# Patient Record
Sex: FEMALE | Race: WHITE | Employment: OTHER | URBAN - METROPOLITAN AREA
[De-identification: names, ages, dates, MRNs, and addresses within clinical notes are randomized per-mention and may not be internally consistent; named-entity substitution may affect disease eponyms.]

---

## 2019-10-25 ENCOUNTER — APPOINTMENT (OUTPATIENT)
Dept: GENERAL RADIOLOGY | Age: 59
DRG: 293 | End: 2019-10-25
Payer: COMMERCIAL

## 2019-10-25 ENCOUNTER — HOSPITAL ENCOUNTER (INPATIENT)
Age: 59
LOS: 2 days | Discharge: HOME | DRG: 293 | End: 2019-10-27
Attending: EMERGENCY MEDICINE | Admitting: INTERNAL MEDICINE
Payer: COMMERCIAL

## 2019-10-25 DIAGNOSIS — I50.9 ACUTE CONGESTIVE HEART FAILURE, UNSPECIFIED HEART FAILURE TYPE (HCC): Primary | ICD-10-CM

## 2019-10-25 DIAGNOSIS — R09.02 HYPOXIA: ICD-10-CM

## 2019-10-25 DIAGNOSIS — J44.9 CHRONIC OBSTRUCTIVE PULMONARY DISEASE, UNSPECIFIED COPD TYPE (HCC): ICD-10-CM

## 2019-10-25 LAB
ABSOLUTE EOS #: 0.1 K/UL (ref 0–0.4)
ABSOLUTE IMMATURE GRANULOCYTE: ABNORMAL K/UL (ref 0–0.3)
ABSOLUTE LYMPH #: 1.5 K/UL (ref 1–4.8)
ABSOLUTE MONO #: 0.6 K/UL (ref 0.1–1.3)
ALBUMIN SERPL-MCNC: 3.9 G/DL (ref 3.5–5.2)
ALBUMIN/GLOBULIN RATIO: ABNORMAL (ref 1–2.5)
ALP BLD-CCNC: 85 U/L (ref 35–104)
ALT SERPL-CCNC: 18 U/L (ref 5–33)
ANION GAP SERPL CALCULATED.3IONS-SCNC: 12 MMOL/L (ref 9–17)
AST SERPL-CCNC: 20 U/L
BASOPHILS # BLD: 1 % (ref 0–2)
BASOPHILS ABSOLUTE: 0.1 K/UL (ref 0–0.2)
BILIRUB SERPL-MCNC: 0.45 MG/DL (ref 0.3–1.2)
BNP INTERPRETATION: ABNORMAL
BUN BLDV-MCNC: 13 MG/DL (ref 6–20)
BUN/CREAT BLD: ABNORMAL (ref 9–20)
CALCIUM SERPL-MCNC: 9 MG/DL (ref 8.6–10.4)
CHLORIDE BLD-SCNC: 100 MMOL/L (ref 98–107)
CO2: 29 MMOL/L (ref 20–31)
CREAT SERPL-MCNC: 0.61 MG/DL (ref 0.5–0.9)
DIFFERENTIAL TYPE: ABNORMAL
EOSINOPHILS RELATIVE PERCENT: 1 % (ref 0–4)
GFR AFRICAN AMERICAN: >60 ML/MIN
GFR NON-AFRICAN AMERICAN: >60 ML/MIN
GFR SERPL CREATININE-BSD FRML MDRD: ABNORMAL ML/MIN/{1.73_M2}
GFR SERPL CREATININE-BSD FRML MDRD: ABNORMAL ML/MIN/{1.73_M2}
GLUCOSE BLD-MCNC: 111 MG/DL (ref 70–99)
HCT VFR BLD CALC: 43.9 % (ref 36–46)
HEMOGLOBIN: 14.4 G/DL (ref 12–16)
IMMATURE GRANULOCYTES: ABNORMAL %
INR BLD: 1
LYMPHOCYTES # BLD: 18 % (ref 24–44)
MCH RBC QN AUTO: 33.7 PG (ref 26–34)
MCHC RBC AUTO-ENTMCNC: 32.7 G/DL (ref 31–37)
MCV RBC AUTO: 102.9 FL (ref 80–100)
MONOCYTES # BLD: 8 % (ref 1–7)
NRBC AUTOMATED: ABNORMAL PER 100 WBC
PARTIAL THROMBOPLASTIN TIME: 30.6 SEC (ref 24–36)
PDW BLD-RTO: 15.3 % (ref 11.5–14.9)
PLATELET # BLD: 331 K/UL (ref 150–450)
PLATELET ESTIMATE: ABNORMAL
PMV BLD AUTO: 8.1 FL (ref 6–12)
POTASSIUM SERPL-SCNC: 4.3 MMOL/L (ref 3.7–5.3)
PRO-BNP: 591 PG/ML
PROTHROMBIN TIME: 13.5 SEC (ref 11.8–14.6)
RBC # BLD: 4.26 M/UL (ref 4–5.2)
RBC # BLD: ABNORMAL 10*6/UL
SEG NEUTROPHILS: 72 % (ref 36–66)
SEGMENTED NEUTROPHILS ABSOLUTE COUNT: 5.9 K/UL (ref 1.3–9.1)
SODIUM BLD-SCNC: 141 MMOL/L (ref 135–144)
TOTAL PROTEIN: 7.2 G/DL (ref 6.4–8.3)
TROPONIN INTERP: NORMAL
TROPONIN INTERP: NORMAL
TROPONIN T: NORMAL NG/ML
TROPONIN T: NORMAL NG/ML
TROPONIN, HIGH SENSITIVITY: 10 NG/L (ref 0–14)
TROPONIN, HIGH SENSITIVITY: 11 NG/L (ref 0–14)
WBC # BLD: 8.2 K/UL (ref 3.5–11)
WBC # BLD: ABNORMAL 10*3/UL

## 2019-10-25 PROCEDURE — 83880 ASSAY OF NATRIURETIC PEPTIDE: CPT

## 2019-10-25 PROCEDURE — 71045 X-RAY EXAM CHEST 1 VIEW: CPT

## 2019-10-25 PROCEDURE — 85610 PROTHROMBIN TIME: CPT

## 2019-10-25 PROCEDURE — 84484 ASSAY OF TROPONIN QUANT: CPT

## 2019-10-25 PROCEDURE — 36415 COLL VENOUS BLD VENIPUNCTURE: CPT

## 2019-10-25 PROCEDURE — 94640 AIRWAY INHALATION TREATMENT: CPT

## 2019-10-25 PROCEDURE — 99285 EMERGENCY DEPT VISIT HI MDM: CPT

## 2019-10-25 PROCEDURE — 93005 ELECTROCARDIOGRAM TRACING: CPT

## 2019-10-25 PROCEDURE — 6360000002 HC RX W HCPCS: Performed by: STUDENT IN AN ORGANIZED HEALTH CARE EDUCATION/TRAINING PROGRAM

## 2019-10-25 PROCEDURE — 85025 COMPLETE CBC W/AUTO DIFF WBC: CPT

## 2019-10-25 PROCEDURE — 85730 THROMBOPLASTIN TIME PARTIAL: CPT

## 2019-10-25 PROCEDURE — 6370000000 HC RX 637 (ALT 250 FOR IP): Performed by: INTERNAL MEDICINE

## 2019-10-25 PROCEDURE — 6360000002 HC RX W HCPCS: Performed by: EMERGENCY MEDICINE

## 2019-10-25 PROCEDURE — 1200000000 HC SEMI PRIVATE

## 2019-10-25 PROCEDURE — 2580000003 HC RX 258: Performed by: STUDENT IN AN ORGANIZED HEALTH CARE EDUCATION/TRAINING PROGRAM

## 2019-10-25 PROCEDURE — 80053 COMPREHEN METABOLIC PANEL: CPT

## 2019-10-25 RX ORDER — IPRATROPIUM BROMIDE AND ALBUTEROL SULFATE 2.5; .5 MG/3ML; MG/3ML
1 SOLUTION RESPIRATORY (INHALATION)
Status: DISCONTINUED | OUTPATIENT
Start: 2019-10-25 | End: 2019-10-27 | Stop reason: HOSPADM

## 2019-10-25 RX ORDER — FUROSEMIDE 10 MG/ML
20 INJECTION INTRAMUSCULAR; INTRAVENOUS DAILY
Status: DISCONTINUED | OUTPATIENT
Start: 2019-10-25 | End: 2019-10-26

## 2019-10-25 RX ORDER — SODIUM CHLORIDE 0.9 % (FLUSH) 0.9 %
10 SYRINGE (ML) INJECTION EVERY 12 HOURS SCHEDULED
Status: DISCONTINUED | OUTPATIENT
Start: 2019-10-25 | End: 2019-10-27 | Stop reason: HOSPADM

## 2019-10-25 RX ORDER — SODIUM CHLORIDE 0.9 % (FLUSH) 0.9 %
10 SYRINGE (ML) INJECTION PRN
Status: DISCONTINUED | OUTPATIENT
Start: 2019-10-25 | End: 2019-10-27 | Stop reason: HOSPADM

## 2019-10-25 RX ORDER — FUROSEMIDE 10 MG/ML
40 INJECTION INTRAMUSCULAR; INTRAVENOUS ONCE
Status: COMPLETED | OUTPATIENT
Start: 2019-10-25 | End: 2019-10-25

## 2019-10-25 RX ORDER — IBUPROFEN 600 MG/1
600 TABLET ORAL EVERY 6 HOURS PRN
Status: DISCONTINUED | OUTPATIENT
Start: 2019-10-25 | End: 2019-10-27 | Stop reason: HOSPADM

## 2019-10-25 RX ORDER — ACETAMINOPHEN 500 MG
500 TABLET ORAL EVERY 6 HOURS PRN
Status: DISCONTINUED | OUTPATIENT
Start: 2019-10-25 | End: 2019-10-27 | Stop reason: HOSPADM

## 2019-10-25 RX ORDER — ONDANSETRON 2 MG/ML
4 INJECTION INTRAMUSCULAR; INTRAVENOUS EVERY 6 HOURS PRN
Status: DISCONTINUED | OUTPATIENT
Start: 2019-10-25 | End: 2019-10-27 | Stop reason: HOSPADM

## 2019-10-25 RX ORDER — ALBUTEROL SULFATE 2.5 MG/3ML
2.5 SOLUTION RESPIRATORY (INHALATION)
Status: DISCONTINUED | OUTPATIENT
Start: 2019-10-25 | End: 2019-10-25

## 2019-10-25 RX ORDER — ALBUTEROL SULFATE 90 UG/1
2 AEROSOL, METERED RESPIRATORY (INHALATION) EVERY 6 HOURS PRN
COMMUNITY
End: 2021-01-19

## 2019-10-25 RX ADMIN — Medication 10 ML: at 23:04

## 2019-10-25 RX ADMIN — ENOXAPARIN SODIUM 40 MG: 40 INJECTION SUBCUTANEOUS at 23:00

## 2019-10-25 RX ADMIN — FUROSEMIDE 40 MG: 10 INJECTION, SOLUTION INTRAMUSCULAR; INTRAVENOUS at 16:48

## 2019-10-25 RX ADMIN — IPRATROPIUM BROMIDE AND ALBUTEROL SULFATE 1 AMPULE: .5; 3 SOLUTION RESPIRATORY (INHALATION) at 21:53

## 2019-10-25 RX ADMIN — IBUPROFEN 600 MG: 600 TABLET, FILM COATED ORAL at 23:46

## 2019-10-25 ASSESSMENT — ENCOUNTER SYMPTOMS
BACK PAIN: 0
VOMITING: 0
EYES NEGATIVE: 1
ABDOMINAL PAIN: 0
CONSTIPATION: 0
GASTROINTESTINAL NEGATIVE: 1
COLOR CHANGE: 0
SHORTNESS OF BREATH: 1
DIARRHEA: 0
WHEEZING: 1
COUGH: 1
APNEA: 1
CHOKING: 0
CHEST TIGHTNESS: 0
NAUSEA: 0
STRIDOR: 0

## 2019-10-25 ASSESSMENT — PAIN SCALES - GENERAL: PAINLEVEL_OUTOF10: 5

## 2019-10-26 LAB
ANION GAP SERPL CALCULATED.3IONS-SCNC: 14 MMOL/L (ref 9–17)
BUN BLDV-MCNC: 15 MG/DL (ref 6–20)
BUN/CREAT BLD: ABNORMAL (ref 9–20)
CALCIUM SERPL-MCNC: 8.7 MG/DL (ref 8.6–10.4)
CHLORIDE BLD-SCNC: 98 MMOL/L (ref 98–107)
CO2: 30 MMOL/L (ref 20–31)
CREAT SERPL-MCNC: 0.64 MG/DL (ref 0.5–0.9)
FOLATE: 4.6 NG/ML
GFR AFRICAN AMERICAN: >60 ML/MIN
GFR NON-AFRICAN AMERICAN: >60 ML/MIN
GFR SERPL CREATININE-BSD FRML MDRD: ABNORMAL ML/MIN/{1.73_M2}
GFR SERPL CREATININE-BSD FRML MDRD: ABNORMAL ML/MIN/{1.73_M2}
GLUCOSE BLD-MCNC: 130 MG/DL (ref 70–99)
HCT VFR BLD CALC: 41.3 % (ref 36–46)
HEMOGLOBIN: 13.7 G/DL (ref 12–16)
LV EF: 58 %
LVEF MODALITY: NORMAL
MCH RBC QN AUTO: 34.2 PG (ref 26–34)
MCHC RBC AUTO-ENTMCNC: 33.1 G/DL (ref 31–37)
MCV RBC AUTO: 103.4 FL (ref 80–100)
NRBC AUTOMATED: ABNORMAL PER 100 WBC
PDW BLD-RTO: 15.7 % (ref 11.5–14.9)
PLATELET # BLD: 302 K/UL (ref 150–450)
PMV BLD AUTO: 8.2 FL (ref 6–12)
POTASSIUM SERPL-SCNC: 3.7 MMOL/L (ref 3.7–5.3)
RBC # BLD: 4 M/UL (ref 4–5.2)
SODIUM BLD-SCNC: 142 MMOL/L (ref 135–144)
TSH SERPL DL<=0.05 MIU/L-ACNC: 1.57 MIU/L (ref 0.3–5)
VITAMIN B-12: 415 PG/ML (ref 232–1245)
WBC # BLD: 6.7 K/UL (ref 3.5–11)

## 2019-10-26 PROCEDURE — 1200000000 HC SEMI PRIVATE

## 2019-10-26 PROCEDURE — 6370000000 HC RX 637 (ALT 250 FOR IP): Performed by: INTERNAL MEDICINE

## 2019-10-26 PROCEDURE — 94761 N-INVAS EAR/PLS OXIMETRY MLT: CPT

## 2019-10-26 PROCEDURE — 99223 1ST HOSP IP/OBS HIGH 75: CPT | Performed by: INTERNAL MEDICINE

## 2019-10-26 PROCEDURE — 94640 AIRWAY INHALATION TREATMENT: CPT

## 2019-10-26 PROCEDURE — 82607 VITAMIN B-12: CPT

## 2019-10-26 PROCEDURE — 2500000003 HC RX 250 WO HCPCS: Performed by: STUDENT IN AN ORGANIZED HEALTH CARE EDUCATION/TRAINING PROGRAM

## 2019-10-26 PROCEDURE — 97116 GAIT TRAINING THERAPY: CPT

## 2019-10-26 PROCEDURE — C8929 TTE W OR WO FOL WCON,DOPPLER: HCPCS

## 2019-10-26 PROCEDURE — 2580000003 HC RX 258: Performed by: STUDENT IN AN ORGANIZED HEALTH CARE EDUCATION/TRAINING PROGRAM

## 2019-10-26 PROCEDURE — 82746 ASSAY OF FOLIC ACID SERUM: CPT

## 2019-10-26 PROCEDURE — 36415 COLL VENOUS BLD VENIPUNCTURE: CPT

## 2019-10-26 PROCEDURE — 85027 COMPLETE CBC AUTOMATED: CPT

## 2019-10-26 PROCEDURE — 6370000000 HC RX 637 (ALT 250 FOR IP): Performed by: STUDENT IN AN ORGANIZED HEALTH CARE EDUCATION/TRAINING PROGRAM

## 2019-10-26 PROCEDURE — 6360000004 HC RX CONTRAST MEDICATION: Performed by: INTERNAL MEDICINE

## 2019-10-26 PROCEDURE — 2700000000 HC OXYGEN THERAPY PER DAY

## 2019-10-26 PROCEDURE — 84443 ASSAY THYROID STIM HORMONE: CPT

## 2019-10-26 PROCEDURE — 97161 PT EVAL LOW COMPLEX 20 MIN: CPT

## 2019-10-26 PROCEDURE — 6360000002 HC RX W HCPCS: Performed by: STUDENT IN AN ORGANIZED HEALTH CARE EDUCATION/TRAINING PROGRAM

## 2019-10-26 PROCEDURE — 80048 BASIC METABOLIC PNL TOTAL CA: CPT

## 2019-10-26 RX ORDER — SPIRONOLACTONE 25 MG/1
25 TABLET ORAL DAILY
Status: DISCONTINUED | OUTPATIENT
Start: 2019-10-26 | End: 2019-10-26

## 2019-10-26 RX ORDER — LISINOPRIL 10 MG/1
10 TABLET ORAL DAILY
Status: DISCONTINUED | OUTPATIENT
Start: 2019-10-26 | End: 2019-10-27 | Stop reason: HOSPADM

## 2019-10-26 RX ORDER — FUROSEMIDE 10 MG/ML
40 INJECTION INTRAMUSCULAR; INTRAVENOUS DAILY
Status: DISCONTINUED | OUTPATIENT
Start: 2019-10-27 | End: 2019-10-27 | Stop reason: HOSPADM

## 2019-10-26 RX ORDER — METOPROLOL TARTRATE 5 MG/5ML
5 INJECTION INTRAVENOUS EVERY 4 HOURS
Status: DISCONTINUED | OUTPATIENT
Start: 2019-10-26 | End: 2019-10-26

## 2019-10-26 RX ORDER — LISINOPRIL 10 MG/1
10 TABLET ORAL DAILY
Status: DISCONTINUED | OUTPATIENT
Start: 2019-10-26 | End: 2019-10-26

## 2019-10-26 RX ADMIN — FUROSEMIDE 20 MG: 10 INJECTION, SOLUTION INTRAMUSCULAR; INTRAVENOUS at 09:00

## 2019-10-26 RX ADMIN — IPRATROPIUM BROMIDE AND ALBUTEROL SULFATE 1 AMPULE: .5; 3 SOLUTION RESPIRATORY (INHALATION) at 07:01

## 2019-10-26 RX ADMIN — Medication 10 ML: at 20:19

## 2019-10-26 RX ADMIN — IPRATROPIUM BROMIDE AND ALBUTEROL SULFATE 1 AMPULE: .5; 3 SOLUTION RESPIRATORY (INHALATION) at 14:55

## 2019-10-26 RX ADMIN — IPRATROPIUM BROMIDE AND ALBUTEROL SULFATE 1 AMPULE: .5; 3 SOLUTION RESPIRATORY (INHALATION) at 21:15

## 2019-10-26 RX ADMIN — IPRATROPIUM BROMIDE AND ALBUTEROL SULFATE 1 AMPULE: .5; 3 SOLUTION RESPIRATORY (INHALATION) at 10:55

## 2019-10-26 RX ADMIN — LISINOPRIL 10 MG: 10 TABLET ORAL at 15:41

## 2019-10-26 RX ADMIN — Medication 10 ML: at 09:02

## 2019-10-26 RX ADMIN — PERFLUTREN 2.2 MG: 6.52 INJECTION, SUSPENSION INTRAVENOUS at 10:43

## 2019-10-26 RX ADMIN — ENOXAPARIN SODIUM 40 MG: 40 INJECTION SUBCUTANEOUS at 09:00

## 2019-10-26 RX ADMIN — METOPROLOL TARTRATE 25 MG: 25 TABLET ORAL at 20:19

## 2019-10-26 RX ADMIN — ENOXAPARIN SODIUM 40 MG: 40 INJECTION SUBCUTANEOUS at 20:18

## 2019-10-26 RX ADMIN — METOPROLOL TARTRATE 5 MG: 1 INJECTION, SOLUTION INTRAVENOUS at 09:07

## 2019-10-26 ASSESSMENT — ENCOUNTER SYMPTOMS
GASTROINTESTINAL NEGATIVE: 1
WHEEZING: 0
SHORTNESS OF BREATH: 1

## 2019-10-27 VITALS
HEIGHT: 67 IN | OXYGEN SATURATION: 96 % | BODY MASS INDEX: 45.99 KG/M2 | TEMPERATURE: 98 F | DIASTOLIC BLOOD PRESSURE: 70 MMHG | HEART RATE: 66 BPM | WEIGHT: 293 LBS | RESPIRATION RATE: 20 BRPM | SYSTOLIC BLOOD PRESSURE: 142 MMHG

## 2019-10-27 LAB
ANION GAP SERPL CALCULATED.3IONS-SCNC: 10 MMOL/L (ref 9–17)
BUN BLDV-MCNC: 14 MG/DL (ref 6–20)
BUN/CREAT BLD: ABNORMAL (ref 9–20)
CALCIUM SERPL-MCNC: 9.4 MG/DL (ref 8.6–10.4)
CHLORIDE BLD-SCNC: 99 MMOL/L (ref 98–107)
CO2: 33 MMOL/L (ref 20–31)
CREAT SERPL-MCNC: 0.67 MG/DL (ref 0.5–0.9)
GFR AFRICAN AMERICAN: >60 ML/MIN
GFR NON-AFRICAN AMERICAN: >60 ML/MIN
GFR SERPL CREATININE-BSD FRML MDRD: ABNORMAL ML/MIN/{1.73_M2}
GFR SERPL CREATININE-BSD FRML MDRD: ABNORMAL ML/MIN/{1.73_M2}
GLUCOSE BLD-MCNC: 122 MG/DL (ref 70–99)
HCT VFR BLD CALC: 41.4 % (ref 36–46)
HEMOGLOBIN: 13.5 G/DL (ref 12–16)
MCH RBC QN AUTO: 33.6 PG (ref 26–34)
MCHC RBC AUTO-ENTMCNC: 32.6 G/DL (ref 31–37)
MCV RBC AUTO: 103 FL (ref 80–100)
NRBC AUTOMATED: ABNORMAL PER 100 WBC
PDW BLD-RTO: 15.9 % (ref 11.5–14.9)
PLATELET # BLD: 302 K/UL (ref 150–450)
PMV BLD AUTO: 8.1 FL (ref 6–12)
POTASSIUM SERPL-SCNC: 4.7 MMOL/L (ref 3.7–5.3)
RBC # BLD: 4.02 M/UL (ref 4–5.2)
SODIUM BLD-SCNC: 142 MMOL/L (ref 135–144)
WBC # BLD: 5.8 K/UL (ref 3.5–11)

## 2019-10-27 PROCEDURE — 2700000000 HC OXYGEN THERAPY PER DAY

## 2019-10-27 PROCEDURE — 80048 BASIC METABOLIC PNL TOTAL CA: CPT

## 2019-10-27 PROCEDURE — 99239 HOSP IP/OBS DSCHRG MGMT >30: CPT | Performed by: INTERNAL MEDICINE

## 2019-10-27 PROCEDURE — 6360000002 HC RX W HCPCS: Performed by: STUDENT IN AN ORGANIZED HEALTH CARE EDUCATION/TRAINING PROGRAM

## 2019-10-27 PROCEDURE — 2580000003 HC RX 258: Performed by: STUDENT IN AN ORGANIZED HEALTH CARE EDUCATION/TRAINING PROGRAM

## 2019-10-27 PROCEDURE — 94640 AIRWAY INHALATION TREATMENT: CPT

## 2019-10-27 PROCEDURE — 36415 COLL VENOUS BLD VENIPUNCTURE: CPT

## 2019-10-27 PROCEDURE — 6370000000 HC RX 637 (ALT 250 FOR IP): Performed by: INTERNAL MEDICINE

## 2019-10-27 PROCEDURE — 6370000000 HC RX 637 (ALT 250 FOR IP): Performed by: STUDENT IN AN ORGANIZED HEALTH CARE EDUCATION/TRAINING PROGRAM

## 2019-10-27 PROCEDURE — 85027 COMPLETE CBC AUTOMATED: CPT

## 2019-10-27 PROCEDURE — 94761 N-INVAS EAR/PLS OXIMETRY MLT: CPT

## 2019-10-27 RX ORDER — LISINOPRIL 10 MG/1
10 TABLET ORAL DAILY
Qty: 30 TABLET | Refills: 3 | Status: SHIPPED | OUTPATIENT
Start: 2019-10-28 | End: 2020-04-21 | Stop reason: SDUPTHER

## 2019-10-27 RX ORDER — FUROSEMIDE 40 MG/1
40 TABLET ORAL DAILY
Qty: 60 TABLET | Refills: 3 | Status: SHIPPED | OUTPATIENT
Start: 2019-10-27 | End: 2020-04-21 | Stop reason: DRUGHIGH

## 2019-10-27 RX ADMIN — FUROSEMIDE 40 MG: 10 INJECTION, SOLUTION INTRAMUSCULAR; INTRAVENOUS at 09:20

## 2019-10-27 RX ADMIN — IPRATROPIUM BROMIDE AND ALBUTEROL SULFATE 1 AMPULE: .5; 3 SOLUTION RESPIRATORY (INHALATION) at 15:32

## 2019-10-27 RX ADMIN — IPRATROPIUM BROMIDE AND ALBUTEROL SULFATE 1 AMPULE: .5; 3 SOLUTION RESPIRATORY (INHALATION) at 07:06

## 2019-10-27 RX ADMIN — Medication 10 ML: at 09:21

## 2019-10-27 RX ADMIN — METOPROLOL TARTRATE 25 MG: 25 TABLET ORAL at 09:20

## 2019-10-27 RX ADMIN — ENOXAPARIN SODIUM 40 MG: 40 INJECTION SUBCUTANEOUS at 09:20

## 2019-10-27 RX ADMIN — LISINOPRIL 10 MG: 10 TABLET ORAL at 09:20

## 2019-10-27 RX ADMIN — IPRATROPIUM BROMIDE AND ALBUTEROL SULFATE 1 AMPULE: .5; 3 SOLUTION RESPIRATORY (INHALATION) at 11:59

## 2019-10-27 ASSESSMENT — ENCOUNTER SYMPTOMS
SHORTNESS OF BREATH: 1
ABDOMINAL PAIN: 0
NAUSEA: 0
APNEA: 0
CHEST TIGHTNESS: 0
DIARRHEA: 0
COUGH: 0
VOMITING: 0
BACK PAIN: 0
ABDOMINAL DISTENTION: 0

## 2019-10-29 ENCOUNTER — HOSPITAL ENCOUNTER (OUTPATIENT)
Age: 59
Setting detail: SPECIMEN
Discharge: HOME | End: 2019-10-29
Payer: COMMERCIAL

## 2019-10-29 ENCOUNTER — TELEPHONE (OUTPATIENT)
Dept: GASTROENTEROLOGY | Age: 59
End: 2019-10-29

## 2019-10-29 ENCOUNTER — OFFICE VISIT (OUTPATIENT)
Dept: INTERNAL MEDICINE CLINIC | Age: 59
End: 2019-10-29
Payer: COMMERCIAL

## 2019-10-29 ENCOUNTER — TELEPHONE (OUTPATIENT)
Dept: INTERNAL MEDICINE CLINIC | Age: 59
End: 2019-10-29

## 2019-10-29 VITALS
SYSTOLIC BLOOD PRESSURE: 122 MMHG | HEIGHT: 67 IN | DIASTOLIC BLOOD PRESSURE: 88 MMHG | WEIGHT: 293 LBS | BODY MASS INDEX: 45.99 KG/M2 | OXYGEN SATURATION: 89 % | HEART RATE: 77 BPM

## 2019-10-29 DIAGNOSIS — E66.01 CLASS 3 SEVERE OBESITY WITH SERIOUS COMORBIDITY AND BODY MASS INDEX (BMI) OF 50.0 TO 59.9 IN ADULT, UNSPECIFIED OBESITY TYPE (HCC): ICD-10-CM

## 2019-10-29 DIAGNOSIS — Z09 HOSPITAL DISCHARGE FOLLOW-UP: ICD-10-CM

## 2019-10-29 DIAGNOSIS — Z12.39 BREAST CANCER SCREENING: ICD-10-CM

## 2019-10-29 DIAGNOSIS — Z76.89 ENCOUNTER TO ESTABLISH CARE: Primary | ICD-10-CM

## 2019-10-29 DIAGNOSIS — J44.9 CHRONIC OBSTRUCTIVE PULMONARY DISEASE, UNSPECIFIED COPD TYPE (HCC): ICD-10-CM

## 2019-10-29 DIAGNOSIS — Z76.89 ENCOUNTER TO ESTABLISH CARE: ICD-10-CM

## 2019-10-29 DIAGNOSIS — F33.0 MILD EPISODE OF RECURRENT MAJOR DEPRESSIVE DISORDER (HCC): ICD-10-CM

## 2019-10-29 DIAGNOSIS — Z72.0 TOBACCO ABUSE: ICD-10-CM

## 2019-10-29 DIAGNOSIS — I10 ESSENTIAL HYPERTENSION: ICD-10-CM

## 2019-10-29 DIAGNOSIS — I50.33 ACUTE ON CHRONIC HEART FAILURE WITH PRESERVED EJECTION FRACTION (HCC): ICD-10-CM

## 2019-10-29 DIAGNOSIS — Z12.11 COLON CANCER SCREENING: ICD-10-CM

## 2019-10-29 DIAGNOSIS — Z80.0 FAMILY HISTORY OF COLON CANCER IN MOTHER: ICD-10-CM

## 2019-10-29 PROBLEM — Z86.711 HISTORY OF PULMONARY EMBOLISM: Status: ACTIVE | Noted: 2019-10-29

## 2019-10-29 PROBLEM — E66.813 CLASS 3 SEVERE OBESITY WITH SERIOUS COMORBIDITY AND BODY MASS INDEX (BMI) OF 50.0 TO 59.9 IN ADULT: Status: ACTIVE | Noted: 2019-10-29

## 2019-10-29 LAB
-: ABNORMAL
ABSOLUTE EOS #: 0.18 K/UL (ref 0–0.44)
ABSOLUTE IMMATURE GRANULOCYTE: 0.04 K/UL (ref 0–0.3)
ABSOLUTE LYMPH #: 1.64 K/UL (ref 1.1–3.7)
ABSOLUTE MONO #: 0.64 K/UL (ref 0.1–1.2)
ALBUMIN SERPL-MCNC: 3.8 G/DL (ref 3.5–5.2)
ALBUMIN/GLOBULIN RATIO: 1 (ref 1–2.5)
ALP BLD-CCNC: 77 U/L (ref 35–104)
ALT SERPL-CCNC: 22 U/L (ref 5–33)
AMORPHOUS: ABNORMAL
ANION GAP SERPL CALCULATED.3IONS-SCNC: 19 MMOL/L (ref 9–17)
AST SERPL-CCNC: 29 U/L
BACTERIA: ABNORMAL
BASOPHILS # BLD: 1 % (ref 0–2)
BASOPHILS ABSOLUTE: 0.04 K/UL (ref 0–0.2)
BILIRUB SERPL-MCNC: 0.73 MG/DL (ref 0.3–1.2)
BILIRUBIN URINE: NEGATIVE
BUN BLDV-MCNC: 11 MG/DL (ref 6–20)
BUN/CREAT BLD: ABNORMAL (ref 9–20)
CALCIUM SERPL-MCNC: 9.8 MG/DL (ref 8.6–10.4)
CASTS UA: ABNORMAL /LPF (ref 0–2)
CHLORIDE BLD-SCNC: 100 MMOL/L (ref 98–107)
CHOLESTEROL/HDL RATIO: 2.9
CHOLESTEROL: 135 MG/DL
CO2: 24 MMOL/L (ref 20–31)
COLOR: YELLOW
COMMENT UA: ABNORMAL
CREAT SERPL-MCNC: 0.52 MG/DL (ref 0.5–0.9)
CRYSTALS, UA: ABNORMAL /HPF
CRYSTALS, UA: ABNORMAL /HPF
DIFFERENTIAL TYPE: ABNORMAL
EOSINOPHILS RELATIVE PERCENT: 2 % (ref 1–4)
EPITHELIAL CELLS UA: ABNORMAL /HPF (ref 0–5)
GFR AFRICAN AMERICAN: >60 ML/MIN
GFR NON-AFRICAN AMERICAN: >60 ML/MIN
GFR SERPL CREATININE-BSD FRML MDRD: ABNORMAL ML/MIN/{1.73_M2}
GFR SERPL CREATININE-BSD FRML MDRD: ABNORMAL ML/MIN/{1.73_M2}
GLUCOSE BLD-MCNC: 113 MG/DL (ref 70–99)
GLUCOSE URINE: NEGATIVE
HCT VFR BLD CALC: 50.1 % (ref 36.3–47.1)
HDLC SERPL-MCNC: 46 MG/DL
HEMOGLOBIN: 15.3 G/DL (ref 11.9–15.1)
IMMATURE GRANULOCYTES: 1 %
KETONES, URINE: NEGATIVE
LDL CHOLESTEROL: 76 MG/DL (ref 0–130)
LEUKOCYTE ESTERASE, URINE: NEGATIVE
LYMPHOCYTES # BLD: 19 % (ref 24–43)
MCH RBC QN AUTO: 33.2 PG (ref 25.2–33.5)
MCHC RBC AUTO-ENTMCNC: 30.5 G/DL (ref 28.4–34.8)
MCV RBC AUTO: 108.7 FL (ref 82.6–102.9)
MONOCYTES # BLD: 8 % (ref 3–12)
MUCUS: ABNORMAL
NITRITE, URINE: NEGATIVE
NRBC AUTOMATED: 0 PER 100 WBC
OTHER OBSERVATIONS UA: ABNORMAL
PDW BLD-RTO: 14.9 % (ref 11.8–14.4)
PH UA: >9 (ref 5–8)
PLATELET # BLD: 389 K/UL (ref 138–453)
PLATELET ESTIMATE: ABNORMAL
PMV BLD AUTO: 10.8 FL (ref 8.1–13.5)
POTASSIUM SERPL-SCNC: 4.9 MMOL/L (ref 3.7–5.3)
PROTEIN UA: NEGATIVE
RBC # BLD: 4.61 M/UL (ref 3.95–5.11)
RBC # BLD: ABNORMAL 10*6/UL
RBC UA: ABNORMAL /HPF (ref 0–2)
RENAL EPITHELIAL, UA: ABNORMAL /HPF
SEG NEUTROPHILS: 69 % (ref 36–65)
SEGMENTED NEUTROPHILS ABSOLUTE COUNT: 5.94 K/UL (ref 1.5–8.1)
SODIUM BLD-SCNC: 143 MMOL/L (ref 135–144)
SPECIFIC GRAVITY UA: 1.02 (ref 1–1.03)
TOTAL PROTEIN: 7.8 G/DL (ref 6.4–8.3)
TRICHOMONAS: ABNORMAL
TRIGL SERPL-MCNC: 64 MG/DL
TSH SERPL DL<=0.05 MIU/L-ACNC: 2.26 MIU/L (ref 0.3–5)
TURBIDITY: ABNORMAL
URINE HGB: NEGATIVE
UROBILINOGEN, URINE: NORMAL
VLDLC SERPL CALC-MCNC: NORMAL MG/DL (ref 1–30)
WBC # BLD: 8.5 K/UL (ref 3.5–11.3)
WBC # BLD: ABNORMAL 10*3/UL
WBC UA: ABNORMAL /HPF (ref 0–5)
YEAST: ABNORMAL

## 2019-10-29 PROCEDURE — 1111F DSCHRG MED/CURRENT MED MERGE: CPT | Performed by: PHYSICIAN ASSISTANT

## 2019-10-29 PROCEDURE — 99205 OFFICE O/P NEW HI 60 MIN: CPT | Performed by: PHYSICIAN ASSISTANT

## 2019-10-29 RX ORDER — PREDNISONE 20 MG/1
20 TABLET ORAL 2 TIMES DAILY
Qty: 10 TABLET | Refills: 0 | Status: SHIPPED | OUTPATIENT
Start: 2019-10-29 | End: 2019-11-03

## 2019-10-29 RX ORDER — PAROXETINE HYDROCHLORIDE 25 MG/1
25 TABLET, FILM COATED, EXTENDED RELEASE ORAL DAILY
Qty: 30 TABLET | Refills: 3 | Status: SHIPPED | OUTPATIENT
Start: 2019-10-29 | End: 2020-04-14 | Stop reason: SDUPTHER

## 2019-10-29 ASSESSMENT — PATIENT HEALTH QUESTIONNAIRE - PHQ9
SUM OF ALL RESPONSES TO PHQ QUESTIONS 1-9: 0
SUM OF ALL RESPONSES TO PHQ9 QUESTIONS 1 & 2: 0
SUM OF ALL RESPONSES TO PHQ QUESTIONS 1-9: 0
2. FEELING DOWN, DEPRESSED OR HOPELESS: 0
1. LITTLE INTEREST OR PLEASURE IN DOING THINGS: 0

## 2019-10-30 LAB
EKG ATRIAL RATE: 77 BPM
EKG P AXIS: 57 DEGREES
EKG P-R INTERVAL: 160 MS
EKG Q-T INTERVAL: 422 MS
EKG QRS DURATION: 98 MS
EKG QTC CALCULATION (BAZETT): 477 MS
EKG R AXIS: -26 DEGREES
EKG T AXIS: 61 DEGREES
EKG VENTRICULAR RATE: 77 BPM
ESTIMATED AVERAGE GLUCOSE: 137 MG/DL
HBA1C MFR BLD: 6.4 % (ref 4–6)

## 2019-10-31 ENCOUNTER — TELEPHONE (OUTPATIENT)
Dept: INTERNAL MEDICINE CLINIC | Age: 59
End: 2019-10-31

## 2019-11-13 ENCOUNTER — OFFICE VISIT (OUTPATIENT)
Dept: INTERNAL MEDICINE CLINIC | Age: 59
End: 2019-11-13
Payer: COMMERCIAL

## 2019-11-13 VITALS
DIASTOLIC BLOOD PRESSURE: 66 MMHG | BODY MASS INDEX: 45.99 KG/M2 | HEIGHT: 67 IN | SYSTOLIC BLOOD PRESSURE: 130 MMHG | OXYGEN SATURATION: 97 % | WEIGHT: 293 LBS | HEART RATE: 85 BPM

## 2019-11-13 DIAGNOSIS — I10 ESSENTIAL HYPERTENSION: ICD-10-CM

## 2019-11-13 DIAGNOSIS — R73.03 PREDIABETES: ICD-10-CM

## 2019-11-13 DIAGNOSIS — E66.01 CLASS 3 SEVERE OBESITY WITH SERIOUS COMORBIDITY AND BODY MASS INDEX (BMI) OF 50.0 TO 59.9 IN ADULT, UNSPECIFIED OBESITY TYPE (HCC): ICD-10-CM

## 2019-11-13 DIAGNOSIS — I50.31 ACUTE DIASTOLIC CONGESTIVE HEART FAILURE (HCC): ICD-10-CM

## 2019-11-13 DIAGNOSIS — G89.29 CHRONIC PAIN OF RIGHT HIP: Primary | ICD-10-CM

## 2019-11-13 DIAGNOSIS — M25.551 CHRONIC PAIN OF RIGHT HIP: Primary | ICD-10-CM

## 2019-11-13 PROCEDURE — 99214 OFFICE O/P EST MOD 30 MIN: CPT | Performed by: PHYSICIAN ASSISTANT

## 2019-11-13 RX ORDER — TRAMADOL HYDROCHLORIDE 50 MG/1
50 TABLET ORAL EVERY 6 HOURS PRN
Qty: 20 TABLET | Refills: 0 | Status: SHIPPED | OUTPATIENT
Start: 2019-11-13 | End: 2019-11-18

## 2019-11-15 ASSESSMENT — ENCOUNTER SYMPTOMS
ABDOMINAL PAIN: 0
DIARRHEA: 0
BACK PAIN: 0
SORE THROAT: 0
EYE DISCHARGE: 0
NAUSEA: 0
WHEEZING: 1
BLOOD IN STOOL: 0
EYE PAIN: 0
COLOR CHANGE: 0
CONSTIPATION: 0
COUGH: 0
EYE ITCHING: 0
VOMITING: 0
SINUS PAIN: 0
CHEST TIGHTNESS: 0
RHINORRHEA: 0
SHORTNESS OF BREATH: 1

## 2019-11-18 ENCOUNTER — HOSPITAL ENCOUNTER (OUTPATIENT)
Dept: GENERAL RADIOLOGY | Facility: CLINIC | Age: 59
Discharge: HOME | End: 2019-11-20
Payer: COMMERCIAL

## 2019-11-18 ENCOUNTER — HOSPITAL ENCOUNTER (OUTPATIENT)
Facility: CLINIC | Age: 59
Discharge: HOME | End: 2019-11-20
Payer: COMMERCIAL

## 2019-11-18 DIAGNOSIS — M25.551 CHRONIC PAIN OF RIGHT HIP: ICD-10-CM

## 2019-11-18 DIAGNOSIS — G89.29 CHRONIC PAIN OF RIGHT HIP: ICD-10-CM

## 2019-11-18 PROCEDURE — 73502 X-RAY EXAM HIP UNI 2-3 VIEWS: CPT

## 2019-11-19 ENCOUNTER — OFFICE VISIT (OUTPATIENT)
Dept: INTERNAL MEDICINE CLINIC | Age: 59
End: 2019-11-19
Payer: COMMERCIAL

## 2019-11-19 VITALS
SYSTOLIC BLOOD PRESSURE: 130 MMHG | WEIGHT: 293 LBS | BODY MASS INDEX: 45.99 KG/M2 | DIASTOLIC BLOOD PRESSURE: 78 MMHG | HEIGHT: 67 IN

## 2019-11-19 DIAGNOSIS — F33.0 MILD EPISODE OF RECURRENT MAJOR DEPRESSIVE DISORDER (HCC): ICD-10-CM

## 2019-11-19 DIAGNOSIS — M16.11 PRIMARY OSTEOARTHRITIS OF RIGHT HIP: Primary | ICD-10-CM

## 2019-11-19 DIAGNOSIS — J44.9 CHRONIC OBSTRUCTIVE PULMONARY DISEASE, UNSPECIFIED COPD TYPE (HCC): ICD-10-CM

## 2019-11-19 DIAGNOSIS — I10 ESSENTIAL HYPERTENSION: ICD-10-CM

## 2019-11-19 DIAGNOSIS — I50.32 CHRONIC HEART FAILURE WITH PRESERVED EJECTION FRACTION (HCC): ICD-10-CM

## 2019-11-19 PROCEDURE — 99214 OFFICE O/P EST MOD 30 MIN: CPT | Performed by: PHYSICIAN ASSISTANT

## 2019-11-19 RX ORDER — IBUPROFEN 600 MG/1
600 TABLET ORAL 3 TIMES DAILY PRN
Qty: 90 TABLET | Refills: 0 | Status: SHIPPED | OUTPATIENT
Start: 2019-11-19 | End: 2020-03-31 | Stop reason: ALTCHOICE

## 2019-11-22 ENCOUNTER — TELEPHONE (OUTPATIENT)
Dept: INTERNAL MEDICINE CLINIC | Age: 59
End: 2019-11-22

## 2019-11-22 DIAGNOSIS — M16.11 PRIMARY OSTEOARTHRITIS OF RIGHT HIP: Primary | ICD-10-CM

## 2019-11-22 RX ORDER — HYDROCODONE BITARTRATE AND ACETAMINOPHEN 5; 325 MG/1; MG/1
1 TABLET ORAL EVERY 8 HOURS PRN
Qty: 15 TABLET | Refills: 0 | Status: SHIPPED | OUTPATIENT
Start: 2019-11-22 | End: 2019-11-27

## 2020-01-20 ENCOUNTER — TELEPHONE (OUTPATIENT)
Dept: INTERNAL MEDICINE CLINIC | Age: 60
End: 2020-01-20

## 2020-01-20 NOTE — TELEPHONE ENCOUNTER
Attempted to reach pt by phone regarding no show appt osm w/ EDWARD Moser. No answer, unable to lm, voice mail box full    Ltr mailed.

## 2020-02-06 ENCOUNTER — TELEPHONE (OUTPATIENT)
Dept: INTERNAL MEDICINE CLINIC | Age: 60
End: 2020-02-06

## 2020-03-31 ENCOUNTER — TELEPHONE (OUTPATIENT)
Dept: INTERNAL MEDICINE CLINIC | Age: 60
End: 2020-03-31

## 2020-03-31 RX ORDER — CELECOXIB 100 MG/1
100 CAPSULE ORAL 2 TIMES DAILY PRN
Qty: 180 CAPSULE | Refills: 1 | Status: SHIPPED | OUTPATIENT
Start: 2020-03-31 | End: 2020-04-21 | Stop reason: ALTCHOICE

## 2020-03-31 RX ORDER — CELECOXIB 100 MG/1
100 CAPSULE ORAL 2 TIMES DAILY
Qty: 60 CAPSULE | Refills: 3 | Status: CANCELLED | OUTPATIENT
Start: 2020-03-31

## 2020-03-31 NOTE — TELEPHONE ENCOUNTER
Patient would need to see orthopedic surgeon for hip injection due to location of arthritis, would likely need intraarticular injection. Can refills the ibuprofen to take TID prn, advise to take with food and large glass of water.

## 2020-03-31 NOTE — TELEPHONE ENCOUNTER
Patient will like to make an appointment to get a anti inflammatory shot in her hip and will also like for the provider to call in some type of anti inflammatory meds to her pharmacy. Please contact the patient at [phone].

## 2020-04-06 ENCOUNTER — TELEPHONE (OUTPATIENT)
Dept: INTERNAL MEDICINE CLINIC | Age: 60
End: 2020-04-06

## 2020-04-06 NOTE — TELEPHONE ENCOUNTER
Patient called and stated that she wanted to be off work longer because of her heart condition and COPD. How long did you want her off work?

## 2020-04-08 ENCOUNTER — TELEPHONE (OUTPATIENT)
Dept: INTERNAL MEDICINE CLINIC | Age: 60
End: 2020-04-08

## 2020-04-09 RX ORDER — MELOXICAM 7.5 MG/1
15 TABLET ORAL DAILY
Qty: 30 TABLET | Refills: 3 | Status: CANCELLED | OUTPATIENT
Start: 2020-04-09

## 2020-04-10 RX ORDER — MELOXICAM 15 MG/1
15 TABLET ORAL DAILY
Qty: 30 TABLET | Refills: 3 | Status: SHIPPED | OUTPATIENT
Start: 2020-04-10 | End: 2020-04-21 | Stop reason: SINTOL

## 2020-04-14 RX ORDER — PAROXETINE HYDROCHLORIDE 25 MG/1
25 TABLET, FILM COATED, EXTENDED RELEASE ORAL DAILY
Qty: 30 TABLET | Refills: 3 | Status: SHIPPED | OUTPATIENT
Start: 2020-04-14 | End: 2020-04-21 | Stop reason: DRUGHIGH

## 2020-04-21 ENCOUNTER — OFFICE VISIT (OUTPATIENT)
Dept: INTERNAL MEDICINE CLINIC | Age: 60
End: 2020-04-21
Payer: COMMERCIAL

## 2020-04-21 VITALS
BODY MASS INDEX: 45.99 KG/M2 | HEIGHT: 67 IN | OXYGEN SATURATION: 80 % | SYSTOLIC BLOOD PRESSURE: 138 MMHG | HEART RATE: 102 BPM | WEIGHT: 293 LBS | DIASTOLIC BLOOD PRESSURE: 80 MMHG

## 2020-04-21 PROCEDURE — G0444 DEPRESSION SCREEN ANNUAL: HCPCS | Performed by: PHYSICIAN ASSISTANT

## 2020-04-21 PROCEDURE — 99215 OFFICE O/P EST HI 40 MIN: CPT | Performed by: PHYSICIAN ASSISTANT

## 2020-04-21 RX ORDER — LISINOPRIL 10 MG/1
10 TABLET ORAL DAILY
Qty: 30 TABLET | Refills: 3 | Status: SHIPPED | OUTPATIENT
Start: 2020-04-21 | End: 2020-12-16

## 2020-04-21 RX ORDER — FUROSEMIDE 40 MG/1
40 TABLET ORAL 2 TIMES DAILY
Qty: 60 TABLET | Refills: 3 | Status: SHIPPED | OUTPATIENT
Start: 2020-04-21 | End: 2020-06-30 | Stop reason: DRUGHIGH

## 2020-04-21 RX ORDER — FUROSEMIDE 40 MG/1
40 TABLET ORAL 2 TIMES DAILY
Qty: 60 TABLET | Refills: 3 | Status: SHIPPED | OUTPATIENT
Start: 2020-04-21 | End: 2020-04-21 | Stop reason: SDUPTHER

## 2020-04-21 RX ORDER — PAROXETINE HYDROCHLORIDE 37.5 MG/1
37.5 TABLET, FILM COATED, EXTENDED RELEASE ORAL DAILY
Qty: 30 TABLET | Refills: 3 | Status: SHIPPED | OUTPATIENT
Start: 2020-04-21 | End: 2020-06-30 | Stop reason: DRUGHIGH

## 2020-04-21 RX ORDER — DOXYCYCLINE HYCLATE 100 MG
100 TABLET ORAL 2 TIMES DAILY
Qty: 14 TABLET | Refills: 0 | Status: SHIPPED | OUTPATIENT
Start: 2020-04-21 | End: 2020-04-28

## 2020-04-21 ASSESSMENT — ENCOUNTER SYMPTOMS
CHEST TIGHTNESS: 0
EYE ITCHING: 0
COUGH: 0
DIARRHEA: 0
EYE DISCHARGE: 0
SORE THROAT: 0
BLOOD IN STOOL: 0
VOICE CHANGE: 0
ABDOMINAL PAIN: 0
VOMITING: 0
EYE PAIN: 0
NAUSEA: 0
WHEEZING: 0
RHINORRHEA: 0
SINUS PAIN: 0
SHORTNESS OF BREATH: 1
CONSTIPATION: 0
TROUBLE SWALLOWING: 0
BACK PAIN: 1

## 2020-04-21 ASSESSMENT — PATIENT HEALTH QUESTIONNAIRE - PHQ9
7. TROUBLE CONCENTRATING ON THINGS, SUCH AS READING THE NEWSPAPER OR WATCHING TELEVISION: 0
SUM OF ALL RESPONSES TO PHQ QUESTIONS 1-9: 10
10. IF YOU CHECKED OFF ANY PROBLEMS, HOW DIFFICULT HAVE THESE PROBLEMS MADE IT FOR YOU TO DO YOUR WORK, TAKE CARE OF THINGS AT HOME, OR GET ALONG WITH OTHER PEOPLE: 0
SUM OF ALL RESPONSES TO PHQ9 QUESTIONS 1 & 2: 4
8. MOVING OR SPEAKING SO SLOWLY THAT OTHER PEOPLE COULD HAVE NOTICED. OR THE OPPOSITE, BEING SO FIGETY OR RESTLESS THAT YOU HAVE BEEN MOVING AROUND A LOT MORE THAN USUAL: 0
1. LITTLE INTEREST OR PLEASURE IN DOING THINGS: 2
SUM OF ALL RESPONSES TO PHQ QUESTIONS 1-9: 10
2. FEELING DOWN, DEPRESSED OR HOPELESS: 2
3. TROUBLE FALLING OR STAYING ASLEEP: 2
5. POOR APPETITE OR OVEREATING: 0
9. THOUGHTS THAT YOU WOULD BE BETTER OFF DEAD, OR OF HURTING YOURSELF: 1
6. FEELING BAD ABOUT YOURSELF - OR THAT YOU ARE A FAILURE OR HAVE LET YOURSELF OR YOUR FAMILY DOWN: 2
4. FEELING TIRED OR HAVING LITTLE ENERGY: 1

## 2020-04-21 ASSESSMENT — COLUMBIA-SUICIDE SEVERITY RATING SCALE - C-SSRS
5. HAVE YOU STARTED TO WORK OUT OR WORKED OUT THE DETAILS OF HOW TO KILL YOURSELF? DO YOU INTEND TO CARRY OUT THIS PLAN?: NO
3. HAVE YOU BEEN THINKING ABOUT HOW YOU MIGHT KILL YOURSELF?: YES
4. HAVE YOU HAD THESE THOUGHTS AND HAD SOME INTENTION OF ACTING ON THEM?: NO
6. HAVE YOU EVER DONE ANYTHING, STARTED TO DO ANYTHING, OR PREPARED TO DO ANYTHING TO END YOUR LIFE?: NO
1. WITHIN THE PAST MONTH, HAVE YOU WISHED YOU WERE DEAD OR WISHED YOU COULD GO TO SLEEP AND NOT WAKE UP?: YES
2. HAVE YOU ACTUALLY HAD ANY THOUGHTS OF KILLING YOURSELF?: YES

## 2020-04-21 NOTE — PROGRESS NOTES
current facility-administered medications for this visit. ALLERGIES:    No Known Allergies      SOCIAL HISTORY    Reviewed and no change from previous record. Neela Goodwin  reports that she has been smoking. She has a 67.50 pack-year smoking history. She has never used smokeless tobacco.    FAMILY HISTORY:    Reviewed and No change from previous visit    REVIEW OF SYSTEMS:    Review of Systems   Constitutional: Positive for fatigue. Negative for chills, diaphoresis and fever. HENT: Negative for congestion, ear discharge, ear pain, hearing loss, rhinorrhea, sinus pain, sore throat, trouble swallowing and voice change. Eyes: Negative for pain, discharge, itching and visual disturbance. Respiratory: Positive for shortness of breath (exertional). Negative for cough, chest tightness and wheezing. Cardiovascular: Positive for leg swelling (bilateral). Negative for chest pain and palpitations. Gastrointestinal: Negative for abdominal pain, blood in stool, constipation, diarrhea, nausea and vomiting. Endocrine: Negative for cold intolerance and heat intolerance. Genitourinary: Negative for difficulty urinating, dysuria, flank pain, frequency, hematuria and urgency. Musculoskeletal: Positive for arthralgias (chronic) and back pain. Negative for neck pain and neck stiffness. Skin: Positive for rash (left lower leg). Neurological: Negative for dizziness, weakness, light-headedness, numbness and headaches. Hematological: Negative for adenopathy. Does not bruise/bleed easily. Psychiatric/Behavioral: Positive for dysphoric mood. Negative for self-injury, sleep disturbance and suicidal ideas. The patient is nervous/anxious. The patient is not hyperactive.       PHYSICAL EXAM:      Vitals:    04/21/20 1059   BP: 138/80   Pulse: 102   SpO2: (!) 80%   Weight: (!) 347 lb (157.4 kg)   Height: 5' 7.01\" (1.702 m)     BP Readings from Last 3 Encounters:   04/21/20 138/80   11/19/19 130/78   11/13/19 130/66

## 2020-05-04 ENCOUNTER — CARE COORDINATION (OUTPATIENT)
Dept: CARE COORDINATION | Age: 60
End: 2020-05-04

## 2020-05-08 ENCOUNTER — CARE COORDINATION (OUTPATIENT)
Dept: CARE COORDINATION | Age: 60
End: 2020-05-08

## 2020-05-24 ENCOUNTER — HOSPITAL ENCOUNTER (INPATIENT)
Age: 60
LOS: 3 days | Discharge: HOME HEALTH | DRG: 194 | End: 2020-05-27
Attending: EMERGENCY MEDICINE | Admitting: INTERNAL MEDICINE
Payer: MEDICAID

## 2020-05-24 ENCOUNTER — APPOINTMENT (OUTPATIENT)
Dept: GENERAL RADIOLOGY | Age: 60
DRG: 194 | End: 2020-05-24
Payer: MEDICAID

## 2020-05-24 ENCOUNTER — APPOINTMENT (OUTPATIENT)
Dept: CT IMAGING | Age: 60
DRG: 194 | End: 2020-05-24
Payer: MEDICAID

## 2020-05-24 PROBLEM — I50.23: Status: ACTIVE | Noted: 2020-05-24

## 2020-05-24 LAB
ABSOLUTE EOS #: 0.32 K/UL (ref 0–0.4)
ABSOLUTE IMMATURE GRANULOCYTE: ABNORMAL K/UL (ref 0–0.3)
ABSOLUTE LYMPH #: 1.46 K/UL (ref 1–4.8)
ABSOLUTE MONO #: 0.08 K/UL (ref 0.1–1.3)
ALBUMIN SERPL-MCNC: 3.3 G/DL (ref 3.5–5.2)
ALBUMIN/GLOBULIN RATIO: ABNORMAL (ref 1–2.5)
ALP BLD-CCNC: 78 U/L (ref 35–104)
ALT SERPL-CCNC: 11 U/L (ref 5–33)
ANION GAP SERPL CALCULATED.3IONS-SCNC: 12 MMOL/L (ref 9–17)
AST SERPL-CCNC: 16 U/L
BASOPHILS # BLD: 1 % (ref 0–2)
BASOPHILS ABSOLUTE: 0.08 K/UL (ref 0–0.2)
BILIRUB SERPL-MCNC: 0.6 MG/DL (ref 0.3–1.2)
BNP INTERPRETATION: ABNORMAL
BUN BLDV-MCNC: 7 MG/DL (ref 8–23)
BUN/CREAT BLD: ABNORMAL (ref 9–20)
C-REACTIVE PROTEIN: 5.1 MG/L (ref 0–5)
CALCIUM SERPL-MCNC: 8.9 MG/DL (ref 8.6–10.4)
CHLORIDE BLD-SCNC: 94 MMOL/L (ref 98–107)
CO2: 32 MMOL/L (ref 20–31)
CREAT SERPL-MCNC: 0.53 MG/DL (ref 0.5–0.9)
D-DIMER QUANTITATIVE: 1.36 MG/L FEU (ref 0–0.59)
DIFFERENTIAL TYPE: ABNORMAL
EOSINOPHILS RELATIVE PERCENT: 4 % (ref 0–4)
FERRITIN: 131 UG/L (ref 13–150)
GFR AFRICAN AMERICAN: >60 ML/MIN
GFR NON-AFRICAN AMERICAN: >60 ML/MIN
GFR SERPL CREATININE-BSD FRML MDRD: ABNORMAL ML/MIN/{1.73_M2}
GFR SERPL CREATININE-BSD FRML MDRD: ABNORMAL ML/MIN/{1.73_M2}
GLUCOSE BLD-MCNC: 120 MG/DL (ref 70–99)
HCT VFR BLD CALC: 47.4 % (ref 36–46)
HEMOGLOBIN: 15.4 G/DL (ref 12–16)
IMMATURE GRANULOCYTES: ABNORMAL %
LACTATE DEHYDROGENASE: 209 U/L (ref 135–214)
LYMPHOCYTES # BLD: 18 % (ref 24–44)
MCH RBC QN AUTO: 36.4 PG (ref 26–34)
MCHC RBC AUTO-ENTMCNC: 32.5 G/DL (ref 31–37)
MCV RBC AUTO: 111.8 FL (ref 80–100)
MONOCYTES # BLD: 1 % (ref 1–7)
MORPHOLOGY: ABNORMAL
MORPHOLOGY: ABNORMAL
NRBC AUTOMATED: ABNORMAL PER 100 WBC
PATHOLOGIST REVIEW: NORMAL
PDW BLD-RTO: 16.4 % (ref 11.5–14.9)
PLATELET # BLD: 325 K/UL (ref 150–450)
PLATELET ESTIMATE: ABNORMAL
PMV BLD AUTO: 8.6 FL (ref 6–12)
POTASSIUM SERPL-SCNC: 3.1 MMOL/L (ref 3.7–5.3)
PRO-BNP: 422 PG/ML
RBC # BLD: 4.24 M/UL (ref 4–5.2)
RBC # BLD: ABNORMAL 10*6/UL
SARS-COV-2, PCR: NORMAL
SARS-COV-2, RAPID: NOT DETECTED
SARS-COV-2: NORMAL
SEG NEUTROPHILS: 76 % (ref 36–66)
SEGMENTED NEUTROPHILS ABSOLUTE COUNT: 6.16 K/UL (ref 1.3–9.1)
SODIUM BLD-SCNC: 138 MMOL/L (ref 135–144)
SOURCE: NORMAL
TOTAL PROTEIN: 6.9 G/DL (ref 6.4–8.3)
TROPONIN INTERP: NORMAL
TROPONIN T: NORMAL NG/ML
TROPONIN, HIGH SENSITIVITY: 10 NG/L (ref 0–14)
WBC # BLD: 8.1 K/UL (ref 3.5–11)
WBC # BLD: ABNORMAL 10*3/UL

## 2020-05-24 PROCEDURE — 96374 THER/PROPH/DIAG INJ IV PUSH: CPT

## 2020-05-24 PROCEDURE — 80053 COMPREHEN METABOLIC PANEL: CPT

## 2020-05-24 PROCEDURE — 36415 COLL VENOUS BLD VENIPUNCTURE: CPT

## 2020-05-24 PROCEDURE — 2060000000 HC ICU INTERMEDIATE R&B

## 2020-05-24 PROCEDURE — 83880 ASSAY OF NATRIURETIC PEPTIDE: CPT

## 2020-05-24 PROCEDURE — 93005 ELECTROCARDIOGRAM TRACING: CPT

## 2020-05-24 PROCEDURE — 85025 COMPLETE CBC W/AUTO DIFF WBC: CPT

## 2020-05-24 PROCEDURE — 86140 C-REACTIVE PROTEIN: CPT

## 2020-05-24 PROCEDURE — 82728 ASSAY OF FERRITIN: CPT

## 2020-05-24 PROCEDURE — 6360000004 HC RX CONTRAST MEDICATION: Performed by: EMERGENCY MEDICINE

## 2020-05-24 PROCEDURE — 83615 LACTATE (LD) (LDH) ENZYME: CPT

## 2020-05-24 PROCEDURE — 85379 FIBRIN DEGRADATION QUANT: CPT

## 2020-05-24 PROCEDURE — 84484 ASSAY OF TROPONIN QUANT: CPT

## 2020-05-24 PROCEDURE — 71260 CT THORAX DX C+: CPT

## 2020-05-24 PROCEDURE — 6370000000 HC RX 637 (ALT 250 FOR IP): Performed by: EMERGENCY MEDICINE

## 2020-05-24 PROCEDURE — U0002 COVID-19 LAB TEST NON-CDC: HCPCS

## 2020-05-24 PROCEDURE — 6360000002 HC RX W HCPCS: Performed by: EMERGENCY MEDICINE

## 2020-05-24 PROCEDURE — 71045 X-RAY EXAM CHEST 1 VIEW: CPT

## 2020-05-24 PROCEDURE — 2580000003 HC RX 258: Performed by: EMERGENCY MEDICINE

## 2020-05-24 PROCEDURE — 99285 EMERGENCY DEPT VISIT HI MDM: CPT

## 2020-05-24 RX ORDER — ACETAMINOPHEN 325 MG/1
650 TABLET ORAL EVERY 4 HOURS PRN
Status: DISCONTINUED | OUTPATIENT
Start: 2020-05-24 | End: 2020-05-27 | Stop reason: HOSPADM

## 2020-05-24 RX ORDER — SODIUM CHLORIDE 0.9 % (FLUSH) 0.9 %
10 SYRINGE (ML) INJECTION EVERY 12 HOURS SCHEDULED
Status: DISCONTINUED | OUTPATIENT
Start: 2020-05-24 | End: 2020-05-27 | Stop reason: HOSPADM

## 2020-05-24 RX ORDER — SODIUM CHLORIDE 0.9 % (FLUSH) 0.9 %
10 SYRINGE (ML) INJECTION PRN
Status: DISCONTINUED | OUTPATIENT
Start: 2020-05-24 | End: 2020-05-27 | Stop reason: HOSPADM

## 2020-05-24 RX ORDER — HYDROCODONE BITARTRATE AND ACETAMINOPHEN 5; 325 MG/1; MG/1
1 TABLET ORAL EVERY 8 HOURS PRN
Status: DISCONTINUED | OUTPATIENT
Start: 2020-05-24 | End: 2020-05-25

## 2020-05-24 RX ORDER — 0.9 % SODIUM CHLORIDE 0.9 %
80 INTRAVENOUS SOLUTION INTRAVENOUS ONCE
Status: COMPLETED | OUTPATIENT
Start: 2020-05-24 | End: 2020-05-24

## 2020-05-24 RX ORDER — HYDROCODONE BITARTRATE AND ACETAMINOPHEN 5; 325 MG/1; MG/1
1 TABLET ORAL ONCE
Status: COMPLETED | OUTPATIENT
Start: 2020-05-24 | End: 2020-05-24

## 2020-05-24 RX ORDER — FUROSEMIDE 10 MG/ML
20 INJECTION INTRAMUSCULAR; INTRAVENOUS ONCE
Status: COMPLETED | OUTPATIENT
Start: 2020-05-24 | End: 2020-05-24

## 2020-05-24 RX ADMIN — Medication 10 ML: at 18:10

## 2020-05-24 RX ADMIN — HYDROCODONE BITARTRATE AND ACETAMINOPHEN 1 TABLET: 5; 325 TABLET ORAL at 17:09

## 2020-05-24 RX ADMIN — IOVERSOL 75 ML: 741 INJECTION INTRA-ARTERIAL; INTRAVENOUS at 18:11

## 2020-05-24 RX ADMIN — SODIUM CHLORIDE 80 ML: 9 INJECTION, SOLUTION INTRAVENOUS at 18:10

## 2020-05-24 RX ADMIN — FUROSEMIDE 20 MG: 10 INJECTION, SOLUTION INTRAMUSCULAR; INTRAVENOUS at 17:09

## 2020-05-24 ASSESSMENT — PAIN SCALES - GENERAL
PAINLEVEL_OUTOF10: 7
PAINLEVEL_OUTOF10: 8
PAINLEVEL_OUTOF10: 8

## 2020-05-24 ASSESSMENT — ENCOUNTER SYMPTOMS
VOMITING: 0
DIARRHEA: 0
RHINORRHEA: 0
BACK PAIN: 0
SHORTNESS OF BREATH: 1
COUGH: 1

## 2020-05-24 ASSESSMENT — PAIN DESCRIPTION - DESCRIPTORS: DESCRIPTORS: BURNING

## 2020-05-24 ASSESSMENT — PAIN DESCRIPTION - ORIENTATION: ORIENTATION: RIGHT;LEFT

## 2020-05-24 ASSESSMENT — PAIN DESCRIPTION - LOCATION: LOCATION: LEG

## 2020-05-24 ASSESSMENT — PAIN DESCRIPTION - FREQUENCY: FREQUENCY: CONTINUOUS

## 2020-05-24 ASSESSMENT — PAIN DESCRIPTION - PAIN TYPE: TYPE: ACUTE PAIN

## 2020-05-24 NOTE — ED PROVIDER NOTES
conjunctivitis  Heart: Regular rate and rhythm no murmurs  Lungs: basilar crackles bilaterally, no respiratory distress  Chest wall: No crepitus, no tenderness palpation  Abdomen: Soft, nontender, nondistended, with no peritoneal signs  Neurologic: Patient is alert and oriented x3, motor and sensation is intact in all 4 extremities, cerebellar function is normal  Extremities: Severe edema, there is bilateral erythema, but no warmth. MEDICAL DECISION MAKING:     MDM    61 y.o. female with significant medical history of OPD CHF pulmonary embolism presenting with shortness of breath. Differential diagnosis of CHF, COPD, COVID-19, pulmonary embolism, atypical ACS, Pna, lung mass, pneumothorax, symptomatic anemia. Can laboratory imaging studies EKG and will reassess. Hospital course:    Covid Negative  CT shows no PE. There is an area in the LLL that is likely atelectasis. Clinically pt most likely with CHF. I d/w DR. Jorge Luis Sarah,  who will admit the patient. Consult to cardiology (Dr. Burton Brown) ordered. Pt treated with IV diuresis. DIAGNOSTIC RESULTS     EKG: All EKG's are interpreted by the Emergency Department Physician who either signs or Co-signs this chart in the absence of a cardiologist.    EKG shows a sinus rhythm. HR is 96, , QRS 82, , no LANG, No STD, TWI V1,v2, the axis is leftwards. RADIOLOGY:All plain film, CT, MRI, and formal ultrasound images (except ED bedside ultrasound) are read by the radiologist and the images and interpretations are directly viewed by the emergency physician. CT CHEST PULMONARY EMBOLISM W CONTRAST   Preliminary Result   1. No evidence of pulmonary embolic disease. Enlarged main pulmonary artery   consistent with pulmonary hypertension. 2. Mild bronchial wall thickening with airway secretions in the lower lobes. Focus of opacification in the left lower lobe posteriorly, likely atelectasis. 3. Cardiomegaly.   Atherosclerotic [address] [place] [address], [address] [place] [address]  [phone]            DISCHARGE MEDICATIONS:  Current Discharge Medication List            Gallo Fam MD  Attending Emergency Physician                      Gallo Fam MD  05/25/20 2373       Gallo Fam MD  05/25/20 4455

## 2020-05-24 NOTE — ED NOTES
Patient placed on 2L nasal cannula. Dr. Abel Batista notified.       Homero Donnelly RN  05/24/20 7301

## 2020-05-25 PROBLEM — E87.6 HYPOKALEMIA: Status: ACTIVE | Noted: 2020-05-25

## 2020-05-25 LAB
ABSOLUTE EOS #: 0.18 K/UL (ref 0–0.4)
ABSOLUTE IMMATURE GRANULOCYTE: ABNORMAL K/UL (ref 0–0.3)
ABSOLUTE LYMPH #: 2.43 K/UL (ref 1–4.8)
ABSOLUTE MONO #: 0.63 K/UL (ref 0.1–1.3)
BASOPHILS # BLD: 1 % (ref 0–2)
BASOPHILS ABSOLUTE: 0.09 K/UL (ref 0–0.2)
BNP INTERPRETATION: ABNORMAL
DIFFERENTIAL TYPE: ABNORMAL
EOSINOPHILS RELATIVE PERCENT: 2 % (ref 0–4)
HCT VFR BLD CALC: 48.9 % (ref 36–46)
HEMOGLOBIN: 16 G/DL (ref 12–16)
IMMATURE GRANULOCYTES: ABNORMAL %
LYMPHOCYTES # BLD: 27 % (ref 24–44)
MCH RBC QN AUTO: 37 PG (ref 26–34)
MCHC RBC AUTO-ENTMCNC: 32.8 G/DL (ref 31–37)
MCV RBC AUTO: 113.1 FL (ref 80–100)
MONOCYTES # BLD: 7 % (ref 1–7)
MORPHOLOGY: ABNORMAL
MORPHOLOGY: ABNORMAL
NRBC AUTOMATED: ABNORMAL PER 100 WBC
PATHOLOGIST REVIEW: NORMAL
PDW BLD-RTO: 16.5 % (ref 11.5–14.9)
PLATELET # BLD: 324 K/UL (ref 150–450)
PLATELET ESTIMATE: ABNORMAL
PMV BLD AUTO: 8.3 FL (ref 6–12)
PRO-BNP: 1243 PG/ML
RBC # BLD: 4.33 M/UL (ref 4–5.2)
RBC # BLD: ABNORMAL 10*6/UL
SEG NEUTROPHILS: 63 % (ref 36–66)
SEGMENTED NEUTROPHILS ABSOLUTE COUNT: 5.67 K/UL (ref 1.3–9.1)
WBC # BLD: 9 K/UL (ref 3.5–11)
WBC # BLD: ABNORMAL 10*3/UL

## 2020-05-25 PROCEDURE — 83036 HEMOGLOBIN GLYCOSYLATED A1C: CPT

## 2020-05-25 PROCEDURE — 99223 1ST HOSP IP/OBS HIGH 75: CPT | Performed by: INTERNAL MEDICINE

## 2020-05-25 PROCEDURE — 83880 ASSAY OF NATRIURETIC PEPTIDE: CPT

## 2020-05-25 PROCEDURE — 85025 COMPLETE CBC W/AUTO DIFF WBC: CPT

## 2020-05-25 PROCEDURE — 2580000003 HC RX 258: Performed by: INTERNAL MEDICINE

## 2020-05-25 PROCEDURE — 6370000000 HC RX 637 (ALT 250 FOR IP): Performed by: INTERNAL MEDICINE

## 2020-05-25 PROCEDURE — 36415 COLL VENOUS BLD VENIPUNCTURE: CPT

## 2020-05-25 PROCEDURE — 6360000002 HC RX W HCPCS: Performed by: INTERNAL MEDICINE

## 2020-05-25 PROCEDURE — 2060000000 HC ICU INTERMEDIATE R&B

## 2020-05-25 PROCEDURE — 97165 OT EVAL LOW COMPLEX 30 MIN: CPT

## 2020-05-25 PROCEDURE — 97162 PT EVAL MOD COMPLEX 30 MIN: CPT

## 2020-05-25 RX ORDER — FUROSEMIDE 10 MG/ML
40 INJECTION INTRAMUSCULAR; INTRAVENOUS ONCE
Status: DISCONTINUED | OUTPATIENT
Start: 2020-05-25 | End: 2020-05-25

## 2020-05-25 RX ORDER — HYDROCODONE BITARTRATE AND ACETAMINOPHEN 5; 325 MG/1; MG/1
2 TABLET ORAL EVERY 8 HOURS PRN
Status: DISCONTINUED | OUTPATIENT
Start: 2020-05-25 | End: 2020-05-27 | Stop reason: HOSPADM

## 2020-05-25 RX ORDER — POTASSIUM CHLORIDE 7.45 MG/ML
10 INJECTION INTRAVENOUS PRN
Status: DISCONTINUED | OUTPATIENT
Start: 2020-05-25 | End: 2020-05-27 | Stop reason: HOSPADM

## 2020-05-25 RX ORDER — DOXYCYCLINE 100 MG/1
100 CAPSULE ORAL EVERY 12 HOURS SCHEDULED
Status: DISCONTINUED | OUTPATIENT
Start: 2020-05-25 | End: 2020-05-27 | Stop reason: HOSPADM

## 2020-05-25 RX ORDER — FUROSEMIDE 10 MG/ML
40 INJECTION INTRAMUSCULAR; INTRAVENOUS DAILY
Status: DISCONTINUED | OUTPATIENT
Start: 2020-05-25 | End: 2020-05-27 | Stop reason: HOSPADM

## 2020-05-25 RX ORDER — POTASSIUM CHLORIDE 20 MEQ/1
40 TABLET, EXTENDED RELEASE ORAL PRN
Status: DISCONTINUED | OUTPATIENT
Start: 2020-05-25 | End: 2020-05-27 | Stop reason: HOSPADM

## 2020-05-25 RX ORDER — ALBUTEROL SULFATE 90 UG/1
2 AEROSOL, METERED RESPIRATORY (INHALATION) EVERY 6 HOURS PRN
Status: DISCONTINUED | OUTPATIENT
Start: 2020-05-24 | End: 2020-05-27 | Stop reason: HOSPADM

## 2020-05-25 RX ORDER — LISINOPRIL 10 MG/1
10 TABLET ORAL DAILY
Status: DISCONTINUED | OUTPATIENT
Start: 2020-05-25 | End: 2020-05-27 | Stop reason: HOSPADM

## 2020-05-25 RX ORDER — PAROXETINE HYDROCHLORIDE 12.5 MG/1
37.5 TABLET, FILM COATED, EXTENDED RELEASE ORAL DAILY
Status: DISCONTINUED | OUTPATIENT
Start: 2020-05-25 | End: 2020-05-27 | Stop reason: HOSPADM

## 2020-05-25 RX ADMIN — HYDROCODONE BITARTRATE AND ACETAMINOPHEN 2 TABLET: 5; 325 TABLET ORAL at 16:45

## 2020-05-25 RX ADMIN — ENOXAPARIN SODIUM 40 MG: 40 INJECTION SUBCUTANEOUS at 21:58

## 2020-05-25 RX ADMIN — METFORMIN HYDROCHLORIDE 500 MG: 500 TABLET ORAL at 08:09

## 2020-05-25 RX ADMIN — Medication 10 ML: at 08:12

## 2020-05-25 RX ADMIN — TIOTROPIUM BROMIDE INHALATION SPRAY 2 PUFF: 3.12 SPRAY, METERED RESPIRATORY (INHALATION) at 08:12

## 2020-05-25 RX ADMIN — PAROXETINE HYDROCHLORIDE 37.5 MG: 12.5 TABLET, FILM COATED, EXTENDED RELEASE ORAL at 08:40

## 2020-05-25 RX ADMIN — DOXYCYCLINE 100 MG: 100 CAPSULE ORAL at 21:58

## 2020-05-25 RX ADMIN — FUROSEMIDE 40 MG: 10 INJECTION, SOLUTION INTRAMUSCULAR; INTRAVENOUS at 08:11

## 2020-05-25 RX ADMIN — ENOXAPARIN SODIUM 40 MG: 40 INJECTION SUBCUTANEOUS at 00:15

## 2020-05-25 RX ADMIN — POTASSIUM CHLORIDE 40 MEQ: 1500 TABLET, EXTENDED RELEASE ORAL at 00:16

## 2020-05-25 RX ADMIN — METOPROLOL TARTRATE 25 MG: 25 TABLET, FILM COATED ORAL at 08:11

## 2020-05-25 RX ADMIN — DOXYCYCLINE 100 MG: 100 CAPSULE ORAL at 13:27

## 2020-05-25 RX ADMIN — ENOXAPARIN SODIUM 40 MG: 40 INJECTION SUBCUTANEOUS at 08:11

## 2020-05-25 RX ADMIN — HYDROCODONE BITARTRATE AND ACETAMINOPHEN 1 TABLET: 5; 325 TABLET ORAL at 08:47

## 2020-05-25 RX ADMIN — HYDROCODONE BITARTRATE AND ACETAMINOPHEN 1 TABLET: 5; 325 TABLET ORAL at 00:16

## 2020-05-25 RX ADMIN — HYDROCODONE BITARTRATE AND ACETAMINOPHEN 1 TABLET: 5; 325 TABLET ORAL at 08:09

## 2020-05-25 RX ADMIN — LISINOPRIL 10 MG: 10 TABLET ORAL at 08:11

## 2020-05-25 RX ADMIN — METOPROLOL TARTRATE 25 MG: 25 TABLET, FILM COATED ORAL at 00:16

## 2020-05-25 RX ADMIN — Medication 10 ML: at 21:58

## 2020-05-25 ASSESSMENT — PAIN DESCRIPTION - DESCRIPTORS
DESCRIPTORS: ACHING;BURNING
DESCRIPTORS: DISCOMFORT;ACHING
DESCRIPTORS: ACHING;DISCOMFORT

## 2020-05-25 ASSESSMENT — PAIN DESCRIPTION - PROGRESSION
CLINICAL_PROGRESSION: GRADUALLY IMPROVING
CLINICAL_PROGRESSION: NOT CHANGED

## 2020-05-25 ASSESSMENT — PAIN SCALES - GENERAL
PAINLEVEL_OUTOF10: 7
PAINLEVEL_OUTOF10: 7
PAINLEVEL_OUTOF10: 9
PAINLEVEL_OUTOF10: 8
PAINLEVEL_OUTOF10: 8
PAINLEVEL_OUTOF10: 4
PAINLEVEL_OUTOF10: 9
PAINLEVEL_OUTOF10: 9
PAINLEVEL_OUTOF10: 7

## 2020-05-25 ASSESSMENT — PAIN DESCRIPTION - PAIN TYPE
TYPE: ACUTE PAIN

## 2020-05-25 ASSESSMENT — PAIN DESCRIPTION - ONSET
ONSET: ON-GOING
ONSET: ON-GOING

## 2020-05-25 ASSESSMENT — PAIN DESCRIPTION - LOCATION
LOCATION: LEG;KNEE;HIP
LOCATION: KNEE;LEG
LOCATION: LEG;KNEE;HIP

## 2020-05-25 ASSESSMENT — PAIN DESCRIPTION - FREQUENCY
FREQUENCY: CONTINUOUS

## 2020-05-25 ASSESSMENT — PAIN DESCRIPTION - ORIENTATION
ORIENTATION: RIGHT
ORIENTATION: RIGHT;LEFT
ORIENTATION: RIGHT

## 2020-05-25 ASSESSMENT — PAIN - FUNCTIONAL ASSESSMENT
PAIN_FUNCTIONAL_ASSESSMENT: PREVENTS OR INTERFERES SOME ACTIVE ACTIVITIES AND ADLS
PAIN_FUNCTIONAL_ASSESSMENT: PREVENTS OR INTERFERES SOME ACTIVE ACTIVITIES AND ADLS

## 2020-05-25 NOTE — PROGRESS NOTES
Physical Therapy    Facility/Department: [facility] [place] PROGRESSIVE CARE  Initial Assessment    NAME: Arvil Lefort  : 1960  MRN: [id number]    Date of Service: 2020    Discharge Recommendations:  Patient would benefit from continued therapy after discharge     PT Equipment Recommendations  Equipment Needed: Yes  Mobility Devices: ADL AssistiveDevices  ADL Assistive Devices: Transfer Tub Bench  Other: Pt having difficulty getting into shower with increased B LE edema and R hip pain. Would benefit from tub transfer bench to assist into shower and conserve energy while taking a shower. Assessment   Body structures, Functions, Activity limitations: Decreased functional mobility ; Decreased ROM; Decreased ADL status; Decreased strength;Decreased endurance;Decreased balance; Increased pain  Assessment: Pt most limited by pain. Pt has had chronic R hip pain, advised to follow up with doctor to evaluate R hip further. Pt ambulated at supervision level with cane. Improved stability and gait with cane vs no device. Treatment Diagnosis: Impaired functional mobility 2* CHF  Specific instructions for Next Treatment: progress gait/stairs, HEP  Prognosis: Good;Fair  Decision Making: Medium Complexity  Exam: ROM, MMT, transfers, amb, balance, activity tolerance  Clinical Presentation: Pt alert, pleasant, cooperative for PT  Barriers to Learning: none  REQUIRES PT FOLLOW UP: Yes  Activity Tolerance  Activity Tolerance: Patient limited by pain; Patient Tolerated treatment well       Patient Diagnosis(es): The primary encounter diagnosis was Acute on chronic respiratory failure with hypoxia ([facility] [address] A diagnosis of Acute on chronic congestive heart failure, unspecified heart failure type [facility]) was also pertinent to this visit. has a past medical history of Arthritis, CHF (congestive heart failure) ([address] COPD (chronic obstructive pulmonary disease) ([address] Hx of blood clots, and Pulmonary embolism ([address]    has a past surgical history that includes Ankle surgery; Colon surgery; and hernia repair. Restrictions  Restrictions/Precautions  Restrictions/Precautions: General Precautions, Fall Risk  Required Braces or Orthoses?: No  Implants present? : Metal implants(L ankle screw/plate)  Position Activity Restriction  Other position/activity restrictions: PT eval and treat  Vision/Hearing  Vision: Impaired  Vision Exceptions: Wears glasses for distance  Hearing: Within functional limits     Subjective  General  Chart Reviewed: Yes  Patient assessed for rehabilitation services?: Yes  Additional Pertinent Hx: COPD  Family / Caregiver Present: No  Referring Practitioner: Carla Perdue MD  Referral Date : 05/24/20  Diagnosis: CHF  Follows Commands: Within Functional Limits  Subjective  Subjective: Pt very pleasant, pt up in room using restroom without device. No O2 on. Pain Screening  Patient Currently in Pain: Yes  Pain Assessment  Pain Assessment: 0-10  Pain Level: 7  Pain Type: Acute pain  Pain Location: Leg;Knee;Hip  Pain Orientation: Right  Pain Descriptors: Discomfort;Aching  Pain Frequency: Continuous  Pain Onset: On-going  Clinical Progression: Not changed  Functional Pain Assessment: Prevents or interferes some active activities and ADLs  Non-Pharmaceutical Pain Intervention(s): Ambulation/Increased Activity; Distraction;Repositioned; Rest(pt declines elevating B LE)  Response to Pain Intervention: Patient Satisfied  Vital Signs  Patient Currently in Pain: Yes  Oxygen Therapy  SpO2: 93 %  O2 Device: None (Room air)  Patient Observation  Observations: B LE erythema/edema, antalgic gait due to R hip OA       Orientation  Orientation  Overall Orientation Status: Within Normal Limits  Social/Functional History  Social/Functional History  Lives With: Significant other  Type of Home: House  Home Layout: One level  Home Access: Stairs to enter without rails  Entrance Stairs - Number of Steps: 2  Bathroom Shower/Tub: Tub/Shower unit, Curtain  Bathroom Toilet: Standard  Bathroom Accessibility: Accessible  Home Equipment: Cane  ADL Assistance: Independent(sponge bathing difficulty getting into shower)  Homemaking Assistance: Needs assistance(S.O. does)  Homemaking Responsibilities: No  Ambulation Assistance: Independent(uses cane more recently)  Transfer Assistance: Independent(difficulty getting into shower/stairs)  Active : Yes  Mode of Transportation: Mary [employer]  Occupation: On disability  Type of occupation: Kitchen at [employer]  IADL Comments: sleeps in recliner  Additional Comments: Pt states she is supposed to wear O2 at home but does not have it. Has been having difficulty getting into shower between R hip pain and increased B LE edema. Pt filed for disability due to health issues. 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 Cognition        Objective          AROM RLE (degrees)  RLE AROM: WFL  AROM LLE (degrees)  LLE AROM : WFL  AROM RUE (degrees)  RUE General AROM: See OT  AROM LUE (degrees)  LUE General AROM: See OT  Strength RLE  Strength RLE: WFL  Comment: Grossly 3+/5, hip flexion 3-/5 due to pain  Strength LLE  Strength LLE: WNL  Comment: Grossly 4-/5  Strength RUE  Comment: See OT  Strength LUE  Comment: See OT     Sensation  Overall Sensation Status: Impaired(B feet (since increased edema) numb/tingling)  Bed mobility  Rolling to Left: Modified independent  Supine to Sit: Modified independent  Comment: Pt Mod I to bathroom to use restroom. Up in chair at end of session. Instructed to use call light to get back to bed. Pt declines elevating B LE. Transfers  Sit to Stand: Modified independent  Stand to sit: Modified independent  Comment: Mod I without device in bathroom, with cane up to chair. Pt uses cane in L UE - discussed benefits of using in L UE for R hip pain.   Ambulation  Ambulation?: Yes  More Ambulation?: Yes  Ambulation 1  Surface: level tile  Device: No Device  Assistance: Supervision  Quality of Gait:

## 2020-05-25 NOTE — PROGRESS NOTES
AROM (degrees)  LUE AROM : WFL     Left Hand AROM (degrees)  Left Hand AROM: WFL  RUE Strength  Gross RUE Strength: WFL      RUE Tone: Normotonic     RUE AROM (degrees)  RUE AROM : WFL     Right Hand AROM (degrees)  Right Hand AROM: WFL    Fine Motor Skills  Coordination  Movements Are Fluid And Coordinated: Yes                           Mobility  Supine to Sit: Modified independent       Balance  Sitting Balance: Independent  Standing Balance: Stand by assistance(Mod I to/from BR; SBA ambulating around bed with cane)     Functional Mobility  Functional - Mobility Device: No device  Activity: To/from bathroom  Assist Level: Modified independent   Functional Mobility Comments: SBA with cane to walk around bed. Bed mobility  Rolling to Left: Modified independent  Supine to Sit: Modified independent  Comment: Pt left up in bedside chair at end of session. Transfers  Stand Step Transfers: Stand by assistance  Stand Pivot Transfers: Stand by assistance  Sit to stand: Modified independent  Stand to sit: Modified independent  Transfer Comments: Pt has personal cane in room. Toilet Transfers  Toilet - Technique: Ambulating  Equipment Used: Standard toilet  Toilet Transfer: Modified independent  Functional Activity Tolerance  Functional Activity Tolerance: Tolerates 10 - 20 min exercise with multiple rests   Assessment  Assessment: Pt requiring minimal A for LB ADL's 2* increased edema in BLE. Pt reports no concerns about completing these tasks at discharge. Pt also reports that her SO provides A as needed at home when she is having difficulty. PT to address balance, transfers, and mobility. No need for skilled OT services at this time. Prognosis: Good  Decision Making: Low Complexity  REQUIRES OT FOLLOW UP: No  Activity Tolerance: Patient Tolerated treatment well         Goals  Patient Goals   Patient goals : Return home with A from SO as needed.     Plan  Safety Devices  Safety Devices in place: Yes  Type of

## 2020-05-26 LAB
ABSOLUTE EOS #: 0.2 K/UL (ref 0–0.4)
ABSOLUTE IMMATURE GRANULOCYTE: ABNORMAL K/UL (ref 0–0.3)
ABSOLUTE LYMPH #: 2.2 K/UL (ref 1–4.8)
ABSOLUTE MONO #: 0.5 K/UL (ref 0.1–1.3)
ANION GAP SERPL CALCULATED.3IONS-SCNC: 11 MMOL/L (ref 9–17)
BASOPHILS # BLD: 1 % (ref 0–2)
BASOPHILS ABSOLUTE: 0 K/UL (ref 0–0.2)
BUN BLDV-MCNC: 12 MG/DL (ref 8–23)
BUN/CREAT BLD: ABNORMAL (ref 9–20)
CALCIUM SERPL-MCNC: 8.5 MG/DL (ref 8.6–10.4)
CHLORIDE BLD-SCNC: 93 MMOL/L (ref 98–107)
CO2: 35 MMOL/L (ref 20–31)
CREAT SERPL-MCNC: 0.62 MG/DL (ref 0.5–0.9)
DIFFERENTIAL TYPE: ABNORMAL
EOSINOPHILS RELATIVE PERCENT: 3 % (ref 0–4)
ESTIMATED AVERAGE GLUCOSE: 137 MG/DL
GFR AFRICAN AMERICAN: >60 ML/MIN
GFR NON-AFRICAN AMERICAN: >60 ML/MIN
GFR SERPL CREATININE-BSD FRML MDRD: ABNORMAL ML/MIN/{1.73_M2}
GFR SERPL CREATININE-BSD FRML MDRD: ABNORMAL ML/MIN/{1.73_M2}
GLUCOSE BLD-MCNC: 109 MG/DL (ref 70–99)
HBA1C MFR BLD: 6.4 % (ref 4–6)
HCT VFR BLD CALC: 44.2 % (ref 36–46)
HEMOGLOBIN: 14.4 G/DL (ref 12–16)
IMMATURE GRANULOCYTES: ABNORMAL %
LYMPHOCYTES # BLD: 35 % (ref 24–44)
MAGNESIUM: 1.7 MG/DL (ref 1.6–2.6)
MCH RBC QN AUTO: 36.6 PG (ref 26–34)
MCHC RBC AUTO-ENTMCNC: 32.5 G/DL (ref 31–37)
MCV RBC AUTO: 112.5 FL (ref 80–100)
MONOCYTES # BLD: 8 % (ref 1–7)
NRBC AUTOMATED: ABNORMAL PER 100 WBC
PATHOLOGIST REVIEW: NORMAL
PDW BLD-RTO: 16.4 % (ref 11.5–14.9)
PLATELET # BLD: 271 K/UL (ref 150–450)
PLATELET ESTIMATE: ABNORMAL
PMV BLD AUTO: 8.1 FL (ref 6–12)
POTASSIUM SERPL-SCNC: 3.5 MMOL/L (ref 3.7–5.3)
RBC # BLD: 3.93 M/UL (ref 4–5.2)
RBC # BLD: ABNORMAL 10*6/UL
SEG NEUTROPHILS: 53 % (ref 36–66)
SEGMENTED NEUTROPHILS ABSOLUTE COUNT: 3.3 K/UL (ref 1.3–9.1)
SODIUM BLD-SCNC: 139 MMOL/L (ref 135–144)
WBC # BLD: 6.2 K/UL (ref 3.5–11)
WBC # BLD: ABNORMAL 10*3/UL

## 2020-05-26 PROCEDURE — 2500000003 HC RX 250 WO HCPCS: Performed by: INTERNAL MEDICINE

## 2020-05-26 PROCEDURE — 93308 TTE F-UP OR LMTD: CPT

## 2020-05-26 PROCEDURE — 2580000003 HC RX 258: Performed by: INTERNAL MEDICINE

## 2020-05-26 PROCEDURE — 85025 COMPLETE CBC W/AUTO DIFF WBC: CPT

## 2020-05-26 PROCEDURE — 99233 SBSQ HOSP IP/OBS HIGH 50: CPT | Performed by: INTERNAL MEDICINE

## 2020-05-26 PROCEDURE — 2060000000 HC ICU INTERMEDIATE R&B

## 2020-05-26 PROCEDURE — 83735 ASSAY OF MAGNESIUM: CPT

## 2020-05-26 PROCEDURE — 36415 COLL VENOUS BLD VENIPUNCTURE: CPT

## 2020-05-26 PROCEDURE — 80048 BASIC METABOLIC PNL TOTAL CA: CPT

## 2020-05-26 PROCEDURE — 6370000000 HC RX 637 (ALT 250 FOR IP): Performed by: NURSE PRACTITIONER

## 2020-05-26 PROCEDURE — 6370000000 HC RX 637 (ALT 250 FOR IP): Performed by: INTERNAL MEDICINE

## 2020-05-26 PROCEDURE — 6360000002 HC RX W HCPCS: Performed by: INTERNAL MEDICINE

## 2020-05-26 PROCEDURE — 99213 OFFICE O/P EST LOW 20 MIN: CPT

## 2020-05-26 RX ORDER — SPIRONOLACTONE 25 MG/1
25 TABLET ORAL DAILY
Status: DISCONTINUED | OUTPATIENT
Start: 2020-05-26 | End: 2020-05-27 | Stop reason: HOSPADM

## 2020-05-26 RX ADMIN — ENOXAPARIN SODIUM 40 MG: 40 INJECTION SUBCUTANEOUS at 20:42

## 2020-05-26 RX ADMIN — Medication 10 ML: at 20:43

## 2020-05-26 RX ADMIN — METOPROLOL TARTRATE 25 MG: 25 TABLET, FILM COATED ORAL at 08:55

## 2020-05-26 RX ADMIN — TIOTROPIUM BROMIDE INHALATION SPRAY 2 PUFF: 3.12 SPRAY, METERED RESPIRATORY (INHALATION) at 07:52

## 2020-05-26 RX ADMIN — LISINOPRIL 10 MG: 10 TABLET ORAL at 08:55

## 2020-05-26 RX ADMIN — ENOXAPARIN SODIUM 40 MG: 40 INJECTION SUBCUTANEOUS at 08:55

## 2020-05-26 RX ADMIN — PAROXETINE HYDROCHLORIDE 37.5 MG: 12.5 TABLET, FILM COATED, EXTENDED RELEASE ORAL at 12:47

## 2020-05-26 RX ADMIN — DICLOFENAC 2 G: 10 GEL TOPICAL at 05:38

## 2020-05-26 RX ADMIN — FUROSEMIDE 40 MG: 10 INJECTION, SOLUTION INTRAMUSCULAR; INTRAVENOUS at 10:49

## 2020-05-26 RX ADMIN — HYDROCODONE BITARTRATE AND ACETAMINOPHEN 2 TABLET: 5; 325 TABLET ORAL at 00:46

## 2020-05-26 RX ADMIN — SPIRONOLACTONE 25 MG: 25 TABLET, FILM COATED ORAL at 16:35

## 2020-05-26 RX ADMIN — DOXYCYCLINE 100 MG: 100 CAPSULE ORAL at 20:42

## 2020-05-26 RX ADMIN — POTASSIUM CHLORIDE 40 MEQ: 1500 TABLET, EXTENDED RELEASE ORAL at 08:55

## 2020-05-26 RX ADMIN — DICLOFENAC 2 G: 10 GEL TOPICAL at 00:46

## 2020-05-26 RX ADMIN — Medication 10 ML: at 10:49

## 2020-05-26 RX ADMIN — DOXYCYCLINE 100 MG: 100 CAPSULE ORAL at 08:55

## 2020-05-26 RX ADMIN — DICLOFENAC 2 G: 10 GEL TOPICAL at 20:20

## 2020-05-26 RX ADMIN — METOPROLOL TARTRATE 25 MG: 25 TABLET, FILM COATED ORAL at 20:42

## 2020-05-26 RX ADMIN — HYDROCODONE BITARTRATE AND ACETAMINOPHEN 2 TABLET: 5; 325 TABLET ORAL at 08:55

## 2020-05-26 RX ADMIN — HYDROCODONE BITARTRATE AND ACETAMINOPHEN 2 TABLET: 5; 325 TABLET ORAL at 16:39

## 2020-05-26 RX ADMIN — ANTI-FUNGAL POWDER MICONAZOLE NITRATE TALC FREE: 1.42 POWDER TOPICAL at 20:20

## 2020-05-26 RX ADMIN — METFORMIN HYDROCHLORIDE 500 MG: 500 TABLET ORAL at 07:52

## 2020-05-26 RX ADMIN — DICLOFENAC 2 G: 10 GEL TOPICAL at 12:45

## 2020-05-26 ASSESSMENT — PAIN DESCRIPTION - PAIN TYPE
TYPE: ACUTE PAIN;CHRONIC PAIN

## 2020-05-26 ASSESSMENT — PAIN SCALES - GENERAL
PAINLEVEL_OUTOF10: 9
PAINLEVEL_OUTOF10: 4
PAINLEVEL_OUTOF10: 9
PAINLEVEL_OUTOF10: 7
PAINLEVEL_OUTOF10: 8
PAINLEVEL_OUTOF10: 8
PAINLEVEL_OUTOF10: 7
PAINLEVEL_OUTOF10: 7
PAINLEVEL_OUTOF10: 9

## 2020-05-26 ASSESSMENT — PAIN DESCRIPTION - PROGRESSION
CLINICAL_PROGRESSION: NOT CHANGED

## 2020-05-26 ASSESSMENT — PAIN DESCRIPTION - LOCATION
LOCATION: HIP;KNEE;LEG

## 2020-05-26 ASSESSMENT — PAIN DESCRIPTION - ORIENTATION
ORIENTATION: RIGHT;LEFT;LOWER
ORIENTATION: RIGHT;LEFT;LOWER

## 2020-05-26 ASSESSMENT — PAIN DESCRIPTION - FREQUENCY
FREQUENCY: INTERMITTENT
FREQUENCY: INTERMITTENT

## 2020-05-26 ASSESSMENT — PAIN DESCRIPTION - ONSET
ONSET: ON-GOING
ONSET: ON-GOING

## 2020-05-26 ASSESSMENT — PAIN DESCRIPTION - DESCRIPTORS
DESCRIPTORS: BURNING;SHARP
DESCRIPTORS: BURNING;SHARP

## 2020-05-26 NOTE — PROGRESS NOTES
05/24/2020     PT/INR:    Lab Results   Component Value Date    PROTIME 13.5 10/25/2019    INR 1.0 10/25/2019     HgBA1c:    Lab Results   Component Value Date    LABA1C 6.4 05/25/2020     PTT: No components found for: LABPTT    Review of Systems    Assessment:     Physical Exam      Ac Risk Score: Ac Scale Score: 16    Patient Active Problem List   Diagnosis Code    Chronic heart failure with preserved ejection fraction (HCC) I50.32    Chronic obstructive pulmonary disease (HCC) J44.9    Tobacco abuse Z72.0    Class 3 severe obesity with serious comorbidity and body mass index (BMI) of 50.0 to 59.9 in adult ([facility] [address] E66.01, Z68.43    Essential hypertension I10    History of pulmonary embolism Z80.36    Family history of colon cancer in mother [de-identified]    Prediabetes R73.03    Class 4 congestive heart failure, acute on chronic, systolic (HCC) C15.10    Hypokalemia E87.6       Patient Active Problem List   Diagnosis    Chronic heart failure with preserved ejection fraction (HCC)    Chronic obstructive pulmonary disease (HCC)    Tobacco abuse    Class 3 severe obesity with serious comorbidity and body mass index (BMI) of 50.0 to 59.9 in adult [facility])    Essential hypertension    History of pulmonary embolism    Family history of colon cancer in mother    Prediabetes    Class 4 congestive heart failure, acute on chronic, systolic (HCC)    Hypokalemia       Measurements:  Wound 05/25/20 Leg Left; Lower; Anterior; Lateral cluster (Active)   Wound Image    5/26/2020  3:49 PM   Wound Venous 5/26/2020  3:49 PM   Dressing Status Old drainage; Changed 5/26/2020  3:49 PM   Dressing Changed Changed/New 5/26/2020  3:49 PM   Dressing/Treatment Other (comment);ABD;Roll gauze 5/26/2020  3:49 PM   Wound Cleansed Rinsed/Irrigated with saline 5/26/2020  3:49 PM   Dressing Change Due 05/27/20 5/26/2020  3:49 PM   Wound Assessment Drainage;Painful;Red;Yellow;Slough 5/26/2020  3:49 PM   Drainage Amount Moderate 5/26/2020 wounds appear to have previously been blistered and are now beginning to slough with some open tissue. The optimal wound healing environment would include added moisture as the wounds are quite dry. Buttocks with denudation just adjacent to gluteal fold and erythematous raised rash with satellite lesions consistent with fungal. Would recommend zinc and antifungal powder, thin layer. Response to treatment:  Well tolerated by patient. Plan:     Plan of Care:     Buttock wounds/denudation: keep clean and dry. Use antifungal powder/zinc cream combo, thin layer to very clean and DRY skin at least twice daily. BLE wound care:  cleanse with saline, pat dry. Cover wound areas with oil emulsion dressing, ABD pad, roll gauze. The patient would benefit from long term compression therapy if arterial studies are completed and WNL. -Future lower leg wound prevention will include close mgmt of CHF and venous stasis. This was discussed at length with the patient.    -Follow up [address] [place] [address] as needed [phone]       -Turn every 2 hours    -Float heels of of bed with pillows under     -Mepilex sacrum dressing to sacrococcygeal area. Peel back dressing, inspect skin beneath, re-secure. Change every 72 hours and prn wrinkles, soilage. Discontinue Sacral Mepilex if  repeatedly soiled by incontinence.     -Routine incontinence care with Clarke barrier cloths and zinc oxide cream. Apply zinc oxide cream BID and prn incontinence. Covidien moisture wicking under pads vs cloth backed briefs    -Static air overlay. Check inflation each shift by sliding hand under the air overlay. Feel for the patient's heaviest/ most dependant body part. Ideally 1/2 to 1\" of air will be between your hand and the patient's body. Add air prn.     Specialty Bed Required : Yes   [] Low Air Loss   [x] Pressure Redistribution  [] Fluid Immersion  [] Bariatric  [] Total Pressure Relief  [] Other:     Current Diet: DIET LOW SODIUM 2

## 2020-05-26 NOTE — PROGRESS NOTES
REPORTED     RBC Morphology NOT REPORTED     Platelet Estimate NOT REPORTED     Seg Neutrophils 53 36 - 66 %    Lymphocytes 35 24 - 44 %    Monocytes 8 (H) 1 - 7 %    Eosinophils % 3 0 - 4 %    Basophils 1 0 - 2 %    Segs Absolute 3.30 1.3 - 9.1 k/uL    Absolute Lymph # 2.20 1.0 - 4.8 k/uL    Absolute Mono # 0.50 0.1 - 1.3 k/uL    Absolute Eos # 0.20 0.0 - 0.4 k/uL    Basophils Absolute 0.00 0.0 - 0.2 k/uL   Basic Metabolic Panel w/ Reflex to MG    Collection Time: 05/26/20  5:12 AM   Result Value Ref Range    Glucose 109 (H) 70 - 99 mg/dL    BUN 12 8 - 23 mg/dL    CREATININE 0.62 0.50 - 0.90 mg/dL    Bun/Cre Ratio NOT REPORTED 9 - 20    Calcium 8.5 (L) 8.6 - 10.4 mg/dL    Sodium 139 135 - 144 mmol/L    Potassium 3.5 (L) 3.7 - 5.3 mmol/L    Chloride 93 (L) 98 - 107 mmol/L    CO2 35 (H) 20 - 31 mmol/L    Anion Gap 11 9 - 17 mmol/L    GFR Non-African American >60 >60 mL/min    GFR African American >60 >60 mL/min    GFR Comment          GFR Staging NOT REPORTED    Path Review, Smear    Collection Time: 05/26/20  5:12 AM   Result Value Ref Range    Pathologist Review NOT REPORTED    Magnesium    Collection Time: 05/26/20  5:12 AM   Result Value Ref Range    Magnesium 1.7 1.6 - 2.6 mg/dL       Recent Labs     05/26/20  0512  05/24/20  1445   HGB 14.4   < > 15.4   HCT 44.2   < > 47.4*   WBC 6.2   < > 8.1   .5*   < > 111.8*     --  138   K 3.5*  --  3.1*   CL 93*  --  94*   CO2 35*  --  32*   BUN 12  --  7*   CREATININE 0.62  --  0.53   GLUCOSE 109*  --  120*   AST  --   --  16   ALT  --   --  11   LABALBU  --   --  3.3*    < > = values in this interval not displayed.        Hematology:  Recent Labs     05/24/20  1445 05/25/20  0516 05/26/20  0512   WBC 8.1 9.0 6.2   RBC 4.24 4.33 3.93*   HGB 15.4 16.0 14.4   HCT 47.4* 48.9* 44.2   .8* 113.1* 112.5*   MCH 36.4* 37.0* 36.6*   MCHC 32.5 32.8 32.5   RDW 16.4* 16.5* 16.4*    324 271   MPV 8.6 8.3 8.1   CRP 5.1*  --   --    DDIMER 1.36*  --   -- Pulmonary Arteries: Pulmonary arteries are adequately opacified for evaluation. No evidence of intraluminal filling defect to suggest pulmonary embolism. Enlarged main pulmonary artery at 4.1 cm indicative of pulmonary hypertension. Mediastinum: The thoracic aorta is normal in course and caliber with mild atherosclerotic plaque. Cardiomegaly with atherosclerotic calcification in the coronary circulation. No pericardial effusion. Calcified mediastinal and right hilar nodes consistent with old granulomatous disease. No pathologic adenopathy. Lungs/pleura: Focus of opacification in the left lower lobe posteriorly. This is likely atelectasis associated with some airway secretions which are noted in the lower lobe bronchial tree bilaterally. Mild bronchial wall thickening in the lower lobes. Patchy opacification at the right lung apex, possibly asymmetric fibrotic changes. The lungs otherwise are grossly clear. Small focus of linear atelectasis in the right middle lobe anteriorly. No pleural fluid or pneumothorax. Upper Abdomen: Limited images of the upper abdomen are unremarkable. Soft Tissues/Bones: No acute bone or soft tissue abnormality. 1. No evidence of pulmonary embolic disease. Enlarged main pulmonary artery consistent with pulmonary hypertension. 2. Mild bronchial wall thickening with airway secretions in the lower lobes. Focus of opacification in the left lower lobe posteriorly, likely atelectasis. 3. Cardiomegaly. Atherosclerotic calcification in the aorta and coronary circulation.         Current Facility-Administered Medications   Medication Dose Route Frequency Provider Last Rate Last Dose    perflutren lipid microspheres (DEFINITY) injection 2.2 mg  2 mL Intravenous ONCE PRN Rhoda Newman MD        albuterol sulfate  (90 Base) MCG/ACT inhaler 2 puff  2 puff Inhalation Q6H PRN Landy Retana MD        lisinopril (PRINIVIL;ZESTRIL) tablet 10 mg  10 mg Oral Daily Landy Retana MD 10 mg at 05/26/20 0855    metFORMIN (GLUCOPHAGE) tablet 500 mg  500 mg Oral Daily with breakfast Freida Fierro MD   500 mg at 05/26/20 2132    metoprolol tartrate (LOPRESSOR) tablet 25 mg  25 mg Oral BID Freida Fierro MD   25 mg at 05/26/20 0855    PARoxetine (PAXIL CR) extended release tablet 37.5 mg  37.5 mg Oral Daily Freida Fierro MD   37.5 mg at 05/25/20 0840    furosemide (LASIX) injection 40 mg  40 mg Intravenous Daily Freida Fierro MD   40 mg at 05/26/20 1049    potassium chloride (KLOR-CON M) extended release tablet 40 mEq  40 mEq Oral PRN Freida Fierro MD   40 mEq at 05/26/20 0855    Or    potassium bicarb-citric acid (EFFER-K) effervescent tablet 40 mEq  40 mEq Oral PRN Freida Fierro MD        Or    potassium chloride 10 mEq/100 mL IVPB (Peripheral Line)  10 mEq Intravenous PRN Freida Fierro MD        tiotropium (SPIRIVA RESPIMAT) 2.5 MCG/ACT inhaler 2 puff  2 puff Inhalation Daily Freida Fierro MD   2 puff at 05/26/20 0752    HYDROcodone-acetaminophen (NORCO) 5-325 MG per tablet 2 tablet  2 tablet Oral Q8H PRN Freida Fierro MD   2 tablet at 05/26/20 0855    doxycycline monohydrate (MONODOX) capsule 100 mg  100 mg Oral 2 times per day Freida Fierro MD   100 mg at 05/26/20 0855    diclofenac sodium (VOLTAREN) 1 % gel 2 g  2 g Topical 4x Daily PRN KIRK Tee - CNP   2 g at 05/26/20 0538    sodium chloride flush 0.9 % injection 10 mL  10 mL Intravenous PRN Jewel Dillon MD   10 mL at 05/24/20 1810    sodium chloride flush 0.9 % injection 10 mL  10 mL Intravenous 2 times per day Freida Fierro MD   10 mL at 05/26/20 1049    sodium chloride flush 0.9 % injection 10 mL  10 mL Intravenous PRN Freida Fierro MD        acetaminophen (TYLENOL) tablet 650 mg  650 mg Oral Q4H PRN Freida Fierro MD        enoxaparin (LOVENOX) injection 40 mg  40 mg Subcutaneous BID Freida Fierro MD   40 mg at 05/26/20 7652       Impressions :     1.  Active Problems:    Chronic obstructive pulmonary disease (HCC)    Class 3 severe obesity with serious comorbidity and body mass index (BMI) of 50.0 to 59.9 in adult [facility])    Essential hypertension    History of pulmonary embolism    Class 4 congestive heart failure, acute on chronic, systolic (HCC)    Hypokalemia  Resolved Problems:    * No resolved hospital problems. *        2.  has a past medical history of Arthritis, CHF (congestive heart failure) ([facility] [address] COPD (chronic obstructive pulmonary disease) ([facility] [address] blood clots, and Pulmonary embolism ([facility] [address] Plans:     Acute exacerbation of chronic diastolic congestive heart failure-EF 55% per echo October 2019. Secondary to noncompliance with Lasix- patient started on 40 mg IV Lasix daily- will monitor input output, cardiology consulted. Troponin flat  Lower leg cellulitis-left- improved over last couple days per patient-start oral doxycycline. Hypokalemia-replace per protocol. COPD-continue home inhalers Spiriva and albuterol. Essential hypertension-resume metoprolol and lisinopril. Elevated d-dimer- CT PE negative for embolism. Morbid obesity.       DVT pplx-subcut Lovenox  Antibiotics started/continued-doxycycline    Acute on chronic diastolic congestive heart failure IV Lasix  Add Aldactone  Severe obesity morbid obesity BMI 53.5  Stasis dermatitis and cellulitis in the left lower extremity on oral antibiotics   Will add iv ceftriaxone        Quang Escalona MD  5/26/2020  11:39 AM

## 2020-05-26 NOTE — CONSULTS
11/13/19  Yes Yancy Roldan PA-C   tiotropium (SPIRIVA HANDIHALER) 18 MCG inhalation capsule Inhale 1 capsule into the lungs daily 10/27/19  Yes Raissa Garves MD   albuterol sulfate  (90 Base) MCG/ACT inhaler Inhale 2 puffs into the lungs every 6 hours as needed for Wheezing   Yes Historical Provider, MD       Allergies:  Patient has no known allergies. Social History:   reports that she has been smoking. She has a 22.50 pack-year smoking history. She has never used smokeless tobacco. She reports current alcohol use. She reports previous drug use. Family History:   Positive for early CAD    REVIEW OF SYSTEMS:    · Constitutional: there has been no unanticipated weight loss. There's been No change in energy level, No change in activity level. · Eyes: No visual changes or diplopia. No scleral icterus. · ENT: No Headaches, hearing loss or vertigo. No mouth sores or sore throat. · Cardiovascular: No problem  · Respiratory: No previous reported problems  · Gastrointestinal: No abdominal pain, appetite loss, blood in stools. No change in bowel or bladder habits. · Genitourinary: No dysuria, trouble voiding, or hematuria. · Musculoskeletal:  No gait disturbance, No weakness or joint complaints. · Integumentary: No rash or pruritis. · Neurological: No headache, diplopia, change in muscle strength, numbness or tingling. No change in gait, balance, coordination, mood, affect, memory, mentation, behavior. · Psychiatric: No anxiety, or depression. · Endocrine: No temperature intolerance. No excessive thirst, fluid intake, or urination. No tremor. · Hematologic/Lymphatic: No abnormal bruising or bleeding, blood clots or swollen lymph nodes. · Allergic/Immunologic: No nasal congestion or hives.     PHYSICAL EXAM:    Physical Examination:    BP (!) 141/58   Pulse 74   Temp 97.8 °F (36.6 °C) (Oral)   Resp 16   Ht 5' 7\" (1.702 m)   Wt (!) 340 lb 13.3 oz (154.6 kg)   SpO2 96%   BMI 53.38 kg/m² suggestive of fat pad. Normal aortic root dimension. IVC Increased diameter, but still has inspiratory variation suggesting upper  normal or mildly elevated RA filling pressure (i.e. CVP) . E/e'' average 11.1     Labs:     CBC:   Recent Labs     05/25/20  0516 05/26/20  0512   WBC 9.0 6.2   HGB 16.0 14.4   HCT 48.9* 44.2    271     BMP:   Recent Labs     05/24/20  1445 05/26/20  0512    139   K 3.1* 3.5*   CO2 32* 35*   BUN 7* 12   CREATININE 0.53 0.62   LABGLOM >60 >60   GLUCOSE 120* 109*     BNP: No results for input(s): BNP in the last 72 hours. PT/INR: No results for input(s): PROTIME, INR in the last 72 hours. APTT:No results for input(s): APTT in the last 72 hours. CARDIAC ENZYMES:No results for input(s): CKTOTAL, CKMB, CKMBINDEX, TROPONINI in the last 72 hours. FASTING LIPID PANEL:  Lab Results   Component Value Date    HDL 46 10/29/2019    TRIG 64 10/29/2019     LIVER PROFILE:  Recent Labs     05/24/20  1445   AST 16   ALT 11   LABALBU 3.3*         IMPRESSION:    1. Acute-on-chronic diastolic HF associated with interruption of chronic Lasix  2. Essential HTN with LVH, preserved LVEF and diastoic dysfunction  3. Type II DM  4. Chronic overweight  5. Suspected sleep apnea syndrome  6. Distant h/o DVT/PE associated with oral contraceptive use    Patient Active Problem List   Diagnosis    Chronic heart failure with preserved ejection fraction (HCC)    Chronic obstructive pulmonary disease (HCC)    Tobacco abuse    Class 3 severe obesity with serious comorbidity and body mass index (BMI) of 50.0 to 59.9 in adult [facility])    Essential hypertension    History of pulmonary embolism    Family history of colon cancer in mother    Prediabetes    Class 4 congestive heart failure, acute on chronic, systolic (HCC)    Hypokalemia       RECOMMENDATIONS:  1. Increase Lasix to 40 mg q 12 hr.  2. Continue metoprolol and lisinopril  3. Wound care  4.  Consider referral for outpatient sleep

## 2020-05-26 NOTE — PLAN OF CARE
for their particular disease process. 5/25/2020 1650 by Jennifer Funk RN  Outcome: Met This Shift     Problem: Skin Integrity:  Goal: Demonstration of wound healing without infection will improve  Description: Demonstration of wound healing without infection will improve  5/26/2020 0530 by Hardy Ayala RN  Outcome: Ongoing  5/25/2020 1650 by Jennifer Funk RN  Outcome: Ongoing  Goal: Complications related to intravenous access or infusion will be avoided or minimized  Description: Complications related to intravenous access or infusion will be avoided or minimized  5/26/2020 0530 by Hardy Ayala RN  Outcome: Ongoing  5/25/2020 1650 by Jennifer Funk RN  Outcome: Ongoing  Goal: Will show no infection signs and symptoms  Description: Will show no infection signs and symptoms  5/26/2020 0530 by Hardy Ayala RN  Outcome: Ongoing  5/25/2020 1650 by Jennifer Funk RN  Outcome: Ongoing  Goal: Absence of new skin breakdown  Description: Absence of new skin breakdown  5/26/2020 0530 by Hardy Ayala RN  Outcome: Ongoing  Note: Will continue to assess and monitor patient for any signs of skin integrity issues. Patient will be rotated hourly to ensure pressure points are being rotated since patient is unable to move freely. 5/25/2020 1650 by Jennifer Funk RN  Outcome: Ongoing     Problem: Fluid Volume:  Description: [TRUNCATED] Hyponatremia indicates a relatively greater amount of water to sodium in plasma. In hypovolemia, a deficit of both total body water and sodium exists but relatively less water deficit. Euvolemia represents near normal total body sodium co .. [place] [TRUNCATED] Hyponatremia indicates a relatively greater amount of water to sodium in plasma. In hypovolemia, a deficit of both total body water and sodium exists but relatively less water deficit. Euvolemia represents near normal total body sodium co ...   Goal: Hemodynamic stability will improve  Description: Hemodynamic

## 2020-05-26 NOTE — FLOWSHEET NOTE
05/26/20 1326   Encounter Summary   Services provided to: Patient   Referral/Consult From: Rounding   Continue Visiting   (5/26/20)   Complexity of Encounter Low   Length of Encounter 15 minutes   Spiritual Assessment Completed Yes   Spiritual/Voodoo   Type Spiritual support   Assessment Calm; Approachable   Intervention Active listening;Prayer;Provided reading materials/devotional materials;Sustaining presence/ Ministry of presence   Outcome Receptive; Expressed feelings/needs/concerns;Engaged in conversation;Expressed gratitude

## 2020-05-27 VITALS
TEMPERATURE: 98 F | DIASTOLIC BLOOD PRESSURE: 70 MMHG | OXYGEN SATURATION: 92 % | HEIGHT: 67 IN | WEIGHT: 293 LBS | RESPIRATION RATE: 19 BRPM | SYSTOLIC BLOOD PRESSURE: 162 MMHG | BODY MASS INDEX: 45.99 KG/M2 | HEART RATE: 81 BPM

## 2020-05-27 LAB
ABSOLUTE EOS #: 0.19 K/UL (ref 0–0.4)
ABSOLUTE IMMATURE GRANULOCYTE: ABNORMAL K/UL (ref 0–0.3)
ABSOLUTE LYMPH #: 2.05 K/UL (ref 1–4.8)
ABSOLUTE MONO #: 0.45 K/UL (ref 0.1–1.3)
ANION GAP SERPL CALCULATED.3IONS-SCNC: 10 MMOL/L (ref 9–17)
BASOPHILS # BLD: 1 % (ref 0–2)
BASOPHILS ABSOLUTE: 0.06 K/UL (ref 0–0.2)
BUN BLDV-MCNC: 16 MG/DL (ref 8–23)
BUN/CREAT BLD: ABNORMAL (ref 9–20)
CALCIUM SERPL-MCNC: 9 MG/DL (ref 8.6–10.4)
CHLORIDE BLD-SCNC: 96 MMOL/L (ref 98–107)
CO2: 33 MMOL/L (ref 20–31)
CREAT SERPL-MCNC: 0.57 MG/DL (ref 0.5–0.9)
DIFFERENTIAL TYPE: ABNORMAL
EOSINOPHILS RELATIVE PERCENT: 3 % (ref 0–4)
GFR AFRICAN AMERICAN: >60 ML/MIN
GFR NON-AFRICAN AMERICAN: >60 ML/MIN
GFR SERPL CREATININE-BSD FRML MDRD: ABNORMAL ML/MIN/{1.73_M2}
GFR SERPL CREATININE-BSD FRML MDRD: ABNORMAL ML/MIN/{1.73_M2}
GLUCOSE BLD-MCNC: 111 MG/DL (ref 70–99)
HCT VFR BLD CALC: 46.2 % (ref 36–46)
HEMOGLOBIN: 15 G/DL (ref 12–16)
IMMATURE GRANULOCYTES: ABNORMAL %
LYMPHOCYTES # BLD: 32 % (ref 24–44)
MCH RBC QN AUTO: 36.3 PG (ref 26–34)
MCHC RBC AUTO-ENTMCNC: 32.5 G/DL (ref 31–37)
MCV RBC AUTO: 111.8 FL (ref 80–100)
MONOCYTES # BLD: 7 % (ref 1–7)
MORPHOLOGY: ABNORMAL
NRBC AUTOMATED: ABNORMAL PER 100 WBC
PATHOLOGIST REVIEW: NORMAL
PDW BLD-RTO: 16.4 % (ref 11.5–14.9)
PLATELET # BLD: 281 K/UL (ref 150–450)
PLATELET ESTIMATE: ABNORMAL
PMV BLD AUTO: 8.2 FL (ref 6–12)
POTASSIUM SERPL-SCNC: 4 MMOL/L (ref 3.7–5.3)
RBC # BLD: 4.13 M/UL (ref 4–5.2)
RBC # BLD: ABNORMAL 10*6/UL
SEG NEUTROPHILS: 57 % (ref 36–66)
SEGMENTED NEUTROPHILS ABSOLUTE COUNT: 3.65 K/UL (ref 1.3–9.1)
SODIUM BLD-SCNC: 139 MMOL/L (ref 135–144)
WBC # BLD: 6.4 K/UL (ref 3.5–11)
WBC # BLD: ABNORMAL 10*3/UL

## 2020-05-27 PROCEDURE — 6370000000 HC RX 637 (ALT 250 FOR IP): Performed by: INTERNAL MEDICINE

## 2020-05-27 PROCEDURE — 2580000003 HC RX 258: Performed by: INTERNAL MEDICINE

## 2020-05-27 PROCEDURE — 36415 COLL VENOUS BLD VENIPUNCTURE: CPT

## 2020-05-27 PROCEDURE — 85025 COMPLETE CBC W/AUTO DIFF WBC: CPT

## 2020-05-27 PROCEDURE — 99212 OFFICE O/P EST SF 10 MIN: CPT

## 2020-05-27 PROCEDURE — 99239 HOSP IP/OBS DSCHRG MGMT >30: CPT | Performed by: INTERNAL MEDICINE

## 2020-05-27 PROCEDURE — 6360000002 HC RX W HCPCS: Performed by: INTERNAL MEDICINE

## 2020-05-27 PROCEDURE — 80048 BASIC METABOLIC PNL TOTAL CA: CPT

## 2020-05-27 RX ORDER — DOXYCYCLINE HYCLATE 100 MG/1
100 CAPSULE ORAL 2 TIMES DAILY
Qty: 20 CAPSULE | Refills: 0 | Status: SHIPPED | OUTPATIENT
Start: 2020-05-27 | End: 2020-06-06

## 2020-05-27 RX ADMIN — DICLOFENAC 2 G: 10 GEL TOPICAL at 05:18

## 2020-05-27 RX ADMIN — HYDROCODONE BITARTRATE AND ACETAMINOPHEN 2 TABLET: 5; 325 TABLET ORAL at 00:42

## 2020-05-27 RX ADMIN — PAROXETINE HYDROCHLORIDE 37.5 MG: 12.5 TABLET, FILM COATED, EXTENDED RELEASE ORAL at 14:01

## 2020-05-27 RX ADMIN — SPIRONOLACTONE 25 MG: 25 TABLET, FILM COATED ORAL at 08:38

## 2020-05-27 RX ADMIN — DOXYCYCLINE 100 MG: 100 CAPSULE ORAL at 08:39

## 2020-05-27 RX ADMIN — METFORMIN HYDROCHLORIDE 500 MG: 500 TABLET ORAL at 08:38

## 2020-05-27 RX ADMIN — Medication 10 ML: at 09:41

## 2020-05-27 RX ADMIN — HYDROCODONE BITARTRATE AND ACETAMINOPHEN 2 TABLET: 5; 325 TABLET ORAL at 08:38

## 2020-05-27 RX ADMIN — TIOTROPIUM BROMIDE INHALATION SPRAY 2 PUFF: 3.12 SPRAY, METERED RESPIRATORY (INHALATION) at 08:43

## 2020-05-27 RX ADMIN — LISINOPRIL 10 MG: 10 TABLET ORAL at 08:38

## 2020-05-27 RX ADMIN — FUROSEMIDE 40 MG: 10 INJECTION, SOLUTION INTRAMUSCULAR; INTRAVENOUS at 08:39

## 2020-05-27 RX ADMIN — METOPROLOL TARTRATE 25 MG: 25 TABLET, FILM COATED ORAL at 08:39

## 2020-05-27 RX ADMIN — ANTI-FUNGAL POWDER MICONAZOLE NITRATE TALC FREE: 1.42 POWDER TOPICAL at 12:36

## 2020-05-27 RX ADMIN — ENOXAPARIN SODIUM 40 MG: 40 INJECTION SUBCUTANEOUS at 08:39

## 2020-05-27 ASSESSMENT — PAIN DESCRIPTION - LOCATION: LOCATION: LEG

## 2020-05-27 ASSESSMENT — PAIN SCALES - GENERAL
PAINLEVEL_OUTOF10: 9
PAINLEVEL_OUTOF10: 9
PAINLEVEL_OUTOF10: 7

## 2020-05-27 ASSESSMENT — PAIN DESCRIPTION - PAIN TYPE: TYPE: CHRONIC PAIN

## 2020-05-27 ASSESSMENT — PAIN DESCRIPTION - ORIENTATION: ORIENTATION: RIGHT;LEFT

## 2020-05-27 NOTE — DISCHARGE SUMMARY
[facility] Internal Medicine    Discharge Summary     Patient ID: Luis Robin  :  1960   MRN: [id number]     ACCOUNT:  [de-identified]   Patient's PCP: Myron Oquendo PA-C  Admit Date: 2020   Discharge Date: 2020    Length of Stay: 3  Code Status:  Full Code  Admitting Physician: Ioana Carbajal MD  Discharge Physician: Ti Ashford MD     Active Discharge Diagnoses:     Primary Problem  <principal problem not specified>      [place] Problems    Diagnosis Date Noted    Hypokalemia [E87.6] 2020    Class 4 congestive heart failure, acute on chronic, systolic (HCC) [C03.52]     [facility] [J44.9] 10/29/2019    Class 3 severe obesity with serious comorbidity and body mass index (BMI) of 50.0 to 59.9 in adult ([facility] [address] [E66.01, Z68.43] 10/29/2019    History of pulmonary embolism [Z86.711] 10/29/2019    Essential hypertension [I10] 10/29/2019       Admission Condition:  fair     Discharged Condition: fair    Hospital Stay:     Hospital Course:   Luis Robin is a 61 y.o. female who was admitted for the management of <principal problem not specified> , presented to ER with Shortness of Breath; Leg Swelling; Blister (bilateral legs); and Knee Pain  30-year-old  lady morbidly obese BMI 53 weighs at 30 and 37 pounds admitted with increasing dyspnea leg swelling and cellulitis diagnosed with acute on chronic diastolic congestive heart failure treated with IV diuretics and IV antibiotics discharged on oral Lasix and oral potassium supplement BMP to be done on Monday oral doxycycline twice a day for 10 days advised to follow-up with me in office  Weight loss is the key for her improving symptoms will  her for better diet exercise and weight management may be referred to [facility] weight management clinic        Significant therapeutic interventions:     Significant Diagnostic Studies:

## 2020-05-27 NOTE — PROGRESS NOTES
CLINICAL PHARMACY NOTE: MEDS TO [address] Select Patient?: No  Total # of Prescriptions Filled: 2   The following medications were delivered to the patient:  · Doxycycline  · Diclofenac  ·   Total # of Interventions Completed: 0  Time Spent (min): 30    Additional Documentation:

## 2020-05-28 ENCOUNTER — CARE COORDINATION (OUTPATIENT)
Dept: CASE MANAGEMENT | Age: 60
End: 2020-05-28

## 2020-05-28 ENCOUNTER — TELEPHONE (OUTPATIENT)
Dept: INTERNAL MEDICINE CLINIC | Age: 60
End: 2020-05-28

## 2020-05-28 LAB
EKG ATRIAL RATE: 96 BPM
EKG P AXIS: 61 DEGREES
EKG P-R INTERVAL: 176 MS
EKG Q-T INTERVAL: 398 MS
EKG QRS DURATION: 82 MS
EKG QTC CALCULATION (BAZETT): 502 MS
EKG R AXIS: -57 DEGREES
EKG T AXIS: 48 DEGREES
EKG VENTRICULAR RATE: 96 BPM

## 2020-06-01 ENCOUNTER — TELEPHONE (OUTPATIENT)
Dept: INTERNAL MEDICINE CLINIC | Age: 60
End: 2020-06-01

## 2020-06-01 NOTE — TELEPHONE ENCOUNTER
Patient called and had to cancel HF appt for today,did advise patient to reschedule due to the importance of following up after hospital stay but she didn't wish to at this time informing that she cant. She would to inform that the lasix dose that she is on is not helping and she is pretty swollen and would like to know what to do ?  Please advise

## 2020-06-01 NOTE — TELEPHONE ENCOUNTER
Would strongly advise she be seen this week in the office, can increase Lasix to 80 mg 8 am 40 mg 2 pm. Needs to complete follow up labs ordered in hospital and would like to see her this week if possible.

## 2020-06-04 ENCOUNTER — CARE COORDINATION (OUTPATIENT)
Dept: CASE MANAGEMENT | Age: 60
End: 2020-06-04

## 2020-06-04 NOTE — CARE COORDINATION
[facility] Transitions Follow Up Call    2020    Patient: Chelsie Goncalves  Patient : 1960   MRN: [id number]  Reason for Admission:   Discharge Date: 20 RARS: Readmission Risk Score: 13       Covid f/u call  # 1 attempt- unable to reach patient, unable to leave a vm message, mailbox full//JU    Care Transitions Subsequent and Final Call    Subsequent and Final Calls  Care Transitions Interventions  Other Interventions: Follow Up  No future appointments.     Jose Irizarry RN

## 2020-06-09 ENCOUNTER — CARE COORDINATION (OUTPATIENT)
Dept: CASE MANAGEMENT | Age: 60
End: 2020-06-09

## 2020-06-30 ENCOUNTER — OFFICE VISIT (OUTPATIENT)
Dept: INTERNAL MEDICINE CLINIC | Age: 60
End: 2020-06-30
Payer: COMMERCIAL

## 2020-06-30 ENCOUNTER — HOSPITAL ENCOUNTER (INPATIENT)
Age: 60
LOS: 3 days | Discharge: HOME HEALTH | DRG: 383 | End: 2020-07-03
Attending: EMERGENCY MEDICINE | Admitting: INTERNAL MEDICINE
Payer: COMMERCIAL

## 2020-06-30 ENCOUNTER — APPOINTMENT (OUTPATIENT)
Dept: GENERAL RADIOLOGY | Age: 60
DRG: 383 | End: 2020-06-30
Payer: COMMERCIAL

## 2020-06-30 VITALS
TEMPERATURE: 97.3 F | BODY MASS INDEX: 45.99 KG/M2 | HEIGHT: 67 IN | SYSTOLIC BLOOD PRESSURE: 130 MMHG | DIASTOLIC BLOOD PRESSURE: 64 MMHG | HEART RATE: 116 BPM | WEIGHT: 293 LBS | OXYGEN SATURATION: 85 %

## 2020-06-30 PROBLEM — L03.90 CELLULITIS: Status: ACTIVE | Noted: 2020-06-30

## 2020-06-30 LAB
ABSOLUTE EOS #: 0.2 K/UL (ref 0–0.4)
ABSOLUTE IMMATURE GRANULOCYTE: ABNORMAL K/UL (ref 0–0.3)
ABSOLUTE LYMPH #: 1.3 K/UL (ref 1–4.8)
ABSOLUTE MONO #: 0.6 K/UL (ref 0.1–1.3)
ALBUMIN SERPL-MCNC: 3.1 G/DL (ref 3.5–5.2)
ALBUMIN/GLOBULIN RATIO: ABNORMAL (ref 1–2.5)
ALP BLD-CCNC: 90 U/L (ref 35–104)
ALT SERPL-CCNC: 7 U/L (ref 5–33)
ANION GAP SERPL CALCULATED.3IONS-SCNC: 12 MMOL/L (ref 9–17)
AST SERPL-CCNC: 15 U/L
BASOPHILS # BLD: 1 % (ref 0–2)
BASOPHILS ABSOLUTE: 0.1 K/UL (ref 0–0.2)
BILIRUB SERPL-MCNC: 0.69 MG/DL (ref 0.3–1.2)
BUN BLDV-MCNC: 17 MG/DL (ref 8–23)
BUN/CREAT BLD: ABNORMAL (ref 9–20)
CALCIUM SERPL-MCNC: 9.3 MG/DL (ref 8.6–10.4)
CHLORIDE BLD-SCNC: 85 MMOL/L (ref 98–107)
CO2: 34 MMOL/L (ref 20–31)
CREAT SERPL-MCNC: 0.68 MG/DL (ref 0.5–0.9)
DIFFERENTIAL TYPE: ABNORMAL
EOSINOPHILS RELATIVE PERCENT: 2 % (ref 0–4)
GFR AFRICAN AMERICAN: >60 ML/MIN
GFR NON-AFRICAN AMERICAN: >60 ML/MIN
GFR SERPL CREATININE-BSD FRML MDRD: ABNORMAL ML/MIN/{1.73_M2}
GFR SERPL CREATININE-BSD FRML MDRD: ABNORMAL ML/MIN/{1.73_M2}
GLUCOSE BLD-MCNC: 128 MG/DL (ref 70–99)
HCT VFR BLD CALC: 41.7 % (ref 36–46)
HEMOGLOBIN: 14 G/DL (ref 12–16)
IMMATURE GRANULOCYTES: ABNORMAL %
LYMPHOCYTES # BLD: 12 % (ref 24–44)
MCH RBC QN AUTO: 37 PG (ref 26–34)
MCHC RBC AUTO-ENTMCNC: 33.4 G/DL (ref 31–37)
MCV RBC AUTO: 110.8 FL (ref 80–100)
MONOCYTES # BLD: 6 % (ref 1–7)
NRBC AUTOMATED: ABNORMAL PER 100 WBC
PATHOLOGIST REVIEW: NORMAL
PDW BLD-RTO: 16.3 % (ref 11.5–14.9)
PLATELET # BLD: 415 K/UL (ref 150–450)
PLATELET ESTIMATE: ABNORMAL
PMV BLD AUTO: 7.9 FL (ref 6–12)
POTASSIUM SERPL-SCNC: 3.3 MMOL/L (ref 3.7–5.3)
RBC # BLD: 3.77 M/UL (ref 4–5.2)
RBC # BLD: ABNORMAL 10*6/UL
SARS-COV-2, PCR: NORMAL
SARS-COV-2, RAPID: NORMAL
SARS-COV-2: NORMAL
SEG NEUTROPHILS: 79 % (ref 36–66)
SEGMENTED NEUTROPHILS ABSOLUTE COUNT: 8.3 K/UL (ref 1.3–9.1)
SODIUM BLD-SCNC: 131 MMOL/L (ref 135–144)
SOURCE: NORMAL
TOTAL PROTEIN: 6.7 G/DL (ref 6.4–8.3)
TROPONIN INTERP: NORMAL
TROPONIN INTERP: NORMAL
TROPONIN T: NORMAL NG/ML
TROPONIN T: NORMAL NG/ML
TROPONIN, HIGH SENSITIVITY: 12 NG/L (ref 0–14)
TROPONIN, HIGH SENSITIVITY: 12 NG/L (ref 0–14)
WBC # BLD: 10.5 K/UL (ref 3.5–11)
WBC # BLD: ABNORMAL 10*3/UL

## 2020-06-30 PROCEDURE — 2580000003 HC RX 258: Performed by: EMERGENCY MEDICINE

## 2020-06-30 PROCEDURE — 3023F SPIROM DOC REV: CPT | Performed by: PHYSICIAN ASSISTANT

## 2020-06-30 PROCEDURE — 4004F PT TOBACCO SCREEN RCVD TLK: CPT | Performed by: PHYSICIAN ASSISTANT

## 2020-06-30 PROCEDURE — G8417 CALC BMI ABV UP PARAM F/U: HCPCS | Performed by: PHYSICIAN ASSISTANT

## 2020-06-30 PROCEDURE — 6360000002 HC RX W HCPCS: Performed by: NURSE PRACTITIONER

## 2020-06-30 PROCEDURE — 71045 X-RAY EXAM CHEST 1 VIEW: CPT

## 2020-06-30 PROCEDURE — 1111F DSCHRG MED/CURRENT MED MERGE: CPT | Performed by: PHYSICIAN ASSISTANT

## 2020-06-30 PROCEDURE — 6360000002 HC RX W HCPCS: Performed by: INTERNAL MEDICINE

## 2020-06-30 PROCEDURE — 85025 COMPLETE CBC W/AUTO DIFF WBC: CPT

## 2020-06-30 PROCEDURE — 2500000003 HC RX 250 WO HCPCS: Performed by: NURSE PRACTITIONER

## 2020-06-30 PROCEDURE — 6370000000 HC RX 637 (ALT 250 FOR IP): Performed by: NURSE PRACTITIONER

## 2020-06-30 PROCEDURE — 99214 OFFICE O/P EST MOD 30 MIN: CPT | Performed by: PHYSICIAN ASSISTANT

## 2020-06-30 PROCEDURE — G8427 DOCREV CUR MEDS BY ELIG CLIN: HCPCS | Performed by: PHYSICIAN ASSISTANT

## 2020-06-30 PROCEDURE — 3017F COLORECTAL CA SCREEN DOC REV: CPT | Performed by: PHYSICIAN ASSISTANT

## 2020-06-30 PROCEDURE — 99285 EMERGENCY DEPT VISIT HI MDM: CPT

## 2020-06-30 PROCEDURE — 99223 1ST HOSP IP/OBS HIGH 75: CPT | Performed by: INTERNAL MEDICINE

## 2020-06-30 PROCEDURE — G8926 SPIRO NO PERF OR DOC: HCPCS | Performed by: PHYSICIAN ASSISTANT

## 2020-06-30 PROCEDURE — 96365 THER/PROPH/DIAG IV INF INIT: CPT

## 2020-06-30 PROCEDURE — 6360000002 HC RX W HCPCS: Performed by: EMERGENCY MEDICINE

## 2020-06-30 PROCEDURE — 6370000000 HC RX 637 (ALT 250 FOR IP): Performed by: EMERGENCY MEDICINE

## 2020-06-30 PROCEDURE — 1200000000 HC SEMI PRIVATE

## 2020-06-30 PROCEDURE — 36415 COLL VENOUS BLD VENIPUNCTURE: CPT

## 2020-06-30 PROCEDURE — 84484 ASSAY OF TROPONIN QUANT: CPT

## 2020-06-30 PROCEDURE — 80053 COMPREHEN METABOLIC PANEL: CPT

## 2020-06-30 RX ORDER — PAROXETINE HYDROCHLORIDE 25 MG/1
25 TABLET, FILM COATED, EXTENDED RELEASE ORAL EVERY MORNING
COMMUNITY
End: 2020-07-16 | Stop reason: ALTCHOICE

## 2020-06-30 RX ORDER — ALBUTEROL SULFATE 2.5 MG/3ML
2.5 SOLUTION RESPIRATORY (INHALATION) EVERY 4 HOURS PRN
Status: DISCONTINUED | OUTPATIENT
Start: 2020-06-30 | End: 2020-07-03 | Stop reason: HOSPADM

## 2020-06-30 RX ORDER — ACETAMINOPHEN 325 MG/1
650 TABLET ORAL EVERY 4 HOURS PRN
Status: DISCONTINUED | OUTPATIENT
Start: 2020-06-30 | End: 2020-07-03 | Stop reason: HOSPADM

## 2020-06-30 RX ORDER — HYDROCODONE BITARTRATE AND ACETAMINOPHEN 5; 325 MG/1; MG/1
1 TABLET ORAL EVERY 6 HOURS PRN
Status: DISCONTINUED | OUTPATIENT
Start: 2020-06-30 | End: 2020-07-01

## 2020-06-30 RX ORDER — TIOTROPIUM BROMIDE 18 UG/1
18 CAPSULE ORAL; RESPIRATORY (INHALATION) DAILY
Qty: 90 CAPSULE | Refills: 1 | Status: CANCELLED | OUTPATIENT
Start: 2020-06-30

## 2020-06-30 RX ORDER — PROMETHAZINE HYDROCHLORIDE 25 MG/1
12.5 TABLET ORAL EVERY 6 HOURS PRN
Status: DISCONTINUED | OUTPATIENT
Start: 2020-06-30 | End: 2020-07-03 | Stop reason: HOSPADM

## 2020-06-30 RX ORDER — POTASSIUM CHLORIDE 20 MEQ/1
40 TABLET, EXTENDED RELEASE ORAL PRN
Status: DISCONTINUED | OUTPATIENT
Start: 2020-06-30 | End: 2020-07-03 | Stop reason: HOSPADM

## 2020-06-30 RX ORDER — POTASSIUM CHLORIDE 20 MEQ/1
40 TABLET, EXTENDED RELEASE ORAL ONCE
Status: COMPLETED | OUTPATIENT
Start: 2020-06-30 | End: 2020-06-30

## 2020-06-30 RX ORDER — SODIUM CHLORIDE 0.9 % (FLUSH) 0.9 %
10 SYRINGE (ML) INJECTION PRN
Status: DISCONTINUED | OUTPATIENT
Start: 2020-06-30 | End: 2020-07-03 | Stop reason: HOSPADM

## 2020-06-30 RX ORDER — FUROSEMIDE 40 MG/1
40 TABLET ORAL DAILY
Status: ON HOLD | COMMUNITY
End: 2020-07-03 | Stop reason: HOSPADM

## 2020-06-30 RX ORDER — POTASSIUM CHLORIDE 7.45 MG/ML
10 INJECTION INTRAVENOUS PRN
Status: DISCONTINUED | OUTPATIENT
Start: 2020-06-30 | End: 2020-07-03 | Stop reason: HOSPADM

## 2020-06-30 RX ORDER — LISINOPRIL 10 MG/1
10 TABLET ORAL DAILY
Status: DISCONTINUED | OUTPATIENT
Start: 2020-06-30 | End: 2020-07-03 | Stop reason: HOSPADM

## 2020-06-30 RX ORDER — POLYETHYLENE GLYCOL 3350 17 G/17G
17 POWDER, FOR SOLUTION ORAL DAILY PRN
Status: DISCONTINUED | OUTPATIENT
Start: 2020-06-30 | End: 2020-07-03 | Stop reason: HOSPADM

## 2020-06-30 RX ORDER — PAROXETINE HYDROCHLORIDE 20 MG/1
20 TABLET, FILM COATED ORAL DAILY
Status: DISCONTINUED | OUTPATIENT
Start: 2020-06-30 | End: 2020-07-03 | Stop reason: HOSPADM

## 2020-06-30 RX ORDER — ONDANSETRON 2 MG/ML
4 INJECTION INTRAMUSCULAR; INTRAVENOUS EVERY 8 HOURS PRN
Status: DISCONTINUED | OUTPATIENT
Start: 2020-06-30 | End: 2020-07-03 | Stop reason: HOSPADM

## 2020-06-30 RX ORDER — FUROSEMIDE 10 MG/ML
40 INJECTION INTRAMUSCULAR; INTRAVENOUS 2 TIMES DAILY
Status: DISCONTINUED | OUTPATIENT
Start: 2020-06-30 | End: 2020-07-03 | Stop reason: HOSPADM

## 2020-06-30 RX ORDER — NICOTINE 21 MG/24HR
1 PATCH, TRANSDERMAL 24 HOURS TRANSDERMAL DAILY PRN
Status: DISCONTINUED | OUTPATIENT
Start: 2020-06-30 | End: 2020-07-03 | Stop reason: HOSPADM

## 2020-06-30 RX ORDER — FUROSEMIDE 10 MG/ML
40 INJECTION INTRAMUSCULAR; INTRAVENOUS ONCE
Status: DISCONTINUED | OUTPATIENT
Start: 2020-06-30 | End: 2020-06-30

## 2020-06-30 RX ORDER — METOPROLOL TARTRATE 50 MG/1
50 TABLET, FILM COATED ORAL ONCE
Status: COMPLETED | OUTPATIENT
Start: 2020-06-30 | End: 2020-06-30

## 2020-06-30 RX ORDER — MAGNESIUM SULFATE 1 G/100ML
1 INJECTION INTRAVENOUS PRN
Status: DISCONTINUED | OUTPATIENT
Start: 2020-06-30 | End: 2020-07-03 | Stop reason: HOSPADM

## 2020-06-30 RX ORDER — SODIUM CHLORIDE 0.9 % (FLUSH) 0.9 %
10 SYRINGE (ML) INJECTION EVERY 12 HOURS SCHEDULED
Status: DISCONTINUED | OUTPATIENT
Start: 2020-06-30 | End: 2020-07-03 | Stop reason: HOSPADM

## 2020-06-30 RX ORDER — ALBUTEROL SULFATE 90 UG/1
2 AEROSOL, METERED RESPIRATORY (INHALATION) EVERY 6 HOURS PRN
Qty: 1 INHALER | Status: CANCELLED | OUTPATIENT
Start: 2020-06-30

## 2020-06-30 RX ADMIN — ENOXAPARIN SODIUM 40 MG: 40 INJECTION SUBCUTANEOUS at 22:12

## 2020-06-30 RX ADMIN — PAROXETINE HYDROCHLORIDE 20 MG: 20 TABLET, FILM COATED ORAL at 22:13

## 2020-06-30 RX ADMIN — DICLOFENAC 2 G: 10 GEL TOPICAL at 22:12

## 2020-06-30 RX ADMIN — POTASSIUM CHLORIDE 40 MEQ: 1500 TABLET, EXTENDED RELEASE ORAL at 22:13

## 2020-06-30 RX ADMIN — METOPROLOL TARTRATE 50 MG: 50 TABLET, FILM COATED ORAL at 18:17

## 2020-06-30 RX ADMIN — VANCOMYCIN HYDROCHLORIDE 2500 MG: 1.25 INJECTION, POWDER, LYOPHILIZED, FOR SOLUTION INTRAVENOUS at 18:17

## 2020-06-30 RX ADMIN — FUROSEMIDE 40 MG: 10 INJECTION, SOLUTION INTRAMUSCULAR; INTRAVENOUS at 21:00

## 2020-06-30 RX ADMIN — ANTI-FUNGAL POWDER MICONAZOLE NITRATE TALC FREE: 1.42 POWDER TOPICAL at 21:00

## 2020-06-30 RX ADMIN — HYDROCODONE BITARTRATE AND ACETAMINOPHEN 1 TABLET: 5; 325 TABLET ORAL at 22:12

## 2020-06-30 RX ADMIN — LISINOPRIL 10 MG: 10 TABLET ORAL at 22:13

## 2020-06-30 ASSESSMENT — PAIN DESCRIPTION - ORIENTATION
ORIENTATION: RIGHT
ORIENTATION: RIGHT;LEFT

## 2020-06-30 ASSESSMENT — ENCOUNTER SYMPTOMS
BACK PAIN: 0
SORE THROAT: 0
SHORTNESS OF BREATH: 0
COUGH: 1
COLOR CHANGE: 1

## 2020-06-30 ASSESSMENT — PAIN DESCRIPTION - DESCRIPTORS
DESCRIPTORS: ACHING;SHARP
DESCRIPTORS: ACHING;SHARP

## 2020-06-30 ASSESSMENT — PAIN DESCRIPTION - PAIN TYPE
TYPE: CHRONIC PAIN
TYPE: ACUTE PAIN

## 2020-06-30 ASSESSMENT — PAIN DESCRIPTION - LOCATION: LOCATION: KNEE;HIP

## 2020-06-30 ASSESSMENT — PAIN SCALES - GENERAL
PAINLEVEL_OUTOF10: 6
PAINLEVEL_OUTOF10: 4
PAINLEVEL_OUTOF10: 5
PAINLEVEL_OUTOF10: 9

## 2020-06-30 ASSESSMENT — PAIN DESCRIPTION - FREQUENCY
FREQUENCY: INTERMITTENT
FREQUENCY: INTERMITTENT

## 2020-06-30 NOTE — PROGRESS NOTES
Pt arrives to room via stretcher from ER. Pt a&o x4 and no s/s of distress noted at this time. VSS. Admission assessment and head to toe assessment completed at this time. Will continue to monitor.

## 2020-06-30 NOTE — ED NOTES
Report given to Gail Maki RN from [facility]. Report method by phone   The following was reviewed with receiving RN:   Current vital signs:  /86   Pulse 104   Temp 98.9 °F (37.2 °C) (Oral)   Resp 10   Ht 5' 7\" (1.702 m)   Wt (!) 345 lb (156.5 kg)   SpO2 98%   BMI 54.03 kg/m²                MEWS Score: 3     Any medication or safety alerts were reviewed. Any pending diagnostics and notifications were also reviewed, as well as any safety concerns or issues, abnormal labs, abnormal imaging, and abnormal assessment findings. Questions were answered.             Phoenix Mcclure RN  06/30/20 0707

## 2020-06-30 NOTE — PROGRESS NOTES
Medication History completed:    New medications: none    Medications discontinued: Spiriva, potassium bicarbonate-citric acid,     Changes to dosing:   Paroxetine CR changed to 25 mg daily  Furosemide changed to 40 mg daily    Stated allergies: NKDA    Other pertinent information: Medications confirmed with [facility].      Thank you,  Christina Post, PharmD, BCPS  [phone]

## 2020-06-30 NOTE — ED PROVIDER NOTES
[facility] [address]  eMERGENCY dEPARTMENT eNCOUnter      Pt Name: Shashi Ribera  MRN: [id number]  [place] 1960  Date of evaluation: 6/30/20      CHIEF COMPLAINT       Chief Complaint   Patient presents with    Leg Swelling    Other     low oxygen     HISTORY OF PRESENT ILLNESS   HPI 61 y.o. female is sent to the emergency department for evaluation of leg redness swelling and low oxygen levels. The patient went to her primary care provider's office today, they found that her oxygen levels were extremely low and  that her leg appeared infected. The patient has chronic lymphedema, she also has chronic heart failure with a preserved ejection fraction among multiple other medical comorbidities noted in the past medical history. The patient was recently admitted to the hospital for bilateral lower extremity edema. She says that since she left the hospital she has had progressively worsening edema in both legs. The left leg in particular has become more red and there is more drainage from the chronic open wounds in her leg. She reports that her pain is moderate in intensity constant and course progressively worsening. It is not improving despite taking her diuretics. She reports exertional shortness of breath and fatigue which has been worsening over the last several weeks, but she denies any chest pain or shortness of breath. She does have a cough which is chronic for her and she attributes to her smoking. In regards to the patient's tachycardia, she says that she did not take her metoprolol dose today. REVIEW OF SYSTEMS     Review of Systems   Constitutional: Positive for activity change. Negative for chills and fever. HENT: Negative for congestion and sore throat. Eyes: Negative for visual disturbance. Respiratory: Positive for cough. Negative for shortness of breath. Cardiovascular: Positive for leg swelling. Negative for chest pain. Genitourinary: Negative for dysuria.    Musculoskeletal: (Oral)   Resp 20   Ht 5' 7\" (1.702 m)   Wt (!) 330 lb 11 oz (150 kg)   SpO2 95%   BMI 51.79 kg/m²   General: NAD  Head: Normocephalic, atraumatic  Eye: Pupils equal round reactive to light, no conjunctivitis  Heart: Tachycardic with a regular rhythm no murmurs  Lungs: Bibasilar rales, no respiratory distress  Chest wall: No crepitus, no tenderness palpation  Abdomen: Soft, nontender, nondistended, with no peritoneal signs  Neurologic: Patient is alert and oriented x3, motor and sensation is intact in all 4 extremities, cerebellar function is normal  Extremities: Bilateral equal pitting edema. On the left leg there are some open ulcers that have some seropurulent drainage from them. There is erythema and warmth to touch. MEDICAL DECISION MAKING:     MDM  This is a 80-year-old female presenting with hypoxia bilateral lower extremity equal edema with signs of cellulitis of the leg. I think that there is also a big component of chronic lymphedema and volume overload. I doubt a DVT or pulmonary embolism, we did a CT scan of her chest about a month ago when she presented with shortness of breath and similar symptoms and this was negative. Will rule out coronavirus infection. We will rule out any atypical presentation of ACS. We will get a chest x-ray to make sure there is no sign of pneumonia. And then will start her on IV antibiotics for her leg infection. We will also give her IV Lasix to diurese her. Patient will be admitted to the hospital.    Emergency Department course:  Laboratory studies reviewed. K low -> replaced oral. Remainder of laboratory studies unremarkable. CXR unremarkable. COVID reported as negative by laboratory over telephone. D/w Dr. Prosper Townsend, admitting to hospital for diuresis and treatment of leg cellulitis.        DIAGNOSTIC RESULTS     EKG: All EKG's are interpreted by the Emergency Department Physician who either signs or Co-signs this chart in the absence of a cardiologist.    EKG shows a sinus tachycardia HR is 114, , QRS 98, , no LANG, No STD, No TWI, the axis is leftwards. RADIOLOGY:All plain film, CT, MRI, and formal ultrasound images (except ED bedside ultrasound) are read by the radiologist and the images and interpretations are directly viewed by the emergency physician. XR CHEST PORTABLE   Final Result   No evidence of acute cardiopulmonary disease             LABS: All lab results were reviewed by myself, and all abnormals are listed below.   Labs Reviewed   CBC WITH AUTO DIFFERENTIAL - Abnormal; Notable for the following components:       Result Value    RBC 3.77 (*)     .8 (*)     MCH 37.0 (*)     RDW 16.3 (*)     Seg Neutrophils 79 (*)     Lymphocytes 12 (*)     All other components within normal limits   COMPREHENSIVE METABOLIC PANEL - Abnormal; Notable for the following components:    Glucose 128 (*)     Sodium 131 (*)     Potassium 3.3 (*)     Chloride 85 (*)     CO2 34 (*)     Alb 3.1 (*)     All other components within normal limits   BASIC METABOLIC PANEL W/ REFLEX TO MG FOR LOW K - Abnormal; Notable for the following components:    Glucose 125 (*)     Chloride 94 (*)     CO2 43 (*)     Anion Gap 4 (*)     All other components within normal limits   CBC - Abnormal; Notable for the following components:    RBC 3.36 (*)     .4 (*)     MCH 37.1 (*)     RDW 16.6 (*)     All other components within normal limits   GRAM STAIN   CULTURE, WOUND   TROPONIN   TROPONIN   COVID-19   PERIPHERAL BLOOD SMEAR, PATH REVIEW   PERIPHERAL BLOOD SMEAR, PATH REVIEW   BASIC METABOLIC PANEL W/ REFLEX TO MG FOR LOW K   CBC       EMERGENCY DEPARTMENT COURSE:   Vitals:    Vitals:    06/30/20 1957 07/01/20 0702 07/01/20 1039 07/01/20 1313   BP: (!) 120/56 (!) 115/59 (!) 93/43 119/69   Pulse: 74 77 77 81   Resp: 18 18  20   Temp: 98.6 °F (37 °C) 98 °F (36.7 °C)  97.9 °F (36.6 °C)   TempSrc: Oral Oral  Oral   SpO2: 100% 96%  95%   Weight: (!) 330 lb 11 oz (150 kg)      Height: 5' 7\" (1.702 m)          The patient was given the following medications while in the emergency department:  Orders Placed This Encounter   Medications    metoprolol tartrate (LOPRESSOR) tablet 50 mg    vancomycin (VANCOCIN) intermittent dosing (placeholder)    vancomycin (VANCOCIN) 2,500 mg in dextrose 5 % 500 mL IVPB    vancomycin (VANCOCIN) 1,500 mg in dextrose 5 % 250 mL IVPB (ADDAVIAL)    sodium chloride flush 0.9 % injection 10 mL    sodium chloride flush 0.9 % injection 10 mL    acetaminophen (TYLENOL) tablet 650 mg    enoxaparin (LOVENOX) injection 40 mg    ondansetron (ZOFRAN) injection 4 mg    DISCONTD: furosemide (LASIX) injection 40 mg    potassium chloride (KLOR-CON M) extended release tablet 40 mEq    albuterol (PROVENTIL) nebulizer solution 2.5 mg    diclofenac sodium (VOLTAREN) 1 % gel 2 g    lisinopril (PRINIVIL;ZESTRIL) tablet 10 mg    metoprolol tartrate (LOPRESSOR) tablet 25 mg    miconazole (MICOTIN) 2 % powder    PARoxetine (PAXIL) tablet 20 mg    furosemide (LASIX) injection 40 mg    OR Linked Order Group     potassium chloride (KLOR-CON M) extended release tablet 40 mEq     potassium bicarb-citric acid (EFFER-K) effervescent tablet 40 mEq     potassium chloride 10 mEq/100 mL IVPB (Peripheral Line)    magnesium sulfate 1 g in dextrose 5% 100 mL IVPB    polyethylene glycol (GLYCOLAX) packet 17 g    promethazine (PHENERGAN) tablet 12.5 mg    nicotine (NICODERM CQ) 21 MG/24HR 1 patch     If indicated/pateint smokes    DISCONTD: HYDROcodone-acetaminophen (NORCO) 5-325 MG per tablet 1 tablet    HYDROcodone-acetaminophen (NORCO) 5-325 MG per tablet 1 tablet     -------------------------  CRITICAL CARE:   CONSULTS: PHARMACY TO DOSE VANCOMYCIN  PROCEDURES: Procedures     FINAL IMPRESSION      1. Cellulitis of left lower extremity    2.  Acute on chronic congestive heart failure, unspecified heart failure type ([facility]          DISPOSITION/PLAN DISPOSITION Admitted 06/30/2020 06:45:15 PM      PATIENT REFERRED TO:  Godwin Bruce PA-C  [address], [address] [place] [address]  [phone]            DISCHARGE MEDICATIONS:  Current Discharge Medication List            Marshall Gaytan MD  Attending Emergency Physician                      Marshall Gaytan MD  07/01/20 6066

## 2020-06-30 NOTE — ED NOTES
C= \"Have you ever felt that you should Cut down on your drinking? \"  Yes  A= \"Have people Annoyed you by criticizing your drinking? \"  No  G= \"Have you ever felt bad or Guilty about your drinking? \"  No  E= \"Have you ever had a drink as an Eye-opener first thing in the morning to steady your nerves or to help a hangover? \"  No      Deferred []      Reason for deferring: N/A    *If yes to two or more: probable alcohol abuse. Aidee Elias RN  06/30/20 0471

## 2020-07-01 LAB
ANION GAP SERPL CALCULATED.3IONS-SCNC: 4 MMOL/L (ref 9–17)
BUN BLDV-MCNC: 14 MG/DL (ref 8–23)
BUN/CREAT BLD: ABNORMAL (ref 9–20)
CALCIUM SERPL-MCNC: 9.1 MG/DL (ref 8.6–10.4)
CHLORIDE BLD-SCNC: 94 MMOL/L (ref 98–107)
CO2: 43 MMOL/L (ref 20–31)
CREAT SERPL-MCNC: 0.78 MG/DL (ref 0.5–0.9)
EKG ATRIAL RATE: 114 BPM
EKG P AXIS: 62 DEGREES
EKG P-R INTERVAL: 150 MS
EKG Q-T INTERVAL: 366 MS
EKG QRS DURATION: 98 MS
EKG QTC CALCULATION (BAZETT): 504 MS
EKG R AXIS: -91 DEGREES
EKG T AXIS: 72 DEGREES
EKG VENTRICULAR RATE: 114 BPM
GFR AFRICAN AMERICAN: >60 ML/MIN
GFR NON-AFRICAN AMERICAN: >60 ML/MIN
GFR SERPL CREATININE-BSD FRML MDRD: ABNORMAL ML/MIN/{1.73_M2}
GFR SERPL CREATININE-BSD FRML MDRD: ABNORMAL ML/MIN/{1.73_M2}
GLUCOSE BLD-MCNC: 125 MG/DL (ref 70–99)
HCT VFR BLD CALC: 37.4 % (ref 36–46)
HEMOGLOBIN: 12.5 G/DL (ref 12–16)
MCH RBC QN AUTO: 37.1 PG (ref 26–34)
MCHC RBC AUTO-ENTMCNC: 33.3 G/DL (ref 31–37)
MCV RBC AUTO: 111.4 FL (ref 80–100)
NRBC AUTOMATED: ABNORMAL PER 100 WBC
PATHOLOGIST REVIEW: NORMAL
PDW BLD-RTO: 16.6 % (ref 11.5–14.9)
PLATELET # BLD: 347 K/UL (ref 150–450)
PMV BLD AUTO: 7.7 FL (ref 6–12)
POTASSIUM SERPL-SCNC: 4 MMOL/L (ref 3.7–5.3)
RBC # BLD: 3.36 M/UL (ref 4–5.2)
SODIUM BLD-SCNC: 141 MMOL/L (ref 135–144)
WBC # BLD: 8.3 K/UL (ref 3.5–11)

## 2020-07-01 PROCEDURE — 6360000002 HC RX W HCPCS: Performed by: INTERNAL MEDICINE

## 2020-07-01 PROCEDURE — 85027 COMPLETE CBC AUTOMATED: CPT

## 2020-07-01 PROCEDURE — 99211 OFF/OP EST MAY X REQ PHY/QHP: CPT

## 2020-07-01 PROCEDURE — 6370000000 HC RX 637 (ALT 250 FOR IP): Performed by: INTERNAL MEDICINE

## 2020-07-01 PROCEDURE — 6370000000 HC RX 637 (ALT 250 FOR IP): Performed by: NURSE PRACTITIONER

## 2020-07-01 PROCEDURE — 97162 PT EVAL MOD COMPLEX 30 MIN: CPT

## 2020-07-01 PROCEDURE — 6360000002 HC RX W HCPCS: Performed by: NURSE PRACTITIONER

## 2020-07-01 PROCEDURE — 1200000000 HC SEMI PRIVATE

## 2020-07-01 PROCEDURE — 80048 BASIC METABOLIC PNL TOTAL CA: CPT

## 2020-07-01 PROCEDURE — 97116 GAIT TRAINING THERAPY: CPT

## 2020-07-01 PROCEDURE — 2580000003 HC RX 258: Performed by: EMERGENCY MEDICINE

## 2020-07-01 PROCEDURE — 6360000002 HC RX W HCPCS: Performed by: EMERGENCY MEDICINE

## 2020-07-01 PROCEDURE — 2580000003 HC RX 258: Performed by: INTERNAL MEDICINE

## 2020-07-01 PROCEDURE — 36415 COLL VENOUS BLD VENIPUNCTURE: CPT

## 2020-07-01 RX ORDER — HYDROCODONE BITARTRATE AND ACETAMINOPHEN 5; 325 MG/1; MG/1
1 TABLET ORAL EVERY 4 HOURS PRN
Status: DISCONTINUED | OUTPATIENT
Start: 2020-07-01 | End: 2020-07-03 | Stop reason: HOSPADM

## 2020-07-01 RX ADMIN — PAROXETINE HYDROCHLORIDE 20 MG: 20 TABLET, FILM COATED ORAL at 08:20

## 2020-07-01 RX ADMIN — ANTI-FUNGAL POWDER MICONAZOLE NITRATE TALC FREE: 1.42 POWDER TOPICAL at 08:21

## 2020-07-01 RX ADMIN — VANCOMYCIN HYDROCHLORIDE 1500 MG: 1.5 INJECTION, POWDER, LYOPHILIZED, FOR SOLUTION INTRAVENOUS at 18:22

## 2020-07-01 RX ADMIN — DICLOFENAC 2 G: 10 GEL TOPICAL at 13:12

## 2020-07-01 RX ADMIN — METOPROLOL TARTRATE 25 MG: 25 TABLET, FILM COATED ORAL at 20:00

## 2020-07-01 RX ADMIN — HYDROCODONE BITARTRATE AND ACETAMINOPHEN 1 TABLET: 5; 325 TABLET ORAL at 22:19

## 2020-07-01 RX ADMIN — DICLOFENAC 2 G: 10 GEL TOPICAL at 08:21

## 2020-07-01 RX ADMIN — LISINOPRIL 10 MG: 10 TABLET ORAL at 08:20

## 2020-07-01 RX ADMIN — HYDROCODONE BITARTRATE AND ACETAMINOPHEN 1 TABLET: 5; 325 TABLET ORAL at 11:39

## 2020-07-01 RX ADMIN — HYDROCODONE BITARTRATE AND ACETAMINOPHEN 1 TABLET: 5; 325 TABLET ORAL at 15:51

## 2020-07-01 RX ADMIN — ENOXAPARIN SODIUM 40 MG: 40 INJECTION SUBCUTANEOUS at 08:20

## 2020-07-01 RX ADMIN — FUROSEMIDE 40 MG: 10 INJECTION, SOLUTION INTRAMUSCULAR; INTRAVENOUS at 08:20

## 2020-07-01 RX ADMIN — HYDROCODONE BITARTRATE AND ACETAMINOPHEN 1 TABLET: 5; 325 TABLET ORAL at 05:50

## 2020-07-01 RX ADMIN — VANCOMYCIN HYDROCHLORIDE 1500 MG: 1.5 INJECTION, POWDER, LYOPHILIZED, FOR SOLUTION INTRAVENOUS at 10:27

## 2020-07-01 RX ADMIN — Medication 10 ML: at 08:21

## 2020-07-01 RX ADMIN — FUROSEMIDE 40 MG: 10 INJECTION, SOLUTION INTRAMUSCULAR; INTRAVENOUS at 17:30

## 2020-07-01 RX ADMIN — ANTI-FUNGAL POWDER MICONAZOLE NITRATE TALC FREE: 1.42 POWDER TOPICAL at 20:00

## 2020-07-01 RX ADMIN — VANCOMYCIN HYDROCHLORIDE 1500 MG: 1.5 INJECTION, POWDER, LYOPHILIZED, FOR SOLUTION INTRAVENOUS at 02:40

## 2020-07-01 ASSESSMENT — ENCOUNTER SYMPTOMS
DIARRHEA: 0
WHEEZING: 0
ABDOMINAL PAIN: 0
NAUSEA: 0
VOMITING: 0
BACK PAIN: 0
CONSTIPATION: 0
SORE THROAT: 0
SHORTNESS OF BREATH: 0
ROS SKIN COMMENTS: BILATERAL LOWER LEGS
COLOR CHANGE: 1
COUGH: 1

## 2020-07-01 ASSESSMENT — PAIN DESCRIPTION - PAIN TYPE: TYPE: CHRONIC PAIN

## 2020-07-01 ASSESSMENT — PAIN SCALES - GENERAL
PAINLEVEL_OUTOF10: 8
PAINLEVEL_OUTOF10: 8
PAINLEVEL_OUTOF10: 5
PAINLEVEL_OUTOF10: 8
PAINLEVEL_OUTOF10: 8
PAINLEVEL_OUTOF10: 7
PAINLEVEL_OUTOF10: 7
PAINLEVEL_OUTOF10: 5

## 2020-07-01 ASSESSMENT — PAIN DESCRIPTION - PROGRESSION: CLINICAL_PROGRESSION: GRADUALLY IMPROVING

## 2020-07-01 ASSESSMENT — PAIN DESCRIPTION - ONSET: ONSET: ON-GOING

## 2020-07-01 ASSESSMENT — PAIN DESCRIPTION - LOCATION: LOCATION: HIP;KNEE;FOOT

## 2020-07-01 ASSESSMENT — PAIN DESCRIPTION - FREQUENCY: FREQUENCY: CONTINUOUS

## 2020-07-01 NOTE — H&P
[address] [facility]     HISTORY AND PHYSICAL EXAMINATION            Date:   7/1/2020  Patientname: Yessica Patton  Date of admission:  6/30/2020  4:01 PM  MRN:   [id number]  Account:  [de-identified]  YOB: 1960  PCP:    Daisy Meléndez PA-C  Room:   2061/2061-01  Code Status:    Full Code    CHIEF COMPLAINT     Chief Complaint   Patient presents with    Leg Swelling    Other     low oxygen       HISTORY OF PRESENT ILLNESS  (Character, Onset, Location, Duration,  Exacerbating/RelievingFactors, Radiation,   Associated Symptoms, Severity )      The patient is a 61 y.o.  female, with a history of CHF with preserved EF, COPD, HTN, and PE, who presents with leg swelling and low oxygen. According to patient, she was hospitalized approximately 1 month prior for COPD and reports that bilateral lower extremity edema and wounds have continued to progress since discharge. Patient reports that she was seen by PCP today and sent to the ED for concerns of wound infection and low oxygen levels. Patient states she is supposed to be on home O2 ATC; however, she does not always wear it as ordered. Symptoms are associated with fatigue, chronic cough, which she attributes to smoking, and exertional shortness of breath. Patient also reports having an open area on her buttocks. Denies fever, chills, chest pain, abdominal pain, nausea, vomiting, diarrhea, and urinary symptoms. There are no aggravating or alleviating factors. Symptoms are reported as constant, moderate, and progressively worsening. PAST MEDICAL HISTORY   Patient  has a past medical history of Arthritis, CHF (congestive heart failure) ([address] COPD (chronic obstructive pulmonary disease) ([address] Hx of blood clots, and Pulmonary embolism ([address] PAST SURGICAL HISTORY    Patient  has a past surgical history that includes Ankle surgery; Colon surgery; and hernia repair.      FAMILY HISTORY    Patient family history is not on file.    SOCIAL HISTORY    Patient  reports that she has been smoking. She has a 22.50 pack-year smoking history. She has never used smokeless tobacco. She reports current alcohol use. She reports previous drug use. HOME MEDICATIONS        Prior to Admission medications    Medication Sig Start Date End Date Taking? Authorizing Provider   PARoxetine (PAXIL CR) 25 MG extended release tablet Take 25 mg by mouth every morning   Yes Historical Provider, MD   furosemide (LASIX) 40 MG tablet Take 40 mg by mouth daily   Yes Historical Provider, MD   miconazole (MICOTIN) 2 % powder Apply topically 2 times daily. 5/27/20  Yes Mi May MD   diclofenac sodium (VOLTAREN) 1 % GEL Apply 2 g topically 4 times daily as needed for Pain 5/27/20  Yes Mi May MD   metoprolol tartrate (LOPRESSOR) 25 MG tablet Take 1 tablet by mouth 2 times daily 4/21/20  Yes Dominic Chand PA-C   lisinopril (PRINIVIL;ZESTRIL) 10 MG tablet Take 1 tablet by mouth daily 4/21/20  Yes Dominic Chand PA-C   albuterol sulfate  (90 Base) MCG/ACT inhaler Inhale 2 puffs into the lungs every 6 hours as needed for Wheezing   Yes Historical Provider, MD       ALLERGIES      Patient has no known allergies. REVIEW OF SYSTEMS     Review of Systems   Constitutional: Positive for activity change. Negative for chills, diaphoresis and fever. HENT: Negative for congestion and sore throat. Respiratory: Positive for cough (chronic - occaisional yellow sputum). Negative for shortness of breath and wheezing. Cardiovascular: Positive for leg swelling. Negative for chest pain and palpitations. Gastrointestinal: Negative for abdominal pain, constipation, diarrhea, nausea and vomiting. Genitourinary: Negative for dysuria, frequency and urgency. Musculoskeletal: Negative for back pain and myalgias. Skin: Positive for color change and wound. Negative for rash.         Bilateral lower legs   Neurological: Negative for dizziness, weakness and headaches. Psychiatric/Behavioral: The patient is not nervous/anxious. PHYSICAL EXAM      BP (!) 120/56   Pulse 74   Temp 98.6 °F (37 °C) (Oral)   Resp 18   Ht 5' 7\" (1.702 m)   Wt (!) 330 lb 11 oz (150 kg)   SpO2 100%   BMI 51.79 kg/m²  Body mass index is 51.79 kg/m². Physical Exam  Constitutional:       General: She is not in acute distress. Appearance: She is well-developed. She is obese. She is not diaphoretic. Comments: Morbid obesity   HENT:      Head: Normocephalic and atraumatic. Eyes:      Conjunctiva/sclera: Conjunctivae normal.      Pupils: Pupils are equal, round, and reactive to light. Neck:      Musculoskeletal: Normal range of motion and neck supple. Trachea: No tracheal deviation. Cardiovascular:      Rate and Rhythm: Normal rate and regular rhythm. Heart sounds: Normal heart sounds. No murmur. No friction rub. No gallop. Pulmonary:      Effort: Pulmonary effort is normal. No respiratory distress. Breath sounds: No wheezing or rales. Comments: diminished breath sounds in right posterior base  Chest:      Chest wall: No tenderness. Abdominal:      General: Bowel sounds are normal. There is no distension. Palpations: Abdomen is soft. Tenderness: There is no abdominal tenderness. There is no guarding. Musculoskeletal: Normal range of motion. General: Tenderness present. Right lower leg: Edema present. Left lower leg: Edema present. Comments: 4+ edema of bilateral lower legs   Lymphadenopathy:      Cervical: No cervical adenopathy. Skin:     General: Skin is warm and dry. Coloration: Skin is not pale. Findings: Erythema and lesion present. No rash. Comments: Erythema and edema with open, weeping areas present on bilateral lower extremities. Serous drainage present. See images in media tab. Neurological:      Mental Status: She is alert and oriented to person, place, and time. Motor: No seizure activity. Coordination: Coordination normal.   Psychiatric:         Behavior: Behavior normal.         Thought Content: Thought content normal.       DIAGNOSTICS      EKG: (as documented in ED note):     EKG shows a sinus tachycardia HR is 114, , QRS 98, , no LANG, No STD, No TWI, the axis is leftwards. Labs:  CBC:   Recent Labs     06/30/20  1711   WBC 10.5   HGB 14.0        BMP:    Recent Labs     06/30/20  1711   *   K 3.3*   CL 85*   CO2 34*   BUN 17   CREATININE 0.68   GLUCOSE 128*     S. Calcium:  Recent Labs     06/30/20  1711   CALCIUM 9.3     S. Ionized Calcium:No results for input(s): IONCA in the last 72 hours. S. Magnesium:No results for input(s): MG in the last 72 hours. S. Phosphorus:No results for input(s): PHOS in the last 72 hours. S. Glucose:No results for input(s): POCGLU in the last 72 hours. Glycosylated hemoglobin A1C:   Lab Results   Component Value Date    LABA1C 6.4 05/25/2020     Hepatic:   Recent Labs     06/30/20  1711   AST 15   ALT 7   ALKPHOS 90     CARDIAC ENZY:   Recent Labs     06/30/20  1711 06/30/20  1929   TROPHS 12 12     INR: No results for input(s): INR in the last 72 hours. BNP: No results for input(s): PROBNP in the last 72 hours. ABGs: No results for input(s): PH, PCO2, PO2, HCO3, O2SAT in the last 72 hours. Lipids: No results for input(s): CHOL, TRIG, HDL, LDLCALC in the last 72 hours. Invalid input(s): LDL  Pancreatic functions:No results for input(s): LIPASE, AMYLASE in the last 72 hours. Derl Levels: No results for input(s): LACTA in the last 72 hours. Thyroid functions:   Lab Results   Component Value Date    TSH 2.26 10/29/2019      U/A:No results for input(s): NITRITE, COLORU, WBCUA, RBCUA, MUCUS, BACTERIA, CLARITYU, SPECGRAV, LEUKOCYTESUR, BLOODU, GLUCOSEU, AMORPHOUS in the last 72 hours.     Invalid input(s): Sunita Ng    Imaging/Diagonstics:     Xr Chest Portable    Result Date:

## 2020-07-01 NOTE — PROGRESS NOTES
kg)  · Admission Body Wt: 330 lb 11 oz (150 kg)  · Usual Body Wt: 337 lb (152.9 kg)(5/24/20 Bed scale)  · % Weight Change:  ,  2% loss in 1 month  · Ideal Body Wt: 135 lb (61.2 kg), % Ideal Body 245% based on admission weight  · BMI Classification: BMI > or equal to 40.0 Obese Class III    Nutrition Interventions:   Continue current diet  Continued Inpatient Monitoring    Nutrition Evaluation:   · Evaluation: Goals set   · Goals: Meet greater than 75% of estimated nutrition needs    · Monitoring: Meal Intake, Diet Tolerance, Wound Healing, I&O, Weight, Pertinent Labs, Monitor Bowel Function        Some areas of assessment may be incomplete due to COVID-19 precautions    Val Roth, RD, LD  Office phone [phone]

## 2020-07-01 NOTE — PLAN OF CARE
Problem: Falls - Risk of:  Goal: Will remain free from falls  Description: Will remain free from falls  7/1/2020 1338 by Wilbur Woodson RN  Outcome: Ongoing     Problem: Falls - Risk of:  Goal: Absence of physical injury  Description: Absence of physical injury  7/1/2020 1338 by Wilbur Woodson RN  Outcome: Ongoing     Problem: Pain:  Goal: Pain level will decrease  Description: Pain level will decrease  7/1/2020 1338 by Wilbur Woodson RN  Outcome: Ongoing     Problem: Pain:  Goal: Control of acute pain  Description: Control of acute pain  7/1/2020 1338 by Wilbur Woodson RN  Outcome: Ongoing     Problem: Pain:  Goal: Control of chronic pain  Description: Control of chronic pain  7/1/2020 1338 by Wilbur Woodson RN  Outcome: Ongoing   Pain controlled with prn medication as ordered. Problem: Skin Integrity:  Goal: Will show no infection signs and symptoms  Description: Will show no infection signs and symptoms  7/1/2020 1338 by Wilbur Woodson RN  Outcome: Ongoing     Problem: Skin Integrity:  Goal: Absence of new skin breakdown  Description: Absence of new skin breakdown  7/1/2020 1338 by Wilbur Woodson RN  Outcome: Ongoing    Skin assessed and treated as stated in flowsheet. Pt on IV atb and tolerating well.

## 2020-07-01 NOTE — CARE COORDINATION
CASE MANAGEMENT NOTE:    Admission Date:  6/30/2020 Freada Soulier is a 61 y.o.  female    Admitted for : Cellulitis [L03.90]    Met with:  Patient    PCP:  Reyes Mires                                Insurance:  C[employer]      Current Residence/ Living Arrangements:  independently at home w/ SO            Current Services PTA:  No    Is patient agreeable to VNS: No    Freedom of choice provided:  Yes    List of [address] provided: No, declines    VNS chosen:  No    DME:  straight cane and walker    Home Oxygen: No    Nebulizer: No    CPAP/BIPAP: No    Supplier: N/A    Potential Assistance Needed: Yes, Follow for Home O2/Neb    SNF needed: No    Freedom of choice and list provided: NA    Pharmacy:  [address] [place] [address] [place]       Does Patient want to use MEDS to BEDS? No    Is the Patient an [facility] with Readmission Risk Score greater than 14%? No  If yes, pt needs a follow up appointment made within 7 days. Family Members/Caregivers that pt would like involved in their care:    Yes    If yes, list name here:  Friend Sien[place]    Transportation Provider:  Patient             Is patient in Isolation/One on One/Altered Mental Status? No  If yes, skip next question. If no, would they like an I-Pad to  use? No  If yes, call [phone]. Discharge Plan:  [facility] Lives in 1 story home w/SO. DME walker,Cane. Denies VNS/SNF. PT/OT on board, will follow rec. IV Lasix, 40mg, Bid, Vanco. Follow for Home O2/Neb or any other needs. Becki would need signed/completed if needed. //KB                 Electronically signed by: Aye Roach RN on 7/1/2020 at 2:36 PM

## 2020-07-01 NOTE — PROGRESS NOTES
Physical Therapy    Facility/Department: [facility] MED SURG  Initial Assessment    NAME: Jose Sigala  : 1960  MRN: [id number]    Date of Service: 2020    Discharge Recommendations:  Patient would benefit from continued therapy after discharge   PT Equipment Recommendations  Equipment Needed: No    Assessment   Body structures, Functions, Activity limitations: Decreased functional mobility ; Decreased strength;Decreased endurance;Decreased sensation;Decreased balance; Increased pain  Assessment: continue per POC to maxmize potential for safe D/C  Treatment Diagnosis: impaired mobility due to arthritic pain right hip and knee and SOB w/ exertion  Specific instructions for Next Treatment: advance gait distance using w walker and O2, instruct in HEP and energy conservation, advance to 2 steps  Prognosis: Good  Decision Making: Medium Complexity  History: admitted due to CHF and hypoxia  Exam: ROM, MMT, balance and mobility assessments  Clinical Presentation:  gait w/ w walker 15' x 2 w/ CGA x 1 using O2 at 3 L, FALL RISK  PT Education: Goals;PT Role;Plan of Care;Energy Conservation;Gait Training  Barriers to Learning: none  REQUIRES PT FOLLOW UP: Yes  Activity Tolerance  Activity Tolerance: Patient limited by fatigue;Patient limited by endurance; Patient limited by pain       Patient Diagnosis(es): The primary encounter diagnosis was Cellulitis of left lower extremity. A diagnosis of Acute on chronic congestive heart failure, unspecified heart failure type [facility]) was also pertinent to this visit. has a past medical history of Arthritis, CHF (congestive heart failure) ([facility] [address] COPD (chronic obstructive pulmonary disease) ([facility] [address] Hx of blood clots, and Pulmonary embolism ([facility] [address] has a past surgical history that includes Ankle surgery; Colon surgery; and hernia repair.     Restrictions  Restrictions/Precautions  Restrictions/Precautions: Fall Risk(peripheral IV left hand, O2 at 3 L)  Required Braces or Orthoses?: No  Implants present? : Metal implants(left ankle screw and plate)  Vision/Hearing  Vision: Impaired  Vision Exceptions: Wears glasses at all times  Hearing: Within functional limits     Subjective  General  Patient assessed for rehabilitation services?: Yes  Additional Pertinent Hx: HPI 61 y.o. female is sent to the emergency department for evaluation of leg redness swelling and low oxygen levels. The patient went to her primary care provider's office today, they found that her oxygen levels were extremely low and  that her leg appeared infected. The patient has chronic lymphedema, she also has chronic heart failure with a preserved ejection fraction among multiple other medical comorbidities noted in the past medical history. The patient was recently admitted to the hospital for bilateral lower extremity edema. She says that since she left the hospital she has had progressively worsening edema in both legs. The left leg in particular has become more red and there is more drainage from the chronic open wounds in her leg. Response To Previous Treatment: Not applicable  Family / Caregiver Present: No  Referring Practitioner: Hoda Bazzi CNP  Referral Date : 06/30/20  Diagnosis: cellulitis left LE, CHF  Follows Commands: Within Functional Limits  Other (Comment): OK per nurse Debra Burgos to proceed w/ PT evaluation  Subjective  Subjective: pt reports that she had difficulty breathing and was sent from her [de-identified] office to the hospital.   Pain Screening  Patient Currently in Pain: Yes  Pain Assessment  Pain Assessment: 0-10  Pain Level: 7  Patient's Stated Pain Goal: 4  Pain Type: Chronic pain  Pain Location: Hip;Knee; Foot  Pain Descriptors: (\"nail going through  my foot\", arthritis pain hip and knee)  Pain Frequency: Continuous  Pain Onset: On-going  Clinical Progression: Gradually improving  Non-Pharmaceutical Pain Intervention(s): Ambulation/Increased Activity;Repositioned  Response to Pain Intervention: Patient Satisfied  Multiple Pain Sites: No  Vital Signs  Patient Currently in Pain: Yes       Orientation  Orientation  Overall Orientation Status: Within Normal Limits  Social/Functional History  Social/Functional History  Lives With: Significant other  Type of Home: House  Home Layout: One level  Home Access: Stairs to enter without rails  Entrance Stairs - Number of Steps: 2  Bathroom Shower/Tub: Tub/Shower unit, Curtain  Bathroom Toilet: Standard  Bathroom Accessibility: Accessible  Home Equipment: Cane, Rolling walker, Reacher, Sock aid, Long-handled shoehorn  ADL Assistance: Independent(sponge bathing difficulty getting into shower, dresses self)  Homemaking Assistance: Needs assistance(S.O. does)  Homemaking Responsibilities: No(S.O. does)  Ambulation Assistance: Independent(has been using w walker more than s cane in recent weeks; limited distances due to breathing- states she doesn't leave the house)  Transfer Assistance: Independent  Active : Yes  Mode of Transportation: HEMINGWAY  Occupation: On disability  Type of occupation:  Kitchen at [employer]  IADL Comments:  sleeps in [address] [facility]  Additional Comments: Has been having difficulty getting into shower between R hip pain and increased B LE edema. Pt filed for disability due to health issues. 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   Cognition        Objective     Observation/Palpation  Observation: peripheral IV left hand, O2 at 3 L  Edema: admitted w/ 4+ edema bilateral LEs    AROM RLE (degrees)  RLE AROM: WFL  AROM LLE (degrees)  LLE AROM : WFL  AROM RUE (degrees)  RUE General AROM: see OT for UE assessment  AROM LUE (degrees)  LUE General AROM: see OT for UE assessment  Strength RLE  Comment: at least 3/5 (deferred resistance due to arthritis and pain level right hip & knee)  Strength LLE  Strength LLE: WFL  Comment: 4/5  Strength RUE  Comment: see OT for UE assessment  Strength LUE  Comment: see OT for UE assessment Sensation  Overall Sensation Status: Impaired(C/O numbness bilateral feet)  Bed mobility  Comment: pt dangling independently at the EOB when the therapist entered the room; pt requested to sit at the EOB at the end of treatment; refused recliner chair  Transfers  Sit to Stand: Contact guard assistance  Stand to sit: Contact guard assistance  Stand Pivot Transfers: Contact guard assistance(used w walker)  Comment: O2 sat at rest 95% HR 83 while dangling and on 3 L O2  Ambulation  Ambulation?: Yes  Ambulation 1  Surface: level tile  Device: Rolling Walker  Other Apparatus: O2(3 L)  Assistance: Contact guard assistance  Gait Deviations: Slow Erum( antalgic gait, decreased stance time on R LE, small steps on L, slow erum, trandelenberg gait, increased lateral sway)  Distance: 15' x 2 around the bed and back  Comments: O2 sat at rest 96% HR 75; audible grating noise right hip and knee when weight bearing on right LE  Stairs/Curb  Stairs?: No     Balance  Sitting - Static: Good  Sitting - Dynamic: Good  Standing - Static: Good;-(used w walker)  Standing - Dynamic: Fair;+(used w walker)        Plan   Plan  Times per week: 2-3  treatments/ 3 days  Times per day: (2-3  treatments/ 3 days)  Specific instructions for Next Treatment: advance gait distance using w walker and O2, instruct in HEP and energy conservation, advance to 2 steps  Current Treatment Recommendations: Strengthening, Transfer Training, Endurance Training, Patient/Caregiver Education & Training, Balance Training, Gait Training, Home Exercise Program, Safety Education & Training, Stair training, Functional Mobility Training  Safety Devices  Type of devices:  All fall risk precautions in place, Gait belt, Patient at risk for falls, Call light within reach, Left in bed, Nurse notified(nurse Macdonald)    G-Code       OutComes Score                                                  AM-PAC Score  AM-PAC Inpatient Mobility Raw Score : 10 (07/01/20 0427)  AM-PAC

## 2020-07-01 NOTE — PROGRESS NOTES
[facility]        NAME:  [id number] RECORD NUMBER:  [id number]  AGE: 61 y.o. GENDER: female  : 1960  TODAY'S DATE:  2020    Attempted to visit for wound eval. The patient states already having her dressing changed late morning today and wound prefer to wait until tomorrow. Will plan a visit tomorrow morning.     Dotty COON, RN, [address] [place]  Wound, Ostomy, and Continence Care  [phone]

## 2020-07-01 NOTE — PLAN OF CARE
Nutrition Problem: Overweight/Obese  Intervention: Food and/or Nutrient Delivery: Continue current diet  Nutritional Goals: Meet greater than 75% of estimated nutrition needs

## 2020-07-02 ENCOUNTER — APPOINTMENT (OUTPATIENT)
Dept: GENERAL RADIOLOGY | Age: 60
DRG: 383 | End: 2020-07-02
Payer: COMMERCIAL

## 2020-07-02 LAB
ANION GAP SERPL CALCULATED.3IONS-SCNC: 8 MMOL/L (ref 9–17)
BUN BLDV-MCNC: 18 MG/DL (ref 8–23)
BUN/CREAT BLD: ABNORMAL (ref 9–20)
C-REACTIVE PROTEIN: 29.8 MG/L (ref 0–5)
CALCIUM SERPL-MCNC: 9 MG/DL (ref 8.6–10.4)
CHLORIDE BLD-SCNC: 89 MMOL/L (ref 98–107)
CO2: 38 MMOL/L (ref 20–31)
CREAT SERPL-MCNC: 0.82 MG/DL (ref 0.5–0.9)
GFR AFRICAN AMERICAN: >60 ML/MIN
GFR NON-AFRICAN AMERICAN: >60 ML/MIN
GFR SERPL CREATININE-BSD FRML MDRD: ABNORMAL ML/MIN/{1.73_M2}
GFR SERPL CREATININE-BSD FRML MDRD: ABNORMAL ML/MIN/{1.73_M2}
GLUCOSE BLD-MCNC: 116 MG/DL (ref 70–99)
HCT VFR BLD CALC: 38.4 % (ref 36–46)
HEMOGLOBIN: 12.7 G/DL (ref 12–16)
MCH RBC QN AUTO: 37 PG (ref 26–34)
MCHC RBC AUTO-ENTMCNC: 33 G/DL (ref 31–37)
MCV RBC AUTO: 112.1 FL (ref 80–100)
NRBC AUTOMATED: ABNORMAL PER 100 WBC
PATHOLOGIST REVIEW: NORMAL
PDW BLD-RTO: 16.5 % (ref 11.5–14.9)
PLATELET # BLD: 373 K/UL (ref 150–450)
PMV BLD AUTO: 7.9 FL (ref 6–12)
POTASSIUM SERPL-SCNC: 3.8 MMOL/L (ref 3.7–5.3)
RBC # BLD: 3.43 M/UL (ref 4–5.2)
SEDIMENTATION RATE, ERYTHROCYTE: 62 MM (ref 0–20)
SODIUM BLD-SCNC: 135 MMOL/L (ref 135–144)
WBC # BLD: 7.5 K/UL (ref 3.5–11)

## 2020-07-02 PROCEDURE — 85651 RBC SED RATE NONAUTOMATED: CPT

## 2020-07-02 PROCEDURE — 6360000002 HC RX W HCPCS: Performed by: INTERNAL MEDICINE

## 2020-07-02 PROCEDURE — 36415 COLL VENOUS BLD VENIPUNCTURE: CPT

## 2020-07-02 PROCEDURE — 2580000003 HC RX 258: Performed by: INTERNAL MEDICINE

## 2020-07-02 PROCEDURE — 99213 OFFICE O/P EST LOW 20 MIN: CPT

## 2020-07-02 PROCEDURE — 6360000002 HC RX W HCPCS: Performed by: NURSE PRACTITIONER

## 2020-07-02 PROCEDURE — 99232 SBSQ HOSP IP/OBS MODERATE 35: CPT | Performed by: INTERNAL MEDICINE

## 2020-07-02 PROCEDURE — 80048 BASIC METABOLIC PNL TOTAL CA: CPT

## 2020-07-02 PROCEDURE — 97116 GAIT TRAINING THERAPY: CPT

## 2020-07-02 PROCEDURE — 73590 X-RAY EXAM OF LOWER LEG: CPT

## 2020-07-02 PROCEDURE — 2580000003 HC RX 258: Performed by: EMERGENCY MEDICINE

## 2020-07-02 PROCEDURE — 1200000000 HC SEMI PRIVATE

## 2020-07-02 PROCEDURE — 6370000000 HC RX 637 (ALT 250 FOR IP): Performed by: INTERNAL MEDICINE

## 2020-07-02 PROCEDURE — 86140 C-REACTIVE PROTEIN: CPT

## 2020-07-02 PROCEDURE — 6360000002 HC RX W HCPCS: Performed by: EMERGENCY MEDICINE

## 2020-07-02 PROCEDURE — 85027 COMPLETE CBC AUTOMATED: CPT

## 2020-07-02 PROCEDURE — 97110 THERAPEUTIC EXERCISES: CPT

## 2020-07-02 PROCEDURE — 6370000000 HC RX 637 (ALT 250 FOR IP): Performed by: NURSE PRACTITIONER

## 2020-07-02 RX ADMIN — HYDROCODONE BITARTRATE AND ACETAMINOPHEN 1 TABLET: 5; 325 TABLET ORAL at 19:04

## 2020-07-02 RX ADMIN — PAROXETINE HYDROCHLORIDE 20 MG: 20 TABLET, FILM COATED ORAL at 08:14

## 2020-07-02 RX ADMIN — HYDROCODONE BITARTRATE AND ACETAMINOPHEN 1 TABLET: 5; 325 TABLET ORAL at 05:57

## 2020-07-02 RX ADMIN — VANCOMYCIN HYDROCHLORIDE 1500 MG: 1.5 INJECTION, POWDER, LYOPHILIZED, FOR SOLUTION INTRAVENOUS at 10:00

## 2020-07-02 RX ADMIN — ENOXAPARIN SODIUM 40 MG: 40 INJECTION SUBCUTANEOUS at 08:13

## 2020-07-02 RX ADMIN — HYDROCODONE BITARTRATE AND ACETAMINOPHEN 1 TABLET: 5; 325 TABLET ORAL at 10:00

## 2020-07-02 RX ADMIN — FUROSEMIDE 40 MG: 10 INJECTION, SOLUTION INTRAMUSCULAR; INTRAVENOUS at 18:02

## 2020-07-02 RX ADMIN — Medication 10 ML: at 08:19

## 2020-07-02 RX ADMIN — METOPROLOL TARTRATE 25 MG: 25 TABLET, FILM COATED ORAL at 20:11

## 2020-07-02 RX ADMIN — ENOXAPARIN SODIUM 40 MG: 40 INJECTION SUBCUTANEOUS at 20:10

## 2020-07-02 RX ADMIN — VANCOMYCIN HYDROCHLORIDE 1500 MG: 1.5 INJECTION, POWDER, LYOPHILIZED, FOR SOLUTION INTRAVENOUS at 18:20

## 2020-07-02 RX ADMIN — ANTI-FUNGAL POWDER MICONAZOLE NITRATE TALC FREE: 1.42 POWDER TOPICAL at 20:11

## 2020-07-02 RX ADMIN — FUROSEMIDE 40 MG: 10 INJECTION, SOLUTION INTRAMUSCULAR; INTRAVENOUS at 08:13

## 2020-07-02 RX ADMIN — VANCOMYCIN HYDROCHLORIDE 1500 MG: 1.5 INJECTION, POWDER, LYOPHILIZED, FOR SOLUTION INTRAVENOUS at 03:00

## 2020-07-02 RX ADMIN — HYDROCODONE BITARTRATE AND ACETAMINOPHEN 1 TABLET: 5; 325 TABLET ORAL at 14:53

## 2020-07-02 RX ADMIN — HYDROCODONE BITARTRATE AND ACETAMINOPHEN 1 TABLET: 5; 325 TABLET ORAL at 23:50

## 2020-07-02 ASSESSMENT — PAIN DESCRIPTION - ORIENTATION: ORIENTATION: RIGHT

## 2020-07-02 ASSESSMENT — ENCOUNTER SYMPTOMS
COLOR CHANGE: 1
DIARRHEA: 0
NAUSEA: 0
SHORTNESS OF BREATH: 1
SORE THROAT: 0
SINUS PAIN: 0
EYE PAIN: 0
COUGH: 1
ABDOMINAL PAIN: 0
VOMITING: 0
CHEST TIGHTNESS: 1

## 2020-07-02 ASSESSMENT — PAIN SCALES - GENERAL
PAINLEVEL_OUTOF10: 6
PAINLEVEL_OUTOF10: 3
PAINLEVEL_OUTOF10: 8
PAINLEVEL_OUTOF10: 10
PAINLEVEL_OUTOF10: 7

## 2020-07-02 ASSESSMENT — PAIN DESCRIPTION - FREQUENCY: FREQUENCY: CONTINUOUS

## 2020-07-02 ASSESSMENT — PAIN DESCRIPTION - PAIN TYPE: TYPE: CHRONIC PAIN

## 2020-07-02 ASSESSMENT — PAIN DESCRIPTION - LOCATION: LOCATION: HIP;KNEE;FOOT

## 2020-07-02 NOTE — FLOWSHEET NOTE
07/02/20 1801   Encounter Summary   Services provided to: Patient   Referral/Consult From: Rounding   Complexity of Encounter Low   Length of Encounter 15 minutes   Spiritual/[facility]ist   Type Spiritual support   Assessment Sleeping   Intervention Prayer

## 2020-07-02 NOTE — PROGRESS NOTES
RN attempted to wean patient's oxygen. Patient's oxygen saturation ranged from 88-90% on room air. Patient placed on 1L oxygen. Oxygen saturation 94-95% on 1L.

## 2020-07-02 NOTE — PROGRESS NOTES
Physical Therapy  Facility/Department: [facility] MED SURG  Daily Treatment Note  NAME: Steve Campoverde  : 1960  MRN: [id number]    Date of Service: 2020    Discharge Recommendations:  Patient would benefit from continued therapy after discharge   PT Equipment Recommendations  Equipment Needed: No    Assessment   Body structures, Functions, Activity limitations: Decreased functional mobility ; Decreased strength;Decreased endurance;Decreased sensation;Decreased balance; Increased pain  Assessment: continue per POC to maxmize potential for safe D/C  Treatment Diagnosis: impaired mobility due to arthritic pain right hip and knee and SOB w/ exertion  Specific instructions for Next Treatment: advance gait distance using w walker and O2, instruct in HEP and energy conservation, advance to 2 steps  Prognosis: Good  Decision Making: Medium Complexity  History: admitted due to CHF and hypoxia  REQUIRES PT FOLLOW UP: Yes  Activity Tolerance  Activity Tolerance: Patient limited by fatigue;Patient limited by endurance; Patient limited by pain  Activity Tolerance: Rest breaks PRN      Patient Diagnosis(es): The primary encounter diagnosis was Cellulitis of left lower extremity. A diagnosis of Acute on chronic congestive heart failure, unspecified heart failure type [facility]) was also pertinent to this visit. has a past medical history of Arthritis, CHF (congestive heart failure) ([facility] [address] COPD (chronic obstructive pulmonary disease) ([facility] [address] Hx of blood clots, and Pulmonary embolism ([facility] [address] has a past surgical history that includes Ankle surgery; Colon surgery; and hernia repair.     Restrictions  Restrictions/Precautions  Restrictions/Precautions: Fall Risk(peripheral IV left hand, O2 at 3 L)  Required Braces or Orthoses?: No  Implants present? : Metal implants(left ankle screw and plate)  Subjective   General  Additional Pertinent Hx: HPI 61 y.o. female is sent to the emergency department for evaluation of leg redness swelling and low oxygen levels. The patient went to her primary care provider's office today, they found that her oxygen levels were extremely low and  that her leg appeared infected. The patient has chronic lymphedema, she also has chronic heart failure with a preserved ejection fraction among multiple other medical comorbidities noted in the past medical history. The patient was recently admitted to the hospital for bilateral lower extremity edema. She says that since she left the hospital she has had progressively worsening edema in both legs. The left leg in particular has become more red and there is more drainage from the chronic open wounds in her leg. Family / Caregiver Present: No  Referring Practitioner: Colletta Landau CNP  Subjective  Subjective: Pt found seated EOB upon arrival. Pt is cooperative and motivated for therapy. General Comment  Comments: EFE SARAVIA ok'd pt for PT. Pain Screening  Patient Currently in Pain: Yes  Pain Assessment  Pain Level: 6  Pain Type: Chronic pain  Pain Location: Hip;Knee; Foot  Pain Orientation: Right  Pain Descriptors: (arthritic pain )  Pain Frequency: Continuous  Vital Signs  Patient Currently in Pain: Yes  Oxygen Therapy  SpO2: 99 %  O2 Device: Nasal cannula  O2 Flow Rate (L/min): 3 L/min       Orientation  Orientation  Overall Orientation Status: Within Normal Limits  Cognition      Objective   Bed mobility  Comment: Pt seated independently at EOB beginning and end of session. Pt requests to sit at EOB at end of treatment; refused recliner chair. Transfers  Sit to Stand: Contact guard assistance  Stand to sit: Contact guard assistance  Comment: VCs for safe technique for sit <> stands x4 with RW.    Ambulation  Ambulation?: Yes  Ambulation 1  Surface: level tile  Device: Rolling Walker  Other Apparatus: O2(3 L/min)  Assistance: Contact guard assistance  Quality of Gait: Demos antalgic gait, decreased stance time on R LE, small steps on L, slow erum, trandelenberg gait, devices:  All fall risk precautions in place, Gait belt, Left in bed, Patient at risk for falls(Left pt at edge of bed )     Therapy Time   Individual Concurrent Group Co-treatment   Time In 0830         Time Out 0854         Minutes Hortensia 40, PTA

## 2020-07-02 NOTE — PLAN OF CARE
Problem: Falls - Risk of:  Goal: Will remain free from falls  Description: Will remain free from falls  Outcome: Ongoing     Problem: Pain:  Goal: Pain level will decrease  Description: Pain level will decrease  Outcome: Ongoing     Problem: Skin Integrity:  Goal: Will show no infection signs and symptoms  Description: Will show no infection signs and symptoms  Outcome: Ongoing     Problem: Nutrition  Goal: Optimal nutrition therapy  7/1/2020 1549 by Soo Tatum RD, LD  Outcome: Ongoing

## 2020-07-02 NOTE — CONSULTS
Results   Component Value Date    PROTIME 13.5 10/25/2019    INR 1.0 10/25/2019     HgBA1c:    Lab Results   Component Value Date    LABA1C 6.4 05/25/2020     PTT: No components found for: LABPTT    Review of Systems    Assessment:     Physical Exam      Ac Risk Score: Ac Scale Score: 16    Patient Active Problem List   Diagnosis Code    Chronic heart failure with preserved ejection fraction (HCC) I50.32    Chronic obstructive pulmonary disease (HCC) J44.9    Tobacco abuse Z72.0    Class 3 severe obesity with serious comorbidity and body mass index (BMI) of 50.0 to 59.9 in adult ([facility] [address] E66.01, Z68.43    Essential hypertension I10    History of pulmonary embolism Z80.36    Family history of colon cancer in mother [de-identified]    Prediabetes R73.03    Class 4 congestive heart failure, acute on chronic, systolic (HCC) T97.90    Hypokalemia E87.6    Cellulitis L03.90       Patient Active Problem List   Diagnosis    Chronic heart failure with preserved ejection fraction (HCC)    Chronic obstructive pulmonary disease (HCC)    Tobacco abuse    Class 3 severe obesity with serious comorbidity and body mass index (BMI) of 50.0 to 59.9 in adult [facility])    Essential hypertension    History of pulmonary embolism    Family history of colon cancer in mother    Prediabetes    Class 4 congestive heart failure, acute on chronic, systolic (HCC)    Hypokalemia    Cellulitis       Measurements:  Wound 05/25/20 Leg Left; Lower; Anterior; Lateral cluster (Active)   Number of days: 38       Wound 05/25/20 Leg Right; Lower red and swollen. weeping yellow sores. (Active)   Number of days: 38       Wound Buttocks (Active)   Number of days:        Wound 06/30/20 Pretibial Right (Active)   Wound Image   7/2/2020  3:15 PM   Wound Venous 7/2/2020  3:15 PM   Dressing Status Old drainage; Changed 7/2/2020  3:15 PM   Dressing Changed Changed/New 7/2/2020  3:15 PM   Dressing/Treatment Other (comment);ABD;Roll gauze 7/2/2020  3:15 PM 3:15 PM   Wound Venous 7/2/2020  3:15 PM   Dressing Status Old drainage; Changed 7/2/2020  3:15 PM   Dressing Changed Changed/New 7/2/2020  3:15 PM   Dressing/Treatment Other (comment);ABD;Roll gauze 7/2/2020  3:15 PM   Wound Cleansed Rinsed/Irrigated with saline 7/2/2020  3:15 PM   Dressing Change Due 07/04/20 7/2/2020  3:15 PM   Wound Length (cm) 2.5 cm 7/2/2020  3:15 PM   Wound Width (cm) 1.6 cm 7/2/2020  3:15 PM   Wound Depth (cm) 0.1 cm 7/2/2020  3:15 PM   Wound Surface Area (cm^2) 4 cm^2 7/2/2020  3:15 PM   Change in Wound Size % (l*w) 11.11 7/2/2020  3:15 PM   Wound Volume (cm^3) 0.4 cm^3 7/2/2020  3:15 PM   Wound Assessment Drainage; Red 7/2/2020  3:15 PM   Drainage Amount Moderate 7/2/2020  3:15 PM   Drainage Description Serosanguinous 7/2/2020  3:15 PM   Odor None 7/2/2020  3:15 PM   Margins Attached edges; Defined edges 7/2/2020  3:15 PM   Aruna-wound Assessment Edema; Hyperpigmented 7/2/2020  3:15 PM   Red%Wound Bed 100 7/2/2020  3:15 PM   Number of days: 1       Wound Leg Left; Lower; Lateral (Active)   Wound Image   7/2/2020  3:15 PM   Wound Venous 7/2/2020  3:15 PM   Dressing Status Old drainage; Changed 7/2/2020  3:15 PM   Dressing Changed Changed/New 7/2/2020  3:15 PM   Dressing/Treatment Other (comment);ABD;Roll gauze 7/2/2020  3:15 PM   Wound Cleansed Rinsed/Irrigated with saline 7/2/2020  3:15 PM   Dressing Change Due 07/04/20 7/2/2020  3:15 PM   Wound Length (cm) 10 cm 7/2/2020  3:15 PM   Wound Width (cm) 9 cm 7/2/2020  3:15 PM   Wound Depth (cm) 0.2 cm 7/2/2020  3:15 PM   Wound Surface Area (cm^2) 90 cm^2 7/2/2020  3:15 PM   Wound Volume (cm^3) 18 cm^3 7/2/2020  3:15 PM   Wound Assessment Drainage;Red;Slough; Yellow;Painful 7/2/2020  3:15 PM   Drainage Amount Moderate 7/2/2020  3:15 PM   Drainage Description Serosanguinous 7/2/2020  3:15 PM   Odor None 7/2/2020  3:15 PM   Margins Attached edges; Defined edges 7/2/2020  3:15 PM   Aruna-wound Assessment Edema; Hyperpigmented 7/2/2020  3:15 PM   Red%Wound Bed 90 7/2/2020  3:15 PM   Yellow%Wound Bed 10 7/2/2020  3:15 PM   Number of days:        Wound Leg Left; Lower; Posterior (Active)   Wound Image   7/2/2020  3:15 PM   Wound Venous 7/2/2020  3:15 PM   Dressing Status Old drainage; Changed 7/2/2020  3:15 PM   Dressing Changed Changed/New 7/2/2020  3:15 PM   Dressing/Treatment Other (comment);ABD;Roll gauze 7/2/2020  3:15 PM   Wound Cleansed Rinsed/Irrigated with saline 7/2/2020  3:15 PM   Dressing Change Due 07/04/20 7/2/2020  3:15 PM   Wound Length (cm) 3.5 cm 7/2/2020  3:15 PM   Wound Width (cm) 3.5 cm 7/2/2020  3:15 PM   Wound Depth (cm) 0.2 cm 7/2/2020  3:15 PM   Wound Surface Area (cm^2) 12.25 cm^2 7/2/2020  3:15 PM   Wound Volume (cm^3) 2.45 cm^3 7/2/2020  3:15 PM   Wound Assessment Drainage;Red;Slough; Yellow;Painful 7/2/2020  3:15 PM   Drainage Amount Moderate 7/2/2020  3:15 PM   Drainage Description Serosanguinous 7/2/2020  3:15 PM   Odor None 7/2/2020  3:15 PM   Margins Attached edges; Defined edges 7/2/2020  3:15 PM   Aruna-wound Assessment Hyperpigmented;Edema 7/2/2020  3:15 PM   Red%Wound Bed 50 7/2/2020  3:15 PM   Yellow%Wound Bed 50 7/2/2020  3:15 PM   Number of days:        Wound Heel Left;[place] (Active)   Wound Image   7/2/2020  3:15 PM   Wound Pressure Stage  2 7/2/2020  3:15 PM   Dressing Status Old drainage; Changed 7/2/2020  3:15 PM   Dressing Changed Changed/New 7/2/2020  3:15 PM   Dressing/Treatment Other (comment);ABD;Roll gauze 7/2/2020  3:15 PM   Wound Cleansed Rinsed/Irrigated with saline 7/2/2020  3:15 PM   Dressing Change Due 07/04/20 7/2/2020  3:15 PM   Wound Length (cm) 1 cm 7/2/2020  3:15 PM   Wound Width (cm) 1.2 cm 7/2/2020  3:15 PM   Wound Depth (cm) 0.2 cm 7/2/2020  3:15 PM   Wound Surface Area (cm^2) 1.2 cm^2 7/2/2020  3:15 PM   Wound Volume (cm^3) 0.24 cm^3 7/2/2020  3:15 PM   Wound Assessment Drainage;[place];Painful 7/2/2020  3:15 PM   Drainage Amount Moderate 7/2/2020  3:15 PM   Drainage Description Serosanguinous 7/2/2020  3:15 PM   Odor overlay. Feel for the patient's heaviest/ most dependant body part. Ideally 1/2 to 1\" of air will be between your hand and the patient's body. Add air prn. Specialty Bed Required : Yes   [] Low Air Loss   [x] Pressure Redistribution  [] Fluid Immersion  [] Bariatric  [] Total Pressure Relief  [] Other:     Current Diet: DIET LOW SODIUM 2 GM;   Dietician consult:  Yes    Discharge Plan:  Placement for patient upon discharge: home with support   Patient appropriate for Outpatient [address]: Yes    Patient/Caregiver Teaching:  Level of patientunderstanding able to:     [x] Indicates understanding       [] Needs reinforcement  [] Unsuccessful      [x] Verbal Understanding  [] Demonstrated understanding       [] No evidence of learning  [] Refused teaching         [] N/A       Electronically signed by Karla Mcdonnell RN on  7/2/2020 at 3:27 PM

## 2020-07-02 NOTE — PROGRESS NOTES
I sent a perfect serve message to the podiatry residents at 12:22 pm on 7/2/20. [address] [facility]

## 2020-07-02 NOTE — PROGRESS NOTES
[place] [address] Internal Medicine    Progress Note  Additional Pertinent Hx: HPI 61 y.o. female is sent to the emergency department for evaluation of leg redness swelling and low oxygen levels. The patient went to her primary care provider's office today, they found that her oxygen levels were extremely low and  that her leg appeared infected. The patient has chronic lymphedema, she also has chronic heart failure with a preserved ejection fraction among multiple other medical comorbidities noted in the past medical history. The patient was recently admitted to the hospital for bilateral lower extremity edema. She says that since she left the hospital she has had progressively worsening edema in both legs. The left leg in particular has become more red and there is more drainage from the chronic open wounds in her leg. Family / Caregiver Present: No  Referring Practitioner: uJma Rai CNP  General Comment  Comments: EFE Payne ok'd pt for PT.   7/2/2020    12:14 PM    Name:   Jerry Silvestre  MRN:     [id number]     Acct:      [de-identified]   Room:   2061/2061-01  IP Day:  2  Admit Date:  6/30/2020  4:01 PM    PCP:   Ren Chirinos PA-C  Code Status:  Full Code    Subjective:     C/C:   Chief Complaint   Patient presents with    Leg Swelling    Other     low oxygen     Principal Problem:    Cellulitis  Active Problems:    Tobacco abuse    Class 3 severe obesity with serious comorbidity and body mass index (BMI) of 50.0 to 59.9 in [facility])    Prediabetes  Resolved Problems:    * No resolved hospital problems. *      Interval History Status: improved.        Edema has improved 1600 output overnight complains of feet pain bilateral on weightbearing poor mobility     Significant last 24 hr data reviewed ;   Vitals:    07/01/20 1313 07/01/20 1915 07/02/20 0739 07/02/20 1126   BP: 119/69 107/61 104/62    Pulse: 81 82 73    Resp: 20 18 16    Temp: 97.9 °F (36.6 °C) 97.7 °F (36.5 °C) 97.3 serious comorbidity and body mass index (BMI) of 50.0 to 59.9 in adult [facility]) [E66.01, Z68.43] 10/29/2019    Tobacco abuse [Z72.0] 10/29/2019       Plan:        1. Acute on chronic diastolic congestive heart failure treated with IV diuretics  2. No extremity cellulitis with superficial skin wounds treated with IV antibiotic  3. Morbid obesity and alcohol dependence and abuse  4. Likely obesity hypoventilation and sleep apnea  5. Severe obesity BMI 51.9  6. Complains of feet pain possible neuropathy versus plantar fasciitis podiatry consult    Medications:      Allergies:  No Known Allergies    Current Meds:   Scheduled Meds:    enoxaparin  40 mg Subcutaneous BID    vancomycin (VANCOCIN) intermittent dosing (placeholder)   Other RX Placeholder    vancomycin  1,500 mg Intravenous Q8H    sodium chloride flush  10 mL Intravenous 2 times per day    lisinopril  10 mg Oral Daily    metoprolol tartrate  25 mg Oral BID    miconazole   Topical BID    PARoxetine  20 mg Oral Daily    furosemide  40 mg Intravenous BID     Continuous Infusions:   PRN Meds: HYDROcodone 5 mg - acetaminophen, sodium chloride flush, acetaminophen, ondansetron, albuterol, diclofenac sodium, potassium chloride **OR** potassium alternative oral replacement **OR** potassium chloride, magnesium sulfate, polyethylene glycol, promethazine, nicotine       Daisha Armstrong MD  7/2/2020  12:14 PM

## 2020-07-03 ENCOUNTER — APPOINTMENT (OUTPATIENT)
Dept: GENERAL RADIOLOGY | Age: 60
DRG: 383 | End: 2020-07-03
Payer: COMMERCIAL

## 2020-07-03 VITALS
BODY MASS INDEX: 45.99 KG/M2 | HEIGHT: 67 IN | SYSTOLIC BLOOD PRESSURE: 129 MMHG | RESPIRATION RATE: 18 BRPM | WEIGHT: 293 LBS | HEART RATE: 72 BPM | DIASTOLIC BLOOD PRESSURE: 61 MMHG | TEMPERATURE: 98.1 F | OXYGEN SATURATION: 93 %

## 2020-07-03 LAB
ANION GAP SERPL CALCULATED.3IONS-SCNC: 7 MMOL/L (ref 9–17)
BUN BLDV-MCNC: 18 MG/DL (ref 8–23)
BUN/CREAT BLD: ABNORMAL (ref 9–20)
CALCIUM SERPL-MCNC: 9 MG/DL (ref 8.6–10.4)
CHLORIDE BLD-SCNC: 92 MMOL/L (ref 98–107)
CO2: 37 MMOL/L (ref 20–31)
CREAT SERPL-MCNC: 0.76 MG/DL (ref 0.5–0.9)
GFR AFRICAN AMERICAN: >60 ML/MIN
GFR NON-AFRICAN AMERICAN: >60 ML/MIN
GFR SERPL CREATININE-BSD FRML MDRD: ABNORMAL ML/MIN/{1.73_M2}
GFR SERPL CREATININE-BSD FRML MDRD: ABNORMAL ML/MIN/{1.73_M2}
GLUCOSE BLD-MCNC: 119 MG/DL (ref 70–99)
HCT VFR BLD CALC: 38.3 % (ref 36–46)
HEMOGLOBIN: 12.6 G/DL (ref 12–16)
MCH RBC QN AUTO: 36.4 PG (ref 26–34)
MCHC RBC AUTO-ENTMCNC: 32.9 G/DL (ref 31–37)
MCV RBC AUTO: 110.8 FL (ref 80–100)
NRBC AUTOMATED: ABNORMAL PER 100 WBC
PATHOLOGIST REVIEW: NORMAL
PDW BLD-RTO: 16.1 % (ref 11.5–14.9)
PLATELET # BLD: 353 K/UL (ref 150–450)
PMV BLD AUTO: 8.2 FL (ref 6–12)
POTASSIUM SERPL-SCNC: 3.8 MMOL/L (ref 3.7–5.3)
RBC # BLD: 3.46 M/UL (ref 4–5.2)
SODIUM BLD-SCNC: 136 MMOL/L (ref 135–144)
WBC # BLD: 6.3 K/UL (ref 3.5–11)

## 2020-07-03 PROCEDURE — 6360000002 HC RX W HCPCS: Performed by: INTERNAL MEDICINE

## 2020-07-03 PROCEDURE — 6370000000 HC RX 637 (ALT 250 FOR IP): Performed by: INTERNAL MEDICINE

## 2020-07-03 PROCEDURE — 6370000000 HC RX 637 (ALT 250 FOR IP): Performed by: NURSE PRACTITIONER

## 2020-07-03 PROCEDURE — 2580000003 HC RX 258: Performed by: INTERNAL MEDICINE

## 2020-07-03 PROCEDURE — 6360000002 HC RX W HCPCS: Performed by: EMERGENCY MEDICINE

## 2020-07-03 PROCEDURE — 6360000002 HC RX W HCPCS: Performed by: NURSE PRACTITIONER

## 2020-07-03 PROCEDURE — 80048 BASIC METABOLIC PNL TOTAL CA: CPT

## 2020-07-03 PROCEDURE — 36415 COLL VENOUS BLD VENIPUNCTURE: CPT

## 2020-07-03 PROCEDURE — 73650 X-RAY EXAM OF HEEL: CPT

## 2020-07-03 PROCEDURE — 85027 COMPLETE CBC AUTOMATED: CPT

## 2020-07-03 PROCEDURE — 99239 HOSP IP/OBS DSCHRG MGMT >30: CPT | Performed by: INTERNAL MEDICINE

## 2020-07-03 PROCEDURE — 2580000003 HC RX 258: Performed by: EMERGENCY MEDICINE

## 2020-07-03 RX ORDER — CEFUROXIME AXETIL 500 MG/1
500 TABLET ORAL 2 TIMES DAILY
Qty: 20 TABLET | Refills: 0 | Status: SHIPPED | OUTPATIENT
Start: 2020-07-03 | End: 2020-07-13

## 2020-07-03 RX ORDER — LIDOCAINE HYDROCHLORIDE 40 MG/ML
10 INJECTION, SOLUTION RETROBULBAR; TOPICAL ONCE
Status: DISCONTINUED | OUTPATIENT
Start: 2020-07-03 | End: 2020-07-03 | Stop reason: HOSPADM

## 2020-07-03 RX ORDER — BUMETANIDE 2 MG/1
2 TABLET ORAL DAILY
Qty: 30 TABLET | Refills: 1 | Status: SHIPPED | OUTPATIENT
Start: 2020-07-03 | End: 2021-01-19

## 2020-07-03 RX ADMIN — PAROXETINE HYDROCHLORIDE 20 MG: 20 TABLET, FILM COATED ORAL at 08:02

## 2020-07-03 RX ADMIN — HYDROCODONE BITARTRATE AND ACETAMINOPHEN 1 TABLET: 5; 325 TABLET ORAL at 14:16

## 2020-07-03 RX ADMIN — VANCOMYCIN HYDROCHLORIDE 1500 MG: 1.5 INJECTION, POWDER, LYOPHILIZED, FOR SOLUTION INTRAVENOUS at 10:42

## 2020-07-03 RX ADMIN — ENOXAPARIN SODIUM 40 MG: 40 INJECTION SUBCUTANEOUS at 08:03

## 2020-07-03 RX ADMIN — LISINOPRIL 10 MG: 10 TABLET ORAL at 14:14

## 2020-07-03 RX ADMIN — METOPROLOL TARTRATE 25 MG: 25 TABLET, FILM COATED ORAL at 08:02

## 2020-07-03 RX ADMIN — FUROSEMIDE 40 MG: 10 INJECTION, SOLUTION INTRAMUSCULAR; INTRAVENOUS at 08:02

## 2020-07-03 RX ADMIN — FUROSEMIDE 40 MG: 10 INJECTION, SOLUTION INTRAMUSCULAR; INTRAVENOUS at 18:15

## 2020-07-03 RX ADMIN — ANTI-FUNGAL POWDER MICONAZOLE NITRATE TALC FREE: 1.42 POWDER TOPICAL at 08:03

## 2020-07-03 RX ADMIN — Medication 10 ML: at 09:10

## 2020-07-03 RX ADMIN — VANCOMYCIN HYDROCHLORIDE 1500 MG: 1.5 INJECTION, POWDER, LYOPHILIZED, FOR SOLUTION INTRAVENOUS at 18:56

## 2020-07-03 RX ADMIN — HYDROCODONE BITARTRATE AND ACETAMINOPHEN 1 TABLET: 5; 325 TABLET ORAL at 07:57

## 2020-07-03 RX ADMIN — HYDROCODONE BITARTRATE AND ACETAMINOPHEN 1 TABLET: 5; 325 TABLET ORAL at 18:15

## 2020-07-03 RX ADMIN — VANCOMYCIN HYDROCHLORIDE 1500 MG: 1.5 INJECTION, POWDER, LYOPHILIZED, FOR SOLUTION INTRAVENOUS at 02:23

## 2020-07-03 ASSESSMENT — PAIN SCALES - GENERAL
PAINLEVEL_OUTOF10: 3
PAINLEVEL_OUTOF10: 9
PAINLEVEL_OUTOF10: 9
PAINLEVEL_OUTOF10: 8
PAINLEVEL_OUTOF10: 7
PAINLEVEL_OUTOF10: 4

## 2020-07-03 ASSESSMENT — ENCOUNTER SYMPTOMS
COUGH: 1
CHEST TIGHTNESS: 1
VOMITING: 0
COLOR CHANGE: 1
NAUSEA: 0
DIARRHEA: 0
SORE THROAT: 0
ABDOMINAL PAIN: 0
SINUS PAIN: 0
EYE ITCHING: 1
SHORTNESS OF BREATH: 1
EYE PAIN: 0

## 2020-07-03 ASSESSMENT — PAIN DESCRIPTION - DESCRIPTORS: DESCRIPTORS: ACHING;SHARP

## 2020-07-03 ASSESSMENT — PAIN DESCRIPTION - FREQUENCY: FREQUENCY: CONTINUOUS

## 2020-07-03 ASSESSMENT — PAIN DESCRIPTION - ORIENTATION: ORIENTATION: RIGHT;LEFT;LOWER

## 2020-07-03 ASSESSMENT — PAIN DESCRIPTION - LOCATION: LOCATION: LEG

## 2020-07-03 ASSESSMENT — PAIN DESCRIPTION - PAIN TYPE: TYPE: ACUTE PAIN

## 2020-07-03 NOTE — DISCHARGE INSTR - COC
Continuity of Care Form    Patient Name: Chloe Johnson   :  1960  MRN:  [id number]    Admit date:  2020  Discharge date:  7/3    Code Status Order: Full Code   Advance Directives:   [address] [facility] Documentation     Date/Time Healthcare Directive Type of Healthcare Directive Copy in [address] Agent's Name Healthcare Agent's Phone Number    20  No, patient does not have an advance directive for healthcare treatment -- -- -- -- --          Admitting Physician:  Luciano Scott MD  PCP: Rohith Catherine PA-C    Discharging Nurse:  Richa Hernandez, [facility] Unit/Room#: /-01  Discharging Unit Phone Number: [phone]    Emergency Contact:   Extended Emergency Contact Information  Primary Emergency Contact: Josse Dorado  [place] Phone: [phone]  Mobile Phone: [phone]  Relation: Other    Past Surgical History:  Past Surgical History:   Procedure Laterality Date    ANKLE SURGERY      COLON SURGERY      HERNIA REPAIR         Immunization History:   Immunization History   Administered Date(s) Administered    Tdap (Boostrix, Adacel) 2017       Active Problems:  Patient Active Problem List   Diagnosis Code    Chronic heart failure with preserved ejection fraction (HCC) I50.32    [facility] J44.9    Tobacco abuse Z72.0    Class 3 severe obesity with serious comorbidity and body mass index (BMI) of 50.0 to 59.9 in adult ([facility] E66.01, Z68.43    Essential hypertension I10    History of pulmonary embolism Z80.36    Family history of colon cancer in mother [de-identified]    Prediabetes R73.03    Class 4 congestive heart failure, acute on chronic, systolic (HCC) K43.61    Hypokalemia E87.6    Cellulitis L03.90       Isolation/Infection:   Isolation          No Isolation        Patient Infection Status     Infection Onset Added Last Indicated Last Indicated By Review Planned Expiration Resolved Resolved By    None active    Resolved    COVID-19 Rule Out 06/30/20 06/30/20 06/30/20 COVID-19 (Ordered)   06/30/20 Rule-Out Test Resulted    COVID-19 Rule Out 05/24/20 05/24/20 05/24/20 COVID-19 (Ordered)   05/24/20 Rule-Out Test Resulted          Nurse Assessment:  Last Vital Signs: /60   Pulse 86   Temp 97.7 °F (36.5 °C) (Oral)   Resp 16   Ht 5' 7\" (1.702 m)   Wt (!) 336 lb 3.2 oz (152.5 kg)   SpO2 93%   BMI 52.66 kg/m²     Last documented pain score (0-10 scale): Pain Level: 9  Last Weight:   Wt Readings from Last 1 Encounters:   07/03/20 (!) 336 lb 3.2 oz (152.5 kg)     Mental Status:  oriented, alert and thought processes intact    IV Access:  - None    Nursing Mobility/ADLs:  Walking   Independent  Transfer  Assisted  Bathing  Independent  Dressing  Independent  Toileting  Assisted  Feeding  Independent  Med Admin  Independent  Med Delivery   whole    Wound Care Documentation and Therapy:  Wound 05/25/20 Leg Left; Lower; Anterior; Lateral cluster (Active)   Number of days: 39       Wound 05/25/20 Leg Right; Lower red and swollen. weeping yellow sores. (Active)   Number of days: 39       Wound Buttocks (Active)   Number of days:        Wound 06/30/20 Pretibial Right (Active)   Wound Image   7/2/2020  3:15 PM   Wound Venous 7/2/2020  3:15 PM   Dressing Status Clean;Dry; Intact 7/3/2020  9:00 AM   Dressing Changed Changed/New 7/2/2020  3:15 PM   Dressing/Treatment Other (comment);ABD;Roll gauze 7/3/2020  9:00 AM   Wound Cleansed Rinsed/Irrigated with saline 7/2/2020  3:15 PM   Dressing Change Due 07/04/20 7/2/2020  3:15 PM   Wound Length (cm) 5.8 cm 7/2/2020  3:15 PM   Wound Width (cm) 4 cm 7/2/2020  3:15 PM   Wound Depth (cm) 0.2 cm 7/2/2020  3:15 PM   Wound Surface Area (cm^2) 23.2 cm^2 7/2/2020  3:15 PM   Change in Wound Size % (l*w) -25.54 7/2/2020  3:15 PM   Wound Volume (cm^3) 4.64 cm^3 7/2/2020  3:15 PM   Wound Assessment Other (Comment) 7/3/2020  9:00 AM   Drainage Amount Moderate 7/2/2020 in Wound Size % (l*w) 11.11 7/2/2020  3:15 PM   Wound Volume (cm^3) 0.4 cm^3 7/2/2020  3:15 PM   Wound Assessment Other (Comment) 7/3/2020  9:00 AM   Drainage Amount Moderate 7/2/2020  3:15 PM   Drainage Description Serosanguinous 7/2/2020  3:15 PM   Odor None 7/2/2020  3:15 PM   Margins Attached edges; Defined edges 7/2/2020  3:15 PM   Aruna-wound Assessment Edema; Hyperpigmented 7/2/2020  3:15 PM   Red%Wound Bed 100 7/2/2020  3:15 PM   Number of days: 2       Wound Leg Left; Lower; Lateral (Active)   Wound Image   7/2/2020  3:15 PM   Wound Venous 7/2/2020  3:15 PM   Dressing Status Clean;Dry; Intact 7/3/2020  9:00 AM   Dressing Changed Changed/New 7/2/2020  3:15 PM   Dressing/Treatment Other (comment);ABD;Roll gauze 7/3/2020  9:00 AM   Wound Cleansed Rinsed/Irrigated with saline 7/2/2020  3:15 PM   Dressing Change Due 07/04/20 7/2/2020  3:15 PM   Wound Length (cm) 10 cm 7/2/2020  3:15 PM   Wound Width (cm) 9 cm 7/2/2020  3:15 PM   Wound Depth (cm) 0.2 cm 7/2/2020  3:15 PM   Wound Surface Area (cm^2) 90 cm^2 7/2/2020  3:15 PM   Wound Volume (cm^3) 18 cm^3 7/2/2020  3:15 PM   Wound Assessment Other (Comment) 7/3/2020  9:00 AM   Drainage Amount None 7/3/2020  9:00 AM   Odor None 7/2/2020  3:15 PM   Margins Attached edges; Defined edges 7/2/2020  3:15 PM   Aruna-wound Assessment Edema; Hyperpigmented 7/2/2020  3:15 PM   Red%Wound Bed 90 7/2/2020  3:15 PM   Yellow%Wound Bed 10 7/2/2020  3:15 PM   Number of days:        Wound Leg Left; Lower; Posterior (Active)   Wound Image   7/2/2020  3:15 PM   Wound Venous 7/2/2020  3:15 PM   Dressing Status Clean;Dry; Intact 7/3/2020  9:00 AM   Dressing Changed Changed/New 7/2/2020  3:15 PM   Dressing/Treatment Other (comment);ABD;Roll gauze 7/3/2020  9:00 AM   Wound Cleansed Rinsed/Irrigated with saline 7/2/2020  3:15 PM   Dressing Change Due 07/04/20 7/2/2020  3:15 PM   Wound Length (cm) 3.5 cm 7/2/2020  3:15 PM   Wound Width (cm) 3.5 cm 7/2/2020  3:15 PM   Wound Depth (cm) 0.2 cm 7/2/2020 3:15 PM   Wound Surface Area (cm^2) 12.25 cm^2 7/2/2020  3:15 PM   Wound Volume (cm^3) 2.45 cm^3 7/2/2020  3:15 PM   Wound Assessment Other (Comment) 7/3/2020  9:00 AM   Drainage Amount Moderate 7/3/2020  9:00 AM   Drainage Description Serosanguinous 7/3/2020  9:00 AM   Odor None 7/3/2020  9:00 AM   Margins Attached edges; Defined edges 7/2/2020  3:15 PM   Aruna-wound Assessment Hyperpigmented;Edema 7/2/2020  3:15 PM   Red%Wound Bed 50 7/2/2020  3:15 PM   Yellow%Wound Bed 50 7/2/2020  3:15 PM   Number of days:        Wound Heel Left;Plantar (Active)   Wound Image   7/2/2020  3:15 PM   Wound Pressure Stage  2 7/2/2020  3:15 PM   Dressing Status Clean;Dry; Intact 7/3/2020  9:00 AM   Dressing Changed Changed/New 7/2/2020  3:15 PM   Dressing/Treatment Other (comment);ABD;Roll gauze 7/3/2020  9:00 AM   Wound Cleansed Rinsed/Irrigated with saline 7/2/2020  3:15 PM   Dressing Change Due 07/04/20 7/2/2020  3:15 PM   Wound Length (cm) 1 cm 7/2/2020  3:15 PM   Wound Width (cm) 1.2 cm 7/2/2020  3:15 PM   Wound Depth (cm) 0.2 cm 7/2/2020  3:15 PM   Wound Surface Area (cm^2) 1.2 cm^2 7/2/2020  3:15 PM   Wound Volume (cm^3) 0.24 cm^3 7/2/2020  3:15 PM   Wound Assessment Other (Comment) 7/3/2020  9:00 AM   Drainage Amount Moderate 7/3/2020  9:00 AM   Drainage Description Serosanguinous 7/3/2020  9:00 AM   Odor None 7/2/2020  3:15 PM   Margins Attached edges; Defined edges 7/2/2020  3:15 PM   [place]%Wound Bed 100 7/2/2020  3:15 PM   Number of days:         Elimination:  Continence:   · Bowel: Yes  · Bladder: Yes  Urinary Catheter: None   Colostomy/Ileostomy/Ileal Conduit: No       Date of Last BM:     Intake/Output Summary (Last 24 hours) at 7/3/2020 1009  Last data filed at 7/3/2020 0650  Gross per 24 hour   Intake 240 ml   Output 2975 ml   Net -2735 ml     I/O last 3 completed shifts: In: 480 [P.O.:480]  Out: 3900 [Urine:3900]    Safety Concerns:      At Risk for Falls    Impairments/Disabilities:      None    Nutrition Therapy:  Current Nutrition Therapy:   - Oral Diet:  Cardiac    Routes of Feeding: Oral  Liquids: Thin Liquids  Daily Fluid Restriction: no  Last Modified Barium Swallow with Video (Video Swallowing Test): not done    Treatments at the Time of Hospital Discharge:   Respiratory Treatments:   Oxygen Therapy:  is on oxygen at 2 L/min per nasal cannula. Ventilator:    - No ventilator support    Rehab Therapies: Physical therapy, Occupational therapy  Weight Bearing Status/Restrictions: No weight bearing restirctions  Other Medical Equipment (for information only, NOT a DME order):  cane and walker, Pt is brand new to Oxygen, please follow, educate appropriately. Other Treatments: Skilled Nursing Assessment Per Protocol. Medication Education & Monitoring. Home health care agency's  to evaluate patient two weeks prior to discharge from home health to determine post-discharge services. Dressings changed by podiatry residents. Change dressing on Wednesday and twice a week. .  ( Rinse with saline, apply aquacel, ABD, rolled gauze. Ace wraps for compression. )    Patient's personal belongings (please select all that are sent with patient):  Eugenia WILKS SIGNATUREBinu Emery RN       CASE MANAGEMENT/SOCIAL WORK SECTION    Inpatient Status Date: 6/3020    Readmission Risk Assessment Score:  Readmission Risk              Risk of Unplanned Readmission:        15           Discharging to Facility/ Agency    . :[facility] [employer] [facility]  [address] [facility] [address]  [address] [address]  Phone [phone]  Fax  [phone]      Dialysis Facility (if applicable)   · Name:  · Address:  · Dialysis Schedule:  · Phone:  · Fax:    / signature: Electronically signed by Stacey Doshi RN on 7/3/20 at 1:30 PM EDT    PHYSICIAN SECTION    Prognosis: Good    Condition at Discharge: Stable    Rehab Potential (if transferring to Rehab): Good    Recommended Labs or Other Treatments After Discharge: Home healthcare nursing to perform dressing changes q4 days on a Monday, Friday schedule. Dressings to be applied include aquacel Ag/silvercell, unna boot and coban/ace. Antibiotics at discretion of primary care. Physician Certification: I certify the above information and transfer of Sam Silva  is necessary for the continuing treatment of the diagnosis listed and that she requires Home Care for less 30 days.      Update Admission H&P: No change in H&P    PHYSICIAN SIGNATURE:  Electronically signed by Jaleesa Lizama MD on 7/3/20 at 10:09 AM EDT

## 2020-07-03 NOTE — DISCHARGE INSTR - DIET

## 2020-07-03 NOTE — PROGRESS NOTES
Patient has been off oxygen for 30 minutes for home O2 assessment. Patient awakened for pulse ox . Pulse ox was 86-88 % on room air . Respiratory called to check patient for evaluation.

## 2020-07-03 NOTE — PROGRESS NOTES
Resident notified of discharge and PVR study unable to be completed. He requested that all doses of Vanco be given today before discharge. He requests that ace wraps be left on until return to wound clinic. He will talk to the attending for any further orders.

## 2020-07-03 NOTE — CARE COORDINATION
ONGOING DISCHARGE PLAN:    Spoke with patient regarding discharge plan and patient confirms that plan is still to return to home w/ SO.     Pt. Denies SNF. Was declining [facility], now is agreeable. Chose [facility]. Writer notified, Beck Yates, from [place] [facility].     Pt. Remains on IV Lasix, 40 MG, Bid & IV Vanco.     Pt. Qualified for Home Oxygen, [facility] is following. Writer Faxed Face Sheet, DME order for Oxygen, Face to Face Notes, Home O2 Eval, to [facility], notified Nisha Nunez, she will have tank delivered to pt's room.     PT/OT on board, PT. [facility] Home w/ [facility] will speak to pt today about transport w/ [facility] [employer] service w/  her [employer] for rides to [address] [place] [address]. Pt. Will need to F/U w/ this on her own for there is a problem w/ Pt's Listed Address, that she needs to clarify w/ [employer]. No more can be done w/ this today. Pt. Will need to Follow. Writer did inform her of this & she states understanding. LSW is setting up transport for home tonight w/ [facility].        Will continue to follow for additional discharge needs.     Electronically signed by Gita Abbott RN on 7/3/2020 at 1:36 PM

## 2020-07-03 NOTE — PROGRESS NOTES
[address] [facility]    OCCUPATIONAL THERAPY MISSED TREATMENT NOTE   INPATIENT   Date: 7/3/20  Patient Name: Ok Dawson       Room:   MRN: [id number]   Account #: [de-identified]    : 1960  (61 y.o.)  Gender: female                 REASON FOR MISSED TREATMENT:  Patient refusal   -   Other - Patient declines need for OT eval at this time. Reports baseline functioning re ADL tasks and mobility within hospital room/bathroom.  d/c OT orders       Devon Epps OT

## 2020-07-03 NOTE — CARE COORDINATION
Patient was evaluated today for the diagnosis of CHF. I entered a DME order for home oxygen because the diagnosis and testing requires the patient to have supplemental oxygen. Condition will improve or be benefited by oxygen use. The patient is  able to perform good mobility in a home setting and therefore does require the use of a portable oxygen system. The need for this equipment was discussed with the patient and she understands and is in agreement.

## 2020-07-03 NOTE — PROGRESS NOTES
Consultation Note  Podiatric Medicine and Surgery     Subjective     Chief Complaint: Leg wounds, bilateral    HPI:  Jose Zuniga is a 61 y.o. female seen at [facility] for worsening wounds with concerns for cellulitis of the bilateral lower extremity. Patient is admitted for acute on chronic congestive heart failure as well as further evaluation of cellulitis of bilateral lower extremity. Patient states the wounds start approximately 1 month ago at which time she was hospitalized and discharged. She states upon follow-up with her PCP there was concern for worsening infection of the bilateral extremity so she was sent to the hospital.   She denies any trauma to the bilateral lower extremities. Patient states that she has been putting gauze on wounds at home otherwise has no formal wound care treatment. She denies purulent drainage from the wounds. She admits to smoking approximately 1 pack/day. She admits to numbness and tingling to bilateral lower extremities. Patient denies being diabetic her last A1c was 6.4% in 05/2020. PCP is Andreas Aquino PA-C    ROS:   Review of Systems   Constitutional: Negative for chills, fatigue and fever. HENT: Negative for sinus pain and sore throat. Eyes: Positive for itching. Negative for pain. Respiratory: Positive for cough, chest tightness and shortness of breath. Cardiovascular: Positive for leg swelling. Negative for chest pain and palpitations. Gastrointestinal: Negative for abdominal pain, diarrhea, nausea and vomiting. Musculoskeletal: Positive for arthralgias, joint swelling and myalgias. Skin: Positive for color change and wound. Neurological: Positive for weakness and numbness. Past Medical History   has a past medical history of Arthritis, CHF (congestive heart failure) ([facility] [address] COPD (chronic obstructive pulmonary disease) ([facility] [address] Hx of blood clots, and Pulmonary embolism ([facility] [address]     Past Surgical History   has a past surgical history that includes Ankle surgery; Colon surgery; and hernia repair. Medications  Prior to Admission medications    Medication Sig Start Date End Date Taking? Authorizing Provider   cefUROXime (CEFTIN) 500 MG tablet Take 1 tablet by mouth 2 times daily for 10 days 7/3/20 7/13/20 Yes Carter Gregorio MD   bumetanide (BUMEX) 2 MG tablet Take 1 tablet by mouth daily 7/3/20  Yes Carter Gregorio MD   PARoxetine (PAXIL CR) 25 MG extended release tablet Take 25 mg by mouth every morning   Yes Historical Provider, MD   miconazole (MICOTIN) 2 % powder Apply topically 2 times daily. 5/27/20  Yes Carter Gregorio MD   diclofenac sodium (VOLTAREN) 1 % GEL Apply 2 g topically 4 times daily as needed for Pain 5/27/20  Yes Carter Gregorio MD   metoprolol tartrate (LOPRESSOR) 25 MG tablet Take 1 tablet by mouth 2 times daily 4/21/20  Yes Marciano Rosa PA-C   lisinopril (PRINIVIL;ZESTRIL) 10 MG tablet Take 1 tablet by mouth daily 4/21/20  Yes Marciano Rosa PA-C   albuterol sulfate  (90 Base) MCG/ACT inhaler Inhale 2 puffs into the lungs every 6 hours as needed for Wheezing   Yes Historical Provider, MD    Scheduled Meds:   enoxaparin  40 mg Subcutaneous BID    vancomycin (VANCOCIN) intermittent dosing (placeholder)   Other RX Placeholder    vancomycin  1,500 mg Intravenous Q8H    sodium chloride flush  10 mL Intravenous 2 times per day    lisinopril  10 mg Oral Daily    metoprolol tartrate  25 mg Oral BID    miconazole   Topical BID    PARoxetine  20 mg Oral Daily    furosemide  40 mg Intravenous BID     Continuous Infusions:  PRN Meds:. HYDROcodone 5 mg - acetaminophen, sodium chloride flush, acetaminophen, ondansetron, albuterol, diclofenac sodium, potassium chloride **OR** potassium alternative oral replacement **OR** potassium chloride, magnesium sulfate, polyethylene glycol, promethazine, nicotine    Allergies  has No Known Allergies. Family History  family history is not on file.     Social History reports that she has been smoking. She has a 22.50 pack-year smoking history. She has never used smokeless tobacco.   reports current alcohol use. reports previous drug use. Objective     Vitals:  Patient Vitals for the past 8 hrs:   BP Temp Temp src Pulse Resp SpO2 Weight   20 0744 117/60 97.7 °F (36.5 °C) Oral 86 16 93 % --   20 0545 -- -- -- -- -- -- (!) 336 lb 3.2 oz (152.5 kg)     Average, Min, and Max for last 24 hours Vitals:  TEMPERATURE:  Temp  Av.7 °F (36.5 °C)  Min: 97.5 °F (36.4 °C)  Max: 97.9 °F (36.6 °C)    RESPIRATIONS RANGE: Resp  Av  Min: 16  Max: 19    PULSE RANGE: Pulse  Av.3  Min: 79  Max: 91    BLOOD PRESSURE RANGE:  Systolic (50VRO), AAD:284 , Min:108 , SDD:643   ; Diastolic (44XUL), DDE:38, Min:56, Max:60      PULSE OXIMETRY RANGE: SpO2  Av %  Min: 91 %  Max: 99 %  I&O:  I/O last 3 completed shifts: In: 480 [P.O.:480]  Out: 3900 [Urine:3900]    CBC:  Recent Labs     20  0538 20  0512 20  2107 20  0514   WBC 8.3 7.5  --  6.3   HGB 12.5 12.7  --  12.6   HCT 37.4 38.4  --  38.3    373  --  353   CRP  --   --  29.8*  --         BMP:  Recent Labs     20  0538 20  0512 20  0514    135 136   K 4.0 3.8 3.8   CL 94* 89* 92*   CO2 43* 38* 37*   BUN 14 18 18   CREATININE 0.78 0.82 0.76   GLUCOSE 125* 116* 119*   CALCIUM 9.1 9.0 9.0        Coags:  Recent Labs     20  1711   PROT 6.7       Lab Results   Component Value Date    LABA1C 6.4 (H) 2020     Lab Results   Component Value Date    SEDRATE 62 (H) 2020     Lab Results   Component Value Date    CRP 29.8 (H) 2020         Physical Exam:  Vascular: DP and PT pulses are non-palpable, audible on Doppler signal, bilateral. CFT <5 seconds to all digits. Hair growth is absent to the level of the digits. Diffuse severe pitting edema. Neuro: Saph/sural/SP/DP/plantar sensation intact to light touch.     Musculoskeletal: Muscle strength is decreased ROM, decreased strength to all lower extremity muscle groups, secondary to pain. Gross deformity is absent. Compartments are tense but compressible. There is exquisite pain to palpation with correlating wound sites. Dermatologic: Multiple partial-thickness wounds to the level of the dermis of the bilateral lower extremities. Primary area of concern is the lateral aspect of the left leg. Wound bases are fibro-granular. There is scant purulent drainage. There is no malodor. There is significant surrounding erythema and an increase in warmth consistent with cellulitis. Wounds do not probe to bone, with no undermining. There are no areas of fluctuance or crepitus or other signs concerning for abscess formation. Clinical images:                                           Imaging:   XR TIBIA FIBULA RIGHT (2 VIEWS)   Final Result   Diffuse soft tissue swelling consistent with the given history of cellulitis. XR TIBIA FIBULA LEFT (2 VIEWS)   Final Result   Findings consistent with the given history of cellulitis. XR CHEST PORTABLE   Final Result   No evidence of acute cardiopulmonary disease         VL Arterial PVR Lower    (Results Pending)   XR CALCANEUS LEFT (MIN 2 VIEWS)    (Results Pending)       Assessment     Silvina Sow is a 61 y.o. female with   1. Multiple venous stasis ulcerations, bilateral lower extremity  2. Cellulitis, bilateral lower extremities  3. Acute on chronic congestive heart failure    Principal Problem:    Cellulitis  Active Problems:    Tobacco abuse    Class 3 severe obesity with serious comorbidity and body mass index (BMI) of 50.0 to 59.9 in [facility])    Prediabetes  Resolved Problems:    * No resolved hospital problems.  *        Plan     · Patient examined and evaluated at bedside   · Treatment options discussed in detail with the patient  · Medical management per primary  · Sharp excisional debridement of wounds noted in objective portion of note of b/l lower extremities to the level of dermis utilizing a #10 blade. 4% lidocaine was applied for pain control. Hemostasis was obtained using direct pressure. 100% of the wound was debrided. Patient tolerated procedure well. Patient reported 4/10 pain post debridement. · Patient will require wound care and home health on outpatient basis for further care. Dressing changes and follow-up information placed in JOS. · Radiographs of left foot reviewed--cortical irregularities noted to calcaneus felt to be chronic changes, not related to an infectious process. Extremely low suspicion for Osteomyelitis  · Cellulitis improving from previous exam, however we would recommend 24-48 of continued IV antibiotic therapy. · Dressing applied to b/l lower extremity consisting of aquacelAg, unna boot, ace bandage. · Elevate bilateral lower extremities with use of pillows behind knee  · Patient to be discharged tonight after final dose of vancomycin  · Weightbearing as tolerated to Bilateral lower extremity  · Follow-up with Dr. Danisha Thurston within 1 week of discharge.     Arben Loredo DPM   Podiatric Medicine & Surgery   7/3/2020 at 10:20 AM

## 2020-07-03 NOTE — PROGRESS NOTES
Dr. Damian Cabrera sent x-ray results per perfect serve. Will also check to see if the residents will return to do additional debridement and dressing change as previously discussed.

## 2020-07-03 NOTE — CARE COORDINATION
Social work: Pt is eligible for transportation to and from hospital admissions and physician appointments. However because this is the weekend their customer service dept is closed and their is an error in her address. They have the same street address: [address]. However they do not have the correct city and the city they have would create a 180 mile trip home when it is actually 8 miles to [facility] per [employer]. The system would not allow the people on duty nor their supervisor to create a trip home today for this pt. She will additionally need to return to the wound care center next week. She is going to have to call customer service Monday at either of these two numbers that will be provided to her also:[phone] weekends and weekdays: [phone]. Will need to get pt home by cab tonight. Unable to use this service due to the address mixup. Suspect that because pt uses a PO BOX that has played an issue in distorting how the address is listed in this automated system. Will advise care coordinator of brenda.   Dimas snyder

## 2020-07-03 NOTE — PROGRESS NOTES
Home Oxygen Evaluation    Home Oxygen Evaluation completed. Patient is on 0 liters per minute via room air.   Resting SpO2 = 87%  Resting SpO2 on room air = 87%    1L nasal cannula = 90% resting SpO2  2L nasal cannula = 92% resting SpO2      Rodriguez Clear  11:48 AM

## 2020-07-03 NOTE — CONSULTS
Consultation Note  Podiatric Medicine and Surgery     Subjective     Chief Complaint: Leg wounds, bilateral    HPI:  Sam Silva is a 61 y.o. female seen at [facility] for worsening wounds with concerns for cellulitis of the bilateral lower extremity. Patient is admitted for acute on chronic congestive heart failure as well as further evaluation of cellulitis of bilateral lower extremity. Patient states the wounds start approximately 1 month ago at which time she was hospitalized and discharged. She states upon follow-up with her PCP there was concern for worsening infection of the bilateral extremity so she was sent to the hospital.   She denies any trauma to the bilateral lower extremities. Patient states that she has been putting gauze on wounds at home otherwise has no formal wound care treatment. She denies purulent drainage from the wounds. She admits to smoking approximately 1 pack/day. She admits to numbness and tingling to bilateral lower extremities. Patient denies being diabetic her last A1c was 6.4% in 05/2020. PCP is Luis Acuña PA-C    ROS:   Review of Systems   Constitutional: Negative for chills, fatigue and fever. HENT: Negative for sinus pain and sore throat. Eyes: Negative for pain. Respiratory: Positive for cough, chest tightness and shortness of breath. Cardiovascular: Positive for leg swelling. Negative for chest pain and palpitations. Gastrointestinal: Negative for abdominal pain, diarrhea, nausea and vomiting. Musculoskeletal: Positive for arthralgias, joint swelling and myalgias. Skin: Positive for color change and wound. Neurological: Positive for weakness and numbness. Past Medical History   has a past medical history of Arthritis, CHF (congestive heart failure) ([address] COPD (chronic obstructive pulmonary disease) ([facility] [address] Hx of blood clots, and Pulmonary embolism ([facility] [address]     Past Surgical History   has a past surgical history that includes Ankle surgery; Colon surgery; and hernia repair. Medications  Prior to Admission medications    Medication Sig Start Date End Date Taking? Authorizing Provider   PARoxetine (PAXIL CR) 25 MG extended release tablet Take 25 mg by mouth every morning   Yes Historical Provider, MD   furosemide (LASIX) 40 MG tablet Take 40 mg by mouth daily   Yes Historical Provider, MD   miconazole (MICOTIN) 2 % powder Apply topically 2 times daily. 5/27/20  Yes Yasir Silva MD   diclofenac sodium (VOLTAREN) 1 % GEL Apply 2 g topically 4 times daily as needed for Pain 5/27/20  Yes Yasir Silva MD   metoprolol tartrate (LOPRESSOR) 25 MG tablet Take 1 tablet by mouth 2 times daily 4/21/20  Yes Rose Payne PA-C   lisinopril (PRINIVIL;ZESTRIL) 10 MG tablet Take 1 tablet by mouth daily 4/21/20  Yes Rose Payne PA-C   albuterol sulfate  (90 Base) MCG/ACT inhaler Inhale 2 puffs into the lungs every 6 hours as needed for Wheezing   Yes Historical Provider, MD    Scheduled Meds:   enoxaparin  40 mg Subcutaneous BID    vancomycin (VANCOCIN) intermittent dosing (placeholder)   Other RX Placeholder    vancomycin  1,500 mg Intravenous Q8H    sodium chloride flush  10 mL Intravenous 2 times per day    lisinopril  10 mg Oral Daily    metoprolol tartrate  25 mg Oral BID    miconazole   Topical BID    PARoxetine  20 mg Oral Daily    furosemide  40 mg Intravenous BID     Continuous Infusions:  PRN Meds:. HYDROcodone 5 mg - acetaminophen, sodium chloride flush, acetaminophen, ondansetron, albuterol, diclofenac sodium, potassium chloride **OR** potassium alternative oral replacement **OR** potassium chloride, magnesium sulfate, polyethylene glycol, promethazine, nicotine    Allergies  has No Known Allergies. Family History  family history is not on file. Social History   reports that she has been smoking. She has a 22.50 pack-year smoking history. She has never used smokeless tobacco.   reports current alcohol use. reports previous drug use. Objective     Vitals:  Patient Vitals for the past 8 hrs:   BP Temp Temp src Pulse Resp SpO2   20 1909 (!) 113/58 97.9 °F (36.6 °C) Oral 91 19 91 %   20 1250 (!) 108/56 97.5 °F (36.4 °C) Oral 79 16 97 %     Average, Min, and Max for last 24 hours Vitals:  TEMPERATURE:  Temp  Av.6 °F (36.4 °C)  Min: 97.3 °F (36.3 °C)  Max: 97.9 °F (36.6 °C)    RESPIRATIONS RANGE: Resp  Av  Min: 16  Max: 19    PULSE RANGE: Pulse  Av  Min: 73  Max: 91    BLOOD PRESSURE RANGE:  Systolic (14LNQ), VXV:023 , Min:104 , AIX:575   ; Diastolic (15JZP), QPJ:62, Min:56, Max:62      PULSE OXIMETRY RANGE: SpO2  Av.5 %  Min: 91 %  Max: 99 %  I&O:  I/O last 3 completed shifts: In: 240 [P.O.:240]  Out: 3050 [Urine:3050]    CBC:  Recent Labs     20  1711 20  0538 20  0512   WBC 10.5 8.3 7.5   HGB 14.0 12.5 12.7   HCT 41.7 37.4 38.4    347 373        BMP:  Recent Labs     20  1711 20  0538 20  0512   * 141 135   K 3.3* 4.0 3.8   CL 85* 94* 89*   CO2 34* 43* 38*   BUN 17 14 18   CREATININE 0.68 0.78 0.82   GLUCOSE 128* 125* 116*   CALCIUM 9.3 9.1 9.0        Coags:  Recent Labs     20  1711   PROT 6.7       Lab Results   Component Value Date    LABA1C 6.4 (H) 2020     No results found for: SEDRATE  Lab Results   Component Value Date    CRP 5.1 (H) 2020         Physical Exam:  Vascular: DP and PT pulses are non-palpable, audible on Doppler signal, bilateral. CFT <5 seconds to all digits. Hair growth is absent to the level of the digits. Diffuse severe pitting edema. Neuro: Saph/sural/SP/DP/plantar sensation intact to light touch. Musculoskeletal: Muscle strength is decreased ROM, decreased strength to all lower extremity muscle groups, secondary to pain. Gross deformity is absent. Compartments are tense but compressible. There is exquisite pain to palpation with correlating wound sites.     Dermatologic: Multiple partial-thickness wounds to the level of the dermis of the bilateral lower extremities. Primary area of concern is the lateral aspect of the left leg. Wound bases are fibro-granular. There is scant purulent drainage. There is no malodor. There is significant surrounding erythema and an increase in warmth consistent with cellulitis. Wounds do not probe to bone, undermine. There are no areas of fluctuance or crepitus or other signs concerning for abscess formation. Clinical:                       Imaging:   XR CHEST PORTABLE   Final Result   No evidence of acute cardiopulmonary disease         XR TIBIA FIBULA RIGHT (2 VIEWS)    (Results Pending)   XR TIBIA FIBULA LEFT (2 VIEWS)    (Results Pending)       Assessment     Chloe Johnson is a 61 y.o. female with   1. Multiple venous stasis ulcerations, bilateral lower extremity  2. Cellulitis, bilateral lower extremities  3. Acute on chronic congestive heart failure    Principal Problem:    Cellulitis  Active Problems:    Tobacco abuse    Class 3 severe obesity with serious comorbidity and body mass index (BMI) of 50.0 to 59.9 in [facility])    Prediabetes  Resolved Problems:    * No resolved hospital problems. *        Plan     · Patient examined and evaluated at bedside   · Treatment options discussed in detail with the patient  · Radiographs of the bilateral lower extremities ordered   · Medical management per primary  · Continue IV antibiotics with vancomycin  · Continue wound care with wound ostomy  · No surgical intervention planned at this time. Monitor clinical response to IV antibiotics. · Follow-up CRP/ESR  · Elevate bilateral lower extremities with use of pillows behind knee  · Dressing from wound ostomy left intact as dressing changes are significantly painful for the patient. We will plan to change dressing tomorrow morning to light compressive dressing with Aquacel and DSD.     · Weightbearing as tolerated to Bilateral lower extremity    Adorno Medley Leo Neff DPM   Podiatric Medicine & Surgery   7/2/2020 at 8:37 PM

## 2020-07-03 NOTE — DISCHARGE SUMMARY
[place] [address] Internal Medicine    Discharge Summary     Patient ID: Silvina Sow  :  1960   MRN: [id number]     ACCOUNT:  [de-identified]   Patient's PCP: Lianne Estrada PA-C  Admit Date: 2020   Discharge Date: 7/3/2020    Length of Stay: 3  Code Status:  Full Code  Admitting Physician: Odilia Parker MD  Discharge Physician: Haylie Prince MD     Active Discharge Diagnoses:     Primary Problem  Cellulitis      Hospital Problems  Active Hospital Problems    Diagnosis Date Noted    Cellulitis [L03.90] 2020    Prediabetes [R73.03] 2019    Class 3 severe obesity with serious comorbidity and body mass index (BMI) of 50.0 to 59.9 in adult ([facility] [address] [E66.01, Z68.43] 10/29/2019    Tobacco abuse [Z72.0] 10/29/2019       Admission Condition:  fair     Discharged Condition: fair    Hospital Stay:     Hospital Course:   Silvina Sow is a 61 y.o. female who was admitted for the management of Cellulitis , presented to ER with Leg Swelling and Other (low oxygen)  69-year-old lady who is in alcohol addiction habitual drinker smoker severe morbid obesity BMI 79.5 chronic diastolic heart failure on diuretics admitted with increasing leg swelling edema and dyspnea diagnosed with acute on chronic diastolic congestive heart failure and also cellulitis with lower extremity secondary to stress ulcer metritis  She was treated with IV antibiotics IV diuretics for pedal pain podiatry was consulted for possible plantar fasciitis she will follow-up with them outpatient oral antibiotics diuretics changed to Bumex advised to follow-up in office Monday  Patient is also been referred to hyperbaric wound care center at [facility]  Patient qualified for oxygen risk and benefit discussed and patient agreed        Significant therapeutic interventions:     Significant Diagnostic Studies:   Labs / Micro:        ,     Radiology:    Xr Tibia Fibula Left (2 Views)    Result Date: 7/2/2020  EXAMINATION: 4  XRAY VIEWS OF THE LEFT TIBIA AND FIBULA 7/2/2020 9:02 pm COMPARISON: None. HISTORY: ORDERING SYSTEM PROVIDED HISTORY: Cellulitis, assess for deep infection TECHNOLOGIST PROVIDED HISTORY: Cellulitis, assess for deep infection Reason for Exam: Cellulitis, assess for deep infection. Pt states she's had redness and wounds to bilateral ankles for \"a long time\". Acuity: Chronic Type of Exam: Unknown Additional signs and symptoms: Cellulitis, assess for deep infection. Pt states she's had redness and wounds to bilateral ankles for \"a long time\". FINDINGS: Status post ORIF medial and lateral malleoli. No fracture dislocation. No bony erosions. Orthopedic hardware appears intact. Diffuse soft tissue swelling. No soft tissue gas. Findings consistent with the given history of cellulitis. Xr Tibia Fibula Right (2 Views)    Result Date: 7/2/2020  EXAMINATION: Tib XRAY VIEWS OF THE RIGHT TIBIA AND FIBULA 7/2/2020 9:02 pm COMPARISON: None. HISTORY: ORDERING SYSTEM PROVIDED HISTORY: Cellulitis, assess for deep infection TECHNOLOGIST PROVIDED HISTORY: Cellulitis, assess for deep infection Reason for Exam: Cellulitis, assess for deep infection. Pt states she's had redness and wounds to bilateral ankles for \"a long time\". Acuity: Chronic Type of Exam: Unknown Additional signs and symptoms: Cellulitis, assess for deep infection. Pt states she's had redness and wounds to bilateral ankles for \"a long time\". FINDINGS: No fracture dislocation. No bony erosions. Diffuse soft tissue swelling. No soft tissue gas. Diffuse soft tissue swelling consistent with the given history of cellulitis.      Xr Chest Portable    Result Date: 6/30/2020  EXAMINATION: ONE XRAY VIEW OF THE CHEST 6/30/2020 3:42 pm COMPARISON: May 24, 2020 HISTORY: ORDERING SYSTEM PROVIDED HISTORY: hypoxia TECHNOLOGIST PROVIDED HISTORY: hypoxia Reason for Exam: sob Acuity: Unknown Type of Exam: Unknown FINDINGS: Adequate inspiration is noted.  Heart size is stable. Vascular markings are distinct. Calcified mediastinal hilar lymph nodes again noted. Osseous structures are stable. No evidence of acute cardiopulmonary disease         Consultations:    Consults:     Final Specialist Recommendations/Findings:   PHARMACY TO DOSE VANCOMYCIN  IP CONSULT TO PODIATRY      The patient was seen and examined on day of discharge and this discharge summary is in conjunction with any daily progress note from day of discharge. Discharge plan:     Disposition: Home [facility] home care    Physician Follow Up: Liliane Chappell[address], [address] [place] [address]  [phone]             Requiring Further Evaluation/Follow Up POST HOSPITALIZATION/Incidental Findings:    Diet: cardiac diet    Activity: As tolerated    Instructions to Patient:     Discharge Medications:      Medication List      START taking these medications    bumetanide 2 MG tablet  Commonly known as:  BUMEX  Take 1 tablet by mouth daily     cefUROXime 500 MG tablet  Commonly known as:  Ceftin  Take 1 tablet by mouth 2 times daily for 10 days        CONTINUE taking these medications    albuterol sulfate  (90 Base) MCG/ACT inhaler     diclofenac sodium 1 % Gel  Commonly known as:  VOLTAREN  Apply 2 g topically 4 times daily as needed for Pain     lisinopril 10 MG tablet  Commonly known as:  PRINIVIL;ZESTRIL  Take 1 tablet by mouth daily     metoprolol tartrate 25 MG tablet  Commonly known as:  LOPRESSOR  Take 1 tablet by mouth 2 times daily     miconazole 2 % powder  Commonly known as:  MICOTIN  Apply topically 2 times daily.      PARoxetine 25 MG extended release tablet  Commonly known as:  PAXIL CR        STOP taking these medications    furosemide 40 MG tablet  Commonly known as:  LASIX           Where to Get Your Medications      These medications were sent to [address] -

## 2020-07-06 ENCOUNTER — TELEPHONE (OUTPATIENT)
Dept: INTERNAL MEDICINE CLINIC | Age: 60
End: 2020-07-06

## 2020-07-06 NOTE — TELEPHONE ENCOUNTER
[facility] Transitions Initial Follow Up Call    Call within 2 business days of discharge: Yes     Patient: [address] Patient : 1960 MRN: [id number]    [unfilled]    RARS: Readmission Risk Score: 15       Spoke with: 1st attempt to contact patient for tcm call unable to leave message     Discharge department/facility: [facility]    Non-face-to-face services provided:       Follow Up  Future Appointments   Date Time Provider Pearl Hilton   7/15/2020  1:15 PM OANH TuttleD CAR [facility] [place]

## 2020-07-14 ENCOUNTER — TELEPHONE (OUTPATIENT)
Dept: INTERNAL MEDICINE CLINIC | Age: 60
End: 2020-07-14

## 2020-07-14 ENCOUNTER — FOLLOWUP TELEPHONE ENCOUNTER (OUTPATIENT)
Dept: WOUND CARE | Age: 60
End: 2020-07-14

## 2020-07-14 NOTE — TELEPHONE ENCOUNTER
[address] care calling to inform that patient canceled her scheduled appt due to transportation issues and feels that she will have trouble getting into the building.  Patient also stated that she is just going to call her PCP

## 2020-07-14 NOTE — PROGRESS NOTES
Patient called to clinic to cancel [facility] wound care apt for 7/15/2020. She stated that she was not going to be able to get out of the [place] and come into the building for the apt. Writer explained that the medical transport company would assist her into the building and from there a [facility] wound care staff could assist her to our office. Pt insistent that apt be canceled and she would call her family doctor instead. Expressed to pt should she change her mind to just call our clinic to reschedule.

## 2020-07-15 ENCOUNTER — HOSPITAL ENCOUNTER (OUTPATIENT)
Dept: WOUND CARE | Age: 60
Discharge: HOME | End: 2020-07-15

## 2020-07-16 ENCOUNTER — TELEPHONE (OUTPATIENT)
Dept: INTERNAL MEDICINE CLINIC | Age: 60
End: 2020-07-16

## 2020-07-16 ENCOUNTER — VIRTUAL VISIT (OUTPATIENT)
Dept: INTERNAL MEDICINE CLINIC | Age: 60
End: 2020-07-16
Payer: COMMERCIAL

## 2020-07-16 PROCEDURE — G8427 DOCREV CUR MEDS BY ELIG CLIN: HCPCS | Performed by: NURSE PRACTITIONER

## 2020-07-16 PROCEDURE — 1111F DSCHRG MED/CURRENT MED MERGE: CPT | Performed by: NURSE PRACTITIONER

## 2020-07-16 PROCEDURE — G8431 POS CLIN DEPRES SCRN F/U DOC: HCPCS | Performed by: NURSE PRACTITIONER

## 2020-07-16 PROCEDURE — 99214 OFFICE O/P EST MOD 30 MIN: CPT | Performed by: NURSE PRACTITIONER

## 2020-07-16 PROCEDURE — 3017F COLORECTAL CA SCREEN DOC REV: CPT | Performed by: NURSE PRACTITIONER

## 2020-07-16 RX ORDER — HYDROCODONE BITARTRATE AND ACETAMINOPHEN 5; 325 MG/1; MG/1
1 TABLET ORAL EVERY 4 HOURS PRN
Qty: 18 TABLET | Refills: 0 | Status: SHIPPED | OUTPATIENT
Start: 2020-07-16 | End: 2020-07-23 | Stop reason: SDUPTHER

## 2020-07-16 RX ORDER — PAROXETINE 30 MG/1
30 TABLET, FILM COATED ORAL DAILY
Qty: 30 TABLET | Refills: 3 | Status: SHIPPED | OUTPATIENT
Start: 2020-07-16 | End: 2020-09-23 | Stop reason: DRUGHIGH

## 2020-07-16 ASSESSMENT — PATIENT HEALTH QUESTIONNAIRE - PHQ9
SUM OF ALL RESPONSES TO PHQ QUESTIONS 1-9: 21
3. TROUBLE FALLING OR STAYING ASLEEP: 2
1. LITTLE INTEREST OR PLEASURE IN DOING THINGS: 3
9. THOUGHTS THAT YOU WOULD BE BETTER OFF DEAD, OR OF HURTING YOURSELF: 3
2. FEELING DOWN, DEPRESSED OR HOPELESS: 3
7. TROUBLE CONCENTRATING ON THINGS, SUCH AS READING THE NEWSPAPER OR WATCHING TELEVISION: 2
10. IF YOU CHECKED OFF ANY PROBLEMS, HOW DIFFICULT HAVE THESE PROBLEMS MADE IT FOR YOU TO DO YOUR WORK, TAKE CARE OF THINGS AT HOME, OR GET ALONG WITH OTHER PEOPLE: 3
8. MOVING OR SPEAKING SO SLOWLY THAT OTHER PEOPLE COULD HAVE NOTICED. OR THE OPPOSITE, BEING SO FIGETY OR RESTLESS THAT YOU HAVE BEEN MOVING AROUND A LOT MORE THAN USUAL: 0
4. FEELING TIRED OR HAVING LITTLE ENERGY: 3
SUM OF ALL RESPONSES TO PHQ QUESTIONS 1-9: 21
SUM OF ALL RESPONSES TO PHQ9 QUESTIONS 1 & 2: 6
6. FEELING BAD ABOUT YOURSELF - OR THAT YOU ARE A FAILURE OR HAVE LET YOURSELF OR YOUR FAMILY DOWN: 3
5. POOR APPETITE OR OVEREATING: 2

## 2020-07-16 ASSESSMENT — ENCOUNTER SYMPTOMS
WHEEZING: 0
COUGH: 0
COLOR CHANGE: 0
ABDOMINAL PAIN: 0
SHORTNESS OF BREATH: 0

## 2020-07-16 NOTE — PROGRESS NOTES
Visit Information    Have you changed or started any medications since your last visit including any over-the-counter medicines, vitamins, or herbal medicines? no   Are you having any side effects from any of your medications? -  no  Have you stopped taking any of your medications? Is so, why? -  no    Have you seen any other physician or provider since your last visit? Yes - Records Obtained  Have you had any other diagnostic tests since your last visit? Yes - Records Obtained  Have you been seen in the emergency room and/or had an admission to a hospital since we last saw you? Yes - Records Obtained  Have you had your routine dental cleaning in the past 6 months? no    Have you activated your [employer] account? If not, what are your barriers? No     Patient Care Team:  Gerson Arevalo PA-C as PCP - General (Physician Assistant)  Gerson Arevalo PA-C as PCP - [facility] Provider    Medical History Review  Past Medical, Family, and Social History reviewed and does contribute to the patient presenting condition    Health Maintenance   Topic Date Due    Hepatitis C screen  1960    Pneumococcal 0-64 years Vaccine (1 of 1 - PPSV23) 02/24/1966    HIV screen  02/24/1975    Cervical cancer screen  02/24/1981    Breast cancer screen  02/24/2010    Colon cancer screen colonoscopy  02/24/2010    Shingles Vaccine (1 of 2) 11/19/2020 (Originally 2/24/2010)    Flu vaccine (1) 09/01/2020    A1C test (Diabetic or Prediabetic)  05/25/2021    Potassium monitoring  07/03/2021    Creatinine monitoring  07/03/2021    Lipid screen  10/29/2024    DTaP/Tdap/Td vaccine (2 - Td) 12/30/2027    Hepatitis A vaccine  Aged Out    Hepatitis B vaccine  Aged Out    Hib vaccine  Aged Out    Meningococcal (ACWY) vaccine  Aged Out     Chief Complaint   Patient presents with    Hip Pain     Pt c/o right hip and knee pain. Pt discribes the pain as constant and sharp pain with certain movements.   Pt states right knee pain is also constant aching and shooting pain with certain movements. Onset ongoing worse last month. Pt tried ibuprofen and aleve with no relief. Pt denies any other concerns at this time.  Health Maintenance     Due annual pap,colonoscopy,pneumonia vaccine and Hep C screening. 2020    TELEHEALTH EVALUATION -- Audio/Visual (During NMPND-51 public health emergency)    HPI:    Tara Malin (:  1960) has requested an audio/video evaluation for the following concern(s):  Right knee and right hip pain. Patient states that she has an appointment with her PCP, Gal Dudley, on Monday, but she is having severe pain and is hoping for something for pain to \"get me through the weekend\". She has had right hip pain for a long time, states it is 10 out of 10 when walking, better with sitting, she has tried ibuprofen and Aleve which helps a little, ice which does not help. She had an x-ray done 2019, which showed moderate arthritis. She has had no trauma, no falls. She states that the right knee started hurting about a month ago, she rates this pain as 7 out of 10, stabbing, without swelling or redness. Pain is also worse when walking, better with sitting. She is morbidly obese. She states that she knows she probably needs joint replacement, but she has not been able to get to doctor's appointment. Also, she only has a couple of pills of paroxetine left, she recently got new insurance and they are not covering the Paxil CR. She is depressed, crying. Review of Systems   Constitutional: Positive for fatigue. Negative for chills and fever. Respiratory: Negative for cough, shortness of breath (chronic, uses oxygen) and wheezing. Cardiovascular: Negative for chest pain and palpitations. Gastrointestinal: Negative for abdominal pain. Musculoskeletal: Positive for arthralgias and gait problem. Negative for joint swelling. Skin: Negative for color change and rash.    Neurological: Negative for weakness and numbness. Psychiatric/Behavioral: The patient is nervous/anxious. Crying       Prior to Visit Medications    Medication Sig Taking? Authorizing Provider   HYDROcodone-acetaminophen (NORCO) 5-325 MG per tablet Take 1 tablet by mouth every 4 hours as needed for Pain for up to 3 days. Intended supply: 3 days. Take lowest dose possible to manage pain Yes KIRK Pisano CNP   PARoxetine (PAXIL) 30 MG tablet Take 1 tablet by mouth daily Yes KIRK Pisano CNP   bumetanide (BUMEX) 2 MG tablet Take 1 tablet by mouth daily  Jayne Kauffman MD   miconazole (MICOTIN) 2 % powder Apply topically 2 times daily.   Jayne Kauffman MD   diclofenac sodium (VOLTAREN) 1 % GEL Apply 2 g topically 4 times daily as needed for Pain  Jason Gordon Elizalde MD   metoprolol tartrate (LOPRESSOR) 25 MG tablet Take 1 tablet by mouth 2 times daily  Sheila Troy PA-C   lisinopril (PRINIVIL;ZESTRIL) 10 MG tablet Take 1 tablet by mouth daily  Sheila Troy PA-C   albuterol sulfate  (90 Base) MCG/ACT inhaler Inhale 2 puffs into the lungs every 6 hours as needed for Wheezing  Historical Provider, MD       Social History     Tobacco Use    Smoking status: Current Every Day Smoker     Packs/day: 0.50     Years: 45.00     Pack years: 22.50    Smokeless tobacco: Never Used   Substance Use Topics    Alcohol use: Yes     Comment: 1-2 times per week    Drug use: Not Currently        No Known Allergies,   Past Medical History:   Diagnosis Date    Arthritis     [facility]estive [facility]) [facility]     COPD (chronic obstructive pulmonary disease) ([facility] [address]     Diverticulitis     Hx of blood clots     Pulmonary embolism ([facility])    ,   Past Surgical History:   Procedure Laterality Date    ANKLE SURGERY      COLON SURGERY      HERNIA REPAIR     ,   Social History     Tobacco Use    Smoking status: Current Every Day Smoker     Packs/day: 0.50     Years: 45.00     Pack years: 22.50    Smokeless tobacco: Never Used Substance Use Topics    Alcohol use: Yes     Comment: 1-2 times per week    Drug use: Not Currently       PHYSICAL EXAMINATION:  Patient was not physically examined as this is a virtual visit. ASSESSMENT/PLAN:  Visit Diagnoses and Associated Orders     Acute pain of right knee    -  Primary    New, severe, check Xray. Tylenol/Motrin for mild to moderate pain, Norco prn for severe pain. Will need ortho referral-to discuss with PCP    HYDROcodone-acetaminophen (NORCO) 5-325 MG per tablet [62450]      XR KNEE RIGHT (3 VIEWS) [89956 Custom]   - Future Order         Chronic pain of right hip        Worsening, offered referral to ortho but she wants to discuss with PCP    HYDROcodone-acetaminophen (NORCO) 5-325 MG per tablet [66462]      XR HIP RIGHT (2-3 VIEWS) [87806 Custom]   - Future Order         Recurrent major depressive disorder, remission status unspecified ([facility]        Switch to Paxil 30mg due to insurance. PARoxetine (PAXIL) 30 MG tablet [98442]                 Return in about 4 days (around 7/20/2020) for already scheduled with PCP. Leia Snell is a 61 y.o. female being evaluated by a Virtual Visit (video visit) encounter to address concerns as mentioned above. A caregiver was present when appropriate. Due to this being a TeleHealth encounter (During FIGUL-77 public health emergency), evaluation of the following organ systems was limited: Vitals/Constitutional/EENT/Resp/CV/GI//MS/Neuro/Skin/Heme-Lymph-Imm. Pursuant to the emergency declaration under the [address] [place] [address], [address] [place] [address] authority and the [employer] and [employer] Act, this Virtual Visit was conducted with patient's (and/or legal guardian's) consent, to reduce the patient's risk of exposure to COVID-19 and provide necessary medical care.   The patient (and/or legal guardian) has also been advised to contact this office for worsening conditions or problems, and seek emergency medical treatment and/or call 911 if deemed necessary. Patient identification was verified at the start of the visit: Yes    Total time spent on this encounter: Not billed by time    Services were provided through a video synchronous discussion virtually to substitute for in-person clinic visit. Patient and provider were located at their individual homes. --KIRK Wright - CNP on 7/16/2020 at 11:38 AM    An electronic signature was used to authenticate this note. On the basis of positive PHQ-9 screening (PHQ-9 Total Score: 21), the following plan was implemented: medication prescribed: Paxil- 30 mg- patient will call for any significant medication side effects or worsening symptoms of depression. Patient will follow-up in 4 day(s) with PCP.

## 2020-07-20 ENCOUNTER — TELEPHONE (OUTPATIENT)
Dept: INTERNAL MEDICINE CLINIC | Age: 60
End: 2020-07-20

## 2020-07-21 ENCOUNTER — TELEPHONE (OUTPATIENT)
Dept: INTERNAL MEDICINE CLINIC | Age: 60
End: 2020-07-21

## 2020-07-21 ENCOUNTER — NURSE TRIAGE (OUTPATIENT)
Dept: OTHER | Facility: CLINIC | Age: 60
End: 2020-07-21

## 2020-07-21 NOTE — TELEPHONE ENCOUNTER
Will forward to care coordinator to see what options available. Multiple chronic medical problems, poor compliance, financial hardship, high risk of hospital readmission.

## 2020-07-21 NOTE — TELEPHONE ENCOUNTER
Patient called and stated that she would like someone come out to eval her to see if she can go to a nursing home for rehab she is in a lot of pain in the right knee and hips. Patient states that both feet are swollen from the cellulitis.  She is unable to come in for an appointment

## 2020-07-21 NOTE — TELEPHONE ENCOUNTER
Pain from her arthritis, pain right hip and knee. It been going on for months. It is hard for her to get out of the house. She is using a walker to get around the house. She has to cancel her appointment yesterday because of the pain. She got a prescription for norco last week and it help some. Ice or heat not working. She feels if she can get into a nursing home and have rehab she could get better. She wants to talk to her PCP to find out how to go about doing something like that. Reason for Disposition   SEVERE pain (e.g., excruciating, unable to walk)    Answer Assessment - Initial Assessment Questions  1. LOCATION and RADIATION: \"Where is the pain located? \"       Pain from her arthritis, pain right hip and knee. It been going on for months. It is hard for her to get out of the house. She is using a walker to get around the house. She has to cancel her appointment yesterday because of the pain. She got a prescription for norco last week and it help some. Ice or heat not working. She feels if she can get into a nursing home and have rehab she could get better. She wants to talk to her PCP to find out how to go about doing something like that. She was in the hospital for cellulitis of the legs. She has a nurse that comes twice a week to wrap and look at her legs. She does not have physical therapy. 2. QUALITY: \"What does the pain feel like? \"  (e.g., sharp, dull, aching, burning)      The knee hurts constantly, but the hip has a sharp pain go through it sometimes. 3. SEVERITY: \"How bad is the pain? \" \"What does it keep you from doing? \"   (Scale 1-10; or mild, moderate, severe)    -  MILD (1-3): doesn't interfere with normal activities     -  MODERATE (4-7): interferes with normal activities (e.g., work or school) or awakens from sleep, limping     -  SEVERE (8-10): excruciating pain, unable to do any normal activities, unable to walk      Knee pain 6-7/10, with movement 10/10.   Hip pain is a 10/10 with movement. Does not hurt with sitting. 4. ONSET: \"When did the pain start? \" \"Does it come and go, or is it there all the time? \"      This has been going on for a couple of months. 5. RECURRENT: \"Have you had this pain before? \" If so, ask: \"When, and what happened then? \"      Not like this    6. SETTING: \"Has there been any recent work, exercise or other activity that involved that part of the body?\"         7. AGGRAVATING FACTORS: \"What makes the knee pain worse? \" (e.g., walking, climbing stairs, running)      Thinks it is because she is not using them enough. 8. ASSOCIATED SYMPTOMS: \"Is there any swelling or redness of the knee? \"      Denies for both knee and hip    9. OTHER SYMPTOMS: \"Do you have any other symptoms? \" (e.g., chest pain, difficulty breathing, fever, calf pain)      No    10. PREGNANCY: \"Is there any chance you are pregnant? \" \"When was your last menstrual period? \"        No    Protocols used: KNEE PAIN-ADULT-OH    Pod 1    She is willing to try a virtual visit. She will need help getting set up. She has a smart phone. Received call from [employer] [address]. Attempted soft transferred to [email or web address] [address] to schedule appointment. message sent via message board. Please do not reply to the triage nurse through this encounter. Any subsequent communication should be directly with the patient.

## 2020-07-22 ENCOUNTER — CARE COORDINATION (OUTPATIENT)
Dept: CARE COORDINATION | Age: 60
End: 2020-07-22

## 2020-07-22 ENCOUNTER — TELEPHONE (OUTPATIENT)
Dept: ADMINISTRATIVE | Age: 60
End: 2020-07-22

## 2020-07-22 SDOH — HEALTH STABILITY: MENTAL HEALTH
STRESS IS WHEN SOMEONE FEELS TENSE, NERVOUS, ANXIOUS, OR CAN'T SLEEP AT NIGHT BECAUSE THEIR MIND IS TROUBLED. HOW STRESSED ARE YOU?: TO SOME EXTENT

## 2020-07-22 SDOH — HEALTH STABILITY: PHYSICAL HEALTH: ON AVERAGE, HOW MANY DAYS PER WEEK DO YOU ENGAGE IN MODERATE TO STRENUOUS EXERCISE (LIKE A BRISK WALK)?: 0 DAYS

## 2020-07-22 SDOH — HEALTH STABILITY: PHYSICAL HEALTH: ON AVERAGE, HOW MANY MINUTES DO YOU ENGAGE IN EXERCISE AT THIS LEVEL?: 0 MIN

## 2020-07-22 SDOH — ECONOMIC STABILITY: INCOME INSECURITY: HOW HARD IS IT FOR YOU TO PAY FOR THE VERY BASICS LIKE FOOD, HOUSING, MEDICAL CARE, AND HEATING?: NOT VERY HARD

## 2020-07-22 NOTE — CARE COORDINATION
Ambulatory Care Coordination Note  7/22/2020  CM Risk Score: 2  Charlson 10 Year Mortality Risk Score: 47%     ACC: Hafsa Samuel RN    Summary Note: Spoke with patient, explained care coordination. Patient is accepting of program.  Patient wants to be placed into a skilled facility. Per patient she is not able to get around her home anymore and is weak. She has [facility] for wound care. Patient agreed to PT and OT evaluation for ability to stay in her home. ACM spoke with [facility] and gave a verbal order for evaluations per PCP. ACM will follow up on referral in  Day. Ambulatory Care Coordination Assessment    Care Coordination Protocol  Program Enrollment:  Complex Care  Referral from Primary Care Provider:  Yes  Week 1 - Initial Assessment     Do you have all of your prescriptions and are they filled?:  Yes  How often do you have trouble taking your medications the way you have been told to take them?:  I always take them as prescribed. Ability to seek help/take action for Emergent Urgent situations i.e. fire, crime, inclement weather or health crisis.:  Needs Assistance  Ability to ambulate to restroom:  Needs Assistance  Ability handle personal hygeine needs (bathing/dressing/grooming):  Needs Assistance  Ability to manage Medications:  Needs Assistance  Ability to prepare Food Preparation:  Needs Assistance  Ability to maintain home (clean home, laundry):  Needs Assistance  Ability to drive and/or has transportation:  Dependent  Ability to do shopping:  Dependent  Ability to manage finances:   Independent  Is patient able to live independently?:  No     Current Housing:  Private Residence              Are you experiencing loss of meaning?:  No  Are you experiencing loss of hope and peace?:  No     Suggested Interventions and Community Resources   Home Health Services:  Completed (Comment: travis)   Physical Therapy:  Completed   Social Work:  Completed   Other Therapy Services: Completed                  Prior to Admission medications    Medication Sig Start Date End Date Taking? Authorizing Provider   PARoxetine (PAXIL) 30 MG tablet Take 1 tablet by mouth daily 7/16/20   KIRK Desouza CNP   bumetanide (BUMEX) 2 MG tablet Take 1 tablet by mouth daily 7/3/20   Ryley Yang MD   miconazole (MICOTIN) 2 % powder Apply topically 2 times daily. 5/27/20   Ryley Yang MD   diclofenac sodium (VOLTAREN) 1 % GEL Apply 2 g topically 4 times daily as needed for Pain 5/27/20   Ryley Yang MD   metoprolol tartrate (LOPRESSOR) 25 MG tablet Take 1 tablet by mouth 2 times daily 4/21/20   Jeni Martinez PA-C   lisinopril (PRINIVIL;ZESTRIL) 10 MG tablet Take 1 tablet by mouth daily 4/21/20   Jeni Martinez PA-C   albuterol sulfate  (90 Base) MCG/ACT inhaler Inhale 2 puffs into the lungs every 6 hours as needed for Wheezing    Historical Provider, MD       No future appointments.

## 2020-07-22 NOTE — TELEPHONE ENCOUNTER
Spoke with patient and home care who she is currently with. They will send out PT, OT and  for evaluation. I will keep you updated.

## 2020-07-23 ENCOUNTER — CARE COORDINATION (OUTPATIENT)
Dept: CARE COORDINATION | Age: 60
End: 2020-07-23

## 2020-07-23 RX ORDER — HYDROCODONE BITARTRATE AND ACETAMINOPHEN 5; 325 MG/1; MG/1
1 TABLET ORAL EVERY 4 HOURS PRN
Qty: 18 TABLET | Refills: 0 | Status: SHIPPED | OUTPATIENT
Start: 2020-07-23 | End: 2020-07-28 | Stop reason: SDUPTHER

## 2020-07-23 NOTE — CARE COORDINATION
ACEVERETT spoke with patient, notified her that [address] will be contacting her for PT, OT and  evaluation. Patient appreciative, will follow up in 3-5 days to check on patient needs.

## 2020-07-27 ENCOUNTER — TELEPHONE (OUTPATIENT)
Dept: INTERNAL MEDICINE CLINIC | Age: 60
End: 2020-07-27

## 2020-07-28 ENCOUNTER — CARE COORDINATION (OUTPATIENT)
Dept: CARE COORDINATION | Age: 60
End: 2020-07-28

## 2020-07-28 RX ORDER — HYDROCODONE BITARTRATE AND ACETAMINOPHEN 5; 325 MG/1; MG/1
1 TABLET ORAL EVERY 4 HOURS PRN
Qty: 18 TABLET | Refills: 0 | Status: SHIPPED | OUTPATIENT
Start: 2020-07-28 | End: 2020-07-31

## 2020-07-28 NOTE — TELEPHONE ENCOUNTER
Patient will need referral to orthopedic surgery vs pain management for further evaluation and treatment, will not continue short term scripts, only use as needed pain uncontrolled with Tylenol

## 2020-07-28 NOTE — CARE COORDINATION
Patient called to report PT and OT evaluated her and believe that she needs to be in a skilled facility. Per patient home care is working on placement. In the meantime she is requesting a refill of pain medication, Request sent to PCP.

## 2020-07-28 NOTE — TELEPHONE ENCOUNTER
When spoke with Celina Constant they stated that patient would like to be [facility] at [facility] on what this order might entail due to referral for nursing home facility.  Please advise

## 2020-07-31 ENCOUNTER — CARE COORDINATION (OUTPATIENT)
Dept: CARE COORDINATION | Age: 60
End: 2020-07-31

## 2020-07-31 NOTE — CARE COORDINATION
Attempting to reach patient for a follow up care coordination call regarding any needs, questions or concerns. Silvino Leroy, [facility]  Unable to leave a . Noted by PCP staff patient was direct admitted into the [facility]. [facility] plans to follow up in 2 weeks.

## 2020-08-12 ENCOUNTER — CARE COORDINATION (OUTPATIENT)
Dept: CARE COORDINATION | Age: 60
End: 2020-08-12

## 2020-08-12 NOTE — CARE COORDINATION
CANDELARIA spoke with [facility] and verify patient is still at this facility receiving skilled care.

## 2020-08-26 ENCOUNTER — CARE COORDINATION (OUTPATIENT)
Dept: CARE COORDINATION | Age: 60
End: 2020-08-26

## 2020-09-15 ENCOUNTER — CARE COORDINATION (OUTPATIENT)
Dept: CARE COORDINATION | Age: 60
End: 2020-09-15

## 2020-09-17 ENCOUNTER — CARE COORDINATION (OUTPATIENT)
Dept: CARE COORDINATION | Age: 60
End: 2020-09-17

## 2020-09-17 NOTE — CARE COORDINATION
Ambulatory Care Coordination Note  9/17/2020  CM Risk Score: 2  Charlson 10 Year Mortality Risk Score: 47%     ACC: Jude Alston, EFE    Summary Note: Spoke with patient who returned home from SNF   Per patient she is doing well, she has a follow up with PCP scheduled. She has AC contact information and was encouraged to call with any concerns. She is unsure if she wants home care or PT at this time but will call when she decides. Precautions reviewed for COVID-19 per CDC  Wash hands often with soap and water for at least 20 seconds  When soap is not available us hand  with at least 70% alcohol  Avoid touching your face  Avoid close contact with others  Maintain 6 feet distance if possible    If you are sick:  Cover mouth and nose when coughing or sneezing and then wash hands  Wear a mask when around others  Clean and disinfect surfaces often with soap or detergent and water. Care Coordination Interventions    Program Enrollment:  Complex Care  Referral from Primary Care Provider:  Yes  Suggested Interventions and [address] [place] [address] Hwy:  Completed (Comment: travis)  Physical Therapy:  Completed  Social Work:  Completed  Other Therapy Services:  Completed (Comment: OT miguel)         Goals Addressed                 This Visit's Progress     Conditions and Symptoms   On track     I will schedule office visits, as directed by my provider. I will keep my appointment or reschedule if I have to cancel. I will notify my provider of any barriers to my plan of care. I will follow my Zone Management tool to seek urgent or emergent care. I will notify my provider of any symptoms that indicate a worsening of my condition.     Barriers: impairment:  physical: limited mobility and overwhelmed by complexity of regimen  Plan for overcoming my barriers: work with CC team and home health  Confidence: 9/10  Anticipated Goal Completion Date: 10.22.20              Prior to Admission medications

## 2020-09-21 ENCOUNTER — TELEPHONE (OUTPATIENT)
Dept: INTERNAL MEDICINE CLINIC | Age: 60
End: 2020-09-21

## 2020-09-21 RX ORDER — TRAMADOL HYDROCHLORIDE 50 MG/1
50 TABLET ORAL EVERY 6 HOURS PRN
Qty: 12 TABLET | Refills: 0 | Status: SHIPPED | OUTPATIENT
Start: 2020-09-21 | End: 2020-09-23 | Stop reason: DRUGHIGH

## 2020-09-21 RX ORDER — OXYCODONE HYDROCHLORIDE AND ACETAMINOPHEN 5; 325 MG/1; MG/1
1 TABLET ORAL EVERY 6 HOURS PRN
Qty: 12 TABLET | Refills: 0 | Status: SHIPPED | OUTPATIENT
Start: 2020-09-21 | End: 2020-09-23 | Stop reason: SDUPTHER

## 2020-09-21 NOTE — TELEPHONE ENCOUNTER
Writer called pt to inform her that Yovani Bardales rx'd a short term supply of tramadol to her pharmacy. Per pt, tramadol does not touch her pain. Pt is requesting percocet instead. Please advise.     No Known Allergies

## 2020-09-21 NOTE — TELEPHONE ENCOUNTER
Pt called & stated she has an appt som [facility]/ Rayna Saxena on 9/23/20 however pt states she won't be able to make it into the office without pain medication. Please advise if you are willing to rx a short term supply of pain medication for rt hip & knee pain until pt comes in for her appt.     No Known Allergies

## 2020-09-23 ENCOUNTER — OFFICE VISIT (OUTPATIENT)
Dept: INTERNAL MEDICINE CLINIC | Age: 60
End: 2020-09-23
Payer: COMMERCIAL

## 2020-09-23 VITALS
HEART RATE: 92 BPM | HEIGHT: 67 IN | TEMPERATURE: 96.8 F | WEIGHT: 293 LBS | BODY MASS INDEX: 45.99 KG/M2 | OXYGEN SATURATION: 94 % | SYSTOLIC BLOOD PRESSURE: 124 MMHG | DIASTOLIC BLOOD PRESSURE: 62 MMHG

## 2020-09-23 PROBLEM — M25.50 CHRONIC PAIN OF MULTIPLE JOINTS: Status: ACTIVE | Noted: 2020-09-23

## 2020-09-23 PROBLEM — G89.29 CHRONIC PAIN OF MULTIPLE JOINTS: Status: ACTIVE | Noted: 2020-09-23

## 2020-09-23 PROBLEM — M16.11 PRIMARY OSTEOARTHRITIS OF RIGHT HIP: Status: ACTIVE | Noted: 2020-09-23

## 2020-09-23 PROCEDURE — 1111F DSCHRG MED/CURRENT MED MERGE: CPT | Performed by: PHYSICIAN ASSISTANT

## 2020-09-23 PROCEDURE — 3023F SPIROM DOC REV: CPT | Performed by: PHYSICIAN ASSISTANT

## 2020-09-23 PROCEDURE — G8926 SPIRO NO PERF OR DOC: HCPCS | Performed by: PHYSICIAN ASSISTANT

## 2020-09-23 PROCEDURE — 4004F PT TOBACCO SCREEN RCVD TLK: CPT | Performed by: PHYSICIAN ASSISTANT

## 2020-09-23 PROCEDURE — 99214 OFFICE O/P EST MOD 30 MIN: CPT | Performed by: PHYSICIAN ASSISTANT

## 2020-09-23 PROCEDURE — G8427 DOCREV CUR MEDS BY ELIG CLIN: HCPCS | Performed by: PHYSICIAN ASSISTANT

## 2020-09-23 PROCEDURE — 3017F COLORECTAL CA SCREEN DOC REV: CPT | Performed by: PHYSICIAN ASSISTANT

## 2020-09-23 PROCEDURE — G8417 CALC BMI ABV UP PARAM F/U: HCPCS | Performed by: PHYSICIAN ASSISTANT

## 2020-09-23 RX ORDER — OXYCODONE HYDROCHLORIDE AND ACETAMINOPHEN 5; 325 MG/1; MG/1
1 TABLET ORAL EVERY 6 HOURS PRN
Qty: 28 TABLET | Refills: 0 | Status: SHIPPED | OUTPATIENT
Start: 2020-09-23 | End: 2020-10-01 | Stop reason: SDUPTHER

## 2020-09-23 RX ORDER — PAROXETINE HYDROCHLORIDE 20 MG/1
20 TABLET, FILM COATED ORAL 2 TIMES DAILY
Qty: 60 TABLET | Refills: 1 | Status: SHIPPED | OUTPATIENT
Start: 2020-09-23 | End: 2020-11-04 | Stop reason: SDUPTHER

## 2020-09-23 NOTE — PROGRESS NOTES
[address] [address]  Dept: [phone]  Dept Fax: [phone]    OfficeProgress/Follow Up Note  Date of patient's visit: 9/24/2020  Patient's Name:  Leilani Rodriguez YOB: 1960            Patient Care Team:  Helene Melendez PA-C as PCP - General (Physician Assistant)  Helene Melendez PA-C as [facility]  Jett Wiseman RN as Ambulatory Care Manager    REASON FOR VISIT:  Routine outpatient follow up    HISTORY OF PRESENT ILLNESS:      Chief Complaint   Patient presents with    Follow-Up from Hospital     discharge from [address] [place] [address], physical decline, high levels of pain, has been feeling a little depressed since been out of nursing home   [address] [place] [address] Maintenance     Flu, cervical cancer screen, colon cancer screen    Hip Pain     right hip    Knee Pain     right knee    Leg Pain     some swelling    Foot Pain     some swelling and shooting pain through both feet due to neuropathy, what can be done?  Discuss Medications     dosage adjustment on Paxil     History was obtained from the patient. Leilani Rodriguez is a 61 y.o. female here today for follow up on chronic medical conditions. Patient recently admitted to skilled nursing facility due to physical deconditioning, intractable pain. She was started on Percocet 5-325 mg QID prn pain. Congestive heart failure, appears well compensated, moderate dyspnea on exertion, weight stable, no orthopnea. Chronic obstructive pulmonary disease, stable, has home oxygen available but has not required use. Depression, symptoms are improved from previous, symptoms rated as 6/10, she denies self harm or suicidal thoughts.      Patient Active Problem List   Diagnosis    Chronic heart failure with preserved ejection fraction (HCC)    Chronic obstructive pulmonary disease (HCC)    Tobacco abuse    Class 3 severe obesity with serious comorbidity and body mass index (BMI) of 50.0 to 59.9 in adult [facility])    Essential hypertension    History of pulmonary embolism    Family history of colon cancer in mother    Prediabetes    Class 4 congestive heart failure, acute on chronic, systolic (HCC)    Hypokalemia    Cellulitis    Primary osteoarthritis of right hip    Chronic pain of multiple joints     Health Maintenance Due   Topic Date Due    Hepatitis C screen  1960    Pneumococcal 0-64 years Vaccine (1 of 1 - PPSV23) 02/24/1966    HIV screen  02/24/1975    Cervical cancer screen  02/24/1981    Breast cancer screen  02/24/2010    Colon cancer screen colonoscopy  02/24/2010    Flu vaccine (1) 09/01/2020     MEDICATIONS:      Current Outpatient Medications   Medication Sig Dispense Refill    PARoxetine (PAXIL) 20 MG tablet Take 1 tablet by mouth 2 times daily 60 tablet 1    oxyCODONE-acetaminophen (PERCOCET) 5-325 MG per tablet Take 1 tablet by mouth every 6 hours as needed for Pain for up to 7 days. Intended supply: 7 days. Take lowest dose possible to manage pain 28 tablet 0    bumetanide (BUMEX) 2 MG tablet Take 1 tablet by mouth daily 30 tablet 1    miconazole (MICOTIN) 2 % powder Apply topically 2 times daily. 45 g 1    diclofenac sodium (VOLTAREN) 1 % GEL Apply 2 g topically 4 times daily as needed for Pain 1 Tube 3    metoprolol tartrate (LOPRESSOR) 25 MG tablet Take 1 tablet by mouth 2 times daily 60 tablet 3    lisinopril (PRINIVIL;ZESTRIL) 10 MG tablet Take 1 tablet by mouth daily 30 tablet 3    albuterol sulfate  (90 Base) MCG/ACT inhaler Inhale 2 puffs into the lungs every 6 hours as needed for Wheezing       No current facility-administered medications for this visit. ALLERGIES:    No Known Allergies      SOCIAL HISTORY    Reviewed and no change from previous record. Jose Kim  reports that she has been smoking. She has a 22.50 pack-year smoking history.  She has never used smokeless tobacco.    FAMILY HISTORY:    Reviewed and no change from previous visit    REVIEW OF SYSTEMS:    Review of Systems   Constitutional: Negative for chills, diaphoresis, fatigue and fever. HENT: Negative for congestion, ear discharge, ear pain, hearing loss, rhinorrhea, sinus pain, sore throat, trouble swallowing and voice change. Eyes: Negative for pain, discharge, itching and visual disturbance. Respiratory: Positive for shortness of breath (exertional) and wheezing (intermittent). Negative for cough and chest tightness. Cardiovascular: Negative for chest pain, palpitations and leg swelling. Gastrointestinal: Negative for abdominal pain, blood in stool, constipation, diarrhea, nausea and vomiting. Endocrine: Negative for cold intolerance and heat intolerance. Genitourinary: Negative for difficulty urinating, dysuria, flank pain, frequency, hematuria and urgency. Musculoskeletal: Positive for arthralgias (multiple joints, right hip). Negative for back pain, neck pain and neck stiffness. Skin: Negative for color change, pallor and rash. Neurological: Negative for dizziness, weakness, light-headedness, numbness and headaches. Hematological: Negative for adenopathy. Does not bruise/bleed easily. Psychiatric/Behavioral: Positive for dysphoric mood. Negative for self-injury, sleep disturbance and suicidal ideas. The patient is not nervous/anxious.       PHYSICAL EXAM:      Vitals:    09/23/20 1320   BP: 124/62   Pulse: 92   Temp: 96.8 °F (36 °C)   SpO2: 94%   Weight: (!) 336 lb (152.4 kg)   Height: 5' 7\" (1.702 m)     BP Readings from Last 3 Encounters:   09/23/20 124/62   07/03/20 129/61   06/30/20 130/64      Wt Readings from Last 3 Encounters:   09/23/20 (!) 336 lb (152.4 kg)   07/03/20 (!) 336 lb 3.2 oz (152.5 kg)   06/30/20 (!) 345 lb (156.5 kg)     General - alert, well appearing, in no acute distress  Skin - normal coloration and turgor, no rash  Eyes - pupils equal and reactive, extraocular eye movements intact, no conj pallor  Mouth - mucous membranes are moist, pharynx normal without lesions  Neck - supple, no significant adenopathy, no palpable masses   Lymphatics - no palpable lymphadenopathy, no hepatosplenomegaly  Chest - faint expiratory wheezes, no crackles, rales or rhonchi, symmetric air entry, no hypoxia or respiratory distress   Heart - normal rate, rhythm is regular, no murmur  Abdomen - soft, nontender, nondistended, no masses or organomegaly  Back - no tenderness, palpable spasm or pain on motion  Neurological - alert, oriented, normal speech, no focal sensory or motor deficit  Musculoskeletal - pain with internal/external rotation of right hip, no joint deformity, able to bear weight   Extremities - peripheral pulses normal, lymphedema bilaterally, no wounds or ulcerations,     LABORATORY FINDINGS:    CBC:  Lab Results   Component Value Date    WBC 6.3 07/03/2020    HGB 12.6 07/03/2020     07/03/2020     BMP:    Lab Results   Component Value Date     07/03/2020    K 3.8 07/03/2020    CL 92 07/03/2020    CO2 37 07/03/2020    BUN 18 07/03/2020    CREATININE 0.76 07/03/2020    GLUCOSE 119 07/03/2020     HEMOGLOBIN A1C:   Lab Results   Component Value Date    LABA1C 6.4 05/25/2020     FASTING LIPID PANEL:  Lab Results   Component Value Date    CHOL 135 10/29/2019    HDL 46 10/29/2019    TRIG 64 10/29/2019     ASSESSMENT AND PLAN:      1. Essential hypertension  - Controlled, continue present management    2. Chronic combined systolic and diastolic congestive heart failure, NYHA class 3 (HCC)  - Stable, continue present management    3. Chronic obstructive pulmonary disease, unspecified COPD type ([address]  - Stable, advised to quit smoking, recommended sleep study, follow up with pulmonologist      4. Class 3 severe obesity due to excess calories with serious comorbidity and body mass index (BMI) of 50.0 to 59.9 in adult [facility])  - Advised to lose weight, dietary changes, exercise     5.  Primary osteoarthritis of right hip  - [facility], Jose A Santiago MD, Orthopedic Surgery, [address] Garrick Kim MD, Pain Management, [facility]  - oxyCODONE-acetaminophen (PERCOCET) 5-325 MG per tablet; Take 1 tablet by mouth every 6 hours as needed for Pain for up to 7 days. Intended supply: 7 days. Take lowest dose possible to manage pain  Dispense: 28 tablet; Refill: 0    6. Chronic pain of multiple joints  - [facility] - Garrick Kim MD, Pain Management, [facility]    7. At high risk for falls  - Discussed fall risks, has walker/wheelchair available for use    8. Tobacco abuse  - Advised to quit smoking, patient declines immunizations    9. Colon cancer screening  - Cologuard    FOLLOW UP AND INSTRUCTIONS:   Return in about 6 weeks (around 11/4/2020), earlier if needed. Noemy received counseling on the following healthy behaviors: nutrition, exercise, medication adherence, tobacco cessation and decrease in alcohol consumption    Discussed use, benefit, and side effects of prescribed medications. Barriers to medication compliance addressed. All patient questions answered. Patient voiced understanding. Patient given educational materials - see patient instructions    [facility]  9/24/2020, 6:59 AM    Please note that this chart was generated using voice recognition Dragon dictation software. Although everyeffort was made to ensure the accuracy of this automated transcription, some errors in transcription may have occurred.

## 2020-09-24 ENCOUNTER — CARE COORDINATION (OUTPATIENT)
Dept: CARE COORDINATION | Age: 60
End: 2020-09-24

## 2020-09-24 ENCOUNTER — TELEPHONE (OUTPATIENT)
Dept: PAIN MANAGEMENT | Age: 60
End: 2020-09-24

## 2020-09-24 ENCOUNTER — TELEPHONE (OUTPATIENT)
Dept: INTERNAL MEDICINE CLINIC | Age: 60
End: 2020-09-24

## 2020-09-24 ASSESSMENT — ENCOUNTER SYMPTOMS
COUGH: 0
NAUSEA: 0
RHINORRHEA: 0
BACK PAIN: 0
COLOR CHANGE: 0
VOICE CHANGE: 0
DIARRHEA: 0
CHEST TIGHTNESS: 0
BLOOD IN STOOL: 0
WHEEZING: 1
SHORTNESS OF BREATH: 1
EYE ITCHING: 0
EYE PAIN: 0
VOMITING: 0
SORE THROAT: 0
ABDOMINAL PAIN: 0
SINUS PAIN: 0
CONSTIPATION: 0
TROUBLE SWALLOWING: 0
EYE DISCHARGE: 0

## 2020-09-24 NOTE — CARE COORDINATION
CANDELARIA returned patients call. She explained that she did speak to her PCP office and requested a PA on her pain medication. CANDELARIA verified a message was routed to the MA to do a prior auth. Patient requested a call when PA is completed. CANDELARIA explained that it sometimes takes a couple of days. Patient understood.

## 2020-09-28 ENCOUNTER — TELEPHONE (OUTPATIENT)
Dept: PAIN MANAGEMENT | Age: 60
End: 2020-09-28

## 2020-09-29 ENCOUNTER — CARE COORDINATION (OUTPATIENT)
Dept: CARE COORDINATION | Age: 60
End: 2020-09-29

## 2020-09-29 ENCOUNTER — TELEPHONE (OUTPATIENT)
Dept: PAIN MANAGEMENT | Age: 60
End: 2020-09-29

## 2020-09-29 NOTE — CARE COORDINATION
Attempting to reach patient for a follow up care coordination call regarding any needs, questions or concerns. Halina Maguire, [facility] [phone]  VM is full.

## 2020-10-01 RX ORDER — OXYCODONE HYDROCHLORIDE AND ACETAMINOPHEN 5; 325 MG/1; MG/1
1 TABLET ORAL EVERY 6 HOURS PRN
Qty: 28 TABLET | Refills: 0 | Status: SHIPPED | OUTPATIENT
Start: 2020-10-01 | End: 2020-10-08 | Stop reason: SDUPTHER

## 2020-10-07 ENCOUNTER — OFFICE VISIT (OUTPATIENT)
Dept: ORTHOPEDIC SURGERY | Age: 60
End: 2020-10-07
Payer: COMMERCIAL

## 2020-10-07 VITALS — TEMPERATURE: 98.2 F

## 2020-10-07 PROCEDURE — 4004F PT TOBACCO SCREEN RCVD TLK: CPT | Performed by: PHYSICIAN ASSISTANT

## 2020-10-07 PROCEDURE — 99203 OFFICE O/P NEW LOW 30 MIN: CPT | Performed by: PHYSICIAN ASSISTANT

## 2020-10-07 PROCEDURE — G8427 DOCREV CUR MEDS BY ELIG CLIN: HCPCS | Performed by: PHYSICIAN ASSISTANT

## 2020-10-07 PROCEDURE — G8417 CALC BMI ABV UP PARAM F/U: HCPCS | Performed by: PHYSICIAN ASSISTANT

## 2020-10-07 PROCEDURE — 3017F COLORECTAL CA SCREEN DOC REV: CPT | Performed by: PHYSICIAN ASSISTANT

## 2020-10-07 PROCEDURE — G8484 FLU IMMUNIZE NO ADMIN: HCPCS | Performed by: PHYSICIAN ASSISTANT

## 2020-10-07 RX ORDER — METHYLPREDNISOLONE 4 MG/1
4 TABLET ORAL SEE ADMIN INSTRUCTIONS
Qty: 1 KIT | Refills: 0 | Status: SHIPPED | OUTPATIENT
Start: 2020-10-07 | End: 2020-10-13

## 2020-10-07 ASSESSMENT — ENCOUNTER SYMPTOMS
VOMITING: 0
RHINORRHEA: 0
COUGH: 0
EYES NEGATIVE: 1
COLOR CHANGE: 0
NAUSEA: 0

## 2020-10-07 NOTE — PROGRESS NOTES
Patient ID: Martin Johnson is a 61 y.o. female. Chief Complaint   Patient presents with   Beatrice Shabazz [facility]    Pain     right hip         HPI  Ms. Lacy Rizzo is a 58-year-old female presents for evaluation of chronic right hip pain. Patient states she has had this pain for approximately 1 year without any preceding injury or trauma. Pain has progressively worsened over that timeframe. Pain is mostly to the anterior lateral aspect of the right hip. Pain is significantly aggravated by any attempt at prolonged standing or walking. She has a great deal of difficulty standing from a seated position as well as tying her shoe. Patient presents in wheelchair today as she has great deal of difficulty walking prolonged distances. Patient is currently residing at a SNF and states she recently had x-rays due to the severe pain which showed significant arthritis however no fracture. She also has x-rays from 11/20/2019 which showed the same. Patient denies any back pain or numbness and tingling in the right lower extremity. Patient denies any hip joint warmth, redness, swelling, fever or chills. She is currently on Percocet 5/325 per her PCP which provides mild relief of her pain. She has never had any injections and has not underwent physical therapy of this right hip. Past Medical History:   Diagnosis Date    Arthritis     CHF (congestive heart failure) (HCC)     COPD (chronic obstructive pulmonary disease) (HCC)     Diverticulitis     Hx of blood clots     Pulmonary embolism (HCC)      Past Surgical History:   Procedure Laterality Date    ANKLE SURGERY      COLON SURGERY      HERNIA REPAIR       No family history on file.   Social History     Occupational History    Not on file   Tobacco Use    Smoking status: Current Every Day Smoker     Packs/day: 0.50     Years: 45.00     Pack years: 22.50    Smokeless tobacco: Never Used   Substance and Sexual Activity    Alcohol use: Yes     Comment: 1-2 times per week    Drug use: Not Currently    Sexual activity: Not Currently        Review of Systems   Constitutional: Negative for chills and fever. HENT: Negative for congestion and rhinorrhea. Eyes: Negative. Respiratory: Negative for cough. Cardiovascular: Negative for chest pain and leg swelling. Gastrointestinal: Negative for nausea and vomiting. Musculoskeletal: Positive for arthralgias (Right hip). Skin: Negative for color change and rash. Neurological: Negative for dizziness and numbness. Physical Exam  Constitutional:       Appearance: She is well-developed. HENT:      Head: Normocephalic and atraumatic. Neck:      Musculoskeletal: Normal range of motion and neck supple. Cardiovascular:      Rate and Rhythm: Normal rate. Pulmonary:      Effort: Pulmonary effort is normal.   Abdominal:      Palpations: Abdomen is soft. Musculoskeletal:      Comments: right Hip   Tenderness: Greater trochanter and Anterior     Range of Motion:     Extension: 0    Flexion: 70    Internal Rotation: 10    External Rotation: 20    Abduction: 30    Adduction:  20     Muscle Strength     Abduction: 4/5    Adduction: 4/5    Flexion: 3/5       Gait: Antalgic  Benoit Test: Positive  Maxwell Test: Positive     Skin:     General: Skin is warm and dry. Findings: No rash. Neurological:      Mental Status: She is alert and oriented to person, place, and time. Psychiatric:         Behavior: Behavior normal.         Assessment:     Encounter Diagnoses   Name Primary?     Right hip pain Yes    Arthritis of right hip          No new x-rays are taken in clinic today    Previous Imagin2019 XR right hip  FINDINGS:    AP and frog-leg lateral views of the hip are submitted for review.  Bony    mineralization is normal.  There is no evidence for acute fracture or    dislocation.  Moderate osteoarthrosis of the acetabular joint with loss of    joint space height.  No aggressive mass

## 2020-10-08 ENCOUNTER — CARE COORDINATION (OUTPATIENT)
Dept: CARE COORDINATION | Age: 60
End: 2020-10-08

## 2020-10-08 RX ORDER — OXYCODONE HYDROCHLORIDE AND ACETAMINOPHEN 5; 325 MG/1; MG/1
1 TABLET ORAL EVERY 6 HOURS PRN
Qty: 28 TABLET | Refills: 0 | Status: SHIPPED | OUTPATIENT
Start: 2020-10-08 | End: 2020-10-14 | Stop reason: SDUPTHER

## 2020-10-08 NOTE — TELEPHONE ENCOUNTER
I will refill. Please advise further pain medications must come from orthopedics or pain management.

## 2020-10-08 NOTE — TELEPHONE ENCOUNTER
Patient called requesting a refill of pain medication, RX pending your review. Patient was seen by ortho on 10. 7.20 for chronic hip pain and was referred to IR for an injection. This has not been scheduled yet. She was started on medrol dosepak. Patient denied any additional needs from PCP or ACM today.

## 2020-10-08 NOTE — CARE COORDINATION
Patient called requesting a refill of pain medication, will route to PCP. Patient was seen by ortho on 10. 7.20 for chronic hip pain and was referred to IR for an injection. She was started on medrol dosepak. Patient denied any additional needs from PCP or ACM today. Precautions reviewed for COVID-19 per CDC  Wash hands often with soap and water for at least 20 seconds  When soap is not available us hand  with at least 70% alcohol  Avoid touching your face  Avoid close contact with others  Maintain 6 feet distance if possible    If you are sick:  Cover mouth and nose when coughing or sneezing and then wash hands  Wear a mask when around others  Clean and disinfect surfaces often with soap or detergent and water.

## 2020-10-14 ENCOUNTER — CARE COORDINATION (OUTPATIENT)
Dept: CARE COORDINATION | Age: 60
End: 2020-10-14

## 2020-10-14 RX ORDER — OXYCODONE HYDROCHLORIDE AND ACETAMINOPHEN 5; 325 MG/1; MG/1
1 TABLET ORAL EVERY 6 HOURS PRN
Qty: 28 TABLET | Refills: 0 | Status: SHIPPED | OUTPATIENT
Start: 2020-10-14 | End: 2020-10-21

## 2020-10-14 NOTE — CARE COORDINATION
Ambulatory Care Coordination Note  10/14/2020  CM Risk Score: 2  Charlson 10 Year Mortality Risk Score: 47%     ACC: Le Chawla, RN    Summary Note: Patient called ACM requesting a refill of her pain medication, ACM sent to PCP. He did fill it but added that any future requests will need to go through pain management, she will be having a pain injection on 10.19.20. Patient understood and was grateful for PCP help. ACM will check in with patient after this injection. Precautions reviewed for COVID-19 per CDC  Wash hands often with soap and water for at least 20 seconds  When soap is not available us hand  with at least 70% alcohol  Avoid touching your face  Avoid close contact with others  Maintain 6 feet distance if possible    If you are sick:  Cover mouth and nose when coughing or sneezing and then wash hands  Wear a mask when around others  Clean and disinfect surfaces often with soap or detergent and water. Care Coordination Interventions    Program Enrollment:  Complex Care  Referral from Primary Care Provider:  Yes  Suggested Interventions and [address] [place] [address] Hwy:  Completed (Comment: travis)  Physical Therapy:  Completed  Social Work:  Completed  Other Therapy Services:  Completed (Comment: OT miguel)         Goals Addressed                 This Visit's Progress     Conditions and Symptoms   On track     I will schedule office visits, as directed by my provider. I will keep my appointment or reschedule if I have to cancel. I will notify my provider of any barriers to my plan of care. I will follow my Zone Management tool to seek urgent or emergent care. I will notify my provider of any symptoms that indicate a worsening of my condition.     Barriers: impairment:  physical: limited mobility and overwhelmed by complexity of regimen  Plan for overcoming my barriers: work with CC team and home health  Confidence: 9/10  Anticipated Goal Completion Date: 10.22.20 Prior to Admission medications    Medication Sig Start Date End Date Taking? Authorizing Provider   oxyCODONE-acetaminophen (PERCOCET) 5-325 MG per tablet Take 1 tablet by mouth every 6 hours as needed for Pain for up to 7 days. Intended supply: 7 days. Take lowest dose possible to manage pain 10/14/20 10/21/20  Ayad Clark PA-C   PARoxetine (PAXIL) 20 MG tablet Take 1 tablet by mouth 2 times daily 9/23/20   Ayad Clark PA-C   bumetanide (BUMEX) 2 MG tablet Take 1 tablet by mouth daily 7/3/20   Magdalena Adair MD   miconazole (MICOTIN) 2 % powder Apply topically 2 times daily.  5/27/20   Magdalena Adair MD   diclofenac sodium (VOLTAREN) 1 % GEL Apply 2 g topically 4 times daily as needed for Pain 5/27/20   Magdalena Adair MD   metoprolol tartrate (LOPRESSOR) 25 MG tablet Take 1 tablet by mouth 2 times daily 4/21/20   Ayad Clark PA-C   lisinopril (PRINIVIL;ZESTRIL) 10 MG tablet Take 1 tablet by mouth daily 4/21/20   Ayad Clark PA-C   albuterol sulfate  (90 Base) MCG/ACT inhaler Inhale 2 puffs into the lungs every 6 hours as needed for Wheezing    Historical Provider, MD       Future Appointments   Date Time Provider Pearl Hilton   10/19/2020  3:00 PM [facility] IR  [facility] Radiolog   10/21/2020  1:40 PM Clayton Birch MD [address]   11/4/2020  9:30 AM Ayad Clark PA-C [id number]     ,   Congestive Heart Failure Assessment    Are you currently restricting fluids?:  Other  Do you understand a low sodium diet?:  Yes  Do you understand how to read food labels?:  Yes  Do you salt your food before tasting it?:  No     No patient-reported symptoms      Symptoms:      Symptom course:  stable  Weight trend:  stable  Salt intake watch compared to last visit:  stable     ,   COPD Assessment    Does the patient understand envrionmental exposure?:  Yes  Is the patient able to verbalize Rescue vs. Long Acting medications?:  Yes     No patient-reported symptoms         Symptoms:          and   General Assessment    Do you have any symptoms that are causing concern?:  Yes  Progression since Onset:  Unchanged  Reported Symptoms:  Pain

## 2020-10-14 NOTE — TELEPHONE ENCOUNTER
Requesting a refill of pain medication, RX pending your approval.   It is not due for refill until tomorrow but patient reports it usually takes her pharmacy awhile. Please advise.    Injection scheduled for 10.19.20

## 2020-10-15 ENCOUNTER — TELEPHONE (OUTPATIENT)
Dept: PAIN MANAGEMENT | Age: 60
End: 2020-10-15

## 2020-10-15 RX ORDER — LIDOCAINE HYDROCHLORIDE 10 MG/ML
4 INJECTION, SOLUTION EPIDURAL; INFILTRATION; INTRACAUDAL; PERINEURAL ONCE
Status: CANCELLED | OUTPATIENT
Start: 2020-10-19

## 2020-10-15 RX ORDER — METHYLPREDNISOLONE ACETATE 80 MG/ML
80 INJECTION, SUSPENSION INTRA-ARTICULAR; INTRALESIONAL; INTRAMUSCULAR; SOFT TISSUE ONCE
Status: CANCELLED | OUTPATIENT
Start: 2020-10-19

## 2020-10-15 RX ORDER — BUPIVACAINE HYDROCHLORIDE 5 MG/ML
4 INJECTION, SOLUTION EPIDURAL; INTRACAUDAL ONCE
Status: CANCELLED | OUTPATIENT
Start: 2020-10-19

## 2020-10-19 ENCOUNTER — TELEPHONE (OUTPATIENT)
Dept: PAIN MANAGEMENT | Age: 60
End: 2020-10-19

## 2020-10-20 ENCOUNTER — TELEPHONE (OUTPATIENT)
Dept: PAIN MANAGEMENT | Age: 60
End: 2020-10-20

## 2020-10-21 ENCOUNTER — HOSPITAL ENCOUNTER (OUTPATIENT)
Dept: PAIN MANAGEMENT | Age: 60
Discharge: HOME | End: 2020-10-21
Payer: COMMERCIAL

## 2020-10-21 PROCEDURE — 99213 OFFICE O/P EST LOW 20 MIN: CPT

## 2020-10-21 PROCEDURE — 99203 OFFICE O/P NEW LOW 30 MIN: CPT

## 2020-10-21 PROCEDURE — 99244 OFF/OP CNSLTJ NEW/EST MOD 40: CPT | Performed by: PAIN MEDICINE

## 2020-10-21 RX ORDER — OXYCODONE HYDROCHLORIDE AND ACETAMINOPHEN 5; 325 MG/1; MG/1
1 TABLET ORAL EVERY 8 HOURS PRN
Qty: 45 TABLET | Refills: 0 | Status: SHIPPED | OUTPATIENT
Start: 2020-10-21 | End: 2020-11-06 | Stop reason: SDUPTHER

## 2020-10-21 ASSESSMENT — ENCOUNTER SYMPTOMS
ABDOMINAL PAIN: 0
EYE PAIN: 0
PHOTOPHOBIA: 0
SHORTNESS OF BREATH: 0
VOMITING: 0
COUGH: 0
CONSTIPATION: 0
ALLERGIC/IMMUNOLOGIC NEGATIVE: 1
NAUSEA: 0

## 2020-10-21 NOTE — CONSULTS
Martin Johnson is a 61 y.o. female evaluated on 10/21/2020. Patient is referred by Lucy Cormier PA-C   Modality of virtual service provided -via  telephone   Consent:  Patient and/or health care decision maker is aware that that patient may receive a bill for this telephone service, depending on one's insurance coverage, and has provided verbal consent to proceed: Yes    Patient identification was verified at the start of the visit: Yes    Chief complaint: Martin Johnson is 61 y.o.,  female, with chief complaint of pain involving the right hip and right knee. Referring Diagnosis   M16.11 (ICD-10-CM) - Primary osteoarthritis of right hip knee pain  M25.50, G89.29 (ICD-10-CM) - Chronic pain of multiple joints     Patient is a 35-year-old female referred to the pain clinic with a chief complaint of pain involving the right hip as well as in the knee and pain involving several joints. Patient pain is more or less constant with varying intensities depending on the activity. Patient also complains of pain involving the feet bilaterally and she has edema of the legs bilaterally. The pain usually radiates from the hip to the knee on the right side. Patient knee pain is not as bad as the hip pain. She is unable to function because of the severe hip pain. She is also complaining of numbness involving the feet bilaterally but she denies any history of diabetes mellitus. Patient admits to smoking 1 pack of cigarettes a day and denies use of alcohol or any drugs. Patient reports she was on Percocet 5/325 for pain control. She reports she has been low on her medication and is requesting medications from us. Hip Pain    The incident occurred more than 1 week ago. There was no injury mechanism. The pain is present in the right hip and right knee. The quality of the pain is described as shooting. The pain is at a severity of 10/10. The pain is severe. The pain has been constant since onset.  Associated symptoms include an inability to bear weight. The symptoms are aggravated by movement and weight bearing. Knee Pain    Incident onset: 2 months. There was no injury mechanism. The pain is present in the right knee. The quality of the pain is described as aching. The pain is at a severity of 7/10. The pain is severe. The pain has been intermittent since onset. Associated symptoms include an inability to bear weight. The symptoms are aggravated by movement and weight bearing. Alleviating factors:rest   Lifestyle changes experienced with pain: Prevents or limits ADLs, Increases w/activity.  , Increases w/prolonged sitting/standing/walking  Mood changes,depressed  Patient currently unemployed. Physical therapy did not help the pain. Are you under psychological counseling at present: No  Goals for treatment include:  Decrease in pain  Enjoy daily and recreational activities, return to previous status. ACTIVITY/SOCIAL/EMOTIONAL:  Sleep Pattern: 8 hours per night. nightime awakenings  Home Exercises: daily none  Activity:unchanged  Emotional Issues: anxiety/ nervousness.    Currently seeing a Psychiatrist or Psychologist:  No     ADVERSE MEDICATION EFFECTS:   Nausea and vomiting: no   Constipation: no-Undercontrol-: yes  Dizziness/drowsy/sleepy--no  Urinary Retention: no    ABERRANT BEHAVIORS SINCE LAST VISIT  Lost rx/pills:------------------------------------------ no  Taking  medication as prescribed: ----------- yes  Urine Drug Screen ---------------------------------  not applicable  Recent ER visits: -------------------------------------No  Pill count is appropriate: ---------------------------no   Refills for prescriptions appropriate:---------- yes      Past Medical History:   Diagnosis Date    Arthritis     [facility]congestive heart failure) [facility]     [facility]     Diverticulitis     Hx of blood clots     Pulmonary embolism ([facility])        Past Surgical History: Procedure Laterality Date    ANKLE SURGERY      COLON SURGERY      HERNIA REPAIR         History reviewed. No pertinent family history. Social History     Socioeconomic History    Marital status: Single     Spouse name: None    Number of children: None    Years of education: None    Highest education level: None   Occupational History    None   Social Needs    Financial resource strain: Not very hard    Food insecurity     Worry: None     Inability: None    Transportation needs     Medical: None     Non-medical: None   Tobacco Use    Smoking status: Current Every Day Smoker     Packs/day: 0.50     Years: 45.00     Pack years: 22.50    Smokeless tobacco: Never Used   Substance and Sexual Activity    Alcohol use: Yes     Comment: 1-2 times per week    Drug use: Not Currently    Sexual activity: Not Currently   Lifestyle    Physical activity     Days per week: 0 days     Minutes per session: 0 min    Stress: To some extent   Relationships    Social connections     Talks on phone: None     Gets together: None     Attends Denominational service: None     Active member of club or organization: None     Attends meetings of clubs or organizations: None     Relationship status: None    Intimate partner violence     Fear of current or ex partner: None     Emotionally abused: None     Physically abused: None     Forced sexual activity: None   Other Topics Concern    None   Social History Narrative    None       No Known Allergies    Current Outpatient Medications on File Prior to Encounter   Medication Sig Dispense Refill    oxyCODONE-acetaminophen (PERCOCET) 5-325 MG per tablet Take 1 tablet by mouth every 6 hours as needed for Pain for up to 7 days. Intended supply: 7 days.  Take lowest dose possible to manage pain 28 tablet 0    PARoxetine (PAXIL) 20 MG tablet Take 1 tablet by mouth 2 times daily 60 tablet 1    bumetanide (BUMEX) 2 MG tablet Take 1 tablet by mouth daily 30 tablet 1    miconazole (MICOTIN) 2 % powder Apply topically 2 times daily. 45 g 1    diclofenac sodium (VOLTAREN) 1 % GEL Apply 2 g topically 4 times daily as needed for Pain 1 Tube 3    metoprolol tartrate (LOPRESSOR) 25 MG tablet Take 1 tablet by mouth 2 times daily 60 tablet 3    lisinopril (PRINIVIL;ZESTRIL) 10 MG tablet Take 1 tablet by mouth daily 30 tablet 3    albuterol sulfate  (90 Base) MCG/ACT inhaler Inhale 2 puffs into the lungs every 6 hours as needed for Wheezing       No current facility-administered medications on file prior to encounter. Review of Systems   Constitutional: Negative for activity change, chills, fatigue and unexpected weight change. HENT: Negative for dental problem, ear pain, mouth sores and sneezing. Eyes: Negative for photophobia, pain and visual disturbance. Respiratory: Negative for cough and shortness of breath. History of COPD on oxygen by nasal cannula   Cardiovascular: Negative for chest pain and palpitations. History of congestive heart failure   Gastrointestinal: Negative for abdominal pain, constipation, nausea and vomiting. Endocrine: Negative. Negative for cold intolerance and polyuria. Genitourinary: Negative for dysuria and hematuria. Musculoskeletal: Positive for arthralgias and gait problem. Skin: Negative for wound. Allergic/Immunologic: Negative. Neurological: Negative for tremors, seizures and headaches. Hematological: Does not bruise/bleed easily. Psychiatric/Behavioral: Negative for self-injury, sleep disturbance and suicidal ideas. The patient is nervous/anxious. Physical Exam  Skin:         Neurological:      Mental Status: She is alert and oriented to person, place, and time. Psychiatric:         Mood and Affect: Affect is tearful.             DATA:  LAB.:  7/3/2020  5:50 AM - [place] Incoming Lab Results From [facility]     Component  Value  Ref Range & Units  Status  Collected  Lab    Glucose  119High    70 - 99           Impression    Moderate osteoarthrosis of the femoroacetabular joint.  No acute osseus    abnormality.           EXAMINATION:    CTA OF THE CHEST 5/24/2020 5:42 pm         TECHNIQUE:    CTA of the chest was performed after the administration of intravenous    contrast.  Multiplanar reformatted images are provided for review.  MIP    images are provided for review. Dose modulation, iterative reconstruction,    and/or weight based adjustment of the mA/kV was utilized to reduce the    radiation dose to as low as reasonably achievable.         COMPARISON:    None         HISTORY:    ORDERING SYSTEM PROVIDED HISTORY: CP, SOB, Hypoxia, Tachycardia, elevated    D.dimer    TECHNOLOGIST PROVIDED HISTORY:    CP, SOB, Hypoxia, Tachycardia, elevated D.dimer    Reason for Exam: CP, SOB, Hypoxia, Tachycardia, elevated D.dimer    Acuity: Acute    Type of Exam: Initial         FINDINGS:    Pulmonary Arteries: Pulmonary arteries are adequately opacified for    evaluation.  No evidence of intraluminal filling defect to suggest pulmonary    embolism.  Enlarged main pulmonary artery at 4.1 cm indicative of pulmonary    hypertension.         Mediastinum: The thoracic aorta is normal in course and caliber with mild    atherosclerotic plaque.  Cardiomegaly with atherosclerotic calcification in    the coronary circulation.  No pericardial effusion.  Calcified mediastinal    and right hilar nodes consistent with old granulomatous disease.  No    pathologic adenopathy.         Lungs/pleura: Focus of opacification in the left lower lobe posteriorly. This is likely atelectasis associated with some airway secretions which are    noted in the lower lobe bronchial tree bilaterally.  Mild bronchial wall    thickening in the lower lobes.  Patchy opacification at the right lung apex,    possibly asymmetric fibrotic changes.  The lungs otherwise are grossly clear. Small focus of linear atelectasis in the right middle lobe anteriorly. No    pleural fluid or pneumothorax.         Upper Abdomen: Limited images of the upper abdomen are unremarkable.         Soft Tissues/Bones: No acute bone or soft tissue abnormality.              Impression    1. No evidence of pulmonary embolic disease.  Enlarged main pulmonary artery    consistent with pulmonary hypertension. 2. Mild bronchial wall thickening with airway secretions in the lower lobes. Focus of opacification in the left lower lobe posteriorly, likely atelectasis. 3. Cardiomegaly.  Atherosclerotic calcification in the aorta and coronary    circulation. Clinical  impression:  1. Primary osteoarthritis of right hip    2. Chronic heart failure with preserved ejection fraction ([address]    3. Chronic obstructive pulmonary disease, unspecified COPD type ([address]    4. Tobacco abuse    5. Class 3 severe obesity due to excess calories with serious comorbidity and body mass index (BMI) of 50.0 to 59.9 in adult ([address]    6. Chronic pain of multiple joints      Plan of care:   Patient's   [x] x-ray    [] CT scan    [] MRI  Were/was  Reviewed. These findings are consistent with the patient's symptoms and physical examination. [x] Patient's findings on the x-ray were explained to the patient using a bone modal.    Other reports reviewed include    [] Bone scan   [] EMG and nerve conduction studies   [x] Referral reports-  I also discussed with him the following treatment options Including advantages and disadvantages of each:    [x] Physical therapy    [] Interventional pain treatment    [] Medication management    [] Surgical options    Patient's OARRS were reviewed. It is acceptable and appears patient is not receiving prescriptions from multiple prescribers. Patient is  forthcoming regarding prescriptions for pain medication in the past  Controlled Substances Monitoring: Periodic Controlled Substance Monitoring: No signs of potential drug abuse or diversion identified. , Assessed functional status. (Courtenay Bamberger, MD)    Counselling/Preventive measures for pain  Control:    [x]  Spine strengthening exercises are discussed with patient in detail. [x]  Patient is counseled about  ill effects of smoking for 8 minutes -Patient is strongly urged to quit smoking   Following health benefits were discussed:  People who stop smoking greatly reduce their risk for disease and premature death. Lowered risk for lung cancer and many other types of cancer. Reduced risk for coronary heart disease, stroke, and peripheral vascular disease. Reduced coronary heart disease risk within 1 to 2 years of quitting. Reduced respiratory symptoms, such as coughing, wheezing, and shortness of breath. The rate of decline in lung function is slower among people who quit smoking than among those who continue to smoke. Reduced risk of developing chronic obstructive pulmonary disease (COPD), one of the leading causes of death in the [place]. Reduced risk for infertility in women of reproductive age. Women who stop smoking during pregnancy also reduce their risk of having a low birth weight baby. Reduced risk of bone and joint damage. Methods to Quit Smoking  The majority of cigarette smokers quit without using evidence-based   Counseling and medication are both effective for treating tobacco dependence, and using them together is more effective than using either one alone. Patient is advised to follow up with his physician for further management. Patient is requesting her medications to help the pain. I reviewed her OARRS in detail. She is due for her medications. I asked her to get a urine screen done require 24 hours preparation needs a ride and she said she will go for urine screen on Friday. Patient will also be scheduled for left hip injection as she is unable to go for the hip injection in the past.  The procedure risks as well as alternate therapies were discussed with the patient. .    Orders Placed

## 2020-10-22 ENCOUNTER — TELEPHONE (OUTPATIENT)
Dept: PAIN MANAGEMENT | Age: 60
End: 2020-10-22

## 2020-10-22 ENCOUNTER — CARE COORDINATION (OUTPATIENT)
Dept: CARE COORDINATION | Age: 60
End: 2020-10-22

## 2020-10-22 SDOH — ECONOMIC STABILITY: FOOD INSECURITY: WITHIN THE PAST 12 MONTHS, YOU WORRIED THAT YOUR FOOD WOULD RUN OUT BEFORE YOU GOT MONEY TO BUY MORE.: NEVER TRUE

## 2020-10-22 SDOH — ECONOMIC STABILITY: TRANSPORTATION INSECURITY
IN THE PAST 12 MONTHS, HAS LACK OF TRANSPORTATION KEPT YOU FROM MEETINGS, WORK, OR FROM GETTING THINGS NEEDED FOR DAILY LIVING?: NO

## 2020-10-22 SDOH — ECONOMIC STABILITY: FOOD INSECURITY: WITHIN THE PAST 12 MONTHS, THE FOOD YOU BOUGHT JUST DIDN'T LAST AND YOU DIDN'T HAVE MONEY TO GET MORE.: NEVER TRUE

## 2020-10-22 SDOH — ECONOMIC STABILITY: TRANSPORTATION INSECURITY
IN THE PAST 12 MONTHS, HAS THE LACK OF TRANSPORTATION KEPT YOU FROM MEDICAL APPOINTMENTS OR FROM GETTING MEDICATIONS?: NO

## 2020-10-22 NOTE — CARE COORDINATION
Medication Sig Start Date End Date Taking? Authorizing Provider   oxyCODONE-acetaminophen (PERCOCET) 5-325 MG per tablet Take 1 tablet by mouth every 8 hours as needed for Pain for up to 15 days. Intended supply: 7 days. Take lowest dose possible to manage pain 10/21/20 11/5/20  Nic Guerra MD   PARoxetine (PAXIL) 20 MG tablet Take 1 tablet by mouth 2 times daily 9/23/20   Neymar Wayne PA-C   bumetanide (BUMEX) 2 MG tablet Take 1 tablet by mouth daily 7/3/20   Deonna Mcghee MD   miconazole (MICOTIN) 2 % powder Apply topically 2 times daily.  5/27/20   Deonna Mcghee MD   diclofenac sodium (VOLTAREN) 1 % GEL Apply 2 g topically 4 times daily as needed for Pain 5/27/20   Deonna Mcghee MD   metoprolol tartrate (LOPRESSOR) 25 MG tablet Take 1 tablet by mouth 2 times daily 4/21/20   Neymar Wayne PA-C   lisinopril (PRINIVIL;ZESTRIL) 10 MG tablet Take 1 tablet by mouth daily 4/21/20   Neymar Wayne PA-C   albuterol sulfate  (90 Base) MCG/ACT inhaler Inhale 2 puffs into the lungs every 6 hours as needed for Wheezing    Historical Provider, MD       Future Appointments   Date Time Provider Pearl Hilton   11/4/2020  9:30 AM [facility]     ,   Congestive Heart Failure Assessment    Are you currently restricting fluids?:  Other  Do you understand a low sodium diet?:  Yes  Do you understand how to read food labels?:  Yes  Do you salt your food before tasting it?:  No     No patient-reported symptoms      Symptoms:      Weight trend:  stable  Salt intake watch compared to last visit:  stable      and   COPD Assessment    Does the patient understand envrionmental exposure?:  Yes  Is the patient able to verbalize Rescue vs. Long Acting medications?:  Yes     No patient-reported symptoms         Symptoms:

## 2020-10-22 NOTE — TELEPHONE ENCOUNTER
Scheduled right hip injection with pt for 11/5 @9:30am. Reviewed pre-procedure and COVID-19 questions.

## 2020-11-01 NOTE — PROGRESS NOTES
Martin Johnson is a 61 y.o. female evaluated on 11/5/2020. Chief complaint: Martin Johnson is 61 y.o.,  female, with chief complaint of right hip pain. HPI   Patient is initially evaluated on 10/21/2020 as a virtual visit. A physical examination is done today to complete the evaluation. Please refer to the original note for complete history. Past Medical History:   Diagnosis Date    Arthritis     CHF (congestive heart failure) (HCC)     COPD (chronic obstructive pulmonary disease) (HCC)     Diverticulitis     Hx of blood clots     Pulmonary embolism (HCC)        Past Surgical History:   Procedure Laterality Date    ANKLE SURGERY      COLON SURGERY      HERNIA REPAIR         No family history on file. Social History     Socioeconomic History    Marital status: Single     Spouse name: Not on file    Number of children: Not on file    Years of education: Not on file    Highest education level: Not on file   Occupational History    Not on file   Social Needs    Financial resource strain: Not very hard    Food insecurity     Worry: Never true     Inability: Never true    Transportation needs     Medical: No     Non-medical: No   Tobacco Use    Smoking status: Current Every Day Smoker     Packs/day: 0.50     Years: 45.00     Pack years: 22.50    Smokeless tobacco: Never Used   Substance and Sexual Activity    Alcohol use: Yes     Comment: 1-2 times per week    Drug use: Not Currently    Sexual activity: Not Currently   Lifestyle    Physical activity     Days per week: 0 days     Minutes per session: 0 min    Stress:  To some extent   Relationships    Social connections     Talks on phone: Not on file     Gets together: Not on file     Attends Restorationism service: Not on file     Active member of club or organization: Not on file     Attends meetings of clubs or organizations: Not on file     Relationship status: Not on file    Intimate partner violence     Fear of current or ex partner: Not on file     Emotionally abused: Not on file     Physically abused: Not on file     Forced sexual activity: Not on file   Other Topics Concern    Not on file   Social History Narrative    Not on file       No Known Allergies    Current Outpatient Medications on File Prior to Encounter   Medication Sig Dispense Refill    PARoxetine (PAXIL) 20 MG tablet Take 1 tablet by mouth 2 times daily 60 tablet 1    miconazole (MICOTIN) 2 % powder Apply topically 2 times daily. 45 g 1    oxyCODONE-acetaminophen (PERCOCET) 5-325 MG per tablet Take 1 tablet by mouth every 8 hours as needed for Pain for up to 15 days. Intended supply: 7 days. Take lowest dose possible to manage pain 45 tablet 0    bumetanide (BUMEX) 2 MG tablet Take 1 tablet by mouth daily 30 tablet 1    diclofenac sodium (VOLTAREN) 1 % GEL Apply 2 g topically 4 times daily as needed for Pain 1 Tube 3    metoprolol tartrate (LOPRESSOR) 25 MG tablet Take 1 tablet by mouth 2 times daily 60 tablet 3    lisinopril (PRINIVIL;ZESTRIL) 10 MG tablet Take 1 tablet by mouth daily 30 tablet 3    albuterol sulfate  (90 Base) MCG/ACT inhaler Inhale 2 puffs into the lungs every 6 hours as needed for Wheezing       No current facility-administered medications on file prior to encounter. Review of Systems   Please refer to review of additional evaluation note for review of systems. Patient denies any changes since then  Physical Exam  Constitutional:       Appearance: She is obese. Comments: Admitted with days somewhat short of breath   HENT:      Head: Normocephalic and atraumatic. Nose: Nose normal.   Eyes:      Extraocular Movements: Extraocular movements intact. Conjunctiva/sclera: Conjunctivae normal.      Pupils: Pupils are equal, round, and reactive to light. Neck:      Musculoskeletal: Normal range of motion and neck supple. Cardiovascular:      Rate and Rhythm: Normal rate and regular rhythm.    Pulmonary: Breath sounds: Wheezing present. Comments: Increased respiratory effort  Abdominal:      General: There is no distension. Palpations: Abdomen is soft. Musculoskeletal:      Right lower leg: Edema present. Left lower leg: Edema present. Comments: There is some increased redness. Patient has a history of chronic cellulitis of the legs. Skin:     Findings: No rash. Neurological:      Mental Status: She is alert and oriented to person, place, and time. Mental status is at baseline. Psychiatric:         Mood and Affect: Mood normal.         Behavior: Behavior normal.         Thought Content: Thought content normal.         Judgment: Judgment normal.      Right Hip Exam     Tenderness   The patient is experiencing tenderness in the ischial tuberosity, greater trochanter, anterior and posterior. Range of Motion   Right hip abduction: Severely limited and painful. Right hip adduction: Severely limited and painful. Right hip extension: Severely limited and painful. Right hip flexion: Severely limited and painful. Right hip external rotation: Severely limited and painful. Right hip internal rotation: Severely limited and painful. Muscle Strength   Right hip normal muscle strength: Could not test the strength as the patient is in severe pain. Tests   LAW: positive    Other   Scars: absent    Comments:   There are signs of fungal infection anteriorly in the groin with skin rash               DATA:  LAB.:  7/3/2020  5:50 AM - [place] Incoming Lab Results From [facility]     Component  Value  Ref Range & Units  Status  Collected  Lab    Glucose  119High    70 - 99 mg/dL  Final  07/03/2020  5:14 AM  [facility] [address] [facility]    BUN  18  8 - 23 mg/dL  Final  07/03/2020  5:14 AM  [facility] [address] [facility]    CREATININE  0.76  0.50 - 0.90 mg/dL  Final  07/03/2020  5:14 AM  [facility] [address] [facility]    Bun/Cre Ratio  NOT REPORTED  9 - 20  Final  07/03/2020  5:14 AM  [facility]    Calcium  9.0  8.6 - 10.4 mg/dL  Final  07/03/2020  5:14 AM  - [facility]    Sodium  136  135 - 144 mmol/L  Final  07/03/2020  5:14 AM  - [facility]    Potassium  3.8  3.7 - 5.3 mmol/L  Final  07/03/2020  5:14 AM  - [facility]    Chloride  92Low    98 - 107 mmol/L  Final  07/03/2020  5:14 AM  - [facility]    CO2  37High    20 - 31 mmol/L  Final  07/03/2020  5:14 AM  - [facility]    Anion Gap  7Low    9 - 17 mmol/L  Final  07/03/2020  5:14 AM  - [facility]    GFR Non-African American  >60  >60 mL/min  Final  07/03/2020  5:14 AM  [facility]    GFR African American  >60  >60 mL/min  Final          X-Ray reports:  TWO XRAY VIEWS OF THE RIGHT HIP        11/18/2019 9:06 am        COMPARISON:    None. HISTORY:    ORDERING SYSTEM PROVIDED HISTORY: Chronic pain of right hip    TECHNOLOGIST PROVIDED HISTORY:    Weight bearing    acute on chronic right hip pain, past two weeks, no known injury    Reason for Exam: patient states right hip pain x3 weeks    Acuity: Acute    Type of Exam: Initial        FINDINGS:    AP and frog-leg lateral views of the hip are submitted for review. Bony    mineralization is normal.  There is no evidence for acute fracture or    dislocation. Moderate osteoarthrosis of the acetabular joint with loss of    joint space height. No aggressive mass lesion or periostitis identified. Overlying soft tissues are unremarkable. Osteoarthrosis of the sacroiliac    joint. Impression    Moderate osteoarthrosis of the femoroacetabular joint. No acute osseus    abnormality. Clinical  impression:  1. Primary osteoarthritis of right hip    2. Trochanteric bursitis, right hip        Plan of care: As planned we will try right hip joint injection for the pain relief.   The procedure risks as well as alternate therapies were discussed at length with the patient and and informed consent was obtained    PDMP Monitoring:    Last PDMP Jose Mcpherson as Reviewed [facility]):  Review User Review Instant Review Result   Foster Montanez 11/1/2020  1:46 PM Reviewed PDMP [1]     Counselling/Preventive measures for pain  Control:    [x]  Spine strengthening exercises are discussed with patient in detail. Decision Making Process : Patient's health history and referral records thoroughly reviewed before focused physical examination and discussion with patient. Level of complexity of date to be reviewed is Moderate. The chart date reviewed include the following: Imaging Reports. Summary of Care. Time spent reviewing with patient the below reports:   Medication safety, Treatment options. Level of diagnosis and management options of this case is multiple: involving the following management options: Interventions as needed, medication management as appropriate, future visits, activity modification, heat/ice as needed, Urine drug screen as required. [x]The patient's questions were answered to the best of my abilities. This note was created using voice recognition software. There may be inaccuracies of transcription  that are inadvertently overlooked prior to the signature. There is any questions about the transcription please contact me.       Electronically signed by Parul Kennedy MD on 11/5/2020 at 6:46 AM

## 2020-11-03 ENCOUNTER — CARE COORDINATION (OUTPATIENT)
Dept: CARE COORDINATION | Age: 60
End: 2020-11-03

## 2020-11-03 NOTE — CARE COORDINATION
Attempting to reach patient for a follow up care coordination call regarding any needs, questions or concerns.   Parul Hope, [address]

## 2020-11-04 ENCOUNTER — VIRTUAL VISIT (OUTPATIENT)
Dept: INTERNAL MEDICINE CLINIC | Age: 60
End: 2020-11-04
Payer: COMMERCIAL

## 2020-11-04 ENCOUNTER — TELEPHONE (OUTPATIENT)
Dept: INTERNAL MEDICINE CLINIC | Age: 60
End: 2020-11-04

## 2020-11-04 PROCEDURE — 99443 PR PHYS/QHP TELEPHONE EVALUATION 21-30 MIN: CPT | Performed by: PHYSICIAN ASSISTANT

## 2020-11-04 RX ORDER — PAROXETINE HYDROCHLORIDE 20 MG/1
20 TABLET, FILM COATED ORAL 2 TIMES DAILY
Qty: 60 TABLET | Refills: 1 | Status: SHIPPED | OUTPATIENT
Start: 2020-11-04 | End: 2020-12-17 | Stop reason: SDUPTHER

## 2020-11-04 ASSESSMENT — PATIENT HEALTH QUESTIONNAIRE - PHQ9
6. FEELING BAD ABOUT YOURSELF - OR THAT YOU ARE A FAILURE OR HAVE LET YOURSELF OR YOUR FAMILY DOWN: 3
3. TROUBLE FALLING OR STAYING ASLEEP: 0
2. FEELING DOWN, DEPRESSED OR HOPELESS: 2
1. LITTLE INTEREST OR PLEASURE IN DOING THINGS: 2
SUM OF ALL RESPONSES TO PHQ9 QUESTIONS 1 & 2: 4
7. TROUBLE CONCENTRATING ON THINGS, SUCH AS READING THE NEWSPAPER OR WATCHING TELEVISION: 0
SUM OF ALL RESPONSES TO PHQ QUESTIONS 1-9: 12
8. MOVING OR SPEAKING SO SLOWLY THAT OTHER PEOPLE COULD HAVE NOTICED. OR THE OPPOSITE, BEING SO FIGETY OR RESTLESS THAT YOU HAVE BEEN MOVING AROUND A LOT MORE THAN USUAL: 0
SUM OF ALL RESPONSES TO PHQ QUESTIONS 1-9: 12
9. THOUGHTS THAT YOU WOULD BE BETTER OFF DEAD, OR OF HURTING YOURSELF: 0
5. POOR APPETITE OR OVEREATING: 3
SUM OF ALL RESPONSES TO PHQ QUESTIONS 1-9: 12
4. FEELING TIRED OR HAVING LITTLE ENERGY: 2

## 2020-11-04 ASSESSMENT — ENCOUNTER SYMPTOMS
COLOR CHANGE: 0
SINUS PAIN: 0
BACK PAIN: 0
ABDOMINAL PAIN: 0
WHEEZING: 1
VOICE CHANGE: 0
CHEST TIGHTNESS: 0
TROUBLE SWALLOWING: 0
EYE PAIN: 0
SHORTNESS OF BREATH: 1
EYE DISCHARGE: 0
COUGH: 0
SORE THROAT: 0
BLOOD IN STOOL: 0
RHINORRHEA: 0
DIARRHEA: 0
EYE ITCHING: 0
NAUSEA: 0
CONSTIPATION: 0
VOMITING: 0

## 2020-11-04 NOTE — PROGRESS NOTES
[address] [address]  Dept: [phone]  Dept Fax: [phone]    Virtual Visit Progress Note  Date of patient's visit: 11/4/2020  Patient's Name:  Renae Johnson YOB: 1960            Patient Care Team:  Chris García PA-C as PCP - General (Physician Assistant)  Chris García PA-C as PCP - [facility] Provider  Donell Lubin RN as Ambulatory Care Manager    The patient and/or health care decision maker are aware that the patient may receive a bill for this telephone service, depending on her/his insurance coverage, and provided verbal consent to proceed: Yes    REASON FOR VISIT:  Routine outpatient visit    HISTORY OF PRESENT ILLNESS:      Chief Complaint   Patient presents with    1 Month Follow-Up    Leg Swelling     taking bumex, not helping the swelling     History was obtained from the patient. Renae Johnson is a 61 y.o. female here today virtually for evaluation of multiple chronic medical conditions. Essential hypertension, blood pressure has been well controlled, no complaints of chest pain/pressure, headaches, vision changes. Congestive heart failure, increased lower extremity swelling over past few weeks, no increased dyspnea, orthopnea, weight gain, has not missed any doses of medication. No leg pain, redness, warmth. Chronic pain multiple joints, following with orthopedics, pain management. Depression is stable, on Paxil, no self harm or suicidal thoughts.      Patient Active Problem List   Diagnosis    Chronic heart failure with preserved ejection fraction (HCC)    Chronic obstructive pulmonary disease (HCC)    Tobacco abuse    Class 3 severe obesity with serious comorbidity and body mass index (BMI) of 50.0 to 59.9 in [facility])    Essential hypertension    History of pulmonary embolism    Family history of colon cancer in mother    Prediabetes    Class 4 congestive heart failure, acute on chronic, systolic (HCC)    Hypokalemia    Cellulitis    Primary osteoarthritis of right hip    Chronic pain of multiple joints     MEDICATIONS:      Current Outpatient Medications   Medication Sig Dispense Refill    PARoxetine (PAXIL) 20 MG tablet Take 1 tablet by mouth 2 times daily 60 tablet 1    miconazole (MICOTIN) 2 % powder Apply topically 2 times daily. 45 g 1    oxyCODONE-acetaminophen (PERCOCET) 5-325 MG per tablet Take 1 tablet by mouth every 8 hours as needed for Pain for up to 15 days. Intended supply: 7 days. Take lowest dose possible to manage pain 45 tablet 0    bumetanide (BUMEX) 2 MG tablet Take 1 tablet by mouth daily 30 tablet 1    diclofenac sodium (VOLTAREN) 1 % GEL Apply 2 g topically 4 times daily as needed for Pain 1 Tube 3    metoprolol tartrate (LOPRESSOR) 25 MG tablet Take 1 tablet by mouth 2 times daily 60 tablet 3    lisinopril (PRINIVIL;ZESTRIL) 10 MG tablet Take 1 tablet by mouth daily 30 tablet 3    albuterol sulfate  (90 Base) MCG/ACT inhaler Inhale 2 puffs into the lungs every 6 hours as needed for Wheezing       No current facility-administered medications for this visit. ALLERGIES:    No Known Allergies    SOCIAL HISTORY   Reviewed and no change from previous record. Anjali Waters  reports that she has been smoking. She has a 22.50 pack-year smoking history. She has never used smokeless tobacco.    FAMILY HISTORY:    Reviewed and no change from previous visit    REVIEW OF SYSTEMS:    Review of Systems   Constitutional: Negative for chills, diaphoresis, fatigue and fever. HENT: Negative for congestion, ear discharge, ear pain, hearing loss, rhinorrhea, sinus pain, sore throat, trouble swallowing and voice change. Eyes: Negative for pain, discharge, itching and visual disturbance. Respiratory: Positive for shortness of breath (exertional) and wheezing (intermittent). Negative for cough and chest tightness. Cardiovascular: Positive for leg swelling (bilateral). Negative for chest pain and palpitations. Gastrointestinal: Negative for abdominal pain, blood in stool, constipation, diarrhea, nausea and vomiting. Endocrine: Negative for cold intolerance and heat intolerance. Genitourinary: Negative for difficulty urinating, dysuria, flank pain, frequency, hematuria and urgency. Musculoskeletal: Positive for arthralgias (multiple joints, right hip). Negative for back pain, neck pain and neck stiffness. Skin: Negative for color change, pallor and rash. Neurological: Negative for dizziness, weakness, light-headedness, numbness and headaches. Hematological: Negative for adenopathy. Does not bruise/bleed easily. Psychiatric/Behavioral: Positive for dysphoric mood. Negative for self-injury, sleep disturbance and suicidal ideas. The patient is not nervous/anxious. PHYSICAL EXAM:      Patient-Reported Vitals 11/4/2020   Patient-Reported Weight 330lb   Patient-Reported Height 5'7   Patient-Reported Systolic -   Patient-Reported Diastolic -      Wt Readings from Last 3 Encounters:   09/23/20 (!) 336 lb (152.4 kg)   07/03/20 (!) 336 lb 3.2 oz (152.5 kg)   06/30/20 (!) 345 lb (156.5 kg)     BP Readings from Last 3 Encounters:   09/23/20 124/62   07/03/20 129/61   06/30/20 130/64     Exam was very limited due to virtual encounter, patient sounds well, in no distress, speaking in complete sentences, speech is clear. ASSESSMENT AND PLAN:      1. Essential hypertension  - Controlled, continue present management    2. Chronic heart failure with preserved ejection fraction (HCC)  - Increased pedal edema, increase Bumex x 3 days, leg elevation, sodium restriction, rec comp stockings  - Advised prompt return to clinic for new or worsening symptoms, patient understands/agrees     3. Chronic obstructive pulmonary disease, unspecified COPD type ([facility] [address]  - Stable, continue present management    4.  Chronic pain of multiple joints  - Following with pain management, orthopedics 5. Class 3 severe obesity due to excess calories with serious comorbidity and body mass index (BMI) of 50.0 to 59.9 in adult ([address]  - Continue weight loss efforts     6. Screening for malignant neoplasm of colon  - Cologuard (For External Results Only); Future    7. Encounter for screening mammogram for breast cancer  - [facility] Digital Screen Bilateral [[id number]]; Future    FOLLOW UP AND INSTRUCTIONS:   Return in about 6 weeks (around 12/16/2020), earlier if needed. Discussed use, benefit, and side effects of prescribed medications. All patient questions answered. Patient voiced understanding. Patient given educational materials - see patient instructions    Patient being evaluated by a Virtual Visit (video visit) encounter to address concerns as mentioned above. A caregiver was present when appropriate. Due to this being a TeleHealth encounter (During ZXXVZ-36 public health emergency), evaluation of the following organ systems was limited: Vitals/Constitutional/EENT/Resp/CV/GI//MS/Neuro/Skin/Heme-Lymph-Imm. Pursuant to the emergency declaration under the [facility], [address] [place] [address] authority and the [employer] Act, this Virtual Visit was conducted with patient's (and/or legal guardian's) consent, to reduce the patient's risk of exposure to COVID-19 and provide necessary medical care. The patient (and/or legal guardian) has also been advised to contact this office for worsening conditions or problems, and seek emergency medical treatment and/or call 911 if deemed necessary. Total time spent on encounter: 25 mins     Services were provided through a video synchronous discussion virtually to substitute for in-person clinic visit. Patient and provider were located at their individual homes.     CAMDEN Kim [facility]  11/4/2020, 10:06 AM    Please note that this chart wasgenerated using voice recognition Dragon dictation software. Although every effort was made to ensure the accuracy of this automated transcription, some errors in transcription may have occurred.

## 2020-11-04 NOTE — TELEPHONE ENCOUNTER
Contacted to check out virtual visit, attempted to schedule Mammogram but patient refused to schedule at this time she also wanted to decline on the cologuard order.

## 2020-11-05 ENCOUNTER — CARE COORDINATION (OUTPATIENT)
Dept: CARE COORDINATION | Age: 60
End: 2020-11-05

## 2020-11-05 ENCOUNTER — HOSPITAL ENCOUNTER (OUTPATIENT)
Dept: PAIN MANAGEMENT | Age: 60
Discharge: HOME | End: 2020-11-05
Payer: COMMERCIAL

## 2020-11-05 ENCOUNTER — TELEPHONE (OUTPATIENT)
Dept: INTERNAL MEDICINE CLINIC | Age: 60
End: 2020-11-05

## 2020-11-05 ENCOUNTER — HOSPITAL ENCOUNTER (OUTPATIENT)
Dept: GENERAL RADIOLOGY | Age: 60
Discharge: HOME | End: 2020-11-07
Payer: COMMERCIAL

## 2020-11-05 VITALS
SYSTOLIC BLOOD PRESSURE: 140 MMHG | RESPIRATION RATE: 18 BRPM | OXYGEN SATURATION: 96 % | HEIGHT: 67 IN | DIASTOLIC BLOOD PRESSURE: 78 MMHG | TEMPERATURE: 98.2 F | BODY MASS INDEX: 45.99 KG/M2 | WEIGHT: 293 LBS | HEART RATE: 79 BPM

## 2020-11-05 PROCEDURE — 6360000004 HC RX CONTRAST MEDICATION: Performed by: PAIN MEDICINE

## 2020-11-05 PROCEDURE — 6360000002 HC RX W HCPCS: Performed by: PAIN MEDICINE

## 2020-11-05 PROCEDURE — 3209999900 FLUORO FOR SURGICAL PROCEDURES

## 2020-11-05 PROCEDURE — 80307 DRUG TEST PRSMV CHEM ANLYZR: CPT

## 2020-11-05 PROCEDURE — 20610 DRAIN/INJ JOINT/BURSA W/O US: CPT | Performed by: PAIN MEDICINE

## 2020-11-05 PROCEDURE — 20610 DRAIN/INJ JOINT/BURSA W/O US: CPT

## 2020-11-05 PROCEDURE — 77002 NEEDLE LOCALIZATION BY XRAY: CPT | Performed by: PAIN MEDICINE

## 2020-11-05 PROCEDURE — 77002 NEEDLE LOCALIZATION BY XRAY: CPT

## 2020-11-05 RX ORDER — BETAMETHASONE SODIUM PHOSPHATE AND BETAMETHASONE ACETATE 3; 3 MG/ML; MG/ML
INJECTION, SUSPENSION INTRA-ARTICULAR; INTRALESIONAL; INTRAMUSCULAR; SOFT TISSUE
Status: COMPLETED | OUTPATIENT
Start: 2020-11-05 | End: 2020-11-05

## 2020-11-05 RX ORDER — ROPIVACAINE HYDROCHLORIDE 5 MG/ML
INJECTION, SOLUTION EPIDURAL; INFILTRATION; PERINEURAL
Status: COMPLETED | OUTPATIENT
Start: 2020-11-05 | End: 2020-11-05

## 2020-11-05 RX ORDER — DOXYCYCLINE HYCLATE 100 MG
100 TABLET ORAL 2 TIMES DAILY
Qty: 14 TABLET | Refills: 0 | Status: SHIPPED | OUTPATIENT
Start: 2020-11-05 | End: 2020-11-12

## 2020-11-05 RX ORDER — OXYCODONE HYDROCHLORIDE AND ACETAMINOPHEN 5; 325 MG/1; MG/1
1 TABLET ORAL EVERY 8 HOURS PRN
Qty: 90 TABLET | Refills: 0 | Status: CANCELLED | OUTPATIENT
Start: 2020-11-05 | End: 2020-12-05

## 2020-11-05 RX ADMIN — IOHEXOL 1 ML: 180 INJECTION INTRAVENOUS at 09:24

## 2020-11-05 RX ADMIN — BETAMETHASONE SODIUM PHOSPHATE AND BETAMETHASONE ACETATE 15 MG: 3; 3 INJECTION, SUSPENSION INTRA-ARTICULAR; INTRALESIONAL; INTRAMUSCULAR at 09:24

## 2020-11-05 RX ADMIN — ROPIVACAINE HYDROCHLORIDE 12 ML: 5 INJECTION, SOLUTION EPIDURAL; INFILTRATION; PERINEURAL at 09:24

## 2020-11-05 ASSESSMENT — PAIN DESCRIPTION - PROGRESSION: CLINICAL_PROGRESSION: GRADUALLY WORSENING

## 2020-11-05 ASSESSMENT — PAIN - FUNCTIONAL ASSESSMENT
PAIN_FUNCTIONAL_ASSESSMENT: PREVENTS OR INTERFERES SOME ACTIVE ACTIVITIES AND ADLS
PAIN_FUNCTIONAL_ASSESSMENT: 0-10

## 2020-11-05 ASSESSMENT — PAIN DESCRIPTION - PAIN TYPE: TYPE: CHRONIC PAIN

## 2020-11-05 ASSESSMENT — PAIN DESCRIPTION - DESCRIPTORS
DESCRIPTORS: ACHING;SHARP
DESCRIPTORS: ACHING;SHARP

## 2020-11-05 ASSESSMENT — PAIN SCALES - GENERAL: PAINLEVEL_OUTOF10: 8

## 2020-11-05 ASSESSMENT — PAIN DESCRIPTION - FREQUENCY: FREQUENCY: CONTINUOUS

## 2020-11-05 ASSESSMENT — PAIN DESCRIPTION - ONSET: ONSET: ON-GOING

## 2020-11-05 ASSESSMENT — PAIN DESCRIPTION - ORIENTATION: ORIENTATION: RIGHT

## 2020-11-05 ASSESSMENT — PAIN DESCRIPTION - LOCATION: LOCATION: HIP

## 2020-11-05 NOTE — PROGRESS NOTES
Discharge criteria met. Patient alert and oriented x3  Post procedure dressing dry and intact. Sensory and motor function intact as per pre-procedure. Instructions and follow up reviewed with pt at patient at discharge. Patient discharged by wheelchair @ 9:45am. Driven home with Provider Amish, guiientyler is at the pt's home to take care of her today. Pt ambulates by wheelchair.

## 2020-11-05 NOTE — PLAN OF CARE
Dr. Miri Jara spoke with Jacquelin LEON regarding xray results and procedure. Patient to call for follow up and also to call her PCP office related to lower leg warmth and reddness.

## 2020-11-05 NOTE — PLAN OF CARE
Dr. Benita Goldsmith examines lower legs. Skin temperature probes applied and read 95 degrees. reddness and warmth noted. No fever.  Consent obtained

## 2020-11-05 NOTE — TELEPHONE ENCOUNTER
Yesterday in her VV you gave her a Miconazole powder but medx called its not covered with her insurance they would like to know if they can do Clotrimazole Cream in place of the powder

## 2020-11-05 NOTE — PROCEDURES
Pre-Procedure Note    Patient Name: Kirk Tolentino   YOB: 1960  Room/Bed: Room/bed info not found  Medical Record Number: [id number]  Date: 11/5/2020       Indication:    1. Primary osteoarthritis of right hip    2. Trochanteric bursitis, right hip        Consent: On file. Vital Signs:   Vitals:    11/05/20 0933   BP: (!) 140/78   Pulse: 79   Resp: 18   Temp:    SpO2: 96%       Past Medical History:   has a past medical history of Arthritis, CHF (congestive heart failure) ([facility] [address] COPD (chronic obstructive pulmonary disease) ([facility] [address] Diverticulitis, Hx of blood clots, and Pulmonary embolism ([facility] [address] Past Surgical History:   has a past surgical history that includes Ankle surgery; Colon surgery; and hernia repair. Pre-Sedation Documentation and Exam:   Vital signs have been reviewed (see flow sheet for vitals). Mallampati Airway Assessment:  Mallampati Class II - (soft palate, fauces & uvula are visible)    ASA Classification:  Class 4 - A patient with an incapacitating systemic disease that is a constant threat to life    Sedation/ Anesthesia Plan:   intravenous sedation   as needed. Medications Planned:   midazolam (Versed) / Fentanyl  Intravenously  as needed. Patient is an appropriate candidate for plan of sedation: yes    Patient's History and Physical examination was reviewed and there is no change. Electronically signed by Jacinto Michelle MD on 11/5/2020 at 9:39 AM      Preoperative Diagnosis:   1.  right hip joint osteoarthritis. 2.  right greater trochantric bursitis. Postoperative Diagnosis:   1. right hip joint osteoarthritis. 2. right greater trochantric bursitis. Procedure Performed:  1.  right hip joint injection. 2.  right greater trochantric bursa injection under fluroscopic guidance. Indication for the Procedure: The patient is complaining of pain in the right hip area of longstanding duration.   Patient's pain is not responding to conservative measures and is interfering with activities of daily living. Physical examination revealed tenderness over the joint and movements of the joint are painful. Antonio's test is positive with patient complaining of pain in the hip. As patient is not responding to conservative management and interfering with activities of daily living we decided to proceed with right hip injection. The procedure and risks were discussed with the patient and an informed consent was obtained. Current Pain Assessment  Pain Assessment  Pain Assessment: 0-10  Pain Level: 9  Patient's Stated Pain Goal: 1  Pain Type: Chronic pain  Pain Location: Back, Hip  Pain Orientation: Right  Pain Radiating Towards: right leg, right knee  Pain Descriptors: Aching, Sharp  Pain Frequency: Continuous  Pain Onset: On-going  Clinical Progression: Not changed  Functional Pain Assessment: Prevents or interferes some active activities and ADLs  Non-Pharmaceutical Pain Intervention(s): Rest   Procedure: The patient is placed in lateral tilt position with right hip upward. The skin over the hip joint was preped and draped in sterile manner. The skin and deep tissues over the greater trochanter were infilitrated with 3 ml of Naropin/ 2% lidocaine. The # 22-gauge, 5-1/2 / 3-1/2 inch long needle was insrted through the skin under fluroscopy such that the tip of the needle lies in the joint space. This was conformed by injecting 1 ml of Omnipaque and observing the spread of contrast in the joint space under fluoroscopy. Then after negative aspiration a total of 6 ml of 0.5% Naropin  with 12 mg of Celestone was injected. The needle was withdrawn and repositioned over the tip of greater trochanter. After negitive aspiration a total of 3 ml of 0.5% Naropin and 3 mg of Celestone was injected. Patient's vital signs remained stable and tolerated the procedure well.   Patient was discharged home in stable condition and will be followed in the pain clinic in the next few weeks for further planning.     Electronically signed by Carlos Hartman MD on 11/5/2020 at 9:39 AM

## 2020-11-05 NOTE — PROGRESS NOTES
Discharge criteria met. Patient alert and oriented x3  Post procedure dressing dry and intact. Sensory and motor function intact as per pre-procedure. Instructions and follow up reviewed with pt at patient at discharge. Patient discharged per wheelchair @ 9783.  Transportation - [facility]

## 2020-11-05 NOTE — PROGRESS NOTES
Patient assessment completed for procedure. Noted wheezing through out. Patient admits to smoking. Concern in bilateral legs/ Swelling 4-5+ Pedal, leg and ankle edema. Redness bilateral. Dr. Alexandria Anderson made aware as well as nurse carrying for this patient.

## 2020-11-06 RX ORDER — OXYCODONE HYDROCHLORIDE AND ACETAMINOPHEN 5; 325 MG/1; MG/1
1 TABLET ORAL EVERY 8 HOURS PRN
Qty: 90 TABLET | Refills: 0 | Status: SHIPPED | OUTPATIENT
Start: 2020-11-06 | End: 2020-12-06

## 2020-11-08 LAB
6-ACETYLMORPHINE, UR: NOT DETECTED
7-AMINOCLONAZEPAM, URINE: NOT DETECTED
ALPHA-OH-ALPRAZ, URINE: NOT DETECTED
ALPRAZOLAM, URINE: NOT DETECTED
AMPHETAMINES, URINE: NOT DETECTED
BARBITURATES, URINE: NOT DETECTED
BENZOYLECGONINE, UR: NOT DETECTED
BUPRENORPHINE URINE: PRESENT
CARISOPRODOL, UR: NOT DETECTED
CLONAZEPAM, URINE: NOT DETECTED
CODEINE, URINE: NOT DETECTED
CREATININE URINE: 156.9 MG/DL (ref 20–400)
DIAZEPAM, URINE: NOT DETECTED
DRUGS EXPECTED, UR: NORMAL
EER HI RES INTERP UR: NORMAL
ETHYL GLUCURONIDE UR: NOT DETECTED
FENTANYL URINE: NOT DETECTED
HYDROCODONE, URINE: NOT DETECTED
HYDROMORPHONE, URINE: NOT DETECTED
LORAZEPAM, URINE: NOT DETECTED
MARIJUANA METAB, UR: NOT DETECTED
MDA, UR: NOT DETECTED
MDEA, EVE, UR: NOT DETECTED
MDMA URINE: NOT DETECTED
MEPERIDINE METAB, UR: NOT DETECTED
METHADONE, URINE: NOT DETECTED
METHAMPHETAMINE, URINE: NOT DETECTED
METHYLPHENIDATE: NOT DETECTED
MIDAZOLAM, URINE: NOT DETECTED
MORPHINE URINE: NOT DETECTED
NORBUPRENORPHINE, URINE: PRESENT
NORDIAZEPAM, URINE: NOT DETECTED
NORFENTANYL, URINE: NOT DETECTED
NORHYDROCODONE, URINE: NOT DETECTED
NOROXYCODONE, URINE: PRESENT
NOROXYMORPHONE, URINE: NOT DETECTED
OXAZEPAM, URINE: NOT DETECTED
OXYCODONE URINE: PRESENT
OXYMORPHONE, URINE: NOT DETECTED
PAIN MANAGEMENT DRUG PANEL INTERP, URINE: NORMAL
PAIN MGT DRUG PANEL, HI RES, UR: NORMAL
PCP,URINE: NOT DETECTED
PHENTERMINE, UR: NOT DETECTED
PROPOXYPHENE, URINE: NOT DETECTED
TAPENTADOL, URINE: NOT DETECTED
TAPENTADOL-O-SULFATE, URINE: NOT DETECTED
TEMAZEPAM, URINE: NOT DETECTED
TRAMADOL, URINE: NOT DETECTED
ZOLPIDEM, URINE: NOT DETECTED

## 2020-11-09 ENCOUNTER — TELEPHONE (OUTPATIENT)
Dept: PAIN MANAGEMENT | Age: 60
End: 2020-11-09

## 2020-11-09 ENCOUNTER — CLINICAL DOCUMENTATION (OUTPATIENT)
Dept: PAIN MANAGEMENT | Age: 60
End: 2020-11-09

## 2020-11-19 ENCOUNTER — HOSPITAL ENCOUNTER (OUTPATIENT)
Dept: PAIN MANAGEMENT | Age: 60
Discharge: HOME | End: 2020-11-19
Payer: COMMERCIAL

## 2020-11-19 PROCEDURE — 99213 OFFICE O/P EST LOW 20 MIN: CPT

## 2020-11-19 PROCEDURE — 99213 OFFICE O/P EST LOW 20 MIN: CPT | Performed by: PAIN MEDICINE

## 2020-11-19 ASSESSMENT — ENCOUNTER SYMPTOMS
NAUSEA: 0
ALLERGIC/IMMUNOLOGIC NEGATIVE: 1
EYE PAIN: 0
CONSTIPATION: 0
PHOTOPHOBIA: 0
ABDOMINAL PAIN: 0
COUGH: 0
SHORTNESS OF BREATH: 0
VOMITING: 0

## 2020-11-19 NOTE — PROGRESS NOTES
Luz Marina Bruce is a 61 y.o. female evaluated on 11/19/2020. Modality of virtual service provided -via  telephone   Consent:  Patient and/or health care decision maker is aware that that patient may receive a bill for this telephone service, depending on one's insurance coverage, and has provided verbal consent to proceed: Yes    Patient identification was verified at the start of the visit: Yes    Chief complaint: Luz Marina Bruce is 61 y.o.,  female, with chief complaint of right hip pain. Referring Diagnosis   M16.11 (ICD-10-CM) - Primary osteoarthritis of right hip knee pain  M25.50, G89.29 (ICD-10-CM) - Chronic pain of multiple joints     Patient is complaining of pain involving the right hip and in the knee as well as several joints. She had undergone right hip injection on 10/5/2020 without any improvement in the pain she is still having considerable pain. Apparently she had 1 or 2 days of relief but the pain is better to its original extent. She has not seen her surgeon. She continues to smoke. Hip Pain    The incident occurred more than 1 week ago. There was no injury mechanism. The pain is present in the right hip and right knee. The quality of the pain is described as shooting. The pain is at a severity of 10/10. The pain is severe. The pain has been constant since onset. Associated symptoms include an inability to bear weight. The symptoms are aggravated by movement and weight bearing. Knee Pain    Incident onset: 2 months. There was no injury mechanism. The pain is present in the right knee. The quality of the pain is described as aching. The pain is at a severity of 8/10. The pain is severe. The pain has been intermittent since onset. Associated symptoms include an inability to bear weight. The symptoms are aggravated by movement and weight bearing.         Alleviating factors:nothing   Lifestyle changes experienced with pain: Wakes from sleep, Prevents or limits ADLs, Increases w/activity. Increases w/prolonged sitting/standing/walking  Mood changes,none  Patient currently unemployed. Physical therapy did not help the pain. Are you under psychological counseling at present: No  Goals for treatment include:  Decrease in pain  Enjoy daily and recreational activities, return to previous status. Last procedure was right hip joint injection 10/5/2020  Patient relates current medications are helping the pain. Patient reports taking pain medications as prescribed, denies obtaining medications from different sources and denies use of illegal drugs. Patient denies side effects from medications like nausea, vomiting, constipation or drowsiness. Patient reports current activities of daily living ar possible due to medications and would like to continue them. ACTIVITY/SOCIAL/EMOTIONAL:  Sleep Pattern: 4 hours per night.  difficulty falling asleep, nightime awakenings and difficulty falling back asleep if awakened  Home Exercises: daily aerobics  Activity:unchanged  Emotional Issues: normal .   Currently seeing a Psychiatrist or Psychologist:  No     ADVERSE MEDICATION EFFECTS:   Nausea and vomiting: no   Constipation: no-Undercontrol-: yes  Dizziness/drowsy/sleepy--no  Urinary Retention: no    ABERRANT BEHAVIORS SINCE LAST VISIT  Lost rx/pills:------------------------------------------ no  Taking  medication as prescribed: ----------- yes  Urine Drug Screen ---------------------------------  Yes-positive for buprenorphine which was not prescribed for her  Recent ER visits: -------------------------------------No  Pill count is appropriate: ---------------------------not applicable   Refills for prescriptions appropriate:---------- not applicable      Past Medical History:   Diagnosis Date    Arthritis     CHF [facility]congestive heart failure) ([facility]     [facility]     Diverticulitis     Hx of blood clots     Pulmonary embolism ([facility])        Past 2 % powder Apply topically 2 times daily. 45 g 1    bumetanide (BUMEX) 2 MG tablet Take 1 tablet by mouth daily 30 tablet 1    diclofenac sodium (VOLTAREN) 1 % GEL Apply 2 g topically 4 times daily as needed for Pain 1 Tube 3    metoprolol tartrate (LOPRESSOR) 25 MG tablet Take 1 tablet by mouth 2 times daily 60 tablet 3    lisinopril (PRINIVIL;ZESTRIL) 10 MG tablet Take 1 tablet by mouth daily 30 tablet 3    albuterol sulfate  (90 Base) MCG/ACT inhaler Inhale 2 puffs into the lungs every 6 hours as needed for Wheezing       No current facility-administered medications on file prior to encounter. Review of Systems   Constitutional: Negative for activity change, chills, fatigue and unexpected weight change. HENT: Negative for dental problem, ear pain, mouth sores and sneezing. Eyes: Negative for photophobia, pain and visual disturbance. Respiratory: Negative for cough and shortness of breath. History of COPD on oxygen by nasal cannula   Cardiovascular: Negative for chest pain and palpitations. History of congestive heart failure   Gastrointestinal: Negative for abdominal pain, constipation, nausea and vomiting. Endocrine: Negative. Negative for cold intolerance and polyuria. Genitourinary: Negative for dysuria and hematuria. Musculoskeletal: Positive for arthralgias and gait problem. Skin: Negative for wound. Allergic/Immunologic: Negative. Neurological: Negative for tremors, seizures and headaches. Hematological: Does not bruise/bleed easily. Psychiatric/Behavioral: Negative for self-injury, sleep disturbance and suicidal ideas. The patient is nervous/anxious.          Physical Exam       DATA:  LAB.:  11/8/2020  4:53 PM - Swathi Mccormack Incoming Lab Results From [facility]     Component  Value  Ref Range & Units  Status  Collected  Lab    Pain Management Drug Panel Interp, Urine  Inconsistent   Final  11/05/2020  8:56 AM  [facility]    (NOTE) ________________________________________________________________   DRUGS EXPECTED:   PERCOCET (OXYCODONE) [11/4/20]   ________________________________________________________________   CONSISTENT with medications provided:   PERCOCET (OXYCODONE) : based on oxycodone, noroxycodone   ________________________________________________________________   INCONSISTENT with medications provided:   Norbuprenorphine   Buprenorphine   ________________________________________________________________   Drugs Not Included in this Assay:   Acetaminophen        X-Ray reports:  EXAMINATION:    TWO XRAY VIEWS OF THE RIGHT HIP         11/18/2019 9:06 am         COMPARISON:    None.         HISTORY:    ORDERING SYSTEM PROVIDED HISTORY: Chronic pain of right hip    TECHNOLOGIST PROVIDED HISTORY:    Weight bearing    acute on chronic right hip pain, past two weeks, no known injury    Reason for Exam: patient states right hip pain x3 weeks    Acuity: Acute    Type of Exam: Initial         FINDINGS:    AP and frog-leg lateral views of the hip are submitted for review.  Bony    mineralization is normal.  There is no evidence for acute fracture or    dislocation.  Moderate osteoarthrosis of the acetabular joint with loss of    joint space height.  No aggressive mass lesion or periostitis identified. Overlying soft tissues are unremarkable.  Osteoarthrosis of the sacroiliac    joint.              Impression    Moderate osteoarthrosis of the femoroacetabular joint.  No acute osseus    abnormality.             Clinical  impression:  1. Primary osteoarthritis of right hip    2. Trochanteric bursitis, right hip    3. Tobacco abuse        Plan of care:  Patient is advised to follow-up with surgeon    PDMP Monitoring:    Last PDMP Jessica Jacobo as Reviewed [facility]):  Review User Review Instant Review Result   Valeda Opitz 11/19/2020  5:38 AM Reviewed PDMP [1]     Patient is counseled to quit smoking.    I discussed the findings of the x-ray with the patient again. She practically does not have any head of the femur. This was explained to the patient and she is advised to get the surgeons input for further management. As far as the medications are concerned her urine showed presence of buprenorphine on which is not prescribed for her and so I am not comfortable giving her medications and this was explained to the patient and she is advised to follow-up with her physician. Decision Making Process : Patient's health history and referral records thoroughly reviewed before focused physical examination and discussion with patient. I have spent 20 mins. Over 50% of today's visit is spent on examining the patient and counseling and coordinating the care. Level of complexity of date to be reviewed is Moderate. The chart date reviewed include the following: Imaging Reports. Summary of Care. Time spent reviewing with patient the below reports:   Medication safety, Treatment options. Level of diagnosis and management options of this case is multiple: involving the following management options: Interventions as needed, medication management as appropriate, future visits, activity modification, heat/ice as needed, Urine drug screen as required. [x]The patient's questions were answered to the best of my abilities. This note was created using voice recognition software. There may be inaccuracies of transcription  that are inadvertently overlooked prior to the signature. There is any questions about the transcription please contact me. Due to the COVID-19 pandemic and the appropriate interventions by Skyla Powers, our non-urgent pain management patients will not be seen in the office at this time for their protection and the protection of our staff.  To offer continuity of care, their prescriptions will be escribed this month after a careful chart review and review of their OARRS report  Pursuant to the emergency declaration under the [facility] [address] waiver authority and the [employer] Act, this Virtual Visit was conducted, with patient's consent, to reduce the patient's risk of exposure to COVID-19 and provide continuity of care for an established patient. Services were provided through a video synchronous discussion virtually to substitute for in-person appointment. \"  Documentation:  I communicated with the patient and/or health care decision maker about plan of care  Details of this discussion including any medical advice provided: Total Time: minutes: 11-20 minutes    I affirm this is a Patient Initiated Episode with an Established Patient who has not had a related appointment within my department in the past 7 days or scheduled within the next 24 hours.     Electronically signed by Wesly Peace MD on 11/19/2020 at 5:39 AM

## 2020-12-03 ENCOUNTER — CARE COORDINATION (OUTPATIENT)
Dept: CARE COORDINATION | Age: 60
End: 2020-12-03

## 2020-12-03 NOTE — CARE COORDINATION
Ambulatory Care Coordination Note  12/3/2020  CM Risk Score: 2  Charlson 10 Year Mortality Risk Score: 47%     ACC: Virgil Irby, RN    Summary Note: Spoke with patient who stated she is having some depression. She is concerned that she will not be able to get any help with her pain. ACM asked if she wanted an appointment with  Her PCP. Per patient she does nit want to leave her home. She agreed to a VV. PCP office will call her to schedule. Patient denied any additional needs. Precautions reviewed for COVID-19 per CDC  Wash hands often with soap and water for at least 20 seconds  When soap is not available us hand  with at least 70% alcohol  Avoid touching your face  Avoid close contact with others  Maintain 6 feet distance if possible    If you are sick:  Cover mouth and nose when coughing or sneezing and then wash hands  Wear a mask when around others  Clean and disinfect surfaces often with soap or detergent and water. Care Coordination Interventions    Program Enrollment:  Complex Care  Referral from Primary Care Provider:  Yes  Suggested Interventions and [address] [place] [address] Hwy:  Completed (Comment: travis)  Physical Therapy:  Completed  Social Work:  Completed  Other Therapy Services:  Completed (Comment: OT miguel)         Goals Addressed                 This Visit's Progress     Conditions and Symptoms   On track     I will schedule office visits, as directed by my provider. I will keep my appointment or reschedule if I have to cancel. I will notify my provider of any barriers to my plan of care. I will follow my Zone Management tool to seek urgent or emergent care. I will notify my provider of any symptoms that indicate a worsening of my condition.     Barriers: impairment:  physical: limited mobility and overwhelmed by complexity of regimen  Plan for overcoming my barriers: work with CC team and home health  Confidence: 9/10  Anticipated Goal Completion Date: 10.22.20              Prior to Admission medications    Medication Sig Start Date End Date Taking? Authorizing Provider   oxyCODONE-acetaminophen (PERCOCET) 5-325 MG per tablet Take 1 tablet by mouth every 8 hours as needed for Pain for up to 30 days. Take lowest dose possible to manage pain 11/6/20 12/6/20  Reshma Resendez MD   PARoxetine (PAXIL) 20 MG tablet Take 1 tablet by mouth 2 times daily 11/4/20   Lucy Cormier PA-C   miconazole (MICOTIN) 2 % powder Apply topically 2 times daily.  11/4/20   Lucy Cormier PA-C   bumetanide (BUMEX) 2 MG tablet Take 1 tablet by mouth daily 7/3/20   Liam Riggins MD   diclofenac sodium (VOLTAREN) 1 % GEL Apply 2 g topically 4 times daily as needed for Pain 5/27/20   Liam Riggins MD   metoprolol tartrate (LOPRESSOR) 25 MG tablet Take 1 tablet by mouth 2 times daily 4/21/20   Lucy Cormier PA-C   lisinopril (PRINIVIL;ZESTRIL) 10 MG tablet Take 1 tablet by mouth daily 4/21/20   Lucy Cormier PA-C   albuterol sulfate  (90 Base) MCG/ACT inhaler Inhale 2 puffs into the lungs every 6 hours as needed for Wheezing    Historical Provider, MD       Future Appointments   Date Time Provider Pearl Hilton   12/16/2020 10:30 AM [facility]ing[facility]     ,   Congestive Heart Failure Assessment    Are you currently restricting fluids?:  Other  Do you understand a low sodium diet?:  Yes  Do you understand how to read food labels?:  Yes  Do you salt your food before tasting it?:  No     No patient-reported symptoms      Symptoms:      Symptom course:  stable  Salt intake watch compared to last visit:  stable     ,   COPD Assessment    Does the patient understand envrionmental exposure?:  Yes  Is the patient able to verbalize Rescue vs. Long Acting medications?:  Yes     No patient-reported symptoms         Symptoms:          and   General Assessment    Do you have any symptoms that are causing concern?:  Yes  Progression since Onset: Gradually Worsening  Reported Symptoms:  Pain (Comment: depression)

## 2020-12-16 ENCOUNTER — VIRTUAL VISIT (OUTPATIENT)
Dept: INTERNAL MEDICINE CLINIC | Age: 60
End: 2020-12-16

## 2020-12-16 RX ORDER — LISINOPRIL 10 MG/1
TABLET ORAL
Qty: 30 TABLET | Refills: 0 | Status: SHIPPED | OUTPATIENT
Start: 2020-12-16 | End: 2021-01-11

## 2020-12-16 RX ORDER — OXYCODONE HYDROCHLORIDE AND ACETAMINOPHEN 5; 325 MG/1; MG/1
1 TABLET ORAL EVERY 6 HOURS PRN
Qty: 28 TABLET | Refills: 0 | Status: SHIPPED | OUTPATIENT
Start: 2020-12-16 | End: 2020-12-23 | Stop reason: SDUPTHER

## 2020-12-16 NOTE — PROGRESS NOTES
Visit Information    Have you changed or started any medications since your last visit including any over-the-counter medicines, vitamins, or herbal medicines? no   Are you having any side effects from any of your medications? -  no  Have you stopped taking any of your medications? Is so, why? -  no    Have you seen any other physician or provider since your last visit? No  Have you had any other diagnostic tests since your last visit? Yes - Records Obtained  Have you been seen in the emergency room and/or had an admission to a hospital since we last saw you? No  Have you had your routine dental cleaning in the past 6 months? no    Have you activated your [employer] account? If not, what are your barriers?  No     Patient Care Team:  Dyllan Munoz PA-C as PCP - General (Physician Assistant)  Dyllan Munoz PA-C as PCP - [facility]  Hoda James RN as Ambulatory Care Manager    Medical History Review  Past Medical, Family, and Social History reviewed and does not contribute to the patient presenting condition    Health Maintenance   Topic Date Due    Hepatitis C screen  1960    Pneumococcal 0-64 years Vaccine (1 of 1 - PPSV23) 02/24/1966    HIV screen  02/24/1975    Cervical cancer screen  02/24/1981    Breast cancer screen  02/24/2010    Shingles Vaccine (1 of 2) 02/24/2010    Colon cancer screen colonoscopy  02/24/2010    Flu vaccine (1) 09/01/2020    A1C test (Diabetic or Prediabetic)  05/25/2021    Potassium monitoring  07/03/2021    Creatinine monitoring  07/03/2021    Lipid screen  10/29/2024    DTaP/Tdap/Td vaccine (2 - Td) 12/30/2027    Hepatitis A vaccine  Aged Out    Hepatitis B vaccine  Aged Out    Hib vaccine  Aged Out    Meningococcal (ACWY) vaccine  Aged Out         [address] [address]  Dept: [phone]  Dept Fax: [phone]    Virtual Visit Progress Note  Date of patient's visit: 12/16/2020 Patient's Name:  Bubba Zhao YOB: 1960            Patient Care Team:  Warden Zac PA-C as PCP - General (Physician Assistant)  Warden Zac PA-C [facility]  Zhanna Young RN as Ambulatory Care Manager    REASON FOR VISIT:  Same day visit    HISTORY OF PRESENT ILLNESS:      Chief Complaint   Patient presents with    Follow-up     6 week follow up       History was obtained from the patient. Bubba Zhao is a 61 y.o. female here today virtually for 6 week follow up. Patient is reporting diffuse arthralgias. Pain in right hip, bilateral feet (left is worse than right). Patient reports bilateral leg swelling   Patient reports decreased ROM due to pain. Unable to keep feet elevated. Patient states she does not have compression stockings. Patient states she is a patient of Dr. Delaney Ba, however was asked not to return due to taking friends pain medication for arthritis pain. Patient Active Problem List   Diagnosis    Chronic heart failure with preserved ejection fraction (HCC)    Chronic obstructive pulmonary disease (HCC)    Tobacco abuse    Class 3 severe obesity with serious comorbidity and body mass index (BMI) of 50.0 to 59.9 in adult [facility])    Essential hypertension    History of pulmonary embolism    Family history of colon cancer in mother    Prediabetes    Class 4 congestive heart failure, acute on chronic, systolic (HCC)    Hypokalemia    Cellulitis    Primary osteoarthritis of right hip    Chronic pain of multiple joints       MEDICATIONS:      Current Outpatient Medications   Medication Sig Dispense Refill    PARoxetine (PAXIL) 20 MG tablet Take 1 tablet by mouth 2 times daily 60 tablet 1    miconazole (MICOTIN) 2 % powder Apply topically 2 times daily.  45 g 1    bumetanide (BUMEX) 2 MG tablet Take 1 tablet by mouth daily 30 tablet 1    diclofenac sodium (VOLTAREN) 1 % GEL Apply 2 g topically 4 times daily as needed for Pain 1 Tube 3  metoprolol tartrate (LOPRESSOR) 25 MG tablet Take 1 tablet by mouth 2 times daily 60 tablet 3    lisinopril (PRINIVIL;ZESTRIL) 10 MG tablet Take 1 tablet by mouth daily 30 tablet 3    albuterol sulfate  (90 Base) MCG/ACT inhaler Inhale 2 puffs into the lungs every 6 hours as needed for Wheezing       No current facility-administered medications for this visit. ALLERGIES:    No Known Allergies    SOCIAL HISTORY   Reviewed and no change from previous record. Brodie Mann  reports that she has been smoking. She has a 22.50 pack-year smoking history. She has never used smokeless tobacco.    FAMILY HISTORY:    Reviewed and No change from previous visit    REVIEW OF SYSTEMS:    Review of Systems     PHYSICAL EXAM:    There were no vitals filed for this visit. Exam was limited due to virtual encounter. General - alert, well appearing, non toxic, in no distress  Skin - normal coloration and turgor, no rash  Eyes - sclera appear clear, no conjunctival injection, no discharge  Ears - no pain with manipulation of tragus or auricle, no drainage, no mastoid tenderness, hearing normal   Nose - normal and patent, no erythema, discharge  Mouth - mucous membranes are moist  Neck - supple, no masses appreciated  Chest - respirations are unlabored, no tachypnea or signs or respiratory distress  Abdomen - soft, nontender, nondistended  Neurological - alert, oriented x 3, normal speech  Extremities - no pedal edema    ASSESSMENT AND PLAN:      There are no diagnoses linked to this encounter. FOLLOW UP AND INSTRUCTIONS:   No follow-ups on file. Discussed use, benefit, and side effects of prescribed medications. All patient questions answered. Patient voiced understanding.      Patient given educational materials - see patient instructions Patient being evaluated by a Virtual Visit (video visit) encounter to address concerns as mentioned above. A caregiver was present when appropriate. Due to this being a TeleHealth encounter (During KII-31 public health emergency), evaluation of the following organ systems was limited: Vitals/Constitutional/EENT/Resp/CV/GI//MS/Neuro/Skin/Heme-Lymph-Imm. Pursuant to the emergency declaration under the [address] [place] [address], [address] [place] [address] and the [employer] Act, this Virtual Visit was conducted with patient's (and/or legal guardian's) consent, to reduce the patient's risk of exposure to COVID-19 and provide necessary medical care. The patient (and/or legal guardian) has also been advised to contact this office for worsening conditions or problems, and seek emergency medical treatment and/or call 911 if deemed necessary. Services were provided through a video synchronous discussion virtually to substitute for in-person clinic visit. Patient and provider were located at their individual homes. KIRK Moreno - CNP   [facility]  12/16/2020, 2:08 PM    Please note that this chart wasgenerated using voice recognition Dragon dictation software. Although every effort was made to ensure the accuracy of this automated transcription, some errors in transcription may have occurred.

## 2020-12-17 RX ORDER — PAROXETINE HYDROCHLORIDE 20 MG/1
20 TABLET, FILM COATED ORAL 2 TIMES DAILY
Qty: 60 TABLET | Refills: 1 | Status: ON HOLD | OUTPATIENT
Start: 2020-12-17 | End: 2021-05-28 | Stop reason: HOSPADM

## 2020-12-17 NOTE — TELEPHONE ENCOUNTER
Medication:Bumetanide 1MG  Last visit: 12/16/2020  Next visit: Visit date not found  Last refill: 9/21/2020  Pharmacy:[facility]

## 2020-12-23 ENCOUNTER — CARE COORDINATION (OUTPATIENT)
Dept: CARE COORDINATION | Age: 60
End: 2020-12-23

## 2020-12-23 RX ORDER — OXYCODONE HYDROCHLORIDE AND ACETAMINOPHEN 5; 325 MG/1; MG/1
1 TABLET ORAL EVERY 6 HOURS PRN
Qty: 28 TABLET | Refills: 0 | Status: SHIPPED | OUTPATIENT
Start: 2020-12-23 | End: 2020-12-30 | Stop reason: SDUPTHER

## 2020-12-23 NOTE — CARE COORDINATION
Patient called Requesting refill of pain medication   Pain Management will see her again but they are  out of the office until until 1.3.20, appointment will be end of January but she needs another referral.  Needs refill percocet. [facility] [address] [facility]. Will route to PCP.

## 2020-12-23 NOTE — TELEPHONE ENCOUNTER
Patient called Requesting refill of pain medication   Pain Management will see her again but they are  out of the office until until 1.3.20, appointment will be end of January but she needs another referral.  Needs refill percocet. [facility] [address] [facility].   RX pending, referral pending your review and approval or denial.

## 2020-12-24 RX ORDER — OXYCODONE HYDROCHLORIDE AND ACETAMINOPHEN 5; 325 MG/1; MG/1
1 TABLET ORAL EVERY 6 HOURS PRN
Qty: 28 TABLET | Refills: 0 | Status: CANCELLED | OUTPATIENT
Start: 2020-12-24 | End: 2020-12-31

## 2020-12-24 NOTE — TELEPHONE ENCOUNTER
Pt informed written script ready for p/u. Pt stated that her boyfriend, Genna Brucekevin Schmidt will be picking up her script.

## 2020-12-24 NOTE — TELEPHONE ENCOUNTER
Kathy Burch called today she would like us to know that [facility] closes at 2 pm today.     She doesn't want to have to wait till Monday for it

## 2020-12-28 ENCOUNTER — CARE COORDINATION (OUTPATIENT)
Dept: CARE COORDINATION | Age: 60
End: 2020-12-28

## 2020-12-28 NOTE — CARE COORDINATION
Attempting to reach patient for a follow up care coordination call regarding any needs, questions or concerns. Binh Hess, [facility]  Referral placed for pain management per PCP.

## 2020-12-29 ENCOUNTER — TELEPHONE (OUTPATIENT)
Dept: PAIN MANAGEMENT | Age: 60
End: 2020-12-29

## 2020-12-29 ENCOUNTER — CARE COORDINATION (OUTPATIENT)
Dept: CARE COORDINATION | Age: 60
End: 2020-12-29

## 2020-12-29 NOTE — TELEPHONE ENCOUNTER
Patient called stating she is scheduled with pain management for next week, they will not be prescribing pain medications. Patient is requesting a refill, it is pending your approval.   She is calling to get in with ortho at Pain managements request as well. Please advise.

## 2020-12-30 RX ORDER — OXYCODONE HYDROCHLORIDE AND ACETAMINOPHEN 5; 325 MG/1; MG/1
1 TABLET ORAL EVERY 6 HOURS PRN
Qty: 28 TABLET | Refills: 0 | Status: SHIPPED | OUTPATIENT
Start: 2020-12-30 | End: 2021-01-06 | Stop reason: SDUPTHER

## 2021-01-05 ENCOUNTER — CARE COORDINATION (OUTPATIENT)
Dept: CARE COORDINATION | Age: 61
End: 2021-01-05

## 2021-01-05 NOTE — CARE COORDINATION
Patient called asking for a refill of pain medication. ACM explained it will not be due until 1.6.20 and ACM will route it to PCP on that date. Patient understood and stated that she has enough until then.

## 2021-01-06 ENCOUNTER — CARE COORDINATION (OUTPATIENT)
Dept: CARE COORDINATION | Age: 61
End: 2021-01-06

## 2021-01-06 DIAGNOSIS — M19.90 ARTHRITIS: ICD-10-CM

## 2021-01-06 RX ORDER — OXYCODONE HYDROCHLORIDE AND ACETAMINOPHEN 5; 325 MG/1; MG/1
1 TABLET ORAL EVERY 6 HOURS PRN
Qty: 28 TABLET | Refills: 0 | Status: SHIPPED | OUTPATIENT
Start: 2021-01-06 | End: 2021-01-12 | Stop reason: SDUPTHER

## 2021-01-06 NOTE — TELEPHONE ENCOUNTER
Tried to call patient and voicemail is full. . Her prescription for pain med is printed out and needs to be picked up in the office.

## 2021-01-06 NOTE — CARE COORDINATION
Patient called ACM to make sure RX request was sent to PCP. ACM explained it has been sent to her pharmacy.

## 2021-01-06 NOTE — TELEPHONE ENCOUNTER
Patient requesting refill of pain medication, waiting on appointments next week with pain management and ortho. Refill pending your review and approval or denial.  She also requested a refill of Voltaren, this RX is not able to be reordered it will need a new RX.

## 2021-01-09 DIAGNOSIS — I50.32 CHRONIC HEART FAILURE WITH PRESERVED EJECTION FRACTION (HCC): ICD-10-CM

## 2021-01-11 RX ORDER — LISINOPRIL 10 MG/1
TABLET ORAL
Qty: 30 TABLET | Refills: 0 | Status: ON HOLD | OUTPATIENT
Start: 2021-01-11 | End: 2021-01-22 | Stop reason: HOSPADM

## 2021-01-12 ENCOUNTER — CARE COORDINATION (OUTPATIENT)
Dept: CARE COORDINATION | Age: 61
End: 2021-01-12

## 2021-01-12 DIAGNOSIS — M19.90 ARTHRITIS: ICD-10-CM

## 2021-01-12 RX ORDER — OXYCODONE HYDROCHLORIDE AND ACETAMINOPHEN 5; 325 MG/1; MG/1
1 TABLET ORAL EVERY 6 HOURS PRN
Qty: 12 TABLET | Refills: 0 | Status: SHIPPED | OUTPATIENT
Start: 2021-01-12 | End: 2021-01-15

## 2021-01-12 NOTE — CARE COORDINATION
Patient called ACM reporting increased swelling in her legs and feet, described pitting edema. She denied increased SOB. ACM recommended an appointment with PCP, she stated that she can not go into the office. ACM explained that they could schedule her a VV. Patient agreed to call and schedule. She stated she cancelled her ortho appointment due to personal issues. Patient requested a refill of pain medication, ACM suggested she address this at her appointment. If appointment is not scheduled soon enough, request could be routed to PCP. Patient agreed.

## 2021-01-12 NOTE — TELEPHONE ENCOUNTER
Patient requesting refill of pain medication. Pain management appt on 1.14.21.   RX pending your approval

## 2021-01-13 ENCOUNTER — TELEPHONE (OUTPATIENT)
Dept: INTERNAL MEDICINE CLINIC | Age: 61
End: 2021-01-13

## 2021-01-13 NOTE — CARE COORDINATION
Patient notified of RX at office and encouraged to keep her appointment with pain management on 1.14.21.

## 2021-01-19 ENCOUNTER — APPOINTMENT (OUTPATIENT)
Dept: GENERAL RADIOLOGY | Age: 61
DRG: 317 | End: 2021-01-19
Payer: COMMERCIAL

## 2021-01-19 ENCOUNTER — HOSPITAL ENCOUNTER (INPATIENT)
Age: 61
LOS: 4 days | Discharge: NURSING, REHAB OR LONG-TERM CARE | DRG: 317 | End: 2021-01-23
Attending: EMERGENCY MEDICINE | Admitting: INTERNAL MEDICINE
Payer: COMMERCIAL

## 2021-01-19 ENCOUNTER — TELEMEDICINE (OUTPATIENT)
Dept: INTERNAL MEDICINE CLINIC | Age: 61
End: 2021-01-19
Payer: COMMERCIAL

## 2021-01-19 DIAGNOSIS — L03.115 CELLULITIS OF RIGHT LEG: Primary | ICD-10-CM

## 2021-01-19 DIAGNOSIS — I50.32 CHRONIC HEART FAILURE WITH PRESERVED EJECTION FRACTION (HCC): Primary | ICD-10-CM

## 2021-01-19 DIAGNOSIS — I10 ESSENTIAL HYPERTENSION: ICD-10-CM

## 2021-01-19 DIAGNOSIS — I50.23: ICD-10-CM

## 2021-01-19 DIAGNOSIS — J44.9 CHRONIC OBSTRUCTIVE PULMONARY DISEASE, UNSPECIFIED COPD TYPE (HCC): ICD-10-CM

## 2021-01-19 DIAGNOSIS — L97.521 ULCER OF LEFT FOOT, LIMITED TO BREAKDOWN OF SKIN (HCC): ICD-10-CM

## 2021-01-19 DIAGNOSIS — L03.119 CELLULITIS OF LOWER EXTREMITY, UNSPECIFIED LATERALITY: ICD-10-CM

## 2021-01-19 DIAGNOSIS — E66.01 CLASS 3 SEVERE OBESITY DUE TO EXCESS CALORIES WITH SERIOUS COMORBIDITY AND BODY MASS INDEX (BMI) OF 50.0 TO 59.9 IN ADULT (HCC): ICD-10-CM

## 2021-01-19 PROBLEM — F32.A DEPRESSION: Status: ACTIVE | Noted: 2021-01-19

## 2021-01-19 LAB
ABSOLUTE EOS #: 0.2 K/UL (ref 0–0.4)
ABSOLUTE IMMATURE GRANULOCYTE: ABNORMAL K/UL (ref 0–0.3)
ABSOLUTE LYMPH #: 1.5 K/UL (ref 1–4.8)
ABSOLUTE MONO #: 0.9 K/UL (ref 0.1–1.3)
ANION GAP SERPL CALCULATED.3IONS-SCNC: 8 MMOL/L (ref 9–17)
BASOPHILS # BLD: 1 % (ref 0–2)
BASOPHILS ABSOLUTE: 0.1 K/UL (ref 0–0.2)
BUN BLDV-MCNC: 16 MG/DL (ref 8–23)
BUN/CREAT BLD: ABNORMAL (ref 9–20)
C-REACTIVE PROTEIN: 45.6 MG/L (ref 0–5)
CALCIUM SERPL-MCNC: 9.2 MG/DL (ref 8.6–10.4)
CHLORIDE BLD-SCNC: 97 MMOL/L (ref 98–107)
CO2: 28 MMOL/L (ref 20–31)
CREAT SERPL-MCNC: 0.96 MG/DL (ref 0.5–0.9)
DIFFERENTIAL TYPE: ABNORMAL
EOSINOPHILS RELATIVE PERCENT: 2 % (ref 0–4)
GFR AFRICAN AMERICAN: >60 ML/MIN
GFR NON-AFRICAN AMERICAN: 59 ML/MIN
GFR SERPL CREATININE-BSD FRML MDRD: ABNORMAL ML/MIN/{1.73_M2}
GFR SERPL CREATININE-BSD FRML MDRD: ABNORMAL ML/MIN/{1.73_M2}
GLUCOSE BLD-MCNC: 110 MG/DL (ref 70–99)
GLUCOSE BLD-MCNC: 113 MG/DL (ref 65–105)
HCT VFR BLD CALC: 36.8 % (ref 36–46)
HEMOGLOBIN: 12.2 G/DL (ref 12–16)
IMMATURE GRANULOCYTES: ABNORMAL %
LACTIC ACID, WHOLE BLOOD: NORMAL MMOL/L (ref 0.7–2.1)
LACTIC ACID: 1.1 MMOL/L (ref 0.5–2.2)
LYMPHOCYTES # BLD: 17 % (ref 24–44)
MCH RBC QN AUTO: 31.4 PG (ref 26–34)
MCHC RBC AUTO-ENTMCNC: 33 G/DL (ref 31–37)
MCV RBC AUTO: 95.2 FL (ref 80–100)
MONOCYTES # BLD: 10 % (ref 1–7)
NRBC AUTOMATED: ABNORMAL PER 100 WBC
PDW BLD-RTO: 14 % (ref 11.5–14.9)
PLATELET # BLD: 504 K/UL (ref 150–450)
PLATELET ESTIMATE: ABNORMAL
PMV BLD AUTO: 7.4 FL (ref 6–12)
POTASSIUM SERPL-SCNC: 4.2 MMOL/L (ref 3.7–5.3)
RBC # BLD: 3.87 M/UL (ref 4–5.2)
RBC # BLD: ABNORMAL 10*6/UL
SEDIMENTATION RATE, ERYTHROCYTE: 71 MM (ref 0–30)
SEG NEUTROPHILS: 70 % (ref 36–66)
SEGMENTED NEUTROPHILS ABSOLUTE COUNT: 6 K/UL (ref 1.3–9.1)
SODIUM BLD-SCNC: 133 MMOL/L (ref 135–144)
WBC # BLD: 8.6 K/UL (ref 3.5–11)
WBC # BLD: ABNORMAL 10*3/UL

## 2021-01-19 PROCEDURE — 6360000002 HC RX W HCPCS: Performed by: STUDENT IN AN ORGANIZED HEALTH CARE EDUCATION/TRAINING PROGRAM

## 2021-01-19 PROCEDURE — 6370000000 HC RX 637 (ALT 250 FOR IP): Performed by: STUDENT IN AN ORGANIZED HEALTH CARE EDUCATION/TRAINING PROGRAM

## 2021-01-19 PROCEDURE — 73590 X-RAY EXAM OF LOWER LEG: CPT

## 2021-01-19 PROCEDURE — 1200000000 HC SEMI PRIVATE

## 2021-01-19 PROCEDURE — G8484 FLU IMMUNIZE NO ADMIN: HCPCS | Performed by: INTERNAL MEDICINE

## 2021-01-19 PROCEDURE — 82947 ASSAY GLUCOSE BLOOD QUANT: CPT

## 2021-01-19 PROCEDURE — 83605 ASSAY OF LACTIC ACID: CPT

## 2021-01-19 PROCEDURE — 85652 RBC SED RATE AUTOMATED: CPT

## 2021-01-19 PROCEDURE — 87070 CULTURE OTHR SPECIMN AEROBIC: CPT

## 2021-01-19 PROCEDURE — 99285 EMERGENCY DEPT VISIT HI MDM: CPT

## 2021-01-19 PROCEDURE — 4004F PT TOBACCO SCREEN RCVD TLK: CPT | Performed by: INTERNAL MEDICINE

## 2021-01-19 PROCEDURE — 87185 SC STD ENZYME DETCJ PER NZM: CPT

## 2021-01-19 PROCEDURE — 71045 X-RAY EXAM CHEST 1 VIEW: CPT

## 2021-01-19 PROCEDURE — 80048 BASIC METABOLIC PNL TOTAL CA: CPT

## 2021-01-19 PROCEDURE — 87186 SC STD MICRODIL/AGAR DIL: CPT

## 2021-01-19 PROCEDURE — 6360000002 HC RX W HCPCS: Performed by: EMERGENCY MEDICINE

## 2021-01-19 PROCEDURE — 87205 SMEAR GRAM STAIN: CPT

## 2021-01-19 PROCEDURE — 96365 THER/PROPH/DIAG IV INF INIT: CPT

## 2021-01-19 PROCEDURE — 87040 BLOOD CULTURE FOR BACTERIA: CPT

## 2021-01-19 PROCEDURE — 36415 COLL VENOUS BLD VENIPUNCTURE: CPT

## 2021-01-19 PROCEDURE — 87075 CULTR BACTERIA EXCEPT BLOOD: CPT

## 2021-01-19 PROCEDURE — 85025 COMPLETE CBC W/AUTO DIFF WBC: CPT

## 2021-01-19 PROCEDURE — 2580000003 HC RX 258: Performed by: EMERGENCY MEDICINE

## 2021-01-19 PROCEDURE — 3017F COLORECTAL CA SCREEN DOC REV: CPT | Performed by: INTERNAL MEDICINE

## 2021-01-19 PROCEDURE — 96375 TX/PRO/DX INJ NEW DRUG ADDON: CPT

## 2021-01-19 PROCEDURE — 86140 C-REACTIVE PROTEIN: CPT

## 2021-01-19 PROCEDURE — 99213 OFFICE O/P EST LOW 20 MIN: CPT | Performed by: INTERNAL MEDICINE

## 2021-01-19 PROCEDURE — G8417 CALC BMI ABV UP PARAM F/U: HCPCS | Performed by: INTERNAL MEDICINE

## 2021-01-19 PROCEDURE — 86403 PARTICLE AGGLUT ANTBDY SCRN: CPT

## 2021-01-19 PROCEDURE — 87076 CULTURE ANAEROBE IDENT EACH: CPT

## 2021-01-19 PROCEDURE — 87077 CULTURE AEROBIC IDENTIFY: CPT

## 2021-01-19 PROCEDURE — 73630 X-RAY EXAM OF FOOT: CPT

## 2021-01-19 RX ORDER — ONDANSETRON 2 MG/ML
4 INJECTION INTRAMUSCULAR; INTRAVENOUS EVERY 6 HOURS PRN
Status: DISCONTINUED | OUTPATIENT
Start: 2021-01-19 | End: 2021-01-21

## 2021-01-19 RX ORDER — 0.9 % SODIUM CHLORIDE 0.9 %
1000 INTRAVENOUS SOLUTION INTRAVENOUS ONCE
Status: COMPLETED | OUTPATIENT
Start: 2021-01-19 | End: 2021-01-19

## 2021-01-19 RX ORDER — ALBUTEROL SULFATE 90 UG/1
2 AEROSOL, METERED RESPIRATORY (INHALATION) EVERY 6 HOURS PRN
Status: DISCONTINUED | OUTPATIENT
Start: 2021-01-19 | End: 2021-01-19 | Stop reason: CLARIF

## 2021-01-19 RX ORDER — ACETAMINOPHEN 325 MG/1
650 TABLET ORAL EVERY 6 HOURS PRN
Status: DISCONTINUED | OUTPATIENT
Start: 2021-01-19 | End: 2021-01-23 | Stop reason: HOSPADM

## 2021-01-19 RX ORDER — MORPHINE SULFATE 4 MG/ML
4 INJECTION, SOLUTION INTRAMUSCULAR; INTRAVENOUS ONCE
Status: COMPLETED | OUTPATIENT
Start: 2021-01-19 | End: 2021-01-19

## 2021-01-19 RX ORDER — POLYETHYLENE GLYCOL 3350 17 G/17G
17 POWDER, FOR SOLUTION ORAL DAILY PRN
Status: DISCONTINUED | OUTPATIENT
Start: 2021-01-19 | End: 2021-01-23 | Stop reason: HOSPADM

## 2021-01-19 RX ORDER — HYDROCODONE BITARTRATE AND ACETAMINOPHEN 5; 325 MG/1; MG/1
1 TABLET ORAL EVERY 4 HOURS PRN
Status: DISCONTINUED | OUTPATIENT
Start: 2021-01-19 | End: 2021-01-19

## 2021-01-19 RX ORDER — ALBUTEROL SULFATE 2.5 MG/3ML
2.5 SOLUTION RESPIRATORY (INHALATION) EVERY 6 HOURS PRN
Status: DISCONTINUED | OUTPATIENT
Start: 2021-01-19 | End: 2021-01-21

## 2021-01-19 RX ORDER — PAROXETINE HYDROCHLORIDE 20 MG/1
20 TABLET, FILM COATED ORAL 2 TIMES DAILY
Status: DISCONTINUED | OUTPATIENT
Start: 2021-01-19 | End: 2021-01-23 | Stop reason: HOSPADM

## 2021-01-19 RX ORDER — ACETAMINOPHEN 650 MG/1
650 SUPPOSITORY RECTAL EVERY 6 HOURS PRN
Status: DISCONTINUED | OUTPATIENT
Start: 2021-01-19 | End: 2021-01-23 | Stop reason: HOSPADM

## 2021-01-19 RX ORDER — OXYCODONE HYDROCHLORIDE AND ACETAMINOPHEN 5; 325 MG/1; MG/1
2 TABLET ORAL EVERY 4 HOURS PRN
Status: DISCONTINUED | OUTPATIENT
Start: 2021-01-19 | End: 2021-01-23 | Stop reason: HOSPADM

## 2021-01-19 RX ORDER — SODIUM CHLORIDE 0.9 % (FLUSH) 0.9 %
10 SYRINGE (ML) INJECTION EVERY 12 HOURS SCHEDULED
Status: DISCONTINUED | OUTPATIENT
Start: 2021-01-19 | End: 2021-01-23 | Stop reason: HOSPADM

## 2021-01-19 RX ORDER — PROMETHAZINE HYDROCHLORIDE 25 MG/1
12.5 TABLET ORAL EVERY 6 HOURS PRN
Status: DISCONTINUED | OUTPATIENT
Start: 2021-01-19 | End: 2021-01-23 | Stop reason: HOSPADM

## 2021-01-19 RX ORDER — SODIUM CHLORIDE 0.9 % (FLUSH) 0.9 %
10 SYRINGE (ML) INJECTION PRN
Status: DISCONTINUED | OUTPATIENT
Start: 2021-01-19 | End: 2021-01-23 | Stop reason: HOSPADM

## 2021-01-19 RX ORDER — BUMETANIDE 1 MG/1
2 TABLET ORAL DAILY
Status: DISCONTINUED | OUTPATIENT
Start: 2021-01-19 | End: 2021-01-23 | Stop reason: HOSPADM

## 2021-01-19 RX ORDER — OXYCODONE HYDROCHLORIDE AND ACETAMINOPHEN 5; 325 MG/1; MG/1
1 TABLET ORAL EVERY 6 HOURS PRN
Status: DISCONTINUED | OUTPATIENT
Start: 2021-01-19 | End: 2021-01-23 | Stop reason: HOSPADM

## 2021-01-19 RX ORDER — LISINOPRIL 10 MG/1
10 TABLET ORAL DAILY
Status: DISCONTINUED | OUTPATIENT
Start: 2021-01-19 | End: 2021-01-23 | Stop reason: HOSPADM

## 2021-01-19 RX ADMIN — ENOXAPARIN SODIUM 40 MG: 40 INJECTION SUBCUTANEOUS at 21:06

## 2021-01-19 RX ADMIN — HYDROCODONE BITARTRATE AND ACETAMINOPHEN 1 TABLET: 5; 325 TABLET ORAL at 21:05

## 2021-01-19 RX ADMIN — CEFEPIME HYDROCHLORIDE 2 G: 2 INJECTION, POWDER, FOR SOLUTION INTRAVENOUS at 19:02

## 2021-01-19 RX ADMIN — PAROXETINE HYDROCHLORIDE 20 MG: 20 TABLET, FILM COATED ORAL at 21:05

## 2021-01-19 RX ADMIN — SODIUM CHLORIDE 1000 ML: 9 INJECTION, SOLUTION INTRAVENOUS at 17:12

## 2021-01-19 RX ADMIN — METOPROLOL TARTRATE 25 MG: 25 TABLET, FILM COATED ORAL at 21:05

## 2021-01-19 RX ADMIN — OXYCODONE HYDROCHLORIDE AND ACETAMINOPHEN 2 TABLET: 5; 325 TABLET ORAL at 23:44

## 2021-01-19 RX ADMIN — VANCOMYCIN HYDROCHLORIDE 2500 MG: 1 INJECTION, POWDER, LYOPHILIZED, FOR SOLUTION INTRAVENOUS at 17:15

## 2021-01-19 RX ADMIN — MORPHINE SULFATE 4 MG: 4 INJECTION, SOLUTION INTRAMUSCULAR; INTRAVENOUS at 17:10

## 2021-01-19 ASSESSMENT — PAIN DESCRIPTION - ORIENTATION: ORIENTATION: LEFT;RIGHT

## 2021-01-19 ASSESSMENT — PAIN SCALES - GENERAL
PAINLEVEL_OUTOF10: 10
PAINLEVEL_OUTOF10: 6

## 2021-01-19 ASSESSMENT — ENCOUNTER SYMPTOMS
COLOR CHANGE: 1
BACK PAIN: 0
CONSTIPATION: 0
EYE PAIN: 0
SHORTNESS OF BREATH: 1
SORE THROAT: 0
TROUBLE SWALLOWING: 0
SINUS PAIN: 0
VOMITING: 0
SHORTNESS OF BREATH: 0
ABDOMINAL PAIN: 0
NAUSEA: 0
BACK PAIN: 1
COUGH: 0
BLOOD IN STOOL: 0
DIARRHEA: 0

## 2021-01-19 ASSESSMENT — PAIN DESCRIPTION - PAIN TYPE: TYPE: ACUTE PAIN

## 2021-01-19 NOTE — H&P
left heel ulcer, for IV antibiotics and further management    Past Medical History:     Past Medical History:   Diagnosis Date    Arthritis     [facility]     COPD [facility]chronic obstructive pulmonary disease) ([facility]     Diverticulitis     Hx of blood clots     Pulmonary embolism (HCC)         Past SurgicalHistory:     Past Surgical History:   Procedure Laterality Date    ANKLE SURGERY      COLON SURGERY      HERNIA REPAIR          Medications Prior to Admission:        Prior to Admission medications    Medication Sig Start Date End Date Taking? Authorizing Provider   metoprolol tartrate (LOPRESSOR) 25 MG tablet TAKE 1 TABLET BY MOUTH TWICE A DAY FOR HTN  1/11/21   Dion Hodgkins, APRN - CNP   lisinopril (PRINIVIL;ZESTRIL) 10 MG tablet TAKE 1 TABLET BY MOUTH ONCE DAILY FOR ESSENTIAL HYPERTENSION  1/11/21   Dion Hodgkins, APRN - CNP   diclofenac sodium (VOLTAREN) 1 % GEL Apply 2 g topically 4 times daily as needed for Pain 1/7/21   Dion Hodgkins, APRN - CNP   PARoxetine (PAXIL) 20 MG tablet Take 1 tablet by mouth 2 times daily 12/17/20   Dion Hodgkins, APRN - CNP   miconazole (MICOTIN) 2 % powder Apply topically 2 times daily. 11/4/20   Karen Roy PA-C   bumetanide (BUMEX) 2 MG tablet Take 1 tablet by mouth daily 7/3/20   Yoel Pelayo MD   diclofenac sodium (VOLTAREN) 1 % GEL Apply 2 g topically 4 times daily as needed for Pain 5/27/20   Yoel Pelayo MD   albuterol sulfate  (90 Base) MCG/ACT inhaler Inhale 2 puffs into the lungs every 6 hours as needed for Wheezing    Historical Provider, MD        Allergies:     Patient has no known allergies. Social History:     Tobacco:    reports that she has been smoking. She has a 45.00 pack-year smoking history. She has never used smokeless tobacco.  Alcohol:      reports previous alcohol use. Drug Use:  reports previous drug use.     Family History:     Family History   Problem Relation Age of Onset    Cancer 4:52 PM   Result Value Ref Range    WBC 8.6 3.5 - 11.0 k/uL    RBC 3.87 (L) 4.0 - 5.2 m/uL    Hemoglobin 12.2 12.0 - 16.0 g/dL    Hematocrit 36.8 36 - 46 %    MCV 95.2 80 - 100 fL    MCH 31.4 26 - 34 pg    MCHC 33.0 31 - 37 g/dL    RDW 14.0 11.5 - 14.9 %    Platelets 954 (H) 010 - 450 k/uL    MPV 7.4 6.0 - 12.0 fL    NRBC Automated NOT REPORTED per 100 WBC    Differential Type NOT REPORTED     Seg Neutrophils 70 (H) 36 - 66 %    Lymphocytes 17 (L) 24 - 44 %    Monocytes 10 (H) 1 - 7 %    Eosinophils % 2 0 - 4 %    Basophils 1 0 - 2 %    Immature Granulocytes NOT REPORTED 0 %    Segs Absolute 6.00 1.3 - 9.1 k/uL    Absolute Lymph # 1.50 1.0 - 4.8 k/uL    Absolute Mono # 0.90 0.1 - 1.3 k/uL    Absolute Eos # 0.20 0.0 - 0.4 k/uL    Basophils Absolute 0.10 0.0 - 0.2 k/uL    Absolute Immature Granulocyte NOT REPORTED 0.00 - 0.30 k/uL    WBC Morphology NOT REPORTED     RBC Morphology NOT REPORTED     Platelet Estimate NOT REPORTED    Basic Metabolic Panel    Collection Time: 01/19/21  4:52 PM   Result Value Ref Range    Glucose 110 (H) 70 - 99 mg/dL    BUN 16 8 - 23 mg/dL    CREATININE 0.96 (H) 0.50 - 0.90 mg/dL    Bun/Cre Ratio NOT REPORTED 9 - 20    Calcium 9.2 8.6 - 10.4 mg/dL    Sodium 133 (L) 135 - 144 mmol/L    Potassium 4.2 3.7 - 5.3 mmol/L    Chloride 97 (L) 98 - 107 mmol/L    CO2 28 20 - 31 mmol/L    Anion Gap 8 (L) 9 - 17 mmol/L    GFR Non-African American 59 (L) >60 mL/min    GFR African American >60 >60 mL/min    GFR Comment          GFR Staging NOT REPORTED    Lactic Acid, Plasma    Collection Time: 01/19/21  4:52 PM   Result Value Ref Range    Lactic Acid 1.1 0.5 - 2.2 mmol/L    Lactic Acid, Whole Blood NOT REPORTED 0.7 - 2.1 mmol/L   Sedimentation Rate    Collection Time: 01/19/21  4:52 PM   Result Value Ref Range    Sed Rate 71 (H) 0 - 30 mm   C-Reactive Protein    Collection Time: 01/19/21  4:52 PM   Result Value Ref Range    CRP 45.6 (H) 0.0 - 5.0 mg/L         Current Facility-Administered Medications Unknown Type of Exam: Unknown Additional signs and symptoms: PT states non-healing wound on lower leg. Wound observed on medial aspect of lower leg. FINDINGS: Left foot: Calcaneus is suboptimally evaluated due to difficulty in appropriately positioning the patient. There are posterior and plantar calcaneal enthesopathic changes. There is suggestion of focal abnormal demineralization at the plantar calcaneus underlying an area of soft tissue ulceration. Partially imaged open reduction internal fixation hardware within the distal tibia and fibula. Degenerative and enthesopathic changes about the foot and ankle. Diffuse soft tissue edema. Right tib-fib: No acute fracture or dislocation. Osseous demineralization throughout. Diffuse periostitis of the tibia and fibula. Mild knee and moderate ankle degenerative changes. Diffuse soft tissue edema. Soft tissue ulceration at the plantar aspect of the heel with suggestion of focal abnormal demineralization at the plantar calcaneus suspicious for concomitant osteomyelitis. Diffuse periostitis about the right tibia and fibula which may relate to venous insufficiency. The reported soft tissue wound of the right lower extremity is not definitively appreciated radiographically on a background of diffuse edema.      Assessment :      Primary Problem  Cellulitis    Active Hospital Problems    Diagnosis Date Noted    Depression [F32.9] 01/19/2021    Cellulitis [L03.90] 06/30/2020    Essential hypertension [I10] 10/29/2019    [facility] disease ([facility] [J44.9] 10/29/2019       Plan:     Patient status Admit as inpatient in the  Med/Surge    Consultations:   PHARMACY TO DOSE VANCOMYCIN  IP CONSULT TO INTERNAL MEDICINE  IP CONSULT TO PODIATRY  IP CONSULT TO SOCIAL WORK  IP CONSULT TO PODIATRY  PHARMACY TO DOSE VANCOMYCIN    Right leg cellulitis   -Signs of cellulitis with redness, hotness, painful, swelling, marked with marker   -Blood culture drawn  -Antibiotics: Vancomycin, Cefepime  -Xray right tibia/fibula: Diffuse periostitis about the right tibia and fibula evaluate venous insufficiency.  -Norco oral as needed for pain  -CRP 45  -ERS 71   -Lactic acid 1.1   -WBC 8.6   -Afebrile  -Mildly Tachycardic on presentation, otherwise SIRS negative      Plantar aspect of left heel wound/ulcer, suspicious concomitant osteomyelitis  -Possibly a pressure ulcer   -Podiatry consulted   -Actively oozing   -Xray left foot: Soft tissue ulceration at the plantar aspect of the heel with suggestion of focal abnormal demineralization of the plantar calcaneus suspicious for concomitant osteomyelitis      Hx of COPD   -Stable respiratory status, normal oxygen saturation  -Mild inspiratory wheezes on the right lung during examination. -CXR ordered  -On home nebulizers, she doesn't use them   -Home oxygen was prescribed before, patient is not using it      Hx of Hypertension and CHF (systolic and diastolic)  -On home lisinopril   -On home metoprolol   -BP on presentation was 93/64         Hx of Depression   -Paroxetine 20 mg twice daily daily    PT/OT  Code Status: Full  Diet: Regular   DVT prophylaxis: Lovenox 40 mg SC  GI prophylaxis: none            Patient is admitted as inpatient status because of co-morbiditieslisted above, severity of signs and symptoms as outlined, requirement for current medical therapies and most importantly because of direct risk to patient if care not provided in a hospital setting.                   Simon Gibson MD  1/19/2021  7:08 PM    Copy sent to Dr. Reymundo Curling, APRN - CNP

## 2021-01-19 NOTE — PROGRESS NOTES
Subjective:      Patient ID: Andrew Aguirre is a 61 y.o. female. The patient stated that she has been having excessive swelling of her legs. Also noted skin breakdown evidence on brownish and yellowish material.  She has a history of chronic congestive heart failure systolic and diastolic, she is also obese and has COPD. Further virtual visit and she was able to give me history. She stated that her leg swelling has been progressive and she has pain. She denied increased shortness of breath or chest pain or productive cough      Review of Systems= negative    Objective:   Physical Exam= was a virtual visit and I was able to inspect her lower extremities. Chronic lymphedema 4+ swelling. She had a large area of wound which is approximately 4 to 5 cm long with yellowish and greenish discharge    Assessment / Plan:      Diagnosis Orders   1. Chronic heart failure with preserved ejection fraction (HCC)     2. Class 4 congestive heart failure, acute on chronic, systolic (HCC) probably decompensated    3. Essential hypertension = it was a virtual visit    4. Chronic obstructive pulmonary disease, unspecified COPD type ([facility] = no productive cough today    5. Class 3 severe obesity due to excess calories with serious comorbidity and body mass index (BMI) of 50.0 to 59.9 in adult [facility])     6. Cellulitis of lower extremity, unspecified laterality = she was advised that her extensive cellulitis and swelling with a large ulcer, candidate for hospital admission. Sent to [facility] emergency center where she will be evaluated and admitted to the hospital      Return in about 8 weeks (around 3/16/2021) for Follow Up, heart failure. No orders of the defined types were placed in this encounter. No orders of the defined types were placed in this encounter. Patient was advised to go to [facility] emergency room and admission will be arranged.   Time spent was 26 minutes   Visit Information Have you changed or started any medications since your last visit including any over-the-counter medicines, vitamins, or herbal medicines? no   Are you having any side effects from any of your medications? -  no  Have you stopped taking any of your medications? Is so, why? -  no    Have you seen any other physician or provider since your last visit? No  Have you had any other diagnostic tests since your last visit? No  Have you been seen in the emergency room and/or had an admission to a hospital since we last saw you? No  Have you had your routine dental cleaning in the past 6 months? no    Have you activated your [employer] account? If not, what are your barriers?  Yes     Patient Care Team:  KIRK Fonseca CNP as PCP - General (Nurse Practitioner)  KIRK Fonseca CNP as PCP - [facility]  Gardenia Amaro RN as Ambulatory Care Manager    Medical History Review  Past Medical, Family, and Social History reviewed and does not contribute to the patient presenting condition    Health Maintenance   Topic Date Due    Hepatitis C screen  1960    Pneumococcal 0-64 years Vaccine (1 of 1 - PPSV23) 02/24/1966    HIV screen  02/24/1975    Cervical cancer screen  02/24/1981    Breast cancer screen  02/24/2010    Shingles Vaccine (1 of 2) 02/24/2010    Colon cancer screen colonoscopy  02/24/2010    Flu vaccine (1) 09/01/2020    A1C test (Diabetic or Prediabetic)  05/25/2021    Potassium monitoring  07/03/2021    Creatinine monitoring  07/03/2021    Lipid screen  10/29/2024    DTaP/Tdap/Td vaccine (2 - Td) 12/30/2027    Hepatitis A vaccine  Aged Out    Hepatitis B vaccine  Aged Out    Hib vaccine  Aged Out    Meningococcal (ACWY) vaccine  Aged Out

## 2021-01-19 NOTE — ED TRIAGE NOTES
Mode of arrival (squad #, walk in, police, etc) : [facility]        Chief complaint(s): Leg pain        Arrival Note (brief scenario, treatment PTA, etc). : Pt arrives to ED c/o bilateral leg pain. Patient states that she has had swelling to both of her legs for the last couple of weeks. Patient states that she had a virtual visit with her PCP and was told to come to the ED for the ulcers on her legs. C= \"Have you ever felt that you should Cut down on your drinking? \"  No  A= \"Have people Annoyed you by criticizing your drinking? \"  No  G= \"Have you ever felt bad or Guilty about your drinking? \"  No  E= \"Have you ever had a drink as an Eye-opener first thing in the morning to steady your nerves or to help a hangover? \"  No      Deferred []      Reason for deferring: N/A    *If yes to two or more: probable alcohol abuse. *

## 2021-01-19 NOTE — ED PROVIDER NOTES
Constitutional:       General: She is not in acute distress. Appearance: She is obese. HENT:      Head: Normocephalic and atraumatic. Eyes:      Conjunctiva/sclera: Conjunctivae normal.      Pupils: Pupils are equal, round, and reactive to light. Neck:      Musculoskeletal: Neck supple. Cardiovascular:      Rate and Rhythm: Normal rate and regular rhythm. Heart sounds: Normal heart sounds. No murmur. Pulmonary:      Effort: Pulmonary effort is normal. No respiratory distress. Breath sounds: Normal breath sounds. Abdominal:      General: Bowel sounds are normal. There is no distension. Palpations: Abdomen is soft. Tenderness: There is no abdominal tenderness. Musculoskeletal:         General: No tenderness. Lymphadenopathy:      Cervical: No cervical adenopathy. Skin:     General: Skin is warm and dry. Findings: Erythema (Right lower extremity) and lesion (Superficial ulcer right lower extremity, open wound with necrotic tissue left heel) present. No rash. Neurological:      Mental Status: She is alert and oriented to person, place, and time. Psychiatric:         Judgment: Judgment normal.           DIFFERENTIAL DIAGNOSIS/MDM:   60-year-old female presents with cellulitis to her right lower extremity and a wound to her left heel. She is afebrile, nontoxic, normal vital signs other than mild tachycardia. No acute distress. She is morbidly obese. She has chronic skin changes to both lower extremities however the erythema on her right leg does seem to be an acute cellulitis. There is really no drainage from the wound on her left foot. She was called as a code sepsis. We will give IV fluids, check blood work, x-ray of right leg and left foot. Will start antibiotics, anticipate admission.     DIAGNOSTIC RESULTS     EKG: All EKG's are interpreted by the Emergency Department Physician who either signs or Co-signs this chart in the absence of a CULTURE, BLOOD 1   LACTIC ACID, PLASMA         EMERGENCY DEPARTMENT COURSE:   Vitals:    Vitals:    01/19/21 1503   BP: 93/64   Pulse: 102   Resp: 20   Temp: 98.2 °F (36.8 °C)   TempSrc: Oral   SpO2: 95%   Weight: (!) 350 lb (158.8 kg)   Height: 5' 7\" (1.702 m)     X-rays are unremarkable. No evidence of osteomyelitis, gas in the tissue. She has no elevated white count. Lactic acid is normal.  Very low suspicion for sepsis. Based on her body habitus, hypertension for outpatient failure we will start IV antibiotics, admit for podiatry consultation. I spoke with Dr. Alethea Schaeffer who accepted patient for admission. Spoke with residents who will place admission orders. I spoke with podiatry resident Almeta Boeck as well who will evaluate patient's foot. CRITICALCARE:      CONSULTS:  PHARMACY TO DOSE VANCOMYCIN  IP CONSULT TO INTERNAL MEDICINE  IP CONSULT TO PODIATRY      PROCEDURES:      FINAL IMPRESSION      1. Cellulitis of right leg    2. Ulcer of left foot, limited to breakdown of skin [facility])            DISPOSITION/PLAN   DISPOSITION Decision To Admit 01/19/2021 05:40:57 PM        PATIENT REFERRED TO:  No follow-up provider specified.     DISCHARGE MEDICATIONS:  New Prescriptions    No medications on file       (Please note that portions ofthis note were completed with a voice recognition program.  Efforts were made to edit the dictations but occasionally words are mis-transcribed.)    Eric Owen DO  Attending Emergency Physician          Eric Owen DO  01/19/21 [id number]

## 2021-01-20 ENCOUNTER — APPOINTMENT (OUTPATIENT)
Dept: MRI IMAGING | Age: 61
DRG: 317 | End: 2021-01-20
Payer: COMMERCIAL

## 2021-01-20 LAB
ABSOLUTE EOS #: 0.2 K/UL (ref 0–0.4)
ABSOLUTE IMMATURE GRANULOCYTE: ABNORMAL K/UL (ref 0–0.3)
ABSOLUTE LYMPH #: 1.3 K/UL (ref 1–4.8)
ABSOLUTE MONO #: 0.5 K/UL (ref 0.1–1.3)
ALBUMIN SERPL-MCNC: 2.7 G/DL (ref 3.5–5.2)
ALBUMIN/GLOBULIN RATIO: ABNORMAL (ref 1–2.5)
ALP BLD-CCNC: 64 U/L (ref 35–104)
ALT SERPL-CCNC: <5 U/L (ref 5–33)
ANION GAP SERPL CALCULATED.3IONS-SCNC: 9 MMOL/L (ref 9–17)
AST SERPL-CCNC: 7 U/L
BASOPHILS # BLD: 0 % (ref 0–2)
BASOPHILS ABSOLUTE: 0 K/UL (ref 0–0.2)
BILIRUB SERPL-MCNC: 0.38 MG/DL (ref 0.3–1.2)
BUN BLDV-MCNC: 13 MG/DL (ref 8–23)
BUN/CREAT BLD: ABNORMAL (ref 9–20)
CALCIUM SERPL-MCNC: 8.7 MG/DL (ref 8.6–10.4)
CHLORIDE BLD-SCNC: 100 MMOL/L (ref 98–107)
CO2: 25 MMOL/L (ref 20–31)
CREAT SERPL-MCNC: 0.74 MG/DL (ref 0.5–0.9)
DIFFERENTIAL TYPE: ABNORMAL
EOSINOPHILS RELATIVE PERCENT: 4 % (ref 0–4)
GFR AFRICAN AMERICAN: >60 ML/MIN
GFR NON-AFRICAN AMERICAN: >60 ML/MIN
GFR SERPL CREATININE-BSD FRML MDRD: ABNORMAL ML/MIN/{1.73_M2}
GFR SERPL CREATININE-BSD FRML MDRD: ABNORMAL ML/MIN/{1.73_M2}
GLUCOSE BLD-MCNC: 110 MG/DL (ref 70–99)
GLUCOSE BLD-MCNC: 122 MG/DL (ref 65–105)
GLUCOSE BLD-MCNC: 126 MG/DL (ref 65–105)
GLUCOSE BLD-MCNC: 130 MG/DL (ref 65–105)
HCT VFR BLD CALC: 34.9 % (ref 36–46)
HEMOGLOBIN: 11.4 G/DL (ref 12–16)
IMMATURE GRANULOCYTES: ABNORMAL %
LYMPHOCYTES # BLD: 20 % (ref 24–44)
MCH RBC QN AUTO: 31.2 PG (ref 26–34)
MCHC RBC AUTO-ENTMCNC: 32.7 G/DL (ref 31–37)
MCV RBC AUTO: 95.6 FL (ref 80–100)
MONOCYTES # BLD: 8 % (ref 1–7)
NRBC AUTOMATED: ABNORMAL PER 100 WBC
PDW BLD-RTO: 13.7 % (ref 11.5–14.9)
PLATELET # BLD: 440 K/UL (ref 150–450)
PLATELET ESTIMATE: ABNORMAL
PMV BLD AUTO: 7.4 FL (ref 6–12)
POTASSIUM SERPL-SCNC: 4.1 MMOL/L (ref 3.7–5.3)
RBC # BLD: 3.65 M/UL (ref 4–5.2)
RBC # BLD: ABNORMAL 10*6/UL
SEG NEUTROPHILS: 68 % (ref 36–66)
SEGMENTED NEUTROPHILS ABSOLUTE COUNT: 4.5 K/UL (ref 1.3–9.1)
SODIUM BLD-SCNC: 134 MMOL/L (ref 135–144)
TOTAL PROTEIN: 6.1 G/DL (ref 6.4–8.3)
WBC # BLD: 6.7 K/UL (ref 3.5–11)
WBC # BLD: ABNORMAL 10*3/UL

## 2021-01-20 PROCEDURE — 6370000000 HC RX 637 (ALT 250 FOR IP): Performed by: STUDENT IN AN ORGANIZED HEALTH CARE EDUCATION/TRAINING PROGRAM

## 2021-01-20 PROCEDURE — 93970 EXTREMITY STUDY: CPT

## 2021-01-20 PROCEDURE — 73721 MRI JNT OF LWR EXTRE W/O DYE: CPT

## 2021-01-20 PROCEDURE — 6360000002 HC RX W HCPCS: Performed by: EMERGENCY MEDICINE

## 2021-01-20 PROCEDURE — 6360000002 HC RX W HCPCS: Performed by: STUDENT IN AN ORGANIZED HEALTH CARE EDUCATION/TRAINING PROGRAM

## 2021-01-20 PROCEDURE — 93925 LOWER EXTREMITY STUDY: CPT

## 2021-01-20 PROCEDURE — 6360000002 HC RX W HCPCS: Performed by: NURSE PRACTITIONER

## 2021-01-20 PROCEDURE — 99223 1ST HOSP IP/OBS HIGH 75: CPT | Performed by: INTERNAL MEDICINE

## 2021-01-20 PROCEDURE — 85025 COMPLETE CBC W/AUTO DIFF WBC: CPT

## 2021-01-20 PROCEDURE — 82947 ASSAY GLUCOSE BLOOD QUANT: CPT

## 2021-01-20 PROCEDURE — 1200000000 HC SEMI PRIVATE

## 2021-01-20 PROCEDURE — 2580000003 HC RX 258: Performed by: STUDENT IN AN ORGANIZED HEALTH CARE EDUCATION/TRAINING PROGRAM

## 2021-01-20 PROCEDURE — 2580000003 HC RX 258: Performed by: EMERGENCY MEDICINE

## 2021-01-20 PROCEDURE — 36415 COLL VENOUS BLD VENIPUNCTURE: CPT

## 2021-01-20 PROCEDURE — 2500000003 HC RX 250 WO HCPCS: Performed by: INTERNAL MEDICINE

## 2021-01-20 PROCEDURE — 99254 IP/OBS CNSLTJ NEW/EST MOD 60: CPT | Performed by: INTERNAL MEDICINE

## 2021-01-20 PROCEDURE — 80053 COMPREHEN METABOLIC PANEL: CPT

## 2021-01-20 RX ORDER — LORAZEPAM 2 MG/ML
1 INJECTION INTRAMUSCULAR ONCE
Status: COMPLETED | OUTPATIENT
Start: 2021-01-20 | End: 2021-01-20

## 2021-01-20 RX ADMIN — CEFEPIME 1 G: 1 INJECTION, POWDER, FOR SOLUTION INTRAMUSCULAR; INTRAVENOUS at 19:25

## 2021-01-20 RX ADMIN — VANCOMYCIN HYDROCHLORIDE 1500 MG: 1.5 INJECTION, POWDER, LYOPHILIZED, FOR SOLUTION INTRAVENOUS at 06:10

## 2021-01-20 RX ADMIN — ENOXAPARIN SODIUM 40 MG: 40 INJECTION SUBCUTANEOUS at 22:33

## 2021-01-20 RX ADMIN — PAROXETINE HYDROCHLORIDE 20 MG: 20 TABLET, FILM COATED ORAL at 22:33

## 2021-01-20 RX ADMIN — OXYCODONE HYDROCHLORIDE AND ACETAMINOPHEN 2 TABLET: 5; 325 TABLET ORAL at 15:56

## 2021-01-20 RX ADMIN — LORAZEPAM 1 MG: 2 INJECTION INTRAMUSCULAR; INTRAVENOUS at 08:57

## 2021-01-20 RX ADMIN — BUMETANIDE 2 MG: 1 TABLET ORAL at 08:00

## 2021-01-20 RX ADMIN — PAROXETINE HYDROCHLORIDE 20 MG: 20 TABLET, FILM COATED ORAL at 07:59

## 2021-01-20 RX ADMIN — OXYCODONE HYDROCHLORIDE AND ACETAMINOPHEN 2 TABLET: 5; 325 TABLET ORAL at 11:59

## 2021-01-20 RX ADMIN — OXYCODONE HYDROCHLORIDE AND ACETAMINOPHEN 2 TABLET: 5; 325 TABLET ORAL at 03:46

## 2021-01-20 RX ADMIN — ENOXAPARIN SODIUM 40 MG: 40 INJECTION SUBCUTANEOUS at 07:59

## 2021-01-20 RX ADMIN — METRONIDAZOLE 500 MG: 500 INJECTION, SOLUTION INTRAVENOUS at 23:35

## 2021-01-20 RX ADMIN — METRONIDAZOLE 500 MG: 500 INJECTION, SOLUTION INTRAVENOUS at 18:09

## 2021-01-20 RX ADMIN — CEFEPIME 1 G: 1 INJECTION, POWDER, FOR SOLUTION INTRAMUSCULAR; INTRAVENOUS at 07:29

## 2021-01-20 RX ADMIN — OXYCODONE HYDROCHLORIDE AND ACETAMINOPHEN 2 TABLET: 5; 325 TABLET ORAL at 20:29

## 2021-01-20 RX ADMIN — VANCOMYCIN HYDROCHLORIDE 1500 MG: 1.5 INJECTION, POWDER, LYOPHILIZED, FOR SOLUTION INTRAVENOUS at 16:05

## 2021-01-20 RX ADMIN — OXYCODONE HYDROCHLORIDE AND ACETAMINOPHEN 2 TABLET: 5; 325 TABLET ORAL at 07:59

## 2021-01-20 ASSESSMENT — PAIN DESCRIPTION - LOCATION
LOCATION: LEG;FOOT

## 2021-01-20 ASSESSMENT — PAIN DESCRIPTION - PAIN TYPE
TYPE: ACUTE PAIN

## 2021-01-20 ASSESSMENT — PAIN SCALES - GENERAL
PAINLEVEL_OUTOF10: 9
PAINLEVEL_OUTOF10: 6
PAINLEVEL_OUTOF10: 10
PAINLEVEL_OUTOF10: 9
PAINLEVEL_OUTOF10: 9
PAINLEVEL_OUTOF10: 6

## 2021-01-20 ASSESSMENT — PAIN DESCRIPTION - ORIENTATION: ORIENTATION: LEFT;RIGHT

## 2021-01-20 NOTE — PROGRESS NOTES
Comprehensive Nutrition Assessment    Type and Reason for Visit:  Positive Nutrition Screen(Non-healing wounds)    Nutrition Recommendations/Plan: Continue General diet    Nutrition Assessment:  Patient is admitted with Cellulitis, morbidly obese with BMI 53.62. Patient reports no weight loss, eating well. Patient said cellulitis and open wounds in lower extremities is a chronic problem for her. Encouraged patient to increase protein intake for wound healing but patient refused low calorie high protein supplements. Malnutrition Assessment:  Malnutrition Status: At risk for malnutrition (Comment)    Context:  Chronic Illness     Findings of the 6 clinical characteristics of malnutrition:  Energy Intake:  No significant decrease in energy intake  Weight Loss:  No significant weight loss     Body Fat Loss:  No significant body fat loss     Muscle Mass Loss:  No significant muscle mass loss    Fluid Accumulation:  7 - Severe Extremities   Strength:  Not Performed    Estimated Daily Nutrient Needs:  Energy (kcal):  2372 kcal based on [place]- [place] Jeor and 1.1 factor; Weight Used for Energy Requirements:  Current     Protein (g):  153-165 gm based on 2.6-2.7 gm/kg of ideal; Weight Used for Protein Requirements:  Ideal          Nutrition Related Findings:  +3 edema RLE, LLE. Bowel sounds active      Wounds:  Open Wounds, Diabetic Ulcer, Stage II, Multiple       Current Nutrition Therapies:    DIET GENERAL;     Anthropometric Measures:  · Height: 5' 7\" (170.2 cm)  · Current Body Weight: 342 lb 6 oz (155.3 kg)   · Admission Body Weight: 342 lb 6 oz (155.3 kg)    · Usual Body Weight: 330 lb (149.7 kg)(per past records)     · Ideal Body Weight: 135 lbs; % Ideal Body Weight 253.6 %   · BMI: 53.6  · BMI Categories: Obese Class 3 (BMI 40.0 or greater)       Nutrition Diagnosis:   · Inadequate protein intake related to inadequate protein-energy intake as evidenced by wounds, BMI      Nutrition Interventions:   Food and/or Nutrient Delivery:  Continue Current Diet  Nutrition Education/Counseling:  Education not indicated   Coordination of Nutrition Care:  Continue to monitor while inpatient    Goals:  PO intakes to meet greater than 75% of estimated nutrition needs       Nutrition Monitoring and Evaluation:   Behavioral-Environmental Outcomes:  Readiness for Change   Food/Nutrient Intake Outcomes:  Food and Nutrient Intake  Physical Signs/Symptoms Outcomes:  Biochemical Data, Skin, Weight     Discharge Planning:     Too soon to determine       Some areas of assessment may be incomplete due to COVID-19 precautions    Shaw Amor, RD, LD  Office phone [phone]

## 2021-01-20 NOTE — PLAN OF CARE
Nutrition Problem #1: Inadequate protein intake  Intervention: Food and/or Nutrient Delivery: Continue Current Diet  Nutritional Goals: PO intakes to meet greater than 75% of estimated nutrition needs

## 2021-01-20 NOTE — PROGRESS NOTES
Pt had MRI Left Ankle ordered. Brought pt to MRI, pt was in some pain after moving onto MRI table, tried to make pt comfortable by adding pillows, elevated right leg, etc.  Began scanning, during 2nd scan pt began crying and wanting to stop. Finished scan, pt in too much pain to continue. Called pt's nurse, Derek Ramirez to inform him regarding pt. Will send thru what images were obtained to see if there is anything of diagnostic value per Radiologist. Thank you.

## 2021-01-20 NOTE — PLAN OF CARE
Please fill out the MRI Screening form and fax to dept @ [phone]. Any questions. .please call [facility] MRI @ [phone]. MRI exam will be scheduled after receiving the completed screening form.  Thank you!!

## 2021-01-20 NOTE — PROGRESS NOTES
[address]    PROGRESS NOTE             1/20/2021    8:16 AM    Name:   Dayday Black  MRN:     [id number]     Acct:      [de-identified]   Room:   2074/2074-01  IP Day:  1  Admit Date:  1/19/2021  3:49 PM    PCP:  Reymundo Curling, APRN - CNP  Code Status:  Full Code    Subjective:     C/C:   Chief Complaint   Patient presents with    Leg Swelling    Skin Ulcer    Wound Check     Interval History Status: not changed. Patient seen and examined at the bed side, no new acute events overnight  This morning the patient reported feeling better as her pain is under control. Otherwise, no new complaints reported. No shortness of breathing. No chest pain. No fever no chills. Notes from nursing staff and Consults had been reviewed, and the overnight progress had been checked with the nursing staff as well. Brief History:     The patient is a 61 y.o. Non-/non  female who presents withLeg Swelling, Skin Ulcer, and Wound Check  and she is admitted to the hospital for the management of bilateral cellulitis      61years old female patient with medical history of morbid obesity, hypertension, COPD, depression, prediabetes, chronic bilateral lymphedema. Presented today to the ER with chief complaint of bilateral lower limb pain.     Patient reported that 2 weeks ago, she started to experience pain in her left heel that has progressively worsened over the past 2 weeks associated with discharge. She also had pain in her right lower leg that started 1 week ago, also progressing and associated with redness, hotness and tenderness and the patient reported she was not able to ambulate anymore because of pain. She wasn't sure how her wounds look, but she explained that her bilateral lower extremities have thick crusts and oozing.      She doesn't recall any history of trauma.  She has been dealing with wounds of her bilateral lower extremities for few months. The patient denied having any fever, chills, fatigue. Active shortness of breathing, ,chest pain abdominal pain, nausea, vomiting, altered bowel habits, urinary changes.     Patient was admitted as a case of bilateral cellulitis, with plantar aspect of left heel ulcer, for IV antibiotics and further management    Review of Systems:     CONSTITUTIONAL:  no fevers  Psych: Normal mood and affect, no depression, no suicidal ideation. EYES: negative for blury vision  HEENT: No headaches, no nasal congestion, no difficulty swallowing  RESPIRATORY:Shortness of breath on exertion  CARDIOVASCULAR: negative for chest pain, no palpitations  GASTROINTESTINAL: no nausea, no vomiting, no change in bowel habits, no abdominal pain   GENITOURINARY: negative for dysuria, no hematuria   MUSCULOSKELETAL: Bilateral lower extremity pain  NEUROLOGICAL: No weakness or numbness    Medications: Allergies:    No Known Allergies    Current Meds:   Scheduled Meds:    LORazepam  1 mg Intravenous Once    vancomycin (VANCOCIN) intermittent dosing (placeholder)   Other RX Placeholder    sodium chloride flush  10 mL Intravenous 2 times per day    enoxaparin  40 mg Subcutaneous BID    bumetanide  2 mg Oral Daily    PARoxetine  20 mg Oral BID    metoprolol tartrate  25 mg Oral BID    lisinopril  10 mg Oral Daily    cefepime  1 g Intravenous Q12H    vancomycin  1,500 mg Intravenous Q12H     Continuous Infusions:     PRN Meds: miconazole, sodium chloride flush, promethazine **OR** ondansetron, polyethylene glycol, acetaminophen **OR** acetaminophen, albuterol, oxyCODONE-acetaminophen **OR** oxyCODONE-acetaminophen    Data:     Past Medical History:   has a past medical history of Arthritis, CHF (congestive heart failure) ([facility] COPD (chronic obstructive pulmonary disease) ([facility] Diverticulitis, Hx of blood clots, and Pulmonary embolism ([facility] Social History:   reports that she has been smoking.  She has a 45.00 pack-year smoking history. She has never used smokeless tobacco. She reports previous alcohol use. She reports previous drug use. Family History:   Family History   Problem Relation Age of Onset   [facility] Cancer Mother     Diabetes Paternal Grandfather        Vitals:  BP (!) 105/42   Pulse 76   Temp 98.4 °F (36.9 °C) (Oral)   Resp 18   Ht 5' 7\" (1.702 m)   Wt (!) 342 lb 6 oz (155.3 kg)   SpO2 91%   BMI 53.62 kg/m²   Temp (24hrs), Av.4 °F (36.9 °C), Min:98 °F (36.7 °C), Max:99.1 °F (37.3 °C)      Recent Labs     21  0635   POCGLU 113* 122*       I/O(24Hr):     Intake/Output Summary (Last 24 hours) at 2021 0816  Last data filed at 2021 1924  Gross per 24 hour   Intake 1050 ml   Output --   Net 1050 ml       Labs:    CBC:   Lab Results   Component Value Date    WBC 6.7 2021    RBC 3.65 2021    HGB 11.4 2021    HCT 34.9 2021    MCV 95.6 2021    MCH 31.2 2021    MCHC 32.7 2021    RDW 13.7 2021     2021    MPV 7.4 2021     CBC with Differential:    Lab Results   Component Value Date    WBC 6.7 2021    RBC 3.65 2021    HGB 11.4 2021    HCT 34.9 2021     2021    MCV 95.6 2021    MCH 31.2 2021    MCHC 32.7 2021    RDW 13.7 2021    LYMPHOPCT 20 2021    MONOPCT 8 2021    BASOPCT 0 2021    MONOSABS 0.50 2021    LYMPHSABS 1.30 2021    EOSABS 0.20 2021    BASOSABS 0.00 2021    DIFFTYPE NOT REPORTED 2021     WBC:    Lab Results   Component Value Date    WBC 6.7 2021     Platelets:    Lab Results   Component Value Date     2021     Hemoglobin/Hematocrit:    Lab Results   Component Value Date    HGB 11.4 2021    HCT 34.9 2021     CMP:    Lab Results   Component Value Date     2021    K 4.1 2021     2021    CO2 25 2021    BUN 13 2021    CREATININE 0.74 01/20/2021    GFRAA >60 01/20/2021    LABGLOM >60 01/20/2021    GLUCOSE 110 01/20/2021    PROT 6.1 01/20/2021    LABALBU 2.7 01/20/2021    CALCIUM 8.7 01/20/2021    BILITOT 0.38 01/20/2021    ALKPHOS 64 01/20/2021    AST 7 01/20/2021    ALT <5 01/20/2021     BMP:    Lab Results   Component Value Date     01/20/2021    K 4.1 01/20/2021     01/20/2021    CO2 25 01/20/2021    BUN 13 01/20/2021    LABALBU 2.7 01/20/2021    CREATININE 0.74 01/20/2021    CALCIUM 8.7 01/20/2021    GFRAA >60 01/20/2021    LABGLOM >60 01/20/2021    GLUCOSE 110 01/20/2021       Lab Results   Component Value Date/Time    SPECIAL 1ML PURPLE ONLY RIGHT HAND SP 01/19/2021 05:05 PM     Lab Results   Component Value Date/Time    CULTURE NO GROWTH 9 HOURS 01/19/2021 05:05 PM         Radiology:    Xr Tibia Fibula Left (2 Views)    Result Date: 1/19/2021  EXAMINATION: XRAY VIEWS OF THE LEFT TIBIA AND FIBULA 1/19/2021 9:06 pm COMPARISON: 07/02/2020 HISTORY: ORDERING SYSTEM PROVIDED HISTORY: infection, swelling TECHNOLOGIST PROVIDED HISTORY: infection, swelling Reason for Exam: infection, swelling Acuity: Acute Type of Exam: Initial FINDINGS: There is diffuse soft tissue swelling. The osseous structures are intact. There are stable changes related to instrumented fusion of medial and lateral malleolus with no evidence of hardware related complication such as loosening or fracture. Severe degenerative disease of the knee joint and intertarsal joints are partially visualized     Stable study. Diffuse soft tissue swelling may be related to infection. No osseous or hardware related abnormality. Xr Tibia Fibula Right (2 Views)    Result Date: 1/19/2021  EXAMINATION: THREE XRAY VIEWS OF THE LEFT FOOT;   XRAY VIEWS OF THE RIGHT TIBIA AND FIBULA 1/19/2021 1:44 pm COMPARISON: 07/03/2020 HISTORY: ORDERING SYSTEM PROVIDED HISTORY: heel wound TECHNOLOGIST PROVIDED HISTORY: heel wound Reason for Exam: PT states non-healing wound on heel.  Acuity: Unknown Additional signs and symptoms: PT states non-healing wound on heel. ; ORDERING SYSTEM PROVIDED HISTORY: leg wound TECHNOLOGIST PROVIDED HISTORY: leg wound Reason for Exam: PT states non-healing wound on lower leg. Wound observed on medial aspect of lower leg. Acuity: Unknown Type of Exam: Unknown Additional signs and symptoms: PT states non-healing wound on lower leg. Wound observed on medial aspect of lower leg. FINDINGS: Left foot: Calcaneus is suboptimally evaluated due to difficulty in appropriately positioning the patient. There are posterior and plantar calcaneal enthesopathic changes. There is suggestion of focal abnormal demineralization at the plantar calcaneus underlying an area of soft tissue ulceration. Partially imaged open reduction internal fixation hardware within the distal tibia and fibula. Degenerative and enthesopathic changes about the foot and ankle. Diffuse soft tissue edema. Right tib-fib: No acute fracture or dislocation. Osseous demineralization throughout. Diffuse periostitis of the tibia and fibula. Mild knee and moderate ankle degenerative changes. Diffuse soft tissue edema. Soft tissue ulceration at the plantar aspect of the heel with suggestion of focal abnormal demineralization at the plantar calcaneus suspicious for concomitant osteomyelitis. Diffuse periostitis about the right tibia and fibula which may relate to venous insufficiency. The reported soft tissue wound of the right lower extremity is not definitively appreciated radiographically on a background of diffuse edema. Xr Chest Portable    Result Date: 1/19/2021  EXAMINATION: ONE XRAY VIEW OF THE CHEST 1/19/2021 9:06 pm COMPARISON: 06/30/2020 HISTORY: ORDERING SYSTEM PROVIDED HISTORY: wheezing right lung TECHNOLOGIST PROVIDED HISTORY: wheezing right lung Reason for Exam: wheezing right lung Acuity: Acute Type of Exam: Initial FINDINGS: Mild cardiomegaly.   The mediastinal hilar contours are normal. The lungs are clear with no focal consolidation. No pleural effusion or pneumothorax. Stable calcified nodule of right lung base and bilateral hilar calcified lymph nodes, likely related to prior granulomatous disease exposure. Stable study. No acute intrathoracic pathology. Physical Examination:        PHYSICAL EXAM:  General Appearance  Alert , awake, not in acute distress  Psych: Normal mood and affect, normal behavior, not agitated, maintaining good eye contact   HEENT - Head is normocephalic, atraumatic. Neck: supple, no rigidity, normal ROM, no neck swellings, normal thyroid gland  Lungs - right lung inspiratory wheezes  Cardiovascular - Heart sounds are normal.  Regular rhythm, normal rate without murmur, gallop or rub. Abdomen - Soft, nontender, nondistended, no masses or organomegaly  Neurologic - There are no new focal motor or sensory deficits  Skin - right leg redness, plantar aspect of left heel wound  Extremities - plantar aspect of the left heel: Oozing circular wound diameter 3 cm,  questionable bone tissue seen at the base? .  Right leg redness, hotness, swelling                       Assessment:        Primary Problem  Cellulitis    Active Hospital Problems    Diagnosis Date Noted    Depression [F32.9] 01/19/2021    Cellulitis [L03.90] 06/30/2020    Essential hypertension [I10] 10/29/2019    Chronic [facility] disease ([facility] [J44.9] 10/29/2019       Plan:        Right leg cellulitis   -Signs of cellulitis with redness, hotness, painful, swelling, marked with marker   -Blood culture drawn  -Antibiotics: Vancomycin, Cefepime  -podiatry consulted  -Xray right tibia/fibula: Diffuse periostitis about the right tibia and fibula evaluate venous insufficiency.  -Norco oral as needed for pain  -CRP 45 on presentation  -ERS 71 on presentation  -Lactic acid 1.1 on presentation  -WBC 8.6 on presentation. No new leukocytosis.    -Afebrile  -Mildly Tachycardic on presentation, otherwise SIRS negative, tachycardia improving    -follow venous Doppler lower extremities     Plantar aspect of left heel wound/ulcer, suspicious concomitant osteomyelitis  -Podiatry consulted   -Actively oozing   -Xray left foot: Soft tissue ulceration at the plantar aspect of the heel with suggestion of focal abnormal demineralization of the plantar calcaneus suspicious for concomitant osteomyelitis  -left ankle MRI without contrast to rule out osteomyelitis  -Follow wound cultrues     Hx of COPD   -Stable respiratory status, normal oxygen saturation  -Mild inspiratory wheezes on the right lung during examination. -CXR no acute intrathoracic pathology  -On home nebulizers, she doesn't use them   -Home oxygen was prescribed before, patient is not using it     Prediabetes and nutrition. -POCT glucose  -maintain glucose levels  fasting and Less than 180 postprandial  -Diabetic education.   optimize nutrition for wound healing as albumin is 2.7     Hx of Hypertension and CHF (systolic and diastolic)  -On home lisinopril   -On home metoprolol   -BP on presentation was 93/64     Hx of Depression   -Paroxetine 20 mg twice daily daily     PT/OT  Code Status: Full  Diet: Regular   DVT prophylaxis: Lovenox 40 mg SC  GI prophylaxis: none       Mo'men Maikel Castellanos MD  1/20/2021  8:16 AM

## 2021-01-20 NOTE — CONSULTS
History   has a past medical history of Arthritis, CHF (congestive heart failure) ([facility] [address] COPD (chronic obstructive pulmonary disease) ([facility] [address] Diverticulitis, Hx of blood clots, and Pul[facility] ([facility] Past Surgical History   has a past surgical history that includes Ankle surgery; Colon surgery; and hernia repair. Medications  Prior to Admission medications    Medication Sig Start Date End Date Taking? Authorizing Provider   metoprolol tartrate (LOPRESSOR) 25 MG tablet TAKE 1 TABLET BY MOUTH TWICE A DAY FOR HTN  1/11/21  Yes KIRK Bobby CNP   lisinopril (PRINIVIL;ZESTRIL) 10 MG tablet TAKE 1 TABLET BY MOUTH ONCE DAILY FOR ESSENTIAL HYPERTENSION  1/11/21  Yes KIRK Bobby CNP   diclofenac sodium (VOLTAREN) 1 % GEL Apply 2 g topically 4 times daily as needed for Pain 1/7/21  Yes KIRK Bobby CNP   PARoxetine (PAXIL) 20 MG tablet Take 1 tablet by mouth 2 times daily 12/17/20  Yes KIRK Bobby CNP   miconazole (MICOTIN) 2 % powder Apply topically 2 times daily. 11/4/20  Yes Remy Boo PA-C    Scheduled Meds:   vancomycin (VANCOCIN) intermittent dosing (placeholder)   Other RX Placeholder    sodium chloride flush  10 mL Intravenous 2 times per day    enoxaparin  40 mg Subcutaneous BID    bumetanide  2 mg Oral Daily    PARoxetine  20 mg Oral BID    metoprolol tartrate  25 mg Oral BID    lisinopril  10 mg Oral Daily    [START ON 1/20/2021] cefepime  1 g Intravenous Q12H    [START ON 1/20/2021] vancomycin  1,500 mg Intravenous Q12H     Continuous Infusions:  PRN Meds:.sodium chloride flush, promethazine **OR** ondansetron, polyethylene glycol, acetaminophen **OR** acetaminophen, HYDROcodone 5 mg - acetaminophen, albuterol    Allergies  has No Known Allergies. Family History  family history includes Cancer in her mother; Diabetes in her paternal grandfather. Social History   reports that she has been smoking.  She has a 45.00 pack-year smoking history. She has never used smokeless tobacco.   reports previous alcohol use. reports previous drug use. Objective     Vitals:  Patient Vitals for the past 8 hrs:   BP Temp Temp src Pulse Resp SpO2 Height Weight   21 126/76 98.1 °F (36.7 °C) Oral 95 18 94 % -- --   21 -- -- -- -- -- -- -- (!) 342 lb 6 oz (155.3 kg)   21 1905 138/71 98 °F (36.7 °C) Oral 90 18 95 % -- --   21 1503 93/64 98.2 °F (36.8 °C) Oral 102 20 95 % 5' 7\" (1.702 m) (!) 350 lb (158.8 kg)     Average, Min, and Max for last 24 hours Vitals:  TEMPERATURE:  Temp  Av.1 °F (36.7 °C)  Min: 98 °F (36.7 °C)  Max: 98.2 °F (36.8 °C)    RESPIRATIONS RANGE: Resp  Av.7  Min: 18  Max: 20    PULSE RANGE: Pulse  Av.7  Min: 90  Max: 102    BLOOD PRESSURE RANGE:  Systolic (26SMS), ETV:078 , Min:93 , CCU:279   ; Diastolic (08TOX), SNI:62, Min:64, Max:76      PULSE OXIMETRY RANGE: SpO2  Av.7 %  Min: 94 %  Max: 95 %  I&O:  No intake/output data recorded. CBC:  Recent Labs     21  1652   WBC 8.6   HGB 12.2   HCT 36.8   *   CRP 45.6*        BMP:  Recent Labs     21  1652   *   K 4.2   CL 97*   CO2 28   BUN 16   CREATININE 0.96*   GLUCOSE 110*   CALCIUM 9.2        Coags:  No results for input(s): APTT, PROT, INR in the last 72 hours. Lab Results   Component Value Date    LABA1C 6.4 (H) 2020     Lab Results   Component Value Date    SEDRATE 71 (H) 2021     Lab Results   Component Value Date    CRP 45.6 (H) 2021         Physical Exam:  Vascular: DP and PT pulses are non-palpable, minimally audible on Doppler signal likely secondary to severe edema. CFT brisk to all digits. Hair growth is absent to the level of the digits. Severe nonpitting  lymphedema. Neuro: Saph/sural/SP/DP/plantar sensation diminished to light touch. Musculoskeletal: Muscle strength is decreased ROM, decreased strength to all lower extremity muscle groups.  Gross deformity is present with contracted digits and elephantitis. Compartments are tense. Pain with compression of posterior calf. Dermatologic: Full thickness ulcer to the level of  #1 located plantar lateral heel of left foot and measures approximately 5 cm x 4.3 cm x 1.2 cm. Base is fibronecrotic. Periwound skin is macerated hyperkeratotic. Scant purulent drainage noted with associated mal odor. Erythema present extending to mid tibia with associated increase in warmth. Does not probe to bone, sinus track, or undermine. No fluctuance, crepitus. Interdigital maceration absent. Erythema, induration, increased warmth noted to the right lower extremity consistent with cellulitis. Multiple superficial abrasions to the level of the dermis of the bilateral lower extremities. Clinical:                   Imaging:   XR FOOT LEFT (MIN 3 VIEWS)   Final Result   Soft tissue ulceration at the plantar aspect of the heel with suggestion of   focal abnormal demineralization at the plantar calcaneus suspicious for   concomitant osteomyelitis. Diffuse periostitis about the right tibia and fibula which may relate to   venous insufficiency. The reported soft tissue wound of the right lower   extremity is not definitively appreciated radiographically on a background of   diffuse edema. XR TIBIA FIBULA RIGHT (2 VIEWS)   Final Result   Soft tissue ulceration at the plantar aspect of the heel with suggestion of   focal abnormal demineralization at the plantar calcaneus suspicious for   concomitant osteomyelitis. Diffuse periostitis about the right tibia and fibula which may relate to   venous insufficiency. The reported soft tissue wound of the right lower   extremity is not definitively appreciated radiographically on a background of   diffuse edema.          XR CHEST PORTABLE    (Results Pending)   XR TIBIA FIBULA LEFT (2 VIEWS)    (Results Pending)   MRI ANKLE LEFT WO CONTRAST    (Results Pending)       Cultures: Pending    Assessment     Magaly Baxter is a 61 y.o. female with   1. Cellulitis, bilateral lower extremity  2. Kinney grade 2 ulceration to plantar lateral heel, left foot  3. Severe lymphedema, bilateral lower extremity  4. Smoker  5. CHF       Principal Problem:    Cellulitis  Active Problems:    Chronic obstructive pulmonary disease ([facility] [address]    Essential hypertension    Depression  Resolved Problems:    * No resolved hospital problems. *        Plan     · Patient examined and evaluated at bedside   · All treatment options discussed in detail with the patient  · Radiographs of the left foot and right leg reviewed and discussed in detail with patient--negative for soft tissue emphysema, questionable osteomyelitis of left calcaneus  · Tib-fib XR of left leg ordered  · MRI left foot ordered  · Admitted to medicine for primary service  · Cultures obtained  · Continue IV antibiotics with vancomycin and cefepime  · Cellulitis of bilateral lower extremities with multiple ulcerations. The most concerning ulceration is to the plantar lateral aspect of the left calcaneus. There is concern for osteomyelitis or deeper infection. We will follow MRI results and clinical response to IV antibiotics.    · Dressing applied to Bilateral lower extremity: Betadine to left heel, Adaptic, DSD, and Ace  · Weightbearing as tolerated to right lower extremity, weightbearing to forefoot of left lower extremity      [place] Friday, DPM   Podiatric Medicine & Surgery   1/19/2021 at 9:08 PM

## 2021-01-20 NOTE — PROGRESS NOTES
Physical Therapy  DATE: 2021    NAME: Rosa Maria Foster  MRN: [id number]   : 1960    Patient not seen this date for Physical Therapy due to:  [] Blood transfusion in progress  [] Hemodialysis  [] Patient Declined  [] Spine Precautions   [] Strict Bedrest  [] Surgery/ Procedure  [x] Testing 21: B LE Dopplers ordered, will await results      [] Other        [] PT is being discontinued at this time. Patient independent. No further needs. [] PT is being discontinued at this time due to declining physical/ medical status. Therapy is not appropriate at this time.     Joshua Manrique, PT

## 2021-01-20 NOTE — PROGRESS NOTES
Medication History completed:    New medications: none    Medications discontinued: albuterol inhaler, bumetanide    Changes to dosing: none    Stated allergies: NKDA    Other pertinent information: verified medications with [facility] pharmacy.     Thank you,  Horace Oliver, PharmD candidate [id number]

## 2021-01-20 NOTE — CARE COORDINATION
CASE MANAGEMENT NOTE:    Admission Date:  1/19/2021 Luis Carlos Miller is a 61 y.o.  female    Admitted for : Cellulitis [L03.90]    Met with:  Patient    PCP:  Wallace Nielson NP                                Insurance:  [facility]      Current Residence/ Living Arrangements:  Pt lives at home with her boyfriend and is dependent on his care. Current Services PTA:  No    Is patient agreeable to VNS: Yes, pt states she is agreeable to VNS, however she feels she needs to go to SNF prior to d/c home. [place] of choice provided:  NA    List of [address] provided: NA    VNS chosen:  NA    DME:  straight cane, walker and wheelchair    Home Oxygen: Yes 2L [place] 24/7, however states she does not use it. Has concentrator and portable tanks    Nebulizer: No    CPAP/BIPAP: No    Supplier: [facility]    Potential Assistance Needed: yes, SNF. SNF needed: Yes, pt states she would like [facility]. She has been there in the past and was satisfied with their care. Notified LSW of this. [place] of choice and list provided: No    Pharmacy:  [facility] on [facility]       Does Patient want to use MEDS to BEDS? No    Is patient currently receiving oral anticoagulation therapy? No    Is the Patient an [facility] with Readmission Risk Score greater than 14%? No  If yes, pt needs a follow up appointment made within 7 days. Family Members/Caregivers that pt would like involved in their care:    Yes    If yes, list name here:  [address] Provider:  [facility]             Discharge Plan:  LSW following for discharge to [facility]. PT/OT ordered.   BLE cellulitis- IV Cefepime and Vanco.                 Electronically signed by: Viry Lyman RN on 1/20/2021 at 1:23 PM

## 2021-01-20 NOTE — PROGRESS NOTES
Progress Note  Podiatric Medicine and Surgery     Subjective     CC: Cellulitis, bilateral    Patient seen and examined at bedside with Dr. Marlys Junior  No acute events overnight. Afebrile, vital signs stable  Chart and results reviewed   ID consult pending   MRI not fully completed secondary to pain       HPI :  Alisson Hubbard is a 61 y.o. female seen at [facility] for worsening wounds with concern for cellulitis of the bilateral lower extremity. The wound that is most concerning is to plantar lateral aspect of the left heel. Patient states that she has had the wounds for several months that started back in June 2020, at which time she was admitted for similar complaints. Patient never maintained follow-up recommendations on outpatient basis. Patient presented to the ED due to progressive redness, swelling, pain to the bilateral lower extremities. Radiographs of the left foot and right leg were obtained which were negative for soft tissue emphysema, questionable for osteomyelitis of the left calcaneus. Infectious lab work-up revealed an elevated CRP of 45.6 and ESR of 71. WBC is 8.6. Patient states that she is minimally ambulatory mostly bedridden. Patient denies any trauma to the bilateral lower extremities. Patient denies any purulent drainage from the wound sites. Patient states she is not been performing any kind of wound care at home. Patient admits to smoking approximately 1 pack/day. She admits to numbness and tingling to the bilateral lower extremities.     PCP is KIRK Joseph CNP    ROS: Denies N/V/F/C/SOB/CP. Otherwise negative except at stated in the HPI.      Medications:  Scheduled Meds:   vancomycin (VANCOCIN) intermittent dosing (placeholder)   Other RX Placeholder    sodium chloride flush  10 mL Intravenous 2 times per day    enoxaparin  40 mg Subcutaneous BID    bumetanide  2 mg Oral Daily    PARoxetine  20 mg Oral BID    metoprolol tartrate  25 mg Oral BID    lisinopril 10 mg Oral Daily    cefepime  1 g Intravenous Q12H    vancomycin  1,500 mg Intravenous Q12H       Continuous Infusions:    PRN Meds:miconazole, sodium chloride flush, promethazine **OR** ondansetron, polyethylene glycol, acetaminophen **OR** acetaminophen, albuterol, oxyCODONE-acetaminophen **OR** oxyCODONE-acetaminophen    Objective     Vitals:  Patient Vitals for the past 8 hrs:   BP Temp Temp src Pulse Resp SpO2   21 0720 (!) 105/42 98.4 °F (36.9 °C) Oral 76 18 91 %     Average, Min, and Max for last 24 hours Vitals:  TEMPERATURE:  Temp  Av.4 °F (36.9 °C)  Min: 98 °F (36.7 °C)  Max: 99.1 °F (37.3 °C)    RESPIRATIONS RANGE: Resp  Av.4  Min: 18  Max: 20    PULSE RANGE: Pulse  Av.8  Min: 76  Max: 102    BLOOD PRESSURE RANGE:  Systolic (08JKW), UMO:746 , Min:93 , DAB:066   ; Diastolic (73NQB), CER:05, Min:42, Max:76      PULSE OXIMETRY RANGE: SpO2  Av.4 %  Min: 91 %  Max: 97 %    I/O last 3 completed shifts: In: 1050 [IV Piggyback:1050]  Out: -     CBC:  Recent Labs     21  1652 21  0511   WBC 8.6 6.7   HGB 12.2 11.4*   HCT 36.8 34.9*   * 440   CRP 45.6*  --         BMP:  Recent Labs     21  1652 21  0511   * 134*   K 4.2 4.1   CL 97* 100   CO2 28 25   BUN 16 13   CREATININE 0.96* 0.74   GLUCOSE 110* 110*   CALCIUM 9.2 8.7        Coags:  Recent Labs     21  0511   PROT 6.1*       Lab Results   Component Value Date    SEDRATE 71 (H) 2021     Recent Labs     21  1652   CRP 45.6*       Physical Exam:  Vascular: DP and PT pulses are non-palpable, minimally audible on Doppler signal likely secondary to severe edema. CFT brisk to all digits. Hair growth is absent to the level of the digits. Severe nonpitting  lymphedema.       Neuro: Saph/sural/SP/DP/plantar sensation diminished to light touch.     Musculoskeletal: Muscle strength is decreased ROM, decreased strength to all lower extremity muscle groups.  Gross deformity is present with contracted digits and elephantitis. Compartments are tense. Pain with compression of posterior calf.      Dermatologic: Full thickness ulcer to the level of subcutaneous tissue located plantar lateral heel of left foot and measures approximately 5 cm x 4.3 cm x 1.2 cm. Base is fibro-granular with necrotic grey tissue centrally. Periwound skin is macerated hyperkeratotic. No purulent drainage. Malodor is present. Erythema present with associated increase in warmth. Does not probe to bone, sinus track, or undermine. No fluctuance, crepitus. Right lower leg with ulceration to the level of dermis. No fluctuance or crepitus. No purulent drainage. Erythema, induration, increased warmth noted to the right lower extremity consistent with cellulitis.          Clinical Images:                  Imaging:   MRI ANKLE LEFT WO CONTRAST   Final Result   Significantly limited study. Soft tissue ulceration underlying the calcaneus with questionable early   osteomyelitis versus reactive marrow changes. XR TIBIA FIBULA LEFT (2 VIEWS)   Final Result   Stable study. Diffuse soft tissue swelling may be related to infection. No osseous or hardware related abnormality. XR CHEST PORTABLE   Final Result   Stable study. No acute intrathoracic pathology. XR FOOT LEFT (MIN 3 VIEWS)   Final Result   Soft tissue ulceration at the plantar aspect of the heel with suggestion of   focal abnormal demineralization at the plantar calcaneus suspicious for   concomitant osteomyelitis. Diffuse periostitis about the right tibia and fibula which may relate to   venous insufficiency. The reported soft tissue wound of the right lower   extremity is not definitively appreciated radiographically on a background of   diffuse edema.          XR TIBIA FIBULA RIGHT (2 VIEWS)   Final Result   Soft tissue ulceration at the plantar aspect of the heel with suggestion of   focal abnormal demineralization at the service  · Continue IV antibiotics with vancomycin and cefepime  · Sharp selective excisional debridement performed of the plantar lateral ulceration to the left foot of necrotic tissue utilizing scissor and pickup. No anesthesia required. Hemostasis not needed. Patient reports 0/10 post procedure pain. · Cellulitis of bilateral lower extremities with multiple ulcerations. The most concerning ulceration is to the plantar lateral aspect of the left calcaneus. There is concern for possible osteomyelitis of the calcaneus. Clinically the patient is improving on IV antibiotics with bilateral compressive dressings. We will discuss case with ID team to determine if bone biopsy of calcaneus is appropriate. · Dressing applied to Bilateral lower extremity: Aquacel to left heel, Adaptic, DSD, and Ace  · Weightbearing as tolerated to right lower extremity, weightbearing to forefoot of left lower extremity    Sanaz Hoff DPM   Podiatric Medicine & Surgery   1/20/2021 at 12:38 PM     I performed a history and physical examination of the patient and discussed management with the patient and the resident. I reviewed the residents note and agree with the documented findings and plan of care. Any changes by me in the subjective, objective, assessment, and plan sections were made in the note. I was personally present for the key portions of any procedures. Additional findings and plans are as noted below.     Radha Mckeon DPM 1/20/2021 at 1:29 PM

## 2021-01-20 NOTE — ED NOTES
Admission Dx: cellulitis     Pts Chief Complaints on Arrival: leg swelling and open wounds     ADL's - Partial assistance ( [patient is self care at home, but state sshe may need help with her legs while here) patient uses walker. Pending Diagnostics:  NA     Residence PTA: home     Special Considerations/Circumstances: patient states she hasn't followed with wound care for her legs.      Vitals: Current vital signs:  /71   Pulse 90   Temp 98 °F (36.7 °C) (Oral)   Resp 18   Ht 5' 7\" (1.702 m)   Wt (!) 350 lb (158.8 kg)   SpO2 95%   BMI 54.82 kg/m²                MEWS Score: [address] [facility], RN  01/19/21 1920

## 2021-01-20 NOTE — PROGRESS NOTES
Pharmacy Note  Vancomycin Consult    Calin Pack is a 61 y.o. female started on Vancomycin for cellulitis; consult received from Dr. Violeta Kay to manage therapy. Also receiving the following antibiotics: cefepime. Patient Active Problem List   Diagnosis    Chronic heart failure with preserved ejection fraction (HCC)    Chronic obstructive pulmonary disease (HCC)    Tobacco abuse    Class 3 severe obesity with serious comorbidity and body mass index (BMI) of 50.0 to 59.9 in adult [facility])    Essential hypertension    History of pulmonary embolism    Family history of colon cancer in mother    Prediabetes    Class 4 congestive heart failure, acute on chronic, systolic (HCC)    Hypokalemia    Cellulitis    Primary osteoarthritis of right hip    Chronic pain of multiple joints    Depression       Allergies:  Patient has no known allergies. Temp max: 98.2 F (36.7 C)    Recent Labs     01/19/21  1652   BUN 16       Recent Labs     01/19/21  1652   CREATININE 0.96*       Recent Labs     01/19/21  1652   WBC 8.6         Intake/Output Summary (Last 24 hours) at 1/19/2021 1930  Last data filed at 1/19/2021 1924  Gross per 24 hour   Intake 1050 ml   Output --   Net 1050 ml       Culture Date      Source                       Results  Pending    Ht Readings from Last 1 Encounters:   01/19/21 5' 7\" (1.702 m)        Wt Readings from Last 1 Encounters:   01/19/21 (!) 350 lb (158.8 kg)         Body mass index is 54.82 kg/m². Estimated Creatinine Clearance: 99 mL/min (A) (based on SCr of 0.96 mg/dL (H)). Goal Trough Level: 15-20 mcg/mL    Assessment/Plan:  Will initiate Vancomycin with a one time loading dose of 2500 mg x1, followed by 1500 mg IV every 12 hours. Timing of trough level will be determined based on culture results, renal function, and clinical response. Thank you for the consult. Will continue to follow.   Kirill Catherine, PharmD, BCPS

## 2021-01-20 NOTE — PROGRESS NOTES
[address]   OCCUPATIONAL THERAPY MISSED TREATMENT NOTE   INPATIENT   Date: 21  [facility]       Room: 0257/6911-88  MRN: [id number]   Account #: [de-identified]    : 1960  (61 y.o.)  Gender: female                 REASON FOR MISSED TREATMENT:  Patient unable to participate   -   Other - B LE Dopplers ordered, will await results          Lucy Silva OT

## 2021-01-20 NOTE — PROGRESS NOTES
SW informed of need for referral to [facility]. Referral sent. Pt will be accepted, however will need pt/ot evaluations.

## 2021-01-20 NOTE — PLAN OF CARE
Problem: Falls - Risk of:  Goal: Will remain free from falls  Description: Will remain free from falls  1/20/2021 1745 by Annie Beyer RN  Outcome: Ongoing, aware of limitations      Problem: SAFETY  Goal: Free from accidental physical injury  1/20/2021 1745 by Annie Beyer RN  Outcome: Ongoing, Patient is alert and oriented, able to make needs known.  Patient call light within reach,      Problem: DAILY CARE  Goal: Daily care needs are met  1/20/2021 1745 by Annie Beyer RN  Outcome: Ongoing, Patient care provided per PCT     Problem: PAIN  Goal: Patient's pain/discomfort is manageable  1/20/2021 1745 by Annie Beyer RN  Outcome: Ongoing, Patient complains of pain, medications help      Problem: SKIN INTEGRITY  Goal: Skin integrity is maintained or improved  1/20/2021 1745 by Annie Beyer RN  Outcome: Ongoing, Patient refused air mattress overlay

## 2021-01-20 NOTE — CONSULTS
[facility] -   Infectious diseases evaluation  admission date 1/19/2021    reason for consultation:     Bilateral lower extremity cellulitis  Impression :   Current:  · Left heel wound with possible calcaneus osteomyelitis  · Bilateral lower extremity left more than right  · Lymphedema  · Active smoker  · CHF  · COPD  · Hypertension  · Depression  · Prediabetic    Recommendations   · Continue IV vancomycin and cefepime  · Add Flagyl  · Arterial Doppler  · Consider debridement and bone biopsy  · I will discuss with podiatry  · Follow blood and wound cultures  · Follow CBC renal function  · Follow C-reactive protein and sedimentation rate  · Likely needs PICC line and IV antibiotics on discharge  · Discussed with nursing staff        History of Present Illness:   Initial history: Luis Carlos Miller is a 61y.o.-year-old female presented to the hospital with worsening left heel pain with open wound , foul-smelling tissue associated with the bilateral lower extremity edema and redness more on the left side for 2 weeks. The patient had nonhealing wound to the left heel for several months. She also had open wounds to the right and left legs below-knee. She denied any fever or chills  X-ray and left foot MRI suggestive of left calcaneus osteomyelitis. Initial labs showed C-reactive protein of 45, sedimentation rate of 71, WBC of 8.6  The patient is actively smoking 1 pack a day, had chronic cough and shortness of breath on exertion not increased.   She does have numbness to the feet      Interval changes  1/20/2021   Patient Vitals for the past 8 hrs:   BP Temp Temp src Pulse Resp SpO2 Height   01/20/21 1408 -- -- -- -- -- -- 5' 7\" (1.702 m)   01/20/21 1330 (!) 118/50 98.7 °F (37.1 °C) Oral 82 18 93 % --   01/20/21 0720 (!) 105/42 98.4 °F (36.9 °C) Oral 76 18 91 % --             I have personally reviewed the past medical history, past surgical history, medications, social history, and family history, and I haveupdated the database accordingly. Allergies:   Patient has no known allergies. Review of Systems:     Review of Systems  As per history present illness, other than above 12 system review was negative  Physical Examination :       Physical Exam  Constitutional:       General: She is not in acute distress. HENT:      Head: Normocephalic and atraumatic. Right Ear: External ear normal.      Left Ear: External ear normal.      Nose: Nose normal. No rhinorrhea. Eyes:      General: No scleral icterus. Conjunctiva/sclera: Conjunctivae normal.   Neck:      Musculoskeletal: Neck supple. No neck rigidity. Pulmonary:      Effort: Pulmonary effort is normal.      Breath sounds: Wheezing present. Abdominal:      General: Abdomen is flat. Palpations: Abdomen is soft. Tenderness: There is no abdominal tenderness. Musculoskeletal:      Right lower leg: Edema present. Left lower leg: Edema present. Skin:     General: Skin is warm. Coloration: Skin is not jaundiced. Comments: Bilateral lower extremity erythema warmth, venous stasis dermatitis with open ulcers  Left heel the wound with necrotic, foul-smelling tissue, no probe to bone, no fluctuation, no crepitance. Neurological:      General: No focal deficit present. Mental Status: She is alert and oriented to person, place, and time.    Psychiatric:         Mood and Affect: Mood normal.         Past Medical History:     Past Medical History:   Diagnosis Date    Arthritis     CHF (congestive heart failure) ([address]     COPD [facility] disease[facility] [address]     Diverticulitis     Hx of blood clots     Pulmonary embolism (HCC)        Past Surgical  History:     Past Surgical History:   Procedure Laterality Date    ANKLE SURGERY      COLON SURGERY      HERNIA REPAIR         Medications:      vancomycin (VANCOCIN) intermittent dosing (placeholder)   Other RX Placeholder independently reviewed/ordered the following labs:    CBC with Differential:   Recent Labs     01/19/21  1652 01/20/21  0511   WBC 8.6 6.7   HGB 12.2 11.4*   HCT 36.8 34.9*   * 440   LYMPHOPCT 17* 20*   MONOPCT 10* 8*     BMP:  Recent Labs     01/19/21  1652 01/20/21  0511   * 134*   K 4.2 4.1   CL 97* 100   CO2 28 25   BUN 16 13   CREATININE 0.96* 0.74     Hepatic Function Panel:   Recent Labs     01/20/21  0511   PROT 6.1*   LABALBU 2.7*   BILITOT 0.38   ALKPHOS 64   ALT <5*   AST 7     No results for input(s): RPR in the last 72 hours. No results for input(s): HIV in the last 72 hours. No results for input(s): BC in the last 72 hours. Lab Results   Component Value Date    CREATININE 0.74 01/20/2021    GLUCOSE 110 01/20/2021       Detailed results: Thank you for allowing us to participate in the care of this patient. Please call with questions. This note is created with the assistance of a speech recognition program.  While intending to generate adocument that actually reflects the content of the visit, the document can still have some errors including those of syntax and sound a like substitutions which may escape proof reading. It such instances, actual meaningcan be extrapolated by contextual diversion.     Naveed Calvert MD  Office: [phone]  Perfect serve / office [phone] None known

## 2021-01-20 NOTE — CARE COORDINATION
Admitted to room 2074 from ED per cart, VS taken, oriented to room, instructed on call light use, and orders reviewed.

## 2021-01-21 ENCOUNTER — ANESTHESIA EVENT (OUTPATIENT)
Dept: OPERATING ROOM | Age: 61
DRG: 317 | End: 2021-01-21
Payer: COMMERCIAL

## 2021-01-21 PROBLEM — I73.9 PERIPHERAL ARTERY DISEASE (HCC): Status: ACTIVE | Noted: 2021-01-21

## 2021-01-21 LAB
ABSOLUTE EOS #: 0.3 K/UL (ref 0–0.4)
ABSOLUTE IMMATURE GRANULOCYTE: ABNORMAL K/UL (ref 0–0.3)
ABSOLUTE LYMPH #: 1.5 K/UL (ref 1–4.8)
ABSOLUTE MONO #: 0.5 K/UL (ref 0.1–1.3)
ALBUMIN SERPL-MCNC: 2.8 G/DL (ref 3.5–5.2)
ALBUMIN/GLOBULIN RATIO: ABNORMAL (ref 1–2.5)
ALP BLD-CCNC: 59 U/L (ref 35–104)
ALT SERPL-CCNC: <5 U/L (ref 5–33)
ANION GAP SERPL CALCULATED.3IONS-SCNC: 9 MMOL/L (ref 9–17)
AST SERPL-CCNC: 7 U/L
BASOPHILS # BLD: 1 % (ref 0–2)
BASOPHILS ABSOLUTE: 0 K/UL (ref 0–0.2)
BILIRUB SERPL-MCNC: 0.25 MG/DL (ref 0.3–1.2)
BUN BLDV-MCNC: 18 MG/DL (ref 8–23)
BUN/CREAT BLD: ABNORMAL (ref 9–20)
CALCIUM SERPL-MCNC: 9.1 MG/DL (ref 8.6–10.4)
CHLORIDE BLD-SCNC: 98 MMOL/L (ref 98–107)
CO2: 27 MMOL/L (ref 20–31)
CREAT SERPL-MCNC: 0.78 MG/DL (ref 0.5–0.9)
CULTURE: ABNORMAL
DIFFERENTIAL TYPE: ABNORMAL
DIRECT EXAM: ABNORMAL
EOSINOPHILS RELATIVE PERCENT: 5 % (ref 0–4)
ESTIMATED AVERAGE GLUCOSE: 131 MG/DL
GFR AFRICAN AMERICAN: >60 ML/MIN
GFR NON-AFRICAN AMERICAN: >60 ML/MIN
GFR SERPL CREATININE-BSD FRML MDRD: ABNORMAL ML/MIN/{1.73_M2}
GFR SERPL CREATININE-BSD FRML MDRD: ABNORMAL ML/MIN/{1.73_M2}
GLUCOSE BLD-MCNC: 106 MG/DL (ref 65–105)
GLUCOSE BLD-MCNC: 114 MG/DL (ref 70–99)
HBA1C MFR BLD: 6.2 % (ref 4–6)
HCT VFR BLD CALC: 34.1 % (ref 36–46)
HEMOGLOBIN: 11.5 G/DL (ref 12–16)
IMMATURE GRANULOCYTES: ABNORMAL %
LYMPHOCYTES # BLD: 28 % (ref 24–44)
Lab: ABNORMAL
MCH RBC QN AUTO: 31.7 PG (ref 26–34)
MCHC RBC AUTO-ENTMCNC: 33.7 G/DL (ref 31–37)
MCV RBC AUTO: 93.9 FL (ref 80–100)
MONOCYTES # BLD: 9 % (ref 1–7)
NRBC AUTOMATED: ABNORMAL PER 100 WBC
PDW BLD-RTO: 13.7 % (ref 11.5–14.9)
PLATELET # BLD: 430 K/UL (ref 150–450)
PLATELET ESTIMATE: ABNORMAL
PMV BLD AUTO: 7.3 FL (ref 6–12)
POTASSIUM SERPL-SCNC: 3.8 MMOL/L (ref 3.7–5.3)
RBC # BLD: 3.63 M/UL (ref 4–5.2)
RBC # BLD: ABNORMAL 10*6/UL
SEG NEUTROPHILS: 57 % (ref 36–66)
SEGMENTED NEUTROPHILS ABSOLUTE COUNT: 3.1 K/UL (ref 1.3–9.1)
SODIUM BLD-SCNC: 134 MMOL/L (ref 135–144)
SPECIMEN DESCRIPTION: ABNORMAL
TOTAL PROTEIN: 6.3 G/DL (ref 6.4–8.3)
VANCOMYCIN TROUGH DATE LAST DOSE: ABNORMAL
VANCOMYCIN TROUGH DOSE AMOUNT: ABNORMAL
VANCOMYCIN TROUGH TIME LAST DOSE: 1605
VANCOMYCIN TROUGH: 24.7 UG/ML (ref 10–20)
WBC # BLD: 5.4 K/UL (ref 3.5–11)
WBC # BLD: ABNORMAL 10*3/UL

## 2021-01-21 PROCEDURE — 97535 SELF CARE MNGMENT TRAINING: CPT

## 2021-01-21 PROCEDURE — 6370000000 HC RX 637 (ALT 250 FOR IP): Performed by: STUDENT IN AN ORGANIZED HEALTH CARE EDUCATION/TRAINING PROGRAM

## 2021-01-21 PROCEDURE — 85025 COMPLETE CBC W/AUTO DIFF WBC: CPT

## 2021-01-21 PROCEDURE — U0003 INFECTIOUS AGENT DETECTION BY NUCLEIC ACID (DNA OR RNA); SEVERE ACUTE RESPIRATORY SYNDROME CORONAVIRUS 2 (SARS-COV-2) (CORONAVIRUS DISEASE [COVID-19]), AMPLIFIED PROBE TECHNIQUE, MAKING USE OF HIGH THROUGHPUT TECHNOLOGIES AS DESCRIBED BY CMS-2020-01-R: HCPCS

## 2021-01-21 PROCEDURE — 97161 PT EVAL LOW COMPLEX 20 MIN: CPT

## 2021-01-21 PROCEDURE — 36415 COLL VENOUS BLD VENIPUNCTURE: CPT

## 2021-01-21 PROCEDURE — 99232 SBSQ HOSP IP/OBS MODERATE 35: CPT | Performed by: INTERNAL MEDICINE

## 2021-01-21 PROCEDURE — 97165 OT EVAL LOW COMPLEX 30 MIN: CPT

## 2021-01-21 PROCEDURE — U0005 INFEC AGEN DETEC AMPLI PROBE: HCPCS

## 2021-01-21 PROCEDURE — 97530 THERAPEUTIC ACTIVITIES: CPT

## 2021-01-21 PROCEDURE — 80202 ASSAY OF VANCOMYCIN: CPT

## 2021-01-21 PROCEDURE — 2500000003 HC RX 250 WO HCPCS: Performed by: INTERNAL MEDICINE

## 2021-01-21 PROCEDURE — 6360000002 HC RX W HCPCS: Performed by: INTERNAL MEDICINE

## 2021-01-21 PROCEDURE — 2580000003 HC RX 258: Performed by: INTERNAL MEDICINE

## 2021-01-21 PROCEDURE — 2580000003 HC RX 258: Performed by: STUDENT IN AN ORGANIZED HEALTH CARE EDUCATION/TRAINING PROGRAM

## 2021-01-21 PROCEDURE — 80053 COMPREHEN METABOLIC PANEL: CPT

## 2021-01-21 PROCEDURE — 6360000002 HC RX W HCPCS: Performed by: STUDENT IN AN ORGANIZED HEALTH CARE EDUCATION/TRAINING PROGRAM

## 2021-01-21 PROCEDURE — 99213 OFFICE O/P EST LOW 20 MIN: CPT

## 2021-01-21 PROCEDURE — 1200000000 HC SEMI PRIVATE

## 2021-01-21 PROCEDURE — 83036 HEMOGLOBIN GLYCOSYLATED A1C: CPT

## 2021-01-21 PROCEDURE — 99233 SBSQ HOSP IP/OBS HIGH 50: CPT | Performed by: INTERNAL MEDICINE

## 2021-01-21 PROCEDURE — 82947 ASSAY GLUCOSE BLOOD QUANT: CPT

## 2021-01-21 RX ORDER — ALBUTEROL SULFATE 2.5 MG/3ML
2.5 SOLUTION RESPIRATORY (INHALATION)
Status: DISCONTINUED | OUTPATIENT
Start: 2021-01-21 | End: 2021-01-23 | Stop reason: HOSPADM

## 2021-01-21 RX ORDER — ATORVASTATIN CALCIUM 40 MG/1
40 TABLET, FILM COATED ORAL NIGHTLY
Status: DISCONTINUED | OUTPATIENT
Start: 2021-01-21 | End: 2021-01-23 | Stop reason: HOSPADM

## 2021-01-21 RX ORDER — ASPIRIN 81 MG/1
81 TABLET ORAL DAILY
Qty: 30 TABLET | Refills: 3 | Status: ON HOLD | OUTPATIENT
Start: 2021-01-21 | End: 2021-05-28 | Stop reason: SDUPTHER

## 2021-01-21 RX ADMIN — METOPROLOL TARTRATE 25 MG: 25 TABLET, FILM COATED ORAL at 09:26

## 2021-01-21 RX ADMIN — METRONIDAZOLE 500 MG: 500 INJECTION, SOLUTION INTRAVENOUS at 16:03

## 2021-01-21 RX ADMIN — OXYCODONE HYDROCHLORIDE AND ACETAMINOPHEN 2 TABLET: 5; 325 TABLET ORAL at 09:27

## 2021-01-21 RX ADMIN — BUMETANIDE 2 MG: 1 TABLET ORAL at 09:27

## 2021-01-21 RX ADMIN — METRONIDAZOLE 500 MG: 500 INJECTION, SOLUTION INTRAVENOUS at 22:28

## 2021-01-21 RX ADMIN — OXYCODONE HYDROCHLORIDE AND ACETAMINOPHEN 2 TABLET: 5; 325 TABLET ORAL at 19:45

## 2021-01-21 RX ADMIN — CEFEPIME 1 G: 1 INJECTION, POWDER, FOR SOLUTION INTRAMUSCULAR; INTRAVENOUS at 19:46

## 2021-01-21 RX ADMIN — LISINOPRIL 10 MG: 10 TABLET ORAL at 09:27

## 2021-01-21 RX ADMIN — PAROXETINE HYDROCHLORIDE 20 MG: 20 TABLET, FILM COATED ORAL at 19:46

## 2021-01-21 RX ADMIN — ENOXAPARIN SODIUM 40 MG: 40 INJECTION SUBCUTANEOUS at 09:27

## 2021-01-21 RX ADMIN — CEFEPIME 1000 MG: 1 INJECTION, POWDER, FOR SOLUTION INTRAMUSCULAR; INTRAVENOUS at 07:15

## 2021-01-21 RX ADMIN — OXYCODONE HYDROCHLORIDE AND ACETAMINOPHEN 2 TABLET: 5; 325 TABLET ORAL at 23:31

## 2021-01-21 RX ADMIN — ENOXAPARIN SODIUM 40 MG: 40 INJECTION SUBCUTANEOUS at 19:46

## 2021-01-21 RX ADMIN — VANCOMYCIN HYDROCHLORIDE 1500 MG: 1.5 INJECTION, POWDER, LYOPHILIZED, FOR SOLUTION INTRAVENOUS at 17:14

## 2021-01-21 RX ADMIN — METRONIDAZOLE 500 MG: 500 INJECTION, SOLUTION INTRAVENOUS at 08:48

## 2021-01-21 RX ADMIN — OXYCODONE HYDROCHLORIDE AND ACETAMINOPHEN 2 TABLET: 5; 325 TABLET ORAL at 05:15

## 2021-01-21 RX ADMIN — ATORVASTATIN CALCIUM 40 MG: 40 TABLET, FILM COATED ORAL at 19:46

## 2021-01-21 RX ADMIN — Medication 10 ML: at 09:00

## 2021-01-21 RX ADMIN — PAROXETINE HYDROCHLORIDE 20 MG: 20 TABLET, FILM COATED ORAL at 09:26

## 2021-01-21 RX ADMIN — OXYCODONE HYDROCHLORIDE AND ACETAMINOPHEN 2 TABLET: 5; 325 TABLET ORAL at 15:16

## 2021-01-21 ASSESSMENT — PAIN DESCRIPTION - PAIN TYPE: TYPE: ACUTE PAIN

## 2021-01-21 ASSESSMENT — PAIN DESCRIPTION - ORIENTATION
ORIENTATION: LEFT
ORIENTATION: LEFT;RIGHT

## 2021-01-21 ASSESSMENT — ENCOUNTER SYMPTOMS: STRIDOR: 0

## 2021-01-21 ASSESSMENT — PAIN SCALES - GENERAL
PAINLEVEL_OUTOF10: 10
PAINLEVEL_OUTOF10: 9
PAINLEVEL_OUTOF10: 9

## 2021-01-21 ASSESSMENT — LIFESTYLE VARIABLES: SMOKING_STATUS: 1

## 2021-01-21 NOTE — PLAN OF CARE
breakdown  1/21/2021 0344 by Erin Elizabeth RN  Outcome: Ongoing  Note: Pt skin integrity remained intact, no new alterations noted. Head to toe completed, see chart assessment. Problem: Pain:  Goal: Pain level will decrease  Description: Pain level will decrease  1/21/2021 0344 by Erin Elizabeth RN  Outcome: Ongoing  Note: Pain in BLE. Medicated as needed. Patient tolerating      Problem: Pain:  Goal: Control of acute pain  Description: Control of acute pain  1/21/2021 0344 by Erin Elizabeth RN  Outcome: Ongoing  Note: Pain in BLE. Medicated as needed. Patient tolerating      Problem: Pain:  Goal: Control of chronic pain  Description: Control of chronic pain  1/21/2021 0344 by Erin Elizabeth RN  Outcome: Ongoing  Note: Pain in BLE. Medicated as needed.  Patient tolerating      Problem: Nutrition  Goal: Optimal nutrition therapy  1/21/2021 0344 by Erin Elizabeth RN  Outcome: Ongoing  Note: Adequate nutrition maintained

## 2021-01-21 NOTE — ANESTHESIA PRE PROCEDURE
 Family history of colon cancer in mother [de-identified]    Prediabetes R73.03    Class 4 congestive heart failure, acute on chronic, systolic (HCC) R74.50    Hypokalemia E87.6    Cellulitis L03.90    Primary osteoarthritis of right hip M16.11    Chronic pain of multiple joints M25.50, G89.29    Depression F32.9    Cellulitis of right leg L03.115       Past Medical History:        Diagnosis Date    Arthritis     CHF (congestive heart failure) (HCC)     COPD (chronic obstructive pulmonary disease) (HCC)     Diverticulitis     Hx of blood clots     Pulmonary embolism (HCC)        Past Surgical History:        Procedure Laterality Date    ANKLE SURGERY      COLON SURGERY      HERNIA REPAIR         Social History:    Social History     Tobacco Use    Smoking status: Current Every Day Smoker     Packs/day: 1.00     Years: 45.00     Pack years: 45.00    Smokeless tobacco: Never Used   Substance Use Topics    Alcohol use: Not Currently     Comment: 1-2 times per week                                Ready to quit: Not Answered  Counseling given: Not Answered      Vital Signs (Current):   Vitals:    01/20/21 1408 01/20/21 1942 01/20/21 2230 01/21/21 0920   BP:  (!) 114/54 (!) 119/44 (!) 138/55   Pulse:  77  93   Resp:  18  18   Temp:  98.3 °F (36.8 °C)  98.2 °F (36.8 °C)   TempSrc:  Oral  Oral   SpO2:  90%  92%   Weight:       Height: 5' 7\" (1.702 m)                                                 BP Readings from Last 3 Encounters:   01/21/21 (!) 138/55   11/05/20 (!) 140/78   09/23/20 124/62       NPO Status:                                                                                 BMI:   Wt Readings from Last 3 Encounters:   01/19/21 (!) 342 lb 6 oz (155.3 kg)   11/05/20 (!) 330 lb (149.7 kg)   09/23/20 (!) 336 lb (152.4 kg)     Body mass index is 53.62 kg/m².     CBC:   Lab Results   Component Value Date    WBC 5.4 01/21/2021    RBC 3.63 01/21/2021    HGB 11.5 01/21/2021    HCT 34.1 01/21/2021 MCV 93.9 01/21/2021    RDW 13.7 01/21/2021     01/21/2021       CMP:   Lab Results   Component Value Date     01/21/2021    K 3.8 01/21/2021    CL 98 01/21/2021    CO2 27 01/21/2021    BUN 18 01/21/2021    CREATININE 0.78 01/21/2021    GFRAA >60 01/21/2021    LABGLOM >60 01/21/2021    GLUCOSE 114 01/21/2021    PROT 6.3 01/21/2021    CALCIUM 9.1 01/21/2021    BILITOT 0.25 01/21/2021    ALKPHOS 59 01/21/2021    AST 7 01/21/2021    ALT <5 01/21/2021       POC Tests:   Recent Labs     01/21/21  1123   POCGLU 106*       Coags:   Lab Results   Component Value Date    PROTIME 13.5 10/25/2019    INR 1.0 10/25/2019    APTT 30.6 10/25/2019       HCG (If Applicable): No results found for: PREGTESTUR, PREGSERUM, HCG, HCGQUANT     ABGs: No results found for: PHART, PO2ART, OQJ9AKV, ZWW0IMB, BEART, H9FUBXGH     Type & Screen (If Applicable):  No results found for: LABABO, LABRH    Drug/Infectious Status (If Applicable):  No results found for: HIV, HEPCAB    COVID-19 Screening (If Applicable):   Lab Results   Component Value Date    COVID19 NOT DONE 06/30/2020         Anesthesia Evaluation  Patient summary reviewed and Nursing notes reviewed  Airway: Mallampati: III  TM distance: >3 FB   Neck ROM: full  Mouth opening: > = 3 FB Dental: normal exam         Pulmonary: breath sounds clear to auscultation  (+) COPD:  current smoker    (-) rhonchi, wheezes, rales and stridor                           Cardiovascular:    (+) hypertension: no interval change, CHF: no interval change, peripheral edema,     (-) murmur, weak pulses,  friction rub, systolic click, carotid bruit and  JVD    ECG reviewed  Rhythm: regular  Rate: normal  Echocardiogram reviewed               ROS comment: Global left ventricular systolic function is normal. Estimated LV EF >55 %     Neuro/Psych:   (+) psychiatric history: stable with treatment            GI/Hepatic/Renal:   (+) morbid obesity          Endo/Other:                     Abdominal: (+) obese,         Vascular:   + PE. Anesthesia Plan      MAC     ASA 3       Induction: intravenous. MIPS: Postoperative opioids intended and Prophylactic antiemetics administered. Anesthetic plan and risks discussed with patient. Plan discussed with CRNA.                   Jaky Waters MD   1/21/2021

## 2021-01-21 NOTE — PROGRESS NOTES
Physician Progress Note      Luis Pantoja  CSN #:                  [id number]  :                       1960  ADMIT DATE:       2021 3:49 PM  [address] [place] DATE:  RESPONDING  PROVIDER #:        Patricia Matt MD          QUERY TEXT:    Patient admitted with LE cellulitis and left heel ulcer. Per WOCN, noted to   also have decubitus ulcers to buttocks. If possible, please document in   progress notes and discharge summary the location and POA status of the   decubitus ulcers: The medical record reflects the following:  Risk Factors: reported minimally ambulatory, most bedridden, incontinent of   urine  Clinical Indicators:  WOCN note:  decubitus ulcer of right buttock, stage   3 which was present on admission and decubitus ulcer of left buttock near   gluteal fold, stage 2, which was present on admission  Treatment: as directed by [facility] :  turn every 2 hours, routine incontinence   care, foam dressing to right buttock, zinc paste to ulcer near gluteal fold,   static overlay mattress  Options provided:  -- Stage 3 decubitus pressure ulcer of right buttock and stage 2 pressure   ulcer of left buttock near gluteal fold present on admission  -- Other - I will add my own diagnosis  -- Disagree - Not applicable / Not valid  -- Disagree - Clinically unable to determine / Unknown  -- Refer to Clinical Documentation Reviewer    PROVIDER RESPONSE TEXT:    This patient has a Stage 3 decubitus pressure ulcer of the right buttock and   stage 2 pressure ulcer of left buttock near gluteal fold which were present on   admission.     Query created by: Rebeka Chaudhary on 2021 1:46 PM      Electronically signed by:  Patricia Matt MD 2021 2:48 PM

## 2021-01-21 NOTE — PROGRESS NOTES
[facility] -   Infectious diseases evaluation  admission date 1/19/2021    reason for consultation:     Bilateral lower extremity cellulitis  Impression :   Current:  · Left heel wound with possible calcaneus osteomyelitis  · Bilateral lower extremity left more than right  · Lymphedema  · Active smoker  · CHF  · COPD  · Hypertension  · Depression  · Prediabetic    Recommendations   · Continue IV vancomycin ,cefepime and Flagyl  · Arterial Doppler suggestive of bilateral SF a stenosis  · Surgical debridement and bone biopsy planned for tomorrow  · Follow blood and wound cultures  · Follow CBC, vancomycin level and renal function  · Follow C-reactive protein and sedimentation rate  · Likely needs PICC line and IV antibiotics on discharge  · Discussed with nursing staff        History of Present Illness:   Initial history: Alisson Hubbard is a 61y.o.-year-old female presented to the hospital with worsening left heel pain with open wound , foul-smelling tissue associated with the bilateral lower extremity edema and redness more on the left side for 2 weeks. The patient had nonhealing wound to the left heel for several months. She also had open wounds to the right and left legs below-knee. She denied any fever or chills  X-ray and left foot MRI suggestive of left calcaneus osteomyelitis. Initial labs showed C-reactive protein of 45, sedimentation rate of 71, WBC of 8.6  The patient is actively smoking 1 pack a day, had chronic cough and shortness of breath on exertion not increased.   She does have numbness to the feet      Interval changes  1/21/2021   She is feeling about the same, no acute events, still have pain to the lower extremity bilaterally, denied fever or chills, no other complaints  Patient Vitals for the past 8 hrs:   BP Temp Temp src Pulse Resp SpO2   01/21/21 1515 104/62 -- -- 74 -- --   01/21/21 1430 (!) 86/47 -- -- -- -- --   01/21/21 1330 (!) 94/45 98.9 °F (37.2 °C) Oral 72 18 92 %   01/21/21 0920 (!) 138/55 98.2 °F (36.8 °C) Oral 93 18 92 %             I have personally reviewed the past medical history, past surgical history, medications, social history, and family history, and I haveupdated the database accordingly. Allergies:   Patient has no known allergies. Review of Systems:     Review of Systems  As per history present illness, other than above 12 system review was negative  Physical Examination :       Physical Exam  Constitutional:       General: She is not in acute distress. HENT:      Head: Normocephalic and atraumatic. Right Ear: External ear normal.      Left Ear: External ear normal.      Nose: Nose normal. No rhinorrhea. Eyes:      General: No scleral icterus. Conjunctiva/sclera: Conjunctivae normal.   Neck:      Musculoskeletal: Neck supple. No neck rigidity. Pulmonary:      Effort: Pulmonary effort is normal.      Breath sounds: Wheezing present. Abdominal:      General: Abdomen is flat. Palpations: Abdomen is soft. Tenderness: There is no abdominal tenderness. Musculoskeletal:      Right lower leg: Edema present. Left lower leg: Edema present. Skin:     General: Skin is warm. Coloration: Skin is not jaundiced. Comments: Bilateral lower extremity dressing was not removed   Neurological:      General: No focal deficit present. Mental Status: She is alert and oriented to person, place, and time.    Psychiatric:         Mood and Affect: Mood normal.         Past Medical History:     Past Medical History:   Diagnosis Date    Arthritis     CHF (congestive heart failure) ([address]     COPD [facility] obstructive [facility] disease) [facility]     Diverticulitis     Hx of blood clots     Pulmonary embolism (HCC)        Past Surgical  History:     Past Surgical History:   Procedure Laterality Date    ANKLE SURGERY      COLON SURGERY      HERNIA REPAIR         Medications:      vancomycin  1,500 mg Intravenous Q24H    atorvastatin  40 mg Oral Nightly    metroNIDAZOLE  500 mg Intravenous Q8H    vancomycin (VANCOCIN) intermittent dosing (placeholder)   Other RX Placeholder    sodium chloride flush  10 mL Intravenous 2 times per day    enoxaparin  40 mg Subcutaneous BID    bumetanide  2 mg Oral Daily    PARoxetine  20 mg Oral BID    metoprolol tartrate  25 mg Oral BID    lisinopril  10 mg Oral Daily    cefepime  1 g Intravenous Q12H       Social History:     Social History     Socioeconomic History    Marital status: Single     Spouse name: Not on file    Number of children: Not on file    Years of education: Not on file    Highest education level: Not on file   Occupational History    Not on file   Social Needs    Financial resource strain: Not very hard    Food insecurity     Worry: Never true     Inability: Never true    Transportation needs     Medical: No     Non-medical: No   Tobacco Use    Smoking status: Current Every Day Smoker     Packs/day: 1.00     Years: 45.00     Pack years: 45.00    Smokeless tobacco: Never Used   Substance and Sexual Activity    Alcohol use: Not Currently     Comment: 1-2 times per week    Drug use: Not Currently    Sexual activity: Not Currently   Lifestyle    Physical activity     Days per week: 0 days     Minutes per session: 0 min    Stress:  To some extent   Relationships    Social connections     Talks on phone: Not on file     Gets together: Not on file     Attends [employer] service: Not on file     Active member of club or organization: Not on file     Attends meetings of clubs or organizations: Not on file     Relationship status: Not on file    Intimate partner violence     Fear of current or ex partner: Not on file     Emotionally abused: Not on file     Physically abused: Not on file     Forced sexual activity: Not on file   Other Topics Concern    Not on file   Social History Narrative    Not on file       Family History:     Family History

## 2021-01-21 NOTE — PROGRESS NOTES
Physical Therapy    Facility/Department: [facility] MED SURG  Initial Assessment    NAME: Dennise Pérez  : 1960  MRN: [id number]    Date of Service: 2021    Discharge Recommendations:  Patient would benefit from continued therapy after discharge   PT Equipment Recommendations  Equipment Needed: No    Assessment   Body structures, Functions, Activity limitations: Decreased functional mobility ; Decreased ROM; Decreased strength;Decreased balance; Increased pain  Assessment: Impaired mobility due to Increased pain and inability to maintain forefoot wt bearing on L LE ; also due to safety issues of decreased balance and strength  Decision Making: Low Complexity  History: DJD/pain  Exam: decreased ROM, strength, balance, mobility  Clinical Presentation: stable  REQUIRES PT FOLLOW UP: Yes  Activity Tolerance  Activity Tolerance: Patient limited by pain       Patient Diagnosis(es): The primary encounter diagnosis was Cellulitis of right leg. A diagnosis of Ulcer of left foot, limited to breakdown of skin ([facility] [address] was also pertinent to this visit. has a past medical history of Arthritis, CHF (congestive heart failure) ([address] COPD (chronic obstructive pulmonary disease) ([facility] [address] Diverticulitis, Hx of blood clots, and Pulmonary embolism ([facility] [address] has a past surgical history that includes Ankle surgery; Colon surgery; and hernia repair.     Restrictions  Restrictions/Precautions  Restrictions/Precautions: Weight Bearing, Fall Risk, General Precautions(Forefoot weight-bearing LLE)  Required Braces or Orthoses?: Yes(B PRAFO boots in bed)  Implants present? : Metal implants(L ankle screw/plate)  Lower Extremity Weight Bearing Restrictions  Right Lower Extremity Weight Bearing: Weight Bearing As Tolerated  Left Lower Extremity Weight Bearing: (forefoot wt bearing)        Subjective  General  Patient assessed for rehabilitation services?: Yes  Family / Caregiver Present: No  Follows Commands: Within Functional Limits  General Comment  Comments: pt states that she is to have surgery on her left heel tomorrow  Subjective  Subjective: pt c/o pain \"all over\" with most pain in left foot  Pain Screening  Patient Currently in Pain: Yes  Pain Assessment  Pain Assessment: 0-10  Pain Level: 9  Pain Location: Hip;Knee;Foot;Leg  Pain Orientation: Left;Right  Vital Signs  Patient Currently in Pain: Yes       Orientation  Orientation  Overall Orientation Status: Within Normal Limits  Social/Functional History  Social/Functional History  Lives With: Significant other  Type of Home: House  Home Layout: One level  Home Access: Stairs to enter without rails  Entrance Stairs - Number of Steps: 2  Bathroom Shower/Tub: Tub/Shower unit, Curtain(Does not shower, sponge bathes at sink)  Bathroom Toilet: Standard  Bathroom Equipment: (NO DME)  Bathroom Accessibility: Accessible(Leaves walker outside)  Home Equipment: Rolling walker, Cane, [employer], Oxygen(Reports that she doesn't wear the oxygen)  ADL Assistance: Needs assistance(Does not have A but reports \"I just barely get by\")  Homemaking Assistance: Needs assistance(SO completes all tasks)  Homemaking Responsibilities: No  Ambulation Assistance: Independent(Just to bathroom and back with RW)  Transfer Assistance: Independent  Active : No(SO and his brother do the driving)  Occupation: On disability  Type of occupation: Kitchen at [employer]  Additional Comments: Pt reports that SO picks up odd jobs here and there but is generally around to A. Pt reports that he is able to physically A if necessary.        Objective     Observation/Palpation  Observation: B lower legs and feet with dressing/wraps    AROM RLE (degrees)  RLE AROM: WFL  RLE General AROM: knee 15-85  AROM LLE (degrees)  LLE AROM : WFL  LLE General AROM: knee 5-90  Strength RLE  Strength RLE: WFL  Comment: grossly 3-/3+/5  Strength LLE  Strength LLE: WFL  Comment: grossly 3-/3+/5        Bed mobility  Supine to Sit: Moderate assistance  Sit to Supine:  Moderate assistance  Scooting: Stand by assistance  Comment: all motions are painful  Transfers  Sit to Stand: Contact guard assistance  Stand to sit: Contact guard assistance  Bed to Chair: Contact guard assistance(to [facility] with walker)  Comment: pt unable to maintain forefoot wt bearing on L LE  Ambulation  Ambulation?: No(few steps with walker to and from [facility] and bed)  WB Status: pt unable to maintain forefoot wt bering on L LE     Balance  Sitting - Static: Good  Sitting - Dynamic: Good  Standing - Static: Fair(CGA and walker)  Standing - Dynamic: Fair(CGA and walker)        Plan   Plan  Times per week: 6-7x/wk  Current Treatment Recommendations: Strengthening, ROM, Balance Training, Functional Mobility Training, Transfer Training, Gait Training, Safety Education & Training  Safety Devices  Type of devices: Call light within reach, Left in bed, Patient at risk for falls      Goals  Short term goals  Time Frame for Short term goals: 4-5 days  Short term goal 1: bed mobility with Charley  Short term goal 2: transfers with SBA  Short term goal 3: gait with RW/SBA and forefoot wt bearing L LE X 15-20FT  Patient Goals   Patient goals : less pain       Therapy Time   Individual Concurrent Group Co-treatment   Time In 1259         Time Out 1330         Minutes Marcela Cevallos[id number], PT

## 2021-01-21 NOTE — PROGRESS NOTES
Progress Note  Podiatric Medicine and Surgery     Subjective     CC: Cellulitis, bilateral lower extremities    Patient seen and examined at bedside. No acute events overnight. Afebrile, vital signs stable  Chart and results reviewed  Patient reports continued pain to bilateral lower extremities      HPI :  Mervin Cárdenas a 61 y.o. female seen at [facility] worsening wounds with concern for cellulitis of the bilateral lower extremity.  The wound that is most concerning is to plantar lateral aspect of the left heel.  Patient states that she has had the wounds for several months that started back in June 2020, at which time she was admitted for similar complaints.  Patient never maintained follow-up recommendations on outpatient basis. Evelyn Eddy presented to the ED due to progressive redness, swelling, pain to the bilateral lower extremities.  Radiographs of the left foot and right leg were obtained which were negative for soft tissue emphysema, questionable for osteomyelitis of the left calcaneus.  Infectious lab work-up revealed an elevated CRP of 45.6 and ESR of 71.  WBC is 8.6.  Patient states that she is minimally ambulatory mostly bedridden.  Patient denies any trauma to the bilateral lower extremities.  Patient denies any purulent drainage from the wound sites.  Patient states she is not been performing any kind of wound care at home.  Patient admits to smoking approximately 1 pack/day.  She admits to numbness and tingling to the bilateral lower extremities.     PCP is KIRK Horan - CNP    ROS: Denies N/V/F/C/SOB/CP. Otherwise negative except at stated in the HPI.      Medications:  Scheduled Meds:   vancomycin  1,500 mg Intravenous Q24H    atorvastatin  40 mg Oral Nightly    metroNIDAZOLE  500 mg Intravenous Q8H    vancomycin (VANCOCIN) intermittent dosing (placeholder)   Other RX Placeholder    sodium chloride flush  10 mL Intravenous 2 times per day    enoxaparin  40 mg Subcutaneous BID    bumetanide  2 mg Oral Daily    PARoxetine  20 mg Oral BID    metoprolol tartrate  25 mg Oral BID    lisinopril  10 mg Oral Daily    cefepime  1 g Intravenous Q12H       Continuous Infusions:    PRN Meds:albuterol, miconazole, sodium chloride flush, promethazine **OR** [DISCONTINUED] ondansetron, polyethylene glycol, acetaminophen **OR** acetaminophen, oxyCODONE-acetaminophen **OR** oxyCODONE-acetaminophen    Objective     Vitals:  Patient Vitals for the past 8 hrs:   BP Temp Temp src Pulse Resp SpO2   21 0920 (!) 138/55 98.2 °F (36.8 °C) Oral 93 18 92 %     Average, Min, and Max for last 24 hours Vitals:  TEMPERATURE:  Temp  Av.4 °F (36.9 °C)  Min: 98.2 °F (36.8 °C)  Max: 98.7 °F (37.1 °C)    RESPIRATIONS RANGE: Resp  Av  Min: 18  Max: 18    PULSE RANGE: Pulse  Av  Min: 77  Max: 93    BLOOD PRESSURE RANGE:  Systolic (89QQH), WJS:586 , Min:114 , XBK:876   ; Diastolic (63WKK), JNZ:30, Min:44, Max:55      PULSE OXIMETRY RANGE: SpO2  Av.7 %  Min: 90 %  Max: 93 %    I/O last 3 completed shifts:  In: -   Out: 600 [Urine:600]    CBC:  Recent Labs     21  0521  0556   WBC 8.6 6.7 5.4   HGB 12.2 11.4* 11.5*   HCT 36.8 34.9* 34.1*   * 440 430   CRP 45.6*  --   --         BMP:  Recent Labs     21  0511 21  0556   * 134* 134*   K 4.2 4.1 3.8   CL 97* 100 98   CO2 28 25 27   BUN 16 13 18   CREATININE 0.96* 0.74 0.78   GLUCOSE 110* 110* 114*   CALCIUM 9.2 8.7 9.1        Coags:  Recent Labs     21  0521  0556   PROT 6.1* 6.3*       Lab Results   Component Value Date    SEDRATE 71 (H) 2021     Recent Labs     21  1652   CRP 45.6*       Lower Extremity Physical Exam:  Vascular: DP and PT pulses are nonpalpable, minimally audible on Doppler signal, likely secondary to severe edema of bilateral lower extremities. CFT brisk to all digits. Hair growth absent to the level of the digits.   Severe, nonpitting lymphedema to BLE. Neuro: Saph/sural/SP/DP/plantar sensation diminished to light touch    Musculoskeletal: Decreased range of motion and decreased strength noted to all lower extremity muscle groups of the bilateral lower extremities. Gross deformity is present with contracted digits. Dermatologic: Full-thickness ulcer to the level of subcutaneous tissue located to the plantar lateral heel of left foot, measuring approximately 5 x 4.3 x 1.2 cm. Base is fibrogranular with necrotic gray tissue centrally. Periwound skin is macerated. No purulent drainage or malodor appreciated. Erythema present with associated increase in warmth. Wound does not probe to bone, sinus tract or undermine. No fluctuance or crepitus noted. Partial-thickness ulcer located to the lateral mid tibia shaft of the left leg, measuring approximately 2.0 x 0.8 x 0.1 cm. .  Base of wound is granular, no purulent drainage or malodor present. Right lower leg with ulceration to the level of dermis, measuring approximately 5.0 x 3.0 x 0.1 cm. No fluctuance or crepitus noted, no purulent drainage. Erythema, induration and increased warmth to the right lower extremity consistent with cellulitis. Clinical Images:                        Imaging:   VL Lower Extremity Arteries Bilateral   Final Result      MRI ANKLE LEFT WO CONTRAST   Final Result   Significantly limited study. Soft tissue ulceration underlying the calcaneus with questionable early   osteomyelitis versus reactive marrow changes. VL DUP LOWER EXTREMITY VENOUS BILATERAL   Final Result      XR TIBIA FIBULA LEFT (2 VIEWS)   Final Result   Stable study. Diffuse soft tissue swelling may be related to infection. No osseous or hardware related abnormality. XR CHEST PORTABLE   Final Result   Stable study. No acute intrathoracic pathology.          XR FOOT LEFT (MIN 3 VIEWS)   Final Result   Soft tissue ulceration at the plantar aspect of the heel with suggestion of   focal abnormal demineralization at the plantar calcaneus suspicious for   concomitant osteomyelitis. Diffuse periostitis about the right tibia and fibula which may relate to   venous insufficiency. The reported soft tissue wound of the right lower   extremity is not definitively appreciated radiographically on a background of   diffuse edema. XR TIBIA FIBULA RIGHT (2 VIEWS)   Final Result   Soft tissue ulceration at the plantar aspect of the heel with suggestion of   focal abnormal demineralization at the plantar calcaneus suspicious for   concomitant osteomyelitis. Diffuse periostitis about the right tibia and fibula which may relate to   venous insufficiency. The reported soft tissue wound of the right lower   extremity is not definitively appreciated radiographically on a background of   diffuse edema. NIVS 1/20/21: Summary  Monophasic flow throughout the bilateral lower extremities. Elevated velocities are consistent with bilateral SFA stenosis. Assessment   Anabell Regan is a 61 y.o. female with   1. Cellulitis, right lower extremity  2. Kinney grade 2 ulceration of plantar lateral heel, left foot  3. Possible osteomyelitis, left calcaneus  4. Lymphedema, bilateral lower extremities  5. Smoker  6. CHF    Principal Problem:    Cellulitis  Active Problems:    Chronic obstructive pulmonary disease ([address]    Essential hypertension    Prediabetes    Depression    Cellulitis of right leg  Resolved Problems:    * No resolved hospital problems.  *       Plan     · Patient examined and evaluated at bedside   · Treatment options discussed in detail with the patient  · Radiographs of the bilateral lower extremities reviewed and discussed in detail with patient-negative for soft tissue emphysema, questionable osteomyelitis of left calcaneus  · MRI left foot reviewed- questionable osteomyelitis of the calcaneus however findings are inconclusive due to limited study as the patient was unable to remain still due to pain  · NIVS- findings consistent with bilateral SFA stenosis; vascular surgery consulted. · ID following - vanc/cefepime, flagyl, in agreement with bone biopsy   · Plan for OR tomorrow at 11 am for debridement of the left heel ulceration with bone biopsy of left calcaneus. Risks and benefits of procedure discussed with patient. Patient has been consented and marked for surgery. · Patient to be NPO after midnight  · COVID pending  · Medical clearance for surgery pending from primary team  · Dressing changed to Bilateral lower extremities: Aquacel to left heel wound, adaptic to left leg wound, DSD, ACE; Adaptic to right leg wounds, DSD, ACE. · Discussed with Dr. Alison Valerio DPM   Podiatric Medicine & Surgery   1/21/2021 at 12:40 PM    I performed a history and physical examination of the patient and discussed management with the patient and the resident. I reviewed the residents note and agree with the documented findings and plan of care. Any changes by me in the subjective, objective, assessment, and plan sections were made in the note. I was personally present for the key portions of any procedures. Additional findings and plans are as noted below. Discussed with Sony Conroy the reason for the bone biopsy which is to diagnose or refute the diagnosis of bone infection of the left calcaneus. Patient understands that this procedure is diagnostic and not therapeutic. We also discussed debridement of all non-viable tissue from the wound bed in the left heel in order to optimize healing. Patient voiced understanding of the procedures as well as their associated risks and benefits. Most notably, we discussed the risk of pathologic fracture and infection of the calcaneus which could result in loss of limb. We also discussed risks associated with anesthesia. Patient elects to proceed with surgical treatment.      Lidia Vines DPM 1/21/2021 at 2:23 PM

## 2021-01-21 NOTE — PROGRESS NOTES
Bronchodilator Assessment     RR 16  Breath Sounds: clear  SPO2: 94  FiO2: 21      · Bronchodilator assessment at level  1  ·   · []    Bronchodilator Assessment  BRONCHODILATOR ASSESSMENT SCORE  Score 0 1 2 3 4 5   Breath Sounds   [x]  Patient Baseline [x]  No Wheeze good aeration []  Faint, scattered wheezing, good aeration []  Expiratory Wheezing and or moderately diminished []  Insp/Exp wheeze and/or very diminished []  Insp/Exp and/ or marked distress   Respiratory Rate   [x]  Patient Baseline [x]  Less than 20 []  Less than 20 []  20-25 []  Greater than 25 []  Greater than 25   Peak flow % of Pred or PB [x]  NA   []  Greater than 90%  []  81-90% []  71-80% []  Less than or equal to 70%  or unable to perform []  Unable due to Respiratory Distress   Dyspnea re [x]  Patient Baseline [x]  No SOB []  No SOB []  SOB on exertion []  SOB min activity []  At rest/acute   e FEV% Predicted       [x]  NA []  Above 69%  []  Unable []  Above 60-69%  []  Unable []  Above 50-59%  []  Unable []  Above 35-49%  []  Unable []  Less than 35%  []  Unable          TUCKER Patel      3:19 PM

## 2021-01-21 NOTE — PROGRESS NOTES
CLINICAL PHARMACY NOTE: MEDS TO [address] Select Patient?: No  Total # of Prescriptions Filled: 0   The following medications were delivered to the patient:  Total # of Interventions Completed: 0  Time Spent (min): 15    Additional Documentation:Put the 2 scriots on file we are ordering the Xarelto 2.5 mg for tomorrow

## 2021-01-21 NOTE — CONSULTS
[facility] Nurse  Consult Note       NAME:  Heath Hwang  MEDICAL RECORD NUMBER:  [id number]  AGE: 61 y.o.    GENDER: female  : 1960  TODAY'S DATE:  2021    Subjective:     Reason for Wound Carmen King Care and Assessment: buttocks wounds (podiatry on for leg/foot wounds)      Heath Hwang is a 61 y.o. female referred by:   [x] Physician  [] Nursing  [] Other:       Wound Identification:  Wound Type: pressure  Contributing Factors: diabetes, poor glucose control, chronic pressure, decreased mobility, shear force and incontinence of urine    Wound History: per the patient, the wound is chronic in nature, states she has had it a \"long time\"  Current Wound Care Treatment:  foam    Patient Goal of Care:  [x] Wound Healing  [] Odor Control  [] Palliative Care  [] Pain Control   [] Other:         PAST MEDICAL HISTORY        Diagnosis Date    Arthritis     CHF (congestive heart failure) (HCC)     [facility] [address]     Diverticulitis     Hx of blood clots     Pulmonary embolism ([facility] [address]        PAST SURGICAL HISTORY    Past Surgical History:   Procedure Laterality Date    ANKLE SURGERY      COLON SURGERY      HERNIA REPAIR         FAMILY HISTORY    Family History   Problem Relation Age of Onset    Cancer Mother     Diabetes Paternal Grandfather        SOCIAL HISTORY    Social History     Tobacco Use    Smoking status: Current Every Day Smoker     Packs/day: 1.00     Years: 45.00     Pack years: 45.00    Smokeless tobacco: Never Used   Substance Use Topics    Alcohol use: Not Currently     Comment: 1-2 times per week    Drug use: Not Currently       ALLERGIES    No Known Allergies        Objective:      BP (!) 138/55   Pulse 93   Temp 98.2 °F (36.8 °C) (Oral)   Resp 18   Ht 5' 7\" (1.702 m)   Wt (!) 342 lb 6 oz (155.3 kg)   SpO2 92%   BMI 53.62 kg/m²       LABS    CBC:   Lab Results   Component Value Date    WBC 5.4 2021    RBC 3.63 2021 Status New dressing applied; Old drainage noted 01/21/21 1048   Wound Cleansed Cleansed with saline 01/21/21 1048   Dressing/Treatment Moisture barrier 01/21/21 1048   Dressing Change Due 01/21/21 01/21/21 1048   Wound Length (cm) 4.5 cm 01/21/21 1048   Wound Width (cm) 3.5 cm 01/21/21 1048   Wound Depth (cm) 0.1 cm 01/21/21 1048   Wound Surface Area (cm^2) 15.75 cm^2 01/21/21 1048   Change in Wound Size % (l*w) 35.71 01/21/21 1048   Wound Volume (cm^3) 1.58 cm^3 01/21/21 1048   Wound Healing % 87 01/21/21 1048   Distance Tunneling (cm) 0 cm 01/20/21 0443   Wound Assessment Pink/red 01/21/21 1048   Drainage Amount Moderate 01/21/21 1048   Drainage Description Serosanguinous 01/21/21 1048   Odor None 01/21/21 1048   Aruna-wound Assessment Blanchable erythema; Hyperpigmented 01/21/21 1048   Margins Attached edges; Defined edges 01/21/21 1048   Wound Thickness Description not for Pressure Injury Partial thickness 01/21/21 1048   Number of days:        Wound Buttocks Right (Active)   Wound Image   01/21/21 1048   Wound Etiology Pressure Stage  3 01/21/21 1048   Dressing Status New dressing applied; Old drainage noted 01/21/21 1048   Wound Cleansed Cleansed with saline 01/21/21 1048   Dressing/Treatment Foam 01/21/21 1048   Dressing Change Due 01/24/21 01/21/21 1048   Wound Length (cm) 0.7 cm 01/21/21 1048   Wound Width (cm) 0.7 cm 01/21/21 1048   Wound Depth (cm) 0.1 cm 01/21/21 1048   Wound Surface Area (cm^2) 0.49 cm^2 01/21/21 1048   Wound Volume (cm^3) 0.05 cm^3 01/21/21 1048   Wound Assessment Pink/red;Granulation tissue 01/21/21 1048   Drainage Amount Moderate 01/21/21 1048   Drainage Description Serosanguinous 01/21/21 1048   Odor None 01/21/21 1048   Aruna-wound Assessment Blanchable erythema 01/21/21 1048   Margins Attached edges; Defined edges 01/21/21 1048   Wound Thickness Description not for Pressure Injury Full thickness 01/21/21 1048   Number of days:      Right upper buttock: small stage 3 healing wound, red wound bed with granulation present. Will benefit from a simple foam dressing. Buttock wounds appear quite moist, likely due to diaphoresis and incontinent urine. Recommend avoiding foam dressing and using a zinc paste for treatment and prevention. Response to treatment:  Well tolerated by patient. Plan:     Plan of Care:     -Right upper buttock wound care:  cleanse with saline, pat dry. Apply foam dressing to cover. Change every 3 days and as needed if loose or soiled. -Buttock wound near gluteal fold: use zinc paste to treat. -Turn every 2 hours    -Float heels of of bed with pillows under     -Mepilex sacrum dressing to sacrococcygeal area. Peel back dressing, inspect skin beneath, re-secure. Change every 72 hours and prn wrinkles, soilage. Discontinue Sacral Mepilex if  repeatedly soiled by incontinence.     -Routine incontinence care with Clarke barrier cloths and zinc oxide cream. Apply zinc oxide cream BID and prn incontinence. Covidien moisture wicking under pads vs cloth backed briefs    -Static air overlay. Check inflation each shift by sliding hand under the air overlay. Feel for the patient's heaviest/ most dependant body part. Ideally 1/2 to 1\" of air will be between your hand and the patient's body. Add air prn.     Specialty Bed Required : Yes   [x] Low Air Loss   [] Pressure Redistribution  [] Fluid Immersion  [x] Bariatric  [] Total Pressure Relief  [] Other:     Current Diet: DIET GENERAL;  Dietician consult:  N/A    Discharge Plan:  Placement for patient upon discharge: skilled nursing vs home with home care  Patient appropriate for Outpatient [address]: Yes    Patient/Caregiver Teaching:  Level of patientunderstanding able to:     [x] Indicates understanding       [] Needs reinforcement  [] Unsuccessful      [x] Verbal Understanding  [] Demonstrated understanding       [] No evidence of learning  [] Refused teaching         [] N/A       Electronically signed by Be Montague EFE Gonzalez on  1/21/2021 at 11:37 AM

## 2021-01-21 NOTE — PROGRESS NOTES
Pt will be accepted at [facility], [facility]. upon discharge. Pre-cert will be waived.

## 2021-01-21 NOTE — PROGRESS NOTES
[address]   Occupational Therapy Evaluation  Date: 21  Patient Name: Fabiana Chua       Room: 6605/8039-33  MRN: [id number]  Account: [de-identified]   : 1960  (61 y.o.) Gender: female     Discharge Recommendations:  Further Occupational Therapy is recommended upon facility discharge. Equipment Needed: (TBD)    Referring Practitioner: Dr. Nakia Gray  Diagnosis: Cellulitis; Wound on L heel - scheduled for I&D 20    Treatment Diagnosis: Impaired self-care status  Past Medical History:  has a past medical history of Arthritis, CHF (congestive heart failure) ([facility] [address] COPD (chronic obstructive pulmonary disease) ([facility] [address] Diverticulitis, Hx of blood clots, and Pulmonary embolism ([facility] [address] Past Surgical History:   has a past surgical history that includes Ankle surgery; Colon surgery; and hernia repair. Restrictions  Restrictions/Precautions: Weight Bearing, Fall Risk, General Precautions(Forefoot weight-bearing LLE)  Implants present? : Metal implants(L ankle screw/plate)  Right Lower Extremity Weight Bearing: Weight Bearing As Tolerated  Left Lower Extremity Weight Bearing: (forefoot wt bearing)  Required Braces or Orthoses?: Yes(B PRAFO boots in bed)     Vitals  Temp: 98.9 °F (37.2 °C)  Pulse: 74  Resp: 18  BP: 104/62  Height: 5' 7\" (170.2 cm)  Weight: (!) 342 lb 6 oz (155.3 kg)  BMI (Calculated): 53.7  Oxygen Therapy  SpO2: 92 %  O2 Device: None (Room air)  Level of Consciousness: Alert (0)    Subjective  Subjective: \"Yeah I can get up, I just scream and holler the whole time\"  Comments: Pt has significant pain in BLE with weight-bearing but able to transfer with RW and CGA.   Overall Orientation Status: Within Functional Limits  Vision  Vision: Impaired  Vision Exceptions: Wears glasses for distance  Hearing  Hearing: Within functional limits  Social/Functional History  Lives With: Significant other  Type of Home: House  Home Layout: One level  Home Access: Stairs to enter without rails  Entrance Stairs - Number of Steps: 2  Bathroom Shower/Tub: Tub/Shower unit, Curtain(Does not shower, sponge bathes at sink)  Bathroom Toilet: Standard  Bathroom Equipment: (NO DME)  Bathroom Accessibility: Accessible(Leaves walker outside)  Home Equipment: Rolling walker, Cane, [employer], Oxygen(Reports that she doesn't wear the oxygen)  ADL Assistance: Needs assistance(Does not have A but reports \"I just barely get by\")  Homemaking Assistance: Needs assistance(SO completes all tasks)  Homemaking Responsibilities: No  Ambulation Assistance: Independent(Just to bathroom and back with RW)  Transfer Assistance: Independent  Active : No(SO and his brother do the driving)  Occupation: On disability  Type of occupation: Kitchen at [employer]  Additional Comments: Pt reports that SO picks up odd jobs here and there but is generally around to A. Pt reports that he is able to physically A if necessary. Pain Assessment  Pain Assessment: 0-10  Pain Level: 9  Pain Type: Acute pain  Pain Location: Generalized, Foot  Pain Orientation: Left    Objective  Vision - Basic Assessment  Patient Visual Report: No visual complaint reported. Cognition  Overall Cognitive Status: WFL   Perception  Overall Perceptual Status: WFL  Sensation  Overall Sensation Status: Impaired  Additional Comments: Pt reports numbness in toes   ADL  Feeding: Independent  Grooming: Setup  UE Bathing: Setup  LE Bathing: Maximum assistance  UE Dressing: Setup  LE Dressing: Dependent/Total  Toileting: Dependent/Total  Additional Comments: TA for socks and toileting; Other levels based on pt report, observation, and clinical reasoning.     UE Function  LUE Strength  Gross LUE Strength: WFL  L Hand General: 4/5     LUE Tone: Normotonic     LUE AROM (degrees)  LUE AROM : WFL     Left Hand AROM (degrees)  Left Hand AROM: WFL     RUE Strength  Gross RUE Strength: Exceptions to [employer]  R Hand General: 4/5  RUE Strength Comment: Shoulder 3-/5; Elbow 3+/5      RUE Tone: Normotonic     RUE AROM (degrees)  RUE AROM : Exceptions  RUE General AROM: Shoulder ROM limited to ~50%, premorbid per patient; Other joints WFL     Right Hand AROM (degrees)  Right Hand AROM: WFL    Fine Motor Skills  Coordination  Movements Are Fluid And Coordinated: No  Coordination and Movement description: Gross motor impairments, Right UE(Limited use of R shoulder - premorbid per pt)     Mobility  Supine to Sit: Moderate assistance  Sit to Supine: Moderate assistance       Balance  Sitting Balance: Independent  Standing Balance: Contact guard assistance     Bed mobility  Supine to Sit: Moderate assistance  Sit to Supine: Moderate assistance  Scooting: Stand by assistance(@ EOB)     Transfers  Stand Pivot Transfers: Contact guard assistance  Sit to stand: Contact guard assistance  Stand to sit: Contact guard assistance  Transfer Comments: w/RW  Toilet Transfers  Toilet - Technique: Stand pivot  Equipment Used: Extra wide bedside commode  Toilet Transfer: Contact guard assistance  Toilet Transfers Comments: w/RW  Functional Activity Tolerance  Functional Activity Tolerance: Tolerates < 10 Min exercise, no significant change in vital signs  Additional Comments: Limited by pain and fatigue   Assessment  Performance deficits / Impairments: Decreased functional mobility , Decreased ADL status, Decreased ROM, Decreased strength, Decreased endurance, Decreased balance, Decreased sensation  Treatment Diagnosis: Impaired self-care status  Prognosis: Good  Decision Making: Low Complexity  REQUIRES OT FOLLOW UP: Yes  Discharge Recommendations: Patient would benefit from continued therapy after discharge  Activity Tolerance: Patient limited by pain    Goals  Patient Goals   Patient goals : Pt planning to discharge to SNF for rehab prior to returning home.   Short term goals  Time Frame for Short term goals: By Discharge  Short term goal 1: Pt will complete bed mobility with Min A and tolerate sitting EOB for 10+ minutes while participating in a functional task. Short term goal 2: Pt will actively participate in self-care routine and complete tasks at Max level of IND using AE as appropriate. Short term goal 3: Pt will complete toilet transfer with SBA and toileting with Max A and Good safety using least restrictive device. Short term goal 4: Pt will actively participate in 15-20 minutes of therapeutic exercise/activity to promote increased IND and safety with self-care and mobility.     Plan  Safety Devices  Safety Devices in place: Yes  Type of devices: Patient at risk for falls, Gait belt, Left in bed, Call light within reach, Nurse notified     Plan  Times per week: 5-7  Times per day: Daily  Current Treatment Recommendations: Strengthening, Balance Training, Functional Mobility Training, Endurance Training, Pain Management, Safety Education & Training, Patient/Caregiver Education & Training, Equipment Evaluation, Education, & procurement, Self-Care / ADL    Equipment Recommendations  Equipment Needed: (TBD)  OT Individual Minutes  Time In: 0459  Time Out: 7323  Minutes: 28    Electronically signed by Karolyn Goodwin on 1/21/21 at 3:15 PM EST

## 2021-01-21 NOTE — PROGRESS NOTES
(24hrs), Av.5 °F (36.9 °C), Min:98.3 °F (36.8 °C), Max:98.7 °F (37.1 °C)      Recent Labs     21  0635 21  1139 21   POCGLU 113* 122* 126* 130*       I/O(24Hr): Intake/Output Summary (Last 24 hours) at 2021 0727  Last data filed at 2021 1103  Gross per 24 hour   Intake --   Output 600 ml   Net -600 ml       Labs:    CBC with Differential:    Lab Results   Component Value Date    WBC 5.4 2021    RBC 3.63 2021    HGB 11.5 2021    HCT 34.1 2021     2021    MCV 93.9 2021    MCH 31.7 2021    MCHC 33.7 2021    RDW 13.7 2021    LYMPHOPCT 28 2021    MONOPCT 9 2021    BASOPCT 1 2021    MONOSABS 0.50 2021    LYMPHSABS 1.50 2021    EOSABS 0.30 2021    BASOSABS 0.00 2021    DIFFTYPE NOT REPORTED 2021       Lab Results   Component Value Date/Time    SPECIAL NOT REPORTED 2021 09:24 PM     Lab Results   Component Value Date/Time    CULTURE NORMAL DARRELL 2021 09:24 PM         Radiology:    Xr Tibia Fibula Left (2 Views)    Result Date: 2021  EXAMINATION: XRAY VIEWS OF THE LEFT TIBIA AND FIBULA 2021 9:06 pm COMPARISON: 2020 HISTORY: ORDERING SYSTEM PROVIDED HISTORY: infection, swelling TECHNOLOGIST PROVIDED HISTORY: infection, swelling Reason for Exam: infection, swelling Acuity: Acute Type of Exam: Initial FINDINGS: There is diffuse soft tissue swelling. The osseous structures are intact. There are stable changes related to instrumented fusion of medial and lateral malleolus with no evidence of hardware related complication such as loosening or fracture. Severe degenerative disease of the knee joint and intertarsal joints are partially visualized     Stable study. Diffuse soft tissue swelling may be related to infection. No osseous or hardware related abnormality.      Xr Tibia Fibula Right (2 Views)    Result Date: 2021  EXAMINATION: THREE XRAY VIEWS OF THE LEFT FOOT;   XRAY VIEWS OF THE RIGHT TIBIA AND FIBULA 1/19/2021 1:44 pm COMPARISON: 07/03/2020 HISTORY: ORDERING SYSTEM PROVIDED HISTORY: heel wound TECHNOLOGIST PROVIDED HISTORY: heel wound Reason for Exam: PT states non-healing wound on heel. Acuity: Unknown Type of Exam: Unknown Additional signs and symptoms: PT states non-healing wound on heel. ; ORDERING SYSTEM PROVIDED HISTORY: leg wound TECHNOLOGIST PROVIDED HISTORY: leg wound Reason for Exam: PT states non-healing wound on lower leg. Wound observed on medial aspect of lower leg. Acuity: Unknown Type of Exam: Unknown Additional signs and symptoms: PT states non-healing wound on lower leg. Wound observed on medial aspect of lower leg. FINDINGS: Left foot: Calcaneus is suboptimally evaluated due to difficulty in appropriately positioning the patient. There are posterior and plantar calcaneal enthesopathic changes. There is suggestion of focal abnormal demineralization at the plantar calcaneus underlying an area of soft tissue ulceration. Partially imaged open reduction internal fixation hardware within the distal tibia and fibula. Degenerative and enthesopathic changes about the foot and ankle. Diffuse soft tissue edema. Right tib-fib: No acute fracture or dislocation. Osseous demineralization throughout. Diffuse periostitis of the tibia and fibula. Mild knee and moderate ankle degenerative changes. Diffuse soft tissue edema. Soft tissue ulceration at the plantar aspect of the heel with suggestion of focal abnormal demineralization at the plantar calcaneus suspicious for concomitant osteomyelitis. Diffuse periostitis about the right tibia and fibula which may relate to venous insufficiency. The reported soft tissue wound of the right lower extremity is not definitively appreciated radiographically on a background of diffuse edema.      Xr Foot Left (min 3 Views)    Result Date: 1/19/2021  EXAMINATION: THREE XRAY VIEWS OF THE LEFT FOOT;   XRAY VIEWS OF THE RIGHT TIBIA AND FIBULA 1/19/2021 1:44 pm COMPARISON: 07/03/2020 HISTORY: ORDERING SYSTEM PROVIDED HISTORY: heel wound TECHNOLOGIST PROVIDED HISTORY: heel wound Reason for Exam: PT states non-healing wound on heel. Acuity: Unknown Type of Exam: Unknown Additional signs and symptoms: PT states non-healing wound on heel. ; ORDERING SYSTEM PROVIDED HISTORY: leg wound TECHNOLOGIST PROVIDED HISTORY: leg wound Reason for Exam: PT states non-healing wound on lower leg. Wound observed on medial aspect of lower leg. Acuity: Unknown Type of Exam: Unknown Additional signs and symptoms: PT states non-healing wound on lower leg. Wound observed on medial aspect of lower leg. FINDINGS: Left foot: Calcaneus is suboptimally evaluated due to difficulty in appropriately positioning the patient. There are posterior and plantar calcaneal enthesopathic changes. There is suggestion of focal abnormal demineralization at the plantar calcaneus underlying an area of soft tissue ulceration. Partially imaged open reduction internal fixation hardware within the distal tibia and fibula. Degenerative and enthesopathic changes about the foot and ankle. Diffuse soft tissue edema. Right tib-fib: No acute fracture or dislocation. Osseous demineralization throughout. Diffuse periostitis of the tibia and fibula. Mild knee and moderate ankle degenerative changes. Diffuse soft tissue edema. Soft tissue ulceration at the plantar aspect of the heel with suggestion of focal abnormal demineralization at the plantar calcaneus suspicious for concomitant osteomyelitis. Diffuse periostitis about the right tibia and fibula which may relate to venous insufficiency. The reported soft tissue wound of the right lower extremity is not definitively appreciated radiographically on a background of diffuse edema.      Xr Chest Portable    Result Date: 1/19/2021  EXAMINATION: ONE XRAY VIEW OF THE CHEST 1/19/2021 9:06 pm COMPARISON: 06/30/2020 HISTORY: ORDERING SYSTEM PROVIDED HISTORY: wheezing right lung TECHNOLOGIST PROVIDED HISTORY: wheezing right lung Reason for Exam: wheezing right lung Acuity: Acute Type of Exam: Initial FINDINGS: Mild cardiomegaly. The mediastinal hilar contours are normal.  The lungs are clear with no focal consolidation. No pleural effusion or pneumothorax. Stable calcified nodule of right lung base and bilateral hilar calcified lymph nodes, likely related to prior granulomatous disease exposure. Stable study. No acute intrathoracic pathology. Mri Ankle Left Wo Contrast    Result Date: 1/20/2021  EXAMINATION: MRI OF THE LEFT ANKLE WITHOUT CONTRAST, 1/20/2021 9:39 am TECHNIQUE: Limited MRI of the left ankle was performed without the administration of intravenous contrast. COMPARISON: Left foot radiographs January 19, 2021 HISTORY: ORDERING SYSTEM PROVIDED HISTORY: Surgical planning, r/o osteo, abscess TECHNOLOGIST PROVIDED HISTORY: Area of clinical concern is the plantar lateral calcaneus. Please extend as far distally as possible. Surgical planning, r/o osteo, abscess Reason for Exam: Pt in extreme amount of pain thru right hip, knee area. Tried to make pt comfortable but was unable to finish MRI exam.  Pt in too much pain and crying, exam stopped and will put thru what images were obtained. FINDINGS: Limited MRI of the ankle was obtained as patient was unable to tolerate the procedure. Only two sagittal series were obtained. Artifact is seen from motion and hardware within the hindfoot. Diffuse soft tissue swelling and subcutaneous edema. No gross evidence of a focal drainable fluid collection. Soft tissue ulceration is seen underlying the calcaneus. Changes appear to extend to the underlying bone. There is a minimal amount of edema on STIR imaging. Questionable early area of cortical thinning. No definite marrow replacement on T1 weighted images. Significantly limited study.  Soft tissue ulceration underlying the calcaneus with questionable early osteomyelitis versus reactive marrow changes. Vl Lower Extremity Arteries Bilateral    Result Date: 1/20/2021    [facility]  Vascular Lower Extremities Arterial Duplex Procedure   Patient Name    Jose Antonio HILL Date of Study           01/20/2021   Date of Birth   1960  Gender                  Female   Age             61 year(s)  Race                       Room Number     2074   Corporate ID #  [id number]   Patient Acct #  [de-identified]   MR #            [id number]      Alejo Austin   Accession #     [id number]  Interpreting Physician  [address] [place] [address]   Referring Nurse             Referring Physician     Naveed Milligan DPM  Practitioner  Procedure Type of Study:   Extremities Arteries: Lower Extremities Arterial Duplex, Arterial Scan  Lower Bilateral.  Indications for Study:PVD and ulceration. Patient Status: In Patient. Technical Quality:Limited visualization. Limitation reason:Edema. Conclusions   Summary   Monophasic flow throughout the bilateral lower extremities. Elevated  velocities are consistent with bilateral SFA stenosis. Signature   ----------------------------------------------------------------  Electronically signed by Ivan Ma(Sonographer) on  01/20/2021 10:55 AM  ----------------------------------------------------------------   ----------------------------------------------------------------  Electronically signed by Abby Au Reyes,Arthur(Interpreting  physician) on 01/20/2021 08:27 PM  ----------------------------------------------------------------  Findings:   Right Impression:                    Left Impression:  Plaque without increased Doppler     Plaque without increased Doppler  waveforms and monophasic flow noted  waveforms and monophasic flow noted  throughout. throughout. Non visualization of the peroneal    Non visualization of the peroneal  artery. artery. Velocities are measured in cm/s ; Diameters are measured in cm LE Duplex Measurements +---------++-----+-----+----------+----+-----++---+-----+----------+----+-----+ ! ! !Right! ! Left      !    !     !!   !     !          !    !     ! +---------++-----+-----+----------+----+-----++---+-----+----------+----+-----+ ! Location ! !PSV  ! Ratio! Wave Desc. !AP  ! Trans! !PSV! Ratio! Wave Desc. !AP  ! Trans! !         !!     !     ! !Diam!Diam !!   !     ! !Diam!Diam ! +---------++-----+-----+----------+----+-----++---+-----+----------+----+-----+ ! Dist EIA !!165  ! ! Monophasic!    !     !!208! ! Monophasic!    !     ! +---------++-----+-----+----------+----+-----++---+-----+----------+----+-----+ ! Common   !!191  ! ! Monophasic!    !     !!211! ! Monophasic!    !     ! !Femoral  !!     !     !          !    !     !!   !     !          !    !     ! +---------++-----+-----+----------+----+-----++---+-----+----------+----+-----+ ! PFA      !!77   !0.4  ! Monophasic!    !     !!94 !0.45 ! Monophasic!    !     ! +---------++-----+-----+----------+----+-----++---+-----+----------+----+-----+ ! Prox SFA !!155  ! ! Monophasic!    !     !!197! ! Monophasic!    !     ! +---------++-----+-----+----------+----+-----++---+-----+----------+----+-----+ ! Mid SFA  !!215  !1.39 ! Monophasic!    !     !!226!1.15 ! Monophasic!    !     ! +---------++-----+-----+----------+----+-----++---+-----+----------+----+-----+ ! Dist SFA !!116  !0.54 ! Monophasic!    !     !!135!0.6  ! Monophasic!    !     ! +---------++-----+-----+----------+----+-----++---+-----+----------+----+-----+ ! Prox     !!134  !1.16 ! Monophasic!    !     !!175!1.3  ! Monophasic!    !     ! !Popliteal!!     !     !          !    !     !!   !     !          !    !     ! +---------++-----+-----+----------+----+-----++---+-----+----------+----+-----+ ! Dist PTA !!89   !0.66 ! Monophasic!    !     !!114!0.65 ! Monophasic! !     ! +---------++-----+-----+----------+----+-----++---+-----+----------+----+-----+ ! Prox NIRU !!115  !0.86 ! Monophasic!    !     !!80 !0.46 ! Monophasic!    !     ! +---------++-----+-----+----------+----+-----++---+-----+----------+----+-----+    Vl Dup Lower Extremity Venous Bilateral    Result Date: 1/20/2021    [facility]  Vascular Lower Extremities DVT Study Procedure   Patient Name    Karissa HILL Date of Study           01/20/2021   Date of Birth   1960  Gender                  Female   Age             61 year(s)  Race                       Room Number     2074   Corporate ID #  [id number]   Patient Acct #  [de-identified]   MR #            [id number]      Denise Anderson   Accession #     [id number]  Interpreting Physician  [address] [place] [address]   Referring Nurse             Referring Physician     Kunal Krishnan  Practitioner  Procedure Type of Study:   Veins: Lower Extremities DVT Study, Venous Scan Lower Bilateral.  Indications for Study:Pain and swelling and R/O DVT. Patient Status: In Patient. Technical Quality:Limited visualization. Limitation reason:Bandage material.  Conclusions   Summary   No evidence of superficial or deep venous thrombosis in both lower  extremities. Signature   ----------------------------------------------------------------  Electronically signed by Ivan García(Sonographer) on  01/20/2021 08:39 AM  ----------------------------------------------------------------   ----------------------------------------------------------------  Electronically signed by Gradie Samples Reyes,Arthur(Interpreting  physician) on 01/20/2021 08:25 PM  ----------------------------------------------------------------  Findings:   Right Impression:                       Left Impression:  Non visualization of the posterior      Non visualization of the distal  tibial and peroneal veins.               femoral, popliteal, posterior                                          tibial and peroneal veins. Partial compressibility of the  popliteal vein with hyperechoic         Remaining deep veins  intraluminal content and continuous     demonstrate normal compressibility  Doppler response. and augmentation. Remaining deep veins                    Normal compressibility of the  demonstrate normal compressibility and  great saphenous vein. augmentation. Normal compressibility of the  Normal compressibility of the great     small saphenous vein. saphenous vein. Incidental finding of a Baker's  Normal compressibility of the small     cyst measuring 3.5 cm x 3.5 cm.  saphenous vein. Velocities are measured in cm/s ; Diameters are measured in cm Right Lower Extremities DVT Study Measurements Right 2D Measurements +------------------------------------+----------+---------------+----------+ ! Location                            ! Visualized! Compressibility! Thrombosis! +------------------------------------+----------+---------------+----------+ ! Common Femoral                      !Yes       ! Yes            ! None      ! +------------------------------------+----------+---------------+----------+ ! Prox Femoral                        !Yes       ! Yes            ! None      ! +------------------------------------+----------+---------------+----------+ ! Mid Femoral                         !Partial   !Yes            ! None      ! +------------------------------------+----------+---------------+----------+ ! Dist Femoral                        !Partial   !Yes            ! None      ! +------------------------------------+----------+---------------+----------+ ! Popliteal                           !Yes       ! Partial        !          ! +------------------------------------+----------+---------------+----------+ ! Sapheno Femoral Junction            ! Yes       ! Yes            ! None      ! +------------------------------------+----------+---------------+----------+ ! PTV                                 ! No        !               !          ! +------------------------------------+----------+---------------+----------+ ! Peroneal                            !No        !               !          ! +------------------------------------+----------+---------------+----------+ ! Gastroc                             ! Yes       ! Yes            ! None      ! +------------------------------------+----------+---------------+----------+ ! GSV Thigh                           ! Yes       ! Yes            ! None      ! +------------------------------------+----------+---------------+----------+ ! GSV Knee                            ! Yes       ! Yes            ! None      ! +------------------------------------+----------+---------------+----------+ ! GSV Ankle                           ! No        !               !          ! +------------------------------------+----------+---------------+----------+ ! SSV                                 ! Yes       ! Yes            ! None      ! +------------------------------------+----------+---------------+----------+ Right Doppler Measurements +-------------------------+----------+------+------------------------------+ ! Location                 ! Signal    !Reflux! Reflux (msec)                 ! +-------------------------+----------+------+------------------------------+ ! Common Femoral           !Phasic    !      !                              ! +-------------------------+----------+------+------------------------------+ ! Prox Femoral             !Phasic    !      !                              ! +-------------------------+----------+------+------------------------------+ ! Popliteal                !Continuous!      !                              ! +-------------------------+----------+------+------------------------------+ Left Lower Extremities DVT Study Measurements Left 2D Measurements !No        !               !          ! +------------------------------------+----------+---------------+----------+ ! SSV                                 ! Yes       ! Yes            ! None      ! +------------------------------------+----------+---------------+----------+ Left Doppler Measurements +---------------------------+------+------+--------------------------------+ ! Location                   ! Signal!Reflux! Reflux (msec)                   ! +---------------------------+------+------+--------------------------------+ ! Common Femoral             !Phasic!      !                                ! +---------------------------+------+------+--------------------------------+ ! Prox Femoral               !Phasic!      !                                ! +---------------------------+------+------+--------------------------------+ ! Popliteal                  !Phasic!      !                                ! +---------------------------+------+------+--------------------------------+      EKG:    None. Physical Examination:        PHYSICAL EXAM:  General Appearance  Alert , awake, not in acute distress. She is oriented to person, place, time and situation. HEENT - Head is normocephalic, atraumatic. Neck - Supple. Lungs - Bilateral equal air entry, some mild wheezing present on auscultation of upper anterior lung fields otherwise no rales or rhonchi, aeration good  Cardiovascular - Heart sounds are normal.  Regular rhythm, normal rate without murmur, gallop or rub. Abdomen - Obese but soft, nontender, nondistended, no masses or organomegaly  Neurologic - There are no new focal motor or sensory deficits  Skin - No bruising or bleeding on exposed skin area  Extremities - Bilateral lower extremities and feet are wrapped in bandages.    Psychiatric - Normal mood and affect      Assessment:        Primary Problem  Cellulitis    Active Hospital Problems    Diagnosis Date Noted    Cellulitis of right leg [R80.728]     Depression [F32.9] 01/19/2021    Cellulitis [L03.90] 06/30/2020    Prediabetes [R73.03] 11/13/2019    Essential hypertension [I10] 10/29/2019    [facility] [J44.9] 10/29/2019       Plan:        Bilateral lower extremity cellulitis, possible left calcaneous osteomyelitis  -ID note recommends consideration of debridement and bone biopsy, possible PICC line / IV antibiotics on discharge; will follow ID/podiatry plan/recommendations today  -MRI left ankle 01/20/2021: limited study due to pain; \"Soft tissue ulceration underlying the calcaneus with questionable early osteomyelitis versus reactive marrow changes. \"  - LE arterial doppler 01/20/2021 showing bilateral plaques and nonvisualization of bilateral peroneal arteries. - LE venous doppler 01/20/2021: Right Impression: Non visualization of the posterior tibial and peroneal veins. Partial compressibility of the popliteal vein with hyperechoic intraluminal content and continuous Doppler response.  Remaining deep veins demonstrate normal compressibility and augmentation. Normal compressibility of the great saphenous vein. Normal compressibility of the small saphenous vein. Left impression: Nonvisualization of the distal femoral, popliteal, posterior tibial and peroneal veins. Remaining deep veins demonstrate normal compressibility and augmentation. Normal compressibility of the great saphenous vein. Normal compressibility of the small saphenous vein.   Incidental finding of a Baker's cyst measuring 3.5 cm x 3.5 cm.  -Day 3 Antibiotics: Vancomycin pharmacy to dose, Cefepime 1 g IV q12h; Flagyl 500 mg IV every 8 hours was initiated yesterday by infectious disease team  -Podiatry consult, ID consult, nutrition/dietary consult  -Wound culture anaerobic/aerobic: growing normal bora; direct exam showing few neutrophils, many gram + cocci in clusters, few gram negative rods, few gram positive rods  -Blood cultures: No growth x2 days      Left plantar foot ulceration  -s/p excisional debridement by Podiatry on 01/20/2021    Hx of COPD   -Respiratory therapy aerosol protocol  -Patient noncompliant with previously prescribed home oxygen (2 L 24/7) and nebulziers     Prediabetes, left plantar heel ulceration  -POCT glucose  -maintain glucose levels  fasting and Less than 180 postprandial  -Nutrition/dietary consult due to poor wound healing       Hx of Hypertension and CHF (systolic and diastolic)  -On home lisinopril 10 mg PO daily  -On home metoprolol tartrate 25 mg PO BID     Hx of Depression   -Paroxetine 20 mg twice daily daily     PT/OT  Code Status: Full  Diet: Regular   DVT prophylaxis: Lovenox 40 mg SC  GI prophylaxis: none    Rex Aragon MD  1/21/2021  7:27 AM

## 2021-01-22 ENCOUNTER — APPOINTMENT (OUTPATIENT)
Dept: INTERVENTIONAL RADIOLOGY/VASCULAR | Age: 61
DRG: 317 | End: 2021-01-22
Payer: COMMERCIAL

## 2021-01-22 ENCOUNTER — ANESTHESIA (OUTPATIENT)
Dept: OPERATING ROOM | Age: 61
DRG: 317 | End: 2021-01-22
Payer: COMMERCIAL

## 2021-01-22 VITALS
TEMPERATURE: 96.8 F | OXYGEN SATURATION: 99 % | SYSTOLIC BLOOD PRESSURE: 107 MMHG | DIASTOLIC BLOOD PRESSURE: 58 MMHG | RESPIRATION RATE: 12 BRPM

## 2021-01-22 LAB
ABSOLUTE EOS #: 0.3 K/UL (ref 0–0.4)
ABSOLUTE IMMATURE GRANULOCYTE: ABNORMAL K/UL (ref 0–0.3)
ABSOLUTE LYMPH #: 1.7 K/UL (ref 1–4.8)
ABSOLUTE MONO #: 0.6 K/UL (ref 0.1–1.3)
ALBUMIN SERPL-MCNC: 2.9 G/DL (ref 3.5–5.2)
ALBUMIN/GLOBULIN RATIO: ABNORMAL (ref 1–2.5)
ALP BLD-CCNC: 56 U/L (ref 35–104)
ALT SERPL-CCNC: <5 U/L (ref 5–33)
ANION GAP SERPL CALCULATED.3IONS-SCNC: 7 MMOL/L (ref 9–17)
AST SERPL-CCNC: 9 U/L
BASOPHILS # BLD: 1 % (ref 0–2)
BASOPHILS ABSOLUTE: 0 K/UL (ref 0–0.2)
BILIRUB SERPL-MCNC: 0.18 MG/DL (ref 0.3–1.2)
BUN BLDV-MCNC: 20 MG/DL (ref 8–23)
BUN/CREAT BLD: ABNORMAL (ref 9–20)
C-REACTIVE PROTEIN: 27.3 MG/L (ref 0–5)
CALCIUM SERPL-MCNC: 8.9 MG/DL (ref 8.6–10.4)
CHLORIDE BLD-SCNC: 101 MMOL/L (ref 98–107)
CO2: 30 MMOL/L (ref 20–31)
CREAT SERPL-MCNC: 0.87 MG/DL (ref 0.5–0.9)
DIFFERENTIAL TYPE: ABNORMAL
EOSINOPHILS RELATIVE PERCENT: 6 % (ref 0–4)
GFR AFRICAN AMERICAN: >60 ML/MIN
GFR NON-AFRICAN AMERICAN: >60 ML/MIN
GFR SERPL CREATININE-BSD FRML MDRD: ABNORMAL ML/MIN/{1.73_M2}
GFR SERPL CREATININE-BSD FRML MDRD: ABNORMAL ML/MIN/{1.73_M2}
GLUCOSE BLD-MCNC: 111 MG/DL (ref 70–99)
HCT VFR BLD CALC: 35.1 % (ref 36–46)
HEMOGLOBIN: 11.7 G/DL (ref 12–16)
IMMATURE GRANULOCYTES: ABNORMAL %
LYMPHOCYTES # BLD: 30 % (ref 24–44)
MCH RBC QN AUTO: 31.6 PG (ref 26–34)
MCHC RBC AUTO-ENTMCNC: 33.4 G/DL (ref 31–37)
MCV RBC AUTO: 94.6 FL (ref 80–100)
MONOCYTES # BLD: 11 % (ref 1–7)
NRBC AUTOMATED: ABNORMAL PER 100 WBC
PDW BLD-RTO: 13.4 % (ref 11.5–14.9)
PLATELET # BLD: 457 K/UL (ref 150–450)
PLATELET ESTIMATE: ABNORMAL
PMV BLD AUTO: 7.4 FL (ref 6–12)
POTASSIUM SERPL-SCNC: 5 MMOL/L (ref 3.7–5.3)
RBC # BLD: 3.71 M/UL (ref 4–5.2)
RBC # BLD: ABNORMAL 10*6/UL
SARS-COV-2, RAPID: NORMAL
SARS-COV-2: NORMAL
SARS-COV-2: NOT DETECTED
SEDIMENTATION RATE, ERYTHROCYTE: 47 MM (ref 0–30)
SEG NEUTROPHILS: 52 % (ref 36–66)
SEGMENTED NEUTROPHILS ABSOLUTE COUNT: 3 K/UL (ref 1.3–9.1)
SODIUM BLD-SCNC: 138 MMOL/L (ref 135–144)
SOURCE: NORMAL
TOTAL PROTEIN: 6.3 G/DL (ref 6.4–8.3)
WBC # BLD: 5.7 K/UL (ref 3.5–11)
WBC # BLD: ABNORMAL 10*3/UL

## 2021-01-22 PROCEDURE — 87070 CULTURE OTHR SPECIMN AEROBIC: CPT

## 2021-01-22 PROCEDURE — 2500000003 HC RX 250 WO HCPCS: Performed by: PODIATRIST

## 2021-01-22 PROCEDURE — 99232 SBSQ HOSP IP/OBS MODERATE 35: CPT | Performed by: INTERNAL MEDICINE

## 2021-01-22 PROCEDURE — 2500000003 HC RX 250 WO HCPCS: Performed by: STUDENT IN AN ORGANIZED HEALTH CARE EDUCATION/TRAINING PROGRAM

## 2021-01-22 PROCEDURE — 6370000000 HC RX 637 (ALT 250 FOR IP): Performed by: STUDENT IN AN ORGANIZED HEALTH CARE EDUCATION/TRAINING PROGRAM

## 2021-01-22 PROCEDURE — 6360000002 HC RX W HCPCS: Performed by: ANESTHESIOLOGY

## 2021-01-22 PROCEDURE — 86403 PARTICLE AGGLUT ANTBDY SCRN: CPT

## 2021-01-22 PROCEDURE — 2500000003 HC RX 250 WO HCPCS: Performed by: INTERNAL MEDICINE

## 2021-01-22 PROCEDURE — 0QBM3ZX EXCISION OF LEFT TARSAL, PERCUTANEOUS APPROACH, DIAGNOSTIC: ICD-10-PCS | Performed by: INTERNAL MEDICINE

## 2021-01-22 PROCEDURE — 93005 ELECTROCARDIOGRAM TRACING: CPT | Performed by: STUDENT IN AN ORGANIZED HEALTH CARE EDUCATION/TRAINING PROGRAM

## 2021-01-22 PROCEDURE — 2580000003 HC RX 258: Performed by: ANESTHESIOLOGY

## 2021-01-22 PROCEDURE — 2580000003 HC RX 258: Performed by: STUDENT IN AN ORGANIZED HEALTH CARE EDUCATION/TRAINING PROGRAM

## 2021-01-22 PROCEDURE — 88311 DECALCIFY TISSUE: CPT

## 2021-01-22 PROCEDURE — 93923 UPR/LXTR ART STDY 3+ LVLS: CPT

## 2021-01-22 PROCEDURE — 88305 TISSUE EXAM BY PATHOLOGIST: CPT

## 2021-01-22 PROCEDURE — 3700000000 HC ANESTHESIA ATTENDED CARE: Performed by: PODIATRIST

## 2021-01-22 PROCEDURE — 2709999900 IR FLUORO GUIDED CVA DEVICE PLMT/REPLACE/REMOVAL

## 2021-01-22 PROCEDURE — 87205 SMEAR GRAM STAIN: CPT

## 2021-01-22 PROCEDURE — 3600000002 HC SURGERY LEVEL 2 BASE: Performed by: PODIATRIST

## 2021-01-22 PROCEDURE — 7100000000 HC PACU RECOVERY - FIRST 15 MIN: Performed by: PODIATRIST

## 2021-01-22 PROCEDURE — 3700000001 HC ADD 15 MINUTES (ANESTHESIA): Performed by: PODIATRIST

## 2021-01-22 PROCEDURE — 36415 COLL VENOUS BLD VENIPUNCTURE: CPT

## 2021-01-22 PROCEDURE — 0KBW0ZZ EXCISION OF LEFT FOOT MUSCLE, OPEN APPROACH: ICD-10-PCS | Performed by: PODIATRIST

## 2021-01-22 PROCEDURE — 3600000012 HC SURGERY LEVEL 2 ADDTL 15MIN: Performed by: PODIATRIST

## 2021-01-22 PROCEDURE — 85652 RBC SED RATE AUTOMATED: CPT

## 2021-01-22 PROCEDURE — 85025 COMPLETE CBC W/AUTO DIFF WBC: CPT

## 2021-01-22 PROCEDURE — 2500000003 HC RX 250 WO HCPCS: Performed by: NURSE ANESTHETIST, CERTIFIED REGISTERED

## 2021-01-22 PROCEDURE — 6360000002 HC RX W HCPCS: Performed by: STUDENT IN AN ORGANIZED HEALTH CARE EDUCATION/TRAINING PROGRAM

## 2021-01-22 PROCEDURE — 1200000000 HC SEMI PRIVATE

## 2021-01-22 PROCEDURE — 7100000001 HC PACU RECOVERY - ADDTL 15 MIN: Performed by: PODIATRIST

## 2021-01-22 PROCEDURE — 2709999900 HC NON-CHARGEABLE SUPPLY: Performed by: PODIATRIST

## 2021-01-22 PROCEDURE — 80053 COMPREHEN METABOLIC PANEL: CPT

## 2021-01-22 PROCEDURE — 86140 C-REACTIVE PROTEIN: CPT

## 2021-01-22 PROCEDURE — 02HV33Z INSERTION OF INFUSION DEVICE INTO SUPERIOR VENA CAVA, PERCUTANEOUS APPROACH: ICD-10-PCS | Performed by: RADIOLOGY

## 2021-01-22 PROCEDURE — 6360000002 HC RX W HCPCS: Performed by: NURSE ANESTHETIST, CERTIFIED REGISTERED

## 2021-01-22 PROCEDURE — 87075 CULTR BACTERIA EXCEPT BLOOD: CPT

## 2021-01-22 PROCEDURE — 99211 OFF/OP EST MAY X REQ PHY/QHP: CPT

## 2021-01-22 RX ORDER — HYDRALAZINE HYDROCHLORIDE 20 MG/ML
5 INJECTION INTRAMUSCULAR; INTRAVENOUS EVERY 10 MIN PRN
Status: DISCONTINUED | OUTPATIENT
Start: 2021-01-22 | End: 2021-01-22 | Stop reason: HOSPADM

## 2021-01-22 RX ORDER — HYDROCODONE BITARTRATE AND ACETAMINOPHEN 5; 325 MG/1; MG/1
1 TABLET ORAL
Status: DISCONTINUED | OUTPATIENT
Start: 2021-01-22 | End: 2021-01-22 | Stop reason: HOSPADM

## 2021-01-22 RX ORDER — LIDOCAINE HYDROCHLORIDE 10 MG/ML
INJECTION, SOLUTION EPIDURAL; INFILTRATION; INTRACAUDAL; PERINEURAL PRN
Status: DISCONTINUED | OUTPATIENT
Start: 2021-01-22 | End: 2021-01-22 | Stop reason: ALTCHOICE

## 2021-01-22 RX ORDER — ATORVASTATIN CALCIUM 40 MG/1
40 TABLET, FILM COATED ORAL NIGHTLY
Qty: 30 TABLET | Refills: 3 | Status: ON HOLD | OUTPATIENT
Start: 2021-01-22 | End: 2021-05-28 | Stop reason: SDUPTHER

## 2021-01-22 RX ORDER — DIPHENHYDRAMINE HYDROCHLORIDE 50 MG/ML
12.5 INJECTION INTRAMUSCULAR; INTRAVENOUS
Status: DISCONTINUED | OUTPATIENT
Start: 2021-01-22 | End: 2021-01-22 | Stop reason: HOSPADM

## 2021-01-22 RX ORDER — SODIUM CHLORIDE 0.9 % (FLUSH) 0.9 %
10 SYRINGE (ML) INJECTION EVERY 12 HOURS SCHEDULED
Status: DISCONTINUED | OUTPATIENT
Start: 2021-01-22 | End: 2021-01-22 | Stop reason: HOSPADM

## 2021-01-22 RX ORDER — KETAMINE HYDROCHLORIDE 50 MG/ML
INJECTION, SOLUTION, CONCENTRATE INTRAMUSCULAR; INTRAVENOUS PRN
Status: DISCONTINUED | OUTPATIENT
Start: 2021-01-22 | End: 2021-01-22 | Stop reason: SDUPTHER

## 2021-01-22 RX ORDER — LIDOCAINE HYDROCHLORIDE 10 MG/ML
INJECTION, SOLUTION EPIDURAL; INFILTRATION; INTRACAUDAL; PERINEURAL PRN
Status: DISCONTINUED | OUTPATIENT
Start: 2021-01-22 | End: 2021-01-22 | Stop reason: SDUPTHER

## 2021-01-22 RX ORDER — OXYCODONE HYDROCHLORIDE AND ACETAMINOPHEN 5; 325 MG/1; MG/1
1 TABLET ORAL EVERY 6 HOURS PRN
Qty: 20 TABLET | Refills: 0 | Status: SHIPPED | OUTPATIENT
Start: 2021-01-22 | End: 2021-01-23

## 2021-01-22 RX ORDER — SODIUM CHLORIDE 0.9 % (FLUSH) 0.9 %
10 SYRINGE (ML) INJECTION PRN
Status: DISCONTINUED | OUTPATIENT
Start: 2021-01-22 | End: 2021-01-22 | Stop reason: HOSPADM

## 2021-01-22 RX ORDER — PROMETHAZINE HYDROCHLORIDE 25 MG/ML
6.25 INJECTION, SOLUTION INTRAMUSCULAR; INTRAVENOUS
Status: DISCONTINUED | OUTPATIENT
Start: 2021-01-22 | End: 2021-01-22 | Stop reason: HOSPADM

## 2021-01-22 RX ORDER — PROPOFOL 10 MG/ML
INJECTION, EMULSION INTRAVENOUS CONTINUOUS PRN
Status: DISCONTINUED | OUTPATIENT
Start: 2021-01-22 | End: 2021-01-22 | Stop reason: SDUPTHER

## 2021-01-22 RX ORDER — METOCLOPRAMIDE HYDROCHLORIDE 5 MG/ML
10 INJECTION INTRAMUSCULAR; INTRAVENOUS
Status: DISCONTINUED | OUTPATIENT
Start: 2021-01-22 | End: 2021-01-22 | Stop reason: HOSPADM

## 2021-01-22 RX ORDER — BUPIVACAINE HYDROCHLORIDE 5 MG/ML
INJECTION, SOLUTION EPIDURAL; INTRACAUDAL PRN
Status: DISCONTINUED | OUTPATIENT
Start: 2021-01-22 | End: 2021-01-22 | Stop reason: ALTCHOICE

## 2021-01-22 RX ORDER — LISINOPRIL 10 MG/1
10 TABLET ORAL DAILY
Qty: 30 TABLET | Refills: 3 | Status: SHIPPED | OUTPATIENT
Start: 2021-01-23 | End: 2021-02-08

## 2021-01-22 RX ORDER — MIDAZOLAM HYDROCHLORIDE 1 MG/ML
INJECTION INTRAMUSCULAR; INTRAVENOUS PRN
Status: DISCONTINUED | OUTPATIENT
Start: 2021-01-22 | End: 2021-01-22 | Stop reason: SDUPTHER

## 2021-01-22 RX ORDER — PROPOFOL 10 MG/ML
INJECTION, EMULSION INTRAVENOUS PRN
Status: DISCONTINUED | OUTPATIENT
Start: 2021-01-22 | End: 2021-01-22 | Stop reason: SDUPTHER

## 2021-01-22 RX ORDER — EPHEDRINE SULFATE/0.9% NACL/PF 50 MG/5 ML
SYRINGE (ML) INTRAVENOUS PRN
Status: DISCONTINUED | OUTPATIENT
Start: 2021-01-22 | End: 2021-01-22 | Stop reason: SDUPTHER

## 2021-01-22 RX ORDER — MEPERIDINE HYDROCHLORIDE 25 MG/ML
12.5 INJECTION INTRAMUSCULAR; INTRAVENOUS; SUBCUTANEOUS EVERY 5 MIN PRN
Status: DISCONTINUED | OUTPATIENT
Start: 2021-01-22 | End: 2021-01-22 | Stop reason: HOSPADM

## 2021-01-22 RX ORDER — ALBUTEROL SULFATE 90 UG/1
2 AEROSOL, METERED RESPIRATORY (INHALATION) EVERY 6 HOURS PRN
Qty: 1 INHALER | Refills: 0 | Status: ON HOLD | OUTPATIENT
Start: 2021-01-22 | End: 2021-05-28 | Stop reason: SDUPTHER

## 2021-01-22 RX ORDER — LIDOCAINE HYDROCHLORIDE 10 MG/ML
1 INJECTION, SOLUTION EPIDURAL; INFILTRATION; INTRACAUDAL; PERINEURAL
Status: DISCONTINUED | OUTPATIENT
Start: 2021-01-22 | End: 2021-01-22 | Stop reason: HOSPADM

## 2021-01-22 RX ORDER — SODIUM CHLORIDE, SODIUM LACTATE, POTASSIUM CHLORIDE, CALCIUM CHLORIDE 600; 310; 30; 20 MG/100ML; MG/100ML; MG/100ML; MG/100ML
INJECTION, SOLUTION INTRAVENOUS CONTINUOUS
Status: DISCONTINUED | OUTPATIENT
Start: 2021-01-22 | End: 2021-01-22

## 2021-01-22 RX ORDER — BUMETANIDE 2 MG/1
2 TABLET ORAL DAILY
Qty: 30 TABLET | Refills: 1 | Status: ON HOLD | OUTPATIENT
Start: 2021-01-22 | End: 2021-05-28 | Stop reason: HOSPADM

## 2021-01-22 RX ADMIN — ENOXAPARIN SODIUM 40 MG: 40 INJECTION SUBCUTANEOUS at 21:25

## 2021-01-22 RX ADMIN — CEFEPIME 1000 MG: 1 INJECTION, POWDER, FOR SOLUTION INTRAMUSCULAR; INTRAVENOUS at 21:31

## 2021-01-22 RX ADMIN — PAROXETINE HYDROCHLORIDE 20 MG: 20 TABLET, FILM COATED ORAL at 21:26

## 2021-01-22 RX ADMIN — SODIUM CHLORIDE, POTASSIUM CHLORIDE, SODIUM LACTATE AND CALCIUM CHLORIDE: 600; 310; 30; 20 INJECTION, SOLUTION INTRAVENOUS at 11:25

## 2021-01-22 RX ADMIN — PROPOFOL 100 MCG/KG/MIN: 10 INJECTION, EMULSION INTRAVENOUS at 11:55

## 2021-01-22 RX ADMIN — Medication 15 MG: at 12:09

## 2021-01-22 RX ADMIN — LIDOCAINE HYDROCHLORIDE 50 MG: 10 INJECTION, SOLUTION EPIDURAL; INFILTRATION; INTRACAUDAL; PERINEURAL at 11:55

## 2021-01-22 RX ADMIN — METRONIDAZOLE 500 MG: 500 INJECTION, SOLUTION INTRAVENOUS at 18:43

## 2021-01-22 RX ADMIN — KETAMINE HYDROCHLORIDE 10 MG: 50 INJECTION, SOLUTION INTRAMUSCULAR; INTRAVENOUS at 12:11

## 2021-01-22 RX ADMIN — ATORVASTATIN CALCIUM 40 MG: 40 TABLET, FILM COATED ORAL at 21:26

## 2021-01-22 RX ADMIN — KETAMINE HYDROCHLORIDE 10 MG: 50 INJECTION, SOLUTION INTRAMUSCULAR; INTRAVENOUS at 12:13

## 2021-01-22 RX ADMIN — OXYCODONE HYDROCHLORIDE AND ACETAMINOPHEN 2 TABLET: 5; 325 TABLET ORAL at 14:20

## 2021-01-22 RX ADMIN — KETAMINE HYDROCHLORIDE 30 MG: 50 INJECTION, SOLUTION INTRAMUSCULAR; INTRAVENOUS at 11:55

## 2021-01-22 RX ADMIN — MIDAZOLAM 2 MG: 1 INJECTION INTRAMUSCULAR; INTRAVENOUS at 11:45

## 2021-01-22 RX ADMIN — METRONIDAZOLE 500 MG: 500 INJECTION, SOLUTION INTRAVENOUS at 08:09

## 2021-01-22 RX ADMIN — METRONIDAZOLE 500 MG: 500 INJECTION, SOLUTION INTRAVENOUS at 23:47

## 2021-01-22 RX ADMIN — PAROXETINE HYDROCHLORIDE 20 MG: 20 TABLET, FILM COATED ORAL at 08:11

## 2021-01-22 RX ADMIN — PROPOFOL 20 MG: 10 INJECTION, EMULSION INTRAVENOUS at 11:56

## 2021-01-22 RX ADMIN — PROPOFOL 20 MG: 10 INJECTION, EMULSION INTRAVENOUS at 11:57

## 2021-01-22 RX ADMIN — CEFEPIME 1 G: 1 INJECTION, POWDER, FOR SOLUTION INTRAMUSCULAR; INTRAVENOUS at 06:26

## 2021-01-22 RX ADMIN — Medication 15 MG: at 12:02

## 2021-01-22 RX ADMIN — METOPROLOL TARTRATE 25 MG: 25 TABLET, FILM COATED ORAL at 08:11

## 2021-01-22 RX ADMIN — HYDROMORPHONE HYDROCHLORIDE 0.5 MG: 1 INJECTION, SOLUTION INTRAMUSCULAR; INTRAVENOUS; SUBCUTANEOUS at 12:54

## 2021-01-22 RX ADMIN — VANCOMYCIN HYDROCHLORIDE 1500 MG: 1.5 INJECTION, POWDER, LYOPHILIZED, FOR SOLUTION INTRAVENOUS at 18:54

## 2021-01-22 RX ADMIN — Medication 10 ML: at 21:34

## 2021-01-22 RX ADMIN — LISINOPRIL 10 MG: 10 TABLET ORAL at 08:11

## 2021-01-22 RX ADMIN — OXYCODONE HYDROCHLORIDE AND ACETAMINOPHEN 2 TABLET: 5; 325 TABLET ORAL at 06:28

## 2021-01-22 RX ADMIN — OXYCODONE HYDROCHLORIDE AND ACETAMINOPHEN 2 TABLET: 5; 325 TABLET ORAL at 18:42

## 2021-01-22 RX ADMIN — PROPOFOL 30 MG: 10 INJECTION, EMULSION INTRAVENOUS at 11:55

## 2021-01-22 RX ADMIN — OXYCODONE HYDROCHLORIDE AND ACETAMINOPHEN 2 TABLET: 5; 325 TABLET ORAL at 22:42

## 2021-01-22 ASSESSMENT — PAIN DESCRIPTION - DESCRIPTORS
DESCRIPTORS: ACHING
DESCRIPTORS: STABBING;SHARP
DESCRIPTORS: ACHING

## 2021-01-22 ASSESSMENT — PAIN SCALES - GENERAL
PAINLEVEL_OUTOF10: 0
PAINLEVEL_OUTOF10: 10
PAINLEVEL_OUTOF10: 10
PAINLEVEL_OUTOF10: 8

## 2021-01-22 ASSESSMENT — PULMONARY FUNCTION TESTS
PIF_VALUE: 1
PIF_VALUE: 0
PIF_VALUE: 1
PIF_VALUE: 0
PIF_VALUE: 1
PIF_VALUE: 0
PIF_VALUE: 1
PIF_VALUE: 1

## 2021-01-22 ASSESSMENT — PAIN DESCRIPTION - LOCATION
LOCATION: FOOT
LOCATION: FOOT

## 2021-01-22 ASSESSMENT — PAIN DESCRIPTION - ORIENTATION
ORIENTATION: LEFT
ORIENTATION: LEFT

## 2021-01-22 NOTE — DISCHARGE INSTR - COC
systolic (HCC) S96.54    Hypokalemia E87.6    Cellulitis L03.90    Primary osteoarthritis of right hip M16.11    Chronic pain of multiple joints M25.50, G89.29    Depression F32.9    Cellulitis of right leg L03.115    Peripheral artery disease (HCC) I73.9       Isolation/Infection:   Isolation            No Isolation          Patient Infection Status       Infection Onset Added Last Indicated Last Indicated By Review Planned Expiration Resolved Resolved By    None active    Resolved    COVID-19 Rule Out 01/21/21 01/21/21 01/21/21 COVID-19 (Ordered)   01/22/21 Rule-Out Test Resulted    COVID-19 Rule Out 06/30/20 06/30/20 06/30/20 COVID-19 (Ordered)   06/30/20 Rule-Out Test Resulted    COVID-19 Rule Out 05/24/20 05/24/20 05/24/20 COVID-19 (Ordered)   05/24/20 Rule-Out Test Resulted            Nurse Assessment:  Last Vital Signs: BP (!) 106/49   Pulse 66   Temp 97.3 °F (36.3 °C) (Oral)   Resp 16   Ht 5' 7\" (1.702 m)   Wt (!) 342 lb 6 oz (155.3 kg)   SpO2 94%   BMI 53.62 kg/m²     Last documented pain score (0-10 scale): Pain Level: 10  Last Weight:   Wt Readings from Last 1 Encounters:   01/19/21 (!) 342 lb 6 oz (155.3 kg)     Mental Status:  oriented and alert    IV Access:  - PICC - site  R Basilic, insertion date: 1/22/2021    Nursing Mobility/ADLs:  Walking   Assisted  Transfer  Assisted  Bathing  Assisted  Dressing  Assisted  Toileting  Assisted  Feeding  Assisted  Med Admin  Assisted  Med Delivery   whole    Wound Care Documentation and Therapy:  Wound 05/25/20 Leg Left; Lower; Anterior; Lateral cluster (Active)   Number of days: 242       Wound 05/25/20 Leg Right; Lower red and swollen. weeping yellow sores. (Active)   Number of days: 242       Wound Buttocks near gluteal fold (Active)   Wound Image   01/21/21 1048   Wound Etiology Pressure Stage  2 01/21/21 1048   Dressing Status New dressing applied; Old drainage noted 01/21/21 1048   Wound Cleansed Cleansed with saline 01/21/21 1048 Dressing/Treatment Zinc paste 01/21/21 1945   Dressing Change Due 01/21/21 01/21/21 1048   Wound Length (cm) 4.5 cm 01/21/21 1048   Wound Width (cm) 3.5 cm 01/21/21 1048   Wound Depth (cm) 0.1 cm 01/21/21 1048   Wound Surface Area (cm^2) 15.75 cm^2 01/21/21 1048   Change in Wound Size % (l*w) 35.71 01/21/21 1048   Wound Volume (cm^3) 1.58 cm^3 01/21/21 1048   Wound Healing % 87 01/21/21 1048   Distance Tunneling (cm) 0 cm 01/20/21 0443   Wound Assessment Pink/red 01/21/21 1945   Drainage Amount Small 01/21/21 1945   Drainage Description Serosanguinous 01/21/21 1945   Odor None 01/21/21 1945   Aruna-wound Assessment Blanchable erythema 01/21/21 1945   Margins Attached edges; Defined edges 01/21/21 1048   Wound Thickness Description not for Pressure Injury Partial thickness 01/21/21 1048   Number of days:        Wound 06/30/20 Pretibial Right (Active)   Wound Etiology Diabetic 01/19/21 1954   Dressing Status Clean;Dry; Intact 01/21/21 1945   Dressing/Treatment Ace wrap;Roll gauze 01/21/21 1945   Offloading for Diabetic Foot Ulcers No 01/19/21 1954   Dressing Change Due 01/21/21 01/20/21 0750   Wound Assessment Other (Comment) 01/21/21 1945   Drainage Amount Scant 01/19/21 1954   Drainage Description Serosanguinous; Serous 01/19/21 1954   Odor None 01/19/21 1954   Aruna-wound Assessment Dry/flaky;Edematous; Warm;Other (Comment) 01/19/21 1954   Margins Unattached edges; Undefined edges 01/19/21 1954   Wound Thickness Description not for Pressure Injury Partial thickness 01/19/21 1954   Number of days: 205       Wound 06/30/20 Buttocks small, red, open area  (Active)   Number of days: 205       Wound 06/30/20 Pretibial Left (Active)   Number of days: 205       Wound Leg Left; Lower; Lateral (Active)   Number of days:        Wound Leg Left; Lower; Posterior (Active)   Number of days:        Wound Heel Left;Plantar (Active)   Wound Etiology Diabetic 01/19/21 1954   Dressing Status Clean;Dry; Intact 01/21/21 1945   Dressing/Treatment maile.    Physician Certification: I certify the above information and transfer of Gayle Santos  is necessary for the continuing treatment of the diagnosis listed and that she requires [facility] for less 30 days.      Update Admission H&P: No change in H&P      PHYSICIAN SIGNATURE:  Electronically signed by Omayra Belcher MD on 1/22/21 at 4:41 PM EST

## 2021-01-22 NOTE — PROGRESS NOTES
[facility] -   Infectious diseases evaluation  admission date 1/19/2021      Patient was out for surgery, was not seen. Chart reviewed  Discussed with internal medicine team, would like to discharge patient today after the surgery   The patient may be discharged on IV Invanz 1 g daily, vancomycin 1500 mg daily pharmacy to dose for 6 weeks  PICC line  Follow CBC, BUN/creatinine and vancomycin level weekly while on antibiotics  Follow-up on bone biopsy culture and adjust antibiotics as needed  Follow-up with me next week  Case was discussed with discharge planner.

## 2021-01-22 NOTE — DISCHARGE SUMMARY
[address] [facility]    Discharge Summary     Patient ID: Zulay George  :  1960   MRN: [id number]     ACCOUNT:  [de-identified]   Patient's PCP: KIRK Schaefer CNP  Admit Date: 2021   Discharge Date: 2021     Length of Stay: 4  Code Status:  Full Code  Admitting Physician: Lico Workman MD  Discharge Physician: Concepción Manrique MD     Active Discharge Diagnoses:       Primary Problem  Cellulitis      Hospital Problems  Active Hospital Problems    Diagnosis Date Noted    [facility] [I73.9] 2021    Cellulitis of right leg [K69.029]     Depression [F32.9] 2021    Cellulitis [L03.90] 2020    Prediabetes [R73.03] 2019    Essential hypertension [I10] 10/29/2019    Chronic obstructive pulmonary disease ([facility] [J44.9] 10/29/2019       Admission Condition:  poor     Discharged Condition: fair    Hospital Stay:       Hospital Course: Zulay George is a 61 y.o. female with h/o morbid obesity and DM who was admitted for the management of a left foot wound with MRI suggesting osteomyelitis for which the patient underwent debridement and bone biopsy by podiatry on 2021. She will follow up with infectious disease physician Dr. Ziggy Villalba in 1 week where antibiotics can be adjusted to cultures if needed. She is discharged to [facility] with a PICC line that was placed on 2021 and is prescribed IV vancomycin (pharmacy to dose) and IV ertapenem x6 weeks. She is given lab orders for weekly CBC, BMP, vancomycin trough. On discharge:  -She is started on aspirin 81 mg PO daily and Lipitor 40 mg PO daily for peripheral artery disease (PAD). -Her Lopressor is decreased to 12.5 mg PO twice daily to avoid diastolic hypotension; she may follow up with her PCP to adjust this as needed  -She is given a short course of Percocet and Motrin.      On day of discharge patient feels much improved and is hemodynamically stable. Significant therapeutic interventions: debridement    Significant Diagnostic Studies:   Labs / Micro:  CBC:   Lab Results   Component Value Date    WBC 6.8 01/23/2021    RBC 3.74 01/23/2021    HGB 11.7 01/23/2021    HCT 35.6 01/23/2021    MCV 95.2 01/23/2021    MCH 31.3 01/23/2021    MCHC 32.9 01/23/2021    RDW 13.7 01/23/2021     01/23/2021       Radiology:    Xr Tibia Fibula Left (2 Views)    Result Date: 1/19/2021  EXAMINATION: XRAY VIEWS OF THE LEFT TIBIA AND FIBULA 1/19/2021 9:06 pm COMPARISON: 07/02/2020 HISTORY: ORDERING SYSTEM PROVIDED HISTORY: infection, swelling TECHNOLOGIST PROVIDED HISTORY: infection, swelling Reason for Exam: infection, swelling Acuity: Acute Type of Exam: Initial FINDINGS: There is diffuse soft tissue swelling. The osseous structures are intact. There are stable changes related to instrumented fusion of medial and lateral malleolus with no evidence of hardware related complication such as loosening or fracture. Severe degenerative disease of the knee joint and intertarsal joints are partially visualized     Stable study. Diffuse soft tissue swelling may be related to infection. No osseous or hardware related abnormality. Xr Tibia Fibula Right (2 Views)    Result Date: 1/19/2021  EXAMINATION: THREE XRAY VIEWS OF THE LEFT FOOT;   XRAY VIEWS OF THE RIGHT TIBIA AND FIBULA 1/19/2021 1:44 pm COMPARISON: 07/03/2020 HISTORY: ORDERING SYSTEM PROVIDED HISTORY: heel wound TECHNOLOGIST PROVIDED HISTORY: heel wound Reason for Exam: PT states non-healing wound on heel. Acuity: Unknown Type of Exam: Unknown Additional signs and symptoms: PT states non-healing wound on heel. ; ORDERING SYSTEM PROVIDED HISTORY: leg wound TECHNOLOGIST PROVIDED HISTORY: leg wound Reason for Exam: PT states non-healing wound on lower leg. Wound observed on medial aspect of lower leg.  Acuity: Unknown Type of Exam: Unknown Additional signs and symptoms: PT states non-healing wound on lower leg. Wound observed on medial aspect of lower leg. FINDINGS: Left foot: Calcaneus is suboptimally evaluated due to difficulty in appropriately positioning the patient. There are posterior and plantar calcaneal enthesopathic changes. There is suggestion of focal abnormal demineralization at the plantar calcaneus underlying an area of soft tissue ulceration. Partially imaged open reduction internal fixation hardware within the distal tibia and fibula. Degenerative and enthesopathic changes about the foot and ankle. Diffuse soft tissue edema. Right tib-fib: No acute fracture or dislocation. Osseous demineralization throughout. Diffuse periostitis of the tibia and fibula. Mild knee and moderate ankle degenerative changes. Diffuse soft tissue edema. Soft tissue ulceration at the plantar aspect of the heel with suggestion of focal abnormal demineralization at the plantar calcaneus suspicious for concomitant osteomyelitis. Diffuse periostitis about the right tibia and fibula which may relate to venous insufficiency. The reported soft tissue wound of the right lower extremity is not definitively appreciated radiographically on a background of diffuse edema. Xr Foot Left (min 3 Views)    Result Date: 1/19/2021  EXAMINATION: THREE XRAY VIEWS OF THE LEFT FOOT;   XRAY VIEWS OF THE RIGHT TIBIA AND FIBULA 1/19/2021 1:44 pm COMPARISON: 07/03/2020 HISTORY: ORDERING SYSTEM PROVIDED HISTORY: heel wound TECHNOLOGIST PROVIDED HISTORY: heel wound Reason for Exam: PT states non-healing wound on heel. Acuity: Unknown Type of Exam: Unknown Additional signs and symptoms: PT states non-healing wound on heel. ; ORDERING SYSTEM PROVIDED HISTORY: leg wound TECHNOLOGIST PROVIDED HISTORY: leg wound Reason for Exam: PT states non-healing wound on lower leg. Wound observed on medial aspect of lower leg. Acuity: Unknown Type of Exam: Unknown Additional signs and symptoms: PT states non-healing wound on lower leg.  Wound observed on medial aspect of lower leg. FINDINGS: Left foot: Calcaneus is suboptimally evaluated due to difficulty in appropriately positioning the patient. There are posterior and plantar calcaneal enthesopathic changes. There is suggestion of focal abnormal demineralization at the plantar calcaneus underlying an area of soft tissue ulceration. Partially imaged open reduction internal fixation hardware within the distal tibia and fibula. Degenerative and enthesopathic changes about the foot and ankle. Diffuse soft tissue edema. Right tib-fib: No acute fracture or dislocation. Osseous demineralization throughout. Diffuse periostitis of the tibia and fibula. Mild knee and moderate ankle degenerative changes. Diffuse soft tissue edema. Soft tissue ulceration at the plantar aspect of the heel with suggestion of focal abnormal demineralization at the plantar calcaneus suspicious for concomitant osteomyelitis. Diffuse periostitis about the right tibia and fibula which may relate to venous insufficiency. The reported soft tissue wound of the right lower extremity is not definitively appreciated radiographically on a background of diffuse edema. Xr Chest Portable    Result Date: 1/19/2021  EXAMINATION: ONE XRAY VIEW OF THE CHEST 1/19/2021 9:06 pm COMPARISON: 06/30/2020 HISTORY: ORDERING SYSTEM PROVIDED HISTORY: wheezing right lung TECHNOLOGIST PROVIDED HISTORY: wheezing right lung Reason for Exam: wheezing right lung Acuity: Acute Type of Exam: Initial FINDINGS: Mild cardiomegaly. The mediastinal hilar contours are normal.  The lungs are clear with no focal consolidation. No pleural effusion or pneumothorax. Stable calcified nodule of right lung base and bilateral hilar calcified lymph nodes, likely related to prior granulomatous disease exposure. Stable study. No acute intrathoracic pathology.      Mri Ankle Left Wo Contrast    Result Date: 1/20/2021  EXAMINATION: MRI OF THE LEFT ANKLE WITHOUT CONTRAST, 1/20/2021 9:39 am TECHNIQUE: Limited MRI of the left ankle was performed without the administration of intravenous contrast. COMPARISON: Left foot radiographs January 19, 2021 HISTORY: ORDERING SYSTEM PROVIDED HISTORY: Surgical planning, r/o osteo, abscess TECHNOLOGIST PROVIDED HISTORY: Area of clinical concern is the plantar lateral calcaneus. Please extend as far distally as possible. Surgical planning, r/o osteo, abscess Reason for Exam: Pt in extreme amount of pain thru right hip, knee area. Tried to make pt comfortable but was unable to finish MRI exam.  Pt in too much pain and crying, exam stopped and will put thru what images were obtained. FINDINGS: Limited MRI of the ankle was obtained as patient was unable to tolerate the procedure. Only two sagittal series were obtained. Artifact is seen from motion and hardware within the hindfoot. Diffuse soft tissue swelling and subcutaneous edema. No gross evidence of a focal drainable fluid collection. Soft tissue ulceration is seen underlying the calcaneus. Changes appear to extend to the underlying bone. There is a minimal amount of edema on STIR imaging. Questionable early area of cortical thinning. No definite marrow replacement on T1 weighted images. Significantly limited study. Soft tissue ulceration underlying the calcaneus with questionable early osteomyelitis versus reactive marrow changes.      Vl Lower Extremity Arteries Bilateral    Result Date: 1/20/2021    Count includes the [facility] [place], [facility]  Vascular Lower Extremities Arterial Duplex Procedure   Patient Name    Marin Chawla Date of Study           01/20/2021   Date of Birth   1960  Gender                  Female   Age             61 year(s)  Race                       Room Number     2074   Corporate ID #  [id number]   Patient Acct #  [de-identified]   MR #            [id number]      [facility]   Accession #     [id number]  Interpreting Physician  Anel Duffy Reyes,Arthur   Referring Nurse             Referring Physician     Yasir Blood DPM  Practitioner  Procedure Type of Study:   Extremities Arteries: Lower Extremities Arterial Duplex, Arterial Scan  Lower Bilateral.  Indications for Study:PVD and ulceration. Patient Status: In Patient. Technical Quality:Limited visualization. Limitation reason:Edema. Conclusions   Summary   Monophasic flow throughout the bilateral lower extremities. Elevated  velocities are consistent with bilateral SFA stenosis. Signature   ----------------------------------------------------------------  Electronically signed by Ivan Almaraz(Sonographer) on  01/20/2021 10:55 AM  ----------------------------------------------------------------   ----------------------------------------------------------------  Electronically signed by Norm Kelp Reyes,Arthur(Interpreting  physician) on 01/20/2021 08:27 PM  ----------------------------------------------------------------  Findings:   Right Impression:                    Left Impression:  Plaque without increased Doppler     Plaque without increased Doppler  waveforms and monophasic flow noted  waveforms and monophasic flow noted  throughout. throughout. Non visualization of the peroneal    Non visualization of the peroneal  artery. artery. Velocities are measured in cm/s ; Diameters are measured in cm LE Duplex Measurements +---------++-----+-----+----------+----+-----++---+-----+----------+----+-----+ ! ! !Right! ! Left      !    !     !!   !     !          !    !     ! +---------++-----+-----+----------+----+-----++---+-----+----------+----+-----+ ! Location ! !PSV  ! Ratio! Wave Desc. !AP  ! Trans! !PSV! Ratio! Wave Desc. !AP  ! Trans! !         !!     !     ! !Diam!Diam !!   !     ! !Diam!Diam ! +---------++-----+-----+----------+----+-----++---+-----+----------+----+-----+ ! Dist EIA !!165  ! ! Monophasic!    !     !!208! [id number]   MR #            [id number]      Sonographer             Molly Riggs   Accession #     [id number]  Interpreting Physician  Tc Reed   Referring Nurse             Referring Physician     Tesha Houser  Practitioner  Procedure Type of Study:   Veins: Lower Extremities DVT Study, Venous Scan Lower Bilateral.  Indications for Study:Pain and swelling and R/O DVT. Patient Status: In Patient. Technical Quality:Limited visualization. Limitation reason:Bandage material.  Conclusions   Summary   No evidence of superficial or deep venous thrombosis in both lower  extremities. Signature   ----------------------------------------------------------------  Electronically signed by Ivan Almaraz(Sonographer) on  01/20/2021 08:39 AM  ----------------------------------------------------------------   ----------------------------------------------------------------  Electronically signed by Norm Kelp Reyes,Arthur(Interpreting  physician) on 01/20/2021 08:25 PM  ----------------------------------------------------------------  Findings:   Right Impression:                       Left Impression:  Non visualization of the posterior      Non visualization of the distal  tibial and peroneal veins. femoral, popliteal, posterior                                          tibial and peroneal veins. Partial compressibility of the  popliteal vein with hyperechoic         Remaining deep veins  intraluminal content and continuous     demonstrate normal compressibility  Doppler response. and augmentation. Remaining deep veins                    Normal compressibility of the  demonstrate normal compressibility and  great saphenous vein. augmentation. Normal compressibility of the  Normal compressibility of the great     small saphenous vein. saphenous vein.                                           Incidental finding of a Baker's  Normal compressibility of the small     cyst measuring 3.5 cm x 3.5 cm.  saphenous vein. Velocities are measured in cm/s ; Diameters are measured in cm Right Lower Extremities DVT Study Measurements Right 2D Measurements +------------------------------------+----------+---------------+----------+ ! Location                            ! Visualized! Compressibility! Thrombosis! +------------------------------------+----------+---------------+----------+ ! Common Femoral                      !Yes       ! Yes            ! None      ! +------------------------------------+----------+---------------+----------+ ! Prox Femoral                        !Yes       ! Yes            ! None      ! +------------------------------------+----------+---------------+----------+ ! Mid Femoral                         !Partial   !Yes            ! None      ! +------------------------------------+----------+---------------+----------+ ! Dist Femoral                        !Partial   !Yes            ! None      ! +------------------------------------+----------+---------------+----------+ ! Popliteal                           !Yes       ! Partial        !          ! +------------------------------------+----------+---------------+----------+ ! Sapheno Femoral Junction            ! Yes       ! Yes            ! None      ! +------------------------------------+----------+---------------+----------+ ! PTV                                 ! No        !               !          ! +------------------------------------+----------+---------------+----------+ ! Peroneal                            !No        !               !          ! +------------------------------------+----------+---------------+----------+ ! Gastroc                             ! Yes       ! Yes            ! None      ! +------------------------------------+----------+---------------+----------+ ! GSV Thigh                           ! Yes       ! Yes            ! None      ! +------------------------------------+----------+---------------+----------+ ! GSV Knee                            ! Yes       ! Yes            ! None      ! +------------------------------------+----------+---------------+----------+ ! GSV Ankle                           ! No        !               !          ! +------------------------------------+----------+---------------+----------+ ! SSV                                 ! Yes       ! Yes            ! None      ! +------------------------------------+----------+---------------+----------+ Right Doppler Measurements +-------------------------+----------+------+------------------------------+ ! Location                 ! Signal    !Reflux! Reflux (msec)                 ! +-------------------------+----------+------+------------------------------+ ! Common Femoral           !Phasic    !      !                              ! +-------------------------+----------+------+------------------------------+ ! Prox Femoral             !Phasic    !      !                              ! +-------------------------+----------+------+------------------------------+ ! Popliteal                !Continuous!      !                              ! +-------------------------+----------+------+------------------------------+ Left Lower Extremities DVT Study Measurements Left 2D Measurements +------------------------------------+----------+---------------+----------+ ! Location                            ! Visualized! Compressibility! Thrombosis! +------------------------------------+----------+---------------+----------+ ! Common Femoral                      !Yes       ! Yes            ! None      ! +------------------------------------+----------+---------------+----------+ ! Prox Femoral                        !Yes       ! Yes            ! None      ! +------------------------------------+----------+---------------+----------+ ! Mid Femoral                         !No        !               !          ! +------------------------------------+----------+---------------+----------+ ! Dist Femoral                        !No        !               !          ! +------------------------------------+----------+---------------+----------+ ! Popliteal                           !Yes       ! Yes            ! None      ! +------------------------------------+----------+---------------+----------+ ! Sapheno Femoral Junction            ! Yes       ! Yes            ! None      ! +------------------------------------+----------+---------------+----------+ ! PTV                                 ! No        !               !          ! +------------------------------------+----------+---------------+----------+ ! Peroneal                            !No        !               !          ! +------------------------------------+----------+---------------+----------+ ! Gastroc                             ! Yes       ! Yes            ! None      ! +------------------------------------+----------+---------------+----------+ ! GSV Thigh                           ! Yes       ! Yes            ! None      ! +------------------------------------+----------+---------------+----------+ ! GSV Knee                            ! Yes       ! Yes            ! None      ! +------------------------------------+----------+---------------+----------+ ! GSV Ankle                           ! No        !               !          ! +------------------------------------+----------+---------------+----------+ ! SSV                                 ! Yes       ! Yes            ! None      ! +------------------------------------+----------+---------------+----------+ Left Doppler Measurements +---------------------------+------+------+--------------------------------+ ! Location                   ! Signal!Reflux! Reflux (msec)                   ! +---------------------------+------+------+--------------------------------+ ! Common Femoral             !Phasic!      ! ! +---------------------------+------+------+--------------------------------+ ! Prox Femoral               !Phasic!      !                                ! +---------------------------+------+------+--------------------------------+ ! Popliteal                  !Phasic!      !                                ! +---------------------------+------+------+--------------------------------+    Vl Arterial Pvr Lower    Result Date: 1/22/2021    [facility]  Vascular Lower Arterial Plethysmography Procedure   Patient Name     Bebe HILL Date of Study             01/22/2021   Date of Birth    1960  Gender                    Female   Age              61 year(s)  Race                         Room Number      2074   Corporate ID #   [id number]   Patient Acct #   [de-identified]   MR #             [id number]      Sonographer               Nabor Flores   Accession #      [id number]  Interpreting Physician    Gabino Garvin   Referring Nurse              Referring Physician       Adelina Robles  Practitioner  Procedure Type of Study:   Extremities Arteries: Lower Arterial Plethysmography, PVR Lower. Indications for Study:PVD. Patient Status: In Patient. Technical Quality:Limited visualization. Conclusions   Summary   Bilateral lower extremity ABIs and PVRs (with toe pressures) were  performed for the evaluation of peripheral vascular disease.  Study  demonstrates:   Right:  Normal PVRs  MAGGIE suggests no vascular insufficiency at rest   Left:  Normal PVRs  MAGGIE suggests mild vascular insufficiency at rest   Signature   ----------------------------------------------------------------  Electronically signed by Nabor Flores(Sonographer) on  01/22/2021 10:49 AM  ----------------------------------------------------------------   ----------------------------------------------------------------  Electronically signed by Thuy Negron(Interpreting  physician) on 01/22/2021 04:39 PM ----------------------------------------------------------------  Findings:   Right Impression:                    Left Impression:  Thigh cuff could not fit properly    IV preventing brachial pressure. around thigh, unable to obtain thigh  pressures. Thigh cuff could not fit properly                                       around thigh, unable to obtain thigh  Unable to obtain pressures on        pressure. posterior tibial veins due to thick  skin around ankle and foot. Unable to obtain pressures on                                       posterior tibial veins due to thick  Normal waveforms at the calf, ankle  skin around ankle and foot. and digit levels. Normal waveforms at the calf, ankle  MAGGIE suggests no vascular             and digit levels. insufficiency at rest.                                       MAGGIE suggests mild vascular                                       insufficiency at rest.  Velocities are measured in cm/s ; Diameters are measured in cm Pressures +------------+----+------------+---------+--------+------------+-----------+ !            ! !Right       ! ! Left    !            !           ! +------------+----+------------+---------+--------+------------+-----------+ ! Location    ! !Pressure    ! Ratio    ! !Pressure    ! Ratio      ! +------------+----+------------+---------+--------+------------+-----------+ ! Calf        !    !129         !1.02     !        !107         ! 0.85       ! +------------+----+------------+---------+--------+------------+-----------+ ! Ankle DP    !    !147         !1.17     !        !115         !0.91       ! +------------+----+------------+---------+--------+------------+-----------+   - Brachial Pressure:Right: 126.   - MAGGIE:Right: 1.17. Left: 0.91.         Consultations:    Consults:     Final Specialist Recommendations/Findings:   IP CONSULT TO INTERNAL MEDICINE  IP CONSULT TO PODIATRY  IP CONSULT TO SOCIAL WORK  IP CONSULT TO PODIATRY  PHARMACY TO DOSE VANCOMYCIN  IP CONSULT TO INFECTIOUS DISEASES  IP CONSULT TO VASCULAR SURGERY      The patient was seen and examined on day of discharge and this discharge summary is in conjunction with any daily progress note from day of discharge. Discharge plan:       Disposition: To a non-[facility] facility    Physician Follow Up:     Alexus Mueller MD  [facility] [address] [place] [address]  [phone]    In 1 week         Requiring Further Evaluation/Follow Up POST HOSPITALIZATION/Incidental Findings: bone biopsy culture    Diet: cardiac diet    Activity: As tolerated    Instructions to Patient: Please follow up with Dr. Jett Calvert within 1 week to have antibiotics adjusted as needed. Discharge Medications:      Medication List      START taking these medications    aspirin 81 MG EC tablet  Commonly known as: Aspirin 81  Take 1 tablet by mouth daily        STOP taking these medications    bumetanide 2 MG tablet  Commonly known as: [id number] [facility] your doctor about these medications    diclofenac sodium 1 % Gel  Commonly known as: VOLTAREN  Apply 2 g topically 4 times daily as needed for Pain  Ask about: Which instructions should I use?     lisinopril 10 MG tablet  Commonly known as: PRINIVIL;ZESTRIL  TAKE 1 TABLET BY MOUTH ONCE DAILY FOR ESSENTIAL HYPERTENSION     metoprolol tartrate 25 MG tablet  Commonly known as: LOPRESSOR  TAKE 1 TABLET BY MOUTH TWICE A DAY FOR HTN     miconazole 2 % powder  Commonly known as: MICOTIN  Apply topically 2 times daily.      PARoxetine 20 MG tablet  Commonly known as: PAXIL  Take 1 tablet by mouth 2 times daily           Where to Get Your Medications      These medications were sent to [facility], [address], [address] [facility] [address]    Phone: [phone]   · aspirin 81 MG EC tablet         Time Spent on discharge is  33 mins

## 2021-01-22 NOTE — PROGRESS NOTES
CROW spoke to Fide from [facility]. Pt is approved and accepted. This worker completed . Upon discharge call report to [phone]. Call Alaina Coates the on call worker if needed. [phone]. Addendum: CROW did inform Elisha of need for IV  INVANZ and Vanco. (These medications need to be added and dosed on JOS. [facility] cannot dose these medications).

## 2021-01-22 NOTE — PLAN OF CARE
Problem: Falls - Risk of:  Goal: Will remain free from falls  Outcome: Ongoing  Note: Pt remains free from falls this shift. Bed in lowest position with wheels locked and 2/4 siderails up. Nonskid socks on. Call light and bedside table within reach. Bed alarm activated. Will continue to monitor. Goal: Absence of physical injury  Outcome: Ongoing     Problem: SAFETY  Goal: Free from accidental physical injury  Outcome: Ongoing     Problem: DAILY CARE  Goal: Daily care needs are met  Outcome: Ongoing     Problem: PAIN  Goal: Patient's pain/discomfort is manageable  Outcome: Ongoing     Problem: SKIN INTEGRITY  Goal: Skin integrity is maintained or improved  Outcome: Ongoing     Problem: KNOWLEDGE DEFICIT  Goal: Patient/S.O. demonstrates understanding of disease process, treatment plan, medications, and discharge instructions. Outcome: Ongoing     Problem: DISCHARGE BARRIERS  Goal: Patient's continuum of care needs are met  Outcome: Ongoing     Problem: Skin Integrity:  Goal: Will show no infection signs and symptoms  Outcome: Ongoing  Goal: Absence of new skin breakdown  Outcome: Ongoing  Note: Skin assessment as charted. No new breakdown noted this shift. Pt encouraged to reposition q2h and PRN. Will continue to monitor. Problem: Pain:  Goal: Pain level will decrease  Outcome: Ongoing  Goal: Control of acute pain  Outcome: Ongoing  Note: Pt continuing to have pain in BLE. PRN medication given. Pt educated on nonpharmacologic interventions to help decrease pain such as repositioning and rest. Will continue to monitor.    Goal: Control of chronic pain  Outcome: Ongoing     Problem: Nutrition  Goal: Optimal nutrition therapy  Outcome: Ongoing

## 2021-01-22 NOTE — PROGRESS NOTES
[address] [facility]    OCCUPATIONAL THERAPY MISSED TREATMENT NOTE   INPATIENT   Date: 21  Patient Name: eSlma Troy       Room: 43 [facility]  MRN: [id number]   Account #: [de-identified]    : 1960  (61 y.o.)  Gender: female   Referring Practitioner: Dr. Raffi Sanchez  Diagnosis: Cellulitis; Wound on L heel - scheduled for I&D 20             REASON FOR MISSED TREATMENT:  Patient at testing and/or off the floor   -   Surgery  Per nurse Leonid Prabhakar at 11:22 AM, pt taken down for surgery for debridement of wound.        DIAN Carcamo/SANDRINE

## 2021-01-22 NOTE — BRIEF OP NOTE
PODIATRY BRIEF OP NOTE    PATIENT NAME: Ioana Hart  YOB: 1960  -  61 y.o. female  MRN: [id number]  DATE: 1/22/2021  BILLING #: [id number]    Surgeon(s):  Jemima Rossi DPM     ASSISTANTS: Carter Ayala DPM PGY2, [place] MS4    PRE-OP DIAGNOSIS:   1. Kinney grade 2 ulceration of plantar lateral heel, left foot  2. Osteomyelitis, left calcaneus    POST-OP DIAGNOSIS:  1. Kinney grade 3 ulceration of plantar lateral heel, left foot  2. Osteomyelitis, left calcaneus    PROCEDURE:   1. Debridement of ulcer <20 sq cm into the level of muscle/fascia, left heel  2. Bone biopsy x 2, left calcaneus    ANESTHESIA: General with local    HEMOSTASIS: Tamponade    ESTIMATED BLOOD LOSS: Less than 10 cc. MATERIALS: 4-0 Nylon  * No implants in log *    INJECTABLES: 10 cc of 1:1 mix of 0.5% marcaine plain and 1% lidocaine plain preoperatively    SPECIMEN:   ID Type Source Tests Collected by Time Destination   A : left foot plantar calcaneous for aerobic and anaerobic then for surgical pathology Bone Foot SURGICAL PATHOLOGY, CULTURE, ANAEROBIC AND AEROBIC Jemima Rossi DPM 1/22/2021 1218    B : LEFT FOOT SUPERIOR CALCANEOUS FOR AROBIC AND ANAEROBIC CULTURE THEN FOR SURGICAL PATHOLOGY Bone Foot SURGICAL PATHOLOGY, CULTURE, ANAEROBIC AND AEROBIC Jemima Rossi DPM 1/22/2021 3906        COMPLICATIONS: None    FINDINGS: Ulcer probed to bone following debridement of nonviable, necrotic soft tissue. No purulent drainage appreciated, no evidence of abscess. The patient was counseled at length about the risks of ally Covid-19 during their perioperative period and any recovery window from their procedure. The patient was made aware that ally Covid-19  may worsen their prognosis for recovering from their procedure  and lend to a higher morbidity and/or mortality risk. All material risks, benefits, and reasonable alternatives including postponing the procedure were discussed.  The patient does wish to proceed with the procedure at this time.     Shola Taylor DPM   Podiatric Medicine & Surgery   1/22/2021 at 12:40 PM     Electronically signed by Luci Skaggs DPM on 1/25/2021 at 11:47 AM

## 2021-01-22 NOTE — BRIEF OP NOTE
Brief Postoperative Note    Master Hodge  YOB: 1960  [id number]    Pre-operative Diagnosis: Cellulitis. In need of abx    Post-operative Diagnosis: Same    Procedure: Picc Placement. 5 Fr dual lumen Power Picc placed in right upper extremity. 44 cm. May use picc.     Anesthesia: Local      Surgeons/Assistants: JIGNA LUO    Estimated Blood Loss: Minimal    Complications: none    Specimens: were not obtained      Leeann Verde

## 2021-01-22 NOTE — PROGRESS NOTES
Physician Progress Note      Cruz Nix  CSN #:                  [id number]  :                       1960  ADMIT DATE:       2021 3:49 PM  [address] [place] DATE:  Philomena Deavicente  PROVIDER #:        Toyin Connelly MD          QUERY TEXT:    Patient admitted with lower extremity cellulitis and left heel ulcer. Pt   noted to have conflicting documentation in the record in regards to   prediabetes or DM. Please document in progress notes and discharge summary if   you are evaluating and/or treating the following: The medical record reflects the following:  Risk Factors: HTN, morbid obesity, family history of diabetes;  Clinical Indicators: Blood sugar 106-130 during encounter on a general diet;    prediabetes documented in H &P and in podiatry and ID consult notes;  Diabetes   documented in medicine progress note on  and by [facility] . Treatment: blood sugar checks before meals and at bedtime;  no oral diabetic   medications, insulin, or diabetic diet  Options provided:  -- Prediabetes only and diabetes ruled out  -- DM type II confirmed and prediabetes ruled out  -- Other - I will add my own diagnosis  -- Disagree - Not applicable / Not valid  -- Disagree - Clinically unable to determine / Unknown  -- Refer to Clinical Documentation Reviewer    PROVIDER RESPONSE TEXT:    This patient has prediabetes and diabetes has been ruled out.     Query created by: Crystal Roberts on 2021 1:59 PM      Electronically signed by:  Toyin Connelly MD 2021 9:50 AM

## 2021-01-22 NOTE — PROGRESS NOTES
Physical Therapy  DATE: 2021    NAME: Molly Webster  MRN: [id number]   : 1960    Patient not seen this date for Physical Therapy due to:  [] Blood transfusion in progress  [] Hemodialysis  [] Patient Declined  [] Spine Precautions   [] Strict Bedrest  [x] Surgery/ Procedure Cx, pt taken to surgery per RN Bety Zapata, will try back tomorrow. [] Testing      [] Other        [] PT is being discontinued at this time. Patient independent. No further needs. [] PT is being discontinued at this time due to declining physical/ medical status. Therapy is not appropriate at this time.     Jaswant Tello, PTA

## 2021-01-22 NOTE — CONSULTS
[address] [facility]                 [address] [facility], [address]                                  CONSULTATION    PATIENT NAME: Winston Velez                        :        1960  MED REC NO:   [id number]                              ROOM:  ACCOUNT NO:   [de-identified]                           ADMIT DATE: 2021  PROVIDER:     Connor Truong    CONSULT DATE:  2021    REASON FOR CONSULTATION:  Left heel ulcer with osteomyelitis of the  calcaneum bone. HISTORY OF PRESENT ILLNESS:  This is a 80-year-old morbidly obese white  female who has a history of wound on the left heel. The patient did not  seek any treatment for that. Subsequently, she came to the emergency  room with cellulitis of the leg and foot. She also has swelling of both  lower extremities. The patient also noticed foul smell from the left  heel. She has been admitted and evaluated. The patient has been seen  by Podiatry service as well as Infectious Disease service and MRI has  shown left calcaneal bone osteomyelitis. The patient denies any history  of claudication _____ due to much walking. We are asked to evaluate her  for the above. ALLERGIES:  No known allergies. PAST MEDICAL HISTORY:  The patient has a history of CHF. She has a  history of COPD and history of DVT and PE. History of ankle surgery and  colon surgery including hernia repair in the past.    MEDICATIONS:  As listed. SOCIAL HISTORY:  The patient is single. The patient is a smoker. She  is not abusing alcohol or street drugs. FAMILY HISTORY:  Nothing contributory. PHYSICAL EXAMINATION:  NEUROLOGIC:  The patient is awake, alert and oriented x3. Speech is  clear. Cranial nerve functions are intact. No motor and sensory  deficits are noted. GENERAL:  The patient is obese. HEART:  Reveals normal rhythm. No murmurs are audible. LUNGS:  Clear bilaterally without rales or rhonchi. ABDOMEN:  Large, soft. No masses are palpable. EXTREMITIES:  Reveal 2+ edema bilaterally. Mild cellulitis noted on  both sides in particular on the left side. She has a medium size deep  ulcer, unstageable at the left heel level with foul smell. DIAGNOSTIC DATA:  MRI and x-ray of the foot are compatible with  osteomyelitis involving the calcaneum bone. Venous scan study was  negative for DVT. IMPRESSION:  Infected left heel wound with necrosis and osteomyelitis by  MRI and x-ray of the foot. The patient is scheduled by Podiatry service  for debridement today. The patient has palpable radial, brachial,  femoral pulses. DP and PT are not palpable, but the feet are viable  bilaterally. We are planning on getting PVR study for baseline  evaluation. From Vascular standpoint, the patient needs debridement as scheduled and  we will follow her closely after evaluation of the arterial study to see  if any intervention is needed. Once the infection is under control and  if she does have poor circulation that can be addressed at a later date. This was discussed at length with the patient. We thank you very much for this kind consultation.         Claribel Romero    D: 01/22/2021 10:11:33       T: 01/22/2021 12:24:53     [email or web address]  Job#: [id number]     Doc#: [id number]    CC:

## 2021-01-22 NOTE — PROGRESS NOTES
[address] [place] [employer]    PROGRESS NOTE             1/22/2021    8:27 AM    Name:   Luis Carlos Miller  MRN:     [id number]     Acct:      [de-identified]   Room:   2074/2074-01  IP Day:  3  Admit Date:  1/19/2021  3:49 PM    PCP:  KIRK Galarza CNP  Code Status:  Full Code    Subjective:     C/C:   Chief Complaint   Patient presents with    Leg Swelling    Skin Ulcer    Wound Check     Interval History Status: not changed. Patient seen and examined at the bed side, no new acute events overnight. Vital signs stable this morning, afebrile. She tells me that her only complaint is ongoing bilateral left foot pain, denies shortness of breath, chest pain, any other complaints this time. She is scheduled for OR at 11 AM for debridement and bone biopsy. She endorses a history of CHF but denies M/CAD or use of insulin with diabetes. Please see separate note for medical clearance. Notes from nursing staff and Consults had been reviewed, and the overnight progress had been checked with the nursing staff as well.     Brief History:     60-y/o female with h/o morbid obesity, prediabetes, HTN, COPD, depression, and chronic bilateral lymphedema admitted on 01/19/2021 for bilateral lower extremity cellulitis with possible osteomyelitis of left calcaneous    Review of Systems:     CONSTITUTIONAL:  no fevers  EYES: negative for blury vision  HEENT: No headaches, no nasal congestion, no difficulty swallowing  RESPIRATORY:negative for dyspnea, no wheezing, positive for chronic dry cough  CARDIOVASCULAR: negative for chest pain, no palpitations  GASTROINTESTINAL: no nausea, no vomiting, no change in bowel habits, no abdominal pain   GENITOURINARY: negative for dysuria, no hematuria   MUSCULOSKELETAL: positive for bilateral foot pain, positive for left foot wound, positive for bilateral leg swelling  NEUROLOGICAL: No weakness or numbness  INTEGUMENTARY: Positive for chronic bilateral leg itching and rash    Medications: Allergies:    No Known Allergies    Current Meds:   Scheduled Meds:    vancomycin  1,500 mg Intravenous Q24H    atorvastatin  40 mg Oral Nightly    metroNIDAZOLE  500 mg Intravenous Q8H    vancomycin (VANCOCIN) intermittent dosing (placeholder)   Other RX Placeholder    sodium chloride flush  10 mL Intravenous 2 times per day    enoxaparin  40 mg Subcutaneous BID    bumetanide  2 mg Oral Daily    PARoxetine  20 mg Oral BID    metoprolol tartrate  25 mg Oral BID    lisinopril  10 mg Oral Daily    cefepime  1 g Intravenous Q12H     Continuous Infusions:     PRN Meds: albuterol, miconazole, sodium chloride flush, promethazine **OR** [DISCONTINUED] ondansetron, polyethylene glycol, acetaminophen **OR** acetaminophen, oxyCODONE-acetaminophen **OR** oxyCODONE-acetaminophen    Data:     Past Medical History:   has a past medical history of Arthritis, CHF (congestive heart failure) ([facility] COPD (chronic obstructive pulmonary disease) ([facility] Diverticulitis, Hx of blood clots, and Pul[facility] ([facility] Social History:   reports that she has been smoking. She has a 45.00 pack-year smoking history. She has never used smokeless tobacco. She reports previous alcohol use. She reports previous drug use. Family History:   Family History   Problem Relation Age of Onset   Dot Karmen Cancer Mother     Diabetes Paternal Grandfather        Vitals:  /72   Pulse 85   Temp 98.4 °F (36.9 °C) (Oral)   Resp 19   Ht 5' 7\" (1.702 m)   Wt (!) 342 lb 6 oz (155.3 kg)   SpO2 95%   BMI 53.62 kg/m²   Temp (24hrs), Av.5 °F (36.9 °C), Min:98.2 °F (36.8 °C), Max:98.9 °F (37.2 °C)      Recent Labs     21  0635 21  1139 21  1123   POCGLU 122* 126* 130* 106*       I/O(24Hr):     Intake/Output Summary (Last 24 hours) at 2021 0827  Last data filed at 2021 1718  Gross per 24 hour   Intake --   Output 1250 ml   Net -1250 ml Labs:    CBC with Differential:    Lab Results   Component Value Date    WBC 5.7 01/22/2021    RBC 3.71 01/22/2021    HGB 11.7 01/22/2021    HCT 35.1 01/22/2021     01/22/2021    MCV 94.6 01/22/2021    MCH 31.6 01/22/2021    MCHC 33.4 01/22/2021    RDW 13.4 01/22/2021    LYMPHOPCT 30 01/22/2021    MONOPCT 11 01/22/2021    BASOPCT 1 01/22/2021    MONOSABS 0.60 01/22/2021    LYMPHSABS 1.70 01/22/2021    EOSABS 0.30 01/22/2021    BASOSABS 0.00 01/22/2021    DIFFTYPE NOT REPORTED 01/22/2021       Lab Results   Component Value Date/Time    SPECIAL NOT REPORTED 01/19/2021 09:24 PM     Lab Results   Component Value Date/Time    CULTURE NO ANAEROBIC ORGANISMS ISOLATED AT 2 DAYS (A) 01/19/2021 09:24 PM    CULTURE (A) 01/19/2021 09:24 PM     STAPHYLOCOCCUS AUREUS HEAVY GROWTH This isolate is methicillin susceptible. CULTURE PROTEUS MIRABILIS SCANT GROWTH (A) 01/19/2021 09:24 PM    CULTURE NORMAL DARRELL 01/19/2021 09:24 PM         Radiology:    Pradip Mercer Tibia Fibula Left (2 Views)    Result Date: 1/19/2021  EXAMINATION: XRAY VIEWS OF THE LEFT TIBIA AND FIBULA 1/19/2021 9:06 pm COMPARISON: 07/02/2020 HISTORY: ORDERING SYSTEM PROVIDED HISTORY: infection, swelling TECHNOLOGIST PROVIDED HISTORY: infection, swelling Reason for Exam: infection, swelling Acuity: Acute Type of Exam: Initial FINDINGS: There is diffuse soft tissue swelling. The osseous structures are intact. There are stable changes related to instrumented fusion of medial and lateral malleolus with no evidence of hardware related complication such as loosening or fracture. Severe degenerative disease of the knee joint and intertarsal joints are partially visualized     Stable study. Diffuse soft tissue swelling may be related to infection. No osseous or hardware related abnormality.      Xr Tibia Fibula Right (2 Views)    Result Date: 1/19/2021  EXAMINATION: THREE XRAY VIEWS OF THE LEFT FOOT;   XRAY VIEWS OF THE RIGHT TIBIA AND FIBULA 1/19/2021 1:44 pm COMPARISON: 07/03/2020 HISTORY: ORDERING SYSTEM PROVIDED HISTORY: heel wound TECHNOLOGIST PROVIDED HISTORY: heel wound Reason for Exam: PT states non-healing wound on heel. Acuity: Unknown Type of Exam: Unknown Additional signs and symptoms: PT states non-healing wound on heel. ; ORDERING SYSTEM PROVIDED HISTORY: leg wound TECHNOLOGIST PROVIDED HISTORY: leg wound Reason for Exam: PT states non-healing wound on lower leg. Wound observed on medial aspect of lower leg. Acuity: Unknown Type of Exam: Unknown Additional signs and symptoms: PT states non-healing wound on lower leg. Wound observed on medial aspect of lower leg. FINDINGS: Left foot: Calcaneus is suboptimally evaluated due to difficulty in appropriately positioning the patient. There are posterior and plantar calcaneal enthesopathic changes. There is suggestion of focal abnormal demineralization at the plantar calcaneus underlying an area of soft tissue ulceration. Partially imaged open reduction internal fixation hardware within the distal tibia and fibula. Degenerative and enthesopathic changes about the foot and ankle. Diffuse soft tissue edema. Right tib-fib: No acute fracture or dislocation. Osseous demineralization throughout. Diffuse periostitis of the tibia and fibula. Mild knee and moderate ankle degenerative changes. Diffuse soft tissue edema. Soft tissue ulceration at the plantar aspect of the heel with suggestion of focal abnormal demineralization at the plantar calcaneus suspicious for concomitant osteomyelitis. Diffuse periostitis about the right tibia and fibula which may relate to venous insufficiency. The reported soft tissue wound of the right lower extremity is not definitively appreciated radiographically on a background of diffuse edema.      Xr Foot Left (min 3 Views)    Result Date: 1/19/2021  EXAMINATION: THREE XRAY VIEWS OF THE LEFT FOOT;   XRAY VIEWS OF THE RIGHT TIBIA AND FIBULA 1/19/2021 1:44 pm COMPARISON: 07/03/2020 HISTORY: ORDERING SYSTEM PROVIDED HISTORY: heel wound TECHNOLOGIST PROVIDED HISTORY: heel wound Reason for Exam: PT states non-healing wound on heel. Acuity: Unknown Type of Exam: Unknown Additional signs and symptoms: PT states non-healing wound on heel. ; ORDERING SYSTEM PROVIDED HISTORY: leg wound TECHNOLOGIST PROVIDED HISTORY: leg wound Reason for Exam: PT states non-healing wound on lower leg. Wound observed on medial aspect of lower leg. Acuity: Unknown Type of Exam: Unknown Additional signs and symptoms: PT states non-healing wound on lower leg. Wound observed on medial aspect of lower leg. FINDINGS: Left foot: Calcaneus is suboptimally evaluated due to difficulty in appropriately positioning the patient. There are posterior and plantar calcaneal enthesopathic changes. There is suggestion of focal abnormal demineralization at the plantar calcaneus underlying an area of soft tissue ulceration. Partially imaged open reduction internal fixation hardware within the distal tibia and fibula. Degenerative and enthesopathic changes about the foot and ankle. Diffuse soft tissue edema. Right tib-fib: No acute fracture or dislocation. Osseous demineralization throughout. Diffuse periostitis of the tibia and fibula. Mild knee and moderate ankle degenerative changes. Diffuse soft tissue edema. Soft tissue ulceration at the plantar aspect of the heel with suggestion of focal abnormal demineralization at the plantar calcaneus suspicious for concomitant osteomyelitis. Diffuse periostitis about the right tibia and fibula which may relate to venous insufficiency. The reported soft tissue wound of the right lower extremity is not definitively appreciated radiographically on a background of diffuse edema.      Xr Chest Portable    Result Date: 1/19/2021  EXAMINATION: ONE XRAY VIEW OF THE CHEST 1/19/2021 9:06 pm COMPARISON: 06/30/2020 HISTORY: ORDERING SYSTEM PROVIDED HISTORY: wheezing right lung TECHNOLOGIST PROVIDED HISTORY: wheezing right lung Reason for Exam: wheezing right lung Acuity: Acute Type of Exam: Initial FINDINGS: Mild cardiomegaly. The mediastinal hilar contours are normal.  The lungs are clear with no focal consolidation. No pleural effusion or pneumothorax. Stable calcified nodule of right lung base and bilateral hilar calcified lymph nodes, likely related to prior granulomatous disease exposure. Stable study. No acute intrathoracic pathology. Mri Ankle Left Wo Contrast    Result Date: 1/20/2021  EXAMINATION: MRI OF THE LEFT ANKLE WITHOUT CONTRAST, 1/20/2021 9:39 am TECHNIQUE: Limited MRI of the left ankle was performed without the administration of intravenous contrast. COMPARISON: Left foot radiographs January 19, 2021 HISTORY: ORDERING SYSTEM PROVIDED HISTORY: Surgical planning, r/o osteo, abscess TECHNOLOGIST PROVIDED HISTORY: Area of clinical concern is the plantar lateral calcaneus. Please extend as far distally as possible. Surgical planning, r/o osteo, abscess Reason for Exam: Pt in extreme amount of pain thru right hip, knee area. Tried to make pt comfortable but was unable to finish MRI exam.  Pt in too much pain and crying, exam stopped and will put thru what images were obtained. FINDINGS: Limited MRI of the ankle was obtained as patient was unable to tolerate the procedure. Only two sagittal series were obtained. Artifact is seen from motion and hardware within the hindfoot. Diffuse soft tissue swelling and subcutaneous edema. No gross evidence of a focal drainable fluid collection. Soft tissue ulceration is seen underlying the calcaneus. Changes appear to extend to the underlying bone. There is a minimal amount of edema on STIR imaging. Questionable early area of cortical thinning. No definite marrow replacement on T1 weighted images. Significantly limited study.  Soft tissue ulceration underlying the calcaneus with questionable early osteomyelitis versus reactive marrow changes. Vl Lower Extremity Arteries Bilateral    Result Date: 1/20/2021    [address] [place] [address] Lower Extremities Arterial Duplex Procedure   Patient Name    Cayden HILL Date of Study           01/20/2021   Date of Birth   1960  Gender                  Female   Age             61 year(s)  Race                       Room Number     2074   Corporate ID #  [id number]   Patient Acct #  [de-identified]   MR #            [id number]      Saint Spear   Accession #     [id number]  Interpreting Physician  [address] [place] [address]   Referring Nurse             Referring Physician     Sulema Sprague DPM  Practitioner  Procedure Type of Study:   Extremities Arteries: Lower Extremities Arterial Duplex, Arterial Scan  Lower Bilateral.  Indications for Study:PVD and ulceration. Patient Status: In Patient. Technical Quality:Limited visualization. Limitation reason:Edema. Conclusions   Summary   Monophasic flow throughout the bilateral lower extremities. Elevated  velocities are consistent with bilateral SFA stenosis. Signature   ----------------------------------------------------------------  Electronically signed by Ivan Askew(Sonographer) on  01/20/2021 10:55 AM  ----------------------------------------------------------------   ----------------------------------------------------------------  Electronically signed by Zita Fitting Reyes,Arthur(Interpreting  physician) on 01/20/2021 08:27 PM  ----------------------------------------------------------------  Findings:   Right Impression:                    Left Impression:  Plaque without increased Doppler     Plaque without increased Doppler  waveforms and monophasic flow noted  waveforms and monophasic flow noted  throughout. throughout. Non visualization of the peroneal    Non visualization of the peroneal  artery. artery.   Velocities are measured in cm/s ; Diameters are measured in cm LE Duplex Measurements +---------++-----+-----+----------+----+-----++---+-----+----------+----+-----+ ! ! !Right! ! Left      !    !     !!   !     !          !    !     ! +---------++-----+-----+----------+----+-----++---+-----+----------+----+-----+ ! Location ! !PSV  ! Ratio! Wave Desc. !AP  ! Trans! !PSV! Ratio! Wave Desc. !AP  ! Trans! !         !!     !     ! !Diam!Diam !!   !     ! !Diam!Diam ! +---------++-----+-----+----------+----+-----++---+-----+----------+----+-----+ ! Dist EIA !!165  ! ! Monophasic!    !     !!208! ! Monophasic!    !     ! +---------++-----+-----+----------+----+-----++---+-----+----------+----+-----+ ! Common   !!191  ! ! Monophasic!    !     !!211! ! Monophasic!    !     ! !Femoral  !!     !     !          !    !     !!   !     !          !    !     ! +---------++-----+-----+----------+----+-----++---+-----+----------+----+-----+ ! PFA      !!77   !0.4  ! Monophasic!    !     !!94 !0.45 ! Monophasic!    !     ! +---------++-----+-----+----------+----+-----++---+-----+----------+----+-----+ ! Prox SFA !!155  ! ! Monophasic!    !     !!197! ! Monophasic!    !     ! +---------++-----+-----+----------+----+-----++---+-----+----------+----+-----+ ! Mid SFA  !!215  !1.39 ! Monophasic!    !     !!226!1.15 ! Monophasic!    !     ! +---------++-----+-----+----------+----+-----++---+-----+----------+----+-----+ ! Dist SFA !!116  !0.54 ! Monophasic!    !     !!135!0.6  ! Monophasic!    !     ! +---------++-----+-----+----------+----+-----++---+-----+----------+----+-----+ ! Prox     !!134  !1.16 ! Monophasic!    !     !!175!1.3  ! Monophasic!    !     ! !Popliteal!!     !     !          !    !     !!   !     !          !    !     ! +---------++-----+-----+----------+----+-----++---+-----+----------+----+-----+ ! Dist PTA !!89   !0.66 ! Monophasic!    !     !!114!0.65 ! Monophasic!    !     ! +---------++-----+-----+----------+----+-----++---+-----+----------+----+-----+ ! Prox NIRU !! 115  !0.86 ! Monophasic!    !     !!80 !0.46 ! Monophasic!    !     ! +---------++-----+-----+----------+----+-----++---+-----+----------+----+-----+    Vl Dup Lower Extremity Venous Bilateral    Result Date: 1/20/2021    [facility]  Vascular Lower Extremities DVT Study Procedure   Patient Name    Ruthie HILL Date of Study           01/20/2021   Date of Birth   1960  Gender                  Female   Age             61 year(s)  Race                       Room Number     2074   Corporate ID #  [id number]   Patient Acct #  [de-identified]   MR #            [id number]      Lavinia Figueroa   Accession #     [id number]  Interpreting Physician  [address] [place] [address]   Referring Nurse             Referring Physician     Pedrito Ponce  Practitioner  Procedure Type of Study:   Veins: Lower Extremities DVT Study, Venous Scan Lower Bilateral.  Indications for Study:Pain and swelling and R/O DVT. Patient Status: In Patient. Technical Quality:Limited visualization. Limitation reason:Bandage material.  Conclusions   Summary   No evidence of superficial or deep venous thrombosis in both lower  extremities. Signature   ----------------------------------------------------------------  Electronically signed by Ivan Amado(Sonographer) on  01/20/2021 08:39 AM  ----------------------------------------------------------------   ----------------------------------------------------------------  Electronically signed by Erle Endo Reyes,Arthur(Interpreting  physician) on 01/20/2021 08:25 PM  ----------------------------------------------------------------  Findings:   Right Impression:                       Left Impression:  Non visualization of the posterior      Non visualization of the distal  tibial and peroneal veins. femoral, popliteal, posterior                                          tibial and peroneal veins.   Partial compressibility of the  popliteal vein with hyperechoic !               !          ! +------------------------------------+----------+---------------+----------+ ! Peroneal                            !No        !               !          ! +------------------------------------+----------+---------------+----------+ ! Gastroc                             ! Yes       ! Yes            ! None      ! +------------------------------------+----------+---------------+----------+ ! GSV Thigh                           ! Yes       ! Yes            ! None      ! +------------------------------------+----------+---------------+----------+ ! GSV Knee                            ! Yes       ! Yes            ! None      ! +------------------------------------+----------+---------------+----------+ ! GSV Ankle                           ! No        !               !          ! +------------------------------------+----------+---------------+----------+ ! SSV                                 ! Yes       ! Yes            ! None      ! +------------------------------------+----------+---------------+----------+ Right Doppler Measurements +-------------------------+----------+------+------------------------------+ ! Location                 ! Signal    !Reflux! Reflux (msec)                 ! +-------------------------+----------+------+------------------------------+ ! Common Femoral           !Phasic    !      !                              ! +-------------------------+----------+------+------------------------------+ ! Prox Femoral             !Phasic    !      !                              ! +-------------------------+----------+------+------------------------------+ ! Popliteal                !Continuous!      !                              ! +-------------------------+----------+------+------------------------------+ Left Lower Extremities DVT Study Measurements Left 2D Measurements +------------------------------------+----------+---------------+----------+ ! Location !Visualized! Compressibility! Thrombosis! +------------------------------------+----------+---------------+----------+ ! Common Femoral                      !Yes       ! Yes            ! None      ! +------------------------------------+----------+---------------+----------+ ! Prox Femoral                        !Yes       ! Yes            ! None      ! +------------------------------------+----------+---------------+----------+ ! Mid Femoral                         !No        !               !          ! +------------------------------------+----------+---------------+----------+ ! Dist Femoral                        !No        !               !          ! +------------------------------------+----------+---------------+----------+ ! Popliteal                           !Yes       ! Yes            ! None      ! +------------------------------------+----------+---------------+----------+ ! Sapheno Femoral Junction            ! Yes       ! Yes            ! None      ! +------------------------------------+----------+---------------+----------+ ! PTV                                 ! No        !               !          ! +------------------------------------+----------+---------------+----------+ ! Peroneal                            !No        !               !          ! +------------------------------------+----------+---------------+----------+ ! Gastroc                             ! Yes       ! Yes            ! None      ! +------------------------------------+----------+---------------+----------+ ! GSV Thigh                           ! Yes       ! Yes            ! None      ! +------------------------------------+----------+---------------+----------+ ! GSV Knee                            ! Yes       ! Yes            ! None      ! +------------------------------------+----------+---------------+----------+ ! GSMITALI Ankle                           ! No        !               !          ! +------------------------------------+----------+---------------+----------+ ! SSV                                 ! Yes       ! Yes            ! None      ! +------------------------------------+----------+---------------+----------+ Left Doppler Measurements +---------------------------+------+------+--------------------------------+ ! Location                   ! Signal!Reflux! Reflux (msec)                   ! +---------------------------+------+------+--------------------------------+ ! Common Femoral             !Phasic!      !                                ! +---------------------------+------+------+--------------------------------+ ! Prox Femoral               !Phasic!      !                                ! +---------------------------+------+------+--------------------------------+ ! Popliteal                  !Phasic!      !                                ! +---------------------------+------+------+--------------------------------+      EKG:    None. Physical Examination:        PHYSICAL EXAM:  General Appearance  Alert , awake, not in acute distress. She is oriented to person, place, time and situation. HEENT - Head is normocephalic, atraumatic. Neck - Supple. Lungs - Bilateral equal air entry, some mild wheezing present on auscultation of upper anterior lung fields otherwise no rales or rhonchi, aeration good  Cardiovascular - Heart sounds are normal.  Regular rhythm, normal rate without murmur, gallop or rub. Abdomen - Obese but soft, nontender, nondistended, no masses or organomegaly  Neurologic - There are no new focal motor or sensory deficits  Skin - No bruising or bleeding on exposed skin area  Extremities - Bilateral lower extremities and feet are wrapped in bandages.    Psychiatric - Normal mood and affect      Assessment:        Primary Problem  Cellulitis    Active Hospital Problems    Diagnosis Date Noted    Peripheral artery disease ([facility] [I73.9] 01/21/2021    Cellulitis of right leg [S87.842]     Depression [F32.9] 01/19/2021    Cellulitis [L03.90] 06/30/2020    Prediabetes [R73.03] 11/13/2019    Essential hypertension [I10] 10/29/2019    [facility] [J44.9] 10/29/2019       Plan:        Bilateral lower extremity cellulitis, possible left calcaneous osteomyelitis  -Left calceneal debridement and bone biopsy today; please see separate medical clearance progress note; possible PICC line / IV antibiotics on discharge; will follow ID/podiatry plan/recommendations today   -Day 4 Antibiotics: Vancomycin pharmacy to dose, Cefepime 1 g IV q12h; Flagyl 500 mg IV every 8 hours  -Podiatry consult, ID consult, nutrition/dietary consult  -Wound culture anaerobic/aerobic: culture growing Staph Auereus HEAVY GROWTH, methicillin susceptible and Proteus Mirabilus (scant growth).    -Blood cultures: No growth x4 days      Peripheral artery disease  -initiated Lipitor 40 mg PO daily  -begin ASA 81 mg PO daily and Xarelto 2.5 mg PO BID after surgery    Left plantar foot ulceration  -s/p excisional debridement by Podiatry on 01/20/2021    Hx of COPD   -Respiratory therapy aerosol protocol  -Patient noncompliant with previously prescribed home oxygen (2 L 24/7) and nebulziers     Prediabetes, left plantar heel ulceration  -POCT glucose  -maintain glucose levels  fasting and Less than 180 postprandial  -Nutrition/dietary consult due to poor wound healing       Hx of Hypertension and CHF (systolic and diastolic)  -On home lisinopril 10 mg PO daily  -On home metoprolol tartrate 25 mg PO BID     Hx of Depression   -Paroxetine 20 mg twice daily daily     PT/OT  Code Status: Full  Diet: Regular   DVT prophylaxis: Lovenox 40 mg SC  GI prophylaxis: none    Nubia Camara MD  1/22/2021  8:27 AM

## 2021-01-22 NOTE — PROGRESS NOTES
Physician Progress Note      Toan PLEITEZ #:                  [id number]  :                       1960  ADMIT DATE:       2021 3:49 PM  [address] [place] DATE:  Gris Cruz  PROVIDER #:        Arabella Thurston MD          QUERY TEXT:    Patient admitted with LE cellulitis. Also noted to also have plantar lateral   heel, left foot ulcer. If possible, please document in progress notes and   discharge summary the type/etiology of ulcer: The medical record reflects the following:  Risk Factors: morbid obesity, self reported limited mobility, lymphedema,   tobacco abuse  Clinical Indicators:  H & P: planter aspect of left heel ulcer, possibly a   pressure ulcer;   podiatry consult:  Hal Raymond grade 2 ulceration to plantar   lateral heel, left foot;  exam notes: Full thickness ulcer to the level of    #1 located plantar lateral heel of left foot, does not probe to bone or sinus   track or undermine;  hair growth absent to level of the digits, DP and PT   pulses are non-palpable, minimally audible on Doppler signal likely secondary   to severe edema;   lower extremity arterial duplex:  Monophasic flow   throughout the bilateral lower extremities. E  Treatment: podiatry and ID consults, debridement on , diagnostic testing,   IV antibiotics  Options provided:  -- Decubitus Pressure Ulcer to left planter lateral heel  -- Arteriosclerotic/Venous stasis ulcer to left planter lateral heel  -- Left planter lateral heel due to, Please specify. -- Other - I will add my own diagnosis  -- Disagree - Not applicable / Not valid  -- Disagree - Clinically unable to determine / Unknown  -- Refer to Clinical Documentation Reviewer    PROVIDER RESPONSE TEXT:    This patient has a decubitus pressure ulcer to left planter lateral heel.     Query created by: Soledad Kerns on 2021 1:36 PM      Electronically signed by:  Arabella Thurston MD 2021 1:01 PM

## 2021-01-22 NOTE — PROGRESS NOTES
Patient is medically cleared for today's surgery with intermediate risk categorization.      Electronically signed by Shai Aleman MD on 1/22/2021 at 9:49 AM

## 2021-01-22 NOTE — PROGRESS NOTES
Progress Note  Podiatric Medicine and Surgery     Subjective     CC: Cellulitis, bilateral lower extremities    Patient seen and examined at bedside. No acute events overnight. Afebrile, vital signs stable  Chart and results reviewed  Patient reports continued pain to bilateral lower extremities      HPI :  Osmani pastrana 61 y.o. female seen at [place] [address] worsening wounds with concern for cellulitis of the bilateral lower extremity.  The wound that is most concerning is to plantar lateral aspect of the left heel.  Patient states that she has had the wounds for several months that started back in June 2020, at which time she was admitted for similar complaints.  Patient never maintained follow-up recommendations on outpatient basis. Joleen Briggs presented to the ED due to progressive redness, swelling, pain to the bilateral lower extremities.  Radiographs of the left foot and right leg were obtained which were negative for soft tissue emphysema, questionable for osteomyelitis of the left calcaneus.  Infectious lab work-up revealed an elevated CRP of 45.6 and ESR of 71.  WBC is 8.6.  Patient states that she is minimally ambulatory mostly bedridden.  Patient denies any trauma to the bilateral lower extremities.  Patient denies any purulent drainage from the wound sites.  Patient states she is not been performing any kind of wound care at home.  Patient admits to smoking approximately 1 pack/day.  She admits to numbness and tingling to the bilateral lower extremities.     PCP is KIRK Adame CNP    ROS: Denies N/V/F/C/SOB/CP. Otherwise negative except at stated in the HPI.      Medications:  Scheduled Meds:   vancomycin  1,500 mg Intravenous Q24H    atorvastatin  40 mg Oral Nightly    metroNIDAZOLE  500 mg Intravenous Q8H    vancomycin (VANCOCIN) intermittent dosing (placeholder)   Other RX Placeholder    sodium chloride flush  10 mL Intravenous 2 times per day    enoxaparin  40 mg Subcutaneous BID    bumetanide  2 mg Oral Daily    PARoxetine  20 mg Oral BID    metoprolol tartrate  25 mg Oral BID    lisinopril  10 mg Oral Daily    cefepime  1 g Intravenous Q12H       Continuous Infusions:    PRN Meds:albuterol, miconazole, sodium chloride flush, promethazine **OR** [DISCONTINUED] ondansetron, polyethylene glycol, acetaminophen **OR** acetaminophen, oxyCODONE-acetaminophen **OR** oxyCODONE-acetaminophen    Objective     Vitals:  Patient Vitals for the past 8 hrs:   BP Temp Temp src Pulse Resp SpO2   21 0619 127/72 98.4 °F (36.9 °C) Oral 85 19 95 %     Average, Min, and Max for last 24 hours Vitals:  TEMPERATURE:  Temp  Av.6 °F (37 °C)  Min: 98.4 °F (36.9 °C)  Max: 98.9 °F (37.2 °C)    RESPIRATIONS RANGE: Resp  Av.3  Min: 18  Max: 19    PULSE RANGE: Pulse  Av.8  Min: 72  Max: 85    BLOOD PRESSURE RANGE:  Systolic (37QEH), GI , Min:86 , LZL:076   ; Diastolic (24FVQ), CWL:85, Min:45, Max:72      PULSE OXIMETRY RANGE: SpO2  Av.3 %  Min: 92 %  Max: 95 %    I/O last 3 completed shifts:  In: -   Out: 1250 [[id number]]    CBC:  Recent Labs     21  1652 21  0511 21  0556 21  0544   WBC 8.6 6.7 5.4 5.7   HGB 12.2 11.4* 11.5* 11.7*   HCT 36.8 34.9* 34.1* 35.1*   * 440 430 457*   CRP 45.6*  --   --  27.3*        BMP:  Recent Labs     21  0511 21  0556 21  0544   * 134* 138   K 4.1 3.8 5.0    98 101   CO2 25 27 30   BUN 13 18 20   CREATININE 0.74 0.78 0.87   GLUCOSE 110* 114* 111*   CALCIUM 8.7 9.1 8.9        Coags:  Recent Labs     21  0511 21  0556 21  0544   PROT 6.1* 6.3* 6.3*       Lab Results   Component Value Date    SEDRATE 47 (H) 2021     Recent Labs     21  1652 21  0544   CRP 45.6* 27.3*       Lower Extremity Physical Exam: Physical exam findings and clinical images from 21. Dressings had been removed due to repeat PVRs being done this morning when we entered the room.  Will reapply dressings postoperatively. Vascular: DP and PT pulses are nonpalpable, minimally audible on Doppler signal, likely secondary to severe edema of bilateral lower extremities. CFT brisk to all digits. Hair growth absent to the level of the digits. Severe, nonpitting lymphedema to BLE. Neuro: Saph/sural/SP/DP/plantar sensation diminished to light touch    Musculoskeletal: Decreased range of motion and decreased strength noted to all lower extremity muscle groups of the bilateral lower extremities. Gross deformity is present with contracted digits. Dermatologic: Full-thickness ulcer to the level of subcutaneous tissue located to the plantar lateral heel of left foot, measuring approximately 5 x 4.3 x 1.2 cm. Base is fibrogranular with necrotic gray tissue centrally. Periwound skin is macerated. No purulent drainage or malodor appreciated. Erythema present with associated increase in warmth. Wound does not probe to bone, sinus tract or undermine. No fluctuance or crepitus noted. Partial-thickness ulcer located to the lateral mid tibia shaft of the left leg, measuring approximately 2.0 x 0.8 x 0.1 cm. .  Base of wound is granular, no purulent drainage or malodor present. Right lower leg with ulceration to the level of dermis, measuring approximately 5.0 x 3.0 x 0.1 cm. No fluctuance or crepitus noted, no purulent drainage. Erythema, induration and increased warmth to the right lower extremity consistent with cellulitis. Clinical Images:                        Imaging:   VL Lower Extremity Arteries Bilateral   Final Result      MRI ANKLE LEFT WO CONTRAST   Final Result   Significantly limited study. Soft tissue ulceration underlying the calcaneus with questionable early   osteomyelitis versus reactive marrow changes. VL DUP LOWER EXTREMITY VENOUS BILATERAL   Final Result      XR TIBIA FIBULA LEFT (2 VIEWS)   Final Result   Stable study.       Diffuse soft tissue swelling may be related to infection. No osseous or hardware related abnormality. XR CHEST PORTABLE   Final Result   Stable study. No acute intrathoracic pathology. XR FOOT LEFT (MIN 3 VIEWS)   Final Result   Soft tissue ulceration at the plantar aspect of the heel with suggestion of   focal abnormal demineralization at the plantar calcaneus suspicious for   concomitant osteomyelitis. Diffuse periostitis about the right tibia and fibula which may relate to   venous insufficiency. The reported soft tissue wound of the right lower   extremity is not definitively appreciated radiographically on a background of   diffuse edema. XR TIBIA FIBULA RIGHT (2 VIEWS)   Final Result   Soft tissue ulceration at the plantar aspect of the heel with suggestion of   focal abnormal demineralization at the plantar calcaneus suspicious for   concomitant osteomyelitis. Diffuse periostitis about the right tibia and fibula which may relate to   venous insufficiency. The reported soft tissue wound of the right lower   extremity is not definitively appreciated radiographically on a background of   diffuse edema. VL Arterial PVR Lower    (Results Pending)     NIVS 1/20/21: Summary  Monophasic flow throughout the bilateral lower extremities. Elevated velocities are consistent with bilateral SFA stenosis. Assessment   Vandana Crump is a 61 y.o. female with   1. Cellulitis, right lower extremity  2. Kinney grade 2 ulceration of plantar lateral heel, left foot  3. Possible osteomyelitis, left calcaneus  4. Lymphedema, bilateral lower extremities  5. Smoker  6. CHF    Principal Problem:    Cellulitis  Active Problems:    Chronic obstructive pulmonary disease ([address]    Essential hypertension    Prediabetes    Depression    Cellulitis of right leg    Peripheral artery disease (HCC)  Resolved Problems:    * No resolved hospital problems.  *       Plan     · Patient examined and evaluated at bedside   · Treatment options discussed in detail with the patient  · Radiographs of the bilateral lower extremities reviewed and discussed in detail with patient-negative for soft tissue emphysema, questionable osteomyelitis of left calcaneus  · MRI left foot reviewed- questionable osteomyelitis of the calcaneus however findings are inconclusive due to limited study as the patient was unable to remain still due to pain  · NIVS- findings consistent with bilateral SFA stenosis; vascular surgery consulted. · Vascular consulted- state patient should not have issues with healing postoperatively based on moderate arterial disease. · ID following - vanc/cefepime, flagyl, in agreement with bone biopsy   · Plan for OR today at 11 am for debridement of the left heel ulceration with bone biopsy of left calcaneus. Risks and benefits of procedure discussed with patient. Patient has been consented and marked for surgery. · Patient currently NPO  · COVID pending  · Surgical clearance given by primary team  · Dressings kept intact to BLE: Aquacel to left heel wound, adaptic to left leg wound, DSD, ACE; Adaptic to right leg wounds, DSD, ACE.   · Will discuss with Dr. Jimbo Jalloh DPM   Podiatric Medicine & Surgery   1/22/2021 at 10:19 AM

## 2021-01-22 NOTE — PROGRESS NOTES
Spoke with Dr. Yasir Colon. States wants to discharge patient with PICC line ordered and he spoke with radiology and will complete today. He spoke with Dr. Lindsay Tirado and wants IV VAnco for 6 weeks pharmacy to dose. Spoke with [facility] advised on vanco, needs exact dose nursing home will not dose per pharmacy. No transport available this evening. 1640 Phoned spoke with Dr. Lindsay Tirado. IV Invanz 1gram 6 weeks, Vanco 1500mg every 24 hours for 6 weeks and labs. Placed in [address] [place] [address]. F/u one week.

## 2021-01-22 NOTE — CONSULTS
VASCULAR SURGERY H&P      Subjective:  Ms. Virginia Hampton presented to the ED on 1/19/21 with complaints of bilateral lower extremity redness, swelling, pain and left heel ulcer. Pain in her left heel began several weeks ago, pain, warmth, erythema and swelling in her right leg started 1 week ago. As she can not see her heel, she was not aware that a wound was present until she noted drainage a few days prior to admission. She was not performing wound care at home. She states that wounds and swelling to her legs has been present since this summer. She has history CHF, COPD, obesity, PE and BLE lymphedema. She has not sought care for lymphedema previously. She denies claudication, rest pain to her legs but reports numbness and paresthesia and chronic non-healing leg wounds. She has a 45 pack year smoking history. She reports no headache, dizziness, syncope, fever, chills, neck pain, chest pain, palpitations, shortness of breath, abdominal pain, nausea, vomiting, diarrhea or constipation, or increased numbness and paresthesia of the extremities.     Objective:      Past Medical History   Past Medical History:   Diagnosis Date    Arthritis     [facility]     [facility]     Diverticulitis     Hx of blood clots     Pulmonary embolism (HCC)         Past Surgical History   Past Surgical History:   Procedure Laterality Date    ANKLE SURGERY      COLON SURGERY      HERNIA REPAIR          Social History  Social History     Socioeconomic History    Marital status: Single     Spouse name: Not on file    Number of children: Not on file    Years of education: Not on file    Highest education level: Not on file   Occupational History    Not on file   Social Needs    Financial resource strain: Not very hard    Food insecurity     Worry: Never true     Inability: Never true    Transportation needs     Medical: No     Non-medical: No   Tobacco Use    Smoking status: Current Every Day Smoker     Packs/day: 1.00     Years: 45.00     Pack years: 45.00    Smokeless tobacco: Never Used   Substance and Sexual Activity    Alcohol use: Not Currently     Comment: 1-2 times per week    Drug use: Not Currently    Sexual activity: Not Currently   Lifestyle    Physical activity     Days per week: 0 days     Minutes per session: 0 min    Stress:  To some extent   Relationships    Social connections     Talks on phone: Not on file     Gets together: Not on file     Attends [employer] service: Not on file     Active member of club or organization: Not on file     Attends meetings of clubs or organizations: Not on file     Relationship status: Not on file    Intimate partner violence     Fear of current or ex partner: Not on file     Emotionally abused: Not on file     Physically abused: Not on file     Forced sexual activity: Not on file   Other Topics Concern    Not on file   Social History Narrative    Not on file        Current Medications   vancomycin  1,500 mg Intravenous Q24H    atorvastatin  40 mg Oral Nightly    metroNIDAZOLE  500 mg Intravenous Q8H    vancomycin (VANCOCIN) intermittent dosing (placeholder)   Other RX Placeholder    sodium chloride flush  10 mL Intravenous 2 times per day    enoxaparin  40 mg Subcutaneous BID    bumetanide  2 mg Oral Daily    PARoxetine  20 mg Oral BID    metoprolol tartrate  25 mg Oral BID    lisinopril  10 mg Oral Daily    cefepime  1 g Intravenous Q12H        Allergies  No Known Allergies     Labs  Recent Labs     01/21/21  0556 01/20/21  0511 01/19/21  1652   WBC 5.4 6.7 8.6   HGB 11.5* 11.4* 12.2   HCT 34.1* 34.9* 36.8   MCV 93.9 95.6 95.2    440 504*     Lab Results   Component Value Date     01/21/2021    K 3.8 01/21/2021    CL 98 01/21/2021    CO2 27 01/21/2021    BUN 18 01/21/2021    CREATININE 0.78 01/21/2021    GLUCOSE 114 01/21/2021    CALCIUM 9.1 01/21/2021      No results found for: CKTOTAL, CKMB, Tam's care. Your referrals are greatly appreciated. Please do not hesitate to contact our service with any questions or concerns regarding her management.     KIRK Rodriguez-CNP  [facility] [place]

## 2021-01-22 NOTE — CONSULTS
Chart reviewed,pt. Examined, note dictated. Pt. Has osteo of th calcaneum and podiatry service is planning on debridement today. PVR ordered,yudy. Scan is negative for DVT. Recommend DVT prophylaxis measured. Thanks.

## 2021-01-23 VITALS
WEIGHT: 293 LBS | HEIGHT: 67 IN | HEART RATE: 80 BPM | SYSTOLIC BLOOD PRESSURE: 120 MMHG | TEMPERATURE: 97.7 F | DIASTOLIC BLOOD PRESSURE: 50 MMHG | OXYGEN SATURATION: 92 % | RESPIRATION RATE: 16 BRPM | BODY MASS INDEX: 45.99 KG/M2

## 2021-01-23 LAB
ABSOLUTE EOS #: 0.5 K/UL (ref 0–0.4)
ABSOLUTE IMMATURE GRANULOCYTE: ABNORMAL K/UL (ref 0–0.3)
ABSOLUTE LYMPH #: 1 K/UL (ref 1–4.8)
ABSOLUTE MONO #: 0.4 K/UL (ref 0.1–1.3)
ANION GAP SERPL CALCULATED.3IONS-SCNC: 8 MMOL/L (ref 9–17)
BASOPHILS # BLD: 1 % (ref 0–2)
BASOPHILS ABSOLUTE: 0.1 K/UL (ref 0–0.2)
BUN BLDV-MCNC: 18 MG/DL (ref 8–23)
BUN/CREAT BLD: ABNORMAL (ref 9–20)
CALCIUM SERPL-MCNC: 8.5 MG/DL (ref 8.6–10.4)
CHLORIDE BLD-SCNC: 99 MMOL/L (ref 98–107)
CO2: 28 MMOL/L (ref 20–31)
CREAT SERPL-MCNC: 0.6 MG/DL (ref 0.5–0.9)
DIFFERENTIAL TYPE: ABNORMAL
EKG ATRIAL RATE: 72 BPM
EKG P AXIS: 61 DEGREES
EKG P-R INTERVAL: 166 MS
EKG Q-T INTERVAL: 432 MS
EKG QRS DURATION: 98 MS
EKG QTC CALCULATION (BAZETT): 473 MS
EKG R AXIS: -24 DEGREES
EKG T AXIS: 48 DEGREES
EKG VENTRICULAR RATE: 72 BPM
EOSINOPHILS RELATIVE PERCENT: 7 % (ref 0–4)
GFR AFRICAN AMERICAN: >60 ML/MIN
GFR NON-AFRICAN AMERICAN: >60 ML/MIN
GFR SERPL CREATININE-BSD FRML MDRD: ABNORMAL ML/MIN/{1.73_M2}
GFR SERPL CREATININE-BSD FRML MDRD: ABNORMAL ML/MIN/{1.73_M2}
GLUCOSE BLD-MCNC: 192 MG/DL (ref 70–99)
HCT VFR BLD CALC: 35.6 % (ref 36–46)
HEMOGLOBIN: 11.7 G/DL (ref 12–16)
IMMATURE GRANULOCYTES: ABNORMAL %
INR BLD: 1.1
LYMPHOCYTES # BLD: 15 % (ref 24–44)
MCH RBC QN AUTO: 31.3 PG (ref 26–34)
MCHC RBC AUTO-ENTMCNC: 32.9 G/DL (ref 31–37)
MCV RBC AUTO: 95.2 FL (ref 80–100)
MONOCYTES # BLD: 6 % (ref 1–7)
NRBC AUTOMATED: ABNORMAL PER 100 WBC
PDW BLD-RTO: 13.7 % (ref 11.5–14.9)
PLATELET # BLD: 432 K/UL (ref 150–450)
PLATELET ESTIMATE: ABNORMAL
PMV BLD AUTO: 7.4 FL (ref 6–12)
POTASSIUM SERPL-SCNC: 4.3 MMOL/L (ref 3.7–5.3)
PROTHROMBIN TIME: 14.1 SEC (ref 11.8–14.6)
RBC # BLD: 3.74 M/UL (ref 4–5.2)
RBC # BLD: ABNORMAL 10*6/UL
SEG NEUTROPHILS: 71 % (ref 36–66)
SEGMENTED NEUTROPHILS ABSOLUTE COUNT: 4.9 K/UL (ref 1.3–9.1)
SODIUM BLD-SCNC: 135 MMOL/L (ref 135–144)
WBC # BLD: 6.8 K/UL (ref 3.5–11)
WBC # BLD: ABNORMAL 10*3/UL

## 2021-01-23 PROCEDURE — 2580000003 HC RX 258: Performed by: STUDENT IN AN ORGANIZED HEALTH CARE EDUCATION/TRAINING PROGRAM

## 2021-01-23 PROCEDURE — 85610 PROTHROMBIN TIME: CPT

## 2021-01-23 PROCEDURE — 6370000000 HC RX 637 (ALT 250 FOR IP): Performed by: STUDENT IN AN ORGANIZED HEALTH CARE EDUCATION/TRAINING PROGRAM

## 2021-01-23 PROCEDURE — 2500000003 HC RX 250 WO HCPCS: Performed by: STUDENT IN AN ORGANIZED HEALTH CARE EDUCATION/TRAINING PROGRAM

## 2021-01-23 PROCEDURE — 99239 HOSP IP/OBS DSCHRG MGMT >30: CPT | Performed by: INTERNAL MEDICINE

## 2021-01-23 PROCEDURE — 6360000002 HC RX W HCPCS: Performed by: STUDENT IN AN ORGANIZED HEALTH CARE EDUCATION/TRAINING PROGRAM

## 2021-01-23 PROCEDURE — 80048 BASIC METABOLIC PNL TOTAL CA: CPT

## 2021-01-23 PROCEDURE — 97110 THERAPEUTIC EXERCISES: CPT

## 2021-01-23 PROCEDURE — 36415 COLL VENOUS BLD VENIPUNCTURE: CPT

## 2021-01-23 PROCEDURE — 93010 ELECTROCARDIOGRAM REPORT: CPT | Performed by: INTERNAL MEDICINE

## 2021-01-23 PROCEDURE — 85025 COMPLETE CBC W/AUTO DIFF WBC: CPT

## 2021-01-23 RX ORDER — OXYCODONE HYDROCHLORIDE AND ACETAMINOPHEN 5; 325 MG/1; MG/1
1 TABLET ORAL EVERY 6 HOURS PRN
Qty: 20 TABLET | Refills: 0 | Status: SHIPPED | OUTPATIENT
Start: 2021-01-23 | End: 2021-01-28

## 2021-01-23 RX ORDER — IBUPROFEN 600 MG/1
600 TABLET ORAL 3 TIMES DAILY PRN
Qty: 30 TABLET | Refills: 0 | Status: ON HOLD | OUTPATIENT
Start: 2021-01-23 | End: 2021-09-20

## 2021-01-23 RX ADMIN — PAROXETINE HYDROCHLORIDE 20 MG: 20 TABLET, FILM COATED ORAL at 08:58

## 2021-01-23 RX ADMIN — OXYCODONE HYDROCHLORIDE AND ACETAMINOPHEN 2 TABLET: 5; 325 TABLET ORAL at 07:13

## 2021-01-23 RX ADMIN — LISINOPRIL 10 MG: 10 TABLET ORAL at 08:58

## 2021-01-23 RX ADMIN — ENOXAPARIN SODIUM 40 MG: 40 INJECTION SUBCUTANEOUS at 08:58

## 2021-01-23 RX ADMIN — METRONIDAZOLE 500 MG: 500 INJECTION, SOLUTION INTRAVENOUS at 07:13

## 2021-01-23 RX ADMIN — CEFEPIME 1000 MG: 1 INJECTION, POWDER, FOR SOLUTION INTRAMUSCULAR; INTRAVENOUS at 06:30

## 2021-01-23 RX ADMIN — METOPROLOL TARTRATE 12.5 MG: 25 TABLET, FILM COATED ORAL at 08:58

## 2021-01-23 RX ADMIN — ANTI-FUNGAL POWDER MICONAZOLE NITRATE TALC FREE: 1.42 POWDER TOPICAL at 08:58

## 2021-01-23 RX ADMIN — OXYCODONE HYDROCHLORIDE AND ACETAMINOPHEN 2 TABLET: 5; 325 TABLET ORAL at 11:55

## 2021-01-23 RX ADMIN — OXYCODONE HYDROCHLORIDE AND ACETAMINOPHEN 2 TABLET: 5; 325 TABLET ORAL at 03:00

## 2021-01-23 RX ADMIN — Medication 10 ML: at 09:00

## 2021-01-23 ASSESSMENT — PAIN - FUNCTIONAL ASSESSMENT: PAIN_FUNCTIONAL_ASSESSMENT: PREVENTS OR INTERFERES WITH MANY ACTIVE NOT PASSIVE ACTIVITIES

## 2021-01-23 ASSESSMENT — PAIN DESCRIPTION - ORIENTATION
ORIENTATION: LEFT
ORIENTATION_3: RIGHT

## 2021-01-23 ASSESSMENT — PAIN DESCRIPTION - LOCATION
LOCATION_2: HIP
LOCATION: FOOT

## 2021-01-23 ASSESSMENT — PAIN DESCRIPTION - PAIN TYPE
TYPE_2: CHRONIC PAIN
TYPE: SURGICAL PAIN

## 2021-01-23 ASSESSMENT — PAIN DESCRIPTION - DESCRIPTORS
DESCRIPTORS_3: ACHING;DISCOMFORT
DESCRIPTORS: ACHING;SORE
DESCRIPTORS_2: ACHING;SORE

## 2021-01-23 ASSESSMENT — PAIN SCALES - GENERAL
PAINLEVEL_OUTOF10: 10
PAINLEVEL_OUTOF10: 7
PAINLEVEL_OUTOF10: 8
PAINLEVEL_OUTOF10: 8

## 2021-01-23 ASSESSMENT — PAIN DESCRIPTION - INTENSITY: RATING_2: 8

## 2021-01-23 ASSESSMENT — PAIN DESCRIPTION - DURATION: DURATION_2: CONTINUOUS

## 2021-01-23 NOTE — PROGRESS NOTES
Patients blood pressure 94/40. Patient states she has been reading low blood pressures for a while now, likely since she was started on Lopressor twice a day. Patient states she's \"not sure why the doctors have her on Lopressor twice a day. \" Patient says she feels normal and is in no distress. Writer contacted Laxmi Cormier CNP regarding patients diastolic blood pressure less than 50. Malik Angeles looked into it and said the Lopressor was her home dose for over a year, and if the patient is asymptomatic then she will not make any changes tonight, and to see what the rounding doctor on days thinks.

## 2021-01-23 NOTE — FLOWSHEET NOTE
Patient picked up by Jose Townsend at this time. All belongings with patient, including home medications.

## 2021-01-23 NOTE — OP NOTE
PODIATRY OP NOTE     PATIENT NAME: Maria Ines Hammonds  YOB: 1960  -  61 y.o. female  MRN: [id number]  DATE: 1/22/2021  BILLING #: [id number]     Surgeon(s):  Arnold Pierce DPM      ASSISTANTS: Desiree Wheat DPM PGY2, Jackie Bridegroom MS4     PRE-OP DIAGNOSIS:   1. Kinney grade 2 ulceration of plantar lateral heel, left foot  2. Osteomyelitis, left calcaneus     POST-OP DIAGNOSIS:  1. Kinney grade 3 ulceration of planatar lateral heel, left foot  2. Osteomyelitis, left calcaneus     PROCEDURE:   1. Debridement of ulcer <20 sq cm into the level of muscle/fascia, left heel  2. Bone biopsy x 2, left calcaneus     ANESTHESIA: MAC with local     HEMOSTASIS: Tamponade     ESTIMATED BLOOD LOSS: Less than 10 cc.     MATERIALS: 4-0 Nylon  * No implants in log *     INJECTABLES: 10 cc of 1:1 mix of 0.5% marcaine plain and 1% lidocaine plain preoperatively     SPECIMEN:   ID Type Source Tests Collected by Time Destination   A : left foot plantar calcaneous for aerobic and anaerobic then for surgical pathology Bone Foot SURGICAL PATHOLOGY, CULTURE, ANAEROBIC AND AEROBIC Arnold Pierce DPM 1/22/2021 1218     B : LEFT FOOT SUPERIOR CALCANEOUS FOR AROBIC AND ANAEROBIC CULTURE THEN FOR SURGICAL PATHOLOGY Bone Foot SURGICAL PATHOLOGY, CULTURE, ANAEROBIC AND AEROBIC Arnold Pierce DPM 1/22/2021 8748           COMPLICATIONS: None     FINDINGS: Ulcer probed to bone following debridement of nonviable, necrotic soft tissue. No purulent drainage appreciated, no evidence of abscess. Indications for procedure: Patient has a chronic non-healing wound to the plantar lateral left heel. X-rays and MRI are concerning for osteomyelitis of the calcaneus.   Patient has failed conservative treatments and surgical intervention is necessary today to obtain bone biopsies of the left calcaneus in order to confirm or refute the diagnosis of osteomyelitis and perform wound debridement in order to establish healthy tissue in the wound bed to optimize healing. Risks and benefits of the procedure were discussed with patient. No guarantees were given or implied. Consent signed and placed in chart. PROCEDURE IN DETAIL: The patient was transported from pre-op to the operating room and placed on the operating table in the supine position with a safety strap across the lap. After adequate sedation by anesthesia, 10 cc of 1:1 mix of 0.5% marcaine plain and 1% lidocaine plain  lidocaine was injected to the left foot. The foot was then prepped and draped in the usual aseptic fashion. Attention was directed to the left heel. A 22-gauge needle was used to cai the skin and verify borders of calcaneus, just medial and superior to heel ulcer. Next, a Jamshidi needle was used to take a bone biopsy from the more inferior part of the lateral calcaneus near but away from the site of ulceration. A different Jamshidi needle was then used to take a bone biopsy from an area just superior to first biopsy site. Bone biopsies were passed the back table and placed in separate specimen cup to be sent fresh for aerobic/anaeroboic culture and surgical pathology. 4-0 nylon was used to close sites where Jamshidi needle was placed through the skin. Next, a rongeur was used to excisionally debride all nonviable and necrotic soft tissue from the wound base into the level of muscle and fascia. Predebridement, the wound measured approximately 2.6 cm x 2.8 cm x 1.2 cm. Post debridement, the wound measured 2.8 cm x 3.0 cm x 2.0 cm. It was found that the wound probed to the calcaneus following debridement. Ulcer was irrigated with copious amounts of sterile saline. Dressings consisted of 1/4\" iodoform packing to heel wound, silver cell to heel wound, adaptic to left leg wound, 4x4 gauze, ABD, Kerlix, Ace. The patient tolerated the above procedure and anesthesia well without complications.  The patient was transported from the operating room to the PACU with vital signs stable and vascular status intact to the left foot.      Electronically signed by Di Shahid DPM on 1/22/2021 at 7:49 PM     Electronically signed by John Lazo DPM on 1/25/2021 at 11:52 AM

## 2021-01-23 NOTE — PROGRESS NOTES
Progress Note  Podiatric Medicine and Surgery     Subjective     CC: Cellulitis, bilateral lower extremities    POD #1 wound debridement and bone biopsy, left foot (DOS 1/22/21)  Patient seen and examined at bedside  No acute events overnight  Afebrile, hypotensive, other vital signs stable  Patient reports pain well controlled to bilateral lower extremities, does relate pain to right hip this morning. Reports sleeping well    HPI :  Pamella Boswell a 61 y.o. female seen at [place] [address] worsening wounds with concern for cellulitis of the bilateral lower extremity.  The wound that is most concerning is to plantar lateral aspect of the left heel.  Patient states that she has had the wounds for several months that started back in June 2020, at which time she was admitted for similar complaints.  Patient never maintained follow-up recommendations on outpatient basis. Heidy Allen presented to the ED due to progressive redness, swelling, pain to the bilateral lower extremities.  Radiographs of the left foot and right leg were obtained which were negative for soft tissue emphysema, questionable for osteomyelitis of the left calcaneus.  Infectious lab work-up revealed an elevated CRP of 45.6 and ESR of 71.  WBC is 8.6.  Patient states that she is minimally ambulatory mostly bedridden.  Patient denies any trauma to the bilateral lower extremities.  Patient denies any purulent drainage from the wound sites.  Patient states she is not been performing any kind of wound care at home.  Patient admits to smoking approximately 1 pack/day.  She admits to numbness and tingling to the bilateral lower extremities.     PCP is KIRK Holden - CNP    ROS: Denies N/V/F/C/SOB/CP. Otherwise negative except at stated in the HPI.      Medications:  Scheduled Meds:   metoprolol tartrate  12.5 mg Oral BID    vancomycin  1,500 mg Intravenous Q24H    atorvastatin  40 mg Oral Nightly    metroNIDAZOLE  500 mg Intravenous Q8H    vancomycin (VANCOCIN) intermittent dosing (placeholder)   Other RX Placeholder    sodium chloride flush  10 mL Intravenous 2 times per day    enoxaparin  40 mg Subcutaneous BID    bumetanide  2 mg Oral Daily    PARoxetine  20 mg Oral BID    lisinopril  10 mg Oral Daily    cefepime  1 g Intravenous Q12H       Continuous Infusions:    PRN Meds:albuterol, miconazole, sodium chloride flush, promethazine **OR** [DISCONTINUED] ondansetron, polyethylene glycol, acetaminophen **OR** acetaminophen, oxyCODONE-acetaminophen **OR** oxyCODONE-acetaminophen    Objective     Vitals:  Patient Vitals for the past 8 hrs:   BP Temp Temp src Pulse Resp SpO2   21 0741 (!) 120/50 97.7 °F (36.5 °C) Oral 80 16 92 %     Average, Min, and Max for last 24 hours Vitals:  TEMPERATURE:  Temp  Av.7 °F (36.5 °C)  Min: 96.8 °F (36 °C)  Max: 98.2 °F (36.8 °C)    RESPIRATIONS RANGE: Resp  Avg: 10.3  Min: 0  Max: 21    PULSE RANGE: Pulse  Av.5  Min: 65  Max: 80    BLOOD PRESSURE RANGE:  Systolic (98RXR), WOJ:688 , Min:81 , IHN:625   ; Diastolic (98PHY), BNA:45, Min:40, Max:61      PULSE OXIMETRY RANGE: SpO2  Av %  Min: 90 %  Max: 100 %    I/O last 3 completed shifts:   In: 675 [I.V.:675]  Out: 1650 [GXDOM:0691]    CBC:  Recent Labs     21  0556 21  0544 21  0901   WBC 5.4 5.7 6.8   HGB 11.5* 11.7* 11.7*   HCT 34.1* 35.1* 35.6*    457* 432   CRP  --  27.3*  --         BMP:  Recent Labs     21  0556 21  0544   * 138   K 3.8 5.0   CL 98 101   CO2 27 30   BUN 18 20   CREATININE 0.78 0.87   GLUCOSE 114* 111*   CALCIUM 9.1 8.9        Coags:  Recent Labs     21  0556 21  0544 21  0612   PROT 6.3* 6.3*  --    INR  --   --  1.1       Lab Results   Component Value Date    SEDRATE 47 (H) 2021     Recent Labs     21  0544   CRP 27.3*       Lower Extremity Physical Exam:     Vascular: DP and PT pulses are nonpalpable, minimally audible on Doppler signal, likely secondary to severe edema of bilateral lower extremities. CFT brisk to all digits. Hair growth absent to the level of the digits. Severe, nonpitting lymphedema to BLE. Neuro: Saph/sural/SP/DP/plantar sensation diminished to light touch    Musculoskeletal: Decreased range of motion and decreased strength noted to all lower extremity muscle groups of the bilateral lower extremities. Gross deformity is present with contracted digits. Dermatologic: Full-thickness ulcer level of subcutaneous tissue with 1 area that probes to bone on the plantar lateral heel of left foot, measuring approximately 2.8 x 3.0 x 2.0 cm. Base is fibrogranular. Periwound skin is macerated. Serosanguineous drainage noted, no malodor appreciated. Erythema greatly improved, no associated increase in warmth. .  Wound does probe to bone but is not sinus track or undermine. No fluctuance or crepitus noted. 1 nylon suture to lateral heel due to skin closure from bone biopsy on 1/22/2021. Partial-thickness ulcer located to the lateral mid tibia shaft of the left leg, measuring approximately 2.0 x 0.8 x 0.1 cm. .  Base of wound is granular, no purulent drainage or malodor present. Right lower leg with ulceration to the level of dermis, measuring approximately 5.0 x 3.0 x 0.1 cm. No fluctuance or crepitus noted, no purulent drainage. Clinical Images:        Imaging:   IR FLUORO GUIDED CVA DEVICE PLMT/REPLACE/REMOVAL   Final Result   Successful ultrasound and fluoroscopy guided PICC placement, as above. PICC   is ready for use at this time. VL Arterial PVR Lower   Final Result      VL Lower Extremity Arteries Bilateral   Final Result      MRI ANKLE LEFT WO CONTRAST   Final Result   Significantly limited study. Soft tissue ulceration underlying the calcaneus with questionable early   osteomyelitis versus reactive marrow changes.          VL DUP LOWER EXTREMITY VENOUS BILATERAL   Final Result      XR TIBIA FIBULA LEFT (2 VIEWS)   Final Result   Stable study. Diffuse soft tissue swelling may be related to infection. No osseous or hardware related abnormality. XR CHEST PORTABLE   Final Result   Stable study. No acute intrathoracic pathology. XR FOOT LEFT (MIN 3 VIEWS)   Final Result   Soft tissue ulceration at the plantar aspect of the heel with suggestion of   focal abnormal demineralization at the plantar calcaneus suspicious for   concomitant osteomyelitis. Diffuse periostitis about the right tibia and fibula which may relate to   venous insufficiency. The reported soft tissue wound of the right lower   extremity is not definitively appreciated radiographically on a background of   diffuse edema. XR TIBIA FIBULA RIGHT (2 VIEWS)   Final Result   Soft tissue ulceration at the plantar aspect of the heel with suggestion of   focal abnormal demineralization at the plantar calcaneus suspicious for   concomitant osteomyelitis. Diffuse periostitis about the right tibia and fibula which may relate to   venous insufficiency. The reported soft tissue wound of the right lower   extremity is not definitively appreciated radiographically on a background of   diffuse edema. NIVS 1/20/21: Summary  Monophasic flow throughout the bilateral lower extremities. Elevated velocities are consistent with bilateral SFA stenosis. PVR 1/22/21  MAGGIE R: 1.17, L: 0.91    Culture, Left Foot: Updated 1/22/21- MSSA, Proteus Mirabilis    Assessment   Kera Yoder is a 61 y.o. female with   1. Status post wound debridement and bone biopsy x2, left foot (DOS 1/22/2021)  2. Cellulitis, right lower extremity  3. Kinney grade 2 ulceration of plantar lateral heel, left foot  4. Possible osteomyelitis, left calcaneus  5. Lymphedema, bilateral lower extremities  6. Smoker  7.  CHF    Principal Problem:    Cellulitis  Active Problems:    Chronic obstructive pulmonary disease ([address]    Essential hypertension Prediabetes    Depression    Cellulitis of right leg    Peripheral artery disease (HCC)  Resolved Problems:    * No resolved hospital problems. *       Plan     · Patient examined and evaluated at bedside   · Treatment options discussed in detail with the patient  · Radiographs of the bilateral lower extremities reviewed and discussed in detail with patient-negative for soft tissue emphysema, questionable osteomyelitis of left calcaneus  · MRI left foot reviewed- questionable osteomyelitis of the calcaneus however findings are inconclusive due to limited study as the patient was unable to remain still due to pain  · NIVS- findings consistent with bilateral SFA stenosis  · Bone biopsy results pending  · ID following - Patient may be discharged on IV Invanz 1 g daily, vancomycin 1500 mg (pharmacy to dose) for 6 weeks. · Vascular- pending results from PVRs, will potentially perform intervention at a later date  · Patient okay for discharge to nursing home from podiatry standpoint. Dressings to be changed daily to bilateral lower extremities, dressing change instructions placed in JOS. Patient to be weightbearing to left lower extremity for transfers only in surgical shoe. Patient to follow-up with Dr. Darlyn Amaro and [facility] wound care clinic in 1 week. · Dressing change to left lower extremity: 1/4\" plain packing and Aquacel to left heel wound, secured in place with Mepilex, covered with DSD and Ace.   · Dressing kept intact to right lower extremity: Adaptic to superficial wounds, DSD, Ace  · Discussed with Dr. Razia Coleman, [facility]   Podiatric Medicine & Surgery   1/23/2021 at 9:33 AM

## 2021-01-23 NOTE — PLAN OF CARE
Problem: Falls - Risk of:  Goal: Will remain free from falls  Description: Will remain free from falls  Outcome: Ongoing  Note: Patient remains free of falls and injuries throughout shift. Bed remains in the lowest position, wheels locked, call light and bedside table are within reach. Goal: Absence of physical injury  Description: Absence of physical injury  Outcome: Ongoing     Problem: SAFETY  Goal: Free from accidental physical injury  Outcome: Ongoing     Problem: DAILY CARE  Goal: Daily care needs are met  Outcome: Ongoing     Problem: PAIN  Goal: Patient's pain/discomfort is manageable  Outcome: Ongoing     Problem: SKIN INTEGRITY  Goal: Skin integrity is maintained or improved  Outcome: Ongoing  Note: Assess the overall condition of the skin. Check on bony prominences such as the sacrum, trochanters, scapulae, elbows, heels, inner and outer malleolus, inner and outer knees, back of head). Reinforce the importance of turning, mobility, and ambulation. Problem: KNOWLEDGE DEFICIT  Goal: Patient/S.O. demonstrates understanding of disease process, treatment plan, medications, and discharge instructions.   Outcome: Ongoing     Problem: DISCHARGE BARRIERS  Goal: Patient's continuum of care needs are met  Outcome: Ongoing     Problem: Skin Integrity:  Goal: Will show no infection signs and symptoms  Description: Will show no infection signs and symptoms  Outcome: Ongoing  Goal: Absence of new skin breakdown  Description: Absence of new skin breakdown  Outcome: Ongoing     Problem: Pain:  Goal: Pain level will decrease  Description: Pain level will decrease  Outcome: Ongoing  Goal: Control of acute pain  Description: Control of acute pain  Outcome: Ongoing  Goal: Control of chronic pain  Description: Control of chronic pain  Outcome: Ongoing     Problem: Nutrition  Goal: Optimal nutrition therapy  Outcome: Ongoing

## 2021-01-23 NOTE — CARE COORDINATION
[facility] received final DC orders for this patient to DC to [facility] on this date. [facility] set up transportation and informed the staff here, the facility, and [facility] also called this patient's contact, Siena and informed her of the DC/ transport time. The patient is scheudled to be picked up at 12:30 pm via stretcher by Emigdio Hanna. The number for report is [phone].

## 2021-01-23 NOTE — PLAN OF CARE
Problem: Falls - Risk of:  Goal: Will remain free from falls  Description: Will remain free from falls  1/23/2021 1254 by Shweta Agarwal RN  Outcome: Completed  1/23/2021 0427 by Wes Kan RN  Outcome: Ongoing  Note: Patient remains free of falls and injuries throughout shift. Bed remains in the lowest position, wheels locked, call light and bedside table are within reach. Goal: Absence of physical injury  Description: Absence of physical injury  1/23/2021 1254 by Shweta Agarwal RN  Outcome: Completed  1/23/2021 0427 by Wes Kan RN  Outcome: Ongoing     Problem: SAFETY  Goal: Free from accidental physical injury  1/23/2021 1254 by Shweta Agarwal RN  Outcome: Completed  1/23/2021 0427 by Wes Kan RN  Outcome: Ongoing     Problem: DAILY CARE  Goal: Daily care needs are met  1/23/2021 1254 by Shweta Agarwal RN  Outcome: Completed  1/23/2021 0427 by Wes Kan RN  Outcome: Ongoing     Problem: PAIN  Goal: Patient's pain/discomfort is manageable  1/23/2021 1254 by Shweta Agarwal RN  Outcome: Completed  1/23/2021 0427 by Wes Kan RN  Outcome: Ongoing     Problem: SKIN INTEGRITY  Goal: Skin integrity is maintained or improved  1/23/2021 1254 by Shweta Agarwal RN  Outcome: Completed  1/23/2021 0427 by Wes Kan RN  Outcome: Ongoing  Note: Assess the overall condition of the skin. Check on bony prominences such as the sacrum, trochanters, scapulae, elbows, heels, inner and outer malleolus, inner and outer knees, back of head). Reinforce the importance of turning, mobility, and ambulation. Problem: KNOWLEDGE DEFICIT  Goal: Patient/S.O. demonstrates understanding of disease process, treatment plan, medications, and discharge instructions.   1/23/2021 1254 by Shweta Agarwal RN  Outcome: Completed  1/23/2021 0427 by Wes Kan RN  Outcome: Ongoing     Problem: DISCHARGE BARRIERS  Goal: Patient's continuum of care needs are met  1/23/2021 1254 by Shweta Agarwal RN  Outcome: Completed  1/23/2021 0427 by Wes Kan RN  Outcome: Ongoing     Problem: Skin Integrity:  Goal: Will show no infection signs and symptoms  Description: Will show no infection signs and symptoms  1/23/2021 1254 by Shweta Agarwal RN  Outcome: Completed  1/23/2021 0427 by Wes Kan RN  Outcome: Ongoing  Goal: Absence of new skin breakdown  Description: Absence of new skin breakdown  1/23/2021 1254 by Shweta Agarwal RN  Outcome: Completed  1/23/2021 0427 by Wes Kan RN  Outcome: Ongoing     Problem: Pain:  Goal: Pain level will decrease  Description: Pain level will decrease  1/23/2021 1254 by Shweta Agarwal RN  Outcome: Completed  1/23/2021 0427 by Wes Kan RN  Outcome: Ongoing  Goal: Control of acute pain  Description: Control of acute pain  1/23/2021 1254 by Shweta Agarwal RN  Outcome: Completed  1/23/2021 0427 by Wes Kan RN  Outcome: Ongoing  Goal: Control of chronic pain  Description: Control of chronic pain  1/23/2021 1254 by Shweta Agarwal RN  Outcome: Completed  1/23/2021 0427 by Wes Kan RN  Outcome: Ongoing     Problem: Nutrition  Goal: Optimal nutrition therapy  1/23/2021 1254 by Shweta Agarwal RN  Outcome: Completed  1/23/2021 0427 by Wes Kan RN  Outcome: Ongoing

## 2021-01-23 NOTE — PROGRESS NOTES
01/22/21, \"Weightbearing to left lower extremity for transfers only. \" Check order for new WB order update    Vital Signs  Patient Currently in Pain: Yes  Pain Assessment: 0-10  Pain Level: 8  Pain Type: Surgical pain  Pain Location: Foot(heel)  Pain Orientation: Left  Pain Descriptors: Aching; Sore  Pain Frequency: Continuous  Functional Pain Assessment: Prevents or interferes with many active not passive activities  Non-Pharmaceutical Pain Intervention(s): Distraction; Emotional support;Rest;Repositioned  Response to Pain Intervention: Patient Satisfied  Multiple Pain Sites: Yes        Patient Observation  Observations: Pt reports she slept well throughout the night after her L heel sx 01/22/21.  Pt states that she has chronic pain and has a hard time staying motivated to participate with therapy          Bed Mobility  Comment: Pt refused to perform this date due pain    Transfers:  Comments: Pt refused to perform this date due to pain and wanting to conserve energy for possible D/C today                                Stairs/Curb  Stairs?: No                                                     Comments: Unable to determine this date, pt only agreeable to in bed therapy     Other exercises?: Yes  Other exercises 1: Supine B LE: ankle pumps x10-15 reps, x5-10 quad sets and x15 glute sets, and x2 ea leg lifts to reposition pillows  Other exercises 2: Educated pt over being an advocate for herself to perform LE AROM/exercises throughout the day to improve circulation and improve strength/reduce stiffness  Other exercises 3: Educated over repositioning self ~2 hours to reduce chance of bedsore formation and protect skin  Other exercises 4: B LE integrity check: B LE wrapped in ace wrap dry and clean  Other exercises 5: Doffed/Donned B PRAFO boots  Other Activities  Comment: rest breaks PRN        Activity Tolerance: Patient limited by pain  PT Equipment Recommendations  Equipment Needed: No  Other Comments  Comments: Pt reports nursing staff assists her to [facility] for bathroom transfers    Assessment  Activity Tolerance: Patient limited by pain   Body structures, Functions, Activity limitations: Decreased functional mobility ; Decreased ROM; Decreased strength;Decreased balance; Increased pain  Discharge Recommendations: Patient would benefit from continued therapy after discharge     Type of devices: All fall risk precautions in place;Call light within reach; Patient at risk for falls; Left in bed;Nurse notified(EFE French notified)     Plan  Times per week: 6-7x/wk  Current Treatment Recommendations: Strengthening, ROM, Balance Training, Functional Mobility Training, Transfer Training, Gait Training, Safety Education & Training    Patient Education  New Education Provided:  Continue to perform gentle supine AROM/B LE therex throughout day to improve circulation/strength and reduce joint stiffness  Learner:patient  Method: demonstration and explanation       Outcome: acknowledged understanding of continue to perform gentle supine  AROM/B LE therex throughout day to improve circulation/strength and reduce joint stiffness, demonstrated understanding and needs reinforcement     Goals  Short term goals  Time Frame for Short term goals: 4-5 days  Short term goal 1: bed mobility with Charley  Short term goal 2: transfers with SBA  Short term goal 3: gait with RW/SBA and forefoot wt bearing L LE X 15-20FT    PT Individual Minutes  Time In: 0800  Time Out: 0813  Minutes: 13    Electronically signed by Joe Jang PTA on 1/23/21 at 8:39 AM EST

## 2021-01-23 NOTE — PROGRESS NOTES
[address] [employer]    PROGRESS NOTE             1/23/2021    10:18 AM    Name:   Zulay George  MRN:     [id number]     Acct:      [de-identified]   Room:   2074/2074-01  IP Day:  4  Admit Date:  1/19/2021  3:49 PM    PCP:  KIRK Schaefer CNP  Code Status:  Full Code    Subjective:     C/C:   Chief Complaint   Patient presents with    Leg Swelling    Skin Ulcer    Wound Check     Interval History Status: improved. Patient seen and examined at the bed side, no new acute events overnight. She is POD 1 from wound debridement and bone biopsy left foot. Afebrile overnight with no desaturations. She continues to have mild diastolic hypotension which I discussed with the patient; she states that he was having some of this prior to admission and confirms to me that she does indeed take Lopressor 25 mg twice daily even at home prior to this admission. I counseled her that we will be splitting that dose in half and she will now be taking 12.5 mg twice daily until she follows up with her PCP who can adjust accordingly. She explains to me that she still takes Bumex as currently being given in hospital.    This morning the patient states that she feels well this morning, continues to have ongoing bilateral foot pain unchanged from previous days admission. Denies any chest pain, shortness of breath, cough, abdominal pain, new leg pain or swelling, or any other complaints this time. Notes from nursing staff and Consults had been reviewed, and the overnight progress had been checked with the nursing staff as well.     Brief History:     60-y/o female with h/o morbid obesity, prediabetes, HTN, COPD, depression, and chronic bilateral lymphedema admitted on 01/19/2021 for bilateral lower extremity cellulitis with possible osteomyelitis of left calcaneous    Review of Systems:     CONSTITUTIONAL:  no fevers, no headcahes  EYES: negative for blury vision  HEENT: No headaches, No nasal congestion, no difficulty swallowing  RESPIRATORY:negative for dyspnea, no wheezing, no Cough  CARDIOVASCULAR: negative for chest pain, no palpitations  GASTROINTESTINAL: no nausea, no vomiting, no change in bowel habits, no abdominal pain   GENITOURINARY: negative for dysuria, no hematuria   MUSCULOSKELETAL: Positive for bilateral foot pain, positive for chronic right hip pain, otherwise no muscle aches or swelling of joints or extremities  NEUROLOGICAL: No  Weakness or numbness  INTEGUMENTARY: No rash      Medications: Allergies:    No Known Allergies    Current Meds:   Scheduled Meds:    metoprolol tartrate  12.5 mg Oral BID    vancomycin  1,500 mg Intravenous Q24H    atorvastatin  40 mg Oral Nightly    metroNIDAZOLE  500 mg Intravenous Q8H    vancomycin (VANCOCIN) intermittent dosing (placeholder)   Other RX Placeholder    sodium chloride flush  10 mL Intravenous 2 times per day    enoxaparin  40 mg Subcutaneous BID    bumetanide  2 mg Oral Daily    PARoxetine  20 mg Oral BID    lisinopril  10 mg Oral Daily    cefepime  1 g Intravenous Q12H     Continuous Infusions:     PRN Meds: albuterol, miconazole, sodium chloride flush, promethazine **OR** [DISCONTINUED] ondansetron, polyethylene glycol, acetaminophen **OR** acetaminophen, oxyCODONE-acetaminophen **OR** oxyCODONE-acetaminophen    Data:     Past Medical History:   has a past medical history of Arthritis, CHF (congestive heart failure) ([facility] [address] COPD (chronic obstructive pulmonary disease) ([facility] [address] Diverticulitis, Hx of blood clots, and Pulmonary embolism ([facility] [address] Social History:   reports that she has been smoking. She has a 45.00 pack-year smoking history. She has never used smokeless tobacco. She reports previous alcohol use. She reports previous drug use.      Family History:   Family History   Problem Relation Age of Onset   Ramo Mckeon Cancer Mother     Diabetes Paternal Grandfather        Vitals:  BP (!) 120/50 Pulse 80   Temp 97.7 °F (36.5 °C) (Oral)   Resp 16   Ht 5' 7\" (1.702 m)   Wt (!) 342 lb 6 oz (155.3 kg)   SpO2 92%   BMI 53.62 kg/m²   Temp (24hrs), Av.7 °F (36.5 °C), Min:96.8 °F (36 °C), Max:98.2 °F (36.8 °C)      Recent Labs     21  1139 218 21  1123   POCGLU 126* 130* 106*       I/O(24Hr): Intake/Output Summary (Last 24 hours) at 2021 1018  Last data filed at 2021 2652  Gross per 24 hour   Intake 675 ml   Output 1650 ml   Net -975 ml       Labs:    CBC with Differential:    Lab Results   Component Value Date    WBC 6.8 2021    RBC 3.74 2021    HGB 11.7 2021    HCT 35.6 2021     2021    MCV 95.2 2021    MCH 31.3 2021    MCHC 32.9 2021    RDW 13.7 2021    LYMPHOPCT 15 2021    MONOPCT 6 2021    BASOPCT 1 2021    MONOSABS 0.40 2021    LYMPHSABS 1.00 2021    EOSABS 0.50 2021    BASOSABS 0.10 2021    DIFFTYPE NOT REPORTED 2021       Lab Results   Component Value Date/Time    SPECIAL NOT REPORTED 2021 12:22 PM     Lab Results   Component Value Date/Time    CULTURE PENDING 2021 12:22 PM         Radiology:    Xr Tibia Fibula Left (2 Views)    Result Date: 2021  EXAMINATION: XRAY VIEWS OF THE LEFT TIBIA AND FIBULA 2021 9:06 pm COMPARISON: 2020 HISTORY: ORDERING SYSTEM PROVIDED HISTORY: infection, swelling TECHNOLOGIST PROVIDED HISTORY: infection, swelling Reason for Exam: infection, swelling Acuity: Acute Type of Exam: Initial FINDINGS: There is diffuse soft tissue swelling. The osseous structures are intact. There are stable changes related to instrumented fusion of medial and lateral malleolus with no evidence of hardware related complication such as loosening or fracture. Severe degenerative disease of the knee joint and intertarsal joints are partially visualized     Stable study.  Diffuse soft tissue swelling may be related to infection. No osseous or hardware related abnormality. Xr Tibia Fibula Right (2 Views)    Result Date: 1/19/2021  EXAMINATION: THREE XRAY VIEWS OF THE LEFT FOOT;   XRAY VIEWS OF THE RIGHT TIBIA AND FIBULA 1/19/2021 1:44 pm COMPARISON: 07/03/2020 HISTORY: ORDERING SYSTEM PROVIDED HISTORY: heel wound TECHNOLOGIST PROVIDED HISTORY: heel wound Reason for Exam: PT states non-healing wound on heel. Acuity: Unknown Type of Exam: Unknown Additional signs and symptoms: PT states non-healing wound on heel. ; ORDERING SYSTEM PROVIDED HISTORY: leg wound TECHNOLOGIST PROVIDED HISTORY: leg wound Reason for Exam: PT states non-healing wound on lower leg. Wound observed on medial aspect of lower leg. Acuity: Unknown Type of Exam: Unknown Additional signs and symptoms: PT states non-healing wound on lower leg. Wound observed on medial aspect of lower leg. FINDINGS: Left foot: Calcaneus is suboptimally evaluated due to difficulty in appropriately positioning the patient. There are posterior and plantar calcaneal enthesopathic changes. There is suggestion of focal abnormal demineralization at the plantar calcaneus underlying an area of soft tissue ulceration. Partially imaged open reduction internal fixation hardware within the distal tibia and fibula. Degenerative and enthesopathic changes about the foot and ankle. Diffuse soft tissue edema. Right tib-fib: No acute fracture or dislocation. Osseous demineralization throughout. Diffuse periostitis of the tibia and fibula. Mild knee and moderate ankle degenerative changes. Diffuse soft tissue edema. Soft tissue ulceration at the plantar aspect of the heel with suggestion of focal abnormal demineralization at the plantar calcaneus suspicious for concomitant osteomyelitis. Diffuse periostitis about the right tibia and fibula which may relate to venous insufficiency.   The reported soft tissue wound of the right lower extremity is not definitively appreciated radiographically on a background of diffuse edema. Xr Foot Left (min 3 Views)    Result Date: 1/19/2021  EXAMINATION: THREE XRAY VIEWS OF THE LEFT FOOT;   XRAY VIEWS OF THE RIGHT TIBIA AND FIBULA 1/19/2021 1:44 pm COMPARISON: 07/03/2020 HISTORY: ORDERING SYSTEM PROVIDED HISTORY: heel wound TECHNOLOGIST PROVIDED HISTORY: heel wound Reason for Exam: PT states non-healing wound on heel. Acuity: Unknown Type of Exam: Unknown Additional signs and symptoms: PT states non-healing wound on heel. ; ORDERING SYSTEM PROVIDED HISTORY: leg wound TECHNOLOGIST PROVIDED HISTORY: leg wound Reason for Exam: PT states non-healing wound on lower leg. Wound observed on medial aspect of lower leg. Acuity: Unknown Type of Exam: Unknown Additional signs and symptoms: PT states non-healing wound on lower leg. Wound observed on medial aspect of lower leg. FINDINGS: Left foot: Calcaneus is suboptimally evaluated due to difficulty in appropriately positioning the patient. There are posterior and plantar calcaneal enthesopathic changes. There is suggestion of focal abnormal demineralization at the plantar calcaneus underlying an area of soft tissue ulceration. Partially imaged open reduction internal fixation hardware within the distal tibia and fibula. Degenerative and enthesopathic changes about the foot and ankle. Diffuse soft tissue edema. Right tib-fib: No acute fracture or dislocation. Osseous demineralization throughout. Diffuse periostitis of the tibia and fibula. Mild knee and moderate ankle degenerative changes. Diffuse soft tissue edema. Soft tissue ulceration at the plantar aspect of the heel with suggestion of focal abnormal demineralization at the plantar calcaneus suspicious for concomitant osteomyelitis. Diffuse periostitis about the right tibia and fibula which may relate to venous insufficiency.   The reported soft tissue wound of the right lower extremity is not definitively appreciated radiographically on a background of diffuse edema. Ir Auther Ruth Device Plmt/replace/removal    Result Date: 1/22/2021  PROCEDURE: ULTRASOUND GUIDED VASCULAR ACCESS. FLUOROSCOPY GUIDED PICC PLACEMENT 1/22/2021. HISTORY: ORDERING SYSTEM PROVIDED HISTORY: PICC line placement TECHNOLOGIST PROVIDED HISTORY: PICC line placement Lumen?->Double Lumen History of cellulitis in need of long-term antibiotics SEDATION: Local anesthesia FLUOROSCOPY DOSE AND TYPE OR TIME AND EXPOSURES: Fluoroscopy time 1 minutes 6 seconds D AP-420 cGy cm squared TECHNIQUE: Informed consent was obtained after a detailed explanation of the procedure including risks, benefits, and alternatives. Universal protocol was observed. The procedure was performed by the nurse in special procedures under my direct supervision. The right arm was prepped and draped in sterile fashion using maximum sterile barrier technique. Local anesthesia was achieved with lidocaine. A micropuncture needle was used to access the right basilic vein using ultrasound guidance. An ultrasound image demonstrating patency of the vein with needle tip located within it. An image was obtained and stored in PACs. A 0.018 guidewire was used to place a peel-a-way sheath and a 49 cm 5 Uzbek dual-lumen power PICC was advanced with fluoroscopic guidance with the tip at the cavo-atrial junction. The catheter flushed easily and there was a good blood return. The catheter was secured to the skin. The patient tolerated the procedure well and there were no immediate complications. FINDINGS: Fluoroscopic image demonstrates the tip of the catheter at the cavo-atrial junction. Successful ultrasound and fluoroscopy guided PICC placement, as above. PICC is ready for use at this time.      Xr Chest Portable    Result Date: 1/19/2021  EXAMINATION: ONE XRAY VIEW OF THE CHEST 1/19/2021 9:06 pm COMPARISON: 06/30/2020 HISTORY: ORDERING SYSTEM PROVIDED HISTORY: wheezing right lung TECHNOLOGIST PROVIDED HISTORY: wheezing right lung Reason for Exam: wheezing right lung Acuity: Acute Type of Exam: Initial FINDINGS: Mild cardiomegaly. The mediastinal hilar contours are normal.  The lungs are clear with no focal consolidation. No pleural effusion or pneumothorax. Stable calcified nodule of right lung base and bilateral hilar calcified lymph nodes, likely related to prior granulomatous disease exposure. Stable study. No acute intrathoracic pathology. Mri Ankle Left Wo Contrast    Result Date: 1/20/2021  EXAMINATION: MRI OF THE LEFT ANKLE WITHOUT CONTRAST, 1/20/2021 9:39 am TECHNIQUE: Limited MRI of the left ankle was performed without the administration of intravenous contrast. COMPARISON: Left foot radiographs January 19, 2021 HISTORY: ORDERING SYSTEM PROVIDED HISTORY: Surgical planning, r/o osteo, abscess TECHNOLOGIST PROVIDED HISTORY: Area of clinical concern is the plantar lateral calcaneus. Please extend as far distally as possible. Surgical planning, r/o osteo, abscess Reason for Exam: Pt in extreme amount of pain thru right hip, knee area. Tried to make pt comfortable but was unable to finish MRI exam.  Pt in too much pain and crying, exam stopped and will put thru what images were obtained. FINDINGS: Limited MRI of the ankle was obtained as patient was unable to tolerate the procedure. Only two sagittal series were obtained. Artifact is seen from motion and hardware within the hindfoot. Diffuse soft tissue swelling and subcutaneous edema. No gross evidence of a focal drainable fluid collection. Soft tissue ulceration is seen underlying the calcaneus. Changes appear to extend to the underlying bone. There is a minimal amount of edema on STIR imaging. Questionable early area of cortical thinning. No definite marrow replacement on T1 weighted images. Significantly limited study. Soft tissue ulceration underlying the calcaneus with questionable early osteomyelitis versus reactive marrow changes. Vl Lower Extremity Arteries Bilateral    Result Date: 1/20/2021    [address] [place] [address]  Vascular Lower Extremities Arterial Duplex Procedure   Patient Name    Katina HILL Date of Study           01/20/2021   Date of Birth   1960  Gender                  Female   Age             61 year(s)  Race                       Room Number     2074   Corporate ID #  [id number]   Patient Acct #  [de-identified]   MR #            [id number]      George Hsieh   Accession #     [id number]  Interpreting Physician  [address] [place] [address]   Referring Nurse             Referring Physician     Quynh Jin DPM  Practitioner  Procedure Type of Study:   Extremities Arteries: Lower Extremities Arterial Duplex, Arterial Scan  Lower Bilateral.  Indications for Study:PVD and ulceration. Patient Status: In Patient. Technical Quality:Limited visualization. Limitation reason:Edema. Conclusions   Summary   Monophasic flow throughout the bilateral lower extremities. Elevated  velocities are consistent with bilateral SFA stenosis. Signature   ----------------------------------------------------------------  Electronically signed by Ivan Berrios(Sonographer) on  01/20/2021 10:55 AM  ----------------------------------------------------------------   ----------------------------------------------------------------  Electronically signed by Lyla Hinders Reyes,Arthur(Interpreting  physician) on 01/20/2021 08:27 PM  ----------------------------------------------------------------  Findings:   Right Impression:                    Left Impression:  Plaque without increased Doppler     Plaque without increased Doppler  waveforms and monophasic flow noted  waveforms and monophasic flow noted  throughout. throughout. Non visualization of the peroneal    Non visualization of the peroneal  artery. artery.   Velocities are measured in cm/s ; Diameters are measured in cm LE Duplex Measurements +---------++-----+-----+----------+----+-----++---+-----+----------+----+-----+ ! ! !Right! ! Left      !    !     !!   !     !          !    !     ! +---------++-----+-----+----------+----+-----++---+-----+----------+----+-----+ ! Location ! !PSV  ! Ratio! Wave Desc. !AP  ! Trans! !PSV! Ratio! Wave Desc. !AP  ! Trans! !         !!     !     ! !Diam!Diam !!   !     ! !Diam!Diam ! +---------++-----+-----+----------+----+-----++---+-----+----------+----+-----+ ! Dist EIA !!165  ! ! Monophasic!    !     !!208! ! Monophasic!    !     ! +---------++-----+-----+----------+----+-----++---+-----+----------+----+-----+ ! Common   !!191  ! ! Monophasic!    !     !!211! ! Monophasic!    !     ! !Femoral  !!     !     !          !    !     !!   !     !          !    !     ! +---------++-----+-----+----------+----+-----++---+-----+----------+----+-----+ ! PFA      !!77   !0.4  ! Monophasic!    !     !!94 !0.45 ! Monophasic!    !     ! +---------++-----+-----+----------+----+-----++---+-----+----------+----+-----+ ! Prox SFA !!155  ! ! Monophasic!    !     !!197! ! Monophasic!    !     ! +---------++-----+-----+----------+----+-----++---+-----+----------+----+-----+ ! Mid SFA  !!215  !1.39 ! Monophasic!    !     !!226!1.15 ! Monophasic!    !     ! +---------++-----+-----+----------+----+-----++---+-----+----------+----+-----+ ! Dist SFA !!116  !0.54 ! Monophasic!    !     !!135!0.6  ! Monophasic!    !     ! +---------++-----+-----+----------+----+-----++---+-----+----------+----+-----+ ! Prox     !!134  !1.16 ! Monophasic!    !     !!175!1.3  ! Monophasic!    !     ! !Popliteal!!     !     !          !    !     !!   !     !          !    !     ! +---------++-----+-----+----------+----+-----++---+-----+----------+----+-----+ ! Dist PTA !!89   !0.66 ! Monophasic!    !     !!114!0.65 ! Monophasic!    !     ! +---------++-----+-----+----------+----+-----++---+-----+----------+----+-----+ ! Prox NIRU !!115 !0. 86 ! Monophasic!    !     !!80 !0.46 ! Monophasic!    !     ! +---------++-----+-----+----------+----+-----++---+-----+----------+----+-----+    Vl Dup Lower Extremity Venous Bilateral    Result Date: 1/20/2021    [address] [place] [address]  Vascular Lower Extremities DVT Study Procedure   Patient Name    Roberto HILL Date of Study           01/20/2021   Date of Birth   1960  Gender                  Female   Age             61 year(s)  Race                       Room Number     2074   Corporate ID #  [id number]   Patient Acct #  [de-identified]   MR #            [id number]      Petros Obrien   Accession #     [id number]  Interpreting Physician  [address] [place] [address]   Referring Nurse             Referring Physician     Conrad Guzman  Practitioner  Procedure Type of Study:   Veins: Lower Extremities DVT Study, Venous Scan Lower Bilateral.  Indications for Study:Pain and swelling and R/O DVT. Patient Status: In Patient. Technical Quality:Limited visualization. Limitation reason:Bandage material.  Conclusions   Summary   No evidence of superficial or deep venous thrombosis in both lower  extremities. Signature   ----------------------------------------------------------------  Electronically signed by Ivan Zuleta(Sonographer) on  01/20/2021 08:39 AM  ----------------------------------------------------------------   ----------------------------------------------------------------  Electronically signed by Valdemar Expose Reyes,Arthur(Interpreting  physician) on 01/20/2021 08:25 PM  ----------------------------------------------------------------  Findings:   Right Impression:                       Left Impression:  Non visualization of the posterior      Non visualization of the distal  tibial and peroneal veins. femoral, popliteal, posterior                                          tibial and peroneal veins.   Partial compressibility of the  popliteal vein with hyperechoic         Remaining deep veins  intraluminal content and continuous     demonstrate normal compressibility  Doppler response. and augmentation. Remaining deep veins                    Normal compressibility of the  demonstrate normal compressibility and  great saphenous vein. augmentation. Normal compressibility of the  Normal compressibility of the great     small saphenous vein. saphenous vein. Incidental finding of a Baker's  Normal compressibility of the small     cyst measuring 3.5 cm x 3.5 cm.  saphenous vein. Velocities are measured in cm/s ; Diameters are measured in cm Right Lower Extremities DVT Study Measurements Right 2D Measurements +------------------------------------+----------+---------------+----------+ ! Location                            ! Visualized! Compressibility! Thrombosis! +------------------------------------+----------+---------------+----------+ ! Common Femoral                      !Yes       ! Yes            ! None      ! +------------------------------------+----------+---------------+----------+ ! Prox Femoral                        !Yes       ! Yes            ! None      ! +------------------------------------+----------+---------------+----------+ ! Mid Femoral                         !Partial   !Yes            ! None      ! +------------------------------------+----------+---------------+----------+ ! Dist Femoral                        !Partial   !Yes            ! None      ! +------------------------------------+----------+---------------+----------+ ! Popliteal                           !Yes       ! Partial        !          ! +------------------------------------+----------+---------------+----------+ ! Sapheno Femoral Junction            ! Yes       ! Yes            ! None      ! +------------------------------------+----------+---------------+----------+ ! PTV                                 ! No        ! !Visualized! Compressibility! Thrombosis! +------------------------------------+----------+---------------+----------+ ! Common Femoral                      !Yes       ! Yes            ! None      ! +------------------------------------+----------+---------------+----------+ ! Prox Femoral                        !Yes       ! Yes            ! None      ! +------------------------------------+----------+---------------+----------+ ! Mid Femoral                         !No        !               !          ! +------------------------------------+----------+---------------+----------+ ! Dist Femoral                        !No        !               !          ! +------------------------------------+----------+---------------+----------+ ! Popliteal                           !Yes       ! Yes            ! None      ! +------------------------------------+----------+---------------+----------+ ! Sapheno Femoral Junction            ! Yes       ! Yes            ! None      ! +------------------------------------+----------+---------------+----------+ ! PTV                                 ! No        !               !          ! +------------------------------------+----------+---------------+----------+ ! Peroneal                            !No        !               !          ! +------------------------------------+----------+---------------+----------+ ! Gastroc                             ! Yes       ! Yes            ! None      ! +------------------------------------+----------+---------------+----------+ ! GSV Thigh                           ! Yes       ! Yes            ! None      ! +------------------------------------+----------+---------------+----------+ ! GSV Knee                            ! Yes       ! Yes            ! None      ! +------------------------------------+----------+---------------+----------+ ! GSMITALI Ankle                           ! No        !               !          ! +------------------------------------+----------+---------------+----------+ ! SSV                                 ! Yes       ! Yes            ! None      ! +------------------------------------+----------+---------------+----------+ Left Doppler Measurements +---------------------------+------+------+--------------------------------+ ! Location                   ! Signal!Reflux! Reflux (msec)                   ! +---------------------------+------+------+--------------------------------+ ! Common Femoral             !Phasic!      !                                ! +---------------------------+------+------+--------------------------------+ ! Prox Femoral               !Phasic!      !                                ! +---------------------------+------+------+--------------------------------+ ! Popliteal                  !Phasic!      !                                ! +---------------------------+------+------+--------------------------------+    Vl Arterial Pvr Lower    Result Date: 1/22/2021    [address] [place] [address]  Vascular Lower Arterial Plethysmography Procedure   Patient Name     Alexandra Candelaria HILL Date of Study             01/22/2021   Date of Birth    1960  Gender                    Female   Age              61 year(s)  Race                         Room Number      2074   Corporate ID #   [id number]   Patient Acct #   [de-identified]   MR #             [id number]      Sonographer               Nabor Flores   Accession #      [id number]  Interpreting Physician    Tanner Ashford   Referring Nurse              Referring Physician       Guero Conception  Practitioner  Procedure Type of Study:   Extremities Arteries: Lower Arterial Plethysmography, PVR Lower. Indications for Study:PVD. Patient Status: In Patient. Technical Quality:Limited visualization. Conclusions   Summary   Bilateral lower extremity ABIs and PVRs (with toe pressures) were  performed for the evaluation of peripheral vascular disease.  Study +------------+----+------------+---------+--------+------------+-----------+ ! Calf        !    !129         !1.02     !        !107         ! 0.85       ! +------------+----+------------+---------+--------+------------+-----------+ ! Ankle DP    !    !147         !1.17     !        !115         !0.91       ! +------------+----+------------+---------+--------+------------+-----------+   - Brachial Pressure:Right: 126.   - MAGGIE:Right: 1.17. Left: 0.91. Physical Examination:        PHYSICAL EXAM:  General Appearance  Alert , awake, not in acute distress. She is oriented to person, place, time and situation. HEENT - Head is normocephalic, atraumatic. Neck - Supple. Lungs - Bilateral equal air entry, some mild wheezing present on auscultation of upper anterior lung fields otherwise no rales or rhonchi, aeration good  Cardiovascular - Heart sounds are normal.  Regular rhythm, normal rate without murmur, gallop or rub. Abdomen - Obese but soft, nontender, nondistended, no masses or organomegaly  Neurologic - There are no new focal motor or sensory deficits  Skin - No bruising or bleeding on exposed skin area  Extremities - Bilateral lower extremities and feet are wrapped in bandages.    Psychiatric - Normal mood and affect    Assessment:        Primary Problem  Cellulitis    Active Hospital Problems    Diagnosis Date Noted    Peripheral artery disease ([facility] [I73.9] 01/21/2021    Cellulitis of right leg [A08.470]     Depression [F32.9] 01/19/2021    Cellulitis [L03.90] 06/30/2020    Prediabetes [R73.03] 11/13/2019    Essential hypertension [I10] 10/29/2019    Chronic obstructive pulmonary disease ([facility] [J44.9] 10/29/2019       Plan:        Bilateral lower extremity cellulitis, possible left calcaneous osteomyelitis  -Discharge today to [facility] with PICC line; Vancomycin IV and Ertapenem IV x6 weeks; will f/u with Dr. Henna Damon in 1 week for antibiotic adjustment as needed; given lab orders for weekly CBC, BMP, vancomycin troughs  -Given Rx for short course of Percocet and Motrin  -POD 1: Left calceneal debridement and bone biopsy today  -Day 5 Antibiotics: Vancomycin pharmacy to dose, Cefepime 1 g IV q12h; Flagyl 500 mg IV every 8 hours  -Podiatry consult, ID consult, nutrition/dietary consult  -Wound culture anaerobic/aerobic from 01/19/2021: culture growing Staph Auereus HEAVY GROWTH, methicillin susceptible and Proteus Mirabilus (scant growth). -Blood cultures: No growth x4 days  -Biopsy culture still pending      Peripheral artery disease  -initiated Lipitor 40 mg PO daily  -begin ASA 81 mg PO daily on discharge     Left plantar foot ulceration  -s/p excisional debridement by Podiatry on 01/20/2021     Hx of COPD   -Respiratory therapy aerosol protocol  -Patient noncompliant with previously prescribed home oxygen (2 L 24/7) and nebulziers     Prediabetes, left plantar heel ulceration  -POCT glucose  -maintain glucose levels  fasting and Less than 180 postprandial  -Nutrition/dietary consult due to poor wound healing        Hx of Hypertension and CHF (systolic and diastolic)  -On home lisinopril 10 mg PO daily  -Was previously receiving home dose for metoprolol tartrate 25 mg PO BID; we will change to 12.5 mg BID today due to some diastolic hypotension - she can f/u with PCP and have this med adjusted as needed     Hx of Depression   -Paroxetine 20 mg twice daily daily     PT/OT  Code Status: Full  Diet: Regular   DVT prophylaxis: Lovenox 40 mg SC  GI prophylaxis: none       Antonio Ferreira MD  1/23/2021  10:18 AM     Attending Physician Statement  I have discussed the care of Maria Ines Hammonds and I have examined the patient myselft and taken ros and hpi , including pertinent history and exam findings,  with the resident. I have reviewed the key elements of all parts of the encounter with the resident. I agree with the assessment, plan and orders as documented by the resident.       Electronically signed by Travis Rowe MD

## 2021-01-23 NOTE — PROGRESS NOTES
Vancomycin Day: 5    Patient's labs, cultures, vitals, and vancomycin regimen reviewed. No changes today.    Virginia Hampton MS   1/23/2021  7:20 AM

## 2021-01-25 ENCOUNTER — CARE COORDINATION (OUTPATIENT)
Dept: CARE COORDINATION | Age: 61
End: 2021-01-25

## 2021-01-25 LAB
CULTURE: NORMAL
CULTURE: NORMAL
Lab: NORMAL
Lab: NORMAL
SPECIMEN DESCRIPTION: NORMAL
SPECIMEN DESCRIPTION: NORMAL

## 2021-01-25 NOTE — CARE COORDINATION
URIELM attempting to reach patient. She has been DC to [facility]. CANDELARIA will follow up in 3 weeks for a DC plan and re enroll if needed.    Anant Echevarria RN, ambulatory care manager

## 2021-01-25 NOTE — PROGRESS NOTES
Physician Progress Note      Darcie Wiseman  [facility] #:                  [id number]  :                       1960  ADMIT DATE:       2021 3:49 PM  [address] [place] DATE:        2021 12:57 PM  RESPONDING  PROVIDER #:        Jose Villegas DPM          QUERY TEXT:    Patient admitted with LE cellulitis and left heel ulceration. Per Op note   dated  documentation of debridement. To accurately reflect the procedure   performed please document if debridement was excisional or nonexcisional and   the deepest depth of tissue removed as down to and including: The medical record reflects the following:  Risk Factors: morbid obesity with limited mobility, left plantar heel   ulceration with osteomyelitis of calcaneus  Clinical Indicators:  Kinney grade 3 ulceration of plantar lateral heel, left   foot, probes to bone; debridement to level of bone per  OR note;  rongeur   was used to remove all nonviable and necrotic soft tissue from the wound base. Post debridement, it was found that the wound probed to the calcaneus  Treatment: IV antibiotics, surgical debridement and bone biopsy  Options provided:  -- Nonexcisional debridement of skin  -- Excisional debridement of skin  -- Nonexcisional debridement of subcutaneous tissue  -- Excisional debridement of subcutaneous tissue  -- Nonexcisional debridement of muscle  -- Excisional debridement of muscle  -- Nonexcisional debridement of bone  -- Excisional debridement of bone  -- Other - I will add my own diagnosis  -- Disagree - Not applicable / Not valid  -- Disagree - Clinically unable to determine / Unknown  -- Refer to Clinical Documentation Reviewer    PROVIDER RESPONSE TEXT:    Excisional debridement of muscle of left heel ulcer was performed during   procedure on .     Query created by: Jn Mcnamara on 2021 6:27 PM      Electronically signed by:  Jose Villegas DPM 2021 11:45 AM

## 2021-01-26 DIAGNOSIS — M25.551 RIGHT HIP PAIN: Primary | ICD-10-CM

## 2021-01-26 LAB — SURGICAL PATHOLOGY REPORT: NORMAL

## 2021-01-27 LAB
CULTURE: NORMAL
DIRECT EXAM: NORMAL
DIRECT EXAM: NORMAL
Lab: NORMAL
SPECIMEN DESCRIPTION: NORMAL

## 2021-01-28 LAB
CULTURE: ABNORMAL
CULTURE: ABNORMAL
DIRECT EXAM: ABNORMAL
DIRECT EXAM: ABNORMAL
Lab: ABNORMAL
SPECIMEN DESCRIPTION: ABNORMAL

## 2021-02-02 DIAGNOSIS — M25.551 RIGHT HIP PAIN: Primary | ICD-10-CM

## 2021-02-03 ENCOUNTER — OFFICE VISIT (OUTPATIENT)
Dept: ORTHOPEDIC SURGERY | Age: 61
End: 2021-02-03
Payer: COMMERCIAL

## 2021-02-03 DIAGNOSIS — M16.11 ARTHRITIS OF RIGHT HIP: Primary | ICD-10-CM

## 2021-02-03 PROCEDURE — 1111F DSCHRG MED/CURRENT MED MERGE: CPT | Performed by: ORTHOPAEDIC SURGERY

## 2021-02-03 PROCEDURE — 99213 OFFICE O/P EST LOW 20 MIN: CPT | Performed by: ORTHOPAEDIC SURGERY

## 2021-02-03 PROCEDURE — G8428 CUR MEDS NOT DOCUMENT: HCPCS | Performed by: ORTHOPAEDIC SURGERY

## 2021-02-03 PROCEDURE — G8484 FLU IMMUNIZE NO ADMIN: HCPCS | Performed by: ORTHOPAEDIC SURGERY

## 2021-02-03 PROCEDURE — 4004F PT TOBACCO SCREEN RCVD TLK: CPT | Performed by: ORTHOPAEDIC SURGERY

## 2021-02-03 PROCEDURE — 3017F COLORECTAL CA SCREEN DOC REV: CPT | Performed by: ORTHOPAEDIC SURGERY

## 2021-02-03 PROCEDURE — G8417 CALC BMI ABV UP PARAM F/U: HCPCS | Performed by: ORTHOPAEDIC SURGERY

## 2021-02-03 NOTE — PROGRESS NOTES
Katie Guerra M.D.            [address] [facility] [address] [facility],[address], [address] [facility]           Dept Phone: [phone]           Dept Fax:  [address]           [place]          Dept Phone: [phone]           Dept Fax:  [phone]      Chief Compliant:  Chief Complaint   Patient presents with    Pain     Rt hip        History of Present Illness: This is a 61 y.o. female who presents to the clinic today for evaluation / follow up of severe right hip pain. Patient is a 57-year-old female who is been seen by multiple persons in the past for her right hip. She did have an injection done last November but did not do much. This was at pain management. Patient has significant medical comorbidities. She is not a surgical candidate has multiple lesions on her lower extremities chronic cellulitis. .       Review of Systems   Constitutional: Negative for fever, chills, sweats. Eyes: Negative for changes in vision, or pain. HENT: Negative for ear ache, epistaxis, or sore throat. Respiratory/Cardio: Negative for Chest pain, palpitations, SOB, or cough. Gastrointestinal: Negative for abdominal pain, N/V/D. Genitourinary: Negative for dysuria, frequency, urgency, or hematuria. Neurological: Negative for headache, numbness, or weakness. Integumentary: Negative for rash, itching, laceration, or abrasion. Musculoskeletal: Positive for Pain (Rt hip)       Physical Exam:  Constitutional: Patient is oriented to person, place, and time. Patient appears well-developed and well nourished. HENT: Negative otherwise noted  Head: Normocephalic and Atraumatic  Nose: Normal  Eyes: Conjunctivae and EOM are normal  Neck: Normal range of motion Neck supple. Respiratory/Cardio: Effort normal. No respiratory distress.   Musculoskeletal: I did not do any force examination the patient's right hip has her x-rays which precluded any reason for me to do this  Neurological: Patient is alert and oriented to person, place, and time. Normal strenght. No sensory deficit. Skin: Skin is warm and dry  Psychiatric: Behavior is normal. Thought content normal.  Nursing note and vitals reviewed. Labs and Imaging:     XR taken today:  Xr Pelvis (1-2 Views)    Result Date: 2/3/2021  X-rays taken today reviewed by me show an AP of the pelvis. .  She has severe destructive a vast necrosis of the right hip with dislocation        No orders of the defined types were placed in this encounter. Assessment and Plan:  1. Arthritis of right hip    2. 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 female who presents to the clinic today for evaluation / follow up of severe right hip pain.      Past History:    Current Outpatient Medications:     metoprolol tartrate (LOPRESSOR) 25 MG tablet, Take 0.5 tablets by mouth 2 times daily, Disp: 60 tablet, Rfl: 3    ibuprofen (ADVIL;MOTRIN) 600 MG tablet, Take 1 tablet by mouth 3 times daily as needed for Pain, Disp: 30 tablet, Rfl: 0    albuterol sulfate  (90 Base) MCG/ACT inhaler, Inhale 2 puffs into the lungs every 6 hours as needed for Wheezing, Disp: 1 Inhaler, Rfl: 0    atorvastatin (LIPITOR) 40 MG tablet, Take 1 tablet by mouth nightly, Disp: 30 tablet, Rfl: 3    bumetanide (BUMEX) 2 MG tablet, Take 1 tablet by mouth daily, Disp: 30 tablet, Rfl: 1    lisinopril (PRINIVIL;ZESTRIL) 10 MG tablet, Take 1 tablet by mouth daily, Disp: 30 tablet, Rfl: 3    vancomycin (VANCOCIN) infusion, Infuse 1,500 mg intravenously every 24 hours Compound per protocol., Disp: 40069 mg, Rfl: 0    ertapenem (INVANZ) infusion, Infuse 1,000 mg intravenously every 24 hours Compound per protocol., Disp: 53431 mg, Rfl: 0    aspirin (ASPIRIN 81) 81 MG EC tablet, Take 1 tablet by mouth daily, Disp: 30 tablet, Rfl: 3    diclofenac sodium (VOLTAREN) 1 % GEL, Apply 2 g topically 4 times daily as needed for Pain, Disp: 350 g, Rfl: 3    PARoxetine (PAXIL) 20 MG tablet, Take 1 tablet by mouth 2 times daily, Disp: 60 tablet, Rfl: 1    miconazole (MICOTIN) 2 % powder, Apply topically 2 times daily. , Disp: 45 g, Rfl: 1  No Known Allergies  Social History     Socioeconomic History    Marital status: Single     Spouse name: Not on file    Number of children: Not on file    Years of education: Not on file    Highest education level: Not on file   Occupational History    Not on file   Social Needs    Financial resource strain: Not very hard    Food insecurity     Worry: Never true     Inability: Never true    Transportation needs     Medical: No     Non-medical: No   Tobacco Use    Smoking status: Current Every Day Smoker     Packs/day: 1.00     Years: 45.00     Pack years: 45.00    Smokeless tobacco: Never Used   Substance and Sexual Activity    Alcohol use: Not Currently     Comment: 1-2 times per week    Drug use: Not Currently    Sexual activity: Not Currently   Lifestyle    Physical activity     Days per week: 0 days     Minutes per session: 0 min    Stress:  To some extent   Relationships    Social connections     Talks on phone: Not on file     Gets together: Not on file     Attends Zoroastrian service: Not on file     Active member of club or organization: Not on file     Attends meetings of clubs or organizations: Not on file     Relationship status: Not on file    Intimate partner violence     Fear of current or ex partner: Not on file     Emotionally abused: Not on file     Physically abused: Not on file     Forced sexual activity: Not on file   Other Topics Concern    Not on file   Social History Narrative    Not on file     Past Medical History:   Diagnosis Date    Arthritis     [facility]estive [facility]) [facility]     COPD (chronic obstructive pulmonary disease) ([facility]  Diverticulitis     Hx of blood clots     Pulmonary embolism (HCC)      Past Surgical History:   Procedure Laterality Date    ANKLE SURGERY      COLON SURGERY      FOOT SURGERY Left 1/22/2021    FOOT BONE BIOPSY W/ INCISION & DRAINAGE AND REDRESSING ON RIGHT FOOT performed by Jemima Rossi DPM at [facility] [address]       Family History   Problem Relation Age of Onset    Cancer Mother     Diabetes Paternal Grandfather    Plan  Unfortunately I informed the patient there is nothing that I can offer her at this time other than injections and maybe upping her Percocet prescription. She is not not a surgical candidate in regards to a total of arthroplasty. I made this available on her charts. The patient if the at its the point where her hip is still painful for would probably best be served by possible Girdlestone procedure. I would refer her to the [facility] for this for specialist.  No follow-up here. Provider Attestation:  Allie Amador, personally performed the services described in this documentation. All medical record entries made by the scribe were at my direction and in my presence. I have reviewed the chart and discharge instructions and agree that the records reflect my personal performance and is accurate and complete. Quyen Katz MD. 02/03/21      Please note that this chart was generated using voice recognition Dragon dictation software. Although every effort was made to ensure the accuracy of this automated transcription, some errors in transcription may have occurred.

## 2021-02-04 ENCOUNTER — TELEPHONE (OUTPATIENT)
Dept: WOUND CARE | Age: 61
End: 2021-02-04

## 2021-02-04 ENCOUNTER — HOSPITAL ENCOUNTER (OUTPATIENT)
Dept: WOUND CARE | Age: 61
Discharge: HOME | End: 2021-02-04

## 2021-02-04 NOTE — TELEPHONE ENCOUNTER
Patient's [facility] [[email or web address] wound care appointment canceled for today due to patient refusing visit. [facility] calling to say patient did not want to come, they will call back after deciding what patient wants to do.

## 2021-02-08 ENCOUNTER — HOSPITAL ENCOUNTER (OUTPATIENT)
Dept: WOUND CARE | Age: 61
Discharge: HOME | End: 2021-02-08
Payer: COMMERCIAL

## 2021-02-08 VITALS
SYSTOLIC BLOOD PRESSURE: 145 MMHG | DIASTOLIC BLOOD PRESSURE: 80 MMHG | HEART RATE: 78 BPM | TEMPERATURE: 98 F | RESPIRATION RATE: 20 BRPM

## 2021-02-08 DIAGNOSIS — S91.302A NON HEALING LEFT HEEL WOUND: ICD-10-CM

## 2021-02-08 PROCEDURE — 11042 DBRDMT SUBQ TIS 1ST 20SQCM/<: CPT

## 2021-02-08 PROCEDURE — 99213 OFFICE O/P EST LOW 20 MIN: CPT

## 2021-02-08 PROCEDURE — 11042 DBRDMT SUBQ TIS 1ST 20SQCM/<: CPT | Performed by: INTERNAL MEDICINE

## 2021-02-08 RX ORDER — LISINOPRIL 10 MG/1
TABLET ORAL
Qty: 30 TABLET | Refills: 0 | Status: ON HOLD | OUTPATIENT
Start: 2021-02-08 | End: 2021-05-28 | Stop reason: HOSPADM

## 2021-02-08 NOTE — PROGRESS NOTES
[address]   Progress Note and Procedure Note      Zulay George  MEDICAL RECORD NUMBER:  [id number]  AGE: 61 y.o. GENDER: female  : 1960  EPISODE DATE:  2021    Subjective:     Chief Complaint   Patient presents with    Wound Check     LEft heel         HISTORY of PRESENT ILLNESS HPI     Zulay George is a 61 y.o. female who presents today for wound/ulcer evaluation. History of Wound Context: She presented for evaluation of nonhealing left heel wound with possible underlying calcaneus osteomyelitis. She was hospitalized recently 2021 through 2021,  left foot MRI suggestive of left calcaneus osteomyelitis. C-reactive protein of 45, sedimentation rate of 71, had surgical debridement with bone biopsy done 2021  Wound culture 2021 grew MSSA, Proteus mirabilis and Bacteroides, anaerobic gram-negative rods. Tissue culture 2021 no growth, benign bone on pathology. She had a PICC line prior to discharge and was discharged on IV vancomycin and Invanz that she is rest sitting and tolerating. She denied any fever or chills, denied cough or shortness of breath, no nausea or vomiting, no other complaints.   The patient is actively smoking 1 pack a day  Wound/Ulcer Pain Timing/Severity: intermittent  Quality of pain: dull  Severity:  2 / 10   Modifying Factors: None  Associated Signs/Symptoms: edema and drainage    Ulcer Identification:  Ulcer Type: Diabetic    Contributing Factors: edema, venous stasis, lymphedema, decreased mobility, obesity, smoking and arterial insufficiency        PAST MEDICAL HISTORY        Diagnosis Date    Arthritis     [facility]     COPD (chronic obstructive pulmonary disease) ([facility])     Diverticulitis     Hx of blood clots     Pulmonary embolism ([facility])        PAST SURGICAL HISTORY    Past Surgical History:   Procedure Laterality Date    ANKLE SURGERY      COLON SURGERY      FOOT SURGERY Left 1/22/2021    FOOT BONE BIOPSY W/ INCISION & DRAINAGE AND REDRESSING ON RIGHT FOOT performed by Savannah Joseph DPM at [address]    Family History   Problem Relation Age of Onset    Cancer Mother     Diabetes Paternal Grandfather        SOCIAL HISTORY    Social History     Tobacco Use    Smoking status: Current Every Day Smoker     Packs/day: 1.00     Years: 45.00     Pack years: 45.00    Smokeless tobacco: Never Used    Tobacco comment: has not smoked in 3 weeks   Substance Use Topics    Alcohol use: Not Currently     Comment: 1-2 times per week    Drug use: Not Currently       ALLERGIES    No Known Allergies    MEDICATIONS    Current Outpatient Medications on File Prior to Encounter   Medication Sig Dispense Refill    metoprolol tartrate (LOPRESSOR) 25 MG tablet Take 0.5 tablets by mouth 2 times daily 60 tablet 3    ibuprofen (ADVIL;MOTRIN) 600 MG tablet Take 1 tablet by mouth 3 times daily as needed for Pain 30 tablet 0    albuterol sulfate  (90 Base) MCG/ACT inhaler Inhale 2 puffs into the lungs every 6 hours as needed for Wheezing 1 Inhaler 0    atorvastatin (LIPITOR) 40 MG tablet Take 1 tablet by mouth nightly 30 tablet 3    bumetanide (BUMEX) 2 MG tablet Take 1 tablet by mouth daily 30 tablet 1    vancomycin (VANCOCIN) infusion Infuse 1,500 mg intravenously every 24 hours Compound per protocol. 57566 mg 0    ertapenem (INVANZ) infusion Infuse 1,000 mg intravenously every 24 hours Compound per protocol. 74867 mg 0    aspirin (ASPIRIN 81) 81 MG EC tablet Take 1 tablet by mouth daily 30 tablet 3    PARoxetine (PAXIL) 20 MG tablet Take 1 tablet by mouth 2 times daily 60 tablet 1    miconazole (MICOTIN) 2 % powder Apply topically 2 times daily.  45 g 1    lisinopril (PRINIVIL;ZESTRIL) 10 MG tablet Take 1 tablet by mouth daily 30 tablet 3    diclofenac sodium (VOLTAREN) 1 % GEL Apply 2 g topically 4 times daily as needed for Pain 350 g 3     No current facility-administered medications on file prior to encounter. REVIEW OF SYSTEMS  Review of Systems    Pertinent items are noted in HPI. Other than above 12 system review was negative  Objective:      BP (!) 145/80   Pulse 78   Temp 98 °F (36.7 °C) (Tympanic)   Resp 20     Wt Readings from Last 3 Encounters:   01/19/21 (!) 342 lb 6 oz (155.3 kg)   11/05/20 (!) 330 lb (149.7 kg)   09/23/20 (!) 336 lb (152.4 kg)       PHYSICAL EXAM  Physical Exam  Constitutional:       General: She is not in acute distress. HENT:      Right Ear: External ear normal.      Left Ear: External ear normal.   Eyes:      General: No scleral icterus. Conjunctiva/sclera: Conjunctivae normal.   Neck:      Musculoskeletal: Neck supple. No neck rigidity. Cardiovascular:      Rate and Rhythm: Normal rate and regular rhythm. Heart sounds: No murmur. Pulmonary:      Effort: Pulmonary effort is normal. No respiratory distress. Breath sounds: No rales. Abdominal:      General: Abdomen is flat. There is no distension. Palpations: Abdomen is soft. Musculoskeletal:      Right lower leg: Edema present. Left lower leg: Edema present. Skin:     General: Skin is warm. Coloration: Skin is not jaundiced. Neurological:      General: No focal deficit present. Mental Status: She is alert and oriented to person, place, and time. Psychiatric:         Mood and Affect: Mood normal.         Behavior: Behavior normal.             Assessment:        Problem List Items Addressed This Visit     Non healing left heel wound           Procedure Note  Indications:  Based on my examination of this patient's wound(s)/ulcer(s) today, debridement is required to promote healing and evaluate the wound base.     Performed by: Rao Myrick MD    Consent obtained:  Yes    Time out taken:  Yes    Pain Control:         Debridement: Excisional Debridement    Using curette the wound(s)/ulcer(s) was/were debrided down through and including the removal of subcutaneous tissue. Devitalized Tissue Debrided:  fibrin, biofilm and slough    Pre Debridement Measurements:  Are located in the [facility]  Documentation Flow Sheet      Wound/Ulcer #: 1    Post Debridement Measurements:  Wound/Ulcer Descriptions are Pre Debridement except measurements:    Wound 02/08/21 Heel Left;Posterior Wound # 1 (Active)   Wound Image   02/08/21 1008   Offloading for Diabetic Foot Ulcers No 02/08/21 1008   Wound Length (cm) 2.8 cm 02/08/21 1008   Wound Width (cm) 2.2 cm 02/08/21 1008   Wound Depth (cm) 0.3 cm 02/08/21 1008   Wound Surface Area (cm^2) 6.16 cm^2 02/08/21 1008   Wound Volume (cm^3) 1.85 cm^3 02/08/21 1008   Post-Procedure Length (cm) 2.8 cm 02/08/21 1008   Post-Procedure Width (cm) 2.2 cm 02/08/21 1008   Post-Procedure Depth (cm) 0.3 cm 02/08/21 1008   Post-Procedure Surface Area (cm^2) 6.16 cm^2 02/08/21 1008   Post-Procedure Volume (cm^3) 1.85 cm^3 02/08/21 1008   Wound Assessment Bleeding;Devitalized tissue;Pink/red 02/08/21 1008   Drainage Amount Large 02/08/21 1008   Drainage Description Serosanguinous 02/08/21 1008   Odor Mild 02/08/21 1008   Aruna-wound Assessment Denuded;Fragile; Maceration 02/08/21 1008   Number of days: 0          Total Surface Area Debrided:  6.16 sq cm     Estimated Blood Loss:  Minimal    Hemostasis Achieved:  by pressure    Procedural Pain:  3  / 10     Post Procedural Pain:  3 / 10     Response to treatment:  Well tolerated by patient. Plan:   Continue Invanz 1 g daily until 3/8/2021  Discontinue vancomycin  Follow CBC, renal function weekly  Follow sedimentation rate and C-reactive protein  Follow-up with podiatry next week  Follow-up with me in 2 weeks    Treatment Note please see attached Discharge Instructions    Written patient dismissal instructions given to patient and signed by patient or POA.          Discharge Instructions         [address] [facility]      Visit  Discharge Instructions / Physician Orders  DATE: 02/08/2021     Home Care: SNF     SUPPLIES ORDERED THRU: SNF     Wound Location:  Left heel     Cleanse with: Liquid antibacterial soap and water, rinse well      Dressing Orders: Silvercel to wound, cover with ABD, wrap with kerlix and ace wrap     Frequency:  Daily     Additional Orders: Increase protein to diet (meat, cheese, eggs, fish, peanut butter, nuts and beans)  Multivitamin daily    MY CHART []     Smart Device  []     HYPERBARIC TREATMENT-                TREATMENT #     Your next appointment with the [facility] is in 1 week with Dr Lisa Schmidt, 2 weeks with Dr Leia Vyas   [] CHAIR     [] BED   [] EITHER        TIME [] 39 MIN     [] 60 MIN     (Please note your next appointment above and if you are unable to keep, kindly give a 24 hour notice. Thank you.)     If you experience any of the following, please call the [facility] during business hours:  [phone]     * Increase in Pain  * Temperature over 101  * Increase in drainage from your wound  * Drainage with a foul odor  * Bleeding  * Increase in swelling  * Need for compression bandage changes due to slippage, breakthrough drainage. If you need medical attention outside of the business hours of the [facility] please contact your PCP or go to the nearest emergency room. The information contained in the After Visit Summary has been reviewed with me, the patient and/or responsible adult, by my health care provider(s). I had the opportunity to ask questions regarding this information.  I have elected to receive;      []After Visit Summary  [x]Comprehensive Discharge Instruction      Patient signature______________________________________Date:________  Electronically signed by Gaby Jha RN on 2/8/2021 at 10:44 AM              Electronically signed by Suzanne Shields MD on 2/8/2021 at 10:47 AM

## 2021-02-17 ENCOUNTER — HOSPITAL ENCOUNTER (OUTPATIENT)
Dept: WOUND CARE | Age: 61
Discharge: HOME | End: 2021-02-17
Payer: COMMERCIAL

## 2021-02-17 VITALS
HEART RATE: 82 BPM | HEIGHT: 67 IN | TEMPERATURE: 98 F | DIASTOLIC BLOOD PRESSURE: 75 MMHG | SYSTOLIC BLOOD PRESSURE: 126 MMHG | BODY MASS INDEX: 53.62 KG/M2 | RESPIRATION RATE: 20 BRPM

## 2021-02-17 DIAGNOSIS — I89.0 LYMPHEDEMA OF BOTH LOWER EXTREMITIES: ICD-10-CM

## 2021-02-17 DIAGNOSIS — L84 CORNS AND CALLOSITIES: Primary | ICD-10-CM

## 2021-02-17 DIAGNOSIS — L89.94: ICD-10-CM

## 2021-02-17 PROCEDURE — 11042 DBRDMT SUBQ TIS 1ST 20SQCM/<: CPT

## 2021-02-17 RX ORDER — LIDOCAINE HYDROCHLORIDE 20 MG/ML
JELLY TOPICAL ONCE
Status: CANCELLED | OUTPATIENT
Start: 2021-02-17 | End: 2021-02-17

## 2021-02-17 RX ORDER — BACITRACIN ZINC AND POLYMYXIN B SULFATE 500; 1000 [USP'U]/G; [USP'U]/G
OINTMENT TOPICAL ONCE
Status: CANCELLED | OUTPATIENT
Start: 2021-02-17 | End: 2021-02-17

## 2021-02-17 RX ORDER — LIDOCAINE 40 MG/G
CREAM TOPICAL ONCE
Status: CANCELLED | OUTPATIENT
Start: 2021-02-17 | End: 2021-02-17

## 2021-02-17 RX ORDER — BETAMETHASONE DIPROPIONATE 0.05 %
OINTMENT (GRAM) TOPICAL ONCE
Status: CANCELLED | OUTPATIENT
Start: 2021-02-17 | End: 2021-02-17

## 2021-02-17 RX ORDER — GENTAMICIN SULFATE 1 MG/G
OINTMENT TOPICAL ONCE
Status: CANCELLED | OUTPATIENT
Start: 2021-02-17 | End: 2021-02-17

## 2021-02-17 RX ORDER — LIDOCAINE 50 MG/G
OINTMENT TOPICAL ONCE
Status: CANCELLED | OUTPATIENT
Start: 2021-02-17 | End: 2021-02-17

## 2021-02-17 RX ORDER — LIDOCAINE HYDROCHLORIDE 40 MG/ML
SOLUTION TOPICAL ONCE
Status: CANCELLED | OUTPATIENT
Start: 2021-02-17 | End: 2021-02-17

## 2021-02-17 RX ORDER — BACITRACIN, NEOMYCIN, POLYMYXIN B 400; 3.5; 5 [USP'U]/G; MG/G; [USP'U]/G
OINTMENT TOPICAL ONCE
Status: CANCELLED | OUTPATIENT
Start: 2021-02-17 | End: 2021-02-17

## 2021-02-17 RX ORDER — LIDOCAINE HYDROCHLORIDE 40 MG/ML
SOLUTION TOPICAL ONCE
Status: DISCONTINUED | OUTPATIENT
Start: 2021-02-17 | End: 2021-02-18 | Stop reason: HOSPADM

## 2021-02-17 RX ORDER — GINSENG 100 MG
CAPSULE ORAL ONCE
Status: CANCELLED | OUTPATIENT
Start: 2021-02-17 | End: 2021-02-17

## 2021-02-17 RX ORDER — CLOBETASOL PROPIONATE 0.5 MG/G
OINTMENT TOPICAL ONCE
Status: CANCELLED | OUTPATIENT
Start: 2021-02-17 | End: 2021-02-17

## 2021-02-17 ASSESSMENT — ENCOUNTER SYMPTOMS
VOMITING: 0
DIARRHEA: 0
NAUSEA: 0

## 2021-02-17 NOTE — PROGRESS NOTES
[address]   Progress Note and Procedure Note      Emily Irving  MEDICAL RECORD NUMBER:  [id number]  AGE: 61 y.o. GENDER: female  : 1960  EPISODE DATE:  2021    Subjective:     Chief Complaint   Patient presents with    Wound Check     lower legs         HISTORY of PRESENT ILLNESS HPI     Emily Irving is a 61 y.o. female who presents today for wound/ulcer evaluation. History of Wound Context: Patient presents for follow-up of left heel and lower leg ulcerations. She was admitted to the hospital on 21 and underwent debridement of the heel ulceration with bone biopsy of the calcaneus. 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 She was discharged to SNF. Is been receiving dressing changes and compression wraps to both lower legs in the nursing home. She continues on IV Invanz.     PCP is Jhonathan Mann  Date of last visit: 20           PAST MEDICAL HISTORY        Diagnosis Date    Arthritis     [facility]     COPD (chronic obstructive pulmonary disease) ([facility])     Diverticulitis     Hx of blood clots     Pulmonary embolism ([facility]        PAST SURGICAL HISTORY    Past Surgical History:   Procedure Laterality Date    ANKLE SURGERY      COLON SURGERY      FOOT SURGERY Left 2021    FOOT BONE BIOPSY W/ INCISION & DRAINAGE AND REDRESSING ON RIGHT FOOT performed by Reny Moreno DPM at [facility]    Family History   Problem Relation Age of Onset    Cancer Mother     Diabetes Paternal Grandfather        SOCIAL HISTORY    Social History     Tobacco Use    Smoking status: Current Every Day Smoker     Packs/day: 1.00     Years: 45.00     Pack years: 45.00    Smokeless tobacco: Never Used    Tobacco comment: has not smoked in 3 weeks   Substance Use Topics    Alcohol use: Not Currently     Comment: 1-2 times per week    Drug use: Not Encounters:   01/19/21 (!) 342 lb 6 oz (155.3 kg)   11/05/20 (!) 330 lb (149.7 kg)   09/23/20 (!) 336 lb (152.4 kg)       Physical Exam:  General:  Alert and oriented x3. In no acute distress. Lower Extremity Physical Exam:    Vascular: DP and PT pulses are not palpable. CFT brisk to all digits. Hair growth absent to the level of the digits. Non-pitting edema to BLE.       Neuro: Saph/sural/SP/DP/plantar sensation diminished to light touch     Musculoskeletal:  Gross deformity is present with contracted digits.     Dermatologic: Full-thickness ulcer which extends to subcutaneous tissue at the plantar lateral heel of left foot. Base is hyper granular. Bases with biofilm and slough pre-debridement. There is no purulent drainage. No malodor. Erythema is absent, no associated increase in warmth. Wound does not probe to bone. No fluctuance or crepitus noted. Focal hyperkeratosis of the left first metatarsal head.   No underlying wound post debridement or erythema.     Lower leg ulcerations have healed        Surgical Pathology of Bone Biopsy 01/22/21:  SPECIMEN \"A\":  BONE, LEFT PLANTAR CALCANEUS, BIOPSY:          BENIGN BONE TRABECULAE, PERIOSTEUM, ADIPOSE AND FIBROVASCULAR   TISSUE, NEGATIVE FOR SIGNIFICANT PATHOLOGIC FINDINGS     NO EVIDENCE FOR ACUTE OSTEOMYELITIS       SPECIMEN \"B\":  BONE, LEFT FOOT SUPERIOR CALCANEUS, BIOPSY:          BENIGN BONE TRABECULA, PERIOSTEUM, ADIPOSE AND FIBROVASCULAR   TISSUE, NEGATIVE FOR SIGNIFICANT PATHOLOGIC FINDINGS     NO EVIDENCE FOR ACUTE OSTEOMYELITIS     Bone Culture 01/22/21:   No growth     PVR/PPG 01/22/21:  Right:    Normal PVRs    MAGGIE suggests no vascular insufficiency at rest        Left:    Normal PVRs    MAGGIE suggests mild vascular insufficiency at rest     Right Impression:                    Left Impression:    Plaque without increased Doppler     Plaque without increased Doppler    waveforms and monophasic flow noted  waveforms and monophasic flow noted    throughout.                          throughout.        Non visualization of the peroneal    Non visualization of the peroneal    artery.                              artery. Assessment:      Active Hospital Problems    Diagnosis Date Noted    Corns and callosities [L84] 02/17/2021    Healing pressure ulcer, stage IV (HCC) [L89.94] 02/17/2021    Lymphedema of both lower extremities [I89.0] 02/17/2021    Peripheral artery disease ([facility] [I73.9] 01/21/2021    Class 3 severe obesity with serious comorbidity and body mass index (BMI) of 50.0 to 59.9 in adult [facility]) [E66.01, Z68.43] 10/29/2019    Tobacco abuse [Z72.0] 10/29/2019       Plan:     Treatment Note please see attached Discharge Instructions    Instructed to follow-up with vascular surgeon for outpatient follow-up of abnormal noninvasive vascular studies from hospitalization     Continue IV invanz until 03/08/21 per ID and follow-up with Dr. Brandi Soto as instructed     Silver nitrate applied to hyper granular tissue    Continue compression wraps to bilateral lower extremity. Discussed compression stockings for long-term control of edema and prevention of reulceration. Continue silver cell to wound base and dry sterile dressing    Educated on signs and symptoms of infection. Instructed to call clinic immediately or go to ER if signs and symptoms of infection are present. RTC 1 week      Procedure Note  Indications:  Based on my examination of this patient's wound(s)/ulcer(s) today, debridement is required to promote healing and evaluate the wound base. Performed by: Jess Jones DPM    Consent obtained:  Yes    Time out taken:  Yes    Pain Control: Anesthetic  Anesthetic: 4% Lidocaine Liquid Topical       Debridement: Excisional Debridement    Using curette the wound(s)/ulcer(s) was/were sharply debrided down through and including the removal of subcutaneous tissue.         Devitalized Tissue Debrided:  biofilm and slough    Pre Debridement Measurements:  Are located in the [facility]  Documentation Flow Sheet    Wound/Ulcer #: 1    Post Debridement Measurements:  Wound/Ulcer Descriptions are Pre Debridement except measurements:    Wound 02/08/21 Heel Left;Posterior Wound # 1 (Active)   Wound Image   02/08/21 1008   Wound Cleansed Irrigated with saline; Soap and water 02/17/21 1301   Offloading for Diabetic Foot Ulcers No 02/17/21 1301   Wound Length (cm) 1.9 cm 02/17/21 1301   Wound Width (cm) 1.4 cm 02/17/21 1301   Wound Depth (cm) 0.1 cm 02/17/21 1301   Wound Surface Area (cm^2) 2.66 cm^2 02/17/21 1301   Change in Wound Size % (l*w) 56.82 02/17/21 1301   Wound Volume (cm^3) 0.27 cm^3 02/17/21 1301   Wound Healing % 85 02/17/21 1301   Post-Procedure Length (cm) 1.9 cm 02/17/21 1301   Post-Procedure Width (cm) 1.4 cm 02/17/21 1301   Post-Procedure Depth (cm) 0.1 cm 02/17/21 1301   Post-Procedure Surface Area (cm^2) 2.66 cm^2 02/17/21 1301   Post-Procedure Volume (cm^3) 0.27 cm^3 02/17/21 1301   Wound Assessment Bleeding;Devitalized tissue 02/17/21 1301   Drainage Amount Moderate 02/17/21 1301   Drainage Description Serosanguinous; Yellow 02/17/21 1301   Odor Mild 02/17/21 1301   Aruna-wound Assessment Denuded;Fragile 02/17/21 1301   Number of days: 9          Percent of Wound(s)/Ulcer(s) Debrided: 100%    Total Surface Area Debrided:  6.16 sq cm     Diabetic/Pressure/Non Pressure Ulcers only:  Ulcer: Pressure ulcer, Stage 4     Estimated Blood Loss:  Minimal    Hemostasis Achieved:  by silver nitrate stick    Procedural Pain:  0  / 10     Post Procedural Pain:  0 / 10     Response to treatment:  Well tolerated by patient. Written patient discharge instructions given to patient and signed by patient or POA.         Discharge Instructions         [id number]                                 Visit  Discharge Instructions / Physician Orders  DATE: 02/17/2021     Home Care: SNF     SUPPLIES ORDERED THRU: SNF     Wound Location:  Left heel     Cleanse with: Liquid antibacterial soap and water, rinse well      Dressing Orders: Silvercel to wound, cover with ABD, wrap with kerlix and ace wrap     Frequency:  Daily     Additional Orders: Increase protein to diet (meat, cheese, eggs, fish, peanut butter, nuts and beans)  Multivitamin daily  Apply interdry to folds as needed   IV Invanz 1g daily until 03/08/2021  CBC, BUN/creatinine weekly  CRP, sed rate every other week  Please fax lab results to wound care at [phone]     MY CHART []?     Smart Device  []?      HYPERBARIC TREATMENT-                TREATMENT #     Your next appointment with the [address] [place] [address] is in 1 week with Dr Dinorah Bennett, 2 weeks with Dr Didier Joyner                                                                                                   ROOM TYPE   []? CHAIR     []? BED   []? EITHER        TIME []? 45 MIN     []? 60 MIN     (Please note your next appointment above and if you are unable to keep, kindly give a 24 hour notice. Thank you.)     If you experience any of the following, please call the [address] [place] [address] during business hours:  [phone]     * Increase in Pain  * Temperature over 101  * Increase in drainage from your wound  * Drainage with a foul odor  * Bleeding  * Increase in swelling  * Need for compression bandage changes due to slippage, breakthrough drainage.     If you need medical attention outside of the business hours of the [address] [place] [address] please contact your PCP or go to the nearest emergency room. The information contained in the After Visit Summary has been reviewed with me, the patient and/or responsible adult, by my health care provider(s). I had the opportunity to ask questions regarding this information. I have elected to receive;      []? After Visit Summary  [x]? Comprehensive Discharge Instruction        Patient signature______________________________________Date:________  Electronically signed by Yen Echeverria Octavio Carney on 2/17/2021 at 1:24 PM  Electronically signed by Krystina Echevarria DPM on 2/17/2021 at 1:00 PM          Electronically signed by Krystina Echevarria DPM on 2/17/2021 at 1:32 PM

## 2021-02-22 ENCOUNTER — HOSPITAL ENCOUNTER (OUTPATIENT)
Dept: WOUND CARE | Age: 61
Discharge: HOME | End: 2021-02-22
Payer: COMMERCIAL

## 2021-02-22 VITALS
SYSTOLIC BLOOD PRESSURE: 156 MMHG | BODY MASS INDEX: 45.99 KG/M2 | RESPIRATION RATE: 20 BRPM | WEIGHT: 293 LBS | HEIGHT: 67 IN | TEMPERATURE: 97.7 F | HEART RATE: 69 BPM | DIASTOLIC BLOOD PRESSURE: 73 MMHG

## 2021-02-22 DIAGNOSIS — L89.94: Primary | ICD-10-CM

## 2021-02-22 PROCEDURE — 11042 DBRDMT SUBQ TIS 1ST 20SQCM/<: CPT | Performed by: INTERNAL MEDICINE

## 2021-02-22 PROCEDURE — 11042 DBRDMT SUBQ TIS 1ST 20SQCM/<: CPT

## 2021-02-22 RX ORDER — LIDOCAINE HYDROCHLORIDE 20 MG/ML
JELLY TOPICAL ONCE
Status: CANCELLED | OUTPATIENT
Start: 2021-02-22 | End: 2021-02-22

## 2021-02-22 RX ORDER — LIDOCAINE HYDROCHLORIDE 40 MG/ML
SOLUTION TOPICAL ONCE
Status: COMPLETED | OUTPATIENT
Start: 2021-02-22 | End: 2021-02-22

## 2021-02-22 RX ORDER — LIDOCAINE HYDROCHLORIDE 40 MG/ML
SOLUTION TOPICAL ONCE
Status: CANCELLED | OUTPATIENT
Start: 2021-02-22 | End: 2021-02-22

## 2021-02-22 RX ORDER — BETAMETHASONE DIPROPIONATE 0.05 %
OINTMENT (GRAM) TOPICAL ONCE
Status: CANCELLED | OUTPATIENT
Start: 2021-02-22 | End: 2021-02-22

## 2021-02-22 RX ORDER — LIDOCAINE 40 MG/G
CREAM TOPICAL ONCE
Status: CANCELLED | OUTPATIENT
Start: 2021-02-22 | End: 2021-02-22

## 2021-02-22 RX ORDER — GINSENG 100 MG
CAPSULE ORAL ONCE
Status: CANCELLED | OUTPATIENT
Start: 2021-02-22 | End: 2021-02-22

## 2021-02-22 RX ORDER — LIDOCAINE 50 MG/G
OINTMENT TOPICAL ONCE
Status: CANCELLED | OUTPATIENT
Start: 2021-02-22 | End: 2021-02-22

## 2021-02-22 RX ORDER — BACITRACIN ZINC AND POLYMYXIN B SULFATE 500; 1000 [USP'U]/G; [USP'U]/G
OINTMENT TOPICAL ONCE
Status: CANCELLED | OUTPATIENT
Start: 2021-02-22 | End: 2021-02-22

## 2021-02-22 RX ORDER — CLOBETASOL PROPIONATE 0.5 MG/G
OINTMENT TOPICAL ONCE
Status: CANCELLED | OUTPATIENT
Start: 2021-02-22 | End: 2021-02-22

## 2021-02-22 RX ORDER — GENTAMICIN SULFATE 1 MG/G
OINTMENT TOPICAL ONCE
Status: CANCELLED | OUTPATIENT
Start: 2021-02-22 | End: 2021-02-22

## 2021-02-22 RX ORDER — BACITRACIN, NEOMYCIN, POLYMYXIN B 400; 3.5; 5 [USP'U]/G; MG/G; [USP'U]/G
OINTMENT TOPICAL ONCE
Status: CANCELLED | OUTPATIENT
Start: 2021-02-22 | End: 2021-02-22

## 2021-02-22 RX ADMIN — LIDOCAINE HYDROCHLORIDE 5 ML: 40 SOLUTION TOPICAL at 09:53

## 2021-02-22 ASSESSMENT — PAIN SCALES - GENERAL: PAINLEVEL_OUTOF10: 0

## 2021-02-22 NOTE — PLAN OF CARE
Problem: Wound:  Goal: Will show signs of wound healing; wound closure and no evidence of infection  Description: Will show signs of wound healing; wound closure and no evidence of infection  Outcome: Ongoing     Problem: Falls - Risk of:  Goal: Will remain free from falls  Description: Will remain free from falls  Outcome: Ongoing Oriented - self; Oriented - place; Oriented - time

## 2021-02-22 NOTE — PROGRESS NOTES
[address]   Progress Note and Procedure Note      Valentino Flasher  MEDICAL RECORD NUMBER:  [id number]  AGE: 61 y.o. GENDER: female  : 1960  EPISODE DATE:  2021    Subjective:     Chief Complaint   Patient presents with    Wound Check     Left heel         HISTORY of PRESENT ILLNESS HPI     Valentino Flasher is a 61 y.o. female who presents today for wound/ulcer evaluation. History of Wound Context: She presented for evaluation of nonhealing left heel wound with possible underlying calcaneus osteomyelitis. She was hospitalized recently 2021 through 2021,  left foot MRI suggestive of left calcaneus osteomyelitis. C-reactive protein of 45, sedimentation rate of 71, had surgical debridement with bone biopsy done 2021  Wound culture 2021 grew MSSA, Proteus mirabilis and Bacteroides, anaerobic gram-negative rods. Tissue culture 2021 no growth, benign bone on pathology. She had a PICC line prior to discharge and was discharged on IV vancomycin and Invanz.   Vancomycin was discontinued and she is tolerating Invanz  She denied any fever or chills, denied nausea vomiting no diarrhea no other complaints  The patient is actively smoking 1 pack a day  Wound/Ulcer Pain Timing/Severity: intermittent  Quality of pain: dull  Severity:  4 / 10   Modifying Factors: None  Associated Signs/Symptoms: edema and drainage    Ulcer Identification:  Ulcer Type: Diabetic    Contributing Factors: edema, venous stasis, lymphedema, decreased mobility, obesity, smoking and arterial insufficiency        PAST MEDICAL HISTORY        Diagnosis Date    Arthritis     CHF (congestive heart failure) (HCC)     COPD (chronic obstructive pulmonary disease) (HCC)     Diverticulitis     Hx of blood clots     Pulmonary embolism ([facility]        PAST SURGICAL HISTORY    Past Surgical History:   Procedure Laterality Date    ANKLE SURGERY      COLON SURGERY      FOOT SURGERY Left 2021 FOOT BONE BIOPSY W/ INCISION & DRAINAGE AND REDRESSING ON RIGHT FOOT performed by Jacques Pratt DPM at [facility]    Family History   Problem Relation Age of Onset    Cancer Mother     Diabetes Paternal Grandfather        SOCIAL HISTORY    Social History     Tobacco Use    Smoking status: Current Every Day Smoker     Packs/day: 1.00     Years: 45.00     Pack years: 45.00    Smokeless tobacco: Never Used    Tobacco comment: has not smoked in 3 weeks   Substance Use Topics    Alcohol use: Not Currently     Comment: 1-2 times per week    Drug use: Not Currently       ALLERGIES    No Known Allergies    MEDICATIONS    Current Outpatient Medications on File Prior to Encounter   Medication Sig Dispense Refill    metoprolol tartrate (LOPRESSOR) 25 MG tablet TAKE ONE (1) TABLET BY MOUTH TWICE A DAY FOR HTN  60 tablet 0    lisinopril (PRINIVIL;ZESTRIL) 10 MG tablet TAKE ONE (1) TABLET BY MOUTH ONCE DAILY FOR ESSENTIAL HYPERTENSION  30 tablet 0    ibuprofen (ADVIL;MOTRIN) 600 MG tablet Take 1 tablet by mouth 3 times daily as needed for Pain 30 tablet 0    albuterol sulfate  (90 Base) MCG/ACT inhaler Inhale 2 puffs into the lungs every 6 hours as needed for Wheezing 1 Inhaler 0    atorvastatin (LIPITOR) 40 MG tablet Take 1 tablet by mouth nightly 30 tablet 3    bumetanide (BUMEX) 2 MG tablet Take 1 tablet by mouth daily 30 tablet 1    ertapenem (INVANZ) infusion Infuse 1,000 mg intravenously every 24 hours Compound per protocol. 40863 mg 0    aspirin (ASPIRIN 81) 81 MG EC tablet Take 1 tablet by mouth daily 30 tablet 3    diclofenac sodium (VOLTAREN) 1 % GEL Apply 2 g topically 4 times daily as needed for Pain 350 g 3    PARoxetine (PAXIL) 20 MG tablet Take 1 tablet by mouth 2 times daily 60 tablet 1    miconazole (MICOTIN) 2 % powder Apply topically 2 times daily. 45 g 1     No current facility-administered medications on file prior to encounter.         REVIEW OF SYSTEMS  Review of Systems    Pertinent items are noted in HPI. Other than above 12 system review was negative  Objective:      BP (!) 156/73   Pulse 69   Temp 97.7 °F (36.5 °C) (Tympanic)   Resp 20   Ht 5' 7\" (1.702 m)   Wt (!) 342 lb (155.1 kg)   BMI 53.56 kg/m²     Wt Readings from Last 3 Encounters:   02/22/21 (!) 342 lb (155.1 kg)   01/19/21 (!) 342 lb 6 oz (155.3 kg)   11/05/20 (!) 330 lb (149.7 kg)       PHYSICAL EXAM  Alert oriented x3 not under distress      Assessment:        Problem List Items Addressed This Visit     Healing pressure ulcer, stage IV ([address] - Primary    Relevant Orders    Supply: Wound Cleanser    Supply: Wound Dressings    Supply: Cover and Secure    Supply: Edema Control           Procedure Note  Indications:  Based on my examination of this patient's wound(s)/ulcer(s) today, debridement is required to promote healing and evaluate the wound base. Performed by: Deirdre Barron MD    Consent obtained:  Yes    Time out taken:  Yes    Pain Control: Anesthetic  Anesthetic: 4% Lidocaine Liquid Topical       Debridement: Excisional Debridement    Using curette the wound(s)/ulcer(s) was/were debrided down through and including the removal of subcutaneous tissue.         Devitalized Tissue Debrided:  fibrin, biofilm and slough    Pre Debridement Measurements:  Are located in the [facility]  Documentation Flow Sheet      Wound/Ulcer #: 1    Post Debridement Measurements:  Wound/Ulcer Descriptions are Pre Debridement except measurements:    Wound 02/08/21 Heel Left;Posterior Wound # 1 (Active)   Wound Image   02/08/21 1008   Wound Cleansed Soap and water 02/22/21 0950   Offloading for Diabetic Foot Ulcers No 02/22/21 0950   Wound Length (cm) 2.4 cm 02/22/21 0950   Wound Width (cm) 1.3 cm 02/22/21 0950   Wound Depth (cm) 0.1 cm 02/22/21 0950   Wound Surface Area (cm^2) 3.12 cm^2 02/22/21 0950   Change in Wound Size % (l*w) 49.35 02/22/21 0950   Wound Volume (cm^3) 0.31 cm^3 02/22/21 0950 Wound Healing % 83 02/22/21 0950   Post-Procedure Length (cm) 2.4 cm 02/22/21 0950   Post-Procedure Width (cm) 1.3 cm 02/22/21 0950   Post-Procedure Depth (cm) 0.1 cm 02/22/21 0950   Post-Procedure Surface Area (cm^2) 3.12 cm^2 02/22/21 0950   Post-Procedure Volume (cm^3) 0.31 cm^3 02/22/21 0950   Wound Assessment Granulation tissue; Devitalized tissue;Pink/red 02/22/21 0950   Drainage Amount Moderate 02/22/21 0950   Drainage Description Serosanguinous 02/22/21 0950   Odor None 02/22/21 0950   Aruna-wound Assessment Maceration 02/22/21 0950   Margins Defined edges 02/22/21 0950   Number of days: 14          Total Surface Area Debrided:  3.12 sq cm     Estimated Blood Loss:  Minimal    Hemostasis Achieved:  by pressure    Procedural Pain:  7 / 10     Post Procedural Pain:  4 / 10     Response to treatment:  Well tolerated by patient. Plan:   Continue Invanz 1 g daily until 3/8/2021  Follow CBC, renal function weekly  Follow sedimentation rate and C-reactive protein  Follow-up with podiatry next week  Follow-up with me in 2 weeks    Treatment Note please see attached Discharge Instructions    Written patient dismissal instructions given to patient and signed by patient or POA.          Discharge Instructions         [facility] [place] HYPERBARIC TREATMENT  CENTER                                 Visit [facility] Instructions / Physician Orders  DATE: 02/22/2021     Home Care: SNF     SUPPLIES ORDERED THRU: SNF     Wound Location:  Left heel     Cleanse with: Liquid antibacterial soap and water, rinse well      Dressing Orders: Silvercel to wound, cover with ABD, wrap with kerlix and ace wrap     Frequency:  Daily     Additional Orders: Increase protein to diet (meat, cheese, eggs, fish, peanut butter, nuts and beans)  Multivitamin daily  Apply interdry to folds as needed   IV Invanz 1g daily until 03/08/2021  CBC, BUN/creatinine weekly  CRP, sed rate every other week  Please fax lab results to wound care at [phone]     MY CHART []? ?     Smart Device  []? ?      HYPERBARIC TREATMENT-                TREATMENT #     Your next appointment with the [address] [place] [address] is in 1 week with Dr Dahlia Sims, 2 weeks with Dr Teresa Kilpatrick                                                                                                   ROOM TYPE   []? ? CHAIR     []? ? BED   []? ? EITHER        TIME []?? 45 MIN     []? ? 60 MIN     (Please note your next appointment above and if you are unable to keep, kindly give a 24 hour notice. Thank you.)     If you experience any of the following, please call the [address] [place] [address] during business hours:  [phone]     * Increase in Pain  * Temperature over 101  * Increase in drainage from your wound  * Drainage with a foul odor  * Bleeding  * Increase in swelling  * Need for compression bandage changes due to slippage, breakthrough drainage.     If you need medical attention outside of the business hours of the [address] [place] [address] please contact your PCP or go to the nearest emergency room.     The information contained in the After Visit Summary has been reviewed with me, the patient and/or responsible adult, by my health care provider(s). I had the opportunity to ask questions regarding this information. I have elected to receive;      []? ? After Visit Summary  [x]? ? Comprehensive Discharge Instruction        Patient signature______________________________________Date:________  Electronically signed by Edward Quick RN on 2/22/2021 at 10:13 AM        Electronically signed by Sukhwidner Feng MD on 2/22/2021 at 10:15 AM

## 2021-02-24 ENCOUNTER — CARE COORDINATION (OUTPATIENT)
Dept: CARE COORDINATION | Age: 61
End: 2021-02-24

## 2021-02-24 NOTE — CARE COORDINATION
metoprolol tartrate (LOPRESSOR) 25 MG tablet TAKE ONE (1) TABLET BY MOUTH TWICE A DAY FOR HTN  2/8/21   Danni Heard MD   lisinopril (PRINIVIL;ZESTRIL) 10 MG tablet TAKE ONE (1) TABLET BY MOUTH ONCE DAILY FOR ESSENTIAL HYPERTENSION  2/8/21   Danni Heard MD   ibuprofen (ADVIL;MOTRIN) 600 MG tablet Take 1 tablet by mouth 3 times daily as needed for Pain 1/23/21   Jose Yeboah MD   albuterol sulfate  (90 Base) MCG/ACT inhaler Inhale 2 puffs into the lungs every 6 hours as needed for Wheezing 1/22/21   Jose Yeboah MD   atorvastatin (LIPITOR) 40 MG tablet Take 1 tablet by mouth nightly 1/22/21   Jose Yeboah MD   bumetanide (BUMEX) 2 MG tablet Take 1 tablet by mouth daily 1/22/21   Jose Yeboah MD   ertapenem [facility]) infusion Infuse 1,000 mg intravenously every 24 hours Compound per protocol. 1/22/21 3/5/21  Kwame Canales MD   aspirin (ASPIRIN 81) 81 MG EC tablet Take 1 tablet by mouth daily 1/21/21   Jose Yeboah MD   diclofenac sodium (VOLTAREN) 1 % GEL Apply 2 g topically 4 times daily as needed for Pain 1/7/21   KIRK Dupree CNP   PARoxetine (PAXIL) 20 MG tablet Take 1 tablet by mouth 2 times daily 12/17/20   KIRK Dupree CNP   miconazole (MICOTIN) 2 % powder Apply topically 2 times daily.  11/4/20   Addi Potter PA-C       Future Appointments   Date Time Provider Pearl Perezi   3/3/2021  1:15 PM Aron Esqueda [facility]   3/8/2021  9:30 AM Kwame Canales MD [facility]

## 2021-03-03 ENCOUNTER — CARE COORDINATION (OUTPATIENT)
Dept: CARE COORDINATION | Age: 61
End: 2021-03-03

## 2021-03-03 ENCOUNTER — HOSPITAL ENCOUNTER (OUTPATIENT)
Dept: WOUND CARE | Age: 61
Discharge: HOME | End: 2021-03-03
Payer: COMMERCIAL

## 2021-03-03 VITALS
WEIGHT: 293 LBS | DIASTOLIC BLOOD PRESSURE: 66 MMHG | HEART RATE: 74 BPM | TEMPERATURE: 97.3 F | RESPIRATION RATE: 20 BRPM | BODY MASS INDEX: 45.99 KG/M2 | SYSTOLIC BLOOD PRESSURE: 140 MMHG | HEIGHT: 67 IN

## 2021-03-03 DIAGNOSIS — L89.94: Primary | ICD-10-CM

## 2021-03-03 PROCEDURE — 11042 DBRDMT SUBQ TIS 1ST 20SQCM/<: CPT

## 2021-03-03 RX ORDER — BETAMETHASONE DIPROPIONATE 0.05 %
OINTMENT (GRAM) TOPICAL ONCE
Status: CANCELLED | OUTPATIENT
Start: 2021-03-03 | End: 2021-03-03

## 2021-03-03 RX ORDER — LIDOCAINE 40 MG/G
CREAM TOPICAL ONCE
Status: CANCELLED | OUTPATIENT
Start: 2021-03-03 | End: 2021-03-03

## 2021-03-03 RX ORDER — LIDOCAINE HYDROCHLORIDE 40 MG/ML
SOLUTION TOPICAL ONCE
Status: CANCELLED | OUTPATIENT
Start: 2021-03-03 | End: 2021-03-03

## 2021-03-03 RX ORDER — LIDOCAINE HYDROCHLORIDE 20 MG/ML
JELLY TOPICAL ONCE
Status: CANCELLED | OUTPATIENT
Start: 2021-03-03 | End: 2021-03-03

## 2021-03-03 RX ORDER — LIDOCAINE 50 MG/G
OINTMENT TOPICAL ONCE
Status: CANCELLED | OUTPATIENT
Start: 2021-03-03 | End: 2021-03-03

## 2021-03-03 RX ORDER — LIDOCAINE HYDROCHLORIDE 40 MG/ML
SOLUTION TOPICAL ONCE
Status: COMPLETED | OUTPATIENT
Start: 2021-03-03 | End: 2021-03-03

## 2021-03-03 RX ORDER — BACITRACIN, NEOMYCIN, POLYMYXIN B 400; 3.5; 5 [USP'U]/G; MG/G; [USP'U]/G
OINTMENT TOPICAL ONCE
Status: CANCELLED | OUTPATIENT
Start: 2021-03-03 | End: 2021-03-03

## 2021-03-03 RX ORDER — BACITRACIN ZINC AND POLYMYXIN B SULFATE 500; 1000 [USP'U]/G; [USP'U]/G
OINTMENT TOPICAL ONCE
Status: CANCELLED | OUTPATIENT
Start: 2021-03-03 | End: 2021-03-03

## 2021-03-03 RX ORDER — GINSENG 100 MG
CAPSULE ORAL ONCE
Status: CANCELLED | OUTPATIENT
Start: 2021-03-03 | End: 2021-03-03

## 2021-03-03 RX ORDER — GENTAMICIN SULFATE 1 MG/G
OINTMENT TOPICAL ONCE
Status: CANCELLED | OUTPATIENT
Start: 2021-03-03 | End: 2021-03-03

## 2021-03-03 RX ORDER — CLOBETASOL PROPIONATE 0.5 MG/G
OINTMENT TOPICAL ONCE
Status: CANCELLED | OUTPATIENT
Start: 2021-03-03 | End: 2021-03-03

## 2021-03-03 RX ADMIN — LIDOCAINE HYDROCHLORIDE 5 ML: 40 SOLUTION TOPICAL at 13:44

## 2021-03-03 ASSESSMENT — ENCOUNTER SYMPTOMS
VOMITING: 0
DIARRHEA: 0
NAUSEA: 0

## 2021-03-03 ASSESSMENT — PAIN DESCRIPTION - PAIN TYPE: TYPE: ACUTE PAIN;CHRONIC PAIN

## 2021-03-03 ASSESSMENT — PAIN SCALES - GENERAL: PAINLEVEL_OUTOF10: 6

## 2021-03-03 ASSESSMENT — PAIN DESCRIPTION - LOCATION: LOCATION: HIP;KNEE;FOOT

## 2021-03-03 NOTE — PLAN OF CARE
Pt expressed severe frustration and desperation at the amount of pain she is currently experiencing. She has stated that she has recently wished that she would go to sleep and not wake up, anything to stop hurting. But she denies any plan or serious thought on acting on hurting herself. This RN offered to call our spiritual care services or the facility where she lives to help get someone to talk with her about her feelings. She declined.

## 2021-03-03 NOTE — PROGRESS NOTES
[address]   Progress Note and Procedure Note      Maria Ines Hammonds  MEDICAL RECORD NUMBER:  [id number]  AGE: 64 y.o. GENDER: female  : 1960  EPISODE DATE:  3/3/2021    Subjective:     Chief Complaint   Patient presents with    Wound Check     Left heel         HISTORY of PRESENT ILLNESS HPI     Maria Ines Hammonds is a 64 y.o. female who presents today for wound/ulcer evaluation. Interval history: Continues to receive wound care in the SNF. Overall relates that her wounds have greatly improved. Denies any new ulceration or problem. History of Wound Context: Patient presents for follow-up of left heel and lower leg ulcerations. She was admitted to the hospital on 21 and underwent debridement of the heel ulceration with bone biopsy of the calcaneus. 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 She was discharged to SNF. Is been receiving dressing changes and compression wraps to both lower legs in the nursing home. She continues on IV Invanz.     PCP is Vasyl Encarnacion  Date of last visit: 20           PAST MEDICAL HISTORY        Diagnosis Date    Arthritis     CHF (congestive heart failure) ([address]     COPD (chronic obstructive pulmonary disease) (HCC)     Diverticulitis     Hx of blood clots     Pulmonary embolism ([address]        PAST SURGICAL HISTORY    Past Surgical History:   Procedure Laterality Date    ANKLE SURGERY      COLON SURGERY      FOOT SURGERY Left 2021    FOOT BONE BIOPSY W/ INCISION & DRAINAGE AND REDRESSING ON RIGHT FOOT performed by Arnold Pierce DPM at [facility]    Family History   Problem Relation Age of Onset    Cancer Mother     Diabetes Paternal Grandfather        SOCIAL HISTORY    Social History     Tobacco Use    Smoking status: Current Every Day Smoker     Packs/day: 1.00     Years: 45.00     Pack years: 45.00    Smokeless tobacco: Never Used    Tobacco comment: has not smoked in 3 weeks   Substance Use Topics    Alcohol use: Not Currently     Comment: 1-2 times per week    Drug use: Not Currently       ALLERGIES    No Known Allergies    MEDICATIONS    Current Outpatient Medications on File Prior to Encounter   Medication Sig Dispense Refill    metoprolol tartrate (LOPRESSOR) 25 MG tablet TAKE ONE (1) TABLET BY MOUTH TWICE A DAY FOR HTN  60 tablet 0    lisinopril (PRINIVIL;ZESTRIL) 10 MG tablet TAKE ONE (1) TABLET BY MOUTH ONCE DAILY FOR ESSENTIAL HYPERTENSION  30 tablet 0    ibuprofen (ADVIL;MOTRIN) 600 MG tablet Take 1 tablet by mouth 3 times daily as needed for Pain 30 tablet 0    albuterol sulfate  (90 Base) MCG/ACT inhaler Inhale 2 puffs into the lungs every 6 hours as needed for Wheezing 1 Inhaler 0    atorvastatin (LIPITOR) 40 MG tablet Take 1 tablet by mouth nightly 30 tablet 3    bumetanide (BUMEX) 2 MG tablet Take 1 tablet by mouth daily 30 tablet 1    ertapenem (INVANZ) infusion Infuse 1,000 mg intravenously every 24 hours Compound per protocol. 82626 mg 0    aspirin (ASPIRIN 81) 81 MG EC tablet Take 1 tablet by mouth daily 30 tablet 3    diclofenac sodium (VOLTAREN) 1 % GEL Apply 2 g topically 4 times daily as needed for Pain 350 g 3    PARoxetine (PAXIL) 20 MG tablet Take 1 tablet by mouth 2 times daily 60 tablet 1    miconazole (MICOTIN) 2 % powder Apply topically 2 times daily. 45 g 1     No current facility-administered medications on file prior to encounter. REVIEW OF SYSTEMS    Review of Systems   Constitutional: Negative for chills and fever. Gastrointestinal: Negative for diarrhea, nausea and vomiting. Skin: Positive for wound.          Objective:      BP (!) 140/66   Pulse 74   Temp 97.3 °F (36.3 °C) (Tympanic)   Resp 20   Ht 5' 7\" (1.702 m)   Wt (!) 350 lb (158.8 kg)   BMI 54.82 kg/m²     Wt Readings from Last 3 Encounters:   03/03/21 (!) 350 lb (158.8 kg)   02/22/21 (!) 342 lb (155.1 kg)   01/19/21 (!) 342 lb 6 oz (155.3 kg)       Physical Exam:  General:  Alert and oriented x3. In no acute distress. Lower Extremity Physical Exam:    Vascular: DP and PT pulses are not palpable. CFT brisk to all digits. Hair growth absent to the level of the digits. Non-pitting edema to BLE.       Neuro: Saph/sural/SP/DP/plantar sensation diminished to light touch     Musculoskeletal:  Gross deformity is present with contracted digits.     Dermatologic: Full-thickness ulcer which extends to subcutaneous tissue at the plantar lateral heel of left foot. Base is hyper granular. Bases with biofilm and slough pre-debridement. There is no purulent drainage. No malodor. Erythema is absent, no associated increase in warmth. Wound does not probe to bone.   No fluctuance or crepitus noted.     Lower leg ulcerations have remained healed      Surgical Pathology of Bone Biopsy 01/22/21:  SPECIMEN \"A\":  BONE, LEFT PLANTAR CALCANEUS, BIOPSY:          BENIGN BONE TRABECULAE, PERIOSTEUM, ADIPOSE AND FIBROVASCULAR   TISSUE, NEGATIVE FOR SIGNIFICANT PATHOLOGIC FINDINGS     NO EVIDENCE FOR ACUTE OSTEOMYELITIS       SPECIMEN \"B\":  BONE, LEFT FOOT SUPERIOR CALCANEUS, BIOPSY:          BENIGN BONE TRABECULA, PERIOSTEUM, ADIPOSE AND FIBROVASCULAR   TISSUE, NEGATIVE FOR SIGNIFICANT PATHOLOGIC FINDINGS     NO EVIDENCE FOR ACUTE OSTEOMYELITIS     Bone Culture 01/22/21:   No growth     PVR/PPG 01/22/21:  Right:    Normal PVRs    MAGGIE suggests no vascular insufficiency at rest        Left:    Normal PVRs    MAGGIE suggests mild vascular insufficiency at rest     Right Impression:                    Left Impression:    Plaque without increased Doppler     Plaque without increased Doppler    waveforms and monophasic flow noted  waveforms and monophasic flow noted    throughout.                          throughout.        Non visualization of the peroneal    Non visualization of the peroneal    artery.                             Nisa Norwood. Assessment:      Active Hospital Problems    Diagnosis Date Noted    Healing pressure ulcer, stage IV ([facility] [address] [L89.94] 02/17/2021    Lymphedema of both lower extremities [I89.0] 02/17/2021    Peripheral artery disease ([facility] [I73.9] 01/21/2021    Tobacco abuse [Z72.0] 10/29/2019    Class 3 severe obesity with serious comorbidity and body mass index (BMI) of 50.0 to 59.9 in adult [facility]) [E66.01, Z68.43] 10/29/2019       Plan:     Treatment Note please see attached Discharge Instructions    Instructed to follow-up with vascular surgeon for outpatient follow-up of abnormal noninvasive vascular studies from hospitalization     Continue IV invanz until 03/08/21 per ID and follow-up with Dr. Teresa Kilpatrick as instructed     Continue compression wraps to bilateral lower extremity. Continue silver cell to wound base and dry sterile dressing    Educated on signs and symptoms of infection. Instructed to call clinic immediately or go to ER if signs and symptoms of infection are present. RTC 1 week      Procedure Note  Indications:  Based on my examination of this patient's wound(s)/ulcer(s) today, debridement is required to promote healing and evaluate the wound base. Performed by: Hoda Boles DPM    Consent obtained:  Yes    Time out taken:  Yes    Pain Control: Anesthetic  Anesthetic: 4% Lidocaine Liquid Topical       Debridement: Excisional Debridement    Using curette the wound(s)/ulcer(s) was/were sharply debrided down through and including the removal of subcutaneous tissue.         Devitalized Tissue Debrided:  biofilm and slough    Pre Debridement Measurements:  Are located in the [facility]  Documentation Flow Sheet    Wound/Ulcer #: 1    Post Debridement Measurements:  Wound/Ulcer Descriptions are Pre Debridement except measurements:    Wound 02/08/21 Heel Left;Posterior Wound # 1 (Active)   Wound Image   03/03/21 1342   Wound Etiology Pressure Stage  4 03/03/21 1342   Wound Cleansed Soap and water 03/03/21 1342   Offloading for Diabetic Foot Ulcers No 02/22/21 0950   Wound Length (cm) 1 cm 03/03/21 1342   Wound Width (cm) 0.6 cm 03/03/21 1342   Wound Depth (cm) 0.1 cm 03/03/21 1342   Wound Surface Area (cm^2) 0.6 cm^2 03/03/21 1342   Change in Wound Size % (l*w) 90.26 03/03/21 1342   Wound Volume (cm^3) 0.06 cm^3 03/03/21 1342   Wound Healing % 97 03/03/21 1342   Post-Procedure Length (cm) 1 cm 03/03/21 1342   Post-Procedure Width (cm) 0.6 cm 03/03/21 1342   Post-Procedure Depth (cm) 0.1 cm 03/03/21 1342   Post-Procedure Surface Area (cm^2) 0.6 cm^2 03/03/21 1342   Post-Procedure Volume (cm^3) 0.06 cm^3 03/03/21 1342   Wound Assessment Bleeding;Granulation tissue;Pink/red 03/03/21 1342   Drainage Amount Moderate 03/03/21 1342   Drainage Description Serosanguinous 03/03/21 1342   Odor None 03/03/21 1342   Aruna-wound Assessment Hyperkeratosis (callous) 03/03/21 1342   Margins Defined edges 03/03/21 1342   Number of days: 23          Percent of Wound(s)/Ulcer(s) Debrided: 100%    Total Surface Area Debrided:  0.6 sq cm     Diabetic/Pressure/Non Pressure Ulcers only:  Ulcer: Pressure ulcer, Stage 4     Estimated Blood Loss:  Minimal    Hemostasis Achieved: By pressure    Procedural Pain:  0  / 10     Post Procedural Pain:  0 / 10     Response to treatment:  Well tolerated by patient. Written patient discharge instructions given to patient and signed by patient or POA.         Discharge Instructions              [facility] [place] [facility] TREATMENT  CENTER                                 Visit Raymond Instructions / Physician Orders  DATE: 03/03/2021     Home Care: SNF     SUPPLIES ORDERED THRU: SNF     Wound Location:  Left heel     Cleanse with: Liquid antibacterial soap and water, rinse well      Dressing Orders: Silvercel to wound, cover with ABD, wrap with kerlix and ace wrap (please apply ace wraps to bilateral legs)     Frequency:  Daily     Additional Orders: Increase protein to diet (meat, cheese, eggs, fish, peanut butter, nuts and beans)  Multivitamin daily  Apply interdry to folds as needed   IV Invanz 1g daily until 03/08/2021  CBC, BUN/creatinine weekly  CRP, sed rate every other week  Please fax lab results to wound care at [phone]     MY CHART []???     Smart Device  []???      HYPERBARIC TREATMENT-                TREATMENT #     Your next appointment with the [address] [place] [address] is in 1 week with Dr Yana Mora, 2 weeks with Dr Daisy Guzman                                                                                                   ROOM TYPE   []? ?? CHAIR     []? ?? BED   []? ?? EITHER        TIME []??? 45 MIN     []? ?? 60 MIN     (Please note your next appointment above and if you are unable to keep, kindly give a 24 hour notice. Thank you.)     If you experience any of the following, please call the [address] [place] [address] during business hours:  [phone]     * Increase in Pain  * Temperature over 101  * Increase in drainage from your wound  * Drainage with a foul odor  * Bleeding  * Increase in swelling  * Need for compression bandage changes due to slippage, breakthrough drainage.     If you need medical attention outside of the business hours of the [address] [place] [address] please contact your PCP or go to the nearest emergency room.     The information contained in the After Visit Summary has been reviewed with me, the patient and/or responsible adult, by my health care provider(s). I had the opportunity to ask questions regarding this information. I have elected to receive;      []? ? ? After Visit Summary  [x]? ?? Comprehensive Discharge Instruction        Patient signature______________________________________Date:________  Electronically signed by Patricia Edwards RN on 3/3/2021 at 2:17 PM  Electronically signed by Avis Hatfield DPM on 3/3/2021 at 1:02 PM          Electronically signed by Avis Hatfield DPM on 3/3/2021 at 2:22 PM

## 2021-03-03 NOTE — CARE COORDINATION
Attempting to reach patient for a follow up care coordination call regarding any needs, questions or concerns.   Reinier Layton [facility] [phone]  No VM available

## 2021-03-07 ENCOUNTER — CARE COORDINATION (OUTPATIENT)
Dept: CARE COORDINATION | Age: 61
End: 2021-03-07

## 2021-03-07 NOTE — CARE COORDINATION
Spoke with patient who verified that she is still at rehab. Planned call for 2 weeks, patient will call ACM if she discharges before this.    Debbie Chavez RN, ambulatory care manager

## 2021-03-08 ENCOUNTER — HOSPITAL ENCOUNTER (OUTPATIENT)
Dept: WOUND CARE | Age: 61
Discharge: HOME | End: 2021-03-08

## 2021-03-08 ENCOUNTER — FOLLOWUP TELEPHONE ENCOUNTER (OUTPATIENT)
Dept: WOUND CARE | Age: 61
End: 2021-03-08

## 2021-03-08 DIAGNOSIS — Z12.31 ENCOUNTER FOR SCREENING MAMMOGRAM FOR BREAST CANCER: ICD-10-CM

## 2021-03-08 DIAGNOSIS — Z12.11 COLON CANCER SCREENING: Primary | ICD-10-CM

## 2021-03-08 NOTE — PROGRESS NOTES
Pt no called no showed for [facility] wound care apt today. Called pt, unable to leave message, voice mail box is full.

## 2021-03-17 ENCOUNTER — HOSPITAL ENCOUNTER (OUTPATIENT)
Dept: WOUND CARE | Age: 61
Discharge: HOME | End: 2021-03-17
Payer: COMMERCIAL

## 2021-03-17 VITALS
HEIGHT: 67 IN | DIASTOLIC BLOOD PRESSURE: 71 MMHG | HEART RATE: 71 BPM | SYSTOLIC BLOOD PRESSURE: 137 MMHG | TEMPERATURE: 97.7 F | BODY MASS INDEX: 54.82 KG/M2 | RESPIRATION RATE: 20 BRPM

## 2021-03-17 DIAGNOSIS — L97.912 NON-PRESSURE CHRONIC ULCER OF RIGHT LOWER LEG WITH FAT LAYER EXPOSED (HCC): ICD-10-CM

## 2021-03-17 DIAGNOSIS — L89.94: Primary | ICD-10-CM

## 2021-03-17 PROCEDURE — 11042 DBRDMT SUBQ TIS 1ST 20SQCM/<: CPT

## 2021-03-17 RX ORDER — GENTAMICIN SULFATE 1 MG/G
OINTMENT TOPICAL ONCE
Status: CANCELLED | OUTPATIENT
Start: 2021-03-17 | End: 2021-03-17

## 2021-03-17 RX ORDER — CLOBETASOL PROPIONATE 0.5 MG/G
OINTMENT TOPICAL ONCE
Status: CANCELLED | OUTPATIENT
Start: 2021-03-17 | End: 2021-03-17

## 2021-03-17 RX ORDER — GINSENG 100 MG
CAPSULE ORAL ONCE
Status: CANCELLED | OUTPATIENT
Start: 2021-03-17 | End: 2021-03-17

## 2021-03-17 RX ORDER — LIDOCAINE HYDROCHLORIDE 40 MG/ML
SOLUTION TOPICAL ONCE
Status: CANCELLED | OUTPATIENT
Start: 2021-03-17 | End: 2021-03-17

## 2021-03-17 RX ORDER — BACITRACIN ZINC AND POLYMYXIN B SULFATE 500; 1000 [USP'U]/G; [USP'U]/G
OINTMENT TOPICAL ONCE
Status: CANCELLED | OUTPATIENT
Start: 2021-03-17 | End: 2021-03-17

## 2021-03-17 RX ORDER — LIDOCAINE HYDROCHLORIDE 40 MG/ML
SOLUTION TOPICAL ONCE
Status: COMPLETED | OUTPATIENT
Start: 2021-03-17 | End: 2021-03-17

## 2021-03-17 RX ORDER — LIDOCAINE HYDROCHLORIDE 20 MG/ML
JELLY TOPICAL ONCE
Status: CANCELLED | OUTPATIENT
Start: 2021-03-17 | End: 2021-03-17

## 2021-03-17 RX ORDER — BETAMETHASONE DIPROPIONATE 0.05 %
OINTMENT (GRAM) TOPICAL ONCE
Status: CANCELLED | OUTPATIENT
Start: 2021-03-17 | End: 2021-03-17

## 2021-03-17 RX ORDER — BACITRACIN, NEOMYCIN, POLYMYXIN B 400; 3.5; 5 [USP'U]/G; MG/G; [USP'U]/G
OINTMENT TOPICAL ONCE
Status: CANCELLED | OUTPATIENT
Start: 2021-03-17 | End: 2021-03-17

## 2021-03-17 RX ORDER — LIDOCAINE 40 MG/G
CREAM TOPICAL ONCE
Status: CANCELLED | OUTPATIENT
Start: 2021-03-17 | End: 2021-03-17

## 2021-03-17 RX ORDER — LIDOCAINE 50 MG/G
OINTMENT TOPICAL ONCE
Status: CANCELLED | OUTPATIENT
Start: 2021-03-17 | End: 2021-03-17

## 2021-03-17 RX ADMIN — LIDOCAINE HYDROCHLORIDE: 40 SOLUTION TOPICAL at 14:01

## 2021-03-17 ASSESSMENT — PAIN DESCRIPTION - ORIENTATION: ORIENTATION: RIGHT

## 2021-03-17 ASSESSMENT — ENCOUNTER SYMPTOMS
DIARRHEA: 0
NAUSEA: 0
VOMITING: 0

## 2021-03-17 ASSESSMENT — PAIN DESCRIPTION - LOCATION: LOCATION: LEG

## 2021-03-17 ASSESSMENT — PAIN DESCRIPTION - DESCRIPTORS: DESCRIPTORS: BURNING

## 2021-03-17 ASSESSMENT — PAIN DESCRIPTION - PAIN TYPE: TYPE: CHRONIC PAIN

## 2021-03-17 ASSESSMENT — PAIN SCALES - GENERAL: PAINLEVEL_OUTOF10: 4

## 2021-03-17 ASSESSMENT — PAIN DESCRIPTION - FREQUENCY: FREQUENCY: CONTINUOUS

## 2021-03-17 ASSESSMENT — PAIN DESCRIPTION - PROGRESSION: CLINICAL_PROGRESSION: NOT CHANGED

## 2021-03-17 NOTE — PROGRESS NOTES
02/22/21 (!) 342 lb (155.1 kg)   01/19/21 (!) 342 lb 6 oz (155.3 kg)       Physical Exam:  General:  Alert and oriented x3. In no acute distress. Lower Extremity Physical Exam:    Vascular: DP and PT pulses are not palpable. CFT brisk to all digits. Hair growth absent to the level of the digits. Non-pitting edema to BLE.       Neuro: Saph/sural/SP/DP/plantar sensation diminished to light touch     Musculoskeletal:  Gross deformity is present with contracted digits.     Dermatologic: Full-thickness ulcer which extends to subcutaneous tissue at the plantar lateral heel of left foot. Base is granular. Bases with biofilm and slough pre-debridement. There is no purulent drainage. No malodor. Erythema is absent, no associated increase in warmth. Wound does not probe to bone. No fluctuance or crepitus. Full-thickness ulcer which extends to subcutaneous tissue at the lateral right lower leg. Base is with slough. There is no purulent drainage. No malodor. Erythema is absent, no associated increase in warmth. Wound does not probe to bone. No fluctuance or crepitus.        Surgical Pathology of Bone Biopsy 01/22/21:  SPECIMEN \"A\":  BONE, LEFT PLANTAR CALCANEUS, BIOPSY:          BENIGN BONE TRABECULAE, PERIOSTEUM, ADIPOSE AND FIBROVASCULAR   TISSUE, NEGATIVE FOR SIGNIFICANT PATHOLOGIC FINDINGS     NO EVIDENCE FOR ACUTE OSTEOMYELITIS       SPECIMEN \"B\":  BONE, LEFT FOOT SUPERIOR CALCANEUS, BIOPSY:          BENIGN BONE TRABECULA, PERIOSTEUM, ADIPOSE AND FIBROVASCULAR   TISSUE, NEGATIVE FOR SIGNIFICANT PATHOLOGIC FINDINGS     NO EVIDENCE FOR ACUTE OSTEOMYELITIS     Bone Culture 01/22/21:   No growth     PVR/PPG 01/22/21:  Right:    Normal PVRs    MAGGIE suggests no vascular insufficiency at rest        Left:    Normal PVRs    MAGGIE suggests mild vascular insufficiency at rest     Right Impression:                    Left Impression:    Plaque without increased Doppler     Plaque without increased Doppler  waveforms and monophasic flow noted  waveforms and monophasic flow noted    throughout.                          throughout.        Non visualization of the peroneal    Non visualization of the peroneal    artery.                              artery. Assessment:      Active Hospital Problems    Diagnosis Date Noted    Non-pressure chronic ulcer of right lower leg with fat layer exposed ([facility] [L97.912] 03/17/2021    Healing pressure ulcer, stage IV ([facility] [L89.94] 02/17/2021    Lymphedema of both lower extremities [I89.0] 02/17/2021    Peripheral artery disease ([facility] [I73.9] 01/21/2021    Tobacco abuse [Z72.0] 10/29/2019    Class 3 severe obesity with serious comorbidity and body mass index (BMI) of 50.0 to 59.9 in adult [facility]) [E66.01, Z68.43] 10/29/2019       Plan:     Treatment Note please see attached Discharge Instructions    Instructed to follow-up with vascular surgeon for outpatient follow-up of abnormal noninvasive vascular studies from hospitalization     Continue compression wraps to bilateral lower extremity. Will use Unna boot due to new RLE ulceration    Continue silver cell to wound base and dry sterile dressing    Educated on signs and symptoms of infection. Instructed to call clinic immediately or go to ER if signs and symptoms of infection are present. RTC 1 week      Procedure Note  Indications:  Based on my examination of this patient's wound(s)/ulcer(s) today, debridement is required to promote healing and evaluate the wound base. Performed by: Hoda Boles DPM    Consent obtained:  Yes    Time out taken:  Yes    Pain Control: Anesthetic  Anesthetic: 4% Lidocaine Liquid Topical       Debridement: Excisional Debridement    Using curette the wound(s)/ulcer(s) was/were sharply debrided down through and including the removal of subcutaneous tissue.         Devitalized Tissue Debrided:  biofilm and slough    Pre Debridement Measurements:  Are located in the [place]  Wound(s)/Ulcer(s) Debrided: 100%    Total Surface Area Debrided:  2.4 sq cm     Diabetic/Pressure/Non Pressure Ulcers only:  Ulcer: Pressure ulcer, Stage 4 left heel, non pressure ulcer fat layer exposed right leg    Estimated Blood Loss:  Minimal    Hemostasis Achieved: By pressure    Procedural Pain:  0  / 10     Post Procedural Pain:  0 / 10     Response to treatment:  Well tolerated by patient. Written patient discharge instructions given to patient and signed by patient or POA. Discharge Instructions         [address] [place] [facility] HY[facility] TREAT[facility]  CENTER                                 Visit [facility] Instructions / Physician Orders  DATE: 03/17/2021     Home Care: SNF     SUPPLIES ORDERED THRU: SNF     Wound Location:  Left heel     Cleanse with: Liquid antibacterial soap and water, rinse well      Dressing Orders: Promogran/Silvercel to wound, wrap with zinc unna boots bilat     Frequency:  Daily     Additional Orders: Increase protein to diet (meat, cheese, eggs, fish, peanut butter, nuts and beans)  Multivitamin daily        MY CHART []?????     Smart Device  []?????      HYPERBARIC TREATMENT-                TREATMENT #     Your next appointment with the [address] [place] [address] is in 1 week with Dr Matilde Mendoza, 2 weeks with Dr Leila Moore                                                                                                   ROOM TYPE   []????? CHAIR     []????? BED   []????? EITHER        TIME []????? 45 MIN     []????? 60 MIN     (Please note your next appointment above and if you are unable to keep, kindly give a 24 hour notice.  Thank you.)     If you experience any of the following, please call the [address] [place] [address] during business hours:  [phone]     * Increase in Pain  * Temperature over 101  * Increase in drainage from your wound  * Drainage with a foul odor  * Bleeding  * Increase in swelling  * Need for compression bandage changes due to slippage, breakthrough drainage.     If you

## 2021-03-17 NOTE — PROGRESS NOTES
Gwendloyn Misha Application   Below Knee    NAME:  Sania Peraza  YOB: 1960  MEDICAL RECORD NUMBER:  [id number]  DATE:  3/17/2021     [x] Removed old Rhonda Magyar boot if indicated and wash leg with mild soap and water.  [x] Applied moisturizing agent to dry skin as needed.  [x] Appied primary and secondary dressing as ordered     [x] Applied Unna roll from toes to knee overlapping each time.  [x] Applied ace wrap or coban from toes to below the knee.  [x] Secured with tape and/or metal clips covered with tape.  [x] Instructed patient/caregiver to keep dressing dry and intact. DO NOT REMOVE DRESSING.  [x] Instructed pt/family/caregiver to report excessive draining, loose bandage, wet dressing, severe pain or tingling in toes.  [x] Applied Gwendloyn Misha dressing below the knee to Bilateral lower leg(s)        Unna Boot(s) were applied per  Guidelines.      Electronically signed by Elder Ross RN on 3/17/2021 at 2:30 PM

## 2021-03-24 ENCOUNTER — TELEPHONE (OUTPATIENT)
Dept: WOUND CARE | Age: 61
End: 2021-03-24

## 2021-03-24 NOTE — TELEPHONE ENCOUNTER
Patient's [facility] wound care appointment canceled today due to patient having issues with a transport from her ECF.  She was rescheduled for 3-31-21

## 2021-03-26 ENCOUNTER — CARE COORDINATION (OUTPATIENT)
Dept: CARE COORDINATION | Age: 61
End: 2021-03-26

## 2021-03-26 NOTE — CARE COORDINATION
Spoke with patient who is still at [facility]. She plans to be there another week or so. ACM will follow up in 1-2 weeks. Patient encouraged to make a follow up with PCP when DC.

## 2021-03-31 ENCOUNTER — HOSPITAL ENCOUNTER (OUTPATIENT)
Dept: WOUND CARE | Age: 61
Discharge: HOME | End: 2021-03-31
Payer: COMMERCIAL

## 2021-03-31 VITALS
TEMPERATURE: 96.4 F | HEIGHT: 67 IN | RESPIRATION RATE: 20 BRPM | BODY MASS INDEX: 45.99 KG/M2 | WEIGHT: 293 LBS | HEART RATE: 76 BPM

## 2021-03-31 DIAGNOSIS — L84 CORNS AND CALLOSITIES: ICD-10-CM

## 2021-03-31 DIAGNOSIS — L85.3 XEROSIS CUTIS: ICD-10-CM

## 2021-03-31 DIAGNOSIS — I73.9 PERIPHERAL ARTERY DISEASE (HCC): ICD-10-CM

## 2021-03-31 DIAGNOSIS — L89.94: Primary | ICD-10-CM

## 2021-03-31 DIAGNOSIS — R60.0 LOCALIZED EDEMA: ICD-10-CM

## 2021-03-31 DIAGNOSIS — I89.0 LYMPHEDEMA OF BOTH LOWER EXTREMITIES: ICD-10-CM

## 2021-03-31 PROCEDURE — 99212 OFFICE O/P EST SF 10 MIN: CPT

## 2021-03-31 RX ORDER — LIDOCAINE 40 MG/G
CREAM TOPICAL ONCE
Status: CANCELLED | OUTPATIENT
Start: 2021-03-31 | End: 2021-03-31

## 2021-03-31 RX ORDER — CLOBETASOL PROPIONATE 0.5 MG/G
OINTMENT TOPICAL ONCE
Status: CANCELLED | OUTPATIENT
Start: 2021-03-31 | End: 2021-03-31

## 2021-03-31 RX ORDER — GINSENG 100 MG
CAPSULE ORAL ONCE
Status: CANCELLED | OUTPATIENT
Start: 2021-03-31 | End: 2021-03-31

## 2021-03-31 RX ORDER — GENTAMICIN SULFATE 1 MG/G
OINTMENT TOPICAL ONCE
Status: CANCELLED | OUTPATIENT
Start: 2021-03-31 | End: 2021-03-31

## 2021-03-31 RX ORDER — LIDOCAINE HYDROCHLORIDE 20 MG/ML
JELLY TOPICAL ONCE
Status: CANCELLED | OUTPATIENT
Start: 2021-03-31 | End: 2021-03-31

## 2021-03-31 RX ORDER — BACITRACIN ZINC AND POLYMYXIN B SULFATE 500; 1000 [USP'U]/G; [USP'U]/G
OINTMENT TOPICAL ONCE
Status: CANCELLED | OUTPATIENT
Start: 2021-03-31 | End: 2021-03-31

## 2021-03-31 RX ORDER — LIDOCAINE HYDROCHLORIDE 40 MG/ML
SOLUTION TOPICAL ONCE
Status: CANCELLED | OUTPATIENT
Start: 2021-03-31 | End: 2021-03-31

## 2021-03-31 RX ORDER — BACITRACIN, NEOMYCIN, POLYMYXIN B 400; 3.5; 5 [USP'U]/G; MG/G; [USP'U]/G
OINTMENT TOPICAL ONCE
Status: CANCELLED | OUTPATIENT
Start: 2021-03-31 | End: 2021-03-31

## 2021-03-31 RX ORDER — LIDOCAINE HYDROCHLORIDE 40 MG/ML
SOLUTION TOPICAL ONCE
Status: COMPLETED | OUTPATIENT
Start: 2021-03-31 | End: 2021-03-31

## 2021-03-31 RX ORDER — LIDOCAINE 50 MG/G
OINTMENT TOPICAL ONCE
Status: CANCELLED | OUTPATIENT
Start: 2021-03-31 | End: 2021-03-31

## 2021-03-31 RX ORDER — BETAMETHASONE DIPROPIONATE 0.05 %
OINTMENT (GRAM) TOPICAL ONCE
Status: CANCELLED | OUTPATIENT
Start: 2021-03-31 | End: 2021-03-31

## 2021-03-31 RX ADMIN — LIDOCAINE HYDROCHLORIDE 5 ML: 40 SOLUTION TOPICAL at 13:06

## 2021-03-31 ASSESSMENT — PAIN DESCRIPTION - ORIENTATION: ORIENTATION: RIGHT

## 2021-03-31 ASSESSMENT — PAIN SCALES - GENERAL: PAINLEVEL_OUTOF10: 0

## 2021-03-31 ASSESSMENT — PAIN DESCRIPTION - PROGRESSION: CLINICAL_PROGRESSION: NOT CHANGED

## 2021-03-31 ASSESSMENT — ENCOUNTER SYMPTOMS
NAUSEA: 0
DIARRHEA: 0
VOMITING: 0

## 2021-03-31 ASSESSMENT — PAIN DESCRIPTION - FREQUENCY: FREQUENCY: CONTINUOUS

## 2021-03-31 ASSESSMENT — PAIN DESCRIPTION - LOCATION: LOCATION: LEG

## 2021-03-31 NOTE — PROGRESS NOTES
[address]   Progress Note and Procedure Note      Kate Piña  MEDICAL RECORD NUMBER:  [id number]  AGE: 64 y.o. GENDER: female  : 1960  EPISODE DATE:  3/31/2021    Subjective:     Chief Complaint   Patient presents with    Wound Check     right lateral leg, left posterior heel         HISTORY of PRESENT ILLNESS HPI     Kate Piña is a 64 y.o. female who presents today for wound/ulcer evaluation. Interval history: Continues to receive wound care in the SNF. States that they cream has helped but the nursing staff does not apply is as directed. Does not currently have compression stockings. Presents with ace wraps today. History of Wound Context: Patient presents for follow-up of left heel and lower leg ulcerations. She was admitted to the hospital on 21 and underwent debridement of the heel ulceration with bone biopsy of the calcaneus. 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 She was discharged to SNF. Is been receiving dressing changes and compression wraps to both lower legs in the nursing home. She continues on IV Invanz.     PCP is Brodie Austin  Date of last visit: 20           PAST MEDICAL HISTORY        Diagnosis Date    Arthritis     CHF (congestive heart failure) ([address]     COPD (chronic obstructive pulmonary disease) (HCC)     Diverticulitis     Hx of blood clots     Pulmonary embolism ([address]        PAST SURGICAL HISTORY    Past Surgical History:   Procedure Laterality Date    ANKLE SURGERY      COLON SURGERY      FOOT SURGERY Left 2021    FOOT BONE BIOPSY W/ INCISION & DRAINAGE AND REDRESSING ON RIGHT FOOT performed by John Paul Hernandez DPM at [facility] HISTORY    Family History   Problem Relation Age of Onset    Cancer Mother     Diabetes Paternal Grandfather        SOCIAL HISTORY    Social History     Tobacco Use    Smoking status: 342 lb (155.1 kg)       Physical Exam:  General:  Alert and oriented x3. In no acute distress. Lower Extremity Physical Exam:    Vascular: DP and PT pulses are not palpable. CFT brisk to all digits. Hair growth absent to the level of the digits. Non-pitting edema to BLE.       Neuro: Saph/sural/SP/DP/plantar sensation diminished to light touch     Musculoskeletal:  Gross deformity is present with contracted digits.     Dermatologic: Ulcerations have healed without sign of infection. There is no drainage. No malodor. Erythema is absent, no associated increase in warmth. Wound does not probe to bone. No fluctuance or crepitus. Surgical Pathology of Bone Biopsy 01/22/21:  SPECIMEN \"A\":  BONE, LEFT PLANTAR CALCANEUS, BIOPSY:          BENIGN BONE TRABECULAE, PERIOSTEUM, ADIPOSE AND FIBROVASCULAR   TISSUE, NEGATIVE FOR SIGNIFICANT PATHOLOGIC FINDINGS     NO EVIDENCE FOR ACUTE OSTEOMYELITIS       SPECIMEN \"B\":  BONE, LEFT FOOT SUPERIOR CALCANEUS, BIOPSY:          BENIGN BONE TRABECULA, PERIOSTEUM, ADIPOSE AND FIBROVASCULAR   TISSUE, NEGATIVE FOR SIGNIFICANT PATHOLOGIC FINDINGS     NO EVIDENCE FOR ACUTE OSTEOMYELITIS     Bone Culture 01/22/21:   No growth     PVR/PPG 01/22/21:  Right:    Normal PVRs    MAGGIE suggests no vascular insufficiency at rest        Left:    Normal PVRs    MAGGIE suggests mild vascular insufficiency at rest     Right Impression:                    Left Impression:    Plaque without increased Doppler     Plaque without increased Doppler    waveforms and monophasic flow noted  waveforms and monophasic flow noted    throughout.                          throughout.        Non visualization of the peroneal    Non visualization of the peroneal    artery.                              artery.          Assessment:      Active Hospital Problems    Diagnosis Date Noted    Localized edema [R60.0] 03/31/2021    Xerosis cutis [L85.3] 03/31/2021    Lymphedema of both lower extremities [I89.0] 02/17/2021    Corns and callosities [L84] 02/17/2021    Peripheral artery disease ([facility] [I73.9] 01/21/2021    Tobacco abuse [Z72.0] 10/29/2019    Class 3 severe obesity with serious comorbidity and body mass index (BMI) of 50.0 to 59.9 in adult [facility]) [E66.01, Z68.43] 10/29/2019       Plan:     Treatment Note please see attached Discharge Instructions    Continue compression wraps to bilateral lower extremity. Juxta-lites 20-30mmHg to be ordered by facility to prevent re-ulceration    Ammonium lactate 12% cream to be applied BID to dry skin     Monitor for new wound and call us if present    Referral to podiatry for regular foot care    Educated on signs and symptoms of infection. Instructed to call clinic immediately or go to ER if signs and symptoms of infection are present. RTC as needed      Written patient discharge instructions given to patient and signed by patient or POA.         Discharge Instructions         [address] [place] [place] [facility]                                 Visit [facility] Instructions / Physician Orders  DATE: 03/31/2021     Home Care: SNF     SUPPLIES ORDERED THRU: SNF     Wound Location:  Left heel-healed                                  Right lower leg-healed     Cleanse with: Keep dry and intact     Dressing Orders: Continue amilactin cream to legs daily                                  Wrap with ace wraps  ECF to order knee high compression stockings 20 -30 mmhg 3 pairs prior to [facility] discharge  Frequency: Keep dry and intact     Additional Orders: Increase protein to diet (meat, cheese, eggs, fish, peanut butter, nuts and beans)  Multivitamin daily        MY CHART []??????     Smart Device  []??????      HYPERBARIC TREATMENT-                TREATMENT #     Your next appointment with the [facility] is call if needed  Make appointment with Wellington Fraga for foot care [phone] 3-4 weeks                                                                                                   ROOM TYPE   []?????? CHAIR     []?????? BED   []?????? EITHER        TIME []?????? 45 MIN     []?????? 60 MIN     (Please note your next appointment above and if you are unable to keep, kindly give a 24 hour notice. Thank you.)     If you experience any of the following, please call the [address] during business hours:  [phone]     * Increase in Pain  * Temperature over 101  * Increase in drainage from your wound  * Drainage with a foul odor  * Bleeding  * Increase in swelling  * Need for compression bandage changes due to slippage, breakthrough drainage.     If you need medical attention outside of the business hours of the [address] please contact your PCP or go to the nearest emergency room.     The information contained in the After Visit Summary has been reviewed with me, the patient and/or responsible adult, by my health care provider(s). I had the opportunity to ask questions regarding this information. I have elected to receive;      []???? ?? After Visit Summary  [x]???? ?? Comprehensive Discharge Instruction        Patient signature______________________________________Date:________    Electronically signed by Nurys Pickens DPM on 3/31/2021 at 1:01 PM  Electronically signed by Marsha Casey RN on 3/31/2021 at 1:21 PM          Electronically signed by Nurys Pickens DPM on 3/31/2021 at 1:23 PM

## 2021-04-08 ENCOUNTER — CARE COORDINATION (OUTPATIENT)
Dept: CARE COORDINATION | Age: 61
End: 2021-04-08

## 2021-04-08 NOTE — CARE COORDINATION
Spoke with patient who stated she is still at [facility] and will likely be going home next week. ACM asked her to call with DC date so that we can get her scheduled with PCP and make sure she is set up with home health.    Aishwarya Hallman RN, ambulatory care manager

## 2021-04-19 ENCOUNTER — CARE COORDINATION (OUTPATIENT)
Dept: CARE COORDINATION | Age: 61
End: 2021-04-19

## 2021-04-19 NOTE — CARE COORDINATION
Attempting to reach patient for a follow up care coordination call regarding any needs, questions or concerns. CANDELARIA Jalloh [phone]  Would like to see if patient was DC from SNF.

## 2021-04-28 ENCOUNTER — CARE COORDINATION (OUTPATIENT)
Dept: CARE COORDINATION | Age: 61
End: 2021-04-28

## 2021-04-28 NOTE — CARE COORDINATION
Spoke with patient who verified she will be going home from SNF tomorrow. She is waiting on LSW to come in and discuss needs for home. ACM encouraged patient to reach out to [facility] if she does not get set up with everything that she needs for home. She stated that she will and she will call PCP office tomorrow to schedule a follow up appointment.

## 2021-05-06 ENCOUNTER — CARE COORDINATION (OUTPATIENT)
Dept: CARE COORDINATION | Age: 61
End: 2021-05-06

## 2021-05-10 ENCOUNTER — TELEPHONE (OUTPATIENT)
Dept: INTERNAL MEDICINE CLINIC | Age: 61
End: 2021-05-10

## 2021-05-10 RX ORDER — OXYCODONE AND ACETAMINOPHEN 7.5; 325 MG/1; MG/1
TABLET ORAL
Status: CANCELLED | OUTPATIENT
Start: 2021-05-10

## 2021-05-10 NOTE — TELEPHONE ENCOUNTER
Sorry for the late response. Per OARS and the PDMP the patient was prescribed 240 tablets on 4/29/21. I cannot re prescribe the pain medication since its only been 2 weeks.

## 2021-05-10 NOTE — TELEPHONE ENCOUNTER
It looks like per the PDMP, she was dispensed a 30 day supply on 4/28. It is too soon to prescribe more pain medications.

## 2021-05-10 NOTE — CARE COORDINATION
Patient returned call stating she did call the SNF and they verified the remainder of her pills are at their pharmacy and they will have someone call her to help her with this.

## 2021-05-10 NOTE — TELEPHONE ENCOUNTER
ECC received a call from: Umesh Nathan    Name of Caller: same    Relationship to patient:self    Organization name: na    Best contact number: same    Reason for call: Patient calling in so much pain. Needs her oxycodone med. She comes in for hospital f/u on May 17th. If it could be called in . [facility].  Call pt back

## 2021-05-10 NOTE — TELEPHONE ENCOUNTER
DAWN    Called and spoke with patient, advised that we are unable to refill Oxycodone per OARRS she was given a 30 day supply on 4/28. Enrique Pedersen said that the system is lying and she was given meds to get her to Thursday. I again let Enrique Pedersen know that we have to go by what Antoinette Serna is saying due to the legality and technicalities of prescribing Narcotic medications. She said \"I get it but its lying, they didn't give me barely any meds\". Patient expressed that she was in terrible pain and \"how am I supposed to do anything in this much pain\", I advised pt that if she is in that much pain and she does not feel she can wait for her appt on 5/17/21 to be evaluated that she could call EMS to take her to the ED for evaluation. Patient expressed that she wasn't going to be coming to her appt on 5/17 because \"if im in this much pain, im not getting out of this house, theres just no happening of that\". I expressed that Narendra Knight would like for her to been evaluated IN OFFICE with one of the MDs in the practice, Enrique Pedersen in return told me to [facility] the appointment, ill ride this out as long as I can but im going to call to go to the ER\". I advised pt that I could cancel if that is what she would like and she again expressed that she wanted the appt cancelled. I advised pt to call EMS if she feels she needs assistance getting to the ED, she expressed understanding.  Appt for 5/17 was cancelled per patient request

## 2021-05-10 NOTE — TELEPHONE ENCOUNTER
Patient called back to check on the status of message, patient pleading that she was in pain. Informed that Guzmanlittle Emma still needs to address the message and then office will get back with her.

## 2021-05-17 ENCOUNTER — CARE COORDINATION (OUTPATIENT)
Dept: CARE COORDINATION | Age: 61
End: 2021-05-17

## 2021-05-17 NOTE — CARE COORDINATION
Ambulatory Care Coordination Note  5/17/2021  CM Risk Score: 3  Charlson 10 Year Mortality Risk Score: 79%     ACC: Link Paris RN  Decision maker on file  Summary Note: Spoke with patient who stated she was able to get her pain medications. She was reminded to call and schedule a follow up with PCP. Patient denied any needs from [employer] today. Plan:  Follow up call in 1-2 weeks for needs, possibly graduate at this time. Care Coordination Interventions    Program Enrollment: Complex Care  Referral from Primary Care Provider: No  Suggested Interventions and Community Resources         Goals Addressed                 This Visit's Progress     Medication Management   On track     I will take my medication as directed. I will notify my provider of any problems with medications, like adverse effects or side effects. I will notify my provider/Care Coordinator if I am unable to afford my medications. I will notify my provider for advice before I stop taking any of my medication. Barriers: lack of support and overwhelmed by complexity of regimen  Plan for overcoming my barriers: work with ACM  Confidence: 9/10  Anticipated Goal Completion Date: 5.25.21            Prior to Admission medications    Medication Sig Start Date End Date Taking?  Authorizing Provider   XTAMPZA ER 9 MG C12A  4/15/21   Historical Provider, MD   oxyCODONE-acetaminophen (PERCOCET) 7.5-325 MG per tablet  4/29/21   Historical Provider, MD   metoprolol tartrate (LOPRESSOR) 25 MG tablet TAKE ONE (1) TABLET BY MOUTH TWICE A DAY FOR HTN  2/8/21   Meliza Solis MD   lisinopril (PRINIVIL;ZESTRIL) 10 MG tablet TAKE ONE (1) TABLET BY MOUTH ONCE DAILY FOR ESSENTIAL HYPERTENSION  2/8/21   Meliza Solis MD   ibuprofen (ADVIL;MOTRIN) 600 MG tablet Take 1 tablet by mouth 3 times daily as needed for Pain 1/23/21   Diann Lynne MD   albuterol sulfate  (90 Base) MCG/ACT inhaler Inhale 2 puffs into the lungs every 6 hours as needed for Wheezing 1/22/21   Melissa Zayas MD   atorvastatin (LIPITOR) 40 MG tablet Take 1 tablet by mouth nightly 1/22/21   Melissa Zayas MD   metanide [facility]) 2 MG tablet Take 1 tablet by mouth daily 1/22/21   Melissa Zayas MD   aspirin (ASPIRIN 81) 81 MG EC tablet Take 1 tablet by mouth daily 1/21/21   Melissa Zayas MD   diclofenac sodium (VOLTAREN) 1 % GEL Apply 2 g topically 4 times daily as needed for Pain 1/7/21   KIRK Chun CNP   PARoxetine (PAXIL) 20 MG tablet Take 1 tablet by mouth 2 times daily 12/17/20   KIRK Chun CNP   miconazole (MICOTIN) 2 % powder Apply topically 2 times daily.  11/4/20   Joey Virk PA-C       Future Appointments   Date Time Provider Pearl Perezi   7/14/2021  2:30 PM Tavia Kaur DPM [id number]     ,   Congestive Heart Failure Assessment    Are you currently restricting fluids?: Other  Do you understand a low sodium diet?: Yes  Do you understand how to read food labels?: Yes  Do you salt your food before tasting it?: No     No patient-reported symptoms      Symptoms:     Weight trend: stable  Salt intake watch compared to last visit: stable     ,   COPD Assessment    Does the patient understand envrionmental exposure?: Yes  Is the patient able to verbalize Rescue vs. Long Acting medications?: Yes     No patient-reported symptoms         Symptoms:         and   General Assessment    Do you have any symptoms that are causing concern?: No

## 2021-05-20 ENCOUNTER — CARE COORDINATION (OUTPATIENT)
Dept: CARE COORDINATION | Age: 61
End: 2021-05-20

## 2021-05-20 NOTE — CARE COORDINATION
Perico Patton requested  to call and follow up with Marlene Croft. Perico Patton reports that Marlene Croft informed her that she is very depressed and does have suicidal thoughts. Perico Patton recommended getting her an appointment with [facility], to go to the nearest emergency department. Per Daniel Jeong stated she will think about it. Marlene Croft was agreeable for  to call her.  called and spoke to Marlene Croft. Marlene Croft reports that she is feeling very down and depressed. Marlene Croft reports that she has been depressed for a while however recently has become worse. Marlene Croft denied any triggers that have progressed her depression. Marlene Croft reports \"my life just sucks\". Marlene Croft reports that she thinks she would be better off \"not here\".  inquired if Marlene Croft felt suicidal at today? Marlene Croft declined. Marlene Croft reports she just thinks about it from time to time. Marlene Croft denied having a plan. Marlene Croft reports \"I wouldn't do it; I just think how I would be better off not here and in pain. \"      talked about [employer]'s support system. Marlene Croft was very vague. Marlene Croft declined wanting Rescue Crisis number or suicide hotline numbers. Marlene Croft reports that she was too tired to talk anymore and wanted to nap.  recommended Marlene Croft to go to the nearest ER. Marlene Croft reports that she is fine and will start seeing a counselor.  praised Marlene Croft for willingness to see counselor however thought these number could be useful if she wanted to speak to someone if needed sooner. Marlene Croft declined. Marlene Croft thanked  for calling. Plan:    will follow up with Mahnaz Yo.

## 2021-05-21 ENCOUNTER — HOSPITAL ENCOUNTER (INPATIENT)
Age: 61
LOS: 8 days | Discharge: HOME | DRG: 751 | End: 2021-05-29
Attending: STUDENT IN AN ORGANIZED HEALTH CARE EDUCATION/TRAINING PROGRAM | Admitting: PSYCHIATRY & NEUROLOGY
Payer: COMMERCIAL

## 2021-05-21 DIAGNOSIS — R19.7 DIARRHEA, UNSPECIFIED TYPE: ICD-10-CM

## 2021-05-21 DIAGNOSIS — R45.851 SUICIDAL IDEATION: Primary | ICD-10-CM

## 2021-05-21 PROBLEM — F32.A DEPRESSION WITH SUICIDAL IDEATION: Status: ACTIVE | Noted: 2021-05-21

## 2021-05-21 LAB
ABSOLUTE EOS #: 0.2 K/UL (ref 0–0.4)
ABSOLUTE IMMATURE GRANULOCYTE: ABNORMAL K/UL (ref 0–0.3)
ABSOLUTE LYMPH #: 1.7 K/UL (ref 1–4.8)
ABSOLUTE MONO #: 0.8 K/UL (ref 0.1–1.3)
ACETAMINOPHEN LEVEL: <5 UG/ML (ref 10–30)
ALBUMIN SERPL-MCNC: 4.4 G/DL (ref 3.5–5.2)
ALBUMIN/GLOBULIN RATIO: ABNORMAL (ref 1–2.5)
ALP BLD-CCNC: 87 U/L (ref 35–104)
ALT SERPL-CCNC: 13 U/L (ref 5–33)
ANION GAP SERPL CALCULATED.3IONS-SCNC: 16 MMOL/L (ref 9–17)
AST SERPL-CCNC: 13 U/L
BASOPHILS # BLD: 1 % (ref 0–2)
BASOPHILS ABSOLUTE: 0.1 K/UL (ref 0–0.2)
BILIRUB SERPL-MCNC: 0.55 MG/DL (ref 0.3–1.2)
BUN BLDV-MCNC: 17 MG/DL (ref 8–23)
BUN/CREAT BLD: ABNORMAL (ref 9–20)
CALCIUM SERPL-MCNC: 9.8 MG/DL (ref 8.6–10.4)
CHLORIDE BLD-SCNC: 102 MMOL/L (ref 98–107)
CO2: 24 MMOL/L (ref 20–31)
CREAT SERPL-MCNC: 0.61 MG/DL (ref 0.5–0.9)
DIFFERENTIAL TYPE: ABNORMAL
EOSINOPHILS RELATIVE PERCENT: 2 % (ref 0–4)
GFR AFRICAN AMERICAN: >60 ML/MIN
GFR NON-AFRICAN AMERICAN: >60 ML/MIN
GFR SERPL CREATININE-BSD FRML MDRD: ABNORMAL ML/MIN/{1.73_M2}
GFR SERPL CREATININE-BSD FRML MDRD: ABNORMAL ML/MIN/{1.73_M2}
GLUCOSE BLD-MCNC: 114 MG/DL (ref 70–99)
HCT VFR BLD CALC: 37.1 % (ref 36–46)
HCT VFR BLD CALC: 43 % (ref 36–46)
HEMOGLOBIN: 12.5 G/DL (ref 12–16)
HEMOGLOBIN: 13.8 G/DL (ref 12–16)
IMMATURE GRANULOCYTES: ABNORMAL %
LACTIC ACID, SEPSIS WHOLE BLOOD: NORMAL MMOL/L (ref 0.5–1.9)
LACTIC ACID, SEPSIS: 1 MMOL/L (ref 0.5–1.9)
LIPASE: 34 U/L (ref 13–60)
LYMPHOCYTES # BLD: 14 % (ref 24–44)
MCH RBC QN AUTO: 31 PG (ref 26–34)
MCH RBC QN AUTO: 32.4 PG (ref 26–34)
MCHC RBC AUTO-ENTMCNC: 32.2 G/DL (ref 31–37)
MCHC RBC AUTO-ENTMCNC: 33.7 G/DL (ref 31–37)
MCV RBC AUTO: 96.2 FL (ref 80–100)
MCV RBC AUTO: 96.2 FL (ref 80–100)
MONOCYTES # BLD: 7 % (ref 1–7)
NRBC AUTOMATED: ABNORMAL PER 100 WBC
NRBC AUTOMATED: ABNORMAL PER 100 WBC
PDW BLD-RTO: 14.3 % (ref 11.5–14.9)
PDW BLD-RTO: 14.5 % (ref 11.5–14.9)
PLATELET # BLD: 444 K/UL (ref 150–450)
PLATELET # BLD: 485 K/UL (ref 150–450)
PLATELET ESTIMATE: ABNORMAL
PMV BLD AUTO: 7.2 FL (ref 6–12)
PMV BLD AUTO: 7.3 FL (ref 6–12)
POTASSIUM SERPL-SCNC: 3.9 MMOL/L (ref 3.7–5.3)
RBC # BLD: 3.86 M/UL (ref 4–5.2)
RBC # BLD: 4.46 M/UL (ref 4–5.2)
RBC # BLD: ABNORMAL 10*6/UL
SARS-COV-2, RAPID: NOT DETECTED
SEG NEUTROPHILS: 76 % (ref 36–66)
SEGMENTED NEUTROPHILS ABSOLUTE COUNT: 9.2 K/UL (ref 1.3–9.1)
SODIUM BLD-SCNC: 142 MMOL/L (ref 135–144)
SPECIMEN DESCRIPTION: NORMAL
TOTAL PROTEIN: 7.8 G/DL (ref 6.4–8.3)
WBC # BLD: 10.7 K/UL (ref 3.5–11)
WBC # BLD: 12 K/UL (ref 3.5–11)
WBC # BLD: ABNORMAL 10*3/UL

## 2021-05-21 PROCEDURE — 6370000000 HC RX 637 (ALT 250 FOR IP): Performed by: STUDENT IN AN ORGANIZED HEALTH CARE EDUCATION/TRAINING PROGRAM

## 2021-05-21 PROCEDURE — 99285 EMERGENCY DEPT VISIT HI MDM: CPT

## 2021-05-21 PROCEDURE — 80143 DRUG ASSAY ACETAMINOPHEN: CPT

## 2021-05-21 PROCEDURE — 87635 SARS-COV-2 COVID-19 AMP PRB: CPT

## 2021-05-21 PROCEDURE — 85025 COMPLETE CBC W/AUTO DIFF WBC: CPT

## 2021-05-21 PROCEDURE — 83605 ASSAY OF LACTIC ACID: CPT

## 2021-05-21 PROCEDURE — 80053 COMPREHEN METABOLIC PANEL: CPT

## 2021-05-21 PROCEDURE — 6370000000 HC RX 637 (ALT 250 FOR IP): Performed by: PSYCHIATRY & NEUROLOGY

## 2021-05-21 PROCEDURE — 36415 COLL VENOUS BLD VENIPUNCTURE: CPT

## 2021-05-21 PROCEDURE — 85027 COMPLETE CBC AUTOMATED: CPT

## 2021-05-21 PROCEDURE — 1240000000 HC EMOTIONAL WELLNESS R&B

## 2021-05-21 PROCEDURE — 83690 ASSAY OF LIPASE: CPT

## 2021-05-21 PROCEDURE — 96360 HYDRATION IV INFUSION INIT: CPT

## 2021-05-21 PROCEDURE — 2580000003 HC RX 258: Performed by: STUDENT IN AN ORGANIZED HEALTH CARE EDUCATION/TRAINING PROGRAM

## 2021-05-21 RX ORDER — ALBUTEROL SULFATE 90 UG/1
2 AEROSOL, METERED RESPIRATORY (INHALATION) EVERY 6 HOURS PRN
Status: DISCONTINUED | OUTPATIENT
Start: 2021-05-21 | End: 2021-05-29 | Stop reason: HOSPADM

## 2021-05-21 RX ORDER — ATORVASTATIN CALCIUM 20 MG/1
40 TABLET, FILM COATED ORAL NIGHTLY
Status: DISCONTINUED | OUTPATIENT
Start: 2021-05-21 | End: 2021-05-29 | Stop reason: HOSPADM

## 2021-05-21 RX ORDER — POLYETHYLENE GLYCOL 3350 17 G/17G
17 POWDER, FOR SOLUTION ORAL DAILY PRN
Status: DISCONTINUED | OUTPATIENT
Start: 2021-05-21 | End: 2021-05-29 | Stop reason: HOSPADM

## 2021-05-21 RX ORDER — 0.9 % SODIUM CHLORIDE 0.9 %
500 INTRAVENOUS SOLUTION INTRAVENOUS ONCE
Status: COMPLETED | OUTPATIENT
Start: 2021-05-21 | End: 2021-05-21

## 2021-05-21 RX ORDER — LOPERAMIDE HYDROCHLORIDE 2 MG/1
2 CAPSULE ORAL ONCE
Status: COMPLETED | OUTPATIENT
Start: 2021-05-21 | End: 2021-05-21

## 2021-05-21 RX ORDER — TRAZODONE HYDROCHLORIDE 50 MG/1
50 TABLET ORAL NIGHTLY PRN
Status: DISCONTINUED | OUTPATIENT
Start: 2021-05-21 | End: 2021-05-29 | Stop reason: HOSPADM

## 2021-05-21 RX ORDER — HYDROXYZINE 50 MG/1
50 TABLET, FILM COATED ORAL 3 TIMES DAILY PRN
Status: DISCONTINUED | OUTPATIENT
Start: 2021-05-21 | End: 2021-05-29 | Stop reason: HOSPADM

## 2021-05-21 RX ORDER — PAROXETINE HYDROCHLORIDE 20 MG/1
20 TABLET, FILM COATED ORAL 2 TIMES DAILY
Status: DISCONTINUED | OUTPATIENT
Start: 2021-05-21 | End: 2021-05-22

## 2021-05-21 RX ORDER — IBUPROFEN 600 MG/1
600 TABLET ORAL 3 TIMES DAILY PRN
Status: DISCONTINUED | OUTPATIENT
Start: 2021-05-21 | End: 2021-05-29 | Stop reason: HOSPADM

## 2021-05-21 RX ORDER — LISINOPRIL 10 MG/1
10 TABLET ORAL DAILY
Status: DISCONTINUED | OUTPATIENT
Start: 2021-05-21 | End: 2021-05-23

## 2021-05-21 RX ORDER — BUMETANIDE 1 MG/1
2 TABLET ORAL DAILY
Status: DISCONTINUED | OUTPATIENT
Start: 2021-05-21 | End: 2021-05-29 | Stop reason: HOSPADM

## 2021-05-21 RX ORDER — ASPIRIN 81 MG/1
81 TABLET ORAL DAILY
Status: DISCONTINUED | OUTPATIENT
Start: 2021-05-21 | End: 2021-05-29 | Stop reason: HOSPADM

## 2021-05-21 RX ORDER — NICOTINE 21 MG/24HR
1 PATCH, TRANSDERMAL 24 HOURS TRANSDERMAL DAILY
Status: DISCONTINUED | OUTPATIENT
Start: 2021-05-21 | End: 2021-05-24

## 2021-05-21 RX ORDER — MAGNESIUM HYDROXIDE/ALUMINUM HYDROXICE/SIMETHICONE 120; 1200; 1200 MG/30ML; MG/30ML; MG/30ML
30 SUSPENSION ORAL EVERY 6 HOURS PRN
Status: DISCONTINUED | OUTPATIENT
Start: 2021-05-21 | End: 2021-05-29 | Stop reason: HOSPADM

## 2021-05-21 RX ORDER — IBUPROFEN 400 MG/1
400 TABLET ORAL EVERY 6 HOURS PRN
Status: DISCONTINUED | OUTPATIENT
Start: 2021-05-21 | End: 2021-05-29 | Stop reason: HOSPADM

## 2021-05-21 RX ORDER — ACETAMINOPHEN 325 MG/1
650 TABLET ORAL EVERY 4 HOURS PRN
Status: DISCONTINUED | OUTPATIENT
Start: 2021-05-21 | End: 2021-05-29 | Stop reason: HOSPADM

## 2021-05-21 RX ADMIN — SODIUM CHLORIDE 500 ML: 9 INJECTION, SOLUTION INTRAVENOUS at 12:40

## 2021-05-21 RX ADMIN — ASPIRIN 81 MG: 81 TABLET, COATED ORAL at 22:20

## 2021-05-21 RX ADMIN — LISINOPRIL 10 MG: 10 TABLET ORAL at 22:20

## 2021-05-21 RX ADMIN — TRAZODONE HYDROCHLORIDE 50 MG: 50 TABLET ORAL at 22:21

## 2021-05-21 RX ADMIN — LOPERAMIDE HYDROCHLORIDE 2 MG: 2 CAPSULE ORAL at 12:47

## 2021-05-21 RX ADMIN — HYDROXYZINE HYDROCHLORIDE 50 MG: 50 TABLET, FILM COATED ORAL at 22:21

## 2021-05-21 RX ADMIN — METOPROLOL TARTRATE 25 MG: 25 TABLET, FILM COATED ORAL at 22:21

## 2021-05-21 RX ADMIN — PAROXETINE HYDROCHLORIDE 20 MG: 20 TABLET, FILM COATED ORAL at 22:21

## 2021-05-21 RX ADMIN — ATORVASTATIN CALCIUM 40 MG: 20 TABLET, FILM COATED ORAL at 22:20

## 2021-05-21 ASSESSMENT — ENCOUNTER SYMPTOMS
RHINORRHEA: 0
COUGH: 0
SHORTNESS OF BREATH: 0
PHOTOPHOBIA: 0
COLOR CHANGE: 0
ABDOMINAL PAIN: 0
EYE ITCHING: 0
VOMITING: 0
NAUSEA: 0
DIARRHEA: 1
FACIAL SWELLING: 0

## 2021-05-21 ASSESSMENT — SLEEP AND FATIGUE QUESTIONNAIRES
AVERAGE NUMBER OF SLEEP HOURS: 8
DO YOU HAVE DIFFICULTY SLEEPING: NO

## 2021-05-21 ASSESSMENT — PAIN SCALES - GENERAL
PAINLEVEL_OUTOF10: 0
PAINLEVEL_OUTOF10: 10

## 2021-05-21 ASSESSMENT — PAIN DESCRIPTION - LOCATION: LOCATION: GENERALIZED

## 2021-05-21 ASSESSMENT — PAIN - FUNCTIONAL ASSESSMENT: PAIN_FUNCTIONAL_ASSESSMENT: PREVENTS OR INTERFERES WITH MANY ACTIVE NOT PASSIVE ACTIVITIES

## 2021-05-21 ASSESSMENT — PAIN DESCRIPTION - FREQUENCY: FREQUENCY: CONTINUOUS

## 2021-05-21 ASSESSMENT — PAIN SCALES - WONG BAKER: WONGBAKER_NUMERICALRESPONSE: 8

## 2021-05-21 NOTE — BH NOTE
Pants with feces on them taken to CD unit and put into washer. Pants will be returned to patient once clean.

## 2021-05-21 NOTE — BH NOTE
Tobacco Note:    Patient given tobacco quitline number 58943105268 at this time, refusing to call at this time, states \" I just dont want to quit now\"- patient given information as to the dangers of long term tobacco use. Continue to reinforce the importance of tobacco cessation.

## 2021-05-21 NOTE — BH NOTE
Verified medications with home pharmacy and perfect served Dr. Dixie Murray to inform him it was done and that patient also needs order for line of sight, wheelchair, and walker.

## 2021-05-21 NOTE — BH NOTE
`[facility]  Admission Note     Admission Type:   Admission Type: Voluntary    Reason for admission:  Reason for Admission: Suicidal ideation related to increased depression due to an inability to engage in activities of daily living.     PATIENT STRENGTHS:  Strengths: Social Skills, Positive Support    Patient Strengths and Limitations:  Limitations: General negative or hopeless attitude about future/recovery, Perceives need for assistance with self-care    Addictive Behavior:   Addictive Behavior  In the past 3 months, have you felt or has someone told you that you have a problem with:  : None  Do you have a history of Chemical Use?: Yes  Do you have a history of Alcohol Use?: No  Do you have a history of Street Drug Abuse?: No  Histroy of Prescripton Drug Abuse?: Yes    Medical Problems:   Past Medical History:   Diagnosis Date    Arthritis     [facility]congestive [facility]     COPD [facility] obstructive pulmonary disease) [facility]     Diverticulitis     Hx of blood clots     Pulmonary embolism ([facility])        Status EXAM:  Status and Exam  Normal: No  Facial Expression: Flat, Sad  Affect: Appropriate  Level of Consciousness: Alert  Mood:Normal: No  Mood: Depressed, Sad  Motor Activity:Normal: No  Motor Activity: Decreased  Interview Behavior: Cooperative  Preception: [place] to Person, Joshi Ditto to Time, [place] to Place, [place] to Situation  Attention:Normal: No  Attention: Distractible  Thought Processes: Circumstantial  Thought Content:Normal: Yes  Hallucinations: None  Delusions: No  Memory:Normal: Yes  Insight and Judgment: No  Insight and Judgment: Poor Judgment, Poor Insight  Present Suicidal Ideation: No  Present Homicidal Ideation: No    Tobacco Screening:  Practical Counseling, on admission, roque X, if applicable and completed (first 3 are required if patient doesn't refuse):            ( )  Recognizing danger situations (included triggers and roadblocks)                    ( ) Coping skills (new ways to manage stress, exercise, relaxation techniques, changing routine, distraction)                                                           ( )  Basic information about quitting (benefits of quitting, techniques in how to quit, available resources  ( ) Referral for counseling faxed to [facility]                                           (x ) Patient refused counseling  ( ) Patient has not smoked in the last 30 days    Metabolic Screening:    Lab Results   Component Value Date    LABA1C 6.2 (H) 01/21/2021       Lab Results   Component Value Date    CHOL 135 10/29/2019     Lab Results   Component Value Date    TRIG 64 10/29/2019     Lab Results   Component Value Date    HDL 46 10/29/2019     No components found for: LDLCAL  No results found for: LABVLDL      Body mass index is 54.82 kg/m². BP Readings from Last 2 Encounters:   05/21/21 122/70   03/17/21 137/71           Pt admitted with followings belongings:  Dentures: None  Vision - Corrective Lenses: None  Hearing Aid: None  Jewelry: None  Body Piercings Removed: N/A  Clothing: Shirt  Were All Patient Medications Collected?: Not Applicable  Other Valuables: Cell phone, Other (Comment) (phone )     Valuables sent home with n/a. Valuables placed in safe in security envelope, number:  [id number]. Patient's home medications were verified. Patient oriented to surroundings and program expectations and copy of patient rights given. Received admission packet:  yes. Consents reviewed, signed yes. Refused no. Patient verbalize understanding:  yes. Patient education on precautions: completed    Patient was escorted to [facility] unit by 2 [facility] staff via wheelchair. Patient was unsteady and required assistance for transfer. Patient reported that she had been suicidal due to increased depression related to a decreased ability to care for herself and engage in activities of daily living.   Patient reported that she felt safe and was not suicidal at time of admission. Patient related that she had used her prescription for percocet too quickly and was unable to get more and has been experiencing withdrawal symptoms like diarrhea. Patient said that she has not had her prescription medications in a week and has noticed an increase in depression and anxiety. Patient has scattered scabs and scratches over her extremities and abdomen. Patient stated that the wounds are from itching related to increased anxiety. Patient has poor hygiene, dirty feet and reddened areas in her skin folds on her lower abdominal area.                    Norma Bolton RN

## 2021-05-21 NOTE — ED PROVIDER NOTES
EMERGENCY DEPARTMENT ENCOUNTER    Pt Name: Joshua Mott  MRN: [id number]  [place] 1960  Date of evaluation: 5/21/21  CHIEF COMPLAINT       Chief Complaint   Patient presents with    Diarrhea    Addiction Problem    Suicidal     HISTORY OF PRESENT ILLNESS   HPI  43-year-old female history of arthritis, COPD, CHF presents for evaluation of diarrhea. Patient states she has been taking Percocet daily for the past year or so for her hip pain. She stopped this about 3 days ago and since then has been having innumerable episodes of brown watery diarrhea. No nausea or vomiting. No abdominal pain. No fever chills. No recent antibiotics. No shortness of breath or chest pain. No lightheadedness or passing out. Additionally is feeling tearful and intermittently suicidal.  She does not have any kind of a plan. She denies any attempted self-harm today. She does have home nurse she talks to about this and is trying to follow-up with psychiatry as an outpatient. No hallucinations. No homicidal ideation. REVIEW OF SYSTEMS     Review of Systems   Constitutional: Negative for chills and fatigue. HENT: Negative for facial swelling, postnasal drip and rhinorrhea. Eyes: Negative for photophobia and itching. Respiratory: Negative for cough and shortness of breath. Cardiovascular: Negative for chest pain and leg swelling. Gastrointestinal: Positive for diarrhea. Negative for abdominal pain, nausea and vomiting. Genitourinary: Negative for dysuria, flank pain and hematuria. Musculoskeletal: Negative for arthralgias and joint swelling. Skin: Negative for color change and rash. Neurological: Negative for dizziness, numbness and headaches.      PASTMEDICAL HISTORY     Past Medical History:   Diagnosis Date    Arthritis     [facility] [address]     COPD (chronic obstructive pulmonary disease) ([facility])     Diverticulitis     Hx of blood clots     Pulmonary embol[facility] ([facility])      Past Problem List  Patient Active Problem List   Diagnosis Code    Chronic heart failure with preserved ejection fraction ([facility]) I50.32    Chronic obstructive pulmonary disease ([facility]) J44.9    Tobacco abuse Z72.0    Class 3 severe obesity with serious comorbidity and body mass index (BMI) of 50.0 to 59.9 in adult ([facility] E66.01, Z68.43    Essential hypertension I10    History of pulmonary embolism Z86.711    Family history of colon cancer in mother [de-identified]    Prediabetes R73.03    Class 4 congestive heart failure, acute on chronic, systolic ([facility]) X23.60    Hypokalemia E87.6    Cellulitis L03.90    Primary osteoarthritis of right hip M16.11    Chronic pain of multiple joints M25.50, G89.29    Depression F32.9    Cellulitis of right leg L03.115    Peripheral artery disease ([facility]) I73.9    Non healing left heel wound S91.302A    Corns and callosities L84    Healing pressure ulcer, stage IV ([facility]) L89.94    Lymphedema of both lower extremities I89.0    Non-pressure chronic ulcer of right lower leg with fat layer exposed ([facility] L97.912    Localized edema R60.0    Xerosis cutis L85.3    Depression with suicidal ideation F32.9, R45.851     SURGICAL HISTORY       Past Surgical History:   Procedure Laterality Date    ANKLE SURGERY      COLON SURGERY      FOOT SURGERY Left 1/22/2021    FOOT BONE BIOPSY W/ INCISION & DRAINAGE AND REDRESSING ON RIGHT FOOT performed by Hussain Maravilla DPM at [address]       Current Discharge Medication List      CONTINUE these medications which have NOT CHANGED    Details   metoprolol tartrate (LOPRESSOR) 25 MG tablet TAKE ONE (1) TABLET BY MOUTH TWICE A DAY FOR HTN   Qty: 60 tablet, Refills: 0      lisinopril (PRINIVIL;ZESTRIL) 10 MG tablet TAKE ONE (1) TABLET BY MOUTH ONCE DAILY FOR ESSENTIAL HYPERTENSION   Qty: 30 tablet, Refills: 0      ibuprofen (ADVIL;MOTRIN) 600 MG tablet Take 1 tablet by mouth 3 times daily as needed for Pain  Qty: 30 tablet, Refills: 0      albuterol sulfate  (90 Base) MCG/ACT inhaler Inhale 2 puffs into the lungs every 6 hours as needed for Wheezing  Qty: 1 Inhaler, Refills: 0      atorvastatin (LIPITOR) 40 MG tablet Take 1 tablet by mouth nightly  Qty: 30 tablet, Refills: 3      bumetanide (BUMEX) 2 MG tablet Take 1 tablet by mouth daily  Qty: 30 tablet, Refills: 1      aspirin (ASPIRIN 81) 81 MG EC tablet Take 1 tablet by mouth daily  Qty: 30 tablet, Refills: 3      diclofenac sodium (VOLTAREN) 1 % GEL Apply 2 g topically 4 times daily as needed for Pain  Qty: 350 g, Refills: 3      PARoxetine (PAXIL) 20 MG tablet Take 1 tablet by mouth 2 times daily  Qty: 60 tablet, Refills: 1      XTAMPZA ER 9 MG C12A       oxyCODONE-acetaminophen (PERCOCET) 7.5-325 MG per tablet            ALLERGIES     has No Known Allergies. FAMILY HISTORY     She indicated that her mother is . She indicated that the status of her paternal grandfather is unknown. SOCIAL HISTORY       Social History     Tobacco Use    Smoking status: Current Every Day Smoker     Packs/day: 0.25     Years: 45.00     Pack years: 11.25    Smokeless tobacco: Never Used    Tobacco comment: has not smoked in two months   Vaping Use    Vaping Use: Never used   Substance Use Topics    Alcohol use: Not Currently     Comment: 1-2 times per week    Drug use: Not Currently     PHYSICAL EXAM     INITIAL VITALS: /70   Pulse 99   Temp 98 °F (36.7 °C) (Temporal)   Resp 16   Ht 5' 7\" (1.702 m)   Wt (!) 350 lb (158.8 kg)   SpO2 96%   BMI 54.82 kg/m²    Physical Exam  Constitutional:       Appearance: She is normal weight. HENT:      Head: Normocephalic and atraumatic. Eyes:      Extraocular Movements: Extraocular movements intact. Pupils: Pupils are equal, round, and reactive to light. Cardiovascular:      Rate and Rhythm: Regular rhythm. Tachycardia present.    Pulmonary:      Effort: Pulmonary effort is normal.      Breath sounds: Notable for the following components:       Result Value    WBC 12.0 (*)     Platelets 013 (*)     Seg Neutrophils 76 (*)     Lymphocytes 14 (*)     Segs Absolute 9.20 (*)     All other components within normal limits   COMPREHENSIVE METABOLIC PANEL W/ REFLEX TO MG FOR LOW K - Abnormal; Notable for the following components:    Glucose 114 (*)     All other components within normal limits   ACETAMINOPHEN LEVEL - Abnormal; Notable for the following components:    Acetaminophen Level <5 (*)     All other components within normal limits   COVID-19, RAPID   LIPASE       EMERGENCY DEPARTMENTCOURSE:         Vitals:    Vitals:    05/21/21 1430 05/21/21 1500 05/21/21 1700 05/21/21 1715   BP: 130/72 135/72 122/70    Pulse:   99    Resp:   22 16   Temp:   98 °F (36.7 °C)    TempSrc:   Temporal Oral   SpO2: 94% 96%     Weight:       Height:           The patient was given the following medications while in the emergency department:  Orders Placed This Encounter   Medications    0.9 % sodium chloride IV bolus 500 mL    loperamide (IMODIUM) capsule 2 mg    aluminum & magnesium hydroxide-simethicone (MAALOX) 200-200-20 MG/5ML suspension 30 mL    polyethylene glycol (GLYCOLAX) packet 17 g    traZODone (DESYREL) tablet 50 mg    ibuprofen (ADVIL;MOTRIN) tablet 400 mg    acetaminophen (TYLENOL) tablet 650 mg    nicotine (NICODERM CQ) 14 MG/24HR 1 patch    hydrOXYzine (ATARAX) tablet 50 mg     CONSULTS:  None    FINAL IMPRESSION      1. Suicidal ideation    2. Diarrhea, unspecified type          DISPOSITION/PLAN   DISPOSITION Decision To Admit 05/21/2021 02:01:24 PM      PATIENT REFERRED TO:  No follow-up provider specified.   DISCHARGE MEDICATIONS:  Current Discharge Medication List        Rosanna Abernathy MD  Attending Emergency Physician                    Rosanna Abernathy MD  05/21/21 8596

## 2021-05-21 NOTE — ED TRIAGE NOTES
Patient to ED by EMS from home for initial c/o persistent diarrhea. Patient becomes tearful and states she has been taking too much of her Percocet and an unprescribed suboxone for her chronic arthritic pain. States \" I don't want to live anymore, I'm sick of myself. \" Denies taking medications in an attempt to end her life. Denies having a plan to end her life. States \" . .and no, I don't have a plan. Everyone always asks me if I have a plan. \" Physician aware. VSS.

## 2021-05-21 NOTE — ED NOTES
Patient asking for Percocet, and if she cannot have Percocet, she would like Suboxone. She denies having a previous prescription for Suboxone. Provider updated.       Emanuel Sanchez RN  05/21/21 3316

## 2021-05-21 NOTE — ED NOTES
Provisional Diagnosis:   Major Depressive Disorder    Psychosocial and Contextual Factors:   Patient reports ongoing chronic medical health issues and pain. Patient states she has been having fleeting suicidal ideations that's have been increasingly been getting worse. C-SSRS Summary:      Patient: X  Family:   Agency:     Substance Abuse: Patient reports she has been using prescribed percocet, and unprescribed suboxone. Present Suicidal Behavior:  Patient states she is going to kill herself when/if she goes home. Patient did not indicate any means. Verbal: X    Attempt:    Past Suicidal Behavior: Patient denies. Verbal:    Attempt:      Self-Injurious/Self-Mutilation:Patient denies. Trauma Identified:  Patient denies. Protective Factors:    Patient has housing with her . Patient has an income. Risk Factors:    Patient not linked with outpatient mental health service. Clinical Summary:    Dianelys Sheikh is a 64 y.o. female who presents to the ED for initaal medical complaint. Upon screening patient states she has been intermittently suicidal. Patient then stated would kill herself if she went home today. Patient is tearful while in the ED. Patient adamently states if she goes home she will kill herself. Patient states her suicidal ideation has been increasingly been getting worse. Patient states \"I am just embarrassed. I shit myself. I am in pain. I am addicted to percocet. I just don't want to live, I am not happy. \"  Patient reports daily feelings of hopelessness and worthlessness. Patient reports no motivation and states she spends most of her day in a chair. Patient states she uses a walker to use the restroom. Patient reports extensive chronic medical conditions. Patient states she feels like a burden. Patient states she has been oversleeping.     Per RN note, patient \"states she has been taking too much of her Percocet and an unprescribed suboxone for her chronic arthritic pain. States \" I don't want to live anymore, I'm sick of myself. \"\"    Patient states she has never received outpatient mental health treatment or had a psychiatrist. Patient has insight. Patient reports she has a support system but states she doesn't use them. Patient denies homicidal ideation. Patient denies auditory and visual hallucinations. Patient denies paranoid thoughts. Level of Care Disposition:    Writer consulted with Dr. Armani Munoz who recommends inpatient psychiatric admission for safety and stabilization. Patient is in agreement and signed application for voluntary admission.

## 2021-05-21 NOTE — ED NOTES
Up to restroom with use of rajni-steady. Linens changed. Patient placed in [facility] approved clothing.      Marlin Khalil RN  05/21/21 9483

## 2021-05-21 NOTE — ED NOTES
Bed: D  Expected date:   Expected time:   Means of arrival:   Comments:     Paris Mittal RN  05/21/21 9412

## 2021-05-22 PROBLEM — F33.2 MAJOR DEPRESSIVE DISORDER, RECURRENT, SEVERE WITHOUT PSYCHOTIC BEHAVIOR (HCC): Status: ACTIVE | Noted: 2021-05-22

## 2021-05-22 PROBLEM — F11.10 PERCOCET USE DISORDER, MILD, ABUSE (HCC): Status: ACTIVE | Noted: 2021-05-22

## 2021-05-22 PROCEDURE — 99254 IP/OBS CNSLTJ NEW/EST MOD 60: CPT | Performed by: INTERNAL MEDICINE

## 2021-05-22 PROCEDURE — 90792 PSYCH DIAG EVAL W/MED SRVCS: CPT | Performed by: PSYCHIATRY & NEUROLOGY

## 2021-05-22 PROCEDURE — 6370000000 HC RX 637 (ALT 250 FOR IP): Performed by: PSYCHIATRY & NEUROLOGY

## 2021-05-22 PROCEDURE — 1240000000 HC EMOTIONAL WELLNESS R&B

## 2021-05-22 RX ORDER — DULOXETIN HYDROCHLORIDE 20 MG/1
40 CAPSULE, DELAYED RELEASE ORAL DAILY
Status: DISCONTINUED | OUTPATIENT
Start: 2021-05-23 | End: 2021-05-24

## 2021-05-22 RX ORDER — PAROXETINE HYDROCHLORIDE 20 MG/1
20 TABLET, FILM COATED ORAL NIGHTLY
Status: DISCONTINUED | OUTPATIENT
Start: 2021-05-22 | End: 2021-05-25

## 2021-05-22 RX ORDER — PAROXETINE 10 MG/1
10 TABLET, FILM COATED ORAL 2 TIMES DAILY
Status: DISCONTINUED | OUTPATIENT
Start: 2021-05-22 | End: 2021-05-22

## 2021-05-22 RX ORDER — PAROXETINE 10 MG/1
10 TABLET, FILM COATED ORAL DAILY
Status: DISCONTINUED | OUTPATIENT
Start: 2021-05-23 | End: 2021-05-25

## 2021-05-22 RX ORDER — LIDOCAINE 4 G/G
1 PATCH TOPICAL DAILY
Status: DISCONTINUED | OUTPATIENT
Start: 2021-05-22 | End: 2021-05-29 | Stop reason: HOSPADM

## 2021-05-22 RX ORDER — DULOXETIN HYDROCHLORIDE 20 MG/1
20 CAPSULE, DELAYED RELEASE ORAL DAILY
Status: DISCONTINUED | OUTPATIENT
Start: 2021-05-22 | End: 2021-05-22

## 2021-05-22 RX ORDER — DULOXETIN HYDROCHLORIDE 20 MG/1
20 CAPSULE, DELAYED RELEASE ORAL DAILY
Status: DISCONTINUED | OUTPATIENT
Start: 2021-05-23 | End: 2021-05-22

## 2021-05-22 RX ADMIN — ASPIRIN 81 MG: 81 TABLET, COATED ORAL at 08:42

## 2021-05-22 RX ADMIN — PAROXETINE HYDROCHLORIDE 20 MG: 20 TABLET, FILM COATED ORAL at 20:51

## 2021-05-22 RX ADMIN — PAROXETINE HYDROCHLORIDE 20 MG: 20 TABLET, FILM COATED ORAL at 08:42

## 2021-05-22 RX ADMIN — ATORVASTATIN CALCIUM 40 MG: 20 TABLET, FILM COATED ORAL at 20:51

## 2021-05-22 RX ADMIN — TRAZODONE HYDROCHLORIDE 50 MG: 50 TABLET ORAL at 20:51

## 2021-05-22 RX ADMIN — HYDROXYZINE HYDROCHLORIDE 50 MG: 50 TABLET, FILM COATED ORAL at 20:51

## 2021-05-22 RX ADMIN — LISINOPRIL 10 MG: 10 TABLET ORAL at 08:42

## 2021-05-22 RX ADMIN — METOPROLOL TARTRATE 25 MG: 25 TABLET, FILM COATED ORAL at 08:42

## 2021-05-22 RX ADMIN — DULOXETINE 20 MG: 20 CAPSULE, DELAYED RELEASE ORAL at 14:18

## 2021-05-22 ASSESSMENT — PATIENT HEALTH QUESTIONNAIRE - PHQ9: SUM OF ALL RESPONSES TO PHQ QUESTIONS 1-9: 22

## 2021-05-22 ASSESSMENT — PAIN DESCRIPTION - LOCATION: LOCATION: HIP

## 2021-05-22 ASSESSMENT — SLEEP AND FATIGUE QUESTIONNAIRES
AVERAGE NUMBER OF SLEEP HOURS: 18
DO YOU USE A SLEEP AID: NO

## 2021-05-22 ASSESSMENT — PAIN SCALES - GENERAL: PAINLEVEL_OUTOF10: 8

## 2021-05-22 NOTE — CARE COORDINATION
Psychosocial Assessment    Current Level of Psychosocial Functioning     Independent  X  Dependent     Minimal Assist     Comments:      Psychosocial High Risk Factors (check all that apply)    Unable to obtain meds   Chronic illness/pain   X  Substance abuse     X  Lack of Family Support  X  Financial stress   Isolation   Inadequate Community Resources  Suicide attempt(s)  Not taking medications    Victim of crime   Developmental Delay  Unable to manage personal needs   X  Age 72 or older   Homeless  No transportation   Readmission within 30 days  Unemployment  Traumatic Event    Family/Supports identified: Pt identified having family but only speaks with one sister     Sexual Orientation:  Heterosexual    Patient Strengths: Educated    Patient Barriers: current mental health symptoms and abuse of pain pills    Safety plan: Completed     CMHC/MH history: Not linked     Plan of Care:  medication management, group/individual therapies, family meetings, psycho -education, treatment team meetings to assist with stabilization    Initial Discharge Plan:  Return home, link with a mental health agency. Clinical Summary:  Pt is a 64year old  female admitted to the [facility] for safety and stabilization. Pt declined to leave bed for assessment. Pt stated \"I stink, I can shower but I can't reach certain parts so I don't want to get up. \" Pt reports feeling suicidal because pt cannot obtain any more percocet due to abusing the medication. Pt reports having pain in the hip and knee area which is why pt takes the medication. Pt feels without the medication there is no reason to live in such pain. Pt shared being unable to have home healthcare in the home due to living in [facility]. Pt shared not being linked with any mental health agency, obtaining medication from the APRN. Pt expressed concerned regarding pain management.  Pt denies any thoughts of homicide at the time of assessment. Pt denies any hallucinations.

## 2021-05-22 NOTE — BH NOTE
Pt. Did not attend 1100 wellness group. Pt offered 1:1 talk time as an alternative to group. Pt. Declined.

## 2021-05-22 NOTE — GROUP NOTE
Group Therapy Note    Date: 5/22/2021    Group Start Time: 1330  Group End Time: 4463  Group Topic: Psychoeducation    MALENA Vaca, CTRS    Pt did not attend 1330 psychoeducation group d/t resting in room despite staff invitation to attend. 1:1 talk time offered as alternative to group session, pt declined.       Signature:  Abhay Gentile

## 2021-05-22 NOTE — SUICIDE SAFETY PLAN
SAFETY PLAN    A suicide Safety Plan is a document that supports someone when they are having thoughts of suicide. Warning Signs that indicate a suicidal crisis may be developing: What (situations, thoughts, feelings, body sensations, behaviors, etc.) do you experience that lets you know you are beginning to think about suicide? 1. \"Just happens\"    Internal Coping Strategies:  What things can I do (relaxation techniques, hobbies, physical activities, etc.) to take my mind off my problems without contacting another person? 1. \"I don't have anything\"    People and social settings that provide distraction: Who can I call or where can I go to distract me? 1. \"I don't have anything\"    People whom I can ask for help: Who can I call when I need help - for example, friends, family, clergy, someone else? 1. \"Noone, I don't want to bother people\"  Professionals or [address] [place], [place] [address]vd I can contact during a crisis: Who can I call for help - for example, my doctor, my psychiatrist, my psychologist, a mental health provider, a suicide hotline? [facility] 2-1-1 [phone] or [phone]    [facility] [address] [facility], 24-hour Crisis Services, Central Access: [phone], [address] [facility], [address]    MONSE (National Association of Mental [employer]ness) groups and support, [facility] [facility] [address] [phone]. Suicide Prevention Lifeline: 9-114-056-TALK (8834)      Making the environment safe: How can I make my environment (house/apartment/living space) safer? For example, can I remove guns, medications, and other items? 1.  \"I have no idea\"

## 2021-05-22 NOTE — H&P
Currently     Patient reports she smokes cigarettes 1-1/2 packs a day. She reports she has not drank alcohol in a year. Reports she was taking 7.5 mg Percocets for the past year for her pain. She stopped taking it on Monday when she ran out of her medication. She reports she was taking 8 Percocet pills a day when she was in the nursing home. Patient reports she has used Suboxone \"one time\" a year ago. She reports \"it was a friend of mine's\". Patient denies the use of any other substances or drugs. LEGAL HISTORY:   HISTORY OF INCARCERATION: [x] Yes [] No  Patient reports she has a history of incarceration for drunk driving in the [place]. Family History:       Problem Relation Age of Onset    Cancer Mother     Diabetes Paternal Grandfather        Psychiatric Family History  Patient denies psychiatric family history. Suicides in family: [] Yes [x] No    Substance use in family: [x] Yes [] No   Patient reports \"we'll drink\". PHYSICAL EXAM:  Vitals:  /74   Pulse 72   Temp 97.8 °F (36.6 °C) (Temporal)   Resp 14   Ht 5' 7\" (1.702 m)   Wt (!) 350 lb (158.8 kg)   SpO2 96%   BMI 54.82 kg/m²     LABS:  Labs reviewed: [x] Yes  Last EKG in EMR reviewed: [x] Yes          Review of Systems   Constitutional: Negative for chills and weight loss. HENT: Negative for ear pain and nosebleeds. Eyes: Negative for blurred vision and photophobia. Respiratory: Negative for cough, shortness of breath and wheezing. Cardiovascular: Negative for chest pain and palpitations. Gastrointestinal: Negative for abdominal pain, diarrhea and vomiting today. Previous report of diarrhea. Genitourinary: Negative for dysuria and urgency. Musculoskeletal: Negative for falls. Positive for pain. Skin: Negative for itching and rash. Neurological: Negative for tremors, seizures and weakness. Endo/Heme/Allergies: Does not bruise/bleed easily.       Physical Exam:   Constitutional:  Appears well-developed and well-nourished, no acute distress. HENT:   Head: Normocephalic and atraumatic. Eyes: Conjunctivae are normal. Right eye exhibits no discharge. Left eye exhibits no discharge. No scleral icterus. Neck: Normal range of motion. Neck supple. Pulmonary/Chest:  No respiratory distress or accessory muscle use, no wheezing. Cardiac: Regular rate and rhythm. Abdominal: Soft. Non-tender. Exhibits no distension. Musculoskeletal: Normal range of motion. Exhibits no edema. Neurological: cranial nerves II-XII grossly in tact, normal gait and station. Skin: Skin is warm and dry. Patient is not diaphoretic. No erythema. Mental Status Examination:    Level of consciousness: Awake and alert  Appearance:  Appropriate attire, resting in bed, fair grooming   Behavior/Motor: Approachable, no psychomotor abnormalities noted  Attitude toward examiner:  Cooperative, attentive, good eye contact  Speech: Normal rate, volume, and tone. Mood: Depressed  Affect:  Congruent to mood  Thought processes:  Goal directed, linear  Thought content: Fleeting suicidal ideations, without current plan or intent, contracts for safety on the unit.                Denies homicidal ideations               Denies hallucinations              Denies delusions              Denies paranoia  Cognition:  Oriented to self, location, time, situation  Concentration: Clinically adequate  Memory: Intact  Insight &Judgment: Poor         DSM-5 Diagnosis    Principal Problem: Major depressive disorder, recurrent, severe without psychotic behavior ([address]    Percocet Use Disorder, mild, abuse    Psychosocial and Contextual factors:  Financial denies  Occupational denies  Relationship endorses  Legal denies  Living situation denies  Educational denies    Past Medical History:   Diagnosis Date    Arthritis     CHF (congestive heart failure) ([address]     COPD (chronic obstructive pulmonary disease) ([address]     Diverticulitis     Hx of blood clots     Pulmonary embolism ([facility]         TREATMENT PLAN    Continue inpatient psychiatric treatment. Home medications reviewed. Problem list updated. Medication change: Paxil dose adjusted to 10 mg in the morning (starting 5/23/21) and 20 mg at bedtime (starting 5/22/21). .  Medication added: Cymbalta extended release capsule 20 mg daily. First dose on 05/22/21. Medication titration: We will titrate from Paxil to Cymbalta. Consider PT/OT. Internal medicine consult for medical management and chronic pain. Monitor need and frequency of PRN medications. Attempt to develop insight. Follow-up daily while inpatient. Reviewed risks and benefits as well as potential side effects with patient. CONSULTS [x] Yes [] No   Consult internal medicine ordered:    · Medical management  · Chronic pain    Risk Management: close watch per standard protocol      Psychotherapy: participation in milieu and group and individual sessions with Attending Physician,  and Physician Assistant/CNP      Estimated length of stay:  2-14 days      GENERAL PATIENT/FAMILY EDUCATION  Patient will understand basic signs and symptoms, patient will understand benefits/risks and potential side effects from proposed medications, and patient will understand their role in recovery. Family is not active in patient's care. Patient assets that may be helpful during treatment include: Intent to participate and engage in treatment, sufficient fund of knowledge and intellect to understand and utilize treatments. Goals:    1) Remission of depression, anxiety, and suicidal ideation. 2) Stabilization of symptoms prior to discharge. 3) Establish efficacy and tolerability of medications.          Behavioral Services  Medicare Certification     Admission Day 1  I certify that this patient's inpatient psychiatric hospital admission is medically necessary for:    x (1) treatment which could reasonably be expected to improve this patient's condition, or    x (2) diagnostic study or its equivalent. Time Spent: 60 minutes    Tank Madrid is a 64 y.o. female being evaluated face to face    --KIRK Kirkpatrick CNP on 5/22/2021 at 10:33 AM    An electronic signature was used to authenticate this note. Psychiatry Attending Attestation     I independently saw and evaluated the patient. I reviewed the Advance Practice Provider's documentation above. Any additional comments or changes to the Advance Practice Provider's documentation are stated below otherwise agree with assessment. Patient is a 61-year-old single  female with history of depression, currently taking Paxil for last 15 years, admitted for worsening suicidal thoughts and after she stated that she would kill herself if she goes home from the emergency department. Patient initially presented to the emergency department for diarrhea possibly secondary to withdrawals from Percocets. Patient was prescribed 240 pills of Percocets on 4/29 however she ran out of them last Monday. Reports that she has been taking 15 pills a day at times and has been abusing them help with her pain. She made several statements in the emergency department stating \"I do not want to live anymore, I am sick of myself\"  Reports feeling sad down and low for last several weeks now. Endorses anhedonia. Identifies her complex medical issues as a stressor. Reports that she has good support from her boyfriend at home. Reports constant feelings of helplessness hopelessness and worthlessness. Reports having some trouble falling asleep and staying asleep secondary to pain. Patient mentions that she has not tried any other antidepressant other than Paxil lately. Discussed with her about cross titrating off Paxil and onto Cymbalta. She understood and agreed to the plan. Agree with rest of the assessment and plan as above.     Electronically signed by Shamir Briseno MD Melodie on 5/22/21 at 1:41 PM EDT

## 2021-05-22 NOTE — BH NOTE
Ms. Laura Perkins refused her morning scheduled Bumex and reported \"Every time I ring my call light, no one comes to help me and I piss myself\". Writer inquired of Ms. Malagon current needs. She declined having to use the restroom, she declined needing fluids and denied having other needs at that time.

## 2021-05-22 NOTE — PLAN OF CARE
Problem: Falls - Risk of:  Goal: Will remain free from falls  Description: Will remain free from falls  Outcome: Ongoing  Note: Patient remains free from falls at this time. Problem: Falls - Risk of:  Goal: Absence of physical injury  Description: Absence of physical injury  Outcome: Ongoing  Note: Patient remains free from injuries at this time. Problem: Altered Mood, Depressive Behavior:  Goal: Ability to disclose and discuss suicidal ideas will improve  Description: Ability to disclose and discuss suicidal ideas will improve  Outcome: Ongoing  Note: Patient denies suicidal ideation at this time. Problem: Altered Mood, Depressive Behavior:  Goal: Absence of self-harm  Description: Absence of self-harm  Outcome: Ongoing  Note: Patient denies thoughts of self-harm at this time. Problem: Altered Mood, Deterioration in Function:  Goal: Ability to perform activities of daily living will improve  Description: Ability to perform activities of daily living will improve  Outcome: Ongoing  Note: Patient is able to perform activities of daily living with encouragement. Problem: Skin Integrity:  Goal: Will show no infection signs and symptoms  Description: Will show no infection signs and symptoms  Outcome: Ongoing  Note: Patient shows no signs and symptoms of infection at this time. Problem: Skin Integrity:  Goal: Absence of new skin breakdown  Description: Absence of new skin breakdown  Outcome: Ongoing  Note: Patient has no new skin breakdown at this time. Problem: Tobacco Use:  Goal: Inpatient tobacco use cessation counseling participation  Description: Inpatient tobacco use cessation counseling participation  Outcome: Ongoing  Note: Patient participated in inpatient tobacco use cessation counseling.

## 2021-05-22 NOTE — CONSULTS
[facility] Internal Medicine    CONSULTATION / HISTORY AND PHYSICAL EXAMINATION            Date:   5/22/2021  Patient name: Tony Mason  Date of admission:  5/21/2021 12:10 PM  MRN:   [id number]  Account:  [de-identified]  YOB: 1960  PCP:    Claudette Goldstein, APRN - CNP  Room:   [facility]0136-  Code Status:    Full Code    Physician Requesting Consult: Alonso Rosenberg MD    Reason for Consult:  medical management    Chief Complaint:     Chief Complaint   Patient presents with    Diarrhea    Addiction Problem    Suicidal       History Obtained From:     Patient medical record nursing staff    History of Present Illness:   Patient mated to [facility] with major depression  Entered medicine consulted for multiple medical issues, chronic pain  Patient has multiple medical problem which includes hypertension, hyperlipidemia, morbid obesity, heart failure with preserved ejection fraction, COPD not on oxygen, chronic arthritis of right hip, patient mention that she has constant pain, pain is dull aching in nature, worse with movement of leg   No other complaints. Past Medical History:     Past Medical History:   Diagnosis Date    Arthritis     CHF (congestive heart failure) ([address]     [facility]     Diverticulitis     Hx of blood clots     Pulmonary embolism ([facility])         Past Surgical History:     Past Surgical History:   Procedure Laterality Date    ANKLE SURGERY      COLON SURGERY      FOOT SURGERY Left 1/22/2021    FOOT BONE BIOPSY W/ INCISION & DRAINAGE AND REDRESSING ON RIGHT FOOT performed by Germaine Dunaway DPM at [address] [place] [address]          Medications Prior to Admission:     Prior to Admission medications    Medication Sig Start Date End Date Taking?  Authorizing Provider   metoprolol tartrate (LOPRESSOR) 25 MG tablet TAKE ONE (1) TABLET BY MOUTH TWICE A DAY FOR HTN  2/8/21  Yes Leola Bernard MD   lisinopril (PRINIVIL;ZESTRIL) 10 MG tablet TAKE ONE (1) TABLET BY MOUTH ONCE DAILY FOR ESSENTIAL HYPERTENSION  2/8/21  Yes Hiren Wills MD   ibuprofen (ADVIL;MOTRIN) 600 MG tablet Take 1 tablet by mouth 3 times daily as needed for Pain 1/23/21  Yes Bushra Myrick MD   albuterol sulfate  (90 Base) MCG/ACT inhaler Inhale 2 puffs into the lungs every 6 hours as needed for Wheezing 1/22/21  Yes Bushra Myrick MD   atorvastatin (LIPITOR) 40 MG tablet Take 1 tablet by mouth nightly 1/22/21  Yes Bushra Myrick MD   bumetanide (BUMEX) 2 MG tablet Take 1 tablet by mouth daily  Patient taking differently: Take 4 mg by mouth daily  1/22/21  Yes Bushra Myrick MD   aspirin (ASPIRIN 81) 81 MG EC tablet Take 1 tablet by mouth daily 1/21/21  Yes Bushra Myrick MD   diclofenac sodium (VOLTAREN) 1 % GEL Apply 2 g topically 4 times daily as needed for Pain 1/7/21  Yes KIRK Hull CNP   PARoxetine (PAXIL) 20 MG tablet Take 1 tablet by mouth 2 times daily  Patient taking differently: Take 30 mg by mouth every morning  12/17/20  Yes KIRK Hull CNP   XTAMPZA ER 9 MG C12A  4/15/21   Historical Provider, MD   oxyCODONE-acetaminophen (PERCOCET) 7.5-325 MG per tablet  4/29/21   Historical Provider, MD        Allergies:     Patient has no known allergies. Social History:     Tobacco:    reports that she has been smoking. She has a 11.25 pack-year smoking history. She has never used smokeless tobacco.  Alcohol:      reports previous alcohol use. Drug Use:  reports previous drug use. Family History:     Family History   Problem Relation Age of Onset    Cancer Mother     Diabetes Paternal Grandfather        Review of Systems:     Positive and Negative as described in HPI.     CONSTITUTIONAL:  negative for fevers, chills, sweats, fatigue, weight loss  HEENT:  negative for vision, hearing changes, runny nose, throat pain  RESPIRATORY:  negative for shortness of breath, cough, congestion, wheezing. CARDIOVASCULAR:  negative for chest pain, palpitations. GASTROINTESTINAL:  negative for nausea, vomiting, diarrhea, constipation, change in bowel habits, abdominal pain   GENITOURINARY:  negative for difficulty of urination, burning with urination, frequency   INTEGUMENT:  negative for rash, skin lesions, easy bruising   HEMATOLOGIC/LYMPHATIC:  negative for swelling/edema   ALLERGIC/IMMUNOLOGIC:  negative for urticaria , itching  ENDOCRINE:  negative increase in drinking, increase in urination, hot or cold intolerance  MUSCULOSKELETAL: Right hip pain  NEUROLOGICAL:  negative for headaches, dizziness, lightheadedness, numbness, pain, tingling extremities  BEHAVIOR/PSYCH:      Physical Exam:     BP (!) 103/59   Pulse 72   Temp 98 °F (36.7 °C)   Resp 14   Ht 5' 7\" (1.702 m)   Wt (!) 350 lb (158.8 kg)   SpO2 96%   BMI 54.82 kg/m²   Temp (24hrs), Av.9 °F (36.6 °C), Min:97.8 °F (36.6 °C), Max:98 °F (36.7 °C)    No results for input(s): POCGLU in the last 72 hours. No intake or output data in the 24 hours ending 21    General Appearance:  alert, well appearing, and in no acute distress, central obesity present  Mental status: oriented to person, place, and time with normal affect  Head:  normocephalic, atraumatic. Eye: no icterus, redness, pupils equal and reactive, extraocular eye movements intact, conjunctiva clear  Ear: normal external ear, no discharge, hearing intact  Nose:  no drainage noted  Mouth: mucous membranes moist  Neck: Thick neck present  Lungs: Bilateral equal air entry, clear to ausculation, no wheezing, rales or rhonchi, normal effort  Cardiovascular: normal rate, regular rhythm, no murmur, gallop, rub.   Abdomen: Soft, nontender, nondistended, normal bowel sounds, no hepatomegaly or splenomegaly  Neurologic: There are no new focal motor or sensory deficits, normal muscle tone and bulk, no abnormal sensation, normal speech, cranial nerves II through XII grossly intact Skin: No gross lesions, rashes, bruising or bleeding on exposed skin area  Extremities: Pain worse with movement of right leg   psych:     Investigations:      Laboratory Testing:  No results found for this or any previous visit (from the past 24 hour(s)). Consultations:   IP CONSULT TO INTERNAL MEDICINE  Assessment :      Primary Problem  Major depressive disorder, recurrent, severe without psychotic behavior [facility])    [facility] Problems    Diagnosis Date Noted    Major depressive disorder, recurrent, severe without psychotic behavior ([facility] [F33.2] 05/22/2021    Percocet use disorder, mild, abuse ([facility] [F11.10] 05/22/2021    Class 3 severe obesity with serious comorbidity and body mass index (BMI) of 50.0 to 59.9 in adult ([facility] [E66.01, Z68.43] 10/29/2019    Chronic heart failure with preserved ejection fraction ([facility] [I50.32] 10/25/2019       Plan:     1. Hypertension, controlled  2. COPD not on home oxygen, on albuterol as needed  3. Heart failure preserved ejection fraction, patient is on Bumex, metoprolol, compensated  4. Right hip pain, x-ray right hip, concerning for degenerative disc disease done in November 2019  5. Patient is already on Cymbalta, will increase dose to 40 mg from 20 mg  6. Lidocaine patch  7. NSAIDs  8. Heating pad          Kanu Martinez MD  5/22/2021  7:53 PM    Copy sent to Dr. Sonam Rajan, APRN - CNP    Please note that this chart was generated using voice recognition Dragon dictation software. Although every effort was made to ensure the accuracy of this automated transcription, some errors in transcription may have occurred.

## 2021-05-22 NOTE — PLAN OF CARE
[facility]  Initial Interdisciplinary Treatment Plan NO      Original treatment plan Date & Time: 5/22/2021  0729    Admission Type:  Admission Type: Voluntary    Reason for admission:   Reason for Admission: Suicidal ideation related to increased depression due to an inability to engage in activities of daily living.     Estimated Length of Stay:  5-7days  Estimated Discharge Date: to be determined by physician    PATIENT STRENGTHS:  Patient Strengths:Strengths: Social Skills, Positive Support  Patient Strengths and Limitations:Limitations: General negative or hopeless attitude about future/recovery, Perceives need for assistance with self-care  Addictive Behavior: Addictive Behavior  In the past 3 months, have you felt or has someone told you that you have a problem with:  : None  Do you have a history of Chemical Use?: Yes  Do you have a history of Alcohol Use?: No  Do you have a history of Street Drug Abuse?: No  Histroy of Prescripton Drug Abuse?: Yes  Medical Problems:  Past Medical History:   Diagnosis Date    Arthritis     CHF (congestive heart failure) ([facility])     COPD (chronic obstructive pulmonary disease) ([facility]     Diverticulitis     Hx of blood clots     Pulmonary embolism ([facility])      Status EXAM:Status and Exam  Normal: No  Facial Expression: Flat  Affect: Appropriate  Level of Consciousness: Alert  Mood:Normal: No  Mood: Depressed, Sad  Motor Activity:Normal: No  Motor Activity: Decreased, Unusual Posture/Gait  Interview Behavior: Cooperative  Preception: [place] to Person, Alvia [place] to Time, [place] to Place, [place] to Situation  Attention:Normal: No  Attention: Distractible  Thought Processes: Circumstantial  Thought Content:Normal: Yes  Hallucinations: None  Delusions: No  Memory:Normal: Yes  Insight and Judgment: No  Insight and Judgment: Poor Insight, Poor Judgment  Present Suicidal Ideation: No  Present Homicidal Ideation: No    EDUCATION:   Learner Progress Toward Treatment Goals: reviewed group plans and strategies for care    Method:group therapy, medication compliance, individualized assessments and care planning    Outcome: needs reinforcement    PATIENT GOALS: to be discussed with patient within 72 hours    PLAN/TREATMENT RECOMMENDATIONS:     continue group therapy , medications compliance, goal setting, individualized assessments and care, continue to monitor pt on unit      SHORT-TERM GOALS:   Time frame for Short-Term Goals: 5-7 days    LONG-TERM GOALS:  Time frame for Long-Term Goals: 6 months  Members Present in Team Meeting: See Signature Sheet    RAFY Sims

## 2021-05-22 NOTE — PLAN OF CARE
Problem: Falls - Risk of:  Goal: Will remain free from falls  Description: Will remain free from falls  5/22/2021 1701 by Pascual Artis  Outcome: Ongoing  Pt remains free of falls. Up to bathroom with walker and 1 assist. Pt remains line of sight for safety. Problem: Altered Mood, Depressive Behavior:  Goal: Ability to disclose and discuss suicidal ideas will improve  Description: Ability to disclose and discuss suicidal ideas will improve  5/22/2021 1701 by Pascual rAtis  Outcome: Ongoing  Pt. Relates she is depressed due to her medical issues and that she has trouble taking care of herself. Pt denies hallucinations. Medication compliant. Disheveled appearance. Pt declines shower when offered to help her. Problem: Altered Mood, Depressive Behavior:  Goal: Absence of self-harm  Description: Absence of self-harm  5/22/2021 1701 by Pascual Artis  Outcome: Ongoing  Pt denies current suicidal thoughts. Pt remains line of sight for safety. Problem: Altered Mood, Deterioration in Function:  Goal: Ability to perform activities of daily living will improve  Description: Ability to perform activities of daily living will improve  5/22/2021 1701 by Pascual Artis  Outcome: Ongoing  Pt. Up to toilet with assist but declines shower offered. Poor ADLs. Chooses to isolate in bed all day despite encouragement to get up. Problem: Skin Integrity:  Goal: Will show no infection signs and symptoms  Description: Will show no infection signs and symptoms  5/22/2021 1701 by Pascual Artis  Outcome: Ongoing  Pt. Does have scabs on forearms where she has picked at her skin. Dry and intact at this time. Problem: Pain:  Goal: Pain level will decrease  Description: Pain level will decrease  Outcome: Ongoing  Pt relates to pain in her RIGHT hip. She states this makes it difficult for her to ambulate.

## 2021-05-22 NOTE — BH NOTE
Writer went into the room to check on patient, Patient stated that she's not wet and did not need to use the bathroom at this time, Writer checked the bed sheets and found that they were actually dry.

## 2021-05-22 NOTE — CARE COORDINATION
Patient declined to attend Psychotherapy group at 10:00 am despite encouragement. Patient was offered a 1:1 time to meet after group or alternative activity to do and patient refused.

## 2021-05-22 NOTE — GROUP NOTE
Group Therapy Note    Date: 5/22/2021    Group Start Time: 0900  Group End Time: 3596  Group Topic: Community Meeting    GEORGETTE Coy    Pt did not attend 0900 community meeting/ goal setting /community meeting d/t resting in room despite staff invitation to attend. 1:1 talk time offered as alternative to group session, pt declined.           Signature:  Juma Tomas

## 2021-05-22 NOTE — BH NOTE
LOS>  Pt remains line of sight for safety. Up to bathroom with walker x 1 assist. Pt. Refused shower but did allow staff to help her clean up in bathroom. Fresh gown given and linens changed.

## 2021-05-23 PROCEDURE — 6370000000 HC RX 637 (ALT 250 FOR IP): Performed by: PSYCHIATRY & NEUROLOGY

## 2021-05-23 PROCEDURE — 1240000000 HC EMOTIONAL WELLNESS R&B

## 2021-05-23 PROCEDURE — 99232 SBSQ HOSP IP/OBS MODERATE 35: CPT | Performed by: INTERNAL MEDICINE

## 2021-05-23 PROCEDURE — 6370000000 HC RX 637 (ALT 250 FOR IP): Performed by: INTERNAL MEDICINE

## 2021-05-23 PROCEDURE — 99232 SBSQ HOSP IP/OBS MODERATE 35: CPT | Performed by: PSYCHIATRY & NEUROLOGY

## 2021-05-23 RX ADMIN — METOPROLOL TARTRATE 25 MG: 25 TABLET, FILM COATED ORAL at 21:18

## 2021-05-23 RX ADMIN — PAROXETINE HYDROCHLORIDE 20 MG: 20 TABLET, FILM COATED ORAL at 21:19

## 2021-05-23 RX ADMIN — TRAZODONE HYDROCHLORIDE 50 MG: 50 TABLET ORAL at 21:18

## 2021-05-23 RX ADMIN — HYDROXYZINE HYDROCHLORIDE 50 MG: 50 TABLET, FILM COATED ORAL at 21:18

## 2021-05-23 RX ADMIN — ATORVASTATIN CALCIUM 40 MG: 20 TABLET, FILM COATED ORAL at 21:18

## 2021-05-23 RX ADMIN — DULOXETINE 40 MG: 20 CAPSULE, DELAYED RELEASE ORAL at 08:31

## 2021-05-23 RX ADMIN — ASPIRIN 81 MG: 81 TABLET, COATED ORAL at 08:31

## 2021-05-23 RX ADMIN — IBUPROFEN 400 MG: 400 TABLET, FILM COATED ORAL at 21:18

## 2021-05-23 RX ADMIN — PAROXETINE HYDROCHLORIDE 10 MG: 10 TABLET, FILM COATED ORAL at 08:31

## 2021-05-23 ASSESSMENT — PAIN SCALES - GENERAL: PAINLEVEL_OUTOF10: 6

## 2021-05-23 NOTE — BH NOTE
Face sheet and notification of consult faxed to Dr. Vinayak Cantu at [address] [place] [address] and [facility].

## 2021-05-23 NOTE — PLAN OF CARE
Problem: Falls - Risk of:  Goal: Will remain free from falls  Description: Will remain free from falls  5/22/2021 2220 by Breana Pimentel LPN  Outcome: Ongoing     Problem: Altered Mood, Depressive Behavior:  Goal: Ability to disclose and discuss suicidal ideas will improve  Description: Ability to disclose and discuss suicidal ideas will improve  5/22/2021 2220 by Breana Pimentel LPN  Outcome: Ongoing   Patient denies suicidal ideas at this time. Patient denies homicidal ideas at this time. Patient denies depressive symptoms at this time. Patient has been in room. Patient is free of self harm at this time. Patient agrees to seek out staff if thoughts to harm self arise. Staff will provide support and reassurance as needed. Safety checks maintained every 15 minutes.

## 2021-05-23 NOTE — PLAN OF CARE
Problem: Altered Mood, Depressive Behavior:  Goal: Absence of self-harm  Description: Absence of self-harm  5/23/2021 1805 by Nisha Tang LPN  Outcome: Ongoing  Note: Pt denies thoughts of self harm and is agreeable to seeking out should thoughts of self harm arise. Safe environment maintained. Every 15 minute checks for safety cont per unit policy. Will cont to monitor for safety and provides support and reassurance as needed.

## 2021-05-23 NOTE — GROUP NOTE
Group Therapy Note    Date: 5/23/2021    Group Start Time: 0900  Group End Time: 2668  Group Topic: Community Meeting    [facility] DANIE Shipman, CTRS    Pt did not attend 0900 community meeting/ goal setting  group d/t resting in room despite staff invitation to attend. 1:1 talk time offered as alternative to group session, pt declined.           Signature:  Ion Barkley

## 2021-05-23 NOTE — PROGRESS NOTES
[facility] Internal Medicine    CONSULTATION / HISTORY AND PHYSICAL EXAMINATION            Date:   5/23/2021  Patient name: Richard Wilkins  Date of admission:  5/21/2021 12:10 PM  MRN:   [id number]  Account:  [de-identified]  YOB: 1960  PCP:    KIRK Martinez CNP  Room:   [address] [place] [address]  Code Status:    Full Code    Physician Requesting Consult: Piyush Maradiaga MD    Reason for Consult:  medical management    Chief Complaint:     Chief Complaint   Patient presents with    Diarrhea    Addiction Problem    Suicidal       History Obtained From:     Patient medical record nursing staff    History of Present Illness:   Patient mated to [facility] with major depression  Entered medicine consulted for multiple medical issues, chronic pain  Patient has multiple medical problem which includes hypertension, hyperlipidemia, morbid obesity, heart failure with preserved ejection fraction, COPD not on oxygen, chronic arthritis of right hip, patient mention that she has constant pain, pain is dull aching in nature, worse with movement of leg   No other complaints. Past Medical History:     Past Medical History:   Diagnosis Date    Arthritis     CHF (congestive heart failure) ([address]     COPD [facility]ch[facility] obstructive [facility] disease) [facility]     Diverticulitis     Hx of blood clots     Pulmonary embolism ([facility])         Past Surgical History:     Past Surgical History:   Procedure Laterality Date    ANKLE SURGERY      COLON SURGERY      FOOT SURGERY Left 1/22/2021    FOOT BONE BIOPSY W/ INCISION & DRAINAGE AND REDRESSING ON RIGHT FOOT performed by Karyle Eddy, DPM at [address] [place] [address]          Medications Prior to Admission:     Prior to Admission medications    Medication Sig Start Date End Date Taking?  Authorizing Provider   metoprolol tartrate (LOPRESSOR) 25 MG tablet TAKE ONE (1) TABLET BY MOUTH TWICE A DAY FOR HTN  2/8/21  Yes Estela Kehr, MD   lisinopril (PRINIVIL;ZESTRIL) 10 MG tablet TAKE ONE (1) TABLET BY MOUTH ONCE DAILY FOR ESSENTIAL HYPERTENSION  2/8/21  Yes Adela Ny MD   ibuprofen (ADVIL;MOTRIN) 600 MG tablet Take 1 tablet by mouth 3 times daily as needed for Pain 1/23/21  Yes Trina Allen MD   albuterol sulfate  (90 Base) MCG/ACT inhaler Inhale 2 puffs into the lungs every 6 hours as needed for Wheezing 1/22/21  Yes Trina Allen MD   atorvastatin (LIPITOR) 40 MG tablet Take 1 tablet by mouth nightly 1/22/21  Yes Trina Allen MD   bumetanide (BUMEX) 2 MG tablet Take 1 tablet by mouth daily  Patient taking differently: Take 4 mg by mouth daily  1/22/21  Yes Trina Allen MD   aspirin (ASPIRIN 81) 81 MG EC tablet Take 1 tablet by mouth daily 1/21/21  Yes Trina Allen MD   diclofenac sodium (VOLTAREN) 1 % GEL Apply 2 g topically 4 times daily as needed for Pain 1/7/21  Yes KIRK Del Rosario CNP   PARoxetine (PAXIL) 20 MG tablet Take 1 tablet by mouth 2 times daily  Patient taking differently: Take 30 mg by mouth every morning  12/17/20  Yes KRIK Del Rosario CNP   XTAMPZA ER 9 MG C12A  4/15/21   Historical Provider, MD   oxyCODONE-acetaminophen (PERCOCET) 7.5-325 MG per tablet  4/29/21   Historical Provider, MD        Allergies:     Patient has no known allergies. Social History:     Tobacco:    reports that she has been smoking. She has a 11.25 pack-year smoking history. She has never used smokeless tobacco.  Alcohol:      reports previous alcohol use. Drug Use:  reports previous drug use. Family History:     Family History   Problem Relation Age of Onset    Cancer Mother     Diabetes Paternal Grandfather        Review of Systems:     Positive and Negative as described in HPI.     CONSTITUTIONAL:  negative for fevers, chills, sweats, fatigue, weight loss  HEENT:  negative for vision, hearing changes, runny nose, throat pain  RESPIRATORY:  negative for shortness of breath, cough, congestion, intact  Skin: No gross lesions, rashes, bruising or bleeding on exposed skin area  Extremities: Pain worse with movement of right leg   psych:     Investigations:      Laboratory Testing:  No results found for this or any previous visit (from the past 24 hour(s)). Consultations:   IP CONSULT TO INTERNAL MEDICINE  IP CONSULT TO PODIATRY  Assessment :      Primary Problem  Major depressive disorder, recurrent, severe without psychotic behavior ([facility]    Active Hospital Problems    Diagnosis Date Noted    Major depressive disorder, recurrent, severe without psychotic behavior ([email or web address] [F33.2] 05/22/2021    Percocet use disorder, mild, abuse ([facility] [F11.10] 05/22/2021    Class 3 severe obesity with serious comorbidity and body mass index (BMI) of 50.0 to 59.9 in adult ([facility] [E66.01, Z68.43] 10/29/2019    Chronic heart failure with preserved ejection fraction ([facility] [I50.32] 10/25/2019       Plan:     1. Hypertension, controlled  2. COPD not on home oxygen, on albuterol as needed  3. Heart failure preserved ejection fraction, patient is on Bumex, metoprolol, compensated  4. Right hip pain, x-ray right hip, concerning for degenerative disc disease done in November 2019  5. Patient is already on Cymbalta, will increase dose to 40 mg from 20 mg  6. Lidocaine patch  7. NSAIDs  8. Heating pad    5/23  Patient mentioned that her pain is better  Readings of low blood pressure, will discontinue lisinopril  We will sign off, please call with questions      Arabella Ocampo MD  5/23/2021  5:39 PM    Copy sent to Dr. Keira Ba, APRN - CNP    Please note that this chart was generated using voice recognition Dragon dictation software. Although every effort was made to ensure the accuracy of this automated transcription, some errors in transcription may have occurred.

## 2021-05-23 NOTE — BH NOTE
Patient was offered assistance with shower but patient declined, states \"I'm just not up to it right now. \"

## 2021-05-23 NOTE — PROGRESS NOTES
Daily Progress Note  5/23/2021    Patient Name: Reid Brand:  Depression, anxiety, suicidal ideation, Percocet abuse         SUBJECTIVE:      Patient is seen today for a follow up assessment. Patient patient was medication compliant with the exception of her scheduled Bumex tablets, Lopressor tablet, and nicotine patch. Patient has received the following as needed medications:      Patient endorses depression and rates her depression as 8 out of 10 (010 scale with 0 being no depression and 10 being worst). Patient also endorses anxiety and rates her anxiety as a 5 out of 10 (010 scale with 0 being no anxiety and 10 being worst). Patient also presents as diaphoretic with beads of sweat visible on her forehead. However, patient is afebrile per last documented vital signs. We will continue to monitor. Staff report that patient was offered assistance with a shower but patient declined. Appetite: Patient reports her appetite is diminished and reports she did not eat lunch. Per staff documentation from 5/22/2021, staff documented daily nutrition as \"within defined limits\". Sleep: Patient states \"I only do sleep\". Per [facility] Adult Daily Assessment flowsheet, staff documented 8 hours of sleep. Group Attendance on Unit:   [] Yes  [] Selectively    [x] No   When writer inquired if patient has been attending groups, patient reports that she did not know about groups. Writer educated patients about groups available on the unit and encourage patient to attend. ROS:  [x] All negative/unchanged except if checked. Explain positive (checked items) below:  [] Constitutional  [] Eyes  [] Ear/Nose/Mouth/Throat  [] Respiratory  [] CV  [] GI  []   [] Musculoskeletal  [] Skin/Breast  [] Neurological  [] Endocrine  [] Heme/Lymph  [] Allergic/Immunologic    Patient denies medical concerns at the time of assessment. Staff reported concern about patient's toenails. Podiatry consult ordered. eGFR calculated using average adult body mass. Additional eGFR calculator available at:        [email or web address]            GFR Staging 05/21/2021 NOT REPORTED   Final    Lipase 05/21/2021 34  13 - 60 U/L Final    Acetaminophen Level 05/21/2021 <5* 10 - 30 ug/mL Final    Specimen Description 05/21/2021 . NASOPHARYNGEAL SWAB   Final    SARS-CoV-2, Rapid 05/21/2021 Not Detected  Not Detected Final    Comment:       Rapid NAAT:  The specimen is NEGATIVE for SARS-CoV-2, the novel coronavirus associated with   COVID-19. The ID NOW COVID-19 assay is designed to detect the virus that causes COVID-19 in patients   with signs and symptoms of infection who are suspected of COVID-19. An individual without symptoms of COVID-19 and who is not shedding SARS-CoV-2 virus would   expect to have a negative (not detected) result in this assay. Negative results should be treated as presumptive and, if inconsistent with clinical signs   and symptoms or necessary for patient management,  should be tested with an alternative molecular assay. Negative results do not preclude   SARS-CoV-2 infection and   should not be used as the sole basis for patient management decisions.          Fact sheet for Healthcare Providers: BuildHer.es  Fact sheet for Patients: BuildHer.es          Methodology: Isothermal Nucleic Acid Amplification      Lactic Acid, Sepsis 05/21/2021 1.0  0.5 - 1.9 mmol/L Final    Lactic Acid, Sepsis, Whole Blood 05/21/2021 NOT REPORTED  0.5 - 1.9 mmol/L Final    WBC 05/21/2021 10.7  3.5 - 11.0 k/uL Final    RBC 05/21/2021 3.86* 4.0 - 5.2 m/uL Final    Hemoglobin 05/21/2021 12.5  12.0 - 16.0 g/dL Final    Hematocrit 05/21/2021 37.1  36 - 46 % Final    MCV 05/21/2021 96.2  80 - 100 fL Final    MCH 05/21/2021 32.4  26 - 34 pg Final    MCHC 05/21/2021 33.7  31 - 37 g/dL Final    RDW 05/21/2021 14.3  11.5 - 14.9 % Final    Platelets 35/48/7244 444  150 - 450 k/uL Final    MPV 05/21/2021 7.2  6.0 - 12.0 fL Final    NRBC Automated 05/21/2021 NOT REPORTED  per 100 WBC Final         Reviewed patient's current plan of care and vital signs with nursing staff. Labs reviewed: [x] Yes  Last EKG in EMR reviewed: [x] Yes    Medications  Current Facility-Administered Medications: PARoxetine (PAXIL) tablet 10 mg, 10 mg, Oral, Daily  PARoxetine (PAXIL) tablet 20 mg, 20 mg, Oral, Nightly  lidocaine 4 % external patch 1 patch, 1 patch, Transdermal, Daily  DULoxetine (CYMBALTA) extended release capsule 40 mg, 40 mg, Oral, Daily  aluminum & magnesium hydroxide-simethicone (MAALOX) 200-200-20 MG/5ML suspension 30 mL, 30 mL, Oral, Q6H PRN  polyethylene glycol (GLYCOLAX) packet 17 g, 17 g, Oral, Daily PRN  traZODone (DESYREL) tablet 50 mg, 50 mg, Oral, Nightly PRN  ibuprofen (ADVIL;MOTRIN) tablet 400 mg, 400 mg, Oral, Q6H PRN  acetaminophen (TYLENOL) tablet 650 mg, 650 mg, Oral, Q4H PRN  nicotine (NICODERM CQ) 14 MG/24HR 1 patch, 1 patch, Transdermal, Daily  hydrOXYzine (ATARAX) tablet 50 mg, 50 mg, Oral, TID PRN  albuterol sulfate  (90 Base) MCG/ACT inhaler 2 puff, 2 puff, Inhalation, Q6H PRN  aspirin EC tablet 81 mg, 81 mg, Oral, Daily  atorvastatin (LIPITOR) tablet 40 mg, 40 mg, Oral, Nightly  bumetanide (BUMEX) tablet 2 mg, 2 mg, Oral, Daily  diclofenac sodium (VOLTAREN) 1 % gel 2 g, 2 g, Topical, 4x Daily PRN  ibuprofen (ADVIL;MOTRIN) tablet 600 mg, 600 mg, Oral, TID PRN  metoprolol tartrate (LOPRESSOR) tablet 25 mg, 25 mg, Oral, BID    ASSESSMENT  Major depressive disorder, recurrent, severe without psychotic behavior ([facility]         PLAN  Patient symptoms are: Remains Unstable   Podiatry consult ordered for concern regarding patient's toenails. An order to recheck patient's blood pressure ordered. Continue current medication regimen. Monitor need and frequency of PRN medications.   Encourage participation in groups and

## 2021-05-23 NOTE — PLAN OF CARE
[address] [place] [address]  Day 3 Interdisciplinary Treatment Plan NOTE    Review Date & Time: 5/23/2021  1300     Admission Type:   Admission Type: Voluntary    Reason for admission:  Reason for Admission: Suicidal ideation related to increased depression due to an inability to engage in activities of daily living.   Estimated Length of Stay: 5-7 days  Estimated Discharge Date Update: to be determined by physician    PATIENT STRENGTHS:  Patient Strengths Strengths: Social Skills, Positive Support  Patient Strengths and Limitations:Limitations: Difficulty problem solving/relies on others to help solve problems, Inappropriate/potentially harmful leisure interests, General negative or hopeless attitude about future/recovery, Perceives need for assistance with self-care  Addictive Behavior:Addictive Behavior  In the past 3 months, have you felt or has someone told you that you have a problem with:  : None  Do you have a history of Chemical Use?: No  Do you have a history of Alcohol Use?: Yes  Do you have a history of Street Drug Abuse?: No  Histroy of Prescripton Drug Abuse?: Yes  Medical Problems:  Past Medical History:   Diagnosis Date    Arthritis     CHF [facility]congestive heart failure) ([facility])     COPD (chronic obstructive pulmonary disease) [facility]     Diverticulitis     Hx of blood clots     Pulmonary embolism ([facility]        Risk:  Fall RiskTotal: 88  Ac Scale Ac Scale Score: 13  BVC Total: 0  Change in scores no Changes to plan of Care no    Status EXAM:   Status and Exam  Normal: No  Facial Expression: Flat  Affect: Blunt  Level of Consciousness: Alert  Mood:Normal: No  Mood: Depressed, Anxious  Motor Activity:Normal: No  Motor Activity: Unusual Posture/Gait, Decreased  Interview Behavior: Cooperative, Evasive  Preception: [place] to Person, [place] to Time, [place] to Place, [place] to Situation  Attention:Normal: No  Attention: Distractible  Thought Processes: Circumstantial  Thought Content:Normal: Yes Hallucinations: None  Delusions: No  Memory:Normal: Yes  Insight and Judgment: No  Insight and Judgment: Poor Insight, Poor Judgment  Present Suicidal Ideation: No  Present Homicidal Ideation: No    Daily Assessment Last Entry:   Daily Sleep (WDL): Within Defined Limits         Patient Currently in Pain: Yes  Daily Nutrition (WDL): Within Defined Limits    Patient Monitoring:  Frequency of Checks: 4 times per hour, close    Psychiatric Symptoms:   Depression Symptoms  Depression Symptoms: Isolative  Anxiety Symptoms  Anxiety Symptoms: Generalized  Darlin Symptoms  Darlin Symptoms: No problems reported or observed. Psychosis Symptoms  Delusion Type: No problems reported or observed. Suicide Risk CSSR-S:  1) Within the past month, have you wished you were dead or wished you could go to sleep and not wake up? : Yes  2) Have you actually had any thoughts of killing yourself? : Yes  3) Have you been thinking about how you might kill yourself? : No  5) Have you started to work out or worked out the details of how to kill yourself?  Do you intend to carry out this plan? : No  6) Have you ever done anything, started to do anything, or prepared to do anything to end your life?: No  Change in Result no Change in Plan of care no      EDUCATION:   EDUCATION:   Learner Progress Toward Treatment Goals: Reviewed results and recommendations of this team, Reviewed group plan and strategies, Reviewed signs, symptoms and risk of self harm and violent behavior, Reviewed goals and plan of care    Method:small group, individual verbal education    Outcome:verbalized by patient, but needs reinforcement to obtain goals    PATIENT GOALS:  Short term: pt refuses to attend tx planning to develop goals   Long term: pt refuses to atend tx planning to develop goals     PLAN/TREATMENT RECOMMENDATIONS UPDATE: continue with group therapies, increased socialization, continue planning for after discharge goals, continue with medication compliance SHORT-TERM GOALS UPDATE:   Time frame for Short-Term Goals: 5-7 days    LONG-TERM GOALS UPDATE:   Time frame for Long-Term Goals: 6 months  Members Present in Team Meeting: See Signature Sheet    RAFY Melara

## 2021-05-24 PROCEDURE — 6370000000 HC RX 637 (ALT 250 FOR IP): Performed by: INTERNAL MEDICINE

## 2021-05-24 PROCEDURE — 6370000000 HC RX 637 (ALT 250 FOR IP): Performed by: PSYCHIATRY & NEUROLOGY

## 2021-05-24 PROCEDURE — 1240000000 HC EMOTIONAL WELLNESS R&B

## 2021-05-24 PROCEDURE — 99232 SBSQ HOSP IP/OBS MODERATE 35: CPT | Performed by: PSYCHIATRY & NEUROLOGY

## 2021-05-24 RX ORDER — DULOXETIN HYDROCHLORIDE 60 MG/1
60 CAPSULE, DELAYED RELEASE ORAL DAILY
Status: DISCONTINUED | OUTPATIENT
Start: 2021-05-25 | End: 2021-05-26

## 2021-05-24 RX ORDER — LOPERAMIDE HYDROCHLORIDE 2 MG/1
2 CAPSULE ORAL 4 TIMES DAILY PRN
Status: DISCONTINUED | OUTPATIENT
Start: 2021-05-24 | End: 2021-05-29 | Stop reason: HOSPADM

## 2021-05-24 RX ADMIN — DULOXETINE 40 MG: 20 CAPSULE, DELAYED RELEASE ORAL at 09:41

## 2021-05-24 RX ADMIN — HYDROXYZINE HYDROCHLORIDE 50 MG: 50 TABLET, FILM COATED ORAL at 20:44

## 2021-05-24 RX ADMIN — PAROXETINE HYDROCHLORIDE 20 MG: 20 TABLET, FILM COATED ORAL at 20:44

## 2021-05-24 RX ADMIN — ASPIRIN 81 MG: 81 TABLET, COATED ORAL at 09:42

## 2021-05-24 RX ADMIN — TRAZODONE HYDROCHLORIDE 50 MG: 50 TABLET ORAL at 20:44

## 2021-05-24 RX ADMIN — PAROXETINE HYDROCHLORIDE 10 MG: 10 TABLET, FILM COATED ORAL at 09:42

## 2021-05-24 RX ADMIN — METOPROLOL TARTRATE 25 MG: 25 TABLET, FILM COATED ORAL at 21:04

## 2021-05-24 RX ADMIN — ATORVASTATIN CALCIUM 40 MG: 20 TABLET, FILM COATED ORAL at 20:44

## 2021-05-24 RX ADMIN — ACETAMINOPHEN 650 MG: 325 TABLET, FILM COATED ORAL at 19:15

## 2021-05-24 ASSESSMENT — PAIN SCALES - GENERAL
PAINLEVEL_OUTOF10: 0
PAINLEVEL_OUTOF10: 3
PAINLEVEL_OUTOF10: 0

## 2021-05-24 NOTE — GROUP NOTE
Group Therapy Note    Date: 5/24/2021    Group Start Time: 0900  Group End Time: 0920  Group Topic: Community Meeting    [facility] DANIE Zavala    Patient refused to attend community meeting/goals group at 0900 after encouragement from staff. 1:1 talk time provided by staff.        Signature:  Elvira Zavala

## 2021-05-24 NOTE — PROGRESS NOTES
Daily Progress Note  5/24/2021     CHIEF COMPLAINT: Suicidal ideation     Reviewed patient's current plan of care and vital signs with nursing staff. Sleep:  several hours last night  Attending groups: No:    SUBJECTIVE:    Staff report patient continues to maintain medication adherence overall but prefers not to take Bumex. She has utilized as needed medications including hydroxyzine and trazodone at 2118 and Motrin for generalized body aches. She reports that she did gain relief related to anxiety and insomnia and slept \"okay\" last night. Discussed patient's medications and she is aware of current goal to discontinue Paxil and titrate Cymbalta as it may be beneficial for her physical discomfort and patient is grateful. She does endorse diarrhea and requests Imodium that is available as needed. Patient continues to endorse depressive symptoms and rates these at 9/10 currently (10 being the worst). She additionally endorses anxiety and rates 6/10 utilizing the same scale. Patient reports her appetite remains modest.  Shares that she appreciates the podiatry consult and denies having questions or concerns related to current treatment plan.     Mental Status Exam  Level of consciousness:  Within normal limits  Appearance: Hospital attire, seated in chair, with poor grooming and hygiene   Behavior/Motor: Calm, psychomotor slowing  Attitude toward examiner:  Cooperative, attentive, good eye contact  Speech:  spontaneous, normal rate, normal volume and well articulated  Mood: Depressed  Affect: Blunted  Thought processes:  linear, goal directed and coherent  Thought content:  denies homicidal ideation  Suicidal Ideation: Endorses fleeting suicidal ideation without current plan or intent and contracts for safety on the unit  Delusions:  no evidence of delusions  Perceptual Disturbance:  denies any perceptual disturbance  Cognition:  Oriented to self, location, time, and situation  Memory: age appropriate  Insight & Judgement: Poor  Medication side effects:  denies       Data   height is 5' 7\" (1.702 m) and weight is 350 lb (158.8 kg) (abnormal). Her oral temperature is 97.5 °F (36.4 °C). Her blood pressure is 99/65 and her pulse is 68. Her respiration is 18 and oxygen saturation is 96%.    Labs:   Admission on 05/21/2021   Component Date Value Ref Range Status    WBC 05/21/2021 12.0* 3.5 - 11.0 k/uL Final    RBC 05/21/2021 4.46  4.0 - 5.2 m/uL Final    Hemoglobin 05/21/2021 13.8  12.0 - 16.0 g/dL Final    Hematocrit 05/21/2021 43.0  36 - 46 % Final    MCV 05/21/2021 96.2  80 - 100 fL Final    MCH 05/21/2021 31.0  26 - 34 pg Final    MCHC 05/21/2021 32.2  31 - 37 g/dL Final    RDW 05/21/2021 14.5  11.5 - 14.9 % Final    Platelets 68/48/0466 485* 150 - 450 k/uL Final    MPV 05/21/2021 7.3  6.0 - 12.0 fL Final    NRBC Automated 05/21/2021 NOT REPORTED  per 100 WBC Final    Differential Type 05/21/2021 NOT REPORTED   Final    Seg Neutrophils 05/21/2021 76* 36 - 66 % Final    Lymphocytes 05/21/2021 14* 24 - 44 % Final    Monocytes 05/21/2021 7  1 - 7 % Final    Eosinophils % 05/21/2021 2  0 - 4 % Final    Basophils 05/21/2021 1  0 - 2 % Final    Immature Granulocytes 05/21/2021 NOT REPORTED  0 % Final    Segs Absolute 05/21/2021 9.20* 1.3 - 9.1 k/uL Final    Absolute Lymph # 05/21/2021 1.70  1.0 - 4.8 k/uL Final    Absolute Mono # 05/21/2021 0.80  0.1 - 1.3 k/uL Final    Absolute Eos # 05/21/2021 0.20  0.0 - 0.4 k/uL Final    Basophils Absolute 05/21/2021 0.10  0.0 - 0.2 k/uL Final    Absolute Immature Granulocyte 05/21/2021 NOT REPORTED  0.00 - 0.30 k/uL Final    WBC Morphology 05/21/2021 NOT REPORTED   Final    RBC Morphology 05/21/2021 NOT REPORTED   Final    Platelet Estimate 72/00/5749 NOT REPORTED   Final    Glucose 05/21/2021 114* 70 - 99 mg/dL Final    BUN 05/21/2021 17  8 - 23 mg/dL Final    CREATININE 05/21/2021 0.61  0.50 - 0.90 mg/dL Final    Bun/Cre Ratio 05/21/2021 NOT REPORTED  9 - 20 Final    Calcium 05/21/2021 9.8  8.6 - 10.4 mg/dL Final    Sodium 05/21/2021 142  135 - 144 mmol/L Final    Potassium 05/21/2021 3.9  3.7 - 5.3 mmol/L Final    Chloride 05/21/2021 102  98 - 107 mmol/L Final    CO2 05/21/2021 24  20 - 31 mmol/L Final    Anion Gap 05/21/2021 16  9 - 17 mmol/L Final    Alkaline Phosphatase 05/21/2021 87  35 - 104 U/L Final    ALT 05/21/2021 13  5 - 33 U/L Final    AST 05/21/2021 13  <32 U/L Final    Total Bilirubin 05/21/2021 0.55  0.3 - 1.2 mg/dL Final    Total Protein 05/21/2021 7.8  6.4 - 8.3 g/dL Final    Albumin 05/21/2021 4.4  3.5 - 5.2 g/dL Final    Albumin/Globulin Ratio 05/21/2021 NOT REPORTED  1.0 - 2.5 Final    GFR Non- 05/21/2021 >60  >60 mL/min Final    GFR  05/21/2021 >60  >60 mL/min Final    GFR Comment 05/21/2021        Final    Comment: Average GFR for 61-76 years old:   80 mL/min/1.73sq m  Chronic Kidney Disease:   <60 mL/min/1.73sq m  Kidney failure:   <15 mL/min/1.73sq m              eGFR calculated using average adult body mass. Additional eGFR calculator available at:        [employer]            GFR Staging 05/21/2021 NOT REPORTED   Final    Lipase 05/21/2021 34  13 - 60 U/L Final    Acetaminophen Level 05/21/2021 <5* 10 - 30 ug/mL Final    Specimen Description 05/21/2021 . NASOPHARYNGEAL SWAB   Final    SARS-CoV-2, Rapid 05/21/2021 Not Detected  Not Detected Final    Comment:       Rapid NAAT:  The specimen is NEGATIVE for SARS-CoV-2, the novel coronavirus associated with   COVID-19. The ID NOW COVID-19 assay is designed to detect the virus that causes COVID-19 in patients   with signs and symptoms of infection who are suspected of COVID-19. An individual without symptoms of COVID-19 and who is not shedding SARS-CoV-2 virus would   expect to have a negative (not detected) result in this assay.   Negative results should be treated as presumptive and, if inconsistent 50 mg, Oral, TID PRN  albuterol sulfate  (90 Base) MCG/ACT inhaler 2 puff, 2 puff, Inhalation, Q6H PRN  aspirin EC tablet 81 mg, 81 mg, Oral, Daily  atorvastatin (LIPITOR) tablet 40 mg, 40 mg, Oral, Nightly  bumetanide (BUMEX) tablet 2 mg, 2 mg, Oral, Daily  diclofenac sodium (VOLTAREN) 1 % gel 2 g, 2 g, Topical, 4x Daily PRN  ibuprofen (ADVIL;MOTRIN) tablet 600 mg, 600 mg, Oral, TID PRN  metoprolol tartrate (LOPRESSOR) tablet 25 mg, 25 mg, Oral, BID    ASSESSMENT  Major depressive disorder, recurrent, severe without psychotic behavior ([facility] [address]     PLAN  Patient symptoms show no change  Discussed with Dr. Ana Maria Dowling and treatment team   Continue to cross taper Cymbalta and Paxil :  Cymbalta 60 mg daily tomorrow   Paxil 10 mg tomorrow and then DC   attempt to develop insight  Psycho-education conducted. Supportive Therapy conducted. Probable discharge is currently undetermined  Follow-up daily while on inpatient unit    Electronically signed by KIRK Rivera CNP on 5/24/21 at 2:24 PM EDT    **This report has been created using voice recognition software. It may contain minor errors which are inherent in voice recognition technology. **    I independently saw and evaluated the patient. I reviewed the nurse practitioners documentation above. Any additional comments or changes to the nurse practitioners documentation are stated below otherwise agree with assessment. Plan will be as follows:  Patient very tearful. Depressed. Suicidal.  Unable to contract for safety off the unit. Denying side effects to medication titration. Openly endorsing her addiction to opiates. Denying significant withdrawal symptoms at present time and agreeable to current course of action. PLAN  Patient s symptoms   show no change  Continue to cross titrate off of Paxil onto Cymbalta  Attempt to develop insight  Psycho-education conducted. Supportive Therapy conducted.   Probable discharge is undetermined at this time Follow-up daily while on inpatient unit

## 2021-05-24 NOTE — PLAN OF CARE
Problem: Falls - Risk of:  Goal: Will remain free from falls  Description: Will remain free from falls  2021 2221 by Lori Resendez LPN  Outcome: Ongoing  Note: Pt denies thoughts of self harm and is agreeable to seeking out should thoughts of self harm arise. Safe environment maintained. Q15 minute checks for safety cont per unit policy. Will cont to monitor for safety and provides support and reassurance as needed. Pt remains free of falls and verbalizes understanding of individual fall risks. Pt wearing non skid footwear and encouraged to seek out staff for any assistance needed.

## 2021-05-25 PROCEDURE — 6370000000 HC RX 637 (ALT 250 FOR IP): Performed by: PSYCHIATRY & NEUROLOGY

## 2021-05-25 PROCEDURE — 1240000000 HC EMOTIONAL WELLNESS R&B

## 2021-05-25 PROCEDURE — 99232 SBSQ HOSP IP/OBS MODERATE 35: CPT | Performed by: PSYCHIATRY & NEUROLOGY

## 2021-05-25 PROCEDURE — 6370000000 HC RX 637 (ALT 250 FOR IP): Performed by: INTERNAL MEDICINE

## 2021-05-25 RX ADMIN — PAROXETINE HYDROCHLORIDE 10 MG: 10 TABLET, FILM COATED ORAL at 09:48

## 2021-05-25 RX ADMIN — DULOXETINE HYDROCHLORIDE 60 MG: 60 CAPSULE, DELAYED RELEASE ORAL at 09:48

## 2021-05-25 RX ADMIN — LOPERAMIDE HYDROCHLORIDE 2 MG: 2 CAPSULE ORAL at 15:53

## 2021-05-25 RX ADMIN — HYDROXYZINE HYDROCHLORIDE 50 MG: 50 TABLET, FILM COATED ORAL at 21:15

## 2021-05-25 RX ADMIN — ASPIRIN 81 MG: 81 TABLET, COATED ORAL at 09:48

## 2021-05-25 RX ADMIN — TRAZODONE HYDROCHLORIDE 50 MG: 50 TABLET ORAL at 21:15

## 2021-05-25 RX ADMIN — METOPROLOL TARTRATE 25 MG: 25 TABLET, FILM COATED ORAL at 09:48

## 2021-05-25 RX ADMIN — ATORVASTATIN CALCIUM 40 MG: 20 TABLET, FILM COATED ORAL at 21:15

## 2021-05-25 RX ADMIN — LOPERAMIDE HYDROCHLORIDE 2 MG: 2 CAPSULE ORAL at 10:51

## 2021-05-25 RX ADMIN — METOPROLOL TARTRATE 25 MG: 25 TABLET, FILM COATED ORAL at 21:15

## 2021-05-25 ASSESSMENT — PAIN DESCRIPTION - PROGRESSION: CLINICAL_PROGRESSION: NOT CHANGED

## 2021-05-25 ASSESSMENT — PAIN DESCRIPTION - PAIN TYPE: TYPE: CHRONIC PAIN

## 2021-05-25 ASSESSMENT — PAIN - FUNCTIONAL ASSESSMENT: PAIN_FUNCTIONAL_ASSESSMENT: PREVENTS OR INTERFERES SOME ACTIVE ACTIVITIES AND ADLS

## 2021-05-25 ASSESSMENT — PAIN DESCRIPTION - ONSET: ONSET: ON-GOING

## 2021-05-25 NOTE — PLAN OF CARE
Problem: Falls - Risk of:  Goal: Will remain free from falls  Description: Will remain free from falls  Outcome: Ongoing  Patient remains on line of sight safety observation to ensure safety as patient is a fall risk. Patient remains alert and oriented and calling for needs appropriately. Safety rounds performed as ordered and as needed. Patient remains free from falls/injury. Problem: Altered Mood, Depressive Behavior:  Goal: Ability to disclose and discuss suicidal ideas will improve  Description: Ability to disclose and discuss suicidal ideas will improve  Outcome: Ongoing     Problem: Altered Mood, Deterioration in Function:  Goal: Ability to perform activities of daily living will improve  Description: Ability to perform activities of daily living will improve  Outcome: Ongoing  Patient denies suicidal or homicidal thoughts. Patient denies hallucinations. Patient continues to report depression and anxiety related to poor chronic physical health. Patient reports hopelessness and feelings or worthlessness. Patient reports poor motivation. She is tearful at times during this conversation. Patient's goal was to shower which she accomplished with minimal staff assistance. Patient came out to day area to eat dinner, she stated \"I need to get out a little today. \" Patient is brightened after showering and one on one talking with staff. Patient has not attended groups. Patient has remained in control of behaviors and taking medications as prescribed. Patient remains free from harm or distress. Problem: Skin Integrity:  Goal: Will show no infection signs and symptoms  Description: Will show no infection signs and symptoms  Outcome: Ongoing  Areas of pre-existing breakdown noted to chart. Patient can reposition and ambulate with minimal assistance. Patient's skin has been clean and dry today. No new areas of breakdown noted.       Problem: Pain:  Goal: Pain level will decrease  Description: Pain level will decrease Outcome: Ongoing   Chronic pain remains to right hip. Lidocaine patch applied per order and patient repositioning for comfort as needed. Will continue to monitor.

## 2021-05-25 NOTE — GROUP NOTE
Group Therapy Note    Date: 5/25/2021    Group Start Time: 1330  Group End Time: 1001  Group Topic: Cognitive Skills    [address], [facility]        Group Therapy Note    Attendees:6/18    patient refused to attend self esteem group at [phone] after encouragement from staff. 1:1 talk time provided as alternative to group session.           Signature:  Maximo Bowman [place]

## 2021-05-25 NOTE — GROUP NOTE
Group Therapy Note    Date: 5/25/2021    Group Start Time: 1000  Group End Time: 1629  Group Topic: Psychoeducation    GEORGETTE Lovell    Patient refused to attend creative expression group at 1000 after encouragement from staff. 1:1 talk time offered by staff as alternative to group session.

## 2021-05-25 NOTE — BH NOTE
Patient requested assistance to bathroom (minimal, standby assist). Pt urinated only.  Patient asked if writer could pass along that during the day today she wants a shower after breakfast.

## 2021-05-25 NOTE — PROGRESS NOTES
good eye contact  Speech:  spontaneous, normal rate, normal volume and well articulated  Mood: Depressed  Affect: Labile  Thought processes:  linear, goal directed and coherent  Thought content:  denies homicidal ideation  Suicidal Ideation: Endorses suicidal ideation without current plan or intent and contracts for safety on the unit  Delusions:  no evidence of delusions  Perceptual Disturbance:  denies any perceptual disturbance  Cognition:  Oriented to self, location, time, and situation  Memory: age appropriate  Insight & Judgement: Poor  Medication side effects:  denies       Data   height is 5' 7\" (1.702 m) and weight is 350 lb (158.8 kg) (abnormal). Her oral temperature is 97.7 °F (36.5 °C). Her blood pressure is 119/68 and her pulse is 72. Her respiration is 16 and oxygen saturation is 96%.    Labs:   Admission on 05/21/2021   Component Date Value Ref Range Status    WBC 05/21/2021 12.0* 3.5 - 11.0 k/uL Final    RBC 05/21/2021 4.46  4.0 - 5.2 m/uL Final    Hemoglobin 05/21/2021 13.8  12.0 - 16.0 g/dL Final    Hematocrit 05/21/2021 43.0  36 - 46 % Final    MCV 05/21/2021 96.2  80 - 100 fL Final    MCH 05/21/2021 31.0  26 - 34 pg Final    MCHC 05/21/2021 32.2  31 - 37 g/dL Final    RDW 05/21/2021 14.5  11.5 - 14.9 % Final    Platelets 51/18/5068 485* 150 - 450 k/uL Final    MPV 05/21/2021 7.3  6.0 - 12.0 fL Final    NRBC Automated 05/21/2021 NOT REPORTED  per 100 WBC Final    Differential Type 05/21/2021 NOT REPORTED   Final    Seg Neutrophils 05/21/2021 76* 36 - 66 % Final    Lymphocytes 05/21/2021 14* 24 - 44 % Final    Monocytes 05/21/2021 7  1 - 7 % Final    Eosinophils % 05/21/2021 2  0 - 4 % Final    Basophils 05/21/2021 1  0 - 2 % Final    Immature Granulocytes 05/21/2021 NOT REPORTED  0 % Final    Segs Absolute 05/21/2021 9.20* 1.3 - 9.1 k/uL Final    Absolute Lymph # 05/21/2021 1.70  1.0 - 4.8 k/uL Final    Absolute Mono # 05/21/2021 0.80  0.1 - 1.3 k/uL Final    Absolute Eos # 05/21/2021 0.20  0.0 - 0.4 k/uL Final    Basophils Absolute 05/21/2021 0.10  0.0 - 0.2 k/uL Final    Absolute Immature Granulocyte 05/21/2021 NOT REPORTED  0.00 - 0.30 k/uL Final    WBC Morphology 05/21/2021 NOT REPORTED   Final    RBC Morphology 05/21/2021 NOT REPORTED   Final    Platelet Estimate 22/23/5809 NOT REPORTED   Final    Glucose 05/21/2021 114* 70 - 99 mg/dL Final    BUN 05/21/2021 17  8 - 23 mg/dL Final    CREATININE 05/21/2021 0.61  0.50 - 0.90 mg/dL Final    Bun/Cre Ratio 05/21/2021 NOT REPORTED  9 - 20 Final    Calcium 05/21/2021 9.8  8.6 - 10.4 mg/dL Final    Sodium 05/21/2021 142  135 - 144 mmol/L Final    Potassium 05/21/2021 3.9  3.7 - 5.3 mmol/L Final    Chloride 05/21/2021 102  98 - 107 mmol/L Final    CO2 05/21/2021 24  20 - 31 mmol/L Final    Anion Gap 05/21/2021 16  9 - 17 mmol/L Final    Alkaline Phosphatase 05/21/2021 87  35 - 104 U/L Final    ALT 05/21/2021 13  5 - 33 U/L Final    AST 05/21/2021 13  <32 U/L Final    Total Bilirubin 05/21/2021 0.55  0.3 - 1.2 mg/dL Final    Total Protein 05/21/2021 7.8  6.4 - 8.3 g/dL Final    Albumin 05/21/2021 4.4  3.5 - 5.2 g/dL Final    Albumin/Globulin Ratio 05/21/2021 NOT REPORTED  1.0 - 2.5 Final    GFR Non- 05/21/2021 >60  >60 mL/min Final    GFR  05/21/2021 >60  >60 mL/min Final    GFR Comment 05/21/2021        Final    Comment: Average GFR for 61-76 years old:   80 mL/min/1.73sq m  Chronic Kidney Disease:   <60 mL/min/1.73sq m  Kidney failure:   <15 mL/min/1.73sq m              eGFR calculated using average adult body mass. Additional eGFR calculator available at:        [employer]            GFR Staging 05/21/2021 NOT REPORTED   Final    Lipase 05/21/2021 34  13 - 60 U/L Final    Acetaminophen Level 05/21/2021 <5* 10 - 30 ug/mL Final    Specimen Description 05/21/2021 . NASOPHARYNGEAL SWAB   Final    SARS-CoV-2, Rapid 05/21/2021 Not Detected  Not Detected Final    Comment:       Rapid NAAT:  The specimen is NEGATIVE for SARS-CoV-2, the novel coronavirus associated with   COVID-19. The ID NOW COVID-19 assay is designed to detect the virus that causes COVID-19 in patients   with signs and symptoms of infection who are suspected of COVID-19. An individual without symptoms of COVID-19 and who is not shedding SARS-CoV-2 virus would   expect to have a negative (not detected) result in this assay. Negative results should be treated as presumptive and, if inconsistent with clinical signs   and symptoms or necessary for patient management,  should be tested with an alternative molecular assay. Negative results do not preclude   SARS-CoV-2 infection and   should not be used as the sole basis for patient management decisions.          Fact sheet for Healthcare Providers: Leroy.papito  Fact sheet for Patients: Leroy.papito          Methodology: Isothermal Nucleic Acid Amplification      Lactic Acid, Sepsis 05/21/2021 1.0  0.5 - 1.9 mmol/L Final    Lactic Acid, Sepsis, Whole Blood 05/21/2021 NOT REPORTED  0.5 - 1.9 mmol/L Final    WBC 05/21/2021 10.7  3.5 - 11.0 k/uL Final    RBC 05/21/2021 3.86* 4.0 - 5.2 m/uL Final    Hemoglobin 05/21/2021 12.5  12.0 - 16.0 g/dL Final    Hematocrit 05/21/2021 37.1  36 - 46 % Final    MCV 05/21/2021 96.2  80 - 100 fL Final    MCH 05/21/2021 32.4  26 - 34 pg Final    MCHC 05/21/2021 33.7  31 - 37 g/dL Final    RDW 05/21/2021 14.3  11.5 - 14.9 % Final    Platelets 12/54/8046 444  150 - 450 k/uL Final    MPV 05/21/2021 7.2  6.0 - 12.0 fL Final    NRBC Automated 05/21/2021 NOT REPORTED  per 100 WBC Final            Medications  Current Facility-Administered Medications: loperamide (IMODIUM) capsule 2 mg, 2 mg, Oral, 4x Daily PRN  DULoxetine (CYMBALTA) extended release capsule 60 mg, 60 mg, Oral, Daily  PARoxetine (PAXIL) tablet 10 mg, 10 mg, Oral, Daily Feels hopeless and helpless. We discussed trying to set some goals over the next 12 months as opposed to short-term goals that may be unrealistic. Reframing successful with the patient. She is denying side effects to the cross taper off of Paxil onto Cymbalta. Not able to contract for safety off the unit. Discussed her willingness to engage in unit activities that she is largely remained in bed. Discussed risk of loss of physical strength and ability with such behaviors. Encouraged more engagement in the unit. PLAN  Patient s symptoms   show no change  Observation off Paxil, may consider further increase in Cymbalta pending observation  Follow-up with PT OT  Attempt to develop insight  Psycho-education conducted. Supportive Therapy conducted.   Probable discharge is undetermined at this time  Follow-up daily while on inpatient unit

## 2021-05-25 NOTE — GROUP NOTE
Group Therapy Note    Date: 5/25/2021    Group Start Time: 0900  Group End Time: 0920  Group Topic: Community Meeting    [address], [address] [place] [address] Therapy Note    Attendees: 4/19    patient refused to attend community meeting and goal setting group at 1342-3441 after encouragement from staff. 1:1 talk time provided as alternative to group session.         Signature:  Michele Denis, [address]

## 2021-05-25 NOTE — PLAN OF CARE
Problem: Falls - Risk of:  Goal: Will remain free from falls  Description: Will remain free from falls  Outcome: Ongoing  Note: Patient has had no falls this shift so far. Patient encouraged to stay hydrated and to change position slowly to prevent chance of falls. Patient voiced understanding. Problem: Falls - Risk of:  Goal: Absence of physical injury  Description: Absence of physical injury  Note: Patient has had no physical injuries and no signs of self harm were noted. Patient encouraged to inform staff if she starts to develop any thoughts to harm self. Patient voiced understanding. Problem: Altered Mood, Depressive Behavior:  Goal: Ability to disclose and discuss suicidal ideas will improve  Description: Ability to disclose and discuss suicidal ideas will improve  Note: Patient denies any thoughts to suicidal ideations and no signs of self harm were noted. Patient encouraged to inform staff if she starts to develop any thoughts to harm self. Patient voiced understanding. Problem: Altered Mood, Depressive Behavior:  Goal: Absence of self-harm  Description: Absence of self-harm  Outcome: Ongoing  Note: Patient denies any thoughts to harm self and no signs of self harm were noted. Patient encouraged to inform staff if she starts to develop any thoughts to harm self. Patient voiced understanding. Problem: Altered Mood, Deterioration in Function:  Goal: Ability to perform activities of daily living will improve  Description: Ability to perform activities of daily living will improve  Note: Patient stayed in bed all evening and refused to shower.  Pt did take medications with no issues       Problem: Altered Mood, Deterioration in Function:  Goal: Patient specific goal  Description: Patient specific goal  Outcome: Ongoing     Problem: Skin Integrity:  Goal: Will show no infection signs and symptoms  Description: Will show no infection signs and symptoms  Outcome: Ongoing  Note: Patient shows no signs of skin infections. Problem: Skin Integrity:  Goal: Absence of new skin breakdown  Description: Absence of new skin breakdown  Outcome: Ongoing  Note: Patient shows no signs of skin breakdown currently. Problem: Tobacco Use:  Goal: Inpatient tobacco use cessation counseling participation  Description: Inpatient tobacco use cessation counseling participation  Outcome: Ongoing  Note: Pt reports no desire to quit smoking at this time. Problem: Tobacco Use:  Goal: Inpatient tobacco use cessation counseling participation  Description: Inpatient tobacco use cessation counseling participation  Outcome: Ongoing  Note: Pt reports no desire to quit smoking at this time. Problem: Pain:  Goal: Pain level will decrease  Description: Pain level will decrease  Outcome: Ongoing  Note: Patient reports chronic generalized pain rated a 3 on pain scale. Pt was offered rest, repositioning and distraction. Pt requested tylenol for pain, which was provided. Pt was satisfied with pain intervention. Problem: Pain:  Goal: Control of acute pain  Description: Control of acute pain  Outcome: Ongoing  Note: Patient denies any pain currently. Patient encouraged to inform staff if she develops any pain. Patient voiced understanding. Problem: Pain:  Goal: Control of chronic pain  Description: Control of chronic pain  Outcome: Ongoing  Note: Patient reports chronic generalized pain rated a 3 on pain scale. Pt was offered rest, repositioning and distraction. Pt requested tylenol for pain, which was provided. Pt was satisfied with pain intervention.

## 2021-05-26 PROCEDURE — 6370000000 HC RX 637 (ALT 250 FOR IP): Performed by: INTERNAL MEDICINE

## 2021-05-26 PROCEDURE — 99232 SBSQ HOSP IP/OBS MODERATE 35: CPT | Performed by: PSYCHIATRY & NEUROLOGY

## 2021-05-26 PROCEDURE — 6370000000 HC RX 637 (ALT 250 FOR IP): Performed by: PSYCHIATRY & NEUROLOGY

## 2021-05-26 PROCEDURE — 1240000000 HC EMOTIONAL WELLNESS R&B

## 2021-05-26 RX ADMIN — DULOXETINE HYDROCHLORIDE 60 MG: 60 CAPSULE, DELAYED RELEASE ORAL at 11:28

## 2021-05-26 RX ADMIN — METOPROLOL TARTRATE 25 MG: 25 TABLET, FILM COATED ORAL at 20:47

## 2021-05-26 RX ADMIN — TRAZODONE HYDROCHLORIDE 50 MG: 50 TABLET ORAL at 20:46

## 2021-05-26 RX ADMIN — ASPIRIN 81 MG: 81 TABLET, COATED ORAL at 11:28

## 2021-05-26 RX ADMIN — HYDROXYZINE HYDROCHLORIDE 50 MG: 50 TABLET, FILM COATED ORAL at 20:47

## 2021-05-26 RX ADMIN — METOPROLOL TARTRATE 25 MG: 25 TABLET, FILM COATED ORAL at 11:27

## 2021-05-26 RX ADMIN — ATORVASTATIN CALCIUM 40 MG: 20 TABLET, FILM COATED ORAL at 20:47

## 2021-05-26 ASSESSMENT — PAIN DESCRIPTION - ONSET: ONSET: ON-GOING

## 2021-05-26 ASSESSMENT — PAIN - FUNCTIONAL ASSESSMENT: PAIN_FUNCTIONAL_ASSESSMENT: PREVENTS OR INTERFERES SOME ACTIVE ACTIVITIES AND ADLS

## 2021-05-26 ASSESSMENT — PAIN DESCRIPTION - LOCATION: LOCATION: GENERALIZED

## 2021-05-26 ASSESSMENT — PAIN SCALES - GENERAL
PAINLEVEL_OUTOF10: 2
PAINLEVEL_OUTOF10: 2

## 2021-05-26 ASSESSMENT — PAIN DESCRIPTION - DESCRIPTORS: DESCRIPTORS: ACHING;CONSTANT;DISCOMFORT

## 2021-05-26 NOTE — GROUP NOTE
Group Therapy Note    Date: 5/26/2021    Group Start Time: 1100  Group End Time: 1140  Group Topic: Psychoeducation    TOM PearsonS    Patient refused to attend socialization skills group at 1100 after encouragement from staff. 1:1 talk time offered by staff as alternative to group session.

## 2021-05-26 NOTE — GROUP NOTE
Group Therapy Note    Date: 5/26/2021    Group Start Time: 0900  Group End Time: 8345  Group Topic: Community Meeting    [facility]      Patient refused to attend community meeting group at 0900 after encouragement from staff. 1:1 talk time provided by staff.        Signature:  Lance Mcgrath, [address]

## 2021-05-26 NOTE — PROGRESS NOTES
examiner: Cooperative, attentive, good eye contact  Speech: Normal rate, volume, and tone  Mood:  Patient reports \"okay\". Patient presents as depressed. Affect: Congruent to depressed presentation. Thought processes: Linear, goal directed and coherent   Thought content: Denies homicidal ideation  Suicidal Ideation: Fleeting suicidal ideations, without current plan or intent, contracts for safety on the unit. Delusions: No evidence of delusions. Denies paranoia. Perceptual Disturbance: Patient does not appear to be responding to internal stimuli. Patient denies visual or auditory hallucinations. Cognition: Oriented to self, location, time, and situation  Memory: Age appropriate  Insight & Judgement: Poor     Data   height is 5' 7\" (1.702 m) and weight is 350 lb (158.8 kg) (abnormal). Her temporal temperature is 97.8 °F (36.6 °C). Her blood pressure is 125/75 and her pulse is 96. Her respiration is 15 and oxygen saturation is 96%.    Labs:   Admission on 05/21/2021   Component Date Value Ref Range Status    WBC 05/21/2021 12.0* 3.5 - 11.0 k/uL Final    RBC 05/21/2021 4.46  4.0 - 5.2 m/uL Final    Hemoglobin 05/21/2021 13.8  12.0 - 16.0 g/dL Final    Hematocrit 05/21/2021 43.0  36 - 46 % Final    MCV 05/21/2021 96.2  80 - 100 fL Final    MCH 05/21/2021 31.0  26 - 34 pg Final    MCHC 05/21/2021 32.2  31 - 37 g/dL Final    RDW 05/21/2021 14.5  11.5 - 14.9 % Final    Platelets 02/41/0735 485* 150 - 450 k/uL Final    MPV 05/21/2021 7.3  6.0 - 12.0 fL Final    NRBC Automated 05/21/2021 NOT REPORTED  per 100 WBC Final    Differential Type 05/21/2021 NOT REPORTED   Final    Seg Neutrophils 05/21/2021 76* 36 - 66 % Final    Lymphocytes 05/21/2021 14* 24 - 44 % Final    Monocytes 05/21/2021 7  1 - 7 % Final    Eosinophils % 05/21/2021 2  0 - 4 % Final    Basophils 05/21/2021 1  0 - 2 % Final    Immature Granulocytes 05/21/2021 NOT REPORTED  0 % Final    Segs Absolute 05/21/2021 9.20* 1.3 - 9.1 k/uL Final    Absolute Lymph # 05/21/2021 1.70  1.0 - 4.8 k/uL Final    Absolute Mono # 05/21/2021 0.80  0.1 - 1.3 k/uL Final    Absolute Eos # 05/21/2021 0.20  0.0 - 0.4 k/uL Final    Basophils Absolute 05/21/2021 0.10  0.0 - 0.2 k/uL Final    Absolute Immature Granulocyte 05/21/2021 NOT REPORTED  0.00 - 0.30 k/uL Final    WBC Morphology 05/21/2021 NOT REPORTED   Final    RBC Morphology 05/21/2021 NOT REPORTED   Final    Platelet Estimate 96/61/2428 NOT REPORTED   Final    Glucose 05/21/2021 114* 70 - 99 mg/dL Final    BUN 05/21/2021 17  8 - 23 mg/dL Final    CREATININE 05/21/2021 0.61  0.50 - 0.90 mg/dL Final    Bun/Cre Ratio 05/21/2021 NOT REPORTED  9 - 20 Final    Calcium 05/21/2021 9.8  8.6 - 10.4 mg/dL Final    Sodium 05/21/2021 142  135 - 144 mmol/L Final    Potassium 05/21/2021 3.9  3.7 - 5.3 mmol/L Final    Chloride 05/21/2021 102  98 - 107 mmol/L Final    CO2 05/21/2021 24  20 - 31 mmol/L Final    Anion Gap 05/21/2021 16  9 - 17 mmol/L Final    Alkaline Phosphatase 05/21/2021 87  35 - 104 U/L Final    ALT 05/21/2021 13  5 - 33 U/L Final    AST 05/21/2021 13  <32 U/L Final    Total Bilirubin 05/21/2021 0.55  0.3 - 1.2 mg/dL Final    Total Protein 05/21/2021 7.8  6.4 - 8.3 g/dL Final    Albumin 05/21/2021 4.4  3.5 - 5.2 g/dL Final    Albumin/Globulin Ratio 05/21/2021 NOT REPORTED  1.0 - 2.5 Final    GFR Non- 05/21/2021 >60  >60 mL/min Final    GFR  05/21/2021 >60  >60 mL/min Final    GFR Comment 05/21/2021        Final    Comment: Average GFR for 61-76 years old:   80 mL/min/1.73sq m  Chronic Kidney Disease:   <60 mL/min/1.73sq m  Kidney failure:   <15 mL/min/1.73sq m              eGFR calculated using average adult body mass.  Additional eGFR calculator available at:        ChartCube.br            GFR Staging 05/21/2021 NOT REPORTED   Final    Lipase 05/21/2021 34  13 - 60 U/L Final    Acetaminophen Level 05/21/2021 <5* 10 - 30 ug/mL Final    Specimen Description 05/21/2021 . NASOPHARYNGEAL SWAB   Final    SARS-CoV-2, Rapid 05/21/2021 Not Detected  Not Detected Final    Comment:       Rapid NAAT:  The specimen is NEGATIVE for SARS-CoV-2, the novel coronavirus associated with   COVID-19. The ID NOW COVID-19 assay is designed to detect the virus that causes COVID-19 in patients   with signs and symptoms of infection who are suspected of COVID-19. An individual without symptoms of COVID-19 and who is not shedding SARS-CoV-2 virus would   expect to have a negative (not detected) result in this assay. Negative results should be treated as presumptive and, if inconsistent with clinical signs   and symptoms or necessary for patient management,  should be tested with an alternative molecular assay. Negative results do not preclude   SARS-CoV-2 infection and   should not be used as the sole basis for patient management decisions. Fact sheet for Healthcare Providers: SeekCultures.si  Fact sheet for Patients: SeekCultures.si          Methodology: Isothermal Nucleic Acid Amplification      Lactic Acid, Sepsis 05/21/2021 1.0  0.5 - 1.9 mmol/L Final    Lactic Acid, Sepsis, Whole Blood 05/21/2021 NOT REPORTED  0.5 - 1.9 mmol/L Final    WBC 05/21/2021 10.7  3.5 - 11.0 k/uL Final    RBC 05/21/2021 3.86* 4.0 - 5.2 m/uL Final    Hemoglobin 05/21/2021 12.5  12.0 - 16.0 g/dL Final    Hematocrit 05/21/2021 37.1  36 - 46 % Final    MCV 05/21/2021 96.2  80 - 100 fL Final    MCH 05/21/2021 32.4  26 - 34 pg Final    MCHC 05/21/2021 33.7  31 - 37 g/dL Final    RDW 05/21/2021 14.3  11.5 - 14.9 % Final    Platelets 50/03/6508 444  150 - 450 k/uL Final    MPV 05/21/2021 7.2  6.0 - 12.0 fL Final    NRBC Automated 05/21/2021 NOT REPORTED  per 100 WBC Final         Reviewed patient's current plan of care and vital signs with nursing staff.     Labs reviewed: [x] Yes  Last EKG in EMR reviewed: [x] Yes    Medications  Current Facility-Administered Medications: loperamide (IMODIUM) capsule 2 mg, 2 mg, Oral, 4x Daily PRN  DULoxetine (CYMBALTA) extended release capsule 60 mg, 60 mg, Oral, Daily  lidocaine 4 % external patch 1 patch, 1 patch, Transdermal, Daily  aluminum & magnesium hydroxide-simethicone (MAALOX) 200-200-20 MG/5ML suspension 30 mL, 30 mL, Oral, Q6H PRN  polyethylene glycol (GLYCOLAX) packet 17 g, 17 g, Oral, Daily PRN  traZODone (DESYREL) tablet 50 mg, 50 mg, Oral, Nightly PRN  ibuprofen (ADVIL;MOTRIN) tablet 400 mg, 400 mg, Oral, Q6H PRN  acetaminophen (TYLENOL) tablet 650 mg, 650 mg, Oral, Q4H PRN  hydrOXYzine (ATARAX) tablet 50 mg, 50 mg, Oral, TID PRN  albuterol sulfate  (90 Base) MCG/ACT inhaler 2 puff, 2 puff, Inhalation, Q6H PRN  aspirin EC tablet 81 mg, 81 mg, Oral, Daily  atorvastatin (LIPITOR) tablet 40 mg, 40 mg, Oral, Nightly  bumetanide (BUMEX) tablet 2 mg, 2 mg, Oral, Daily  diclofenac sodium (VOLTAREN) 1 % gel 2 g, 2 g, Topical, 4x Daily PRN  ibuprofen (ADVIL;MOTRIN) tablet 600 mg, 600 mg, Oral, TID PRN  metoprolol tartrate (LOPRESSOR) tablet 25 mg, 25 mg, Oral, BID    ASSESSMENT  Major depressive disorder, recurrent, severe without psychotic behavior ([facility] [address]         PLAN  Patient symptoms: show no change. PT/OT ordered. RN followed up with podiatry regarding the previously ordered consult. Continue current medication regimen. May consider further increase in Cymbalta pending observation. Monitor need and frequency of PRN medications. Encourage participation in groups and milieu. Attempt to develop insight. Psycho-education conducted. Supportive Therapy conducted. Probable discharge is to be determined by MD.   Follow-up daily while inpatient. Patient continues to be monitored in the inpatient psychiatric facility at [facility] for safety and stabilization.  Patient continues to need, on a daily basis, active treatment furnished directly by or requiring the supervision of inpatient psychiatric personnel. Electronically signed by KIRK Thayer CNP on 5/26/2021 at 5:36 PM    **This report has been created using voice recognition software. It may contain minor errors which are inherent in voice recognition technology. **    I independently saw and evaluated the patient. I reviewed the nurse practitioners documentation above. Any additional comments or changes to the nurse practitioners documentation are stated below otherwise agree with assessment. Plan will be as follows: While patient does report substantial improvement in her depression. She is reporting some movement on her suicidal ideation. She reports that she is starting to be able to contemplate safety off the unit. Discussed increasing her Cymbalta and patient is in agreement. She is denying side effects to current medication regimen. States she is starting to feel better and states \"that may be because I am through my withdrawal\" referring to her opiate cessation. States she got out of bed today. Starting to see some minor steps towards improvement. PLAN  Patient s symptoms   are improving  Increase Cymbalta to 80 mg tomorrow  Attempt to develop insight  Psycho-education conducted. Supportive Therapy conducted.   Probable discharge is 1 to 2 days  Follow-up daily while on inpatient unit

## 2021-05-26 NOTE — PLAN OF CARE
Problem: Falls - Risk of:  Goal: Will remain free from falls  Description: Will remain free from falls  5/25/2021 2247 by Yin Hogan RN  Outcome: Ongoing  Note: Patient has had no falls this shift so far. Patient encouraged to stay hydrated and to change position slowly to prevent chance of falls. Patient voiced understanding. Problem: Falls - Risk of:  Goal: Absence of physical injury  Description: Absence of physical injury  Outcome: Ongoing  Note: Patient has had no physical injuries and no signs of self harm were noted. Patient encouraged to inform staff if she starts to develop any thoughts to harm self. Patient voiced understanding. Problem: Altered Mood, Depressive Behavior:  Goal: Ability to disclose and discuss suicidal ideas will improve  Description: Ability to disclose and discuss suicidal ideas will improve  5/25/2021 2247 by Yin Hogan RN  Outcome: Ongoing  Note: Patient denies any thoughts to suicidal ideations and no signs of self harm were noted. Patient encouraged to inform staff if she starts to develop any thoughts to harm self. Patient voiced understanding. Problem: Altered Mood, Depressive Behavior:  Goal: Absence of self-harm  Description: Absence of self-harm  Outcome: Ongoing  Note: Patient denies any thoughts to harm self and no signs of self harm were noted. Patient encouraged to inform staff if she starts to develop any thoughts to harm self. Patient voiced understanding. Problem: Altered Mood, Deterioration in Function:  Goal: Ability to perform activities of daily living will improve  Description: Ability to perform activities of daily living will improve  5/25/2021 2247 by Yin Hogan RN  Outcome: Ongoing  Note: Pt denied any desire to get out of bed currently. Pt took medications as prescribed. Pt had a shower on day shift.       Problem: Skin Integrity:  Goal: Will show no infection signs and symptoms  Description: Will show no infection signs and symptoms  5/25/2021 2247 by Pilar Olivares RN  Outcome: Ongoing  Note: Patient shows no new signs of infection. Reddness is still present in abdominal folds and pt denied any need for medication currently. Problem: Skin Integrity:  Goal: Absence of new skin breakdown  Description: Absence of new skin breakdown  Outcome: Ongoing  Note: Patient has no signs of new skin breakdown. Pt has redness under abdominal folds. Problem: Tobacco Use:  Goal: Inpatient tobacco use cessation counseling participation  Description: Inpatient tobacco use cessation counseling participation  Outcome: Ongoing  Note: Pt reports no desire to quit smoking at this time. Problem: Pain:  Goal: Pain level will decrease  Description: Pain level will decrease  5/25/2021 2247 by Pilar Olivares RN  Outcome: Ongoing  Note: Patient reports chronic pain generalized over entire body rated an 8 on pain scale. Patient was given rest, repositioning and emotional support for her pain interventions. Pt denied any need for further interventions at this time. Problem: Pain:  Goal: Control of acute pain  Description: Control of acute pain  Outcome: Ongoing  Note: Patient denies any acute pain currently. Patient encouraged to inform staff if she develops any chronic pain. Pt voiced understanding. Problem: Pain:  Goal: Control of chronic pain  Description: Control of chronic pain  Outcome: Ongoing  Note: Patient reports chronic pain generalized over entire body rated an 8 on pain scale. Patient was given rest, repositioning and emotional support for her pain interventions. Pt denied any need for further interventions at this time.

## 2021-05-26 NOTE — GROUP NOTE
Group Therapy Note    Date: 5/26/2021    Group Start Time: 1000  Group End Time: 3008  Group Topic: Psychotherapy    [address], LSW    patient refused to attend psychotherapy group at [facility] after encouragement from staff.   1:1 talk time provided as alternative to group session

## 2021-05-26 NOTE — PLAN OF CARE
Problem: Falls - Risk of:  Goal: Will remain free from falls  Description: Will remain free from falls  5/26/2021 1024 by Andria Loera LPN  Outcome: Ongoing  Note: Patient denies suicidal ideation. 15 minute safety checks continued for patient safety. Problem: Altered Mood, Depressive Behavior:  Goal: Absence of self-harm  Description: Absence of self-harm  5/26/2021 1024 by Andria Loera LPN  Outcome: Ongoing  Note: Patient has been free of self harm and denies thoughts of harming self at this time. Patient has agreed to seek staff if he begins having thoughts of harming self or needs to talk. Q15 minute safety checks continue.

## 2021-05-26 NOTE — GROUP NOTE
Group Therapy Note    Date: 5/26/2021    Group Start Time: 1330  Group End Time: 3560  Group Topic: Recreational    [address], CTRS        Group Therapy Note    Attendees:  5/17      patient refused to attend coping skills group at [phone] after encouragement from staff. 1:1 talk time provided as alternative to group session.        Signature:  Clyde Hall, [address]

## 2021-05-27 PROCEDURE — 1240000000 HC EMOTIONAL WELLNESS R&B

## 2021-05-27 PROCEDURE — 6370000000 HC RX 637 (ALT 250 FOR IP): Performed by: PSYCHIATRY & NEUROLOGY

## 2021-05-27 PROCEDURE — 6370000000 HC RX 637 (ALT 250 FOR IP): Performed by: INTERNAL MEDICINE

## 2021-05-27 PROCEDURE — 99232 SBSQ HOSP IP/OBS MODERATE 35: CPT | Performed by: PSYCHIATRY & NEUROLOGY

## 2021-05-27 RX ADMIN — METOPROLOL TARTRATE 25 MG: 25 TABLET, FILM COATED ORAL at 08:01

## 2021-05-27 RX ADMIN — TRAZODONE HYDROCHLORIDE 50 MG: 50 TABLET ORAL at 22:32

## 2021-05-27 RX ADMIN — ASPIRIN 81 MG: 81 TABLET, COATED ORAL at 08:01

## 2021-05-27 RX ADMIN — ATORVASTATIN CALCIUM 40 MG: 20 TABLET, FILM COATED ORAL at 22:31

## 2021-05-27 RX ADMIN — METOPROLOL TARTRATE 25 MG: 25 TABLET, FILM COATED ORAL at 22:32

## 2021-05-27 RX ADMIN — DULOXETINE 80 MG: 20 CAPSULE, DELAYED RELEASE ORAL at 08:01

## 2021-05-27 RX ADMIN — HYDROXYZINE HYDROCHLORIDE 50 MG: 50 TABLET, FILM COATED ORAL at 22:32

## 2021-05-27 ASSESSMENT — PAIN DESCRIPTION - ORIENTATION: ORIENTATION: RIGHT

## 2021-05-27 ASSESSMENT — PAIN SCALES - GENERAL: PAINLEVEL_OUTOF10: 8

## 2021-05-27 ASSESSMENT — PAIN DESCRIPTION - PAIN TYPE: TYPE: CHRONIC PAIN

## 2021-05-27 NOTE — PROGRESS NOTES
emotional , \"I want to go home\". C/O of pain at shoulder, knee, Hip, mostly R hip. Pain Screening  Patient Currently in Pain: Yes  Pain Assessment  Pain Assessment: 0-10  Pain Level: 8  Pain Type: Chronic pain  Pain Location: Generalized (shoulder, Hip, knee)  Pain Orientation: Right  Pain Descriptors: Aching;Discomfort  Pain Frequency: Continuous  Vital Signs  Patient Currently in Pain: Yes       Orientation     Social/Functional History  Social/Functional History  Lives With:  (Boyfriend Rose Pennington)  Type of Home: House  Home Layout: One level  Home Access: Stairs to enter without rails  Entrance Stairs - Number of Steps: 2 to 3 platform steps, uses her walker to step up  Bathroom Shower/Tub: Tub/Shower unit, Shower chair with back (Does not take a shower, as unable to step into the shower)  Bathroom Toilet: Standard  Bathroom Accessibility: Accessible  Home Equipment: Rolling walker, Wheelchair-manual  Receives Help From: Friend(s)  ADL Assistance: Independent  Ambulation Assistance: Independent (walker)  Transfer Assistance: Independent  Active : No  Patient's  Info: Friend or medical cab  IADL Comments: Sleeps in a lazy boy at home( raised up )  Additional Comments: Pt's Boyfriend Rose Pennington perform cooking , cleaning, friends does shopping for them. Pt reports she was at a SNF for ~ 3months ago, DC home  in April 2021. Per pt, tehy have not been able to schedule Home care. Cognition        Objective          AROM RLE (degrees)  RLE General AROM: Pain with AROM at Hip, Knee/ankle WFL.    AROM LLE (degrees)  LLE AROM : WFL  Strength RLE  Comment: Hip NT due to pain, Knee/ankle 4-/5  Strength LLE  Comment: Hip 3/5, Knee/ankle 4/5        Bed mobility  Rolling to Left: Minimal assistance  Supine to Sit: Minimal assistance  Sit to Supine: Unable to assess (Pt left sitting at EOB, did not want to lie down yet, reports she can get into bed on her own most of the time, if has difficulty she waill call for help.) Co-treatment   Time In 1440         Time Out 1510         Minutes 30         Timed Code Treatment Minutes: [address] Nimesh, PT

## 2021-05-27 NOTE — PLAN OF CARE
Problem: Falls - Risk of:  Goal: Will remain free from falls  Description: Will remain free from falls  5/26/2021 2306 by Valentina Noel RN  Outcome: Ongoing  Note: Patient has had no falls this shift so far. Patient encouraged to stay hydrated and to change position slowly to prevent chance of falls. Patient voiced understanding. Problem: Falls - Risk of:  Goal: Absence of physical injury  Description: Absence of physical injury  Outcome: Ongoing  Note: Patient has had no physical injuries and no signs of self harm were noted. Patient encouraged to inform staff if she starts to develop any thoughts to harm self. Patient voiced understanding. Problem: Altered Mood, Depressive Behavior:  Goal: Ability to disclose and discuss suicidal ideas will improve  Description: Ability to disclose and discuss suicidal ideas will improve  Outcome: Ongoing  Note: Patient denies any thoughts to suicidal ideations and no signs of self harm were noted. Patient encouraged to inform staff if she starts to develop any thoughts to harm self. Patient voiced understanding. Problem: Altered Mood, Depressive Behavior:  Goal: Absence of self-harm  Description: Absence of self-harm  5/26/2021 2306 by Valentina Noel RN  Outcome: Ongoing  Note: Patient denies any thoughts to harm self and no signs of self harm were noted. Patient encouraged to inform staff if she starts to develop any thoughts to harm self. Patient voiced understanding. Problem: Altered Mood, Deterioration in Function:  Goal: Ability to perform activities of daily living will improve  Description: Ability to perform activities of daily living will improve  Outcome: Ongoing  Note: Patient refused to get out of bed this evening for snack or anything. Pt requested medications be taken to her and she did take them after they were brought to her in bed.       Problem: Altered Mood, Deterioration in Function:  Goal: Patient specific goal  Description: Patient specific goal  Outcome: Ongoing  Note: Pt encouraged to get out of bed more but refused. Problem: Skin Integrity:  Goal: Will show no infection signs and symptoms  Description: Will show no infection signs and symptoms  Outcome: Ongoing  Note: No new signs of infection were noted, pt still has redness under abdominal folds. Problem: Skin Integrity:  Goal: Absence of new skin breakdown  Description: Absence of new skin breakdown  Outcome: Ongoing  Note: No new skin breakdown was noted. Problem: Tobacco Use:  Goal: Inpatient tobacco use cessation counseling participation  Description: Inpatient tobacco use cessation counseling participation  Outcome: Ongoing  Note: Pt reports no desire to quit smoking at this time. Problem: Pain:  Goal: Pain level will decrease  Description: Pain level will decrease  Outcome: Ongoing  Note: Patient reports generalized chronic pain rated a 2 on pain scale. Pt was given repositioning, encouraged to get out of bed for awhile which she declined and distraction. Pt denies any need for further pain interventions at this time. Problem: Pain:  Goal: Control of acute pain  Description: Control of acute pain  Outcome: Ongoing  Note: Patient denies any acute pain currently. Pt encouraged to inform staff if she develops any acute pain. Pt voiced understanding. Problem: Pain:  Goal: Control of chronic pain  Description: Control of chronic pain  Outcome: Ongoing  Note: Patient reports generalized chronic pain rated a 2 on pain scale. Pt was given repositioning, encouraged to get out of bed for awhile which she declined and distraction. Pt denies any need for further pain interventions at this time.

## 2021-05-27 NOTE — PLAN OF CARE
Problem: Falls - Risk of:  Goal: Will remain free from falls  Description: Will remain free from falls  Outcome: Ongoing  Note: Patient remains free of falls and verbalizes understanding of individual fall risks. Patient wearing non skid footwear and encouraged to seek out staff for any assistance needed. Patient uses wheelchair. Safe environment maintained. Problem: Falls - Risk of:  Goal: Absence of physical injury  Description: Absence of physical injury  Outcome: Ongoing  Note: No injury noted this shift. Problem: Altered Mood, Depressive Behavior:  Goal: Ability to disclose and discuss suicidal ideas will improve  Description: Ability to disclose and discuss suicidal ideas will improve  Outcome: Ongoing  Note: Patient denies thoughts of suicide or self harm and agrees to seek out staff if these thoughts occur. Patient is tearful during 1:1 time, reports high depression and anxiety and is hopeless and helpless. Will continue to provide encouragement and support as needed. Safe environment maintained. Safety checks continued Q 15 minutes and intermittently. Problem: Altered Mood, Depressive Behavior:  Goal: Absence of self-harm  Description: Absence of self-harm  Outcome: Ongoing  Note: No self harm noted this shift. Patient agrees to seek staff out if negative thoughts arise. Will continue to monitor Q15 minute and intermittently. Problem: Altered Mood, Deterioration in Function:  Goal: Ability to perform activities of daily living will improve  Description: Ability to perform activities of daily living will improve  Outcome: Ongoing  Note: Patient is a moderate assist with ADL's. Will continue to encourage patient to do as much as possible for self. Problem: Skin Integrity:  Goal: Absence of new skin breakdown  Description: Absence of new skin breakdown  Outcome: Ongoing  Note: No new skin breakdown or infection noted this shift.       Problem: Tobacco Use:  Goal: Inpatient

## 2021-05-27 NOTE — GROUP NOTE
Group Therapy Note    Date: 5/27/2021    Group Start Time: 1000  Group End Time: 0537  Group Topic: Psychotherapy    [address], LSW    patient refused to attend psychotherapy group at [facility] after encouragement from staff.   1:1 talk time provided as alternative to group session

## 2021-05-27 NOTE — GROUP NOTE
Group Therapy Note    Date: 5/27/2021    Group Start Time: 1330  Group End Time: 6582  Group Topic: Psychoeducation    MALENA Dietz, CTRS    Patient refused to attend community resources  group at 1330 after encouragement from staff. 1:1 talk time offered by staff.        Signature:  Renetta Dietz, [address]

## 2021-05-27 NOTE — PROGRESS NOTES
Daily Progress Note  5/27/2021    Patient Name: Cruzito Hull:  Suicidal ideation          SUBJECTIVE:      Patient is seen today for a follow up assessment. Patient has been medication compliant with the exception of her scheduled Bumex tablet. Patient has required the following as needed medications:      Patient endorses depression and rates her depression as a 8 out of 10 (010 scale with 0 being no depression and 10 being worst). Patient also endorses anxiety and rates her anxiety as as a 8 out of 10 (010 scale was 0 being no anxiety and 10 being worst). Patient reports fleeting suicidal ideation, without intent or plan, and contracts for safety on the unit. Podiatry consult was previously ordered; however, the patient was not seen by podiatry. RN followed up with podiatry yesterday regarding the previously ordered consult. Appetite:  [x] Normal/Adequate/Unchanged  [] Increased  [] Decreased       Sleep:       [x] Normal/Adequate/Unchanged  [] Fair  [] Poor    Per [facility] Adult Daily Assessment flowsheet, staff documented 8 hours of sleep. Group Attendance on Unit:   [] Yes  [] Selectively    [x] No  Patient reports she has not been attending groups. ROS:  [x] All negative/unchanged except if checked. Explain positive (checked items) below:  [] Constitutional  [] Eyes  [] Ear/Nose/Mouth/Throat  [] Respiratory  [] CV  [] GI  []   [] Musculoskeletal  [] Skin/Breast  [] Neurological  [] Endocrine  [] Heme/Lymph  [] Allergic/Immunologic    Patient denied any medical concerns at the time of assessment. Medication Side Effects: Patient denied medication side effects at the time of assessment.           Mental Status Exam  Level of consciousness: Alert and awake   Appearance: Appropriate attire for setting, resting in bed, with poor grooming and hygiene   Behavior/Motor: Approachable, no psychomotor slowing   Attitude toward examiner: Cooperative, attentive, good eye contact Speech: Normal rate, volume, and tone  Mood:  Patient reports \"sleepy\". Patient presents as depressed. Affect: Congruent to depressed presentation. Thought processes: Linear, goal directed and coherent   Thought content: Denies homicidal ideation  Suicidal Ideation: Fleeting suicidal ideations, without current plan or intent, contracts for safety on the unit. Delusions: No evidence of delusions. Denies paranoia. Perceptual Disturbance: Patient does not appear to be responding to internal stimuli. Patient denies visual or auditory hallucinations. Cognition: Oriented to self, location, time, and situation  Memory: Age appropriate  Insight & Judgement: Poor     Data   height is 5' 7\" (1.702 m) and weight is 350 lb (158.8 kg) (abnormal). Her temperature is 97.5 °F (36.4 °C). Her blood pressure is 122/66 and her pulse is 79. Her respiration is 14 and oxygen saturation is 96%.    Labs:   Admission on 05/21/2021   Component Date Value Ref Range Status    WBC 05/21/2021 12.0* 3.5 - 11.0 k/uL Final    RBC 05/21/2021 4.46  4.0 - 5.2 m/uL Final    Hemoglobin 05/21/2021 13.8  12.0 - 16.0 g/dL Final    Hematocrit 05/21/2021 43.0  36 - 46 % Final    MCV 05/21/2021 96.2  80 - 100 fL Final    MCH 05/21/2021 31.0  26 - 34 pg Final    MCHC 05/21/2021 32.2  31 - 37 g/dL Final    RDW 05/21/2021 14.5  11.5 - 14.9 % Final    Platelets 79/57/1144 485* 150 - 450 k/uL Final    MPV 05/21/2021 7.3  6.0 - 12.0 fL Final    NRBC Automated 05/21/2021 NOT REPORTED  per 100 WBC Final    Differential Type 05/21/2021 NOT REPORTED   Final    Seg Neutrophils 05/21/2021 76* 36 - 66 % Final    Lymphocytes 05/21/2021 14* 24 - 44 % Final    Monocytes 05/21/2021 7  1 - 7 % Final    Eosinophils % 05/21/2021 2  0 - 4 % Final    Basophils 05/21/2021 1  0 - 2 % Final    Immature Granulocytes 05/21/2021 NOT REPORTED  0 % Final    Segs Absolute 05/21/2021 9.20* 1.3 - 9.1 k/uL Final    Absolute Lymph # 05/21/2021 1.70  1.0 - 4.8 k/uL Hermilo Solano NASOPHARYNGEAL SWAB   Final    SARS-CoV-2, Rapid 05/21/2021 Not Detected  Not Detected Final    Comment:       Rapid NAAT:  The specimen is NEGATIVE for SARS-CoV-2, the novel coronavirus associated with   COVID-19. The ID NOW COVID-19 assay is designed to detect the virus that causes COVID-19 in patients   with signs and symptoms of infection who are suspected of COVID-19. An individual without symptoms of COVID-19 and who is not shedding SARS-CoV-2 virus would   expect to have a negative (not detected) result in this assay. Negative results should be treated as presumptive and, if inconsistent with clinical signs   and symptoms or necessary for patient management,  should be tested with an alternative molecular assay. Negative results do not preclude   SARS-CoV-2 infection and   should not be used as the sole basis for patient management decisions. Fact sheet for Healthcare Providers: Salvador  Fact sheet for Patients: Leroy.[email or web address]          Methodology: Isothermal Nucleic Acid Amplification      Lactic Acid, Sepsis 05/21/2021 1.0  0.5 - 1.9 mmol/L Final    Lactic Acid, Sepsis, Whole Blood 05/21/2021 NOT REPORTED  0.5 - 1.9 mmol/L Final    WBC 05/21/2021 10.7  3.5 - 11.0 k/uL Final    RBC 05/21/2021 3.86* 4.0 - 5.2 m/uL Final    Hemoglobin 05/21/2021 12.5  12.0 - 16.0 g/dL Final    Hematocrit 05/21/2021 37.1  36 - 46 % Final    MCV 05/21/2021 96.2  80 - 100 fL Final    MCH 05/21/2021 32.4  26 - 34 pg Final    MCHC 05/21/2021 33.7  31 - 37 g/dL Final    RDW 05/21/2021 14.3  11.5 - 14.9 % Final    Platelets 23/12/5992 444  150 - 450 k/uL Final    MPV 05/21/2021 7.2  6.0 - 12.0 fL Final    NRBC Automated 05/21/2021 NOT REPORTED  per 100 WBC Final         Reviewed patient's current plan of care and vital signs with nursing staff.     Labs reviewed: [x] Yes  Last EKG in EMR reviewed: [x] Yes    Medications  Current Facility-Administered Medications: DULoxetine (CYMBALTA) extended release capsule 80 mg, 80 mg, Oral, Daily  loperamide (IMODIUM) capsule 2 mg, 2 mg, Oral, 4x Daily PRN  lidocaine 4 % external patch 1 patch, 1 patch, Transdermal, Daily  aluminum & magnesium hydroxide-simethicone (MAALOX) 200-200-20 MG/5ML suspension 30 mL, 30 mL, Oral, Q6H PRN  polyethylene glycol (GLYCOLAX) packet 17 g, 17 g, Oral, Daily PRN  traZODone (DESYREL) tablet 50 mg, 50 mg, Oral, Nightly PRN  ibuprofen (ADVIL;MOTRIN) tablet 400 mg, 400 mg, Oral, Q6H PRN  acetaminophen (TYLENOL) tablet 650 mg, 650 mg, Oral, Q4H PRN  hydrOXYzine (ATARAX) tablet 50 mg, 50 mg, Oral, TID PRN  albuterol sulfate  (90 Base) MCG/ACT inhaler 2 puff, 2 puff, Inhalation, Q6H PRN  aspirin EC tablet 81 mg, 81 mg, Oral, Daily  atorvastatin (LIPITOR) tablet 40 mg, 40 mg, Oral, Nightly  bumetanide (BUMEX) tablet 2 mg, 2 mg, Oral, Daily  diclofenac sodium (VOLTAREN) 1 % gel 2 g, 2 g, Topical, 4x Daily PRN  ibuprofen (ADVIL;MOTRIN) tablet 600 mg, 600 mg, Oral, TID PRN  metoprolol tartrate (LOPRESSOR) tablet 25 mg, 25 mg, Oral, BID    ASSESSMENT  Major depressive disorder, recurrent, severe without psychotic behavior ([facility]         PLAN  Patient symptoms: show no change. PT/OT previously ordered. RN followed up with podiatry yesterday regarding the previously ordered consult. Continue current medication regimen. Monitor need and frequency of PRN medications. Encourage participation in groups and milieu. Attempt to develop insight. Psycho-education conducted. Supportive Therapy conducted. Probable discharge is to be determined by MD.   Follow-up daily while inpatient. Patient continues to be monitored in the inpatient psychiatric facility at [facility] for safety and stabilization. Patient continues to need, on a daily basis, active treatment furnished directly by or requiring the supervision of inpatient psychiatric personnel.     Electronically signed by Jane Rawls KIRK Davis - CNP on 5/27/2021 at 3:14 PM    **This report has been created using voice recognition software. It may contain minor errors which are inherent in voice recognition technology. **    I independently saw and evaluated the patient. I reviewed the nurse practitioners documentation above. Any additional comments or changes to the nurse practitioners documentation are stated below otherwise agree with assessment. Plan will be as follows:  Appreciate physical therapy input. Attempting to see if patient can go to a rehab facility as it appears she is in need of inpatient rehab for physical therapy and Occupational Therapy. Patient does report still feeling depressed but reports reduction in intensity and frequency of suicidal ideations. Feels that she could potentially contract for safety as she is able to maintain this level of mood. Discussed observing for stability and she is in agreement  PLAN  Patient s symptoms   are improving  Looking for inpatient rehab  Attempt to develop insight  Psycho-education conducted. Supportive Therapy conducted.   Probable discharge is undetermined at this time  Follow-up daily while on inpatient unit

## 2021-05-27 NOTE — GROUP NOTE
Group Therapy Note    Date: 5/27/2021    Group Start Time: 1100  Group End Time: 1120  Group Topic: Psychoeducation    [facility]    Patient refused to attend creative expression and relaxation group at 1100 after encouragement from staff. 1:1 talk time offered by staff as alternative to group session.

## 2021-05-27 NOTE — GROUP NOTE
Group Therapy Note    Date: 5/27/2021    Group Start Time: 0900  Group End Time: 0920  Group Topic: Community Meeting    [address] [facility]    Patient refused to attend community meeting and goal setting group at 0900 after encouragement from staff. 1:1 talk time offered by staff as alternative to group session.

## 2021-05-28 PROCEDURE — 99232 SBSQ HOSP IP/OBS MODERATE 35: CPT | Performed by: PSYCHIATRY & NEUROLOGY

## 2021-05-28 PROCEDURE — 6370000000 HC RX 637 (ALT 250 FOR IP): Performed by: INTERNAL MEDICINE

## 2021-05-28 PROCEDURE — 6370000000 HC RX 637 (ALT 250 FOR IP): Performed by: PSYCHIATRY & NEUROLOGY

## 2021-05-28 PROCEDURE — 99231 SBSQ HOSP IP/OBS SF/LOW 25: CPT | Performed by: INTERNAL MEDICINE

## 2021-05-28 PROCEDURE — 1240000000 HC EMOTIONAL WELLNESS R&B

## 2021-05-28 RX ORDER — DULOXETINE 40 MG/1
80 CAPSULE, DELAYED RELEASE ORAL DAILY
Qty: 60 CAPSULE | Refills: 0 | Status: ON HOLD | OUTPATIENT
Start: 2021-05-29 | End: 2021-09-20

## 2021-05-28 RX ORDER — ATORVASTATIN CALCIUM 40 MG/1
40 TABLET, FILM COATED ORAL NIGHTLY
Qty: 30 TABLET | Refills: 0 | Status: ON HOLD | OUTPATIENT
Start: 2021-05-28 | End: 2021-09-20

## 2021-05-28 RX ORDER — LIDOCAINE 4 G/G
1 PATCH TOPICAL DAILY
Qty: 30 PATCH | Refills: 0 | Status: ON HOLD | OUTPATIENT
Start: 2021-05-29 | End: 2021-09-20

## 2021-05-28 RX ORDER — ALBUTEROL SULFATE 90 UG/1
2 AEROSOL, METERED RESPIRATORY (INHALATION) EVERY 6 HOURS PRN
Qty: 1 INHALER | Refills: 0 | Status: ON HOLD | OUTPATIENT
Start: 2021-05-28 | End: 2021-09-20

## 2021-05-28 RX ORDER — ASPIRIN 81 MG/1
81 TABLET ORAL DAILY
Qty: 30 TABLET | Refills: 0 | Status: ON HOLD | OUTPATIENT
Start: 2021-05-28 | End: 2021-09-20

## 2021-05-28 RX ORDER — HYDROXYZINE 50 MG/1
50 TABLET, FILM COATED ORAL 3 TIMES DAILY PRN
Qty: 30 TABLET | Refills: 0 | Status: SHIPPED | OUTPATIENT
Start: 2021-05-28 | End: 2021-06-07

## 2021-05-28 RX ORDER — TRAZODONE HYDROCHLORIDE 50 MG/1
50 TABLET ORAL NIGHTLY PRN
Qty: 30 TABLET | Refills: 0 | Status: ON HOLD | OUTPATIENT
Start: 2021-05-28 | End: 2021-09-20

## 2021-05-28 RX ADMIN — DULOXETINE 80 MG: 20 CAPSULE, DELAYED RELEASE ORAL at 08:21

## 2021-05-28 RX ADMIN — TRAZODONE HYDROCHLORIDE 50 MG: 50 TABLET ORAL at 21:11

## 2021-05-28 RX ADMIN — ATORVASTATIN CALCIUM 40 MG: 20 TABLET, FILM COATED ORAL at 21:11

## 2021-05-28 RX ADMIN — HYDROXYZINE HYDROCHLORIDE 50 MG: 50 TABLET, FILM COATED ORAL at 21:11

## 2021-05-28 RX ADMIN — METOPROLOL TARTRATE 25 MG: 25 TABLET, FILM COATED ORAL at 08:21

## 2021-05-28 RX ADMIN — ASPIRIN 81 MG: 81 TABLET, COATED ORAL at 08:21

## 2021-05-28 ASSESSMENT — PAIN DESCRIPTION - ORIENTATION: ORIENTATION: RIGHT

## 2021-05-28 ASSESSMENT — PAIN DESCRIPTION - PAIN TYPE: TYPE: CHRONIC PAIN

## 2021-05-28 ASSESSMENT — PAIN DESCRIPTION - PROGRESSION: CLINICAL_PROGRESSION: NOT CHANGED

## 2021-05-28 ASSESSMENT — PAIN DESCRIPTION - FREQUENCY: FREQUENCY: CONTINUOUS

## 2021-05-28 ASSESSMENT — PAIN - FUNCTIONAL ASSESSMENT: PAIN_FUNCTIONAL_ASSESSMENT: PREVENTS OR INTERFERES SOME ACTIVE ACTIVITIES AND ADLS

## 2021-05-28 NOTE — GROUP NOTE
Group Therapy Note    Date: 5/28/2021    Group Start Time: 0900  Group End Time: 1164  Group Topic: [id number]      Patient declined to attend community meeting/goal setting group at 0900 despite encouragement from staff. 1:1 talk time offered by staff as alternative to group session.       Signature:  Augustin Ventura

## 2021-05-28 NOTE — GROUP NOTE
Group Therapy Note    Date: 5/28/2021    Group Start Time: 1100  Group End Time: 8960  Group Topic: Psychotherapy    [facility] [address], [place]    patient refused to attend psychotherapy group at 11a after encouragement from staff.   1:1 talk time provided as alternative to group session

## 2021-05-28 NOTE — GROUP NOTE
Group Therapy Note    Date: 5/28/2021    Group Start Time: 1330  Group End Time: 6598  Group Topic: Psychoeducation    [address] [facility]    Patient declined to attend coping skills group at 1330 despite encouragement from staff. 1:1 talk time offered by staff as alternative to group session.       Signature:  Mc Garcia

## 2021-05-28 NOTE — PLAN OF CARE
Problem: Falls - Risk of:  Goal: Will remain free from falls  Description: Will remain free from falls  Outcome: Ongoing  No falls noted. Problem: Altered Mood, Depressive Behavior:  Goal: Ability to disclose and discuss suicidal ideas will improve  Description: Ability to disclose and discuss suicidal ideas will improve  Outcome: Ongoing  Patient is seclusive to room and bed much of the day, out for meals, affect worried, stated she is ready to return home, but needs home health care for help with ADLs. Patient denies any thoughts to harm self or others and took all medications as prescribed. Patient has refused assistance with a shower and to attend any groups so far today. Goal: Absence of self-harm  Description: Absence of self-harm  Outcome: Ongoing  No self harm noted. Problem: Altered Mood, Deterioration in Function:  Goal: Ability to perform activities of daily living will improve  Description: Ability to perform activities of daily living will improve  Outcome: Ongoing  ADLs have not improved. Patient refused shower when staff offered to assist.     Problem: Skin Integrity:  Goal: Will show no infection signs and symptoms  Description: Will show no infection signs and symptoms  Outcome: Ongoing  Patient does have a rash in abdominal and breast skin folds that was brought to the attention of attending, internal medical consult was entered to evaluate.

## 2021-05-28 NOTE — PROGRESS NOTES
[facility]    CONSULTATION / HISTORY AND PHYSICAL EXAMINATION            Date:   5/28/2021  Patient name: Kameron Head  Date of admission:  5/21/2021 12:10 PM  MRN:   [id number]  Account:  [de-identified]  YOB: 1960  PCP:    KIRK Mariano CNP  Room:   [address] [place] [address]  Code Status:    Full Code    Physician Requesting Consult: Clayton Litten, MD    Reason for Consult:  medical management    Chief Complaint:     Chief Complaint   Patient presents with    Diarrhea    Addiction Problem    Suicidal       History Obtained From:     Patient medical record nursing staff    History of Present Illness:   Patient mated to [facility] with major depression  Entered medicine consulted for multiple medical issues, chronic pain  Patient has multiple medical problem which includes hypertension, hyperlipidemia, morbid obesity, heart failure with preserved ejection fraction, COPD not on oxygen, chronic arthritis of right hip, patient mention that she has constant pain, pain is dull aching in nature, worse with movement of leg   No other complaints. Past Medical History:     Past Medical History:   Diagnosis Date    Arthritis     CHF (congestive heart failure) ([address]     COPD [facility]chronic obstructive pulmonary disease) ([facility])     Diverticulitis     Hx of blood clots     Pulmonary embolism ([facility])         Past Surgical History:     Past Surgical History:   Procedure Laterality Date    ANKLE SURGERY      COLON SURGERY      FOOT SURGERY Left 1/22/2021    FOOT BONE BIOPSY W/ INCISION & DRAINAGE AND REDRESSING ON RIGHT FOOT performed by Paola Epsarza DPM at [address] [place] [address] ([facility])          Medications Prior to Admission:     Prior to Admission medications    Medication Sig Start Date End Date Taking?  Authorizing Provider   albuterol sulfate  (90 Base) MCG/ACT inhaler Inhale 2 puffs into the lungs every 6 hours as needed for Wheezing 5/28/21  Yes Rebel HENRIQUEZ of urination, burning with urination, frequency   INTEGUMENT:  negative for rash, skin lesions, easy bruising   HEMATOLOGIC/LYMPHATIC:  negative for swelling/edema   ALLERGIC/IMMUNOLOGIC:  negative for urticaria , itching  ENDOCRINE:  negative increase in drinking, increase in urination, hot or cold intolerance  MUSCULOSKELETAL: Right hip pain  NEUROLOGICAL:  negative for headaches, dizziness, lightheadedness, numbness, pain, tingling extremities  BEHAVIOR/PSYCH:      Physical Exam:     /72   Pulse 81   Temp 97.8 °F (36.6 °C) (Oral)   Resp 14   Ht 5' 7\" (1.702 m)   Wt (!) 350 lb (158.8 kg)   SpO2 96%   BMI 54.82 kg/m²   Temp (24hrs), Av.1 °F (36.7 °C), Min:97.8 °F (36.6 °C), Max:98.3 °F (36.8 °C)    No results for input(s): POCGLU in the last 72 hours. No intake or output data in the 24 hours ending 21 1844    General Appearance:  alert, well appearing, and in no acute distress, central obesity present  Mental status: oriented to person, place, and time with normal affect  Head:  normocephalic, atraumatic. Eye: no icterus, redness, pupils equal and reactive, extraocular eye movements intact, conjunctiva clear  Ear: normal external ear, no discharge, hearing intact  Nose:  no drainage noted  Mouth: mucous membranes moist  Neck: Thick neck present  Lungs: Bilateral equal air entry, clear to ausculation, no wheezing, rales or rhonchi, normal effort  Cardiovascular: normal rate, regular rhythm, no murmur, gallop, rub.   Abdomen: Soft, nontender, nondistended, normal bowel sounds, no hepatomegaly or splenomegaly  Neurologic: There are no new focal motor or sensory deficits, normal muscle tone and bulk, no abnormal sensation, normal speech, cranial nerves II through XII grossly intact  Skin: No gross lesions, rashes, bruising or bleeding on exposed skin area  Extremities: Pain worse with movement of right leg   psych:     Investigations:      Laboratory Testing:  No results found for this or any previous visit (from the past 24 hour(s)). Consultations:   IP CONSULT TO INTERNAL MEDICINE  IP CONSULT TO PODIATRY  IP CONSULT TO INTERNAL MEDICINE  Assessment :      Primary Problem  Major depressive disorder, recurrent, severe without psychotic behavior ([facility]    Active Hospital Problems    Diagnosis Date Noted    Major depressive disorder, recurrent, severe without psychotic behavior ([facility] [F33.2] 05/22/2021    Percocet use disorder, mild, abuse ([facility] [F11.10] 05/22/2021    Class 3 severe obesity with serious comorbidity and body mass index (BMI) of 50.0 to 59.9 in adult ([facility] [E66.01, Z68.43] 10/29/2019    Chronic heart failure with preserved ejection fraction ([facility] [I50.32] 10/25/2019       Plan:     1. Hypertension, controlled  2. COPD not on home oxygen, on albuterol as needed  3. Heart failure preserved ejection fraction, patient is on Bumex, metoprolol, compensated  4. Right hip pain, x-ray right hip, concerning for degenerative disc disease done in November 2019  5. Patient is already on Cymbalta, will increase dose to 40 mg from 20 mg  6. Lidocaine patch  7. NSAIDs  8. Heating pad    5/23  Patient mentioned that her pain is better  Readings of low blood pressure, will discontinue lisinopril  We will sign off, please call with questions      Aydin Silva MD  5/28/2021  6:44 PM    Copy sent to Dr. Khris Bess, APRN - CNP    Please note that this chart was generated using voice recognition Dragon dictation software. Although every effort was made to ensure the accuracy of this automated transcription, some errors in transcription may have occurred.

## 2021-05-28 NOTE — PROGRESS NOTES
Behavioral Services                                              Medicare Re-Certification    I certify that the inpatient psychiatric hospital services furnished since the previous certification/re-certification were, and continue to be, medically necessary for;    [x] (1) Treatment which could reasonably be expected to improve the patient's condition,    [x] (2) Or for diagnostic study. Estimated length of stay/service 1 to 2 days    Plan for post-hospital care Home with home health care referral in outpatient community mental health follow-up    This patient continues to need, on a daily basis, active treatment furnished directly by or requiring the supervision of inpatient psychiatric personnel.     Electronically signed by Enedina Luo MD on 5/28/2021 at 3:11 PM

## 2021-05-28 NOTE — PROGRESS NOTES
Daily Progress Note  5/28/2021    Patient Name: Betina Myers COMPLAINT:  Suicidal ideation          SUBJECTIVE:      Patient is seen today for a follow up assessment. Patient has been medication compliant with the exception of her scheduled Bumex tablet. Patient has required the following as needed medications:        Patient endorses depression and rates her depression as a \"5 or 6\" out of 10 (010 scale with 0 being no depression and 10 being worst). Patient also endorses anxiety and rates her anxiety as as a 4 out of 10 (010 scale was 0 being no anxiety and 10 being worst). She reports her anxiety is \"not bad\". Patient reports fleeting suicidal ideation, without intent or plan, and contracts for safety on the unit. Podiatry consult was previously ordered; however, the patient was not seen by podiatry. Per RN documentation:        Today during assessment, patient reports \"I need to get home\" and states \"I am tired of feeling sorry for myself\". Patient continues to report pain and reports she wants \"Percocet\". Patient states \"I shouldn't have said anything\". When writer inquired regarding an explanation of the statement patient made, patient clarifies and states \"I shouldn't have said anything about abusing it\". Writer encouraged hygiene today. Per staff documentation today:      Appetite: Patient reports she eats \"half of what I get\". Sleep:       [x] Normal/Adequate/Unchanged  [] Fair  [] Poor    Patient endorses adequate restorative sleep. Per [facility] Adult Daily Assessment flowsheet, staff documented 9.5 hours of sleep. Group Attendance on Unit:   [] Yes  [] Selectively    [x] No  Patient reports she has not been attending groups. Patient reports the groups \"doesn't interest me at all\". ROS:  [x] All negative/unchanged except if checked.  Explain positive (checked items) below:  [] Constitutional  [] Eyes  [] Ear/Nose/Mouth/Throat  [] Respiratory  [] CV  [] GI  []   [x] Musculoskeletal  [x] Skin/Breast  [] Neurological  [] Endocrine  [] Heme/Lymph  [] Allergic/Immunologic    Patient endorses pain in her hips. She rates her pain as a \"8 or 9\" out of 10 (010 scale with 0 being no pain and 10 being worst). She reports it is \"not bad\" when laying down, but reports it is constant when she is in the chair. Patient has lidocaine patch ordered; however, she states \"don't notice it help\". Patient has redness under skin folds. Affected areas of redness include under right abdominal area and patient reports under her breasts. Internal Medicine consult ordered for unresolved pain and redness under skin folds. Medication Side Effects: Patient denied medication side effects at the time of assessment. Mental Status Exam  Level of consciousness: Alert and awake   Appearance: Appropriate attire for setting, resting in bed, with poor grooming and hygiene   Behavior/Motor: Approachable, no psychomotor slowing   Attitude toward examiner: Cooperative, attentive, good eye contact  Speech: Normal rate, volume, and tone  Mood:  Patient reports \"okay\". Patient presents as depressed. Affect: Congruent to depressed presentation. Thought processes: Linear, goal directed and coherent   Thought content: Denies homicidal ideation  Suicidal Ideation: Fleeting suicidal ideations, without current plan or intent, contracts for safety on the unit. Delusions: No evidence of delusions. Denies paranoia. Perceptual Disturbance: Patient does not appear to be responding to internal stimuli. Patient denies visual or auditory hallucinations. Cognition: Oriented to self, location, time, and situation  Memory: Age appropriate  Insight & Judgement: Poor     Data   height is 5' 7\" (1.702 m) and weight is 350 lb (158.8 kg) (abnormal). Her oral temperature is 97.8 °F (36.6 °C). Her blood pressure is 112/72 and her pulse is 81. Her respiration is 14 and oxygen saturation is 96%.    Labs:   Admission on 05/21/2021   Component Date Value Ref Range Status    WBC 05/21/2021 12.0* 3.5 - 11.0 k/uL Final    RBC 05/21/2021 4.46  4.0 - 5.2 m/uL Final    Hemoglobin 05/21/2021 13.8  12.0 - 16.0 g/dL Final    Hematocrit 05/21/2021 43.0  36 - 46 % Final    MCV 05/21/2021 96.2  80 - 100 fL Final    MCH 05/21/2021 31.0  26 - 34 pg Final    MCHC 05/21/2021 32.2  31 - 37 g/dL Final    RDW 05/21/2021 14.5  11.5 - 14.9 % Final    Platelets 12/62/3954 485* 150 - 450 k/uL Final    MPV 05/21/2021 7.3  6.0 - 12.0 fL Final    NRBC Automated 05/21/2021 NOT REPORTED  per 100 WBC Final    Differential Type 05/21/2021 NOT REPORTED   Final    Seg Neutrophils 05/21/2021 76* 36 - 66 % Final    Lymphocytes 05/21/2021 14* 24 - 44 % Final    Monocytes 05/21/2021 7  1 - 7 % Final    Eosinophils % 05/21/2021 2  0 - 4 % Final    Basophils 05/21/2021 1  0 - 2 % Final    Immature Granulocytes 05/21/2021 NOT REPORTED  0 % Final    Segs Absolute 05/21/2021 9.20* 1.3 - 9.1 k/uL Final    Absolute Lymph # 05/21/2021 1.70  1.0 - 4.8 k/uL Final    Absolute Mono # 05/21/2021 0.80  0.1 - 1.3 k/uL Final    Absolute Eos # 05/21/2021 0.20  0.0 - 0.4 k/uL Final    Basophils Absolute 05/21/2021 0.10  0.0 - 0.2 k/uL Final    Absolute Immature Granulocyte 05/21/2021 NOT REPORTED  0.00 - 0.30 k/uL Final    WBC Morphology 05/21/2021 NOT REPORTED   Final    RBC Morphology 05/21/2021 NOT REPORTED   Final    Platelet Estimate 56/83/1866 NOT REPORTED   Final    Glucose 05/21/2021 114* 70 - 99 mg/dL Final    BUN 05/21/2021 17  8 - 23 mg/dL Final    CREATININE 05/21/2021 0.61  0.50 - 0.90 mg/dL Final    Bun/Cre Ratio 05/21/2021 NOT REPORTED  9 - 20 Final    Calcium 05/21/2021 9.8  8.6 - 10.4 mg/dL Final    Sodium 05/21/2021 142  135 - 144 mmol/L Final    Potassium 05/21/2021 3.9  3.7 - 5.3 mmol/L Final    Chloride 05/21/2021 102  98 - 107 mmol/L Final    CO2 05/21/2021 24  20 - 31 mmol/L Final    Anion Gap 05/21/2021 16  9 - 17 mmol/L Providers: Salvador  Fact sheet for Patients: Salvador          Methodology: Isothermal Nucleic Acid Amplification      Lactic Acid, Sepsis 05/21/2021 1.0  0.5 - 1.9 mmol/L Final    Lactic Acid, Sepsis, Whole Blood 05/21/2021 NOT REPORTED  0.5 - 1.9 mmol/L Final    WBC 05/21/2021 10.7  3.5 - 11.0 k/uL Final    RBC 05/21/2021 3.86* 4.0 - 5.2 m/uL Final    Hemoglobin 05/21/2021 12.5  12.0 - 16.0 g/dL Final    Hematocrit 05/21/2021 37.1  36 - 46 % Final    MCV 05/21/2021 96.2  80 - 100 fL Final    MCH 05/21/2021 32.4  26 - 34 pg Final    MCHC 05/21/2021 33.7  31 - 37 g/dL Final    RDW 05/21/2021 14.3  11.5 - 14.9 % Final    Platelets 67/88/4652 444  150 - 450 k/uL Final    MPV 05/21/2021 7.2  6.0 - 12.0 fL Final    NRBC Automated 05/21/2021 NOT REPORTED  per 100 WBC Final         Reviewed patient's current plan of care and vital signs with nursing staff.     Labs reviewed: [x] Yes  Last EKG in EMR reviewed: [x] Yes    Medications  Current Facility-Administered Medications: DULoxetine (CYMBALTA) extended release capsule 80 mg, 80 mg, Oral, Daily  loperamide (IMODIUM) capsule 2 mg, 2 mg, Oral, 4x Daily PRN  lidocaine 4 % external patch 1 patch, 1 patch, Transdermal, Daily  aluminum & magnesium hydroxide-simethicone (MAALOX) 200-200-20 MG/5ML suspension 30 mL, 30 mL, Oral, Q6H PRN  polyethylene glycol (GLYCOLAX) packet 17 g, 17 g, Oral, Daily PRN  traZODone (DESYREL) tablet 50 mg, 50 mg, Oral, Nightly PRN  ibuprofen (ADVIL;MOTRIN) tablet 400 mg, 400 mg, Oral, Q6H PRN  acetaminophen (TYLENOL) tablet 650 mg, 650 mg, Oral, Q4H PRN  hydrOXYzine (ATARAX) tablet 50 mg, 50 mg, Oral, TID PRN  albuterol sulfate  (90 Base) MCG/ACT inhaler 2 puff, 2 puff, Inhalation, Q6H PRN  aspirin EC tablet 81 mg, 81 mg, Oral, Daily  atorvastatin (LIPITOR) tablet 40 mg, 40 mg, Oral, Nightly  bumetanide (BUMEX) tablet 2 mg, 2 mg, Oral, Daily  diclofenac sodium is agreeable to home health care as opposed to inpatient rehab. PLAN  Patient s symptoms   are improving  Continue with current medication  Attempt to develop insight  Psycho-education conducted. Supportive Therapy conducted.   Probable discharge is tomorrow  Follow-up daily while on inpatient unit

## 2021-05-28 NOTE — PROGRESS NOTES
CLINICAL PHARMACY NOTE: MEDS TO BEDS    Total # of Prescriptions Filled: 7   The following medications were delivered to the patient:  · Asprin low dose 81 mg  · Duloxetine 40 mg  · Metoprolol 25 mg  · Albuterol MDI  · Diclofenac gel 1 %  · Hydroxyzine 50 mg  · Trazodone 50 mg  ·     Additional Documentation:

## 2021-05-29 VITALS
SYSTOLIC BLOOD PRESSURE: 156 MMHG | OXYGEN SATURATION: 96 % | DIASTOLIC BLOOD PRESSURE: 79 MMHG | HEIGHT: 67 IN | WEIGHT: 293 LBS | TEMPERATURE: 98.1 F | HEART RATE: 89 BPM | BODY MASS INDEX: 45.99 KG/M2 | RESPIRATION RATE: 18 BRPM

## 2021-05-29 PROCEDURE — 6370000000 HC RX 637 (ALT 250 FOR IP): Performed by: PSYCHIATRY & NEUROLOGY

## 2021-05-29 PROCEDURE — 6370000000 HC RX 637 (ALT 250 FOR IP): Performed by: INTERNAL MEDICINE

## 2021-05-29 PROCEDURE — 99239 HOSP IP/OBS DSCHRG MGMT >30: CPT | Performed by: PSYCHIATRY & NEUROLOGY

## 2021-05-29 RX ADMIN — BUMETANIDE 2 MG: 1 TABLET ORAL at 08:34

## 2021-05-29 RX ADMIN — DULOXETINE 80 MG: 20 CAPSULE, DELAYED RELEASE ORAL at 08:35

## 2021-05-29 RX ADMIN — METOPROLOL TARTRATE 25 MG: 25 TABLET, FILM COATED ORAL at 08:35

## 2021-05-29 RX ADMIN — ASPIRIN 81 MG: 81 TABLET, COATED ORAL at 08:35

## 2021-05-29 NOTE — BH NOTE
[facility]  Discharge Note    Pt discharged with followings belongings:   Dentures: None  Vision - Corrective Lenses: None  Hearing Aid: None  Jewelry: None  Body Piercings Removed: N/A  Clothing: Shirt  Were All Patient Medications Collected?: Not Applicable  Other Valuables: Cell phone, Other (Comment) (phone )   Valuables sent home withpatient. Valuables retrieved from safe, Security envelope number:  [phone] and returned to patient. Patient education on aftercare instructions: yes  Information faxed to will call for appointment by CROW Patient verbalize understanding of AVS:  yes. Status EXAM upon discharge:  Status and Exam  Normal: Yes  Facial Expression: Worried  Affect: Appropriate  Level of Consciousness: Alert  Mood:Normal: No  Mood: Anxious  Motor Activity:Normal: Yes  Motor Activity: Decreased  Interview Behavior: Cooperative  Preception: [place] to Person, Enedina Shackleton to Time, [place] to Place, [place] to Situation  Attention:Normal: No  Attention: Distractible  Thought Processes: Circumstantial  Thought Content:Normal: Yes  Thought Content: Preoccupations  Hallucinations: None  Delusions: No  Memory:Normal: Yes  Insight and Judgment: No  Insight and Judgment: Poor Insight  Present Suicidal Ideation: No  Present Homicidal Ideation: No      Metabolic Screening:    Lab Results   Component Value Date    LABA1C 6.2 (H) 01/21/2021       Lab Results   Component Value Date    CHOL 135 10/29/2019     Lab Results   Component Value Date    TRIG 64 10/29/2019     Lab Results   Component Value Date    HDL 46 10/29/2019     No components found for: LDLCAL  No results found for: LABVLDL  All belongings were sent with patient. Patient was discharged to home via insurance ambulette.   Marcela Ortega LPN

## 2021-05-29 NOTE — SUICIDE SAFETY PLAN
SAFETY PLAN     A suicide Safety Plan is a document that supports someone when they are having thoughts of suicide. Warning Signs that indicate a suicidal crisis may be developing: What (situations, thoughts, feelings, body sensations, behaviors, etc.) do you experience that lets you know you are beginning to think about suicide? 1. Not taking my medications  2. Suicidal ideations or thoughts of death / dying  3. Depressed mood  4. Im not sleeping well  5. I isolate and dont talk  6. I cry more    2. Internal Coping Strategies:  What things can I do (relaxation techniques, hobbies, physical activities, etc.) to take my mind off my problems without contacting another person? A. Coping skills/ strategies  journal/ listen to music/ go for a walk/ read a book/ watch a funny movie/show / crafts / video game   B. Grounding techniques- eat a sour candy or hot cinnamon candy / focus on colors, sounds, smells, textures on things around you /  drink some herbal tea / eat a piece of dark chocolate / take a hot bath or shower / essential oils for smelling / meditate / color / arts and crafts         People whom I can ask for help: Who can I call when I need help - for example, friends, family, clergy, someone else? 1. [place] : look in phone. Parents, relatives, friends, /, , coworkers, support group, therapist, doctor  park, support group, gym, [employer]al, etc     Professionals or [address] [place], [place] [address]vd I can contact during a crisis: Who can I call for help - for example, my doctor, my psychiatrist, my psychologist, a mental health provider, a suicide hotline? 1. [employer]- [place]  2. Suicide Prevention Lifeline: 8-168-421-TALK (0328)  3. Rescue Crisis: Emergency Services Address: [address] [facility] [place],  [facility] [address], Phone: [phone]  4.  [place] [address] Emergency Services -  for example, [facility] [address] [facility] suicide hotline,   1. Roro Sanchez, 6-868.612.6867  2. Mental Health & Recovery Services Board of [place], Crisis line: [phone]  3. [employer] (Crisis Intervention Team - CIT), 545.752.6546 or 9-1-1  4. [place], 8-954-059-746.575.8764  5. National Association of Mental Illness, 6-219.360.3773  6. [facility] Substance Abuse National Helpline, 1-594-763-HELP (2965)  7. Crisis Text Line, Text 4HOPE to 809399 to connect with a crisis counselor  8. [facility], 1-921.938.8682  9. YANELI ([place]), [phone]      COVID-19 Emotional Support Line: [phone]  Call the Recovery Helpline at [facility] [address]: 8-198-576-TALK (2016)  Text \"4Hope\" to [address]     1. BellyBio  Free katie that teaches a deep breathing technique useful in fighting anxiety and stress. A simple interface uses biofeedback to monitor your breathing. Sounds cascade with the movements of your belly, in rhythms reminiscent of waves on a beach. Charts also let you know how you're doing. A great tool when you need to slow down and breathe. 2. Operation Reach Out  Literally a lifesaving katie, this free intervention tool helps people who are having suicidal thoughts to reassess their thinking and get help. Recommended by followers of, who report that this katie has helped in suicidal crises. Developed by the [employer], but useful to all. [place] a download even if you're not suicidal. You never know if you might need it. 3. eCBT Calm  Provides a set of tools to help you evaluate personal stress and anxiety, challenge distorted thoughts, and learn relaxation skills that have been scientifically validated in research on Cognitive Behavioral Therapy (CBT). Lots of background and useful information along with step-by-step guides.   4. Deep Sleep with Maikel Wesley  Getting enough sleep is one of the foundations of mental health. A personal favorite I listen to all the time, this straightforward katie features a warm, gentle voice guiding listeners through a Progressive Muscle Relaxation (PMR) session and into sleep. Features long or short induction options, and an alarm. 5. WhatsMyM3  A three minute depression and anxiety screen. Validated questionnaires assess symptoms of depression, anxiety, bipolar disorder, and PTSD, and combine into a score that indicates whether or not your life is impacted significantly by a mood disorder, recommending a course of action. The katie keeps a history of test results, to help you track your progress. 6. DBT Diary Card and Skills   Based on Dialectical Behavior Therapy (DBT) developed by psychologist Severa Antes, this katie is a rich resource of self-help skills, reminders of the therapy principles, and coaching tools for coping. Created by a therapist with years of experience in the practice, this katie is not intended to replace a professional but helps people reinforce their treatment. 7. Optimism  Track your moods, keep a journal, and chart your recovery progress with this comprehensive tool for depression, bipolar disorder, and anxiety disorders. One of the most popular mood tracking apps available, with plenty of features. Free. 8. iSleepEasy  A calm female voice helps you quell anxieties and take the time to relax and sleep, in an array of guided meditations. Separately controlled voice and music tracks, flexible lengths, and an alarm. Includes a special wee hours rescue track, and tips for falling asleep. Developed by [employer], who offer an great line of relaxation apps. 9. Magic Window  Living Pictures  Not technically a mental health katie, it makes no miraculous claims about curbing anxiety.  However, there is independent research indicating that taking breaks and getting exposure

## 2021-05-29 NOTE — PLAN OF CARE
Patient was seclusive in this evening. Patient states that her mood is pretty good and shes looking forward to discharge tomorrow. Patient was compliant with all scheduled medications. Patient denies any suicidal thoughts at this time. Patient agrees to come talk with staff if having any thoughts to harm himself this shift. 15 min rounds continued for patient safety.    Problem: Falls - Risk of:  Goal: Will remain free from falls  Description: Will remain free from falls  5/29/2021 0126 by Srini Campoverde RN  Outcome: Ongoing  5/29/2021 0126 by Srini Campoverde RN  Outcome: Ongoing  5/28/2021 1341 by Vianney Paz RN  Outcome: Ongoing  Goal: Absence of physical injury  Description: Absence of physical injury  5/29/2021 0126 by Srini Campoverde RN  Outcome: Ongoing  5/29/2021 0126 by Srini Campoverde RN  Outcome: Ongoing  5/28/2021 1341 by Vianney Paz RN  Outcome: Ongoing     Problem: Altered Mood, Depressive Behavior:  Goal: Ability to disclose and discuss suicidal ideas will improve  Description: Ability to disclose and discuss suicidal ideas will improve  5/29/2021 0126 by Srini Campoverde RN  Outcome: Ongoing  5/29/2021 0126 by Srini Campoverde RN  Outcome: Ongoing  5/28/2021 1341 by Vianney Paz RN  Outcome: Ongoing  Goal: Absence of self-harm  Description: Absence of self-harm  5/29/2021 0126 by Srini Campoverde RN  Outcome: Ongoing  5/29/2021 0126 by Srini Campoverde RN  Outcome: Ongoing  5/28/2021 1341 by Vianney Paz RN  Outcome: Ongoing     Problem: Altered Mood, Deterioration in Function:  Goal: Ability to perform activities of daily living will improve  Description: Ability to perform activities of daily living will improve  5/29/2021 0126 by Srini Campoverde RN  Outcome: Ongoing  5/29/2021 0126 by Srini Campoverde RN  Outcome: Ongoing  5/28/2021 1341 by Vianney Paz RN  Outcome: Ongoing  Goal: Patient specific goal  Description: Patient specific goal  5/29/2021 0126 by Alonso Toth RN  Outcome: Ongoing  5/29/2021 0126 by Alonso Toth RN  Outcome: Ongoing  5/28/2021 1341 by Tano Tobias RN  Outcome: Ongoing     Problem: Skin Integrity:  Goal: Will show no infection signs and symptoms  Description: Will show no infection signs and symptoms  5/29/2021 0126 by Alonso Toth RN  Outcome: Ongoing  5/29/2021 0126 by Alonso Toth RN  Outcome: Ongoing  5/28/2021 1341 by Tano Tobias RN  Outcome: Ongoing  Goal: Absence of new skin breakdown  Description: Absence of new skin breakdown  5/29/2021 0126 by Alonso Toth RN  Outcome: Ongoing  5/29/2021 0126 by Alonso Toth RN  Outcome: Ongoing  5/28/2021 1341 by Tano Tobias RN  Outcome: Ongoing     Problem: Tobacco Use:  Goal: Inpatient tobacco use cessation counseling participation  Description: Inpatient tobacco use cessation counseling participation  5/29/2021 0126 by Alonso Toth RN  Outcome: Ongoing  5/29/2021 0126 by Alonso Toth RN  Outcome: Ongoing  5/28/2021 1341 by Tano Tobias RN  Outcome: Ongoing     Problem: Pain:  Goal: Pain level will decrease  Description: Pain level will decrease  5/29/2021 0126 by Alonso Toth RN  Outcome: Ongoing  5/29/2021 0126 by Alonso Toth RN  Outcome: Ongoing  5/28/2021 1341 by Tano Tobias RN  Outcome: Ongoing  Goal: Control of acute pain  Description: Control of acute pain  5/29/2021 0126 by Alonso Toth RN  Outcome: Ongoing  5/29/2021 0126 by Alonso Toth RN  Outcome: Ongoing  5/28/2021 1341 by Tano Tobias RN  Outcome: Ongoing  Goal: Control of chronic pain  Description: Control of chronic pain  5/29/2021 0126 by Alonso Toth RN  Outcome: Ongoing  5/29/2021 0126 by Alonso Toth RN  Outcome: Ongoing  5/28/2021 1341 by Tano Tobias RN  Outcome: Ongoing     Problem: Musculor/Skeletal Functional Status  Goal: Highest potential functional level 5/29/2021 0126 by Leodan Dangelo RN  Outcome: Ongoing  5/29/2021 0126 by Leodan Dangelo RN  Outcome: Ongoing  5/28/2021 1341 by Ingrid Boles RN  Outcome: Ongoing  Goal: Absence of falls  5/29/2021 0126 by Leodan Dangelo RN  Outcome: Ongoing  5/29/2021 0126 by Leodan Dangelo RN  Outcome: Ongoing  5/28/2021 1341 by Ingrid Boles RN  Outcome: Ongoing

## 2021-05-29 NOTE — GROUP NOTE
Group Therapy Note    Date: 5/29/2021    Group Start Time: 0845  Group End Time: 0900  Group Topic: [id number], [place]        Group Therapy Note    Attendees: 7/15      patient refused to attend community meeting and goal setting group at [phone] after encouragement from staff. 1:1 talk time provided as alternative to group session.         Signature:  Mira Hough [place]

## 2021-05-29 NOTE — GROUP NOTE
Group Therapy Note    Date: 5/28/2021    Group Start Time: 2030  Group End Time: 2050  Group Topic: Relaxation    CZ BHAL Moise, RN        Group Therapy Note:     Pt refused to attend Relaxation group this evening after encouragement from staff. 1:1 talk time offered but pt declined. Will continue to encourage patient to attend unit group programming.         Signature:  Chely Hill RN

## 2021-05-29 NOTE — DISCHARGE SUMMARY
Provider Discharge Summary     Patient ID:  Tony Mason  [id number]  72 y.o.  1960    Admit date: 5/21/2021    Discharge date and time: 5/29/2021  12:57 PM     Admitting Physician: Alonso Rosenberg MD     Discharge Physician: Ludmila Jim MD    Admission Diagnoses: Depression with suicidal ideation [F32.9, R45.851]    Discharge Diagnoses:      Major depressive disorder, recurrent, severe without psychotic behavior [facility])     Patient Active Problem List   Diagnosis Code    Chronic heart failure with preserved ejection fraction (HCC) I50.32    Chronic obstructive pulmonary disease ([facility] J44.9    Tobacco abuse Z72.0    Class 3 severe obesity with serious comorbidity and body mass index (BMI) of 50.0 to 59.9 in adult ([facility] E66.01, Z68.43    Essential hypertension I10    History of pulmonary embolism Z80.36    Family history of colon cancer in mother [de-identified]    Prediabetes R73.03    Class 4 congestive heart failure, acute on chronic, systolic ([facility]) N48.46    Hypokalemia E87.6    Cellulitis L03.90    Primary osteoarthritis of right hip M16.11    Chronic pain of multiple joints M25.50, G89.29    Depression F32.9    Cellulitis of right leg L03.115    Peripheral artery disease (HCC) I73.9    Non healing left heel wound S91.302A    Corns and callosities L84    Healing pressure ulcer, stage IV ([facility]) L89.94    Lymphedema of both lower extremities I89.0    Non-pressure chronic ulcer of right lower leg with fat layer exposed ([facility] L97.912    Localized edema R60.0    Xerosis cutis L85.3    Depression with suicidal ideation F32.9, R45.851    Major depressive disorder, recurrent, severe without psychotic behavior ([facility] F33.2    Percocet use disorder, mild, abuse ([facility] F11.10        Admission Condition: poor    Discharged Condition: stable    Indication for Admission: threat to self    History of Present Illnes (present tense wording is of findings from admission exam and are not necessarily indicative of current findings): Evelin Asif is a 64 y.o. female who has a past medical history of chronic heart failure with preserved ejection fraction, chronic obstructive pulmonary disease, tobacco abuse, class III severe obesity with serious comorbidity, essential hypertension, history of pulmonary embolism, family history of colon cancer in mother, prediabetes, class IV congestive heart failure, acute on chronic, systolic, hypokalemia, cellulitis, primary osteoarthritis of right hip, chronic pain of multiple joints, depression, peripheral artery disease, nonhealing left heel wound, lymphedema of both lower extremities, nonpressure chronic ulcer of right lower leg, localized edema, history of blood clots, history of diverticulitis, and arthritis.      Per ED documentation, patient presented for evaluation of diarrhea. Patient reported she had been taking Percocet daily for the past year or so for her hip pain. She stopped taking Percocet on Monday when she ran out of her medication. She reports this was prescribed to her but reports \"I have abused them\" and states \"I ran away too soon\". Since stopping the Percocet, patient has had multiple episodes of diarrhea. However, she denies any diarrhea today.     Patient reports feeling down and low for more days than not. Endorses anhedonia. Patient notes her depression has been worsening for last month . Patient identifies stressors as \"everything\" and verbalizes difficulty with \"getting out of the house\", \"going to the doctor\", and \"the pain\". Patient endorses feelings of  worthlessness. She reports she has had decreased appetite and states \"I haven't eaten anything since last Monday\". Patient reports \"I sleep constantly\". When writer inquired how many hours of sleep patient gets, she stated she sleeps \"as many as I can\". She states \"it is easier to sleep then to be up\".   Patient rates her depression today as a 8 out of 10 (010 scale with zero being no depression and 10 being worst). She endorses fleeting suicidal ideation, without intent or plan, and contracts for safety on the unit. She denies any homicidal ideation, intent, or plans. She reports she has frequent anxiety and reports she has \"worry about everything\". She rates her anxiety as a 7 out of 10 (010 scale with zero being no anxiety and 10 being worst). Patient denies panic attacks, luis or hypomania symptoms, phobias, obsessions or compulsions, body or vocal tics, visual or auditory hallucinations, delusions or paranoia, or PTSD. Patient denies history of trauma, head trauma, seizures, violence or aggression, or self-injurious behaviors. Patient reports she has medical concerns and reports she has arthritis, COPD, CHF, constant right hip pain, right arm pain, and pain in bilateral knees. Patient reports she uses a walker. She reports she uses a wheelchair when she leaves the house. She also reports difficulty cleaning herself up. Hospital Course:   Upon admission, Frankey Eng was provided a safe secure environment, introduced to unit milieu. Patient participated in groups and individual therapies. Meds were adjusted as noted below. After few days of hospital care, patient began to feel improvement. Depression lifted, thoughts to harm self ceased. Sleep improved, appetite was good. On morning rounds 5/29/2021, Frankey Eng  endorses feeling ready for discharge. Patient denies suicidal or homicidal ideations, denies hallucinations or delusions. Denies SE's from meds. It was decided that maximum benefit from hospital care had been achieved and patient can be discharged.      Consults:   Physical therapy and Occupational Therapyrecommended home health  Medicine for medical evaluationrecommendations followed    Significant Diagnostic Studies: Routine labs and diagnostics    Treatments: Psychotropic medications, therapy with group, milieu, and 1:1 with nurses, social workers, PAArelisC/CNP, and Attending physician.       Discharge Medications:  Discharge Medication List as of 5/29/2021  9:36 AM      START taking these medications    Details   DULoxetine 40 MG CPEP Take 80 mg by mouth daily, Disp-60 capsule, R-0Normal      hydrOXYzine (ATARAX) 50 MG tablet Take 1 tablet by mouth 3 times daily as needed for Anxiety, Disp-30 tablet, R-0Normal      lidocaine 4 % external patch Place 1 patch onto the skin daily, Transdermal, DAILY Starting Sat 5/29/2021, Disp-30 patch, R-0, Normal      traZODone (DESYREL) 50 MG tablet Take 1 tablet by mouth nightly as needed for Sleep, Disp-30 tablet, R-0Normal         CONTINUE these medications which have CHANGED    Details   albuterol sulfate  (90 Base) MCG/ACT inhaler Inhale 2 puffs into the lungs every 6 hours as needed for Wheezing, Disp-1 Inhaler, R-0Normal      aspirin (ASPIRIN 81) 81 MG EC tablet Take 1 tablet by mouth daily, Disp-30 tablet, R-0Normal      atorvastatin (LIPITOR) 40 MG tablet Take 1 tablet by mouth nightly, Disp-30 tablet, R-0Normal      diclofenac sodium (VOLTAREN) 1 % GEL Apply 2 g topically 4 times daily as needed for Pain, Topical, 4 TIMES DAILY PRN Starting Fri 5/28/2021, Disp-350 g, R-0, Normal      metoprolol tartrate (LOPRESSOR) 25 MG tablet Take 1 tablet by mouth 2 times daily, Disp-60 tablet, R-0Normal         CONTINUE these medications which have NOT CHANGED    Details   ibuprofen (ADVIL;MOTRIN) 600 MG tablet Take 1 tablet by mouth 3 times daily as needed for Pain, Disp-30 tablet, R-0Normal         STOP taking these medications       XTAMPZA ER 9 MG C12A Comments:   Reason for Stopping:         oxyCODONE-acetaminophen (PERCOCET) 7.5-325 MG per tablet Comments:   Reason for Stopping:         lisinopril (PRINIVIL;ZESTRIL) 10 MG tablet Comments:   Reason for Stopping:         bumetanide (BUMEX) 2 MG tablet Comments:   Reason for Stopping:         PARoxetine (PAXIL) 20 MG tablet Comments:   Reason for Stopping:         miconazole (MICOTIN) 2 % powder

## 2021-05-29 NOTE — GROUP NOTE
Group Therapy Note    Date: 5/29/2021    Group Start Time: 1000  Group End Time: 1050  Group Topic: Psychoeducation    MARNI Arevalo, [facility]        Group Therapy Note    Pt declined to attend psychoeducation at 1000 am despite encouragement. Pt offered 1:1 as an alternative.        Signature:  MARNI Aguilera, [place]

## 2021-05-29 NOTE — BH NOTE
Patient given tobacco quitline number 99334633024 at this time, refusing to call at this time, states \" I just dont want to quit now\"- patient given information as to the dangers of long term tobacco use. Continue to reinforce the importance of tobacco cessation.

## 2021-06-01 ENCOUNTER — CARE COORDINATION (OUTPATIENT)
Dept: CARE COORDINATION | Age: 61
End: 2021-06-01

## 2021-06-01 ENCOUNTER — TELEPHONE (OUTPATIENT)
Dept: INTERNAL MEDICINE CLINIC | Age: 61
End: 2021-06-01

## 2021-06-01 NOTE — CARE COORDINATION
Name: Tank Madrid    : 1960    Discharge Date:21    Primary Auth/Cert #: [id number]  Destination: home     Discharge Medications:      Medication List      START taking these medications    DULoxetine HCl 40 MG Cpep  Take 80 mg by mouth daily  Notes to patient: To treat low mood     hydrOXYzine 50 MG tablet  Commonly known as: ATARAX  Take 1 tablet by mouth 3 times daily as needed for Anxiety  Notes to patient: For anxiety     lidocaine 4 % external patch  Place 1 patch onto the skin daily  Notes to patient: Topical for pain     traZODone 50 MG tablet  Commonly known as: DESYREL  Take 1 tablet by mouth nightly as needed for Sleep  Notes to patient: For sleep        CHANGE how you take these medications    metoprolol tartrate 25 MG tablet  Commonly known as: LOPRESSOR  Take 1 tablet by mouth 2 times daily  What changed: See the new instructions.   Notes to patient: Decrease blood pressure        CONTINUE taking these medications    albuterol sulfate  (90 Base) MCG/ACT inhaler  Inhale 2 puffs into the lungs every 6 hours as needed for Wheezing  Notes to patient: For Wheezing     aspirin 81 MG EC tablet  Commonly known as: Aspirin 81  Take 1 tablet by mouth daily  Notes to patient: Heart Health     atorvastatin 40 MG tablet  Commonly known as: LIPITOR  Take 1 tablet by mouth nightly  Notes to patient: Decrease cholesterol     diclofenac sodium 1 % Gel  Commonly known as: VOLTAREN  Apply 2 g topically 4 times daily as needed for Pain  Notes to patient: For pain     ibuprofen 600 MG tablet  Commonly known as: ADVIL;MOTRIN  Take 1 tablet by mouth 3 times daily as needed for Pain  Notes to patient: For pain        STOP taking these medications    bumetanide 2 MG tablet  Commonly known as: BUMEX     lisinopril 10 MG tablet  Commonly known as: PRINIVIL;ZESTRIL     oxyCODONE-acetaminophen 7.5-325 MG per tablet  Commonly known as: PERCOCET     PARoxetine 20 MG tablet  Commonly known as: PAXIL     Xtampza ER 9 MG C12a  Generic drug: oxyCODONE ER           Where to Get Your Medications      These medications were sent to [facility], [address], [address] [facility] [address]    Phone: [phone]   · albuterol sulfate  (90 Base) MCG/ACT inhaler  · aspirin 81 MG EC tablet  · atorvastatin 40 MG tablet  · diclofenac sodium 1 % Gel  · DULoxetine HCl 40 MG Cpep  · hydrOXYzine 50 MG tablet  · lidocaine 4 % external patch  · metoprolol tartrate 25 MG tablet  · traZODone 50 MG tablet         Follow Up Appointment: [address], [address] [facility], [address]    Phone: [phone], Fax: [phone]     will contact you Tuesday 6/1 with your follow-up appointment. Discharge home to: via [address] [place] [address]      On 5/29/2021      [facility]  Address: [address] [facility], [place], [address] [place] [address]  Hours:   Open 24 hours  Phone: [phone]  On 5/29/2021  Homecare will contact you to begin PT/OT services when you return home.

## 2021-06-01 NOTE — TELEPHONE ENCOUNTER
Please have her follow up with one of the physicians in the office. Due to her medical complexity and medication noncompliance. If he is concerned for SI or self harm; patient needs to go to ED immediately for safety.

## 2021-06-02 ENCOUNTER — CARE COORDINATION (OUTPATIENT)
Dept: CARE COORDINATION | Age: 61
End: 2021-06-02

## 2021-06-02 SDOH — ECONOMIC STABILITY: INCOME INSECURITY: IN THE LAST 12 MONTHS, WAS THERE A TIME WHEN YOU WERE NOT ABLE TO PAY THE MORTGAGE OR RENT ON TIME?: NO

## 2021-06-02 SDOH — ECONOMIC STABILITY: HOUSING INSECURITY
IN THE LAST 12 MONTHS, WAS THERE A TIME WHEN YOU DID NOT HAVE A STEADY PLACE TO SLEEP OR SLEPT IN A SHELTER (INCLUDING NOW)?: NO

## 2021-06-02 NOTE — CARE COORDINATION
Ambulatory Care Coordination Note  6/2/2021  CM Risk Score: 3  Charlson 10 Year Mortality Risk Score: 79%     ACC: Rosalia Osgood, RN  Decision maker on file  Summary Note: Patient returned ACM call stating she is doing much better. Her PT is setting her up with a LSW referral.  She is detoxing from pain medication, using only tylenol now. Per patient her pain is tolerable. Patient encouraged to call if she would like a referral for counseling. Per patient they wanted her to change her depression medication while in the hospital but she has chosen to stay on paxil. ACM encouraged patient to call right away if she notices depression increasing again. ACM will update PCP. Plan: follow up with patient in 1 week for any needs. Care Coordination Interventions    Program Enrollment: Complex Care  Referral from Primary Care Provider: No  Suggested Interventions and Community Resources         Goals Addressed                 This Visit's Progress     Medication Management   On track     I will take my medication as directed. I will notify my provider of any problems with medications, like adverse effects or side effects. I will notify my provider/Care Coordinator if I am unable to afford my medications. I will notify my provider for advice before I stop taking any of my medication. Barriers: lack of support and overwhelmed by complexity of regimen  Plan for overcoming my barriers: work with ACM  Confidence: 9/10  Anticipated Goal Completion Date: 5.25.21            Prior to Admission medications    Medication Sig Start Date End Date Taking?  Authorizing Provider   albuterol sulfate  (90 Base) MCG/ACT inhaler Inhale 2 puffs into the lungs every 6 hours as needed for Wheezing 5/28/21   Judith Roach MD   aspirin (ASPIRIN 81) 81 MG EC tablet Take 1 tablet by mouth daily 5/28/21   Judith Roach MD   atorvastatin (LIPITOR) 40 MG tablet Take 1 tablet by mouth nightly 5/28/21   Judith Roach MD diclofenac sodium (VOLTAREN) 1 % GEL Apply 2 g topically 4 times daily as needed for Pain 5/28/21   Mojgan Richey MD   DULoxetine 40 MG CPEP Take 80 mg by mouth daily 5/29/21   Mojgan Richey MD   hydrOXYzine (ATARAX) 50 MG tablet Take 1 tablet by mouth 3 times daily as needed for Anxiety 5/28/21 6/7/21  Mojgan Richey MD   lidocaine 4 % external patch Place 1 patch onto the skin daily 5/29/21   Mojgan Richey MD   metoprolol tartrate (LOPRESSOR) 25 MG tablet Take 1 tablet by mouth 2 times daily 5/28/21   Mojgan Richey MD   traZODone (DESYREL) 50 MG tablet Take 1 tablet by mouth nightly as needed for Sleep 5/28/21   Mojgan Richey MD   ibuprofen (ADVIL;MOTRIN) 600 MG tablet Take 1 tablet by mouth 3 times daily as needed for Pain 1/23/21   Lauren Landon MD       Future Appointments   Date Time Provider Pearl Perezi   7/14/2021  2:30 PM Pernell Steward DPM [facility]     ,   Congestive Heart Failure Assessment    Are you currently restricting fluids?: Other  Do you understand a low sodium diet?: Yes  Do you understand how to read food labels?: Yes  Do you salt your food before tasting it?: No     No patient-reported symptoms      Symptoms:  None: Yes      Weight trend: stable  Salt intake watch compared to last visit: stable     ,   COPD Assessment    Does the patient understand envrionmental exposure?: Yes  Is the patient able to verbalize Rescue vs. Long Acting medications?: Yes     No patient-reported symptoms         Symptoms:         and   General Assessment    Do you have any symptoms that are causing concern?: Yes  Progression since Onset: Gradually Improving  Reported Symptoms: Other (Comment: depression)

## 2021-06-04 ENCOUNTER — TELEPHONE (OUTPATIENT)
Dept: INTERNAL MEDICINE CLINIC | Age: 61
End: 2021-06-04

## 2021-06-11 ENCOUNTER — CARE COORDINATION (OUTPATIENT)
Dept: CARE COORDINATION | Age: 61
End: 2021-06-11

## 2021-06-11 NOTE — CARE COORDINATION
% external patch Place 1 patch onto the skin daily 5/29/21   Alonso Rosenberg MD   metoprolol tartrate (LOPRESSOR) 25 MG tablet Take 1 tablet by mouth 2 times daily 5/28/21   Alonso Rosenberg MD   traZODone (DESYREL) 50 MG tablet Take 1 tablet by mouth nightly as needed for Sleep 5/28/21   Alonso Rosenberg MD   ibuprofen (ADVIL;MOTRIN) 600 MG tablet Take 1 tablet by mouth 3 times daily as needed for Pain 1/23/21   Lu Jerez MD       Future Appointments   Date Time Provider Pearl Lida   7/14/2021  2:30 PM Christine Tello [facility]     ,   Congestive Heart Failure Assessment    Are you currently restricting fluids?: Other  Do you understand a low sodium diet?: Yes  Do you understand how to read food labels?: Yes  Do you salt your food before tasting it?: No     No patient-reported symptoms      Symptoms:        ,   COPD Assessment    Does the patient understand envrionmental exposure?: Yes  Is the patient able to verbalize Rescue vs. Long Acting medications?: Yes     No patient-reported symptoms         Symptoms:         and   General Assessment    Do you have any symptoms that are causing concern?: Yes  Progression since Onset: Gradually Improving  Reported Symptoms: Other (Comment: with-drawal symptoms-GI)

## 2021-06-25 ENCOUNTER — CARE COORDINATION (OUTPATIENT)
Dept: CARE COORDINATION | Age: 61
End: 2021-06-25

## 2021-06-25 NOTE — TELEPHONE ENCOUNTER
Patient called me back stating her insurance company recommended suboxone to help her. Please advise.

## 2021-06-25 NOTE — CARE COORDINATION
Ambulatory Care Coordination Note  6/25/2021  CM Risk Score: 3  Charlson 10 Year Mortality Risk Score: 79%     ACC: Donald Adkins RN    Summary Note: Spoke with patient who became tearful. She explained that she is having abdominal pain and loose stools every 2-3 hours since detoxing. She said she just has to deal with it. ACM explained that we can route this to her provider and ask if there is anything she can prescribe. Patient educated on staying hydrated. Patient asked for a refill of Paxil as well. Will route to PCP. Patient declined speaking to LSW again. Care Coordination Interventions    Program Enrollment: Complex Care  Referral from Primary Care Provider: No  Suggested Interventions and Community Resources         Goals Addressed                 This Visit's Progress     Medication Management   No change     I will take my medication as directed. I will notify my provider of any problems with medications, like adverse effects or side effects. I will notify my provider/Care Coordinator if I am unable to afford my medications. I will notify my provider for advice before I stop taking any of my medication. Barriers: lack of support and overwhelmed by complexity of regimen  Plan for overcoming my barriers: work with ACM  Confidence: 9/10  Anticipated Goal Completion Date: 5.25.21            Prior to Admission medications    Medication Sig Start Date End Date Taking?  Authorizing Provider   albuterol sulfate  (90 Base) MCG/ACT inhaler Inhale 2 puffs into the lungs every 6 hours as needed for Wheezing 5/28/21   Alonso Rosenberg MD   aspirin (ASPIRIN 81) 81 MG EC tablet Take 1 tablet by mouth daily 5/28/21   Alonso Rosenberg MD   atorvastatin (LIPITOR) 40 MG tablet Take 1 tablet by mouth nightly 5/28/21   Alonso Rosenberg MD   diclofenac sodium (VOLTAREN) 1 % GEL Apply 2 g topically 4 times daily as needed for Pain 5/28/21   Alonso Rosenberg MD   DULoxetine 40 MG CPEP Take 80 mg by mouth daily 5/29/21   Lethaniel Favre, MD   lidocaine 4 % external patch Place 1 patch onto the skin daily 5/29/21   Lethaniel Favre, MD   metoprolol tartrate (LOPRESSOR) 25 MG tablet Take 1 tablet by mouth 2 times daily 5/28/21   Lethaniel Favre, MD   traZODone (DESYREL) 50 MG tablet Take 1 tablet by mouth nightly as needed for Sleep 5/28/21   Lethaniel Favre, MD   ibuprofen (ADVIL;MOTRIN) 600 MG tablet Take 1 tablet by mouth 3 times daily as needed for Pain 1/23/21   Kiki Ross MD       Future Appointments   Date Time Provider Pearl Hilton   7/14/2021  2:30 PM Madyson Peters D[facility]     ,   Congestive Heart Failure Assessment    Are you currently restricting fluids?: Other  Do you understand a low sodium diet?: Yes  Do you understand how to read food labels?: Yes  Do you salt your food before tasting it?: No     No patient-reported symptoms      Symptoms:     Salt intake watch compared to last visit: stable     ,   COPD Assessment    Does the patient understand envrionmental exposure?: Yes  Is the patient able to verbalize Rescue vs. Long Acting medications?: Yes     No patient-reported symptoms         Symptoms:         and   General Assessment    Do you have any symptoms that are causing concern?: Yes  Progression since Onset: Intermittent - Waxing/Waning  Reported Symptoms: Abdominal Pain, Other (Comment: loose stools)

## 2021-06-25 NOTE — CARE COORDINATION
Spoke with patient and explained that a referral to addiction counselor or any other services may not happen until next week since it is late on a Friday. Offered her rescue crisis number, patient took information and repeated it. Will follow up on Monday.

## 2021-06-25 NOTE — TELEPHONE ENCOUNTER
Paxil is not on patients current medication list.   Please see the message below that Ning sent    Patient explained that she is having abdominal pain and loose stools every 2-3 hours since detoxing. She said she just has to deal with it. She can not make it to the office until issue resolves per patient. Please advise what she can take for loose stools and abdominal pain.    Refill also pending your approval

## 2021-06-28 ENCOUNTER — CARE COORDINATION (OUTPATIENT)
Dept: CARE COORDINATION | Age: 61
End: 2021-06-28

## 2021-06-28 RX ORDER — PAROXETINE HYDROCHLORIDE 20 MG/1
20 TABLET, FILM COATED ORAL 2 TIMES DAILY
Qty: 60 TABLET | Refills: 0 | OUTPATIENT
Start: 2021-06-28

## 2021-06-28 NOTE — CARE COORDINATION
[facility] left  for renewed mind, requesting a return call to [facility] .

## 2021-06-28 NOTE — TELEPHONE ENCOUNTER
Discussed patients situation with care coordinator. She expressed that Toñito Lamar is still needing tx for stools, I advised that Tae Dejesus is not comfortable giving pt medication as she has not been evaluated since beginning of 2021. I talked with another MA in office and we were able to get Toñito Lamar squeezed into Dr. Liza Casillas schedule for this Thursday 7/1/2021 if office, I attempted to contact patient myself and she did not answer her phone. I reached out to SAMARA Barclay to see if she could follow up with patient, Ning advised me that she asked pt about appt and Toñito Lamar refused the appt. Information relayed to NP that pt refused appt.

## 2021-06-28 NOTE — TELEPHONE ENCOUNTER
It looks like according to psychiatry, they have titrated her off Paxil and increased her Cymbalta. Patient needs to follow with psychiatry as well as an appointment in the office with one of our physicians.

## 2021-06-29 ENCOUNTER — CARE COORDINATION (OUTPATIENT)
Dept: CARE COORDINATION | Age: 61
End: 2021-06-29

## 2021-06-29 NOTE — CARE COORDINATION
ACM received vm from [facility] stating they were returning ACM call. ACM returned call and had to leave another VM. ACM included patient's name and number for a return call to patient. Also requested a return call to ACM.

## 2021-07-01 ENCOUNTER — CARE COORDINATION (OUTPATIENT)
Dept: CARE COORDINATION | Age: 61
End: 2021-07-01

## 2021-07-01 NOTE — CARE COORDINATION
Patient called ACM asking if PCP could order something for loose stools. ACM reminded patient that PCP would like her to make an appointment, she has not been seen since her hospital DC. Patient stated \"I can not go anywhere with this issue\". ACM asked patient if she can do a VV? Patient agreed. ACM spoke with manager at PCP office. She stated that the schedule is over booked. Manager put PCP on phone who explained that the patient should report to the ED. She is concerned that she could have C-Diff, needs to be evaluated. ACM spoke with patient explained that PCP does not feel comfortable ordering anything with out her being examined. Explained the concern for C-Diff and that this issue could be worsened by the wrong medication being prescribed. ACM also explained that patient needs to have proper follow up with PCP and specialist to assure proper care. She stated she tried to call [facility] but its on a first call basis, every time she calls the schedule is full. Patient understood PCP recommendations and said she will think about going to the ED. ACM recommended [facility] walk in, once patient has the loose stool issue resolved.

## 2021-07-02 ENCOUNTER — CARE COORDINATION (OUTPATIENT)
Dept: CARE COORDINATION | Age: 61
End: 2021-07-02

## 2021-07-02 NOTE — CARE COORDINATION
spoke with Jaylan Walters regarding Zuleimamary Doyle.  inquired if  should continue to follow and attempt to contact Franky Doyle. Car Briones reports that Zuleimamary Doyle has declined services and was connected with [facility]. Car Briones was agreeable for  to sign off. Plan:    will sign off.

## 2021-07-15 ENCOUNTER — CARE COORDINATION (OUTPATIENT)
Dept: CARE COORDINATION | Age: 61
End: 2021-07-15

## 2021-07-15 NOTE — CARE COORDINATION
Attempting to reach patient for a follow up care coordination call. Unable to leave a VM.   Noemy Gonzalez RN, ambulatory care manager

## 2021-07-16 NOTE — CARE COORDINATION
ACM received return VM from patient, no concerns voiced. ACM returned call and was unable to leave VM.    Hoda James RN, ambulatory care manager

## 2021-07-23 ENCOUNTER — CARE COORDINATION (OUTPATIENT)
Dept: CARE COORDINATION | Age: 61
End: 2021-07-23

## 2021-07-23 NOTE — CARE COORDINATION
Ambulatory Care Coordination Note  7/23/2021  CM Risk Score: 7  Charlson 10 Year Mortality Risk Score: 79%     ACC: Sarkis Marc RN    Patient's Plan of Care    MyChart Status: Active Yes    Reason for Encounter:     1. Follow up loose stools  2. Reminder of need to schedule follow up appointment with PCP    Patient stated her loose stool issue is improving. She believes it is still a side effect from de-toxing. Patient was reminded to call to schedule a follow up with PCP to maintain a relationship for future needs. Patient understood. Next planned follow up call: 2-3 weeks, will graduate if no needs at this time. Care Coordination Interventions    Program Enrollment: Complex Care  Referral from Primary Care Provider: No  Suggested Interventions and Community Resources         Goals Addressed                 This Visit's Progress     Medication Management   No change     I will take my medication as directed. I will notify my provider of any problems with medications, like adverse effects or side effects. I will notify my provider/Care Coordinator if I am unable to afford my medications. I will notify my provider for advice before I stop taking any of my medication. Barriers: lack of support and overwhelmed by complexity of regimen  Plan for overcoming my barriers: work with ACM  Confidence: 9/10  Anticipated Goal Completion Date: 5.25.21            Prior to Admission medications    Medication Sig Start Date End Date Taking?  Authorizing Provider   albuterol sulfate  (90 Base) MCG/ACT inhaler Inhale 2 puffs into the lungs every 6 hours as needed for Wheezing 5/28/21   Frida Murphy MD   aspirin (ASPIRIN 81) 81 MG EC tablet Take 1 tablet by mouth daily 5/28/21   Frida Murphy MD   atorvastatin (LIPITOR) 40 MG tablet Take 1 tablet by mouth nightly 5/28/21   Frida Murphy MD   diclofenac sodium (VOLTAREN) 1 % GEL Apply 2 g topically 4 times daily as needed for Pain 5/28/21   Rebel cervantes FLORENCE Hu MD   DULoxetine 40 MG CPEP Take 80 mg by mouth daily 5/29/21   Melony Kapoor MD   lidocaine 4 % external patch Place 1 patch onto the skin daily 5/29/21   Melony Kapoor MD   metoprolol tartrate (LOPRESSOR) 25 MG tablet Take 1 tablet by mouth 2 times daily 5/28/21   Melony Kapoor MD   traZODone (DESYREL) 50 MG tablet Take 1 tablet by mouth nightly as needed for Sleep 5/28/21   Melony Kapoor MD   ibuprofen (ADVIL;MOTRIN) 600 MG tablet Take 1 tablet by mouth 3 times daily as needed for Pain 1/23/21   Ashley Lujan MD       No future appointments.   ,   Congestive Heart Failure Assessment    Are you currently restricting fluids?: Other  Do you understand a low sodium diet?: Yes  Do you understand how to read food labels?: Yes  Do you salt your food before tasting it?: No     No patient-reported symptoms      Symptoms:     Weight trend: stable  Salt intake watch compared to last visit: stable     ,   COPD Assessment    Does the patient understand envrionmental exposure?: Yes  Is the patient able to verbalize Rescue vs. Long Acting medications?: Yes     No patient-reported symptoms         Symptoms:         and   General Assessment    Do you have any symptoms that are causing concern?: Yes  Progression since Onset: Gradually Improving  Reported Symptoms: Other (Comment: loose stools)

## 2021-08-11 ENCOUNTER — CARE COORDINATION (OUTPATIENT)
Dept: CARE COORDINATION | Age: 61
End: 2021-08-11

## 2021-08-11 ENCOUNTER — TELEPHONE (OUTPATIENT)
Dept: FAMILY MEDICINE CLINIC | Age: 61
End: 2021-08-11

## 2021-08-11 NOTE — CARE COORDINATION
"""CORBIN.  One drop Victor Manuel Z given.""" Attempting to reach patient for a follow up care coordination call regarding any needs, questions or concerns. Linda , [address] [facility]  Unable to leave .

## 2021-08-11 NOTE — TELEPHONE ENCOUNTER
Kina Golden was contacted as part of mammography outreach. Unable to contact patient.      Camilla Green

## 2021-08-18 ENCOUNTER — CARE COORDINATION (OUTPATIENT)
Dept: CARE COORDINATION | Age: 61
End: 2021-08-18

## 2021-08-23 ENCOUNTER — CARE COORDINATION (OUTPATIENT)
Dept: CARE COORDINATION | Age: 61
End: 2021-08-23

## 2021-08-26 ENCOUNTER — CARE COORDINATION (OUTPATIENT)
Dept: CARE COORDINATION | Age: 61
End: 2021-08-26

## 2021-09-08 ENCOUNTER — CARE COORDINATION (OUTPATIENT)
Dept: CARE COORDINATION | Age: 61
End: 2021-09-08

## 2021-09-13 NOTE — CARE COORDINATION
Patient notified AC that she is scheduled with a new PCP in November. Patient continues to deny needs from [facility]. Will graduate at this time but will be happy to assist with any needs in the future.

## 2021-09-20 ENCOUNTER — CARE COORDINATION (OUTPATIENT)
Dept: CARE COORDINATION | Age: 61
End: 2021-09-20

## 2021-09-20 ENCOUNTER — HOSPITAL ENCOUNTER (INPATIENT)
Age: 61
LOS: 10 days | Discharge: NURSING, REHAB OR LONG-TERM CARE | DRG: 751 | End: 2021-09-30
Attending: EMERGENCY MEDICINE | Admitting: PSYCHIATRY & NEUROLOGY
Payer: COMMERCIAL

## 2021-09-20 ENCOUNTER — APPOINTMENT (OUTPATIENT)
Dept: GENERAL RADIOLOGY | Age: 61
DRG: 751 | End: 2021-09-20
Payer: COMMERCIAL

## 2021-09-20 DIAGNOSIS — R60.9 PERIPHERAL EDEMA: Primary | ICD-10-CM

## 2021-09-20 DIAGNOSIS — B88.8 INFESTATION BY BED BUG: ICD-10-CM

## 2021-09-20 DIAGNOSIS — L97.521 ULCER OF LEFT FOOT, LIMITED TO BREAKDOWN OF SKIN (HCC): ICD-10-CM

## 2021-09-20 DIAGNOSIS — R45.89 SUICIDAL BEHAVIOR WITHOUT ATTEMPTED SELF-INJURY: ICD-10-CM

## 2021-09-20 LAB
ABSOLUTE EOS #: 0.9 K/UL (ref 0–0.4)
ABSOLUTE IMMATURE GRANULOCYTE: ABNORMAL K/UL (ref 0–0.3)
ABSOLUTE LYMPH #: 1.1 K/UL (ref 1–4.8)
ABSOLUTE MONO #: 0.5 K/UL (ref 0.1–1.3)
ALBUMIN SERPL-MCNC: 4 G/DL (ref 3.5–5.2)
ALBUMIN/GLOBULIN RATIO: ABNORMAL (ref 1–2.5)
ALP BLD-CCNC: 73 U/L (ref 35–104)
ALT SERPL-CCNC: 9 U/L (ref 5–33)
ANION GAP SERPL CALCULATED.3IONS-SCNC: 13 MMOL/L (ref 9–17)
AST SERPL-CCNC: 12 U/L
BASOPHILS # BLD: 1 % (ref 0–2)
BASOPHILS ABSOLUTE: 0.1 K/UL (ref 0–0.2)
BILIRUB SERPL-MCNC: 0.24 MG/DL (ref 0.3–1.2)
BNP INTERPRETATION: ABNORMAL
BUN BLDV-MCNC: 22 MG/DL (ref 8–23)
BUN/CREAT BLD: ABNORMAL (ref 9–20)
CALCIUM SERPL-MCNC: 9.3 MG/DL (ref 8.6–10.4)
CHLORIDE BLD-SCNC: 103 MMOL/L (ref 98–107)
CO2: 26 MMOL/L (ref 20–31)
CREAT SERPL-MCNC: 0.84 MG/DL (ref 0.5–0.9)
D-DIMER QUANTITATIVE: 3.51 MG/L FEU (ref 0–0.59)
DIFFERENTIAL TYPE: ABNORMAL
EOSINOPHILS RELATIVE PERCENT: 10 % (ref 0–4)
GFR AFRICAN AMERICAN: >60 ML/MIN
GFR NON-AFRICAN AMERICAN: >60 ML/MIN
GFR SERPL CREATININE-BSD FRML MDRD: ABNORMAL ML/MIN/{1.73_M2}
GFR SERPL CREATININE-BSD FRML MDRD: ABNORMAL ML/MIN/{1.73_M2}
GLUCOSE BLD-MCNC: 110 MG/DL (ref 70–99)
HCT VFR BLD CALC: 35.7 % (ref 36–46)
HEMOGLOBIN: 11.6 G/DL (ref 12–16)
IMMATURE GRANULOCYTES: ABNORMAL %
LYMPHOCYTES # BLD: 14 % (ref 24–44)
MCH RBC QN AUTO: 30.4 PG (ref 26–34)
MCHC RBC AUTO-ENTMCNC: 32.4 G/DL (ref 31–37)
MCV RBC AUTO: 94 FL (ref 80–100)
MONOCYTES # BLD: 7 % (ref 1–7)
NRBC AUTOMATED: ABNORMAL PER 100 WBC
PDW BLD-RTO: 15.2 % (ref 11.5–14.9)
PLATELET # BLD: 485 K/UL (ref 150–450)
PLATELET ESTIMATE: ABNORMAL
PMV BLD AUTO: 7.4 FL (ref 6–12)
POTASSIUM SERPL-SCNC: 4.3 MMOL/L (ref 3.7–5.3)
PRO-BNP: 543 PG/ML
RBC # BLD: 3.8 M/UL (ref 4–5.2)
RBC # BLD: ABNORMAL 10*6/UL
SARS-COV-2, RAPID: NOT DETECTED
SEG NEUTROPHILS: 68 % (ref 36–66)
SEGMENTED NEUTROPHILS ABSOLUTE COUNT: 5.8 K/UL (ref 1.3–9.1)
SODIUM BLD-SCNC: 142 MMOL/L (ref 135–144)
SPECIMEN DESCRIPTION: NORMAL
TOTAL PROTEIN: 7.2 G/DL (ref 6.4–8.3)
TROPONIN INTERP: NORMAL
TROPONIN T: NORMAL NG/ML
TROPONIN, HIGH SENSITIVITY: 10 NG/L (ref 0–14)
WBC # BLD: 8.4 K/UL (ref 3.5–11)
WBC # BLD: ABNORMAL 10*3/UL

## 2021-09-20 PROCEDURE — 83880 ASSAY OF NATRIURETIC PEPTIDE: CPT

## 2021-09-20 PROCEDURE — 85025 COMPLETE CBC W/AUTO DIFF WBC: CPT

## 2021-09-20 PROCEDURE — 85379 FIBRIN DEGRADATION QUANT: CPT

## 2021-09-20 PROCEDURE — 1240000000 HC EMOTIONAL WELLNESS R&B

## 2021-09-20 PROCEDURE — 93005 ELECTROCARDIOGRAM TRACING: CPT

## 2021-09-20 PROCEDURE — 93970 EXTREMITY STUDY: CPT

## 2021-09-20 PROCEDURE — 36415 COLL VENOUS BLD VENIPUNCTURE: CPT

## 2021-09-20 PROCEDURE — 99284 EMERGENCY DEPT VISIT MOD MDM: CPT

## 2021-09-20 PROCEDURE — 87635 SARS-COV-2 COVID-19 AMP PRB: CPT

## 2021-09-20 PROCEDURE — 71045 X-RAY EXAM CHEST 1 VIEW: CPT

## 2021-09-20 PROCEDURE — 6370000000 HC RX 637 (ALT 250 FOR IP): Performed by: PSYCHIATRY & NEUROLOGY

## 2021-09-20 PROCEDURE — 80053 COMPREHEN METABOLIC PANEL: CPT

## 2021-09-20 PROCEDURE — 84484 ASSAY OF TROPONIN QUANT: CPT

## 2021-09-20 RX ORDER — LORAZEPAM 1 MG/1
2 TABLET ORAL EVERY 4 HOURS PRN
Status: DISCONTINUED | OUTPATIENT
Start: 2021-09-20 | End: 2021-09-30 | Stop reason: HOSPADM

## 2021-09-20 RX ORDER — DIPHENHYDRAMINE HYDROCHLORIDE 50 MG/ML
50 INJECTION INTRAMUSCULAR; INTRAVENOUS EVERY 4 HOURS PRN
Status: DISCONTINUED | OUTPATIENT
Start: 2021-09-20 | End: 2021-09-30 | Stop reason: HOSPADM

## 2021-09-20 RX ORDER — HALOPERIDOL 5 MG/ML
5 INJECTION INTRAMUSCULAR EVERY 4 HOURS PRN
Status: DISCONTINUED | OUTPATIENT
Start: 2021-09-20 | End: 2021-09-30 | Stop reason: HOSPADM

## 2021-09-20 RX ORDER — IBUPROFEN 400 MG/1
400 TABLET ORAL EVERY 6 HOURS PRN
Status: DISCONTINUED | OUTPATIENT
Start: 2021-09-20 | End: 2021-09-30 | Stop reason: HOSPADM

## 2021-09-20 RX ORDER — TRAZODONE HYDROCHLORIDE 50 MG/1
50 TABLET ORAL NIGHTLY PRN
Status: DISCONTINUED | OUTPATIENT
Start: 2021-09-20 | End: 2021-09-30 | Stop reason: HOSPADM

## 2021-09-20 RX ORDER — HALOPERIDOL 5 MG
5 TABLET ORAL EVERY 4 HOURS PRN
Status: DISCONTINUED | OUTPATIENT
Start: 2021-09-20 | End: 2021-09-30 | Stop reason: HOSPADM

## 2021-09-20 RX ORDER — ACETAMINOPHEN 325 MG/1
650 TABLET ORAL EVERY 4 HOURS PRN
Status: DISCONTINUED | OUTPATIENT
Start: 2021-09-20 | End: 2021-09-30 | Stop reason: HOSPADM

## 2021-09-20 RX ORDER — POLYETHYLENE GLYCOL 3350 17 G/17G
17 POWDER, FOR SOLUTION ORAL DAILY PRN
Status: DISCONTINUED | OUTPATIENT
Start: 2021-09-20 | End: 2021-09-30 | Stop reason: HOSPADM

## 2021-09-20 RX ORDER — LORAZEPAM 2 MG/ML
2 INJECTION INTRAMUSCULAR EVERY 4 HOURS PRN
Status: DISCONTINUED | OUTPATIENT
Start: 2021-09-20 | End: 2021-09-30 | Stop reason: HOSPADM

## 2021-09-20 RX ORDER — HYDROXYZINE 50 MG/1
50 TABLET, FILM COATED ORAL 3 TIMES DAILY PRN
Status: DISCONTINUED | OUTPATIENT
Start: 2021-09-20 | End: 2021-09-30 | Stop reason: HOSPADM

## 2021-09-20 RX ORDER — MAGNESIUM HYDROXIDE/ALUMINUM HYDROXICE/SIMETHICONE 120; 1200; 1200 MG/30ML; MG/30ML; MG/30ML
30 SUSPENSION ORAL EVERY 6 HOURS PRN
Status: DISCONTINUED | OUTPATIENT
Start: 2021-09-20 | End: 2021-09-30 | Stop reason: HOSPADM

## 2021-09-20 RX ORDER — FUROSEMIDE 20 MG/1
20 TABLET ORAL DAILY
Qty: 10 TABLET | Refills: 0 | Status: SHIPPED | OUTPATIENT
Start: 2021-09-20 | End: 2021-09-30

## 2021-09-20 RX ADMIN — TRAZODONE HYDROCHLORIDE 50 MG: 50 TABLET ORAL at 21:43

## 2021-09-20 RX ADMIN — HYDROXYZINE HYDROCHLORIDE 50 MG: 50 TABLET, FILM COATED ORAL at 21:43

## 2021-09-20 ASSESSMENT — SLEEP AND FATIGUE QUESTIONNAIRES
DO YOU HAVE DIFFICULTY SLEEPING: NO
AVERAGE NUMBER OF SLEEP HOURS: 6
DO YOU USE A SLEEP AID: NO

## 2021-09-20 ASSESSMENT — PAIN SCALES - GENERAL
PAINLEVEL_OUTOF10: 7
PAINLEVEL_OUTOF10: 8

## 2021-09-20 ASSESSMENT — PAIN DESCRIPTION - ORIENTATION
ORIENTATION: RIGHT
ORIENTATION: RIGHT

## 2021-09-20 ASSESSMENT — PAIN DESCRIPTION - DESCRIPTORS: DESCRIPTORS: ACHING

## 2021-09-20 ASSESSMENT — PAIN DESCRIPTION - PAIN TYPE
TYPE: CHRONIC PAIN
TYPE: CHRONIC PAIN

## 2021-09-20 ASSESSMENT — PAIN DESCRIPTION - LOCATION
LOCATION: HIP
LOCATION: HIP

## 2021-09-20 ASSESSMENT — PATIENT HEALTH QUESTIONNAIRE - PHQ9: SUM OF ALL RESPONSES TO PHQ QUESTIONS 1-9: 6

## 2021-09-20 ASSESSMENT — LIFESTYLE VARIABLES: HISTORY_ALCOHOL_USE: NO

## 2021-09-20 NOTE — ED TRIAGE NOTES
Pt into ER c/c bilat leg swelling onset 1 week. Pt is out of her normal daily prescriptions, has not been able to see her PMD.  Pt also with bed bug bites visible all over her body.

## 2021-09-20 NOTE — ED NOTES
Provisional Diagnosis:   Major Depressive Disorder with SI     Psychosocial and Contextual Factors:   Patient reports ongoing chronic medical health issues and pain. Patient states she has plan to go home and overdose on all her medications    C-SSRS Summary:       Patient: X  Family:   Agency:      Substance Abuse: Patient reports in past abusing her Percocet and unprescribed suboxone. Present Suicidal Behavior:  Patient states she is going to kill herself when/if she goes home via overdose of medications    Verbal: X     Attempt:     Past Suicidal Behavior: Patient denies. Verbal:     Attempt:        Self-Injurious/Self-Mutilation: Patient denies. Trauma Identified:  Patient denies. Protective Factors:  Patient has housing with a roommate, SSI income, insurance     Risk Factors:  Patient not linked with outpatient mental health service does not follow up with PCP or [facility], poor insight, not motivated, poor coping/problem solving skills, no support system, abuses medications, obese, health issues     Clinical Summary:  Key De La Torre is a  64 y.o. female who presents to the ED for initial medical complaint of leg swelling and pain. Patient at discharge began reporting she has been intermittently suicidal and does not want to go home due to the bed bugs in her home and if she is discharged, she will return home and overdose on her medications. Patient has prior hx of this when last admitted to [facility] in May 2021 and did not follow up with discharge and is not taking her medications. Patient has poor hygiene; reports she lives with a roommate but last admission reported she lives with her . Patient reports daily feelings of hopelessness, helplessness, shame and low self-worth. Patient reports no motivation and states she spends most of her day in a chair. Patient states she uses a walker to use the restroom. Patient reports extensive chronic medical conditions.  Patient reports poor sleep, poor appetite, denies any HI [facility] reports she needs to go into a ECF to get the help she needs to care for herself. Patient is tearful and crying at times and is willing to come in voluntary. Level of Care Disposition:   To be medically cleared and psych consult to be completed

## 2021-09-20 NOTE — BH NOTE
Patient given tobacco quitline number 17126542242 at this time, refusing to call at this time, states \" I just dont want to quit now\"- patient given information as to the dangers of long term tobacco use. Continue to reinforce the importance of tobacco cessation.

## 2021-09-20 NOTE — ED NOTES
Bed: C  Expected date:   Expected time:   Means of arrival:   Comments:  [facility]  09/20/21 3831

## 2021-09-20 NOTE — ED PROVIDER NOTES
[facility]  eMERGENCY dEPARTMENT eNCOUnter      Pt Name: Magdaleno Kerr  MRN: [id number]  [place] 1960  Date of evaluation: 9/20/2021  Provider: Alycia Rubio PA-C    CHIEF COMPLAINT       Chief Complaint   Patient presents with    Leg Swelling           HISTORY OF PRESENT ILLNESS  (Location/Symptom, Timing/Onset, Context/Setting, Quality, Duration, Modifying Factors, Severity.)   Magdaleno Kerr is a 64 y.o. female who presents to the emergency department via EMS for evaluation of leg swelling x 2-3 weeks. Pt states her legs are now weeping fluid they are so swollen. She reports a wound over her left heel that was bleeding 1 week ago. She denies any pain in her legs. Denies fever, chills, nausea, emesis, abd pain, chest pain, sob, cough. Pt also states they her home is infested with bed bugs. She reports bites all over her body. States she does not want to go back home. Pt does not have a PCP. States she has not taken any of her medications for several months. No other complaints. Nursing Notes were reviewed. REVIEW OF SYSTEMS    (2-9 systems for level 4, 10 or more for level 5)     Review of Systems   Leg swelling  Bug bites  Wound       Except as noted above the remainder of the review of systems was reviewed and negative.        PAST MEDICAL HISTORY     Past Medical History:   Diagnosis Date    Arthritis     [facility]     COPD [facility]chronic obstructive pulmonary disease) [facility]     Diverticulitis     Hx of blood clots     Pulmonary embolism ([facility]      None otherwise stated in nurses notes    SURGICAL HISTORY       Past Surgical History:   Procedure Laterality Date    ANKLE SURGERY      COLON SURGERY      FOOT SURGERY Left 1/22/2021    FOOT BONE BIOPSY W/ INCISION & DRAINAGE AND REDRESSING ON RIGHT FOOT performed by Leana Michel DPM at [address] [place] [address] ([facility])       None otherwise stated in nurses notes    CURRENT MEDICATIONS       Current Discharge Medication List          ALLERGIES     Patient has no known allergies. FAMILY HISTORY           Problem Relation Age of Onset    Cancer Mother     Diabetes Paternal Grandfather      Family Status   Relation Name Status    Mother      PGF  (Not Specified)      None otherwise stated in nurses notes    SOCIAL HISTORY      reports that she has been smoking. She has a 11.25 pack-year smoking history. She has never used smokeless tobacco. She reports previous alcohol use. She reports previous drug use. lives at home with others     PHYSICAL EXAM    (up to 7 for level 4, 8 or more for level 5)     ED Triage Vitals [21 1238]   BP Temp Temp Source Pulse Resp SpO2 Height Weight   (!) 160/72 98.3 °F (36.8 °C) Oral 98 16 98 % 5' 7\" (1.702 m) (!) 350 lb (158.8 kg)       Physical Exam  Vitals and nursing note reviewed. Constitutional:       General: She is awake. Appearance: She is morbidly obese. HENT:      Head: Normocephalic and atraumatic. Eyes:      Extraocular Movements: Extraocular movements intact. Conjunctiva/sclera: Conjunctivae normal.      Pupils: Pupils are equal, round, and reactive to light. Cardiovascular:      Rate and Rhythm: Normal rate and regular rhythm. Pulses:           Dorsalis pedis pulses are 2+ on the right side and 2+ on the left side. Posterior tibial pulses are 2+ on the right side and 2+ on the left side. Heart sounds: Normal heart sounds, S1 normal and S2 normal.   Pulmonary:      Effort: Pulmonary effort is normal. No respiratory distress. Breath sounds: Normal breath sounds and air entry. No stridor. No decreased breath sounds, wheezing, rhonchi or rales. Chest:      Chest wall: No tenderness. Abdominal:      General: Abdomen is flat. Palpations: Abdomen is soft. Tenderness: There is no abdominal tenderness. There is no guarding or rebound. Musculoskeletal:         General: Normal range of motion.       Cervical back: Normal range of motion. Feet:      Right foot:      Skin integrity: Callus and dry skin present. No ulcer, blister, skin breakdown, erythema, warmth or fissure. Toenail Condition: Fungal disease present. Left foot:      Skin integrity: Ulcer (stage 2 over heel.), callus and dry skin present. No blister, skin breakdown, erythema, warmth or fissure. Toenail Condition: Fungal disease present. Skin:     General: Skin is warm. Capillary Refill: Capillary refill takes less than 2 seconds. Findings: Rash (bed bug bites over legs, arms.) present. Comments: No weeping of fluid in BL lower extremities. There is no calf tenderness, bony tenderness. No erythema. Neurological:      General: No focal deficit present. Mental Status: She is alert and oriented to person, place, and time. Mental status is at baseline. Cranial Nerves: No cranial nerve deficit. Sensory: No sensory deficit. Motor: No weakness. Coordination: Coordination normal.      Gait: Gait normal.   Psychiatric:         Behavior: Behavior is cooperative. DIAGNOSTIC RESULTS     EKG: All EKG's are interpreted by the Emergency Department Physician who either signs or Co-signs this chart in the absence of a cardiologist.        RADIOLOGY:   All plain film, CT, MRI, and formal ultrasound images (except ED bedside ultrasound) are read by the radiologist, see reports below, unless otherwise noted in MDM or here. VL Lower Extremity Bilateral Venous Duplex   Final Result      XR CHEST PORTABLE   Final Result   No acute process. No results found.           LABS:  Labs Reviewed   CBC WITH AUTO DIFFERENTIAL - Abnormal; Notable for the following components:       Result Value    RBC 3.80 (*)     Hemoglobin 11.6 (*)     Hematocrit 35.7 (*)     RDW 15.2 (*)     Platelets 282 (*)     Seg Neutrophils 68 (*)     Lymphocytes 14 (*)     Eosinophils % 10 (*)     Absolute Eos # 0.90 (*)     All other components within normal limits   COMPREHENSIVE METABOLIC PANEL W/ REFLEX TO MG FOR LOW K - Abnormal; Notable for the following components:    Glucose 110 (*)     Total Bilirubin 0.24 (*)     All other components within normal limits   D-DIMER, QUANTITATIVE - Abnormal; Notable for the following components:    D-Dimer, Quant 3.51 (*)     All other components within normal limits   BRAIN NATRIURETIC PEPTIDE - Abnormal; Notable for the following components:    Pro- (*)     All other components within normal limits   COVID-19, RAPID   TROPONIN       All other labs were within normal range or not returned as of this dictation.     EMERGENCY DEPARTMENT COURSE and DIFFERENTIAL DIAGNOSIS/MDM:   Vitals:    Vitals:    09/20/21 1238 09/20/21 1500 09/20/21 1649   BP: (!) 160/72 107/62 (!) 125/59   Pulse: 98 92 91   Resp: 16 18 18   Temp: 98.3 °F (36.8 °C)     TempSrc: Oral     SpO2: 98% 96% 96%   Weight: (!) 350 lb (158.8 kg)     Height: 5' 7\" (1.702 m)           Patient instructed to return to the emergency room if symptoms worsen, return, or any other concern right away which is agreed by the patient    ED MEDS:  Orders Placed This Encounter   Medications    furosemide (LASIX) 20 MG tablet     Sig: Take 1 tablet by mouth daily for 10 days     Dispense:  10 tablet     Refill:  0    acetaminophen (TYLENOL) tablet 650 mg    aluminum & magnesium hydroxide-simethicone (MAALOX) 200-200-20 MG/5ML suspension 30 mL    hydrOXYzine (ATARAX) tablet 50 mg    ibuprofen (ADVIL;MOTRIN) tablet 400 mg    traZODone (DESYREL) tablet 50 mg    polyethylene glycol (GLYCOLAX) packet 17 g    nicotine polacrilex (NICORETTE) gum 2 mg    AND Linked Order Group     haloperidol (HALDOL) tablet 5 mg     LORazepam (ATIVAN) tablet 2 mg    AND Linked Order Group     haloperidol lactate (HALDOL) injection 5 mg     LORazepam (ATIVAN) injection 2 mg     diphenhydrAMINE (BENADRYL) injection 50 mg         CONSULTS:  None    PROCEDURES:  None      FINAL IMPRESSION      1. Peripheral edema    2. Infestation by bed bug    3. Ulcer of left foot, limited to breakdown of skin ([address]    4. Suicidal behavior without attempted self-injury          DISPOSITION/PLAN   DISPOSITION Decision To Admit    PATIENT REFERRED TO:  Qing Bello, APRN - CNP  [facility] [address]  [address] [facility] [place] [address]  C/Hair Pugh Arlyn, Rajendra [address] [facility], [address] [place] [address] AT THE [place] [address]  [phone]    Call         DISCHARGE MEDICATIONS:  Current Discharge Medication List      START taking these medications    Details   furosemide (LASIX) 20 MG tablet Take 1 tablet by mouth daily for 10 days  Qty: 10 tablet, Refills: 0               Summation      Patient Course:      Brought in via EMS for evaluation of bilateral lower leg swelling x several weeks. Pt states she has not taken any of her medications for several months. Reports bed bug infestation at home. Denies leg pain, cp, sob. Hx of CHF. ddimer is elevated. Venous dopplers are negative. No cp or sob. Low concern for PE. Stage 2 ulcer over left heel. No signs of infection. Dr. Paul Castro did evaluate patient. Pt is ok for dc home. Will dc home on lasix. Recommend PCP follow up . Upon dc home, patient became tearful and states she cannot go back to her house due to the bedbugs. I informed patient that she will need to speak with her landlord regarding the infestation. Patient states she would like to be placed in a nursing home. Patient does not meet admission criteria and will be unable to be placed while here. Antonina Odonnell RN went into room to discharge patient and patient states if she does not get admitted she is going to overdose on her sleeping medications at home. Discussed with Dr. Marshall Staples. Will admit to the [facility] for suicidal ideations. Domonique,  arranged for admission. Patient was accepted.  She is medically clear.        ED Medications administered this visit:    Medications   acetaminophen (TYLENOL) tablet 650 mg (has no administration in time range)   aluminum & magnesium hydroxide-simethicone (MAALOX) 200-200-20 MG/5ML suspension 30 mL (has no administration in time range)   hydrOXYzine (ATARAX) tablet 50 mg (has no administration in time range)   ibuprofen (ADVIL;MOTRIN) tablet 400 mg (has no administration in time range)   traZODone (DESYREL) tablet 50 mg (has no administration in time range)   polyethylene glycol (GLYCOLAX) packet 17 g (has no administration in time range)   nicotine polacrilex (NICORETTE) gum 2 mg (has no administration in time range)   haloperidol (HALDOL) tablet 5 mg (has no administration in time range)     And   LORazepam (ATIVAN) tablet 2 mg (has no administration in time range)   haloperidol lactate (HALDOL) injection 5 mg (has no administration in time range)     And   LORazepam (ATIVAN) injection 2 mg (has no administration in time range)     And   diphenhydrAMINE (BENADRYL) injection 50 mg (has no administration in time range)       New Prescriptions from this visit:    Current Discharge Medication List      START taking these medications    Details   furosemide (LASIX) 20 MG tablet Take 1 tablet by mouth daily for 10 days  Qty: 10 tablet, Refills: 0             Follow-up:  KIRK Chisholm - CNP  [address] [facility] [address]  [address] [facility] [address]  [address] [facility] [address]  C/Hair Pugh Final, Rajendra 161 [address] [facility], [address] [facility]  [address] [place] [id number] [phone]    Call           Final Impression:   1. Peripheral edema    2. Infestation by bed bug    3. Ulcer of left foot, limited to breakdown of skin ([address]    4.  Suicidal behavior without attempted self-injury               (Please note that portions of this note were completed with a voice recognition program )        CAMDEN Thorne

## 2021-09-20 NOTE — ED PROVIDER NOTES
EMERGENCY DEPARTMENT ENCOUNTER   ATTENDING ATTESTATION     Pt Name: Ramesh Velazquez  MRN: [id number]  [place] 1960  Date of evaluation: 9/20/21   Ramesh Velazquez is a 64 y.o. female with CC: Leg Swelling    MDM:            EKG: All EKG's are interpreted by the Emergency Department Physician who either signs or Co-signs this chart in the absence of a cardiologist.  NSR, LAFB, incomplete RBBB, prolonged QT, nonspecific changes, no acute ischemic changes on ST segments, no change compared to old, normal rate and normal intervals except qtc 493      RADIOLOGY:All plain film, CT, MRI, and formal ultrasound images (except ED bedside ultrasound) are read by the radiologist, see reports below, unless otherwise noted in MDM or here. XR CHEST PORTABLE   Final Result   No acute process. VL Lower Extremity Bilateral Venous Duplex    (Results Pending)     LABS: All lab results were reviewed by myself, and all abnormals are listed below.   Labs Reviewed   CBC WITH AUTO DIFFERENTIAL - Abnormal; Notable for the following components:       Result Value    RBC 3.80 (*)     Hemoglobin 11.6 (*)     Hematocrit 35.7 (*)     RDW 15.2 (*)     Platelets 669 (*)     Seg Neutrophils 68 (*)     Lymphocytes 14 (*)     Eosinophils % 10 (*)     Absolute Eos # 0.90 (*)     All other components within normal limits   COMPREHENSIVE METABOLIC PANEL W/ REFLEX TO MG FOR LOW K - Abnormal; Notable for the following components:    Glucose 110 (*)     Total Bilirubin 0.24 (*)     All other components within normal limits   D-DIMER, QUANTITATIVE - Abnormal; Notable for the following components:    D-Dimer, Quant 3.51 (*)     All other components within normal limits   BRAIN NATRIURETIC PEPTIDE - Abnormal; Notable for the following components:    Pro- (*)     All other components within normal limits   COVID-19, RAPID   TROPONIN     PASTMEDICAL HISTORY     Past Medical History:   Diagnosis Date    Arthritis     [facility]     [facility]     Diverticulitis     Hx of blood clots     Pulmonary embolism ([facility])      SURGICAL HISTORY       Past Surgical History:   Procedure Laterality Date    ANKLE SURGERY      COLON SURGERY      FOOT SURGERY Left 2021    FOOT BONE BIOPSY W/ INCISION & DRAINAGE AND REDRESSING ON RIGHT FOOT performed by Kalli Bro DPM at [address]       Previous Medications    ALBUTEROL SULFATE  (90 BASE) MCG/ACT INHALER    Inhale 2 puffs into the lungs every 6 hours as needed for Wheezing    ASPIRIN (ASPIRIN 81) 81 MG EC TABLET    Take 1 tablet by mouth daily    ATORVASTATIN (LIPITOR) 40 MG TABLET    Take 1 tablet by mouth nightly    DICLOFENAC SODIUM (VOLTAREN) 1 % GEL    Apply 2 g topically 4 times daily as needed for Pain    DULOXETINE 40 MG CPEP    Take 80 mg by mouth daily    IBUPROFEN (ADVIL;MOTRIN) 600 MG TABLET    Take 1 tablet by mouth 3 times daily as needed for Pain    LIDOCAINE 4 % EXTERNAL PATCH    Place 1 patch onto the skin daily    METOPROLOL TARTRATE (LOPRESSOR) 25 MG TABLET    Take 1 tablet by mouth 2 times daily    TRAZODONE (DESYREL) 50 MG TABLET    Take 1 tablet by mouth nightly as needed for Sleep     ALLERGIES     has No Known Allergies. FAMILY HISTORY     She indicated that her mother is . She indicated that the status of her paternal grandfather is unknown.      SOCIAL HISTORY       Social History     Tobacco Use    Smoking status: Current Every Day Smoker     Packs/day: 0.25     Years: 45.00     Pack years: 11.25    Smokeless tobacco: Never Used    Tobacco comment: has not smoked in two months   Vaping Use    Vaping Use: Never used   Substance Use Topics    Alcohol use: Not Currently     Comment: 1-2 times per week    Drug use: Not Currently       I personally evaluated and examined the patient in conjunction with the APC and agree with the assessment, treatment plan, and

## 2021-09-20 NOTE — CARE COORDINATION
Patient called ACM reporting that her legs are so swollen that they are seeping fluid, she is a little more SOB than normal. She also stated that she has some open sores due to bed bugs. She does not have a new patient appt until November. ACM recommended going to the ED for evaluation. Patient understood and will go to be evaluated.

## 2021-09-20 NOTE — PROGRESS NOTES
Medication History completed:    Patient reports nonadherence to medications since discharge in May. [place] history confirms no recent fills of controlled substances.      Thank you,  Stacia Mckeon PharmD, BCPS  [phone]

## 2021-09-20 NOTE — BH NOTE
[address] [facility]  Admission Note     Patient admitted from the [facility] for SI to [address] denies SI on admission but reports 10/10 depression/anxiety due to current living situations. Patient denies any hallucinations.  Patient struggles to perform ADL's due to size and states that she cannot take it anymore. Patient has BED BUGS so her belongings are bagged in the back. Bites scattered over extremities.  Patient is able to stand up by herself most of the time with a walker, but can only ambulate small distances at a time.  Patient so far has needed minimal assist after setting room up with bed pan (toilet too low for patient to get off by herself). Patient was calm and cooperative throughout the admission process.      Admission Type:   Admission Type: Voluntary    Reason for admission:  Reason for Admission: SI to OD on Pills    PATIENT STRENGTHS:  Strengths: Communication    Patient Strengths and Limitations:  Limitations: Perceives need for assistance with self-care    Addictive Behavior:   Addictive Behavior  In the past 3 months, have you felt or has someone told you that you have a problem with:  : None  Do you have a history of Chemical Use?: No  Do you have a history of Alcohol Use?: No  Do you have a history of Street Drug Abuse?: No  Histroy of Prescripton Drug Abuse?: No    Medical Problems:   Past Medical History:   Diagnosis Date    Arthritis     [facility]est[facility]     COPD (chronic obstructive pulmonary disease) [facility] [address]     Diverticulitis     Hx of blood clots     Pulmonary embolism ([facility])        Status EXAM:  Status and Exam  Normal: No  Facial Expression: Sad  Affect: Appropriate  Level of Consciousness: Alert  Mood:Normal: No  Mood: Depressed, Anxious  Motor Activity:Normal: No  Motor Activity: Decreased  Interview Behavior: Cooperative  Preception: [place] to Person, [place] to Time, [place] to Place, [place] to Situation  Attention:Normal: No  Attention: Distractible  Thought Processes: Circumstantial  Thought Content:Normal: No  Thought Content: Preoccupations  Hallucinations: None  Delusions: No  Memory:Normal: Yes  Insight and Judgment: No  Insight and Judgment: Poor Insight, Poor Judgment  Present Suicidal Ideation: No  Present Homicidal Ideation: No    Tobacco Screening:  Practical Counseling, on admission, roque X, if applicable and completed (first 3 are required if patient doesn't refuse):            ( )  Recognizing danger situations (included triggers and roadblocks)                    ( )  Coping skills (new ways to manage stress, exercise, relaxation techniques, changing routine, distraction)                                                           ( )  Basic information about quitting (benefits of quitting, techniques in how to quit, available resources  ( ) Referral for counseling faxed to [facility]                                           ( ) Patient refused counseling  ( ) Patient has not smoked in the last 30 days    Metabolic Screening:    Lab Results   Component Value Date    LABA1C 6.2 (H) 01/21/2021       Lab Results   Component Value Date    CHOL 135 10/29/2019     Lab Results   Component Value Date    TRIG 64 10/29/2019     Lab Results   Component Value Date    HDL 46 10/29/2019     No components found for: LDLCAL  No results found for: LABVLDL      Body mass index is 54.82 kg/m². BP Readings from Last 2 Encounters:   09/20/21 122/72   05/29/21 (!) 156/79           Pt admitted with followings belongings:  Dentures: None  Vision - Corrective Lenses: Glasses  Hearing Aid: None  Jewelry: None  Body Piercings Removed: N/A  Clothing: Other (Comment) (Bagged for bed bugs, ELFE[employer])  Were All Patient Medications Collected?: Not Applicable  Other Valuables: Cell phone, Other (Comment) (Cracked cell phone,  and cord, prescription, ID, Insurance card)     Patient's home medications were Verified.   Patient oriented to surroundings and program expectations and copy of patient rights given. Received admission packet:  Yes. Consents reviewed, signed Yes. Refused no. Patient verbalize understanding:  Yes. Patient education on precautions:  Yes                   Kristina Angeles RN

## 2021-09-20 NOTE — ED NOTES
While discussing transport options to the patient for discharge, pt made the statement that if she has to go home, she will overdose herself on her sleeping pills to kill herself. Pt seems in a depressed mood. PA notified.        Perez Galvan RN  09/20/21 1689

## 2021-09-21 PROBLEM — F33.2 SEVERE RECURRENT MAJOR DEPRESSION WITHOUT PSYCHOTIC FEATURES (HCC): Status: ACTIVE | Noted: 2021-09-21

## 2021-09-21 PROCEDURE — APPSS60 APP SPLIT SHARED TIME 46-60 MINUTES: Performed by: PSYCHIATRY & NEUROLOGY

## 2021-09-21 PROCEDURE — 99223 1ST HOSP IP/OBS HIGH 75: CPT | Performed by: PSYCHIATRY & NEUROLOGY

## 2021-09-21 PROCEDURE — 1240000000 HC EMOTIONAL WELLNESS R&B

## 2021-09-21 RX ORDER — PAROXETINE 10 MG/1
10 TABLET, FILM COATED ORAL DAILY
Status: DISCONTINUED | OUTPATIENT
Start: 2021-09-21 | End: 2021-09-22

## 2021-09-21 RX ORDER — IODINE/SODIUM IODIDE 2 %
TINCTURE TOPICAL PRN
Status: DISCONTINUED | OUTPATIENT
Start: 2021-09-21 | End: 2021-09-30 | Stop reason: HOSPADM

## 2021-09-21 NOTE — PLAN OF CARE
Problem: Altered Mood, Depressive Behavior:  Goal: Ability to disclose and discuss suicidal ideas will improve  Description: Ability to disclose and discuss suicidal ideas will improve  Outcome: Ongoing  Patient admits to suicidal ideations without a plan and contracts for safety. Writer provided support. Safe environment maintained and patient remains free from self-harm. Agreeable to seeking staff should intent to harm self arise. Q15min checks for safety. Problem: Altered Mood, Depressive Behavior:  Goal: Absence of self-harm  Description: Absence of self-harm  Outcome: Ongoing  Safe environment maintained and patient remains free from self-harm. Agreeable to seeking staff should thoughts to harm self or others arise. Q15min checks for safety. Problem: Falls - Risk of:  Goal: Will remain free from falls  Description: Will remain free from falls  Outcome: Ongoing  Patient remains free of falls. They are wearing nonskid footwear, ambulating with wheelchair and wearing a wristband with visible indication of fall risk. Patient verbalizes understanding of individual fall risks and does request steadying assist when needed.

## 2021-09-21 NOTE — GROUP NOTE
Group Therapy Note    Date: 9/21/2021    Group Start Time: 1100  Group End Time: 1150  Group Topic: Cognitive Skills    MALENA Orr, CTRS        Group Therapy Note    Attendees: 9/22    patient refused to attend  triggers to anxiety group at [phone] after encouragement from staff. 1:1 talk time provided as alternative to group session.           Signature:  Hailee Orr, [address]

## 2021-09-21 NOTE — PROGRESS NOTES
Behavioral Services  Medicare Certification Upon Admission    I certify that this patient's inpatient psychiatric hospital admission is medically necessary for:    [x] (1) Treatment which could reasonably be expected to improve this patient's condition,       [x] (2) Or for diagnostic study;     AND     [x](2) The inpatient psychiatric services are provided while the individual is under the care of a physician and are included in the individualized plan of care.     Estimated length of stay/service 4 to 7 days    Plan for post-hospital care extended care facility    Electronically signed by Дмитрий Mazariegos MD on 9/21/2021 at 1:24 PM

## 2021-09-21 NOTE — PLAN OF CARE
[facility]  Initial Interdisciplinary Treatment Plan NO      Original treatment plan Date & Time:9/21/21    Admission Type:  Admission Type: Voluntary    Reason for admission:   Reason for Admission: SI to OD on Pills    Estimated Length of Stay:  5-7days  Estimated Discharge Date: to be determined by physician    PATIENT STRENGTHS:  Patient Strengths:Strengths: Communication  Patient Strengths and Limitations:Limitations: Perceives need for assistance with self-care  Addictive Behavior: Addictive Behavior  In the past 3 months, have you felt or has someone told you that you have a problem with:  : None  Do you have a history of Chemical Use?: No  Do you have a history of Alcohol Use?: No  Do you have a history of Street Drug Abuse?: No  Histroy of Prescripton Drug Abuse?: No  Medical Problems:  Past Medical History:   Diagnosis Date    Arthritis     CHF (congestive heart failure) ([facility])     [facility]     Diverticulitis     Hx of blood clots     Pulmonary embolism ([facility])      Status EXAM:Status and Exam  Normal: No  Facial Expression: Expressionless, Flat, Sad  Affect: Appropriate, Constricted  Level of Consciousness: Alert  Mood:Normal: No  Mood: Depressed, Anxious, Sad  Motor Activity:Normal: No  Motor Activity: Decreased  Interview Behavior: Evasive  Preception: [place] to Person, Shazia Fickle to Time, [place] to Place, [place] to Situation  Attention:Normal: No  Attention: Unable to Concentrate  Thought Processes: Circumstantial, Blocking  Thought Content:Normal: No  Thought Content: Preoccupations  Hallucinations: None  Delusions: No  Memory:Normal: Yes  Insight and Judgment: No  Insight and Judgment: Poor Judgment, Poor Insight  Present Suicidal Ideation: Yes  Present Homicidal Ideation: No    EDUCATION:   Learner Progress Toward Treatment Goals: reviewed group plans and strategies for care    Method:group therapy, medication compliance, individualized assessments and care planning    Outcome: needs reinforcement    PATIENT GOALS: to be discussed with patient within 72 hours    PLAN/TREATMENT RECOMMENDATIONS:     continue group therapy , medications compliance, goal setting, individualized assessments and care, continue to monitor pt on unit      SHORT-TERM GOALS:   Time frame for Short-Term Goals: 5-7 days    LONG-TERM GOALS:  Time frame for Long-Term Goals: 6 months  Members Present in Team Meeting: See Signature Sheet    Mckayla Talavera, [address]

## 2021-09-21 NOTE — CARE COORDINATION
BHI Biopsychosocial Assessment    Current Level of Psychosocial Functioning     Independent xx  Dependent    Minimal Assist     Comments:    Psychosocial High Risk Factors (check all that apply)    Unable to obtain meds   Chronic illness/pain   xx  Substance abuse   Lack of Family Support  xx  Financial stress xx  Isolation   Inadequate Community Resources  Suicide attempt(s)  Not taking medications   Victim of crime   Developmental Delay  Unable to manage personal needs    Age 72 or older   Homeless  No transportation   Readmission within 30 days  Unemployment  Traumatic Event    Comments:   Psychiatric Advanced Directives: pt denies     Family to Involve in Treatment: pt denies having family support     Sexual Orientation:  N/A    Patient Strengths: pt receives social security benefits    Patient Barriers: pt has her own home but states it is infested with bed bugs, pt has physical health concerns which limit her ability to care for herself, pt states she has not been following up with outpatient mental health services. Opiate Education Provided:  Pt denies       [facility]/mental health history: Pt referred to [facility] after previous hospitalization, pt states she did not followup with agency after discharge    Plan of Care   medication management, group/individual therapies, family meetings, psycho -education, treatment team meetings to assist with stabilization    Initial Discharge Plan:  Pt states she does not want to return home to her home, requests assist with discharge to nursing home       Clinical Summary:  Ola Scheuermann is a 64year old single female who has been admitted to [place] [address], pt chart reflects patient came to emergency department with physical health complaints, pt met with medical team and when discharge was discussed pt expressed suicidal ideation with plan to overdose on her medications if she went home, was then admitted to [facility].  Pt is seen in her room this date for assessment, pt reports she feels lethargic and is guarded during assessment. Pt was last seen in [facility] 5/2021 and returned home at discharge, SW team put home health resources in place, pt states she did not follow with home health or outpatient mental health services. Pt reports her home is infested with bed bugs, states she does not plan to return there; SW/pt discussed alternate discharge, pt reports she wishes to discharge to nursing facility, states she has been at nursing facility in the past, states the facility was in [place] but does not remember the name, pt states she has no preference on which ECF. SW to completed/file PASRR to begin placement process.

## 2021-09-21 NOTE — PLAN OF CARE
Problem: Altered Mood, Depressive Behavior:  Goal: Ability to disclose and discuss suicidal ideas will improve  Description: Ability to disclose and discuss suicidal ideas will improve  9/21/2021 1152 by Sully Munoz  Outcome: Ongoing  Note: Patient is accepting of 1:1 talk time with staff. Patient admits to depression and anxiety. Patient is isolative to self and room. Patient is eating and sleeping well. Patient is helpless and hopeless. Patient does not attend unit programming. Patient is medication compliant. Q15 minute checks maintained. Patient remains safe at this time. Q15 minute checks maintained. Patient remains safe at this time. Problem: Altered Mood, Depressive Behavior:  Goal: Absence of self-harm  Description: Absence of self-harm  9/21/2021 1152 by Sully Munoz  Outcome: Ongoing  Note: No self harm behaviors noted. Patient admits to fleeting SI with no plan and verbally agrees to approach staff if thoughts of self harm arises. Reassurance and support provided. Q15 minute checks maintained. Patient remains safe at this time.

## 2021-09-21 NOTE — H&P
improves. Currently Jesus Elmore endorses low mood with significant anhedonia poor sleep and a sense of worthlessness related to this infestation. She has poor motivation, difficulty with concentration and substantial psychomotor slowing. She reports that the symptoms have been present for greater than a 2-week. Almost all day every day and she was feeling increased suicidal ideation prior to reporting to the emergency room. Patient denies symptoms of luis including increased goal-directed activity with decreased need for sleep elevated mood or rapid speech. She denies auditory or visual hallucinations or concerns that people are watching or talking about her. She does endorse symptoms of anxiety and rates at 7/10 currently on a 0-10 scale with 10 being the worst.  She denies panic attacks or intrusive or persistent thoughts that are relieved by repetitive behaviors. She denies a history of trauma or symptoms of PTSD. She denies phobias, fear of abandonment objection or utilizing self damaging behaviors as coping mechanism. She denies a forensic history including aggression or violence towards others. Jesus Elmore does have a bedside commode available as well as a walker. She reports that she has a wheelchair to use at home due to difficulty with ambulation and weakness however her home is so small that it is not practical.  She reports chronic pain particularly to her right hip and bilateral knees. At this time it is clear that she is unable to care for self she is requested that her meals be brought to bedside and nursing team is in agreement. Internal medicine consult has been submitted stat as patient is extremely uncomfortable related to condition of skin. Discussed medications and patient denies that she utilized duloxetine feeling that it was ineffective.   She has requested to restart Paxil and after exploring risks versus benefits and alternatives with both this author and attending physician it is mutually agreed to reinitiate this medication. History of head trauma: [] Yes [x] No    History of seizures: [] Yes [x] No    History of violence or aggression: [] Yes [x] No         PSYCHIATRIC HISTORY:  [] Yes [] No    Currently follows with no community provider  Denies lifetime suicide attempts  Prior psychiatric hospital admission [facility] 5/21/2021    Past psychiatric medications includes  Paxil and Cymbalta    Adverse reactions from psychotropic medications: [] Yes [x] No         Lifetime Psychiatric Review of Systems         Depression: Endorses     Anxiety: Endorses     Panic Attacks: Denies     Darlin or Hypomania: Denies     Phobias: Denies     Obsessions and Compulsions: Denies     Body or Vocal Tics: Denies     Visual Hallucinations: Denies     Auditory Hallucinations: Denies     Delusions/Paranoia: Denies     PTSD: Denies    Past Medical History:        Diagnosis Date    Arthritis     CHF (congestive heart failure) (HCC)     COPD [facility] obstructive [facility] disease) [facility]     Diverticulitis     Hx of blood clots     Pulmonary embolism ([facility]        Past Surgical History:        Procedure Laterality Date    ANKLE SURGERY      COLON SURGERY      FOOT SURGERY Left 1/22/2021    FOOT BONE BIOPSY W/ INCISION & DRAINAGE AND REDRESSING ON RIGHT FOOT performed by Leonila Pineda DPM at [address] [facility] [address]         Allergies:  Patient has no known allergies. Social History:     Born in: [place], [place]  Family: Reports she lived up [facility] for 1 year and moved to [place]. Reports she grew up with her mother and father initially and then her parents were . She then moved to [place] and lived with her dad. She has five siblings and reports having one brother and four sisters. 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   Marital Status: Single, never   Children: No children  Residence: Lives in a house with her boyfriend \"Maximilian\"  Stressors: Patient identifies stressors as \"everything\" and that she is unable to care for self and \"sits in the shity place all day \". Offers that her boyfriend is more mobile and he is able to get out and away from the bedbug infestation  Patient Assets/Supportive Factors: Patient's boyfriend and patient's boyfriend's brother       DRUG USE HISTORY  Social History     Tobacco Use   Smoking Status Current Every Day Smoker    Packs/day: 0.25    Years: 45.00    Pack years: 11.25   Smokeless Tobacco Never Used   Tobacco Comment    has not smoked in two months     Social History     Substance and Sexual Activity   Alcohol Use Not Currently    Comment: 1-2 times per week     Social History     Substance and Sexual Activity   Drug Use Not Currently       Denies alcohol or illicit drug use currently         LEGAL HISTORY:   HISTORY OF INCARCERATION: [x] Yes [] No alf time related to DUI      family History:       Problem Relation Age of Onset    Cancer Mother     Diabetes Paternal Grandfather        Psychiatric Family History  Patient endorses psychiatric family history but states that no one talks about it.      Suicides in family: [] Yes [x] No    Substance use in family: [x] Yes [] No alcoholism       PHYSICAL EXAM:  Vitals:  BP (!) 139/54   Pulse 91   Temp 97.7 °F (36.5 °C) (Oral)   Resp 14   Ht 5' 7\" (1.702 m)   Wt (!) 350 lb (158.8 kg)   SpO2 96%   BMI 54.82 kg/m²     LABS:  Labs reviewed: [x] Yes    RBC 3.80, hemoglobin 11.6, hematocrit 35.7, RDW 15.2, platelets 264, segs Neut 68, lymphocytes 14, eosinophils 10%, absolute EOS 0.90, glucose 110, total bili 0.24  D-dimer 3.51      last EKG in EMR reviewed: [x] Yes   NSR, LAFB, incomplete RBBB, prolonged QT, nonspecific changes, no acute ischemic changes on ST segments, no change compared to old, normal rate and normal intervals except qtc 493       Review of Systems   Constitutional: Negative for chills and weight loss.   HENT: Negative for ear pain and nosebleeds. Eyes: Negative for blurred vision and photophobia. Respiratory: Negative for cough, shortness of breath and wheezing. Cardiovascular: Negative for chest pain and palpitations. Peripheral edema  Gastrointestinal: Negative for abdominal pain, diarrhea and vomiting. Genitourinary: Negative for dysuria and urgency. Musculoskeletal: Negative for falls. Chronic joint pain  Skin: Positive for itching and rash full body related to insect infestation. Neurological: Negative for tremors, seizures. Positive weakness and difficulty with gait and stability. Endo/Heme/Allergies: Does not bruise/bleed easily. Physical Exam:   Constitutional:   Obese,  acute distress related to skin condition; insect bites with substantial excoriation throughout. HENT:   Head: Normocephalic and atraumatic. Eyes: Conjunctivae are normal. Right eye exhibits no discharge. Left eye exhibits no discharge. No scleral icterus. Neck: Normal range of motion. Neck supple. Pulmonary/Chest:  No respiratory distress or accessory muscle use, no wheezing. Cardiac: Regular rate and rhythm. Abdominal: Soft. Non-tender. Exhibits no distension. Musculoskeletal: Normal range of motion. Exhibits no edema. Neurological: cranial nerves II-XII grossly in tact. Unstable gait as noted  Skin: Skin is warm and dry. Patient is not diaphoretic. Insect bites throughout body    Mental Status Examination:    Level of consciousness: Awake and alert  Appearance:  Appropriate attire, resting in bed, fair grooming   Behavior/Motor: Approachable, no psychomotor abnormalities noted  Attitude toward examiner:  Cooperative, attentive, good eye contact  Speech: Normal rate, volume, and tone. Mood: Depressed  Affect:  Blunted  Thought processes:  Goal directed, linear  Thought content: Active suicidal ideations, without current plan or intent, contracts for safety on the unit.                Denies homicidal ideations               Denies hallucinations              Denies delusions              Denies paranoia  Cognition:  Oriented to self, location, time, situation  Concentration: Clinically adequate  Memory: Intact  Insight &Judgment: Poor         DSM-5 Diagnosis    Principal Problem: Major depressive disorder, recurrent, severe without psychotic behavior ([facility]    Psychosocial and Contextual factors:  Financial   Occupational   Relationship   Legal   Living situation X  Educational     Past Medical History:   Diagnosis Date    Arthritis     CHF (congestive heart failure) ([facility]     COPD (chronic obstructive pulmonary disease) ([facility]     Diverticulitis     Hx of blood clots     Pulmonary embolism ([facility]         TREATMENT PLAN    Continue inpatient psychiatric treatment  Start Paxil 10 mg daily. Home medications reviewed. Problem list updated. Discussion with social work and treatment team to move forward with patient's request for ECF placement  Monitor need and frequency of PRN medications. Attempt to develop insight. Follow-up daily while inpatient. Reviewed risks and benefits as well as potential side effects with patient. CONSULTS [x] Yes [] No  Internal medicine stat's full skin assessment and treatment of incident bites and abnormal lab values   PT evaluation  OT evaluation    Risk Management: close watch per standard protocol      Psychotherapy: participation in milieu and group and individual sessions with Attending Physician,  and Physician Assistant/CNP      Estimated length of stay:  2-14 days      GENERAL PATIENT/FAMILY EDUCATION  Patient will understand basic signs and symptoms, patient will understand benefits/risks and potential side effects from proposed medications, and patient will understand their role in recovery. Family is active in patient's care.    Patient assets that may be helpful during treatment include: Intent to participate and engage in treatment, sufficient fund of knowledge and intellect to understand and utilize treatments. Goals:    1) Remission of suicidal ideation  2) Stabilization of symptoms prior to discharge. 3) Establish efficacy and tolerability of medications. Behavioral Services  Medicare Certification     Admission Day 1  I certify that this patient's inpatient psychiatric hospital admission is medically necessary for:    x (1) treatment which could reasonably be expected to improve this patient's condition, or    x (2) diagnostic study or its equivalent. Time Spent:60 minutes    Peyman Garibay is a 64 y.o. female being evaluated face to face    --KIRK Valdez CNP on 9/21/2021 at 12:17 PM    An electronic signature was used to authenticate this note. I independently saw and evaluated the patient. I reviewed the nurse practitioners documentation above. Any additional comments or changes to the nurse practitioners documentation are stated below otherwise agree with assessment. Plan will be as follows:  Time spent: 60 minutes in face-to-face, review of records and discussion of treatment plan. 80-year-old female who presented to the ER. Stated clearly she had a plan and intention of returning home and overdosing on her medications in an effort to kill herself. Overwhelmed by life stressors and social situation and reports deteriorating mood over several weeks culminating in her intention to end her life. At present time we discussed risk benefits and alternatives to treatment. Last time she was on Cymbalta and stated she stopped this medication because she did not like the way it made her feel after the first couple weeks.   Reports she did well with Paxil in the past.  I did have a discussion of risk benefits and alternatives including the anticholinergic risks of Paxil however patient accepts those risks and indicating that because the medication has worked well for her in the past she would like to trial that medication. We are agreeable to titrate that medication back up over her inpatient stay. We will try to minimize any other anticholinergic medications. Medicine consult for extensive excoriations and wounds on her skin.       Electronically signed by Berta Jacobson MD on 9/21/2021 at 1:24 PM

## 2021-09-21 NOTE — GROUP NOTE
Group Therapy Note    Date: 9/21/2021    Group Start Time: 1330  Group End Time: 1430  Group Topic: Recreational    [id number] [address], [address]        Group Therapy Note    Attendees: 8/20        patient refused to attend recreation and leisure  group at [phone] after encouragement from staff. 1:1 talk time provided as alternative to group session.          Signature:  Miguel Angel Velasco, [address]

## 2021-09-22 LAB
BNP INTERPRETATION: ABNORMAL
PRO-BNP: 682 PG/ML

## 2021-09-22 PROCEDURE — 97166 OT EVAL MOD COMPLEX 45 MIN: CPT

## 2021-09-22 PROCEDURE — 36415 COLL VENOUS BLD VENIPUNCTURE: CPT

## 2021-09-22 PROCEDURE — 99232 SBSQ HOSP IP/OBS MODERATE 35: CPT | Performed by: PSYCHIATRY & NEUROLOGY

## 2021-09-22 PROCEDURE — APPSS30 APP SPLIT SHARED TIME 16-30 MINUTES: Performed by: PSYCHIATRY & NEUROLOGY

## 2021-09-22 PROCEDURE — 6370000000 HC RX 637 (ALT 250 FOR IP): Performed by: PSYCHIATRY & NEUROLOGY

## 2021-09-22 PROCEDURE — 97162 PT EVAL MOD COMPLEX 30 MIN: CPT

## 2021-09-22 PROCEDURE — 1240000000 HC EMOTIONAL WELLNESS R&B

## 2021-09-22 PROCEDURE — 83880 ASSAY OF NATRIURETIC PEPTIDE: CPT

## 2021-09-22 PROCEDURE — 99223 1ST HOSP IP/OBS HIGH 75: CPT | Performed by: INTERNAL MEDICINE

## 2021-09-22 PROCEDURE — 6370000000 HC RX 637 (ALT 250 FOR IP): Performed by: INTERNAL MEDICINE

## 2021-09-22 RX ORDER — PERMETHRIN 50 MG/G
CREAM TOPICAL ONCE
Status: COMPLETED | OUTPATIENT
Start: 2021-09-22 | End: 2021-09-22

## 2021-09-22 RX ORDER — CEFUROXIME AXETIL 250 MG/1
500 TABLET ORAL EVERY 12 HOURS SCHEDULED
Status: COMPLETED | OUTPATIENT
Start: 2021-09-22 | End: 2021-09-28

## 2021-09-22 RX ORDER — PAROXETINE HYDROCHLORIDE 20 MG/1
20 TABLET, FILM COATED ORAL DAILY
Status: DISCONTINUED | OUTPATIENT
Start: 2021-09-23 | End: 2021-09-26

## 2021-09-22 RX ORDER — FUROSEMIDE 20 MG/1
20 TABLET ORAL DAILY
Status: DISCONTINUED | OUTPATIENT
Start: 2021-09-22 | End: 2021-09-30 | Stop reason: HOSPADM

## 2021-09-22 RX ADMIN — PAROXETINE HYDROCHLORIDE 10 MG: 10 TABLET, FILM COATED ORAL at 11:50

## 2021-09-22 RX ADMIN — HYDROXYZINE HYDROCHLORIDE 50 MG: 50 TABLET, FILM COATED ORAL at 21:20

## 2021-09-22 RX ADMIN — CEFUROXIME AXETIL 500 MG: 250 TABLET ORAL at 11:50

## 2021-09-22 RX ADMIN — FUROSEMIDE 20 MG: 20 TABLET ORAL at 11:50

## 2021-09-22 RX ADMIN — IBUPROFEN 400 MG: 400 TABLET, FILM COATED ORAL at 21:20

## 2021-09-22 RX ADMIN — APIXABAN 2.5 MG: 2.5 TABLET, FILM COATED ORAL at 11:50

## 2021-09-22 RX ADMIN — TRAZODONE HYDROCHLORIDE 50 MG: 50 TABLET ORAL at 21:20

## 2021-09-22 RX ADMIN — APIXABAN 2.5 MG: 2.5 TABLET, FILM COATED ORAL at 21:21

## 2021-09-22 RX ADMIN — CEFUROXIME AXETIL 500 MG: 250 TABLET ORAL at 21:20

## 2021-09-22 RX ADMIN — PERMETHRIN: 50 CREAM TOPICAL at 21:22

## 2021-09-22 ASSESSMENT — PAIN DESCRIPTION - PROGRESSION
CLINICAL_PROGRESSION: NOT CHANGED
CLINICAL_PROGRESSION: NOT CHANGED

## 2021-09-22 ASSESSMENT — PAIN - FUNCTIONAL ASSESSMENT: PAIN_FUNCTIONAL_ASSESSMENT: PREVENTS OR INTERFERES SOME ACTIVE ACTIVITIES AND ADLS

## 2021-09-22 ASSESSMENT — PAIN SCALES - GENERAL
PAINLEVEL_OUTOF10: 3
PAINLEVEL_OUTOF10: 8
PAINLEVEL_OUTOF10: 8
PAINLEVEL_OUTOF10: 4

## 2021-09-22 ASSESSMENT — PAIN DESCRIPTION - PAIN TYPE
TYPE: CHRONIC PAIN
TYPE: CHRONIC PAIN

## 2021-09-22 ASSESSMENT — PAIN DESCRIPTION - LOCATION
LOCATION: GENERALIZED
LOCATION: HIP;KNEE;GENERALIZED
LOCATION: GENERALIZED
LOCATION: HIP;KNEE;GENERALIZED

## 2021-09-22 ASSESSMENT — PAIN DESCRIPTION - ORIENTATION
ORIENTATION: RIGHT
ORIENTATION: RIGHT

## 2021-09-22 ASSESSMENT — PAIN DESCRIPTION - ONSET: ONSET: ON-GOING

## 2021-09-22 ASSESSMENT — PAIN DESCRIPTION - FREQUENCY
FREQUENCY: CONTINUOUS
FREQUENCY: CONTINUOUS

## 2021-09-22 ASSESSMENT — PAIN DESCRIPTION - DESCRIPTORS
DESCRIPTORS: ACHING
DESCRIPTORS: ACHING

## 2021-09-22 NOTE — BH NOTE
Patient did not want writer to complete a full head to toe physical assessment.  Patient stated she has been \"very tired\" today and would like to rest.

## 2021-09-22 NOTE — BH NOTE
Writer re-approached patient about shower and patient again refused stating, \"I'm just too tired right now. I'll do it later. \"

## 2021-09-22 NOTE — GROUP NOTE
Group Therapy Note    Date: 9/22/2021    Group Start Time: 6499  Group End Time: 1403  Group Topic: Cognitive Skills    [address], [facility]        Group Therapy Note    Attendees: 8/21      patient refused to attend aromatherapy  group at [id number] after encouragement from staff. 1:1 talk time provided as alternative to group session.             Signature:  Wanda Bain [place]

## 2021-09-22 NOTE — PROGRESS NOTES
Physical Therapy    Facility/Department: [facility]  Initial Assessment    NAME: Shakira Ellington  : 1960  MRN: [id number]    Date of Service: 2021    Discharge Recommendations:  Patient would benefit from continued therapy after discharge        Assessment   Body structures, Functions, Activity limitations: Decreased functional mobility ; Decreased safe awareness;Decreased balance;Decreased strength;Decreased posture;Decreased endurance  Assessment: Pt presents with suicidal ideations and poor living conditions at home. Pt has large areas of wounds and scratches, not fully observed. Will cont per POC to prepare for d/c. Treatment Diagnosis: impaired mobiltiy d/t depressive d/o  Specific instructions for Next Treatment: check with Nsg re: possible isolation precautions  Prognosis: Fair  Decision Making: Medium Complexity  History: Shakira Ellington is a 64 y.o. female who presents to the emergency department via EMS for evaluation of leg swelling x 2-3 weeks. Pt states her legs are now weeping fluid they are so swollen. She reports a wound over her left heel that was bleeding 1 week ago. She denies any pain in her legs. Denies fever, chills, nausea, emesis, abd pain, chest pain, sob, cough. Pt also states they her home is infested with bed bugs. She reports bites all over her body. States she does not want to go back home. Pt does not have a PCP. States she has not taken any of her medications for several months. No other complaints. Exam: ROM, social hx, balance, functional mobility  Clinical Presentation: uncomfortable  PT Education: PT Role;Plan of Care;General Safety;Goals  Barriers to Learning: none  REQUIRES PT FOLLOW UP: Yes  Activity Tolerance  Activity Tolerance: Patient limited by endurance; Patient limited by pain       Patient Diagnosis(es): The primary encounter diagnosis was Peripheral edema.  Diagnoses of Infestation by bed bug, Ulcer of left foot, limited to breakdown of skin ([address] and Suicidal behavior without attempted self-injury were also pertinent to this visit. has a past medical history of Arthritis, CHF (congestive heart failure) ([facility] COPD (chronic obstructive pulmonary disease) ([facility] Diverticulitis, Hx of blood clots, and Pulmonary embolism ([facility] has a past surgical history that includes Ankle surgery; Colon surgery; hernia repair; and Foot surgery (Left, 1/22/2021). Restrictions  Restrictions/Precautions  Restrictions/Precautions: Fall Risk  Required Braces or Orthoses?: No  Position Activity Restriction  Other position/activity restrictions: active bed bug infestation per RN and CNP on unit. Pt is not currently in isolation. Vision/Hearing  Vision: Impaired  Vision Exceptions: Wears glasses at all times  Hearing: Within functional limits     Subjective  General  Chart Reviewed: Yes  Patient assessed for rehabilitation services?: Yes  Additional Pertinent Hx: 42-year-old  lady morbidly obese history of diastolic congestive heart failureHistory of COPD with obesity hypoventilation syndrome possible sleep apneaDiffuse skin excoriation both arms and legs patient is foul-smellingPossible bedbugs versus scabiesOrder for bath to be given to patient and then apply permethrin cream head to toeWound and stoma consult to evaluate for decubitus ulcerGiven patient body weight and bedbound at high risk of DVT and PE will start Eliquis 2.5 mg twice a day  Response To Previous Treatment: Not applicable  Family / Caregiver Present: No  Diagnosis: major depressive disorder  Follows Commands: Within Functional Limits  General Comment  Comments: RN ok's session, states live bed bugs were found yesterday but pt is not currently in isolation. Spoke with CNP on unit who plans to determine if isolation is appropriate. Subjective  Subjective: Pt is resting in bed and agreeable to PT/OT eval. Pt states \"I'm not going home\" when discussing home set up.   Pain Screening  Patient Currently in Pain: Yes  Pain Assessment  Pain Assessment: 0-10  Pain Level: 8  Pain Type: Chronic pain  Pain Location: Hip;Knee;Generalized  Pain Orientation: Right  Pain Radiating Towards: R hip, Bilat knees, pt states \"everywhere\"  Pain Descriptors: Aching  Pain Frequency: Continuous  Pain Onset: On-going  Clinical Progression: Not changed  Functional Pain Assessment: Prevents or interferes some active activities and ADLs  Vital Signs  Patient Currently in Pain: Yes  Pre Treatment Pain Screening  Intervention List: Patient able to continue with treatment  Comments / Details: c/o generalized severe itching    Orientation  Orientation  Overall Orientation Status: Within Functional Limits  Social/Functional History  Social/Functional History  Lives With: Significant other  Type of Home: House  Home Layout: One level  Home Access: Stairs to enter without rails  Entrance Stairs - Number of Steps: 2-3 platform steps, pt uses RW  Bathroom Shower/Tub: Tub/Shower unit (pt does not take showers)  Bathroom Toilet: Standard  Bathroom Equipment: Shower chair  Bathroom Accessibility: Accessible  Home Equipment: [employer], Rolling walker  ADL Assistance: Independent (does not shower)  Homemaking Assistance: Independent  Ambulation Assistance: Independent (with RW)  Transfer Assistance: Independent (with RW)  Active : No  Patient's  Info: Friend or medical cab  IADL Comments: Per prior PT eval, Boyfriend takes care of IADLs  Additional Comments: Pt states she plans to go to SNF at d/c. Pt was in SNF for ~2 months earlier this year. Pt states her boyfriend does not assist her and has poor living conditions and is unable to return at this time. Cognition        Objective     Observation/Palpation  Posture: Poor (severe fwd flex while standing)  Observation: generalized areas of wounds, scabs of varying age, and bleeding in extremities and buttocks- trunk not observed.  Large body habitus    AROM RLE (degrees)  RLE General AROM: grossly WFL  AROM LLE (degrees)  LLE General AROM: grossly WFL except noted to lack terminal knee ext  AROM RUE (degrees)  RUE General AROM: per OT  AROM LUE (degrees)  LUE General AROM: per OT  Strength RLE  Comment: grossly 3/5  Strength LLE  Comment: grossly 3/5  Strength RUE  Comment: per OT  Strength LUE  Comment: per OT     Sensation  Overall Sensation Status: Impaired  Light Touch: Partial deficits in the LLE;Partial deficits in the RLE (neuropathy in feet)  Bed mobility  Supine to Sit: Stand by assistance  Sit to Supine: Stand by assistance  Comment: HOB up partially  Transfers  Sit to Stand: Stand by assistance (with RW)  Stand to sit: Stand by assistance (with RW)  Comment: Pt states \"Do not touch me\" when attempting to maintain CGA.  Pt noted to have significant UE reliance on RW, severe fwd trunk flex when standing  Ambulation  Ambulation?: Yes  More Ambulation?: No  Ambulation 1  Surface: level tile  Device: Rolling Walker  Assistance: Stand by assistance  Quality of Gait: wide DUNIA, severe trunk flex with lean onto RW  Gait Deviations: Shuffles  Distance: 3 ft x2, bed<>commode  Stairs/Curb  Stairs?: No     Balance  Posture: Poor  Sitting - Static: Fair  Sitting - Dynamic: Fair  Standing - Static: Fair;-  Standing - Dynamic: Fair;-  Comments: with RW        Plan   Plan  Times per week: 3x/wk  Times per day: Daily  Specific instructions for Next Treatment: check with Nsg re: possible isolation precautions  Current Treatment Recommendations: Strengthening, Transfer Training, Endurance Training, Patient/Caregiver Education & Training, Balance Training, Functional Mobility Training, Safety Education & Training, Positioning, Gait Training  Safety Devices  Type of devices: Patient at risk for falls, Left in bed, Call light within reach, Nurse notified    G-Code       OutComes Score                                                  AM-PAC Score  AM-PAC Inpatient Mobility Raw Score : 14 (09/22/21 1155)  AM-PAC Inpatient T-Scale Score : 38.1 (09/22/21 1155)  Mobility Inpatient CMS 0-100% Score: 61.29 (09/22/21 1155)  Mobility Inpatient CMS G-Code Modifier : CL (09/22/21 1155)          Goals  Short term goals  Time Frame for Short term goals: 2 weeks  Short term goal 1: Pt will increase LE strength to [facility]  Short term goal 2: Pt will perform bed mobilty IND  Short term goal 3: Pt will transfer with RW MOD IND  Short term goal 4: Pt will ambulate with RW MOD IND 20 ft  Short term goal 5: Pt will improve standing balance to good to increase safety  Patient Goals   Patient goals : Go to SNF       Therapy Time   Individual Concurrent Group Co-treatment   Time In       5006   Time Out       1014   Minutes       [id number] Xiang Arshad, PT

## 2021-09-22 NOTE — BH NOTE
provider notified of best practice advisory suggesting to place patient on suicide precautions. Provider to discontinue the order as patient does not meet criteria for suicide precautions at this time. Continue to observe patient on every 15 minute checks.

## 2021-09-22 NOTE — BH NOTE
Writer approached patient and asked if she wanted to eat lunch, and patient stated, \"No, I'm not really hungry right now. \" Writer asked if patient would like to get up and shower after lunch is done and patient stated, \"I'm really tired today, maybe we can try later. \" Shannan Reynolds will re-approach later this afternoon.

## 2021-09-22 NOTE — PROGRESS NOTES
[address]   Occupational Therapy Evaluation  Date: 21  Patient Name: Amrita Mckenna       Room: 3378/6697-37  MRN: [id number]  Account: [de-identified]   : 1960  (64 y.o.) Gender: female     Discharge Recommendations:  Further Occupational Therapy is recommended upon facility discharge. Equipment Needed:  (TBD)    Referring Practitioner: KIRK Infante CNP  Diagnosis: major depressive disorder  Additional Pertinent Hx:  72-year-old  lady morbidly obese history of diastolic congestive heart failureHistory of COPD with obesity hypoventilation syndrome possible sleep apneaDiffuse skin excoriation both arms and legs patient is foul-smellingPossible bedbugs versus scabiesOrder for bath to be given to patient and then apply permethrin cream head to toeWound and stoma consult to evaluate for decubitus ulcerGiven patient body weight and bedbound at high risk of DVT and PE will start Eliquis 2.5 mg twice a day    Treatment Diagnosis: Impaired self care status  Past Medical History:  has a past medical history of Arthritis, CHF (congestive heart failure) ([address] COPD (chronic obstructive pulmonary disease) ([facility] Diverticulitis, Hx of blood clots, and Pulmonary embolism ([facility] Past Surgical History:   has a past surgical history that includes Ankle surgery; Colon surgery; hernia repair; and Foot surgery (Left, 2021). Restrictions  Restrictions/Precautions: Fall Risk  Other position/activity restrictions: active bed bug infestation per RN and CNP on unit. Pt is not currently in isolation.   Required Braces or Orthoses?: No     Vitals  Temp: 97.9 °F (36.6 °C)  Pulse: 92  Resp: 14  BP: 120/61  Height: 5' 7\" (170.2 cm)  Weight: (!) 350 lb (158.8 kg)  BMI (Calculated): 54.9  Oxygen Therapy  SpO2: 96 %  O2 Device: None (Room air)  Level of Consciousness: Alert (0)    Subjective  Subjective: Pt is resting in bed upon arrival. pt was agreeable to OT/PT eval.   Comments: Ok per assistance. Unsafe. Toilet Transfers  Toilet - Technique: Ambulating  Equipment Used: Extra wide bedside commode  Toilet Transfer: Stand by assistance  Toilet Transfers Comments: Pt unsafe with transfers. Pt denies assistance with transfers. Functional Activity Tolerance  Functional Activity Tolerance: Tolerates 30 min exercise with multiple rests   Assessment  Performance deficits / Impairments: Decreased ADL status, Decreased functional mobility , Decreased ROM, Decreased strength, Decreased safe awareness, Decreased endurance, Decreased balance, Decreased high-level IADLs  Treatment Diagnosis: Impaired self care status  Prognosis: Fair  Decision Making: Medium Complexity  REQUIRES OT FOLLOW UP: Yes  Discharge Recommendations: Patient would benefit from continued therapy after discharge  Activity Tolerance: Patient limited by pain         Functional Outcome Measures  AM-PAC Daily Activity Inpatient   How much help for putting on and taking off regular lower body clothing?: Total  How much help for Bathing?: A Lot  How much help for Toileting?: A Lot  How much help for putting on and taking off regular upper body clothing?: A Lot  How much help for taking care of personal grooming?: A Little  How much help for eating meals?: A Little  [facility] Inpatient Daily Activity Raw Score: 13  AM-PAC Inpatient ADL T-Scale Score : 32.03  ADL Inpatient CMS 0-100% Score: 63.03  ADL Inpatient CMS G-Code Modifier : CL       Goals  Patient Goals   Patient goals :  To go to rehab  Short term goals  Time Frame for Short term goals: By discharge  Short term goal 1: Pt will complete lower body dressing/bathing with min A and good safety with use of AE  Short term goal 2: Pt will complete functional transfers/mobility during self care tasks with with mod I and good safety  Short term goal 3: Pt will tolerate standing 5+ minutes during functional activity of choice with good safety  Short term goal 4: Pt will verbalize/demonstrate good understanding of fall prevention strategies to increase safety and independence with self care tasks. Short term goal 5: Pt will participate in 15+ minutes of therapeutic exercises/functional activities to increase safety and independence with self care and mobility    Plan  Safety Devices  Safety Devices in place: Yes  Type of devices:  All fall risk precautions in place, Left in bed, Call light within reach, Nurse notified     Plan  Times per week: 2-3  Current Treatment Recommendations: Self-Care / ADL, Strengthening, ROM, Balance Training, Functional Mobility Training, Endurance Training, Pain Management, Safety Education & Training, Equipment Evaluation, Education, & procurement, Patient/Caregiver Education & Training, Home Management Training       Equipment Recommendations  Equipment Needed:  (TBD)  OT Individual Minutes  Time In: 4188  Time Out: 4162  Minutes: 19    Electronically signed by Mio Pedro OT on 9/22/21 at 2:27 PM EDT

## 2021-09-22 NOTE — CONSULTS
[facility]    CONSULTATION / HISTORY AND PHYSICAL EXAMINATION            Date:   9/22/2021  Patient name: Maranda Nathan  Date of admission:  9/20/2021 12:34 PM  MRN:   [id number]  Account:  [de-identified]  YOB: 1960  PCP:    KIRK Butler CNP  Room:   [address] [place] [address]  Code Status:    Full Code    Physician Requesting Consult: Aramis Tang MD    Reason for Consult:  medical management    Chief Complaint:     Chief Complaint   Patient presents with    Leg Swelling   Diffuse rash in arms and legs concern for bedbugs    History Obtained From:     Patient medical record nursing staff    History of Present Illness:     59-year-old lady with history of smoking history of COPD chronic  diastolic congestive heart failure  Bedbound suffering from depression admitted to mental health unit medical consult for diffuse skin excoriation congestive heart failure hypertension COPD  Denies any fever chills positive for depression positive for dyspnea positive for poor mobility positive for bedbugs    Past Medical History:     Past Medical History:   Diagnosis Date    Arthritis     [facility]) [facility]     COPD (chronic obstructive pulmonary disease) [facility]     Diverticulitis     Hx of blood clots     Pulmonary embolism ([facility]         Past Surgical History:     Past Surgical History:   Procedure Laterality Date    ANKLE SURGERY      COLON SURGERY      FOOT SURGERY Left 1/22/2021    FOOT BONE BIOPSY W/ INCISION & DRAINAGE AND REDRESSING ON RIGHT FOOT performed by Dayanara Owens DPM at [address] [place] [address] N          Medications Prior to Admission:     Prior to Admission medications    Medication Sig Start Date End Date Taking? Authorizing Provider   furosemide (LASIX) 20 MG tablet Take 1 tablet by mouth daily for 10 days 9/20/21 9/30/21 Yes Mi Oh PA-C        Allergies:     Patient has no known allergies.     Social History: Tobacco:    reports that she has been smoking. She has a 11.25 pack-year smoking history. She has never used smokeless tobacco.  Alcohol:      reports previous alcohol use. Drug Use:  reports previous drug use. Family History:     Family History   Problem Relation Age of Onset    Cancer Mother     Diabetes Paternal Grandfather        Review of Systems:     Positive and Negative as described in HPI. CONSTITUTIONAL:  negative for fevers, chills, sweats, fatigue, weight loss  HEENT:  negative for vision, hearing changes, runny nose, throat pain  RESPIRATORY:  negative for shortness of breath, cough, congestion, wheezing. CARDIOVASCULAR:  negative for chest pain, palpitations. GASTROINTESTINAL:  negative for nausea, vomiting, diarrhea, constipation, change in bowel habits, abdominal pain   GENITOURINARY:  negative for difficulty of urination, burning with urination, frequency   INTEGUMENT: Itching rash all legs and arms HEMATOLOGIC/LYMPHATIC:  negative for swelling/edema   ALLERGIC/IMMUNOLOGIC:  negative for urticaria , itching  ENDOCRINE:  negative increase in drinking, increase in urination, hot or cold intolerance  MUSCULOSKELETAL:  negative joint pains, muscle aches, swelling of joints  NEUROLOGICAL:  negative for headaches, dizziness, lightheadedness, numbness, pain, tingling extremities  BEHAVIOR/PSYCH: Depression tearful    Physical Exam:     /61   Pulse 92   Temp 97.9 °F (36.6 °C) (Oral)   Resp 14   Ht 5' 7\" (1.702 m)   Wt (!) 350 lb (158.8 kg)   SpO2 96%   BMI 54.82 kg/m²   Temp (24hrs), Av.9 °F (36.6 °C), Min:97.9 °F (36.6 °C), Max:97.9 °F (36.6 °C)    No results for input(s): POCGLU in the last 72 hours.   No intake or output data in the 24 hours ending 21 1111  Very poor personal hygiene foul-smelling morbidly obese  General Appearance:  alert, well appearing, and in no acute distress  Mental status: oriented to person, place, and time with normal affect  Head: normocephalic, atraumatic. Eye: no icterus, redness, pupils equal and reactive, extraocular eye movements intact, conjunctiva clear  Ear: normal external ear, no discharge, hearing intact  Nose:  no drainage noted  Mouth: mucous membranes moist  Neck: supple, no carotid bruits, thyroid not palpable  Lungs: Bilateral equal air entry, clear to ausculation, no wheezing, rales or rhonchi, normal effort  Cardiovascular: normal rate, regular rhythm, no murmur, gallop, rub. Abdomen: Soft, nontender, nondistended, normal bowel sounds, no hepatomegaly or splenomegaly  Neurologic: There are no new focal motor or sensory deficits, normal muscle tone and bulk, no abnormal sensation, normal speech, cranial nerves II through XII grossly intact  Skin: No gross lesions, rashes, bruising or bleeding on exposed skin area  Extremities:  peripheral pulses palpable, no pedal edema or calf pain with palpation  Diffuse skin excoriation noted in both upper and lower extremities  Psych: Flat affect    Investigations:      Laboratory Testing:  No results found for this or any previous visit (from the past 24 hour(s)).         Consultations:   IP CONSULT TO INTERNAL MEDICINE  Assessment :      Primary Problem  Major depressive disorder, recurrent, severe without psychotic behavior [facility])    Active Hospital Problems    Diagnosis Date Noted    Severe recurrent [facility] without psychotic features [facility] [F33.2] 09/21/2021    Major depressive disorder, recurrent, severe without psychotic behavior ([facility] [F33.2] 05/22/2021    Depression with suicidal ideation [F32.9, R45.851] 05/21/2021    Healing pressure ulcer, stage IV ([facility] [L89.94] 02/17/2021    Peripheral artery disease ([facility] [I73.9] 01/21/2021    Class 4 congestive heart failure, acute on chronic, systolic (HCC) [W05.25] 50/34/5788    Chronic obstructive pulmonary disease ([facility] [J44.9] 10/29/2019    Class 3 severe obesity with serious comorbidity and body mass index (BMI) of 50.0 to 59.9 in adult [facility]) [E66.01, Z68.43] 10/29/2019    Essential hypertension [I10] 10/29/2019    Chronic heart failure with preserved ejection fraction ([facility] [I50.32] 10/25/2019       Plan:     49-year-old  lady morbidly obese history of diastolic congestive heart failure  History of COPD with obesity hypoventilation syndrome possible sleep apnea  Diffuse skin excoriation both arms and legs patient is foul-smelling  Possible bedbugs versus scabies  Order for bath to be given to patient and then apply permethrin cream head to toe  Wound and stoma consult to evaluate for decubitus ulcer  Given patient body weight and bedbound at high risk of DVT and PE will start Eliquis 2.5 mg twice a day  Check proBNP Lasix 20 mg daily    Heaven Briggs MD  9/22/2021  11:11 AM    Copy sent to Dr. Iker Bennett, APRN - CNP    Please note that this chart was generated using voice recognition Dragon dictation software. Although every effort was made to ensure the accuracy of this automated transcription, some errors in transcription may have occurred.

## 2021-09-22 NOTE — PLAN OF CARE
[address] [place] [address]  Day 3 Interdisciplinary Treatment Plan NOTE    Review Date & Time: 9/22/21    Admission Type:   Admission Type: Voluntary    Reason for admission:  Reason for Admission: SI to OD on Pills  Estimated Length of Stay: 5-7 days  Estimated Discharge Date Update: to be determined by physician    PATIENT STRENGTHS:  Patient Strengths Strengths: Communication  Patient Strengths and Limitations:Limitations: Demonstrates discomfort with /lack of social skills  Addictive Behavior:Addictive Behavior  In the past 3 months, have you felt or has someone told you that you have a problem with:  : None  Do you have a history of Chemical Use?: No  Do you have a history of Alcohol Use?: No  Do you have a history of Street Drug Abuse?: No  Histroy of Prescripton Drug Abuse?: No  Medical Problems:  Past Medical History:   Diagnosis Date    Arthritis     CHF (congestive heart failure) ([facility])     COPD [facility]ch[facility] disease) [facility]     Diverticulitis     Hx of blood clots     Pulmonary embolism ([facility]        Risk:  Fall RiskTotal: 72  Ac Scale Ac Scale Score: 18  BVC Total: 0  Change in scores no Changes to plan of Care no    Status EXAM:   Status and Exam  Normal: No  Facial Expression: Expressionless  Affect: Constricted, Congruent  Level of Consciousness: Alert  Mood:Normal: No  Mood: Depressed, Anxious, Sad, Empty, Worthless, low self-esteem  Motor Activity:Normal: Yes  Motor Activity: Decreased  Interview Behavior: Evasive  Preception: Switchback to Person, Kunal Alstrom to Time, Switchback to Place, Switchback to Situation  Attention:Normal: No  Attention: Unable to Concentrate  Thought Processes: Circumstantial, Blocking  Thought Content:Normal: No  Thought Content: Preoccupations  Hallucinations: None  Delusions: No  Memory:Normal: Yes  Insight and Judgment: No  Insight and Judgment: Poor Judgment, Poor Insight  Present Suicidal Ideation: No  Present Homicidal Ideation: No    Daily Assessment Last Entry:   Daily Sleep (WDL): Within Defined Limits         Patient Currently in Pain: Yes  Daily Nutrition (WDL): Within Defined Limits    Patient Monitoring:  Frequency of Checks: 4 times per hour, close    Psychiatric Symptoms:   Depression Symptoms  Depression Symptoms: Isolative, Impaired concentration, Feelings of worthlessness, Change in energy level, Feelings of hopelessess  Anxiety Symptoms  Anxiety Symptoms: Generalized  Darlin Symptoms  Darlin Symptoms: No problems reported or observed. Psychosis Symptoms  Delusion Type: No problems reported or observed.     Suicide Risk CSSR-S:  1) Within the past month, have you wished you were dead or wished you could go to sleep and not wake up? : No  2) Have you actually had any thoughts of killing yourself? : No  6) Have you ever done anything, started to do anything, or prepared to do anything to end your life?: No  Change in Result na  Change in Plan of care na       EDUCATION:   EDUCATION:   Learner Progress Toward Treatment Goals: Reviewed results and recommendations of this team, Reviewed group plan and strategies, Reviewed signs, symptoms and risk of self harm and violent behavior, Reviewed goals and plan of care    Method:small group, individual verbal education    Outcome:verbalized by patient, but needs reinforcement to obtain goals    PATIENT GOALS:  Short term: Patient is encouraged to set goals   Long term: patient is encouraged to set goals    PLAN/TREATMENT RECOMMENDATIONS UPDATE: continue with group therapies, increased socialization, continue planning for after discharge goals, continue with medication compliance    SHORT-TERM GOALS UPDATE:   Time frame for Short-Term Goals: 5-7 days    LONG-TERM GOALS UPDATE:   Time frame for Long-Term Goals: 6 months  Members Present in Team Meeting: See Signature Sheet    Sarbjit Carlin [place]

## 2021-09-22 NOTE — PROGRESS NOTES
Daily Progress Note  9/22/2021  CHIEF COMPLAINT: Suicidal ideation with plan to overdose on medications    Reviewed patient's current plan of care and vital signs with nursing staff. Sleep:  several hours last night  Attending groups: No: Patient has limited mobility capabilities due to morbid obesity    SUBJECTIVE:    Nursing staff report that patient has maintained medication adherence and has been without acute behavioral changes since admission. She remains isolative and has stayed in bed due to her physical limitations. Her ability to ambulate is limited however she did cooperate with physical therapy and occupational therapy this morning. She has requested a shower and team is working to prepare all required equipment. Per internal medicine once showered permethrin cream head to toe will treat infestation. Selina Muniz is sad and verbalizes frustration with her inability to manage her own basic ADLs. She is embarrassed by her situation and continues to endorse suicidal ideation. She contracts for safety on this unit but is very certain that if she continued to be in her home she would have ended her own life. Patient received her first dose of Paxil this morning and denies side effects and mutually agrees that we will titrate tomorrow. She reports an adequate appetite and fair sleep and denies having any questions or concerns at this time. Mental Status Exam  Level of consciousness:  Within normal limits  Appearance: Hospital attire, laying on bed and then sitting on edge, with poor grooming and hygiene   Behavior/Motor: Approachable, psychomotor slowing  Attitude toward examiner:  Cooperative, attentive, fragmented eye contact  Speech:  normal rate, normal volume.   Slightly irritable tone  Mood: Patient reports \"bad \"  Affect: Congruent  Thought processes:  linear, goal directed and coherent  Thought content:  denies homicidal ideation  Suicidal Ideation: Endorses suicidal ideation with no current plan or intent and contracts for safety on unit  Delusions:  no evidence of delusions  Perceptual Disturbance:  denies any perceptual disturbance  Cognition:  Oriented to self, location, time, and situation  Memory: age appropriate  Insight & Judgement: Poor  Medication side effects:  denies       Data   height is 5' 7\" (1.702 m) and weight is 350 lb (158.8 kg) (abnormal). Her oral temperature is 97.9 °F (36.6 °C). Her blood pressure is 120/61 and her pulse is 92. Her respiration is 14 and oxygen saturation is 96%.    Labs:   Admission on 09/20/2021   Component Date Value Ref Range Status    WBC 09/20/2021 8.4  3.5 - 11.0 k/uL Final    RBC 09/20/2021 3.80* 4.0 - 5.2 m/uL Final    Hemoglobin 09/20/2021 11.6* 12.0 - 16.0 g/dL Final    Hematocrit 09/20/2021 35.7* 36 - 46 % Final    MCV 09/20/2021 94.0  80 - 100 fL Final    MCH 09/20/2021 30.4  26 - 34 pg Final    MCHC 09/20/2021 32.4  31 - 37 g/dL Final    RDW 09/20/2021 15.2* 11.5 - 14.9 % Final    Platelets 19/15/6205 485* 150 - 450 k/uL Final    MPV 09/20/2021 7.4  6.0 - 12.0 fL Final    NRBC Automated 09/20/2021 NOT REPORTED  per 100 WBC Final    Differential Type 09/20/2021 NOT REPORTED   Final    Seg Neutrophils 09/20/2021 68* 36 - 66 % Final    Lymphocytes 09/20/2021 14* 24 - 44 % Final    Monocytes 09/20/2021 7  1 - 7 % Final    Eosinophils % 09/20/2021 10* 0 - 4 % Final    Basophils 09/20/2021 1  0 - 2 % Final    Immature Granulocytes 09/20/2021 NOT REPORTED  0 % Final    Segs Absolute 09/20/2021 5.80  1.3 - 9.1 k/uL Final    Absolute Lymph # 09/20/2021 1.10  1.0 - 4.8 k/uL Final    Absolute Mono # 09/20/2021 0.50  0.1 - 1.3 k/uL Final    Absolute Eos # 09/20/2021 0.90* 0.0 - 0.4 k/uL Final    Basophils Absolute 09/20/2021 0.10  0.0 - 0.2 k/uL Final    Absolute Immature Granulocyte 09/20/2021 NOT REPORTED  0.00 - 0.30 k/uL Final    WBC Morphology 09/20/2021 NOT REPORTED   Final    RBC Morphology 09/20/2021 NOT REPORTED   Final    Platelet Estimate 09/20/2021 NOT REPORTED   Final    Glucose 09/20/2021 110* 70 - 99 mg/dL Final    BUN 09/20/2021 22  8 - 23 mg/dL Final    CREATININE 09/20/2021 0.84  0.50 - 0.90 mg/dL Final    Bun/Cre Ratio 09/20/2021 NOT REPORTED  9 - 20 Final    Calcium 09/20/2021 9.3  8.6 - 10.4 mg/dL Final    Sodium 09/20/2021 142  135 - 144 mmol/L Final    Potassium 09/20/2021 4.3  3.7 - 5.3 mmol/L Final    Chloride 09/20/2021 103  98 - 107 mmol/L Final    CO2 09/20/2021 26  20 - 31 mmol/L Final    Anion Gap 09/20/2021 13  9 - 17 mmol/L Final    Alkaline Phosphatase 09/20/2021 73  35 - 104 U/L Final    ALT 09/20/2021 9  5 - 33 U/L Final    AST 09/20/2021 12  <32 U/L Final    Total Bilirubin 09/20/2021 0.24* 0.3 - 1.2 mg/dL Final    Total Protein 09/20/2021 7.2  6.4 - 8.3 g/dL Final    Albumin 09/20/2021 4.0  3.5 - 5.2 g/dL Final    Albumin/Globulin Ratio 09/20/2021 NOT REPORTED  1.0 - 2.5 Final    GFR Non- 09/20/2021 >60  >60 mL/min Final    GFR  09/20/2021 >60  >60 mL/min Final    GFR Comment 09/20/2021        Final    Comment: Average GFR for 61-76 years old:   80 mL/min/1.73sq m  Chronic Kidney Disease:   <60 mL/min/1.73sq m  Kidney failure:   <15 mL/min/1.73sq m              eGFR calculated using average adult body mass. Additional eGFR calculator available at:        [employer]            GFR Staging 09/20/2021 NOT REPORTED   Final    D-Dimer, Quant 09/20/2021 3.51* 0.00 - 0.59 mg/L FEU Final    Comment:        When combined with a low clinical probability, a D dimer value of <0.50 mg/L FEU is   considered negative for DVT and PE (negative predictive value of 98%, sensitivity of 97%). If this test is not being used to help rule out DVT and PE, then the following reference   range should be utilized: 0.00 - 0.59 mg/L FEU.   The D-Dimer assay is intended for use as an aid in the diagnosis of venous thromboembolism   (DVT and PE) and the results should be interpreted in conjunction with the patient's medical   history, clinical presentation, and other findings. Elevated levels of D-dimer activity can be seen in any state of coagulation activation and   is not recommended in patients with therapeutic dose anticoagulant therapy for >24 hours,   fibrinolytic therapy within the previous 7 days, trauma or surgery within the previous 4   weeks,   disseminated malignancies, aortic aneurysm, sepsis, severe infections, pneumonia, severe   skin infections, liver cirrhosis, advanced age, debora                           nary disease, diabetes, and pregnancy. A very low percentage of patients with DVT may yield D-dimer results below the cutoff of   0.5 mg/L FEU. This is known to be more prevalent in patients with distal DVT.  Pro-BNP 09/20/2021 543* <300 pg/mL Final     Pro-BNP results cannot be compared to BNP results.  BNP Interpretation 09/20/2021 Pro-BNP Reference Range:   Final    Comment:       Rule Out:    <300        Grey Zone:   Age <50     300-450   Age 54-65   300-900   Age >75     300-1800  Usually represents mild to moderate HF but other cardiopulmonary causes cannot be ruled out. Rule In:   Age <50     >65   Age 54-65   >900   Age >76    >5      Troponin, High Sensitivity 09/20/2021 10  0 - 14 ng/L Final    Comment:       High Sensitivity Troponin values cannot be compared with other Troponin methodologies. Patients with high levels of Biotin oral intake (i.e >5mg/day) may have falsely decreased   Troponin levels. Samples collected within 8 hours of biotin intake may require additional   information for diagnosis.       Troponin T 09/20/2021 NOT REPORTED  <0.03 ng/mL Final    Troponin Interp 09/20/2021 NOT REPORTED   Final    Ventricular Rate 09/20/2021 89  BPM Preliminary    Atrial Rate 09/20/2021 89  BPM Preliminary    P-R Interval 09/20/2021 168  ms Preliminary    QRS Duration 09/20/2021 98  ms Preliminary    Q-T Interval 09/20/2021 406  ms Preliminary    QTc Calculation (Bazett) 09/20/2021 493  ms Preliminary    P Axis 09/20/2021 71  degrees Preliminary    R Axis 09/20/2021 -47  degrees Preliminary    T Axis 09/20/2021 71  degrees Preliminary    Specimen Description 09/20/2021 . NASOPHARYNGEAL SWAB   Final    SARS-CoV-2, Rapid 09/20/2021 Not Detected  Not Detected Final    Comment:       Rapid NAAT:  The specimen is NEGATIVE for SARS-CoV-2, the novel coronavirus associated with   COVID-19. The ID NOW COVID-19 assay is designed to detect the virus that causes COVID-19 in patients   with signs and symptoms of infection who are suspected of COVID-19. An individual without symptoms of COVID-19 and who is not shedding SARS-CoV-2 virus would   expect to have a negative (not detected) result in this assay. Negative results should be treated as presumptive and, if inconsistent with clinical signs   and symptoms or necessary for patient management,  should be tested with an alternative molecular assay. Negative results do not preclude   SARS-CoV-2 infection and   should not be used as the sole basis for patient management decisions. Fact sheet for Healthcare Providers: [employer]  Fact sheet for Patients: [employer]          Methodology: Isothermal Nucleic Acid Amplification      Pro-BNP 09/22/2021 682* <300 pg/mL Final     Pro-BNP results cannot be compared to BNP results.  BNP Interpretation 09/22/2021 Pro-BNP Reference Range:   Final    Comment:       Rule Out:    <300        Grey Zone:   Age <50     300-450   Age 54-65   300-900   Age >75     300-1800  Usually represents mild to moderate HF but other cardiopulmonary causes cannot be ruled out.         Rule In:   Age <50     >65   Age 54-65   >900   Age >76    >5              Medications  Current Facility-Administered Medications: cefUROXime (CEFTIN) tablet 500 mg, 500 been created using voice recognition software. It may contain minor errors which are inherent in voice recognition technology. **    I independently saw and evaluated the patient. I reviewed the nurse practitioners documentation above. Any additional comments or changes to the nurse practitioners documentation are stated below otherwise agree with assessment. Plan will be as follows:  Patient tolerating Paxil so far and denying side effects. Agreeable to increase in medication. Multiple medical issues going on. PT OT, wound consult, internal medicine on board  PLAN  Patient s symptoms   show minimal change  Increase Paxil  Continue with placement efforts  Medical follow-up for multiple specialties  Attempt to develop insight  Psycho-education conducted. Supportive Therapy conducted.   Probable discharge is next week  Follow-up daily while on inpatient unit

## 2021-09-22 NOTE — PLAN OF CARE
Problem: Altered Mood, Depressive Behavior:  Goal: Ability to disclose and discuss suicidal ideas will improve  Description: Ability to disclose and discuss suicidal ideas will improve  9/22/2021 1815 by Domo Bateman LPN  Outcome: Ongoing  9/22/2021 0620 by Ayla Kennedy RN  Outcome: Ongoing  Goal: Absence of self-harm  Description: Absence of self-harm  9/22/2021 1815 by Domo Bateman LPN  Outcome: Ongoing  9/22/2021 0620 by Ayla Kennedy RN  Outcome: Ongoing   Patient remains free from injury and self-harm. Denies suicidal ideation or thoughts of self-harm. Agrees to notify staff if such thoughts arise. Problem: Pain:  Goal: Pain level will decrease  Description: Pain level will decrease  Outcome: Ongoing  Goal: Control of acute pain  Description: Control of acute pain  Outcome: Ongoing  Goal: Control of chronic pain  Description: Control of chronic pain  Outcome: Ongoing   Patient has had no complaints of pain this shift. Patient did complain of \"being really tired\". Problem: Falls - Risk of:  Goal: Will remain free from falls  Description: Will remain free from falls  Outcome: Ongoing  Goal: Absence of physical injury  Description: Absence of physical injury  Outcome: Ongoing   Patient remains free from falls and injury.

## 2021-09-22 NOTE — PLAN OF CARE
Problem: Altered Mood, Depressive Behavior:  Goal: Ability to disclose and discuss suicidal ideas will improve  Description: Ability to disclose and discuss suicidal ideas will improve  Outcome: Ongoing     Problem: Altered Mood, Depressive Behavior:  Goal: Absence of self-harm  Description: Absence of self-harm  Outcome: Ongoing     Patient was isolative to her room entire shift. Pt reports fleeting thoughts of wanting to die but denies any current plans and  is agreeable to seeking out should thoughts of self harm arise. Safe environment maintained. Q15 minute checks for safety cont per unit policy. Will cont to monitor for safety and provide support and reassurance as needed.

## 2021-09-22 NOTE — GROUP NOTE
Group Therapy Note    Date: 9/22/2021    Group Start Time: 1100  Group End Time: 1150  Group Topic: Recreational    [address], [facility]        Group Therapy Note    Attendees:10/22      patient refused to attend  self esteem group at [phone] after encouragement from staff. 1:1 talk time provided as alternative to group session.                Signature:  Mary Lou Lazo, [address]

## 2021-09-23 PROCEDURE — 1240000000 HC EMOTIONAL WELLNESS R&B

## 2021-09-23 PROCEDURE — 99232 SBSQ HOSP IP/OBS MODERATE 35: CPT | Performed by: INTERNAL MEDICINE

## 2021-09-23 PROCEDURE — 97116 GAIT TRAINING THERAPY: CPT

## 2021-09-23 PROCEDURE — 6370000000 HC RX 637 (ALT 250 FOR IP): Performed by: PSYCHIATRY & NEUROLOGY

## 2021-09-23 PROCEDURE — 2500000003 HC RX 250 WO HCPCS: Performed by: INTERNAL MEDICINE

## 2021-09-23 PROCEDURE — 99213 OFFICE O/P EST LOW 20 MIN: CPT

## 2021-09-23 PROCEDURE — 6370000000 HC RX 637 (ALT 250 FOR IP): Performed by: INTERNAL MEDICINE

## 2021-09-23 PROCEDURE — 97110 THERAPEUTIC EXERCISES: CPT

## 2021-09-23 PROCEDURE — 99232 SBSQ HOSP IP/OBS MODERATE 35: CPT | Performed by: PSYCHIATRY & NEUROLOGY

## 2021-09-23 PROCEDURE — APPSS30 APP SPLIT SHARED TIME 16-30 MINUTES: Performed by: PSYCHIATRY & NEUROLOGY

## 2021-09-23 RX ADMIN — CEFUROXIME AXETIL 500 MG: 250 TABLET ORAL at 21:49

## 2021-09-23 RX ADMIN — APIXABAN 2.5 MG: 2.5 TABLET, FILM COATED ORAL at 09:43

## 2021-09-23 RX ADMIN — IBUPROFEN 400 MG: 400 TABLET, FILM COATED ORAL at 21:50

## 2021-09-23 RX ADMIN — PAROXETINE HYDROCHLORIDE 20 MG: 20 TABLET, FILM COATED ORAL at 09:43

## 2021-09-23 RX ADMIN — CEFUROXIME AXETIL 500 MG: 250 TABLET ORAL at 09:43

## 2021-09-23 RX ADMIN — APIXABAN 2.5 MG: 2.5 TABLET, FILM COATED ORAL at 21:50

## 2021-09-23 RX ADMIN — HYDROXYZINE HYDROCHLORIDE 50 MG: 50 TABLET, FILM COATED ORAL at 21:49

## 2021-09-23 RX ADMIN — ANTI-FUNGAL POWDER MICONAZOLE NITRATE TALC FREE: 1.42 POWDER TOPICAL at 21:49

## 2021-09-23 RX ADMIN — TRAZODONE HYDROCHLORIDE 50 MG: 50 TABLET ORAL at 21:49

## 2021-09-23 RX ADMIN — FUROSEMIDE 20 MG: 20 TABLET ORAL at 09:43

## 2021-09-23 ASSESSMENT — PAIN DESCRIPTION - PROGRESSION: CLINICAL_PROGRESSION: NOT CHANGED

## 2021-09-23 ASSESSMENT — PAIN SCALES - WONG BAKER: WONGBAKER_NUMERICALRESPONSE: 6;8

## 2021-09-23 ASSESSMENT — PAIN SCALES - GENERAL
PAINLEVEL_OUTOF10: 6
PAINLEVEL_OUTOF10: 7
PAINLEVEL_OUTOF10: 4

## 2021-09-23 ASSESSMENT — PAIN DESCRIPTION - LOCATION: LOCATION: GENERALIZED;HIP

## 2021-09-23 ASSESSMENT — PAIN DESCRIPTION - PAIN TYPE: TYPE: CHRONIC PAIN

## 2021-09-23 ASSESSMENT — PAIN DESCRIPTION - ORIENTATION: ORIENTATION: RIGHT

## 2021-09-23 NOTE — GROUP NOTE
Group Therapy Note    Date: 9/23/2021    Group Start Time: 2227  Group End Time: 3899  Group Topic: Healthy Living/Wellness    [id number] [address], [address]        Group Therapy Note    Attendees: 10///20    patient refused to attend community resources (MONSE)  group at [phone] after encouragement from staff. 1:1 talk time provided as alternative to group session.           Signature:  Arlene Amherst, [address]

## 2021-09-23 NOTE — PLAN OF CARE
Problem: Altered Mood, Depressive Behavior:  Goal: Ability to disclose and discuss suicidal ideas will improve  Description: Ability to disclose and discuss suicidal ideas will improve  Outcome: Ongoing  Goal: Absence of self-harm  Description: Absence of self-harm  Outcome: Ongoing  Patient remains free from self-harm. Denies suicidal ideation. Patient continues to endorse depression and feelings of hopelessness. Patient continues to isolate in room. Problem: Pain:  Goal: Pain level will decrease  Description: Pain level will decrease  Outcome: Ongoing  Goal: Control of acute pain  Description: Control of acute pain  Outcome: Ongoing  Goal: Control of chronic pain  Description: Control of chronic pain  Outcome: Ongoing   Patient complains of pain \"all over\" when moving or transferring to commode or wheelchair. Pain subsides when back in bed and movement stops. Problem: Falls - Risk of:  Goal: Will remain free from falls  Description: Will remain free from falls  Outcome: Ongoing  Goal: Absence of physical injury  Description: Absence of physical injury  Outcome: Ongoing   Patient remains free from falls and injury. Problem: Skin Integrity:  Goal: Will show no infection signs and symptoms  Description: Will show no infection signs and symptoms  Outcome: Ongoing  Goal: Absence of new skin breakdown  Description: Absence of new skin breakdown  Outcome: Ongoing   Wound care nurse came to assess patient and her wounds.

## 2021-09-23 NOTE — PLAN OF CARE
Problem: Falls - Risk of:  Goal: Will remain free from falls  Description: Will remain free from falls  9/22/2021 1815 by Jocelyn Grande LPN  Outcome: Ongoing     Problem: Altered Mood, Depressive Behavior:  Goal: Ability to disclose and discuss suicidal ideas will improve  Description: Ability to disclose and discuss suicidal ideas will improve  9/22/2021 1815 by Jocelyn Grande LPN  Outcome: Ongoing     Problem: Skin Integrity:  Goal: Absence of new skin breakdown  Description: Absence of new skin breakdown  9/22/2021 2345 by Clair Dodd RN  Outcome: Ongoing     Pt up only for needs, repositions self with encouragement. Pt skin assessed this shift skin appears to be the same as previously charted. Pt having skin break open related to constant itching. Pt reports that she needs to just get help and go to a nursing home so that she can get some of her medical needs addressed. Denies SI/HI   Pt denies suicidal ideations at this time. Pt agreed to seek staff at anytime she felt like any urges to harm self would arise. Safety checks maintained [id number]. Pt remains free from self harm this shift. Pt denies wanting to cause harm to self or others at this time. Pt encouraged to seek nursing staff at anytime if she felt at danger to themselves or others. Pt states understanding. Safety checks maintained [id number]    Patient is complaining of anxiety at this time. Stating that they feel restless and are having trouble sleeping and calming down in order to rest this evening. Medication was given as prescribed for increased anxiety see MAR.

## 2021-09-23 NOTE — GROUP NOTE
Group Therapy Note    Date: 9/23/2021    Group Start Time: 1100  Group End Time: 1150  Group Topic: Cognitive Skills    MALENA Smith, CTRS        Group Therapy Note    Attendees: 10/22    patient refused to attend coping skills jeopardy group at [phone] after encouragement from staff. 1:1 talk time provided as alternative to group session.             Signature:  Issac Booth

## 2021-09-23 NOTE — PROGRESS NOTES
Physical Therapy  Facility/Department: [facility]  Daily Treatment Note  NAME: Wallie Goodpasture  : 1960  MRN: [id number]    Date of Service: 2021    Discharge Recommendations:  Patient would benefit from continued therapy after discharge        Assessment   Body structures, Functions, Activity limitations: Decreased functional mobility ; Decreased safe awareness;Decreased balance;Decreased strength;Decreased posture;Decreased endurance  Assessment: Pt presents with suicidal ideations and poor living conditions at home. Pt has large areas of wounds and scratches, not fully observed. Will cont per POC to prepare for d/c. Treatment Diagnosis: impaired mobiltiy d/t depressive d/o  Specific instructions for Next Treatment: check with Nsg re: possible isolation precautions  Prognosis: Fair  Decision Making: Medium Complexity  History: Wallie Goodpasture is a 64 y.o. female who presents to the emergency department via EMS for evaluation of leg swelling x 2-3 weeks. Pt states her legs are now weeping fluid they are so swollen. She reports a wound over her left heel that was bleeding 1 week ago. She denies any pain in her legs. Denies fever, chills, nausea, emesis, abd pain, chest pain, sob, cough. Pt also states they her home is infested with bed bugs. She reports bites all over her body. States she does not want to go back home. Pt does not have a PCP. States she has not taken any of her medications for several months. No other complaints. Exam: ROM, social hx, balance, functional mobility  Clinical Presentation: uncomfortable  Barriers to Learning: none  REQUIRES PT FOLLOW UP: Yes  Activity Tolerance  Activity Tolerance: Patient limited by endurance; Patient limited by pain     Patient Diagnosis(es): The primary encounter diagnosis was Peripheral edema.  Diagnoses of Infestation by bed bug, Ulcer of left foot, limited to breakdown of skin ([address] and Suicidal behavior without attempted self-injury were also pertinent to this visit. has a past medical history of Arthritis, CHF (congestive heart failure) ([facility] COPD (chronic obstructive pulmonary disease) ([facility] Diverticulitis, Hx of blood clots, and Pulmonary embolism ([facility] has a past surgical history that includes Ankle surgery; Colon surgery; hernia repair; and Foot surgery (Left, 1/22/2021). Restrictions  Restrictions/Precautions  Restrictions/Precautions: Fall Risk  Required Braces or Orthoses?: No  Position Activity Restriction  Other position/activity restrictions: active bed bug infestation per RN and CNP on unit. Pt is not currently in isolation. Subjective   General  Chart Reviewed: Yes  Additional Pertinent Hx: 57-year-old  lady morbidly obese history of diastolic congestive heart failureHistory of COPD with obesity hypoventilation syndrome possible sleep apneaDiffuse skin excoriation both arms and legs patient is foul-smellingPossible bedbugs versus scabiesOrder for bath to be given to patient and then apply permethrin cream head to toeWound and stoma consult to evaluate for decubitus ulcerGiven patient body weight and bedbound at high risk of DVT and PE will start Eliquis 2.5 mg twice a day  Response To Previous Treatment: Not applicable  Family / Caregiver Present: No  Subjective  Subjective: Pt is resting in bed and agreeable to PT/OT eval. Pt continues to wish to d/c to SNF. Pt gets emotional during therapy due to frustration with current status. Gentle approach required. Pt reports right hip pain, increases with mobility. Tylenol helps to decrease pain, per pt. PT also reports decrease feeling in BLE feet. General Comment  Comments: RN ok's session, states live bed bugs were found yesterday but pt is not currently in isolation. Spoke with CNP on unit who plans to determine if isolation is appropriate. UPDATE: 9/23/21 Nursing staff has PPE at desk. Please ask for it prior to treatment.    Pain Screening  Patient Currently in Pain: Yes  Pain Assessment  Pain Assessment: Faces  [place] Pain Rating: Hurts even more;Hurts whole lot (tears/screaming out in pain with mobility. )  Pain Type: Chronic pain  Pain Location: Generalized; Hip  Pain Orientation: Right  Clinical Progression: Not changed  Response to Pain Intervention: Asleep with RR greater than 10  Vital Signs  Level of Consciousness: Alert (0)  Patient Currently in Pain: Yes  Oxygen Therapy  O2 Device: None (Room air)  Pre Treatment Pain Screening  Intervention List: Patient able to continue with treatment  Comments / Details: c/o generalized severe itching    Orientation  Orientation  Overall Orientation Status: Within Functional Limits  Objective   Bed mobility  Supine to Sit: Stand by assistance  Sit to Supine: Stand by assistance  Scooting: Stand by assistance  Comment: Pt wishes to control bed independently. PT demos min difficulty with supine <>sit transfers. Requires HOB to be elevated. Transfers  Sit to Stand: Stand by assistance (with RW)  Stand to sit: Stand by assistance (with RW)  Comment: Pt states \"Do not touch me\" when attempting to maintain CGA. Pt noted to have significant UE reliance on RW, severe fwd trunk flex when standing. Slightly unstedy however no LOB noted. PT demos moaning out in pain during stand. Tolerates<15 sec stand for first attempt. Ambulation  Ambulation?: Yes  More Ambulation?: No  Ambulation 1  Surface: level tile  Device: Rolling Walker  Assistance: Stand by assistance  Quality of Gait: wide DUNIA, severe trunk flex with lean onto RW. Moaning in pain. unable to tolerate further amb at this time. Gait Deviations: Shuffles  Distance: 5'x1  Comments: Pt is slightly unsteady and demos quick movements with RW. Edu provided on safety awareness.    Stairs/Curb  Stairs?: No     Balance  Posture: Poor  Sitting - Static: Fair  Sitting - Dynamic: Fair  Standing - Static: Fair;-  Standing - Dynamic: Fair;-  Comments: with RW  Other exercises  Other exercises?: Yes  Other exercises 1: seated BLE ex's. with lime green TB for light/mod resistance. Reps: 15 each on LLE. Pt can only tolerateAROM on RLE due to hip pain. Other exercises 2: STS x2 with RW  Other exercises 3: Static stand x1 ~<15 sec. Comment: rest breaks PRN.              Goals  Short term goals  Time Frame for Short term goals: 2 weeks  Short term goal 1: Pt will increase LE strength to [facility]  Short term goal 2: Pt will perform bed mobilty IND  Short term goal 3: Pt will transfer with RW MOD IND  Short term goal 4: Pt will ambulate with RW MOD IND 20 ft  Short term goal 5: Pt will improve standing balance to good to increase safety  Patient Goals   Patient goals : Go to SNF    Plan    Plan  Times per week: 3x/wk  Times per day: Daily  Specific instructions for Next Treatment: check with Nsg re: possible isolation precautions  Current Treatment Recommendations: Strengthening, Transfer Training, Endurance Training, Patient/Caregiver Education & Training, Balance Training, Functional Mobility Training, Safety Education & Training, Positioning, Gait Training  Safety Devices  Type of devices: Patient at risk for falls, Left in bed, Call light within reach, Nurse notified     Therapy Time   Individual Concurrent Group Co-treatment   Time In 1010         Time Out 1038         Minutes [address] [place], [place]

## 2021-09-23 NOTE — CONSULTS
[facility] Nurse  Consult Note       NAME:  Ryanne Renteria  MEDICAL RECORD NUMBER:  [id number]  AGE: 64 y.o.    GENDER: female  : 1960  TODAY'S DATE:  2021    Subjective:     Reason for Wound Mag Ervin Care and Assessment: \"pressure sores\"      Ryanne Renteria is a 64 y.o. female referred by:   [x] Physician  [] Nursing  [] Other:       Wound Identification:  Wound Type: non-healing/non-surgical   Contributing Factors: edema, chronic pressure, decreased mobility, shear force, obesity, smoking, arterial insufficiency, decreased tissue oxygenation, poor hygiene and non-adherence    Wound History: pt unsure of how long she has had wounds, poor living conditions at home  Current Wound Care Treatment:  Open to air    Patient Goal of Care:  [x] Wound Healing  [x] Odor Control  [] Palliative Care  [x] Pain Control   [] Other:         PAST MEDICAL HISTORY        Diagnosis Date    Arthritis     [facility]     COPD (chronic obstructive pulmonary disease) [facility]     Diverticulitis     Hx of blood clots     Pulmonary embolism ([facility]        PAST SURGICAL HISTORY    Past Surgical History:   Procedure Laterality Date    ANKLE SURGERY      COLON SURGERY      FOOT SURGERY Left 2021    FOOT BONE BIOPSY W/ INCISION & DRAINAGE AND REDRESSING ON RIGHT FOOT performed by Modesta Cabezas DPM at [address] [facility] HISTORY    Family History   Problem Relation Age of Onset    Cancer Mother     Diabetes Paternal Grandfather        SOCIAL HISTORY    Social History     Tobacco Use    Smoking status: Current Every Day Smoker     Packs/day: 0.25     Years: 45.00     Pack years: 11.25    Smokeless tobacco: Never Used    Tobacco comment: has not smoked in two months   Vaping Use    Vaping Use: Never used   Substance Use Topics    Alcohol use: Not Currently     Comment: 1-2 times per week    Drug use: Not Currently       ALLERGIES    No Known Percocet use disorder, mild, abuse ([facility]) F11.10    Severe recurrent major depression without psychotic features ([address] F33.2       Patient Active Problem List   Diagnosis    Chronic heart failure with preserved ejection fraction (HCC)    Chronic obstructive pulmonary disease (HCC)    Tobacco abuse    Class 3 severe obesity with serious comorbidity and body mass index (BMI) of 50.0 to 59.9 in adult [facility])    Essential hypertension    History of pulmonary embolism    Family history of colon cancer in mother    Prediabetes    Class 4 congestive heart failure, acute on chronic, systolic ([facility])    Hypokalemia    Cellulitis    Primary osteoarthritis of right hip    Chronic pain of multiple joints    Depression    Cellulitis of right leg    Peripheral artery disease ([facility])    Non healing left heel wound    Corns and callosities    Healing pressure ulcer, stage IV ([facility])    Lymphedema of both lower extremities    Non-pressure chronic ulcer of right lower leg with fat layer exposed ([address]    Localized edema    Xerosis cutis    Depression with suicidal ideation    Major depressive disorder, recurrent, severe without psychotic behavior ([address]    Percocet use disorder, mild, abuse ([address]    Severe recurrent major depression without psychotic features ([address]       [id number]  nurse consult for \"pressure sores. \" History obtained from chart and pt coming from poor living conditions at home. She stated that she is unable to care for herself. A skin assessment was completed as pt showered with the assistance of primary RN. Pt is covered in bedbug bites and excoriations from scratching. She has scattered scabbed areas to bilateral arms, bilateral legs, torso, and shoulders. Area underneath abdominal pannus is red and denuded. On the lateral side of the left leg, there is an approximately 1.5x1.5cm open area.  There is an approximately 0.5x0.5cm open area to the plantar aspect of the left heel- it appears as though pt may have stepped on something, causing the wound. She does not really speak much or offer any history on the wounds aside from the bedbug bites, which she states she is embarrassed about. She states that the areas still itch, despite having permethrin cream applied and showering. She does have calamine lotion available to apply. If this is not helpful, would recommend oral benadryl or a benadryl cream topically- would need physician's order. Would also recommend miconazole powder to the red, denuded areas under the pannus- again, need physician's order. For the wound on the left lateral lower leg and left heel, would recommend opticell ag, covered with ABD, and wrapped with roll gauze, changed daily. Photos and actual measurements were deferred due to pt verbalizing that she is embarrassed by how her skin looks and also due to physical limitations. Skin was observed in the shower, and pt unable to tolerate being up for prolonged period of time. According to notes, pt has also been very isolative, and did not want to deter her from taking her shower. 3:55pm: Spoke with Wendy Tony, care coordinator for Dr Shadi Matthews, requesting orders for miconazole powder to pannus and benadryl cream to bedbug bites. Dr Shadi Matthews will place orders. Response to treatment:  With complaints of pain. Plan:     Plan of Care:     Right lateral lower leg/ right heel wounds: Cleanse with saline, pat dry. Apply opticell ag to wounds and weeping areas, cover with ABD, wrap with roll gauze. Change daily and as needed if loose or soiled.     -Abdominal pannus: recommend miconazole powder twice daily and as needed. Please wash and dry area thoroughly with soap and water. Apply powder to denuded areas, brush off excess    -Turn every 2 hours    -Float heels off of bed with pillows under calves    -Routine incontinence care with foaming cleanser and zinc oxide cream. Apply zinc oxide cream BID and prn incontinence.    Use moisture wicking under pads vs cloth backed briefs      Specialty Bed Required : N/A   [] Low Air Loss   [] Pressure Redistribution  [] Fluid Immersion  [] Bariatric  [] Total Pressure Relief  [] Other:     Current Diet: ADULT DIET;  Regular  Dietician consult:  N/A    Discharge Plan:  Placement for patient upon discharge: pt will likely need skilled nursing  Patient appropriate for Outpatient [address]: N/A    Patient/Caregiver Teaching:  Level of patientunderstanding able to:     [x] Indicates understanding       [] Needs reinforcement  [] Unsuccessful      [x] Verbal Understanding  [] Demonstrated understanding       [] No evidence of learning  [] Refused teaching         [] N/A       Electronically signed by Paul Stephens RN on  9/23/2021 at 3:01 PM

## 2021-09-23 NOTE — CONSULTS
[facility]    CONSULTATION / HISTORY AND PHYSICAL EXAMINATION            Date:   9/23/2021  Patient name: Briseida Mckeon  Date of admission:  9/20/2021 12:34 PM  MRN:   [id number]  Account:  [de-identified]  YOB: 1960  PCP:    KIRK Gibbs CNP  Room:   [address] [place] [address]  Code Status:    Full Code    Physician Requesting Consult: Sterling Funk MD    Reason for Consult:  medical management    Chief Complaint:     Chief Complaint   Patient presents with    Leg Swelling   Diffuse rash in arms and legs concern for bedbugs    History Obtained From:     Patient medical record nursing staff    History of Present Illness:     60-year-old lady with history of smoking history of COPD chronic  diastolic congestive heart failure  Bedbound suffering from depression admitted to mental health unit medical consult for diffuse skin excoriation congestive heart failure hypertension COPD  Denies any fever chills positive for depression positive for dyspnea positive for poor mobility positive for bedbugs    Past Medical History:     Past Medical History:   Diagnosis Date    Arthritis     CHF (congestive heart failure) ([address] [facility]     COPD (chronic obstructive pulmonary disease) ([address]     Diverticulitis     Hx of blood clots     Pulmonary embolism ([address]         Past Surgical History:     Past Surgical History:   Procedure Laterality Date    ANKLE SURGERY      COLON SURGERY      FOOT SURGERY Left 1/22/2021    FOOT BONE BIOPSY W/ INCISION & DRAINAGE AND REDRESSING ON RIGHT FOOT performed by Mayur Reed DPM at [address] [facility]          Medications Prior to Admission:     Prior to Admission medications    Medication Sig Start Date End Date Taking? Authorizing Provider   furosemide (LASIX) 20 MG tablet Take 1 tablet by mouth daily for 10 days 9/20/21 9/30/21 Yes Alana Alberto PA-C        Allergies:     Patient has no known allergies.     Social History: Tobacco:    reports that she has been smoking. She has a 11.25 pack-year smoking history. She has never used smokeless tobacco.  Alcohol:      reports previous alcohol use. Drug Use:  reports previous drug use. Family History:     Family History   Problem Relation Age of Onset    Cancer Mother     Diabetes Paternal Grandfather        Review of Systems:     Positive and Negative as described in HPI. CONSTITUTIONAL:  negative for fevers, chills, sweats, fatigue, weight loss  HEENT:  negative for vision, hearing changes, runny nose, throat pain  RESPIRATORY:  negative for shortness of breath, cough, congestion, wheezing. CARDIOVASCULAR:  negative for chest pain, palpitations. GASTROINTESTINAL:  negative for nausea, vomiting, diarrhea, constipation, change in bowel habits, abdominal pain   GENITOURINARY:  negative for difficulty of urination, burning with urination, frequency   INTEGUMENT: Itching rash all legs and arms HEMATOLOGIC/LYMPHATIC:  negative for swelling/edema   ALLERGIC/IMMUNOLOGIC:  negative for urticaria , itching  ENDOCRINE:  negative increase in drinking, increase in urination, hot or cold intolerance  MUSCULOSKELETAL:  negative joint pains, muscle aches, swelling of joints  NEUROLOGICAL:  negative for headaches, dizziness, lightheadedness, numbness, pain, tingling extremities  BEHAVIOR/PSYCH: Depression tearful    Physical Exam:     BP (!) 148/70   Pulse 85   Temp 97.3 °F (36.3 °C) (Oral)   Resp 16   Ht 5' 7\" (1.702 m)   Wt (!) 350 lb (158.8 kg)   SpO2 96%   BMI 54.82 kg/m²   Temp (24hrs), Av.3 °F (36.3 °C), Min:97.3 °F (36.3 °C), Max:97.3 °F (36.3 °C)    No results for input(s): POCGLU in the last 72 hours.   No intake or output data in the 24 hours ending 21 1138  Very poor personal hygiene foul-smelling morbidly obese  General Appearance:  alert, well appearing, and in no acute distress  Mental status: oriented to person, place, and time with normal affect  Head: normocephalic, atraumatic. Eye: no icterus, redness, pupils equal and reactive, extraocular eye movements intact, conjunctiva clear  Ear: normal external ear, no discharge, hearing intact  Nose:  no drainage noted  Mouth: mucous membranes moist  Neck: supple, no carotid bruits, thyroid not palpable  Lungs: Bilateral equal air entry, clear to ausculation, no wheezing, rales or rhonchi, normal effort  Cardiovascular: normal rate, regular rhythm, no murmur, gallop, rub.   Abdomen: Soft, nontender, nondistended, normal bowel sounds, no hepatomegaly or splenomegaly  Neurologic: There are no new focal motor or sensory deficits, normal muscle tone and bulk, no abnormal sensation, normal speech, cranial nerves II through XII grossly intact  Skin: No gross lesions, rashes, bruising or bleeding on exposed skin area  Extremities:  peripheral pulses palpable, no pedal edema or calf pain with palpation  Diffuse skin excoriation noted in both upper and lower extremities  Psych: Flat affect    Investigations:      Laboratory Testing:  Recent Results (from the past 24 hour(s))   Brain Natriuretic Peptide    Collection Time: 09/22/21  2:29 PM   Result Value Ref Range    Pro- (H) <300 pg/mL    BNP Interpretation Pro-BNP Reference Range:            Consultations:   IP CONSULT TO INTERNAL MEDICINE  Assessment :      Primary Problem  Major depressive disorder, recurrent, severe without psychotic behavior ([address]    Active Hospital Problems    Diagnosis Date Noted    Severe recurrent major depression without psychotic features ([address] [F33.2] 09/21/2021    Major depressive disorder, recurrent, severe without psychotic behavior ([address] [F33.2] 05/22/2021    Depression with suicidal ideation [F32.9, R45.851] 05/21/2021    Healing pressure ulcer, stage IV ([address] [L89.94] 02/17/2021    Peripheral artery disease ([address] [I73.9] 01/21/2021    Class 4 congestive heart failure, acute on chronic, systolic (HCC) [T59.06] 93/21/8648    Chronic obstructive pulmonary disease ([facility] [J44.9] 10/29/2019    Class 3 severe obesity with serious comorbidity and body mass index (BMI) of 50.0 to 59.9 in adult [facility]) [E66.01, Z68.43] 10/29/2019    Essential hypertension [I10] 10/29/2019    Chronic heart failure with preserved ejection fraction ([facility] [I50.32] 10/25/2019       Plan:     70-year-old  lady morbidly obese history of diastolic congestive heart failure  History of COPD with obesity hypoventilation syndrome possible sleep apnea  Diffuse skin excoriation both arms and legs patient is foul-smelling  Possible bedbugs versus scabies  Order for bath to be given to patient and then apply permethrin cream head to toe  Wound and stoma consult to evaluate for decubitus ulcer  Given patient body weight and bedbound at high risk of DVT and PE will start Eliquis 2.5 mg twice a day  bnp elevated  Lasix 20 mg daily    Johanne Haas MD  9/23/2021  11:38 AM    Copy sent to Dr. Kristin Paris, APRN - CNP    Please note that this chart was generated using voice recognition Dragon dictation software. Although every effort was made to ensure the accuracy of this automated transcription, some errors in transcription may have occurred.

## 2021-09-23 NOTE — BH NOTE
Writer in to check on patient and noticed kerlex was unraveling off of wound dressing. Opticell was reinforced with ABD pad and paper tape.

## 2021-09-23 NOTE — PROGRESS NOTES
Daily Progress Note  9/23/2021    Patient Name: Tiffanie Ha COMPLAINT:  Suicidal ideation with plan to overdose on medications. SUBJECTIVE:      Patient is seen today for a follow up assessment. Medication Adherence: Patient has been medication compliant. Emergency Medications: Patient has not required any emergency medications today. Patient was resting in her room at the time of approach by writer. Patient presented with poor hygiene and grooming and foul odor in her room. She endorsed depression and anxiety today. She reports her appetite has been decreased. She reports adequate sleep last night, but reports it was nonrestorative. She denies any difficulty falling asleep or any difficulty staying asleep last night. She endorses fleeting suicidal ideation, without intent or plans. She denies homicidal ideation, intent, or plans. She contracts for safety on the unit. Patient reports she would be unsafe off the unit. She denies auditory hallucinations, visual hallucinations, paranoia, or delusions. She denies any medication side effects or medical concerns at the time of assessment. Writer encouraged patient to attend groups on the unit. At this time, the patient is not appropriate for a lower level of care. There is risk of decompensation and patient warrants further hospitalization for safety and stabilization. Group Attendance on Unit:   [] Yes  [] Selectively    [x] No       Mental Status Exam  Level of consciousness: Alert and awake. Appearance: Appropriate attire for setting, resting in bed, with poor grooming and hygiene. Behavior/Motor: Approachable, psychomotor slowing noted. Attitude toward examiner: Cooperative, attentive, intermittent eye contact. Speech: Normal rate, normal volume, normal tone. Mood:  Patient reports \"sleep\". Affect: Blunted. Thought processes: Linear and coherent. Thought content: Denies homicidal ideation.   Suicidal Ideation: Fleeting suicidal ideations, without current plan or intent, contracts for safety on the unit. Delusions: No evidence of delusions. Denies paranoia. Perceptual Disturbance: Patient does not appear to be responding to internal stimuli. Denies auditory hallucinations. Denies visual hallucinations. Cognition: Oriented to self, location, time, and situation. Memory: Intact. Insight & Judgement: Poor. Data   height is 5' 7\" (1.702 m) and weight is 350 lb (158.8 kg) (abnormal). Her oral temperature is 97.3 °F (36.3 °C). Her blood pressure is 148/70 (abnormal) and her pulse is 85. Her respiration is 16 and oxygen saturation is 96%.    Labs:   Admission on 09/20/2021   Component Date Value Ref Range Status    WBC 09/20/2021 8.4  3.5 - 11.0 k/uL Final    RBC 09/20/2021 3.80* 4.0 - 5.2 m/uL Final    Hemoglobin 09/20/2021 11.6* 12.0 - 16.0 g/dL Final    Hematocrit 09/20/2021 35.7* 36 - 46 % Final    MCV 09/20/2021 94.0  80 - 100 fL Final    MCH 09/20/2021 30.4  26 - 34 pg Final    MCHC 09/20/2021 32.4  31 - 37 g/dL Final    RDW 09/20/2021 15.2* 11.5 - 14.9 % Final    Platelets 79/62/2239 485* 150 - 450 k/uL Final    MPV 09/20/2021 7.4  6.0 - 12.0 fL Final    NRBC Automated 09/20/2021 NOT REPORTED  per 100 WBC Final    Differential Type 09/20/2021 NOT REPORTED   Final    Seg Neutrophils 09/20/2021 68* 36 - 66 % Final    Lymphocytes 09/20/2021 14* 24 - 44 % Final    Monocytes 09/20/2021 7  1 - 7 % Final    Eosinophils % 09/20/2021 10* 0 - 4 % Final    Basophils 09/20/2021 1  0 - 2 % Final    Immature Granulocytes 09/20/2021 NOT REPORTED  0 % Final    Segs Absolute 09/20/2021 5.80  1.3 - 9.1 k/uL Final    Absolute Lymph # 09/20/2021 1.10  1.0 - 4.8 k/uL Final    Absolute Mono # 09/20/2021 0.50  0.1 - 1.3 k/uL Final    Absolute Eos # 09/20/2021 0.90* 0.0 - 0.4 k/uL Final    Basophils Absolute 09/20/2021 0.10  0.0 - 0.2 k/uL Final    Absolute Immature Granulocyte 09/20/2021 NOT REPORTED  0.00 - 0.30 k/uL Final    WBC Morphology 09/20/2021 NOT REPORTED   Final    RBC Morphology 09/20/2021 NOT REPORTED   Final    Platelet Estimate 38/70/6100 NOT REPORTED   Final    Glucose 09/20/2021 110* 70 - 99 mg/dL Final    BUN 09/20/2021 22  8 - 23 mg/dL Final    CREATININE 09/20/2021 0.84  0.50 - 0.90 mg/dL Final    Bun/Cre Ratio 09/20/2021 NOT REPORTED  9 - 20 Final    Calcium 09/20/2021 9.3  8.6 - 10.4 mg/dL Final    Sodium 09/20/2021 142  135 - 144 mmol/L Final    Potassium 09/20/2021 4.3  3.7 - 5.3 mmol/L Final    Chloride 09/20/2021 103  98 - 107 mmol/L Final    CO2 09/20/2021 26  20 - 31 mmol/L Final    Anion Gap 09/20/2021 13  9 - 17 mmol/L Final    Alkaline Phosphatase 09/20/2021 73  35 - 104 U/L Final    ALT 09/20/2021 9  5 - 33 U/L Final    AST 09/20/2021 12  <32 U/L Final    Total Bilirubin 09/20/2021 0.24* 0.3 - 1.2 mg/dL Final    Total Protein 09/20/2021 7.2  6.4 - 8.3 g/dL Final    Albumin 09/20/2021 4.0  3.5 - 5.2 g/dL Final    Albumin/Globulin Ratio 09/20/2021 NOT REPORTED  1.0 - 2.5 Final    GFR Non- 09/20/2021 >60  >60 mL/min Final    GFR  09/20/2021 >60  >60 mL/min Final    GFR Comment 09/20/2021        Final    Comment: Average GFR for 61-76 years old:   80 mL/min/1.73sq m  Chronic Kidney Disease:   <60 mL/min/1.73sq m  Kidney failure:   <15 mL/min/1.73sq m              eGFR calculated using average adult body mass. Additional eGFR calculator available at:        zSoup.br            GFR Staging 09/20/2021 NOT REPORTED   Final    D-Dimer, Quant 09/20/2021 3.51* 0.00 - 0.59 mg/L FEU Final    Comment:        When combined with a low clinical probability, a D dimer value of <0.50 mg/L FEU is   considered negative for DVT and PE (negative predictive value of 98%, sensitivity of 97%).   If this test is not being used to help rule out DVT and PE, then the following reference   range should be utilized: 0.00 - 0.59 mg/L FEU. The D-Dimer assay is intended for use as an aid in the diagnosis of venous thromboembolism   (DVT and PE) and the results should be interpreted in conjunction with the patient's medical   history, clinical presentation, and other findings. Elevated levels of D-dimer activity can be seen in any state of coagulation activation and   is not recommended in patients with therapeutic dose anticoagulant therapy for >24 hours,   fibrinolytic therapy within the previous 7 days, trauma or surgery within the previous 4   weeks,   disseminated malignancies, aortic aneurysm, sepsis, severe infections, pneumonia, severe   skin infections, liver cirrhosis, advanced age, debora                           nary disease, diabetes, and pregnancy. A very low percentage of patients with DVT may yield D-dimer results below the cutoff of   0.5 mg/L FEU. This is known to be more prevalent in patients with distal DVT.  Pro-BNP 09/20/2021 543* <300 pg/mL Final     Pro-BNP results cannot be compared to BNP results.  BNP Interpretation 09/20/2021 Pro-BNP Reference Range:   Final    Comment:       Rule Out:    <300        Grey Zone:   Age <50     300-450   Age 54-65   300-900   Age >75     300-1800  Usually represents mild to moderate HF but other cardiopulmonary causes cannot be ruled out. Rule In:   Age <50     >65   Age 54-65   >900   Age >76    >5      Troponin, High Sensitivity 09/20/2021 10  0 - 14 ng/L Final    Comment:       High Sensitivity Troponin values cannot be compared with other Troponin methodologies. Patients with high levels of Biotin oral intake (i.e >5mg/day) may have falsely decreased   Troponin levels. Samples collected within 8 hours of biotin intake may require additional   information for diagnosis.       Troponin T 09/20/2021 NOT REPORTED  <0.03 ng/mL Final    Troponin Interp 09/20/2021 NOT REPORTED   Final    Ventricular Rate 09/20/2021 89  BPM Preliminary    Atrial Rate 09/20/2021 89  BPM Preliminary    P-R Interval 09/20/2021 168  ms Preliminary    QRS Duration 09/20/2021 98  ms Preliminary    Q-T Interval 09/20/2021 406  ms Preliminary    QTc Calculation (Bazett) 09/20/2021 493  ms Preliminary    P Axis 09/20/2021 71  degrees Preliminary    R Axis 09/20/2021 -47  degrees Preliminary    T Axis 09/20/2021 71  degrees Preliminary    Specimen Description 09/20/2021 . NASOPHARYNGEAL SWAB   Final    SARS-CoV-2, Rapid 09/20/2021 Not Detected  Not Detected Final    Comment:       Rapid NAAT:  The specimen is NEGATIVE for SARS-CoV-2, the novel coronavirus associated with   COVID-19. The ID NOW COVID-19 assay is designed to detect the virus that causes COVID-19 in patients   with signs and symptoms of infection who are suspected of COVID-19. An individual without symptoms of COVID-19 and who is not shedding SARS-CoV-2 virus would   expect to have a negative (not detected) result in this assay. Negative results should be treated as presumptive and, if inconsistent with clinical signs   and symptoms or necessary for patient management,  should be tested with an alternative molecular assay. Negative results do not preclude   SARS-CoV-2 infection and   should not be used as the sole basis for patient management decisions. Fact sheet for Healthcare Providers: Leroy.es  Fact sheet for Patients: Leroy.es          Methodology: Isothermal Nucleic Acid Amplification      Pro-BNP 09/22/2021 682* <300 pg/mL Final     Pro-BNP results cannot be compared to BNP results.  BNP Interpretation 09/22/2021 Pro-BNP Reference Range:   Final    Comment:       Rule Out:    <300        Grey Zone:   Age <50     300-450   Age 54-65   300-900   Age >75     300-1800  Usually represents mild to moderate HF but other cardiopulmonary causes cannot be ruled out.         Rule In:   Age <50     >65   Age 54-65   >900 Age >76    >5           Reviewed patient's current plan of care and vital signs with nursing staff. · Elevated blood pressure trends noted. Internal medicine consult ordered. Labs reviewed: [x] Yes  Last EKG in EMR reviewed: [x] Yes  QTc: 493. Medications  Current Facility-Administered Medications: cefUROXime (CEFTIN) tablet 500 mg, 500 mg, Oral, 2 times per day  furosemide (LASIX) tablet 20 mg, 20 mg, Oral, Daily  apixaban (ELIQUIS) tablet 2.5 mg, 2.5 mg, Oral, BID  PARoxetine (PAXIL) tablet 20 mg, 20 mg, Oral, Daily  Calamine 8-8 % lotion, , Topical, PRN  acetaminophen (TYLENOL) tablet 650 mg, 650 mg, Oral, Q4H PRN  aluminum & magnesium hydroxide-simethicone (MAALOX) 200-200-20 MG/5ML suspension 30 mL, 30 mL, Oral, Q6H PRN  hydrOXYzine (ATARAX) tablet 50 mg, 50 mg, Oral, TID PRN  ibuprofen (ADVIL;MOTRIN) tablet 400 mg, 400 mg, Oral, Q6H PRN  traZODone (DESYREL) tablet 50 mg, 50 mg, Oral, Nightly PRN  polyethylene glycol (GLYCOLAX) packet 17 g, 17 g, Oral, Daily PRN  nicotine polacrilex (NICORETTE) gum 2 mg, 2 mg, Oral, Q1H PRN  haloperidol (HALDOL) tablet 5 mg, 5 mg, Oral, Q4H PRN **AND** LORazepam (ATIVAN) tablet 2 mg, 2 mg, Oral, Q4H PRN  haloperidol lactate (HALDOL) injection 5 mg, 5 mg, IntraMUSCular, Q4H PRN **AND** LORazepam (ATIVAN) injection 2 mg, 2 mg, IntraMUSCular, Q4H PRN **AND** diphenhydrAMINE (BENADRYL) injection 50 mg, 50 mg, IntraMUSCular, Q4H PRN    ASSESSMENT  Major depressive disorder, recurrent, severe without psychotic behavior ([facility] [address]         PLAN  Patient symptoms show: Minimal improvement. New order: Consult to internal medicine: In addition to skin assessment, abnormal labs, and medical management, patient is noted to have elevated blood pressure trends. Abnormal ECG. Pro- from 9/22/21. Continue current medication regimen. Monitor need and frequency of PRN medications. Encourage participation in groups and milieu. Attempt to develop insight.   Psycho-education conducted. Supportive Therapy conducted. Probable discharge is to be determined by MD.   Follow-up daily while inpatient. Patient continues to be monitored in the inpatient psychiatric facility at [facility] for safety and stabilization. Patient continues to need, on a daily basis, active treatment furnished directly by or requiring the supervision of inpatient psychiatric personnel. Electronically signed by KIRK Mckinney CNP on 9/23/2021 at 3:40 PM    **This report has been created using voice recognition software. It may contain minor errors which are inherent in voice recognition technology. **    I independently saw and evaluated the patient. I reviewed the nurse practitioners documentation above. Any additional comments or changes to the nurse practitioners documentation are stated below otherwise agree with assessment. Plan will be as follows:  Patient denying side effects to medication. Reports frustration in her situation and in herself. Not able to contract for safety yet off the unit. Expressing some ambivalence about going to nursing home but knows it is necessary. PLAN  Patient s symptoms   show modest improvement  Observe on recently adjusted Paxil and consider further titration pending observation  Attempt to develop insight  Psycho-education conducted. Supportive Therapy conducted.   Probable discharge is next week  Follow-up daily while on inpatient unit

## 2021-09-24 LAB
EKG ATRIAL RATE: 89 BPM
EKG P AXIS: 71 DEGREES
EKG P-R INTERVAL: 168 MS
EKG Q-T INTERVAL: 406 MS
EKG QRS DURATION: 98 MS
EKG QTC CALCULATION (BAZETT): 493 MS
EKG R AXIS: -47 DEGREES
EKG T AXIS: 71 DEGREES
EKG VENTRICULAR RATE: 89 BPM

## 2021-09-24 PROCEDURE — APPSS30 APP SPLIT SHARED TIME 16-30 MINUTES: Performed by: PSYCHIATRY & NEUROLOGY

## 2021-09-24 PROCEDURE — 1240000000 HC EMOTIONAL WELLNESS R&B

## 2021-09-24 PROCEDURE — 99232 SBSQ HOSP IP/OBS MODERATE 35: CPT | Performed by: PSYCHIATRY & NEUROLOGY

## 2021-09-24 PROCEDURE — 99232 SBSQ HOSP IP/OBS MODERATE 35: CPT | Performed by: INTERNAL MEDICINE

## 2021-09-24 PROCEDURE — 6370000000 HC RX 637 (ALT 250 FOR IP): Performed by: PSYCHIATRY & NEUROLOGY

## 2021-09-24 PROCEDURE — 6370000000 HC RX 637 (ALT 250 FOR IP): Performed by: INTERNAL MEDICINE

## 2021-09-24 RX ADMIN — FUROSEMIDE 20 MG: 20 TABLET ORAL at 12:06

## 2021-09-24 RX ADMIN — ANTI-FUNGAL POWDER MICONAZOLE NITRATE TALC FREE: 1.42 POWDER TOPICAL at 12:06

## 2021-09-24 RX ADMIN — HYDROXYZINE HYDROCHLORIDE 50 MG: 50 TABLET, FILM COATED ORAL at 21:24

## 2021-09-24 RX ADMIN — APIXABAN 2.5 MG: 2.5 TABLET, FILM COATED ORAL at 12:06

## 2021-09-24 RX ADMIN — ANTI-FUNGAL POWDER MICONAZOLE NITRATE TALC FREE: 1.42 POWDER TOPICAL at 21:25

## 2021-09-24 RX ADMIN — PAROXETINE HYDROCHLORIDE 20 MG: 20 TABLET, FILM COATED ORAL at 12:05

## 2021-09-24 RX ADMIN — CEFUROXIME AXETIL 500 MG: 250 TABLET ORAL at 12:06

## 2021-09-24 RX ADMIN — TRAZODONE HYDROCHLORIDE 50 MG: 50 TABLET ORAL at 21:24

## 2021-09-24 RX ADMIN — APIXABAN 2.5 MG: 2.5 TABLET, FILM COATED ORAL at 21:14

## 2021-09-24 RX ADMIN — IBUPROFEN 400 MG: 400 TABLET, FILM COATED ORAL at 12:10

## 2021-09-24 RX ADMIN — CEFUROXIME AXETIL 500 MG: 250 TABLET ORAL at 21:14

## 2021-09-24 ASSESSMENT — PAIN SCALES - GENERAL
PAINLEVEL_OUTOF10: 8
PAINLEVEL_OUTOF10: 6

## 2021-09-24 ASSESSMENT — PAIN DESCRIPTION - LOCATION: LOCATION: HIP

## 2021-09-24 NOTE — CONSULTS
[facility]    CONSULTATION / HISTORY AND PHYSICAL EXAMINATION            Date:   9/24/2021  Patient name: Lucita Gregory  Date of admission:  9/20/2021 12:34 PM  MRN:   [id number]  Account:  [de-identified]  YOB: 1960  PCP:    KIRK Malagon CNP  Room:   [address] [place] [address]  Code Status:    Full Code    Physician Requesting Consult: Antonio Holloway MD    Reason for Consult:  medical management    Chief Complaint:     Chief Complaint   Patient presents with    Leg Swelling   Diffuse rash in arms and legs concern for bedbugs    History Obtained From:     Patient medical record nursing staff    History of Present Illness:     75-year-old lady with history of smoking history of COPD chronic  diastolic congestive heart failure  Bedbound suffering from depression admitted to mental health unit medical consult for diffuse skin excoriation congestive heart failure hypertension COPD  Denies any fever chills positive for depression positive for dyspnea positive for poor mobility positive for bedbugs    Past Medical History:     Past Medical History:   Diagnosis Date    Arthritis     [facility]congestive heart failure) ([facility] [address]     COPD (chronic obstructive pulmonary disease) ([facility] [address]     Diverticulitis     Hx of blood clots     Pulmonary embolism ([facility] [address]         Past Surgical History:     Past Surgical History:   Procedure Laterality Date    ANKLE SURGERY      COLON SURGERY      FOOT SURGERY Left 1/22/2021    FOOT BONE BIOPSY W/ INCISION & DRAINAGE AND REDRESSING ON RIGHT FOOT performed by Leonila Pineda DPM at [address] [place] [address] N          Medications Prior to Admission:     Prior to Admission medications    Medication Sig Start Date End Date Taking? Authorizing Provider   furosemide (LASIX) 20 MG tablet Take 1 tablet by mouth daily for 10 days 9/20/21 9/30/21 Yes Arnold Andrea PA-C        Allergies:     Patient has no known allergies.     Social History: Tobacco:    reports that she has been smoking. She has a 11.25 pack-year smoking history. She has never used smokeless tobacco.  Alcohol:      reports previous alcohol use. Drug Use:  reports previous drug use. Family History:     Family History   Problem Relation Age of Onset    Cancer Mother     Diabetes Paternal Grandfather        Review of Systems:     Positive and Negative as described in HPI. CONSTITUTIONAL:  negative for fevers, chills, sweats, fatigue, weight loss  HEENT:  negative for vision, hearing changes, runny nose, throat pain  RESPIRATORY:  negative for shortness of breath, cough, congestion, wheezing. CARDIOVASCULAR:  negative for chest pain, palpitations. GASTROINTESTINAL:  negative for nausea, vomiting, diarrhea, constipation, change in bowel habits, abdominal pain   GENITOURINARY:  negative for difficulty of urination, burning with urination, frequency   INTEGUMENT: Itching rash all legs and arms HEMATOLOGIC/LYMPHATIC:  negative for swelling/edema   ALLERGIC/IMMUNOLOGIC:  negative for urticaria , itching  ENDOCRINE:  negative increase in drinking, increase in urination, hot or cold intolerance  MUSCULOSKELETAL:  negative joint pains, muscle aches, swelling of joints  NEUROLOGICAL:  negative for headaches, dizziness, lightheadedness, numbness, pain, tingling extremities  BEHAVIOR/PSYCH: Depression tearful    Physical Exam:     /71   Pulse 87   Temp 97.8 °F (36.6 °C) (Temporal)   Resp 14   Ht 5' 7\" (1.702 m)   Wt (!) 350 lb (158.8 kg)   SpO2 96%   BMI 54.82 kg/m²   Temp (24hrs), Av °F (36.7 °C), Min:97.8 °F (36.6 °C), Max:98.1 °F (36.7 °C)    No results for input(s): POCGLU in the last 72 hours.   No intake or output data in the 24 hours ending 21 1126  Very poor personal hygiene foul-smelling morbidly obese  General Appearance:  alert, well appearing, and in no acute distress  Mental status: oriented to person, place, and time with normal affect  Head: normocephalic, atraumatic. Eye: no icterus, redness, pupils equal and reactive, extraocular eye movements intact, conjunctiva clear  Ear: normal external ear, no discharge, hearing intact  Nose:  no drainage noted  Mouth: mucous membranes moist  Neck: supple, no carotid bruits, thyroid not palpable  Lungs: Bilateral equal air entry, clear to ausculation, no wheezing, rales or rhonchi, normal effort  Cardiovascular: normal rate, regular rhythm, no murmur, gallop, rub. Abdomen: Soft, nontender, nondistended, normal bowel sounds, no hepatomegaly or splenomegaly  Neurologic: There are no new focal motor or sensory deficits, normal muscle tone and bulk, no abnormal sensation, normal speech, cranial nerves II through XII grossly intact  Skin: No gross lesions, rashes, bruising or bleeding on exposed skin area  Extremities:  peripheral pulses palpable, no pedal edema or calf pain with palpation  Diffuse skin excoriation noted in both upper and lower extremities  Psych: Flat affect    Investigations:      Laboratory Testing:  No results found for this or any previous visit (from the past 24 hour(s)).         Consultations:   IP CONSULT TO INTERNAL MEDICINE  IP CONSULT TO INTERNAL MEDICINE  Assessment :      Primary Problem  Major depressive disorder, recurrent, severe without psychotic behavior ([address]    Active Hospital Problems    Diagnosis Date Noted    Severe recurrent major depression without psychotic features ([facility] [address] [F33.2] 09/21/2021    Major depressive disorder, recurrent, severe without psychotic behavior ([facility] [address] [F33.2] 05/22/2021    Depression with suicidal ideation [F32.9, R45.851] 05/21/2021    Healing pressure ulcer, stage IV ([facility] [address] [L89.94] 02/17/2021    Peripheral artery disease ([facility] [address] [I73.9] 01/21/2021    Class 4 congestive heart failure, acute on chronic, systolic (HCC) [A29.40] 99/24/4175    Chronic obstructive pulmonary disease ([facility] [address] [J44.9] 10/29/2019    Class 3 severe obesity with serious comorbidity and body mass index (BMI) of 50.0 to 59.9 in adult [facility]) [E66.01, Z68.43] 10/29/2019    Essential hypertension [I10] 10/29/2019    Chronic heart failure with preserved ejection fraction ([facility] [address] [I50.32] 10/25/2019       Plan:     49-year-old  lady morbidly obese history of diastolic congestive heart failure  History of COPD with obesity hypoventilation syndrome possible sleep apnea  Diffuse skin excoriation both arms and legs patient is foul-smelling  Possible bedbugs versus scabies  Order for bath to be given to patient and then apply permethrin cream head to toe  Wound and stoma consult to evaluate for decubitus ulcer  Given patient body weight and bedbound at high risk of DVT and PE will start Eliquis 2.5 mg twice a day  bnp elevated  Lasix 20 mg daily    Angela Doshi MD  9/24/2021  11:26 AM    Copy sent to Dr. Stacey Cherry, APRN - CNP    Please note that this chart was generated using voice recognition Dragon dictation software. Although every effort was made to ensure the accuracy of this automated transcription, some errors in transcription may have occurred.

## 2021-09-24 NOTE — GROUP NOTE
Group Therapy Note    Date: 9/24/2021    Group Start Time: 1100  Group End Time: 1140  Group Topic: Cognitive Skills    MALENA Munoz      Patient declined to attend social skills group at 1100 despite encouragement from staff. 1:1 talk time offered by staff as alternative to group session.       Signature:  Yessi Munoz

## 2021-09-24 NOTE — CARE COORDINATION
SW reviewed the [employer] website for status on the PASRR that was filed and status is still showing \"referred\"

## 2021-09-24 NOTE — PLAN OF CARE
Problem: Altered Mood, Depressive Behavior:  Goal: Ability to disclose and discuss suicidal ideas will improve  Description: Ability to disclose and discuss suicidal ideas will improve  9/24/2021 1333 by TOM MimsS  Outcome: Ongoing  Note: Patient denies suicidal ideation at this time. Patient agrees to seek out staff is thoughts to harm self should arise. Patient admits to anxiety and depression. Patient states she has no appetite and poor sleep at this time. Patient is isolative to room but states she can care for self independently. Patient is medication compliant. Problem: Altered Mood, Depressive Behavior:  Goal: Absence of self-harm  Description: Absence of self-harm  9/24/2021 1333 by GEORGETTE Mims  Outcome: Ongoing  Note: Patient is free from self harm at this time. Patient states she is willing to seek out staff if thoughts to harm self should arise.

## 2021-09-24 NOTE — GROUP NOTE
Group Therapy Note    Date: 9/24/2021    Group Start Time: 1330  Group End Time: 5682  Group Topic: Psychoeducation    [facility]    Jennifer Oviedo      Patient declined to attend social skills group at 1330 despite encouragement from staff. 1:1 talk time offered by staff as alternative to group session.       Signature:  Efrain Yadav

## 2021-09-24 NOTE — PROGRESS NOTES
[address]  Date: 21  Patient Name: Briseida Mckeon       Room: 5258/6155-86  MRN: [id number]   Account #: [de-identified]    : 1960  (64 y.o.)  Gender: female   Referring Practitioner: KIRK Larsen CNP  Diagnosis: major depressive disorder             REASON FOR MISSED TREATMENT:  Patient refusal   -   9903- pt sleeping in bed upon arrival and declines therapy at this time. will continue to follow.         ROXANN Klein

## 2021-09-24 NOTE — GROUP NOTE
Group Therapy Note    Date: 9/24/2021    Group Start Time: 0900  Group End Time: 0920  Group Topic: Community Meeting    [facility]    Patient refused to attend community meeting and goal setting group at 0900 after encouragement from staff. 1:1 talk time offered by staff as alternative to group session.

## 2021-09-24 NOTE — PLAN OF CARE
Problem: Altered Mood, Depressive Behavior:  Goal: Ability to disclose and discuss suicidal ideas will improve  Description: Ability to disclose and discuss suicidal ideas will improve  9/24/2021 0329 by Minda Garsia LPN  Outcome: Ongoing   Patient denies suicidal ideas at this time. Problem: Altered Mood, Depressive Behavior:  Goal: Absence of self-harm  Description: Absence of self-harm  9/24/2021 0329 by Minda Garsia LPN  Outcome: Ongoing   Patient denies self harm at this time. No sign f self harm noted. Problem: Pain:  Goal: Pain level will decrease  Description: Pain level will decrease  9/24/2021 0329 by Minda Garsia LPN  Outcome: Ongoing   Patient reports pain level has decreased. Problem: Falls - Risk of:  Goal: Will remain free from falls  Description: Will remain free from falls  9/24/2021 0329 by Minda Garsia LPN  Outcome: Ongoing   Patient remained free from falls during this shift. Will continue to monitor for safety every 15 minutes and provide support as needed.

## 2021-09-24 NOTE — PROGRESS NOTES
ideations, without current plan or intent, contracts for safety on the unit. Delusions: No evidence of delusions. Denies paranoia. Perceptual Disturbance: Patient does not appear to be responding to internal stimuli. Denies auditory hallucinations. Denies visual hallucinations. Cognition: Oriented to self, location, time, and situation. Memory: Intact. Insight & Judgement: Poor. Data   height is 5' 7\" (1.702 m) and weight is 350 lb (158.8 kg) (abnormal). Her temporal temperature is 97.8 °F (36.6 °C). Her blood pressure is 121/71 and her pulse is 87. Her respiration is 14 and oxygen saturation is 96%.    Labs:   Admission on 09/20/2021   Component Date Value Ref Range Status    WBC 09/20/2021 8.4  3.5 - 11.0 k/uL Final    RBC 09/20/2021 3.80* 4.0 - 5.2 m/uL Final    Hemoglobin 09/20/2021 11.6* 12.0 - 16.0 g/dL Final    Hematocrit 09/20/2021 35.7* 36 - 46 % Final    MCV 09/20/2021 94.0  80 - 100 fL Final    MCH 09/20/2021 30.4  26 - 34 pg Final    MCHC 09/20/2021 32.4  31 - 37 g/dL Final    RDW 09/20/2021 15.2* 11.5 - 14.9 % Final    Platelets 17/28/6650 485* 150 - 450 k/uL Final    MPV 09/20/2021 7.4  6.0 - 12.0 fL Final    NRBC Automated 09/20/2021 NOT REPORTED  per 100 WBC Final    Differential Type 09/20/2021 NOT REPORTED   Final    Seg Neutrophils 09/20/2021 68* 36 - 66 % Final    Lymphocytes 09/20/2021 14* 24 - 44 % Final    Monocytes 09/20/2021 7  1 - 7 % Final    Eosinophils % 09/20/2021 10* 0 - 4 % Final    Basophils 09/20/2021 1  0 - 2 % Final    Immature Granulocytes 09/20/2021 NOT REPORTED  0 % Final    Segs Absolute 09/20/2021 5.80  1.3 - 9.1 k/uL Final    Absolute Lymph # 09/20/2021 1.10  1.0 - 4.8 k/uL Final    Absolute Mono # 09/20/2021 0.50  0.1 - 1.3 k/uL Final    Absolute Eos # 09/20/2021 0.90* 0.0 - 0.4 k/uL Final    Basophils Absolute 09/20/2021 0.10  0.0 - 0.2 k/uL Final    Absolute Immature Granulocyte 09/20/2021 NOT REPORTED  0.00 - 0.30 k/uL Final    WBC Morphology 09/20/2021 NOT REPORTED   Final    RBC Morphology 09/20/2021 NOT REPORTED   Final    Platelet Estimate 46/25/8247 NOT REPORTED   Final    Glucose 09/20/2021 110* 70 - 99 mg/dL Final    BUN 09/20/2021 22  8 - 23 mg/dL Final    CREATININE 09/20/2021 0.84  0.50 - 0.90 mg/dL Final    Bun/Cre Ratio 09/20/2021 NOT REPORTED  9 - 20 Final    Calcium 09/20/2021 9.3  8.6 - 10.4 mg/dL Final    Sodium 09/20/2021 142  135 - 144 mmol/L Final    Potassium 09/20/2021 4.3  3.7 - 5.3 mmol/L Final    Chloride 09/20/2021 103  98 - 107 mmol/L Final    CO2 09/20/2021 26  20 - 31 mmol/L Final    Anion Gap 09/20/2021 13  9 - 17 mmol/L Final    Alkaline Phosphatase 09/20/2021 73  35 - 104 U/L Final    ALT 09/20/2021 9  5 - 33 U/L Final    AST 09/20/2021 12  <32 U/L Final    Total Bilirubin 09/20/2021 0.24* 0.3 - 1.2 mg/dL Final    Total Protein 09/20/2021 7.2  6.4 - 8.3 g/dL Final    Albumin 09/20/2021 4.0  3.5 - 5.2 g/dL Final    Albumin/Globulin Ratio 09/20/2021 NOT REPORTED  1.0 - 2.5 Final    GFR Non- 09/20/2021 >60  >60 mL/min Final    GFR  09/20/2021 >60  >60 mL/min Final    GFR Comment 09/20/2021        Final    Comment: Average GFR for 61-76 years old:   80 mL/min/1.73sq m  Chronic Kidney Disease:   <60 mL/min/1.73sq m  Kidney failure:   <15 mL/min/1.73sq m              eGFR calculated using average adult body mass. Additional eGFR calculator available at:        [employer]            GFR Staging 09/20/2021 NOT REPORTED   Final    D-Dimer, Quant 09/20/2021 3.51* 0.00 - 0.59 mg/L FEU Final    Comment:        When combined with a low clinical probability, a D dimer value of <0.50 mg/L FEU is   considered negative for DVT and PE (negative predictive value of 98%, sensitivity of 97%). If this test is not being used to help rule out DVT and PE, then the following reference   range should be utilized: 0.00 - 0.59 mg/L FEU.   The D-Dimer assay is intended for use as an aid in the diagnosis of venous thromboembolism   (DVT and PE) and the results should be interpreted in conjunction with the patient's medical   history, clinical presentation, and other findings. Elevated levels of D-dimer activity can be seen in any state of coagulation activation and   is not recommended in patients with therapeutic dose anticoagulant therapy for >24 hours,   fibrinolytic therapy within the previous 7 days, trauma or surgery within the previous 4   weeks,   disseminated malignancies, aortic aneurysm, sepsis, severe infections, pneumonia, severe   skin infections, liver cirrhosis, advanced age, debora                           nary disease, diabetes, and pregnancy. A very low percentage of patients with DVT may yield D-dimer results below the cutoff of   0.5 mg/L FEU. This is known to be more prevalent in patients with distal DVT.  Pro-BNP 09/20/2021 543* <300 pg/mL Final     Pro-BNP results cannot be compared to BNP results.  BNP Interpretation 09/20/2021 Pro-BNP Reference Range:   Final    Comment:       Rule Out:    <300        Grey Zone:   Age <50     300-450   Age 54-65   300-900   Age >75     300-1800  Usually represents mild to moderate HF but other cardiopulmonary causes cannot be ruled out. Rule In:   Age <50     >65   Age 54-65   >900   Age >76    >5      Troponin, High Sensitivity 09/20/2021 10  0 - 14 ng/L Final    Comment:       High Sensitivity Troponin values cannot be compared with other Troponin methodologies. Patients with high levels of Biotin oral intake (i.e >5mg/day) may have falsely decreased   Troponin levels. Samples collected within 8 hours of biotin intake may require additional   information for diagnosis.       Troponin T 09/20/2021 NOT REPORTED  <0.03 ng/mL Final    Troponin Interp 09/20/2021 NOT REPORTED   Final    Ventricular Rate 09/20/2021 89  BPM Final    Atrial Rate 09/20/2021 89  BPM Final    P-R Interval 09/20/2021 168  ms Final    QRS Duration 09/20/2021 98  ms Final    Q-T Interval 09/20/2021 406  ms Final    QTc Calculation (Bazett) 09/20/2021 493  ms Final    P Axis 09/20/2021 71  degrees Final    R Axis 09/20/2021 -47  degrees Final    T Axis 09/20/2021 71  degrees Final    Specimen Description 09/20/2021 . NASOPHARYNGEAL SWAB   Final    SARS-CoV-2, Rapid 09/20/2021 Not Detected  Not Detected Final    Comment:       Rapid NAAT:  The specimen is NEGATIVE for SARS-CoV-2, the novel coronavirus associated with   COVID-19. The ID NOW COVID-19 assay is designed to detect the virus that causes COVID-19 in patients   with signs and symptoms of infection who are suspected of COVID-19. An individual without symptoms of COVID-19 and who is not shedding SARS-CoV-2 virus would   expect to have a negative (not detected) result in this assay. Negative results should be treated as presumptive and, if inconsistent with clinical signs   and symptoms or necessary for patient management,  should be tested with an alternative molecular assay. Negative results do not preclude   SARS-CoV-2 infection and   should not be used as the sole basis for patient management decisions. Fact sheet for Healthcare Providers: Leroy.es  Fact sheet for Patients: Leroy.es          Methodology: Isothermal Nucleic Acid Amplification      Pro-BNP 09/22/2021 682* <300 pg/mL Final     Pro-BNP results cannot be compared to BNP results.  BNP Interpretation 09/22/2021 Pro-BNP Reference Range:   Final    Comment:       Rule Out:    <300        Grey Zone:   Age <50     300-450   Age 54-65   300-900   Age >75     300-1800  Usually represents mild to moderate HF but other cardiopulmonary causes cannot be ruled out.         Rule In:   Age <50     >65   Age 54-65   >900   Age >76    >5           Reviewed patient's current plan of care and vital signs with nursing staff. · Previous levated blood pressure trends noted. Internal medicine consult was previously ordered. Labs reviewed: [x] Yes  Last EKG in EMR reviewed: [x] Yes  QTc: 493. Medications  Current Facility-Administered Medications: miconazole (MICOTIN) 2 % powder, , Topical, BID  cefUROXime (CEFTIN) tablet 500 mg, 500 mg, Oral, 2 times per day  furosemide (LASIX) tablet 20 mg, 20 mg, Oral, Daily  apixaban (ELIQUIS) tablet 2.5 mg, 2.5 mg, Oral, BID  PARoxetine (PAXIL) tablet 20 mg, 20 mg, Oral, Daily  Calamine 8-8 % lotion, , Topical, PRN  acetaminophen (TYLENOL) tablet 650 mg, 650 mg, Oral, Q4H PRN  aluminum & magnesium hydroxide-simethicone (MAALOX) 200-200-20 MG/5ML suspension 30 mL, 30 mL, Oral, Q6H PRN  hydrOXYzine (ATARAX) tablet 50 mg, 50 mg, Oral, TID PRN  ibuprofen (ADVIL;MOTRIN) tablet 400 mg, 400 mg, Oral, Q6H PRN  traZODone (DESYREL) tablet 50 mg, 50 mg, Oral, Nightly PRN  polyethylene glycol (GLYCOLAX) packet 17 g, 17 g, Oral, Daily PRN  nicotine polacrilex (NICORETTE) gum 2 mg, 2 mg, Oral, Q1H PRN  haloperidol (HALDOL) tablet 5 mg, 5 mg, Oral, Q4H PRN **AND** LORazepam (ATIVAN) tablet 2 mg, 2 mg, Oral, Q4H PRN  haloperidol lactate (HALDOL) injection 5 mg, 5 mg, IntraMUSCular, Q4H PRN **AND** LORazepam (ATIVAN) injection 2 mg, 2 mg, IntraMUSCular, Q4H PRN **AND** diphenhydrAMINE (BENADRYL) injection 50 mg, 50 mg, IntraMUSCular, Q4H PRN    ASSESSMENT  Major depressive disorder, recurrent, severe without psychotic behavior ([facility] [address]         PLAN  Patient symptoms show: Minimal improvement. Internal medicine consult was previously ordered. Consider: Increase in Paxil dose. New order: Mariposa Li to use wheelchair. Continue current medication regimen. Monitor need and frequency of PRN medications. Encourage participation in groups and milieu. Attempt to develop insight. Psycho-education conducted. Supportive Therapy conducted.   Probable discharge is to be determined by MD. Follow-up daily while inpatient. Patient continues to be monitored in the inpatient psychiatric facility at [facility] for safety and stabilization. Patient continues to need, on a daily basis, active treatment furnished directly by or requiring the supervision of inpatient psychiatric personnel. Electronically signed by KIRK Badillo CNP on 9/24/2021 at 2:51 PM    **This report has been created using voice recognition software. It may contain minor errors which are inherent in voice recognition technology. **    I independently saw and evaluated the patient. I reviewed the nurse practitioners documentation above. Any additional comments or changes to the nurse practitioners documentation are stated below otherwise agree with assessment. Plan will be as follows:  Patient still extremely depressed. Not able to contract for safety off the unit. Is able to maintain safety on the unit. Denying side effects to medication. May consider titration of Paxil on Sunday if she continues to tolerate medication well. Encouraging participation in groups and milieu. She is receiving multiple medical care as well for excoriations  PLAN  Patient s symptoms   show modest improvement  Consider increase in Paxil on Sunday if tolerating well  Attempt to develop insight  Psycho-education conducted. Supportive Therapy conducted.   Probable discharge is next week  Follow-up daily while on inpatient unit

## 2021-09-25 PROBLEM — Z91.89 AT HIGH RISK FOR DEEP VENOUS THROMBOSIS: Status: ACTIVE | Noted: 2021-09-25

## 2021-09-25 PROBLEM — R21 RASH OF BODY: Status: ACTIVE | Noted: 2021-09-25

## 2021-09-25 PROBLEM — B86 SCABIES: Status: ACTIVE | Noted: 2021-09-25

## 2021-09-25 PROBLEM — Z79.01 ON ANTICOAGULANT THERAPY: Status: ACTIVE | Noted: 2021-09-25

## 2021-09-25 PROCEDURE — 6370000000 HC RX 637 (ALT 250 FOR IP): Performed by: INTERNAL MEDICINE

## 2021-09-25 PROCEDURE — 99231 SBSQ HOSP IP/OBS SF/LOW 25: CPT | Performed by: PSYCHIATRY & NEUROLOGY

## 2021-09-25 PROCEDURE — 1240000000 HC EMOTIONAL WELLNESS R&B

## 2021-09-25 PROCEDURE — 6370000000 HC RX 637 (ALT 250 FOR IP): Performed by: PSYCHIATRY & NEUROLOGY

## 2021-09-25 PROCEDURE — 99232 SBSQ HOSP IP/OBS MODERATE 35: CPT | Performed by: INTERNAL MEDICINE

## 2021-09-25 RX ORDER — IVERMECTIN 3 MG/1
150 TABLET ORAL ONCE
Status: COMPLETED | OUTPATIENT
Start: 2021-09-25 | End: 2021-09-25

## 2021-09-25 RX ADMIN — PAROXETINE HYDROCHLORIDE 20 MG: 20 TABLET, FILM COATED ORAL at 08:14

## 2021-09-25 RX ADMIN — FUROSEMIDE 20 MG: 20 TABLET ORAL at 08:14

## 2021-09-25 RX ADMIN — CEFUROXIME AXETIL 500 MG: 250 TABLET ORAL at 22:21

## 2021-09-25 RX ADMIN — IVERMECTIN 24 MG: 3 TABLET ORAL at 17:35

## 2021-09-25 RX ADMIN — APIXABAN 2.5 MG: 2.5 TABLET, FILM COATED ORAL at 08:14

## 2021-09-25 RX ADMIN — CEFUROXIME AXETIL 500 MG: 250 TABLET ORAL at 08:14

## 2021-09-25 RX ADMIN — TRAZODONE HYDROCHLORIDE 50 MG: 50 TABLET ORAL at 22:21

## 2021-09-25 RX ADMIN — APIXABAN 2.5 MG: 2.5 TABLET, FILM COATED ORAL at 22:21

## 2021-09-25 RX ADMIN — HYDROXYZINE HYDROCHLORIDE 50 MG: 50 TABLET, FILM COATED ORAL at 22:21

## 2021-09-25 RX ADMIN — ANTI-FUNGAL POWDER MICONAZOLE NITRATE TALC FREE: 1.42 POWDER TOPICAL at 08:14

## 2021-09-25 RX ADMIN — ANTI-FUNGAL POWDER MICONAZOLE NITRATE TALC FREE: 1.42 POWDER TOPICAL at 22:21

## 2021-09-25 NOTE — PLAN OF CARE
Problem: Altered Mood, Depressive Behavior:  Goal: Ability to disclose and discuss suicidal ideas will improve  Description: Ability to disclose and discuss suicidal ideas will improve  9/24/2021 2328 by Laury Sigala  Outcome: Ongoing  9/24/2021 1333 by GEORGETTE Phillips  Outcome: Ongoing  Note: Patient denies suicidal ideation at this time. Patient agrees to seek out staff is thoughts to harm self should arise. Patient admits to anxiety and depression. Patient states she has no appetite and poor sleep at this time. Patient is isolative to room but states she can care for self independently. Patient is medication compliant. Pateint denies current suicidal thoughts  Problem: Altered Mood, Depressive Behavior:  Goal: Absence of self-harm  Description: Absence of self-harm  9/24/2021 2328 by Nick Sigala  Outcome: Ongoing  9/24/2021 1333 by GEORGETTE Phillips  Outcome: Ongoing  Note: Patient is free from self harm at this time. Patient states she is willing to seek out staff if thoughts to harm self should arise.     No self-harm thoughts or behaviors

## 2021-09-25 NOTE — PROGRESS NOTES
[facility] Daily Progress Note  9/25/2021    Patient Name: Aleksander Yap COMPLAINT:  Suicidal ideation with plan to overdose on medications. SUBJECTIVE:      Patient is seen today for a follow up assessment. Medication Adherence: Patient has been medication compliant. Emergency Medications: Patient has not required any emergency medications today. Patient was resting in her room at the time of approach by writer. She endorsed depression and anxiety today. She reports her appetite has been decreased. She reports adequate sleep last night, but reports it was nonrestorative. She denies any difficulty falling asleep or any difficulty staying asleep last night. She endorses increased sleep. She endorses active suicidal ideation, without intent or plans. She denies homicidal ideation, intent, or plans. She contracts for safety on the unit. She denies auditory hallucinations, visual hallucinations, paranoia, or delusions. She denies any medication side effects or medical concerns at the time of assessment. Writer encouraged patient to attend groups on the unit. At this time, the patient is not appropriate for a lower level of care. There is risk of decompensation and patient warrants further hospitalization for safety and stabilization. Group Attendance on Unit:   [] Yes  [] Selectively    [x] No       Mental Status Exam  Level of consciousness: Alert and awake. Appearance: Appropriate attire for setting, resting in bed, with poor grooming and hygiene. Behavior/Motor: Approachable, psychomotor slowing noted. Attitude toward examiner: Cooperative, attentive, intermittent eye contact. Speech: Normal rate, normal volume, normal tone. Mood:  Patient reports \"not great\". Affect: Blunted. Thought processes: Linear and coherent. Thought content: Denies homicidal ideation. Suicidal Ideation: Active suicidal ideations, without current plan or intent, contracts for safety on the unit. Delusions: No evidence of delusions. Denies paranoia. Perceptual Disturbance: Patient does not appear to be responding to internal stimuli. Denies auditory hallucinations. Denies visual hallucinations. Cognition: Oriented to self, location, time, and situation. Memory: Intact. Insight & Judgement: Poor. Data   height is 5' 7\" (1.702 m) and weight is 350 lb (158.8 kg) (abnormal). Her oral temperature is 97.6 °F (36.4 °C). Her blood pressure is 122/76 and her pulse is 82. Her respiration is 16 and oxygen saturation is 96%.    Labs:   Admission on 09/20/2021   Component Date Value Ref Range Status    WBC 09/20/2021 8.4  3.5 - 11.0 k/uL Final    RBC 09/20/2021 3.80* 4.0 - 5.2 m/uL Final    Hemoglobin 09/20/2021 11.6* 12.0 - 16.0 g/dL Final    Hematocrit 09/20/2021 35.7* 36 - 46 % Final    MCV 09/20/2021 94.0  80 - 100 fL Final    MCH 09/20/2021 30.4  26 - 34 pg Final    MCHC 09/20/2021 32.4  31 - 37 g/dL Final    RDW 09/20/2021 15.2* 11.5 - 14.9 % Final    Platelets 11/54/0579 485* 150 - 450 k/uL Final    MPV 09/20/2021 7.4  6.0 - 12.0 fL Final    NRBC Automated 09/20/2021 NOT REPORTED  per 100 WBC Final    Differential Type 09/20/2021 NOT REPORTED   Final    Seg Neutrophils 09/20/2021 68* 36 - 66 % Final    Lymphocytes 09/20/2021 14* 24 - 44 % Final    Monocytes 09/20/2021 7  1 - 7 % Final    Eosinophils % 09/20/2021 10* 0 - 4 % Final    Basophils 09/20/2021 1  0 - 2 % Final    Immature Granulocytes 09/20/2021 NOT REPORTED  0 % Final    Segs Absolute 09/20/2021 5.80  1.3 - 9.1 k/uL Final    Absolute Lymph # 09/20/2021 1.10  1.0 - 4.8 k/uL Final    Absolute Mono # 09/20/2021 0.50  0.1 - 1.3 k/uL Final    Absolute Eos # 09/20/2021 0.90* 0.0 - 0.4 k/uL Final    Basophils Absolute 09/20/2021 0.10  0.0 - 0.2 k/uL Final    Absolute Immature Granulocyte 09/20/2021 NOT REPORTED  0.00 - 0.30 k/uL Final    WBC Morphology 09/20/2021 NOT REPORTED   Final    RBC Morphology 09/20/2021 NOT REPORTED Final    Platelet Estimate 25/66/8592 NOT REPORTED   Final    Glucose 09/20/2021 110* 70 - 99 mg/dL Final    BUN 09/20/2021 22  8 - 23 mg/dL Final    CREATININE 09/20/2021 0.84  0.50 - 0.90 mg/dL Final    Bun/Cre Ratio 09/20/2021 NOT REPORTED  9 - 20 Final    Calcium 09/20/2021 9.3  8.6 - 10.4 mg/dL Final    Sodium 09/20/2021 142  135 - 144 mmol/L Final    Potassium 09/20/2021 4.3  3.7 - 5.3 mmol/L Final    Chloride 09/20/2021 103  98 - 107 mmol/L Final    CO2 09/20/2021 26  20 - 31 mmol/L Final    Anion Gap 09/20/2021 13  9 - 17 mmol/L Final    Alkaline Phosphatase 09/20/2021 73  35 - 104 U/L Final    ALT 09/20/2021 9  5 - 33 U/L Final    AST 09/20/2021 12  <32 U/L Final    Total Bilirubin 09/20/2021 0.24* 0.3 - 1.2 mg/dL Final    Total Protein 09/20/2021 7.2  6.4 - 8.3 g/dL Final    Albumin 09/20/2021 4.0  3.5 - 5.2 g/dL Final    Albumin/Globulin Ratio 09/20/2021 NOT REPORTED  1.0 - 2.5 Final    GFR Non- 09/20/2021 >60  >60 mL/min Final    GFR  09/20/2021 >60  >60 mL/min Final    GFR Comment 09/20/2021        Final    Comment: Average GFR for 61-76 years old:   80 mL/min/1.73sq m  Chronic Kidney Disease:   <60 mL/min/1.73sq m  Kidney failure:   <15 mL/min/1.73sq m              eGFR calculated using average adult body mass. Additional eGFR calculator available at:        [employer]            GFR Staging 09/20/2021 NOT REPORTED   Final    D-Dimer, Quant 09/20/2021 3.51* 0.00 - 0.59 mg/L FEU Final    Comment:        When combined with a low clinical probability, a D dimer value of <0.50 mg/L FEU is   considered negative for DVT and PE (negative predictive value of 98%, sensitivity of 97%). If this test is not being used to help rule out DVT and PE, then the following reference   range should be utilized: 0.00 - 0.59 mg/L FEU.   The D-Dimer assay is intended for use as an aid in the diagnosis of venous thromboembolism   (DVT Yes  QTc: 493. Medications  Current Facility-Administered Medications: ivermectin tablet 24 mg, 150 mcg/kg, Oral, Once  miconazole (MICOTIN) 2 % powder, , Topical, BID  cefUROXime (CEFTIN) tablet 500 mg, 500 mg, Oral, 2 times per day  furosemide (LASIX) tablet 20 mg, 20 mg, Oral, Daily  apixaban (ELIQUIS) tablet 2.5 mg, 2.5 mg, Oral, BID  PARoxetine (PAXIL) tablet 20 mg, 20 mg, Oral, Daily  Calamine 8-8 % lotion, , Topical, PRN  acetaminophen (TYLENOL) tablet 650 mg, 650 mg, Oral, Q4H PRN  aluminum & magnesium hydroxide-simethicone (MAALOX) 200-200-20 MG/5ML suspension 30 mL, 30 mL, Oral, Q6H PRN  hydrOXYzine (ATARAX) tablet 50 mg, 50 mg, Oral, TID PRN  ibuprofen (ADVIL;MOTRIN) tablet 400 mg, 400 mg, Oral, Q6H PRN  traZODone (DESYREL) tablet 50 mg, 50 mg, Oral, Nightly PRN  polyethylene glycol (GLYCOLAX) packet 17 g, 17 g, Oral, Daily PRN  nicotine polacrilex (NICORETTE) gum 2 mg, 2 mg, Oral, Q1H PRN  haloperidol (HALDOL) tablet 5 mg, 5 mg, Oral, Q4H PRN **AND** LORazepam (ATIVAN) tablet 2 mg, 2 mg, Oral, Q4H PRN  haloperidol lactate (HALDOL) injection 5 mg, 5 mg, IntraMUSCular, Q4H PRN **AND** LORazepam (ATIVAN) injection 2 mg, 2 mg, IntraMUSCular, Q4H PRN **AND** diphenhydrAMINE (BENADRYL) injection 50 mg, 50 mg, IntraMUSCular, Q4H PRN    ASSESSMENT  Major depressive disorder, recurrent, severe without psychotic behavior ([facility] [address]         PLAN  Patient symptoms show: Minimal improvement. Internal medicine consult was previously ordered. Continue current medication regimen. Consider increase in Paxil dose on Sunday if patient continues to tolerate medication well. Monitor need and frequency of PRN medications. Encourage participation in groups and milieu. Attempt to develop insight. Psycho-education conducted. Supportive Therapy conducted. Probable discharge is to be determined by MD.   Follow-up daily while inpatient.      Patient continues to be monitored in the inpatient psychiatric facility at [facility] for safety and stabilization. Patient continues to need, on a daily basis, active treatment furnished directly by or requiring the supervision of inpatient psychiatric personnel. Electronically signed by KIRK Akbar CNP on 9/25/2021 at 3:47 PM    **This report has been created using voice recognition software. It may contain minor errors which are inherent in voice recognition technology. **

## 2021-09-25 NOTE — CONSULTS
[facility]    CONSULTATION / HISTORY AND PHYSICAL EXAMINATION            Date:   9/25/2021  Patient name: Rolanda Chaudhry  Date of admission:  9/20/2021 12:34 PM  MRN:   [id number]  Account:  [de-identified]  YOB: 1960  PCP:    KIRK Amaro CNP  Room:   [address] [place] [address]  Code Status:    Full Code    Physician Requesting Consult: Micheal Waldrop MD    Reason for Consult:  medical management    Chief Complaint:     Chief Complaint   Patient presents with    Leg Swelling   Diffuse rash in arms and legs concern for bedbugs    History Obtained From:     Patient medical record nursing staff    History of Present Illness:     24-year-old lady with history of smoking history of COPD chronic  diastolic congestive heart failure  Bedbound suffering from depression admitted to mental health unit medical consult for diffuse skin excoriation congestive heart failure hypertension COPD  Denies any fever chills positive for depression positive for dyspnea positive for poor mobility positive for bedbugs    Past Medical History:     Past Medical History:   Diagnosis Date    Arthritis     [facility]congest[facility]     COPD (chronic obstructive pulmonary disease) ([facility]     Diverticulitis     Hx of blood clots     Pulmonary embolism ([facility]         Past Surgical History:     Past Surgical History:   Procedure Laterality Date    ANKLE SURGERY      COLON SURGERY      FOOT SURGERY Left 1/22/2021    FOOT BONE BIOPSY W/ INCISION & DRAINAGE AND REDRESSING ON RIGHT FOOT performed by Helena Lloyd DPM at [facility].          Medications Prior to Admission:     Prior to Admission medications    Medication Sig Start Date End Date Taking? Authorizing Provider   furosemide (LASIX) 20 MG tablet Take 1 tablet by mouth daily for 10 days 9/20/21 9/30/21 Yes Jarrett Macario PA-C        Allergies:     Patient has no known allergies.     Social History: Tobacco:    reports that she has been smoking. She has a 11.25 pack-year smoking history. She has never used smokeless tobacco.  Alcohol:      reports previous alcohol use. Drug Use:  reports previous drug use. Family History:     Family History   Problem Relation Age of Onset    Cancer Mother     Diabetes Paternal Grandfather        Review of Systems:     Positive and Negative as described in HPI. CONSTITUTIONAL:  negative for fevers, chills, sweats, fatigue, weight loss  HEENT:  negative for vision, hearing changes, runny nose, throat pain  RESPIRATORY:  negative for shortness of breath, cough, congestion, wheezing. CARDIOVASCULAR:  negative for chest pain, palpitations. GASTROINTESTINAL:  negative for nausea, vomiting, diarrhea, constipation, change in bowel habits, abdominal pain   GENITOURINARY:  negative for difficulty of urination, burning with urination, frequency   INTEGUMENT: Itching rash all legs and arms HEMATOLOGIC/LYMPHATIC:  negative for swelling/edema   ALLERGIC/IMMUNOLOGIC:  negative for urticaria , itching  ENDOCRINE:  negative increase in drinking, increase in urination, hot or cold intolerance  MUSCULOSKELETAL:  negative joint pains, muscle aches, swelling of joints  NEUROLOGICAL:  negative for headaches, dizziness, lightheadedness, numbness, pain, tingling extremities  BEHAVIOR/PSYCH: Depression tearful    Physical Exam:     /76   Pulse 82   Temp 97.6 °F (36.4 °C) (Oral)   Resp 16   Ht 5' 7\" (1.702 m)   Wt (!) 350 lb (158.8 kg)   SpO2 96%   BMI 54.82 kg/m²   Temp (24hrs), Av.9 °F (36.6 °C), Min:97.6 °F (36.4 °C), Max:98.2 °F (36.8 °C)    No results for input(s): POCGLU in the last 72 hours.   No intake or output data in the 24 hours ending 21 1418  Very poor personal hygiene foul-smelling morbidly obese  General Appearance:  alert, well appearing, and in no acute distress  Mental status: oriented to person, place, and time with normal affect  Head: normocephalic, atraumatic. Eye: no icterus, redness, pupils equal and reactive, extraocular eye movements intact, conjunctiva clear  Ear: normal external ear, no discharge, hearing intact  Nose:  no drainage noted  Mouth: mucous membranes moist  Neck: supple, no carotid bruits, thyroid not palpable  Lungs: Bilateral equal air entry, clear to ausculation, no wheezing, rales or rhonchi, normal effort  Cardiovascular: normal rate, regular rhythm, no murmur, gallop, rub. Abdomen: Soft, nontender, nondistended, normal bowel sounds, no hepatomegaly or splenomegaly  Neurologic: There are no new focal motor or sensory deficits, normal muscle tone and bulk, no abnormal sensation, normal speech, cranial nerves II through XII grossly intact  Skin:  Extremities:  peripheral pulses palpable, no pedal edema or calf pain with palpation  Diffuse skin excoriation noted in both upper and lower extremities  Psych: Flat affect  Rash consistent with scabies      Investigations:      Laboratory Testing:  No results found for this or any previous visit (from the past 24 hour(s)).         Consultations:   IP CONSULT TO INTERNAL MEDICINE  IP CONSULT TO INTERNAL MEDICINE  Assessment :      Primary Problem  Major depressive disorder, recurrent, severe without psychotic behavior [facility])    Active Hospital Problems    Diagnosis Date Noted    Rash of body [R21] 09/25/2021    Severe recurrent major depression without psychotic features ([facility] [F33.2] 09/21/2021    Major depressive disorder, recurrent, severe without psychotic behavior ([address] [F33.2] 05/22/2021    Depression with suicidal ideation [F32.9, R45.851] 05/21/2021    Healing pressure ulcer, stage IV ([facility] [L89.94] 02/17/2021    Peripheral artery disease ([facility] [I73.9] 01/21/2021    Class 4 congestive heart failure, acute on chronic, systolic (HCC) [G75.73] 97/51/1362    Chronic obstructive pulmonary disease ([address] [J44.9] 10/29/2019    Class 3 severe obesity with serious comorbidity and body mass index (BMI) of 50.0 to 59.9 in adult [facility]) [E66.01, Z68.43] 10/29/2019    Essential hypertension [I10] 10/29/2019    Chronic heart failure with preserved ejection fraction ([facility] [I50.32] 10/25/2019       Plan:     19-year-old  lady morbidly obese history of diastolic congestive heart failure  History of COPD with obesity hypoventilation syndrome possible sleep apnea  Diffuse skin excoriation both arms and legs patient is foul-smelling  Possible bedbugs versus scabies  Order for bath to be given to patient and then apply permethrin cream head to toe  Wound and stoma consult to evaluate for decubitus ulcer  Given patient body weight and bedbound at high risk of DVT and PE will start Eliquis 2.5 mg twice a day  bnp elevated  Lasix 20 mg daily    9/25/21    · Has scabies 1. Start ivermectin        Alondra Marmolejo MD  9/25/2021  2:18 PM    Copy sent to KIRK Aguero - CNP    Please note that this chart was generated using voice recognition Dragon dictation software. Although every effort was made to ensure the accuracy of this automated transcription, some errors in transcription may have occurred.

## 2021-09-25 NOTE — PLAN OF CARE
Problem: Altered Mood, Depressive Behavior:  Goal: Absence of self-harm  Description: Absence of self-harm  Outcome: Ongoing  Note: Patient denies thoughts to self harm, patient continues to need assistance with hygiene. Patient has minimal appetite and has not left room. She shows very little interest in socializing. Staff will continue to monitor and encourage. Problem: Altered Mood, Depressive Behavior:  Goal: Ability to disclose and discuss suicidal ideas will improve  Description: Ability to disclose and discuss suicidal ideas will improve  Outcome: Ongoing  Note: Patient denies suicidal ideation at this time.

## 2021-09-25 NOTE — GROUP NOTE
Group Therapy Note    Date: 9/25/2021    Group Start Time: 0900  Group End Time: 0930  Group Topic: Community Meeting    MALENA Wilkinson LPN        Group Therapy Note    Attendees: 4    Patient refused to attend 0900 group. Patient was proved alternatives but continued to refuse. Staff will continue to encourage.      Signature:  Alfonzo Wilkinson LPN

## 2021-09-26 PROCEDURE — 6370000000 HC RX 637 (ALT 250 FOR IP): Performed by: INTERNAL MEDICINE

## 2021-09-26 PROCEDURE — 1240000000 HC EMOTIONAL WELLNESS R&B

## 2021-09-26 PROCEDURE — 6370000000 HC RX 637 (ALT 250 FOR IP): Performed by: PSYCHIATRY & NEUROLOGY

## 2021-09-26 PROCEDURE — 99231 SBSQ HOSP IP/OBS SF/LOW 25: CPT | Performed by: INTERNAL MEDICINE

## 2021-09-26 PROCEDURE — 99232 SBSQ HOSP IP/OBS MODERATE 35: CPT | Performed by: PSYCHIATRY & NEUROLOGY

## 2021-09-26 RX ADMIN — CEFUROXIME AXETIL 500 MG: 250 TABLET ORAL at 08:44

## 2021-09-26 RX ADMIN — APIXABAN 2.5 MG: 2.5 TABLET, FILM COATED ORAL at 08:44

## 2021-09-26 RX ADMIN — HYDROXYZINE HYDROCHLORIDE 50 MG: 50 TABLET, FILM COATED ORAL at 20:42

## 2021-09-26 RX ADMIN — FUROSEMIDE 20 MG: 20 TABLET ORAL at 08:44

## 2021-09-26 RX ADMIN — PAROXETINE HYDROCHLORIDE 20 MG: 20 TABLET, FILM COATED ORAL at 08:44

## 2021-09-26 RX ADMIN — ANTI-FUNGAL POWDER MICONAZOLE NITRATE TALC FREE: 1.42 POWDER TOPICAL at 08:44

## 2021-09-26 RX ADMIN — TRAZODONE HYDROCHLORIDE 50 MG: 50 TABLET ORAL at 20:42

## 2021-09-26 RX ADMIN — CEFUROXIME AXETIL 500 MG: 250 TABLET ORAL at 20:41

## 2021-09-26 RX ADMIN — ANTI-FUNGAL POWDER MICONAZOLE NITRATE TALC FREE: 1.42 POWDER TOPICAL at 20:42

## 2021-09-26 RX ADMIN — APIXABAN 2.5 MG: 2.5 TABLET, FILM COATED ORAL at 20:42

## 2021-09-26 NOTE — CONSULTS
[facility]    CONSULTATION / HISTORY AND PHYSICAL EXAMINATION            Date:   9/26/2021  Patient name: Magdaleno Kerr  Date of admission:  9/20/2021 12:34 PM  MRN:   [id number]  Account:  [de-identified]  YOB: 1960  PCP:    KIRK Chisholm CNP  Room:   [facility]-  Code Status:    Full Code    Physician Requesting Consult: Rolando Mckoy MD    Reason for Consult:  medical management    Chief Complaint:     Chief Complaint   Patient presents with    Leg Swelling   Diffuse rash in arms and legs concern for bedbugs    History Obtained From:     Patient medical record nursing staff    History of Present Illness:     80-year-old lady with history of smoking history of COPD chronic  diastolic congestive heart failure  Bedbound suffering from depression admitted to mental health unit medical consult for diffuse skin excoriation congestive heart failure hypertension COPD  Denies any fever chills positive for depression positive for dyspnea positive for poor mobility positive for bedbugs    Past Medical History:     Past Medical History:   Diagnosis Date    Arthritis     [facility]congestive [facility]) [facility]     COPD (chronic obstructive pulmonary disease) ([facility]     Diverticulitis     Hx of blood clots     Pulmonary embolism ([facility]         Past Surgical History:     Past Surgical History:   Procedure Laterality Date    ANKLE SURGERY      COLON SURGERY      FOOT SURGERY Left 1/22/2021    FOOT BONE BIOPSY W/ INCISION & DRAINAGE AND REDRESSING ON RIGHT FOOT performed by Leana Michel DPM at [address] [place] [address]          Medications Prior to Admission:     Prior to Admission medications    Medication Sig Start Date End Date Taking? Authorizing Provider   furosemide (LASIX) 20 MG tablet Take 1 tablet by mouth daily for 10 days 9/20/21 9/30/21 Yes Alycia Rubio PA-C        Allergies:     Patient has no known allergies.     Social History: Tobacco:    reports that she has been smoking. She has a 11.25 pack-year smoking history. She has never used smokeless tobacco.  Alcohol:      reports previous alcohol use. Drug Use:  reports previous drug use. Family History:     Family History   Problem Relation Age of Onset    Cancer Mother     Diabetes Paternal Grandfather        Review of Systems:     Positive and Negative as described in HPI. CONSTITUTIONAL:  negative for fevers, chills, sweats, fatigue, weight loss  HEENT:  negative for vision, hearing changes, runny nose, throat pain  RESPIRATORY:  negative for shortness of breath, cough, congestion, wheezing. CARDIOVASCULAR:  negative for chest pain, palpitations. GASTROINTESTINAL:  negative for nausea, vomiting, diarrhea, constipation, change in bowel habits, abdominal pain   GENITOURINARY:  negative for difficulty of urination, burning with urination, frequency   INTEGUMENT: Itching rash all legs and arms HEMATOLOGIC/LYMPHATIC:  negative for swelling/edema   ALLERGIC/IMMUNOLOGIC:  negative for urticaria , itching  ENDOCRINE:  negative increase in drinking, increase in urination, hot or cold intolerance  MUSCULOSKELETAL:  negative joint pains, muscle aches, swelling of joints  NEUROLOGICAL:  negative for headaches, dizziness, lightheadedness, numbness, pain, tingling extremities  BEHAVIOR/PSYCH: Depression tearful    Physical Exam:     BP (!) 125/57   Pulse 88   Temp 97.8 °F (36.6 °C) (Oral)   Resp 14   Ht 5' 7\" (1.702 m)   Wt (!) 350 lb (158.8 kg)   SpO2 96%   BMI 54.82 kg/m²   Temp (24hrs), Av.8 °F (36.6 °C), Min:97.8 °F (36.6 °C), Max:97.8 °F (36.6 °C)    No results for input(s): POCGLU in the last 72 hours.   No intake or output data in the 24 hours ending 21 1033  Very poor personal hygiene foul-smelling morbidly obese  General Appearance:  alert, well appearing, and in no acute distress  Mental status: oriented to person, place, and time with normal affect  Head: normocephalic, atraumatic. Eye: no icterus, redness, pupils equal and reactive, extraocular eye movements intact, conjunctiva clear  Ear: normal external ear, no discharge, hearing intact  Nose:  no drainage noted  Mouth: mucous membranes moist  Neck: supple, no carotid bruits, thyroid not palpable  Lungs: Bilateral equal air entry, clear to ausculation, no wheezing, rales or rhonchi, normal effort  Cardiovascular: normal rate, regular rhythm, no murmur, gallop, rub. Abdomen: Soft, nontender, nondistended, normal bowel sounds, no hepatomegaly or splenomegaly  Neurologic: There are no new focal motor or sensory deficits, normal muscle tone and bulk, no abnormal sensation, normal speech, cranial nerves II through XII grossly intact  Skin:  Extremities:  peripheral pulses palpable, no pedal edema or calf pain with palpation  Diffuse skin excoriation noted in both upper and lower extremities  Psych: Flat affect  Rash consistent with scabies      Investigations:      Laboratory Testing:  No results found for this or any previous visit (from the past 24 hour(s)).         Consultations:   IP CONSULT TO INTERNAL MEDICINE  IP CONSULT TO INTERNAL MEDICINE  Assessment :      Primary Problem  Major depressive disorder, recurrent, severe without psychotic behavior [facility])    Active Hospital Problems    Diagnosis Date Noted    Rash of body [R21] 09/25/2021    At high risk for deep venous thrombosis [Z91.89] 09/25/2021    On anticoagulant therapy low dose eliquis [Z79.01] 09/25/2021    Scabies [B86] 09/25/2021    Peripheral edema [R60.9]     Infestation by bed bug [B88.8]     Severe recurrent major depression without psychotic features ([facility] [F33.2] 09/21/2021    Major depressive disorder, recurrent, severe without psychotic behavior ([facility] [F33.2] 05/22/2021    Depression with suicidal ideation [F32.9, R45.851] 05/21/2021    Healing pressure ulcer, stage IV ([address] [L89.94] 02/17/2021    Peripheral artery disease ([facility] [I73.9] 01/21/2021    Class 4 congestive heart failure, acute on chronic, systolic (HCC) [N28.86] 41/98/4042    Chronic obstructive pulmonary disease (HCC) [J44.9] 10/29/2019    Class 3 severe obesity with serious comorbidity and body mass index (BMI) of 50.0 to 59.9 in adult [facility]) [E66.01, Z68.43] 10/29/2019    Essential hypertension [I10] 10/29/2019    Chronic heart failure with preserved ejection fraction ([facility] [I50.32] 10/25/2019       Plan:     77-year-old  lady morbidly obese history of diastolic congestive heart failure  History of COPD with obesity hypoventilation syndrome possible sleep apnea  Diffuse skin excoriation both arms and legs patient is foul-smelling  Possible bedbugs versus scabies  Order for bath to be given to patient and then apply permethrin cream head to toe  Wound and stoma consult to evaluate for decubitus ulcer  Given patient body weight and bedbound at high risk of DVT and PE will start Eliquis 2.5 mg twice a day  bnp elevated  Lasix 20 mg daily    9/25/21    · Has scabies 1. Start ivermectin          9/26/21    · Has extensive scabies associated dermatitis with classical findings 2. Ivermectin given        Yanni Read MD  9/26/2021  10:33 AM    Copy sent to Dr. Juliet Hinojosa, APRN - CNP    Please note that this chart was generated using voice recognition Dragon dictation software. Although every effort was made to ensure the accuracy of this automated transcription, some errors in transcription may have occurred.

## 2021-09-26 NOTE — PROGRESS NOTES
[facility] Daily Progress Note  9/26/2021    Patient Name: Anastasiia العلي COMPLAINT:  Suicidal ideation with plan to overdose on medications. SUBJECTIVE:      Patient is seen today for a follow up assessment. Medication Adherence: Patient has been medication compliant. Emergency Medications: Patient has not required any emergency medications today. Patient was resting in her room at the time of approach by writer. She endorsed depression and anxiety today. She reports her appetite has been decreased. She reports adequate sleep last night, but reports it was nonrestorative. She denies any difficulty falling asleep or any difficulty staying asleep last night. She endorses increased sleep. She endorses fleeting suicidal ideation, without intent or plans. She denies homicidal ideation, intent, or plans. She contracts for safety on the unit. She denies auditory hallucinations, visual hallucinations, paranoia, or delusions. She denies any medication side effects or medical concerns at the time of assessment. Writer encouraged patient to attend groups on the unit and to participate in the milieu. Patient has a wheelchair and a walker available in her room. Writer encouraged mobility. At this time, the patient is not appropriate for a lower level of care. There is risk of decompensation and patient warrants further hospitalization for safety and stabilization. Group Attendance on Unit:   [] Yes  [] Selectively    [x] No       Mental Status Exam  Level of consciousness: Alert and awake. Appearance: Appropriate attire for setting, resting in bed, with poor grooming and hygiene. Behavior/Motor: Approachable, psychomotor slowing noted. Attitude toward examiner: Cooperative, attentive, poor eye contact. Speech: Normal rate, normal volume, normal tone. Mood:  Patient reports \"depressed\". Affect: Blunted. Thought processes: Linear and coherent.    Thought content: Denies homicidal ideation. Suicidal Ideation: Fleeting suicidal ideations, without current plan or intent, contracts for safety on the unit. Delusions: No evidence of delusions. Denies paranoia. Perceptual Disturbance: Patient does not appear to be responding to internal stimuli. Denies auditory hallucinations. Denies visual hallucinations. Cognition: Oriented to self, location, time, and situation. Memory: Intact. Insight & Judgement: Poor. Data   height is 5' 7\" (1.702 m) and weight is 350 lb (158.8 kg) (abnormal). Her oral temperature is 97.8 °F (36.6 °C). Her blood pressure is 125/57 (abnormal) and her pulse is 88. Her respiration is 14 and oxygen saturation is 96%.    Labs:   Admission on 09/20/2021   Component Date Value Ref Range Status    WBC 09/20/2021 8.4  3.5 - 11.0 k/uL Final    RBC 09/20/2021 3.80* 4.0 - 5.2 m/uL Final    Hemoglobin 09/20/2021 11.6* 12.0 - 16.0 g/dL Final    Hematocrit 09/20/2021 35.7* 36 - 46 % Final    MCV 09/20/2021 94.0  80 - 100 fL Final    MCH 09/20/2021 30.4  26 - 34 pg Final    MCHC 09/20/2021 32.4  31 - 37 g/dL Final    RDW 09/20/2021 15.2* 11.5 - 14.9 % Final    Platelets 55/30/6735 485* 150 - 450 k/uL Final    MPV 09/20/2021 7.4  6.0 - 12.0 fL Final    NRBC Automated 09/20/2021 NOT REPORTED  per 100 WBC Final    Differential Type 09/20/2021 NOT REPORTED   Final    Seg Neutrophils 09/20/2021 68* 36 - 66 % Final    Lymphocytes 09/20/2021 14* 24 - 44 % Final    Monocytes 09/20/2021 7  1 - 7 % Final    Eosinophils % 09/20/2021 10* 0 - 4 % Final    Basophils 09/20/2021 1  0 - 2 % Final    Immature Granulocytes 09/20/2021 NOT REPORTED  0 % Final    Segs Absolute 09/20/2021 5.80  1.3 - 9.1 k/uL Final    Absolute Lymph # 09/20/2021 1.10  1.0 - 4.8 k/uL Final    Absolute Mono # 09/20/2021 0.50  0.1 - 1.3 k/uL Final    Absolute Eos # 09/20/2021 0.90* 0.0 - 0.4 k/uL Final    Basophils Absolute 09/20/2021 0.10  0.0 - 0.2 k/uL Final    Absolute Immature Granulocyte 09/20/2021 NOT REPORTED  0.00 - 0.30 k/uL Final    WBC Morphology 09/20/2021 NOT REPORTED   Final    RBC Morphology 09/20/2021 NOT REPORTED   Final    Platelet Estimate 60/28/6303 NOT REPORTED   Final    Glucose 09/20/2021 110* 70 - 99 mg/dL Final    BUN 09/20/2021 22  8 - 23 mg/dL Final    CREATININE 09/20/2021 0.84  0.50 - 0.90 mg/dL Final    Bun/Cre Ratio 09/20/2021 NOT REPORTED  9 - 20 Final    Calcium 09/20/2021 9.3  8.6 - 10.4 mg/dL Final    Sodium 09/20/2021 142  135 - 144 mmol/L Final    Potassium 09/20/2021 4.3  3.7 - 5.3 mmol/L Final    Chloride 09/20/2021 103  98 - 107 mmol/L Final    CO2 09/20/2021 26  20 - 31 mmol/L Final    Anion Gap 09/20/2021 13  9 - 17 mmol/L Final    Alkaline Phosphatase 09/20/2021 73  35 - 104 U/L Final    ALT 09/20/2021 9  5 - 33 U/L Final    AST 09/20/2021 12  <32 U/L Final    Total Bilirubin 09/20/2021 0.24* 0.3 - 1.2 mg/dL Final    Total Protein 09/20/2021 7.2  6.4 - 8.3 g/dL Final    Albumin 09/20/2021 4.0  3.5 - 5.2 g/dL Final    Albumin/Globulin Ratio 09/20/2021 NOT REPORTED  1.0 - 2.5 Final    GFR Non- 09/20/2021 >60  >60 mL/min Final    GFR  09/20/2021 >60  >60 mL/min Final    GFR Comment 09/20/2021        Final    Comment: Average GFR for 61-76 years old:   80 mL/min/1.73sq m  Chronic Kidney Disease:   <60 mL/min/1.73sq m  Kidney failure:   <15 mL/min/1.73sq m              eGFR calculated using average adult body mass. Additional eGFR calculator available at:        Everwise.br            GFR Staging 09/20/2021 NOT REPORTED   Final    D-Dimer, Quant 09/20/2021 3.51* 0.00 - 0.59 mg/L FEU Final    Comment:        When combined with a low clinical probability, a D dimer value of <0.50 mg/L FEU is   considered negative for DVT and PE (negative predictive value of 98%, sensitivity of 97%).   If this test is not being used to help rule out DVT and PE, then the following reference range should be utilized: 0.00 - 0.59 mg/L FEU. The D-Dimer assay is intended for use as an aid in the diagnosis of venous thromboembolism   (DVT and PE) and the results should be interpreted in conjunction with the patient's medical   history, clinical presentation, and other findings. Elevated levels of D-dimer activity can be seen in any state of coagulation activation and   is not recommended in patients with therapeutic dose anticoagulant therapy for >24 hours,   fibrinolytic therapy within the previous 7 days, trauma or surgery within the previous 4   weeks,   disseminated malignancies, aortic aneurysm, sepsis, severe infections, pneumonia, severe   skin infections, liver cirrhosis, advanced age, debora                           nary disease, diabetes, and pregnancy. A very low percentage of patients with DVT may yield D-dimer results below the cutoff of   0.5 mg/L FEU. This is known to be more prevalent in patients with distal DVT.  Pro-BNP 09/20/2021 543* <300 pg/mL Final     Pro-BNP results cannot be compared to BNP results.  BNP Interpretation 09/20/2021 Pro-BNP Reference Range:   Final    Comment:       Rule Out:    <300        Grey Zone:   Age <50     300-450   Age 54-65   300-900   Age >75     300-1800  Usually represents mild to moderate HF but other cardiopulmonary causes cannot be ruled out. Rule In:   Age <50     >65   Age 54-65   >900   Age >76    >5      Troponin, High Sensitivity 09/20/2021 10  0 - 14 ng/L Final    Comment:       High Sensitivity Troponin values cannot be compared with other Troponin methodologies. Patients with high levels of Biotin oral intake (i.e >5mg/day) may have falsely decreased   Troponin levels. Samples collected within 8 hours of biotin intake may require additional   information for diagnosis.       Troponin T 09/20/2021 NOT REPORTED  <0.03 ng/mL Final    Troponin Interp 09/20/2021 NOT REPORTED   Final    Ventricular Rate 09/20/2021 89  BPM Final    Atrial Rate 09/20/2021 89  BPM Final    P-R Interval 09/20/2021 168  ms Final    QRS Duration 09/20/2021 98  ms Final    Q-T Interval 09/20/2021 406  ms Final    QTc Calculation (Bazett) 09/20/2021 493  ms Final    P Axis 09/20/2021 71  degrees Final    R Axis 09/20/2021 -47  degrees Final    T Axis 09/20/2021 71  degrees Final    Specimen Description 09/20/2021 . NASOPHARYNGEAL SWAB   Final    SARS-CoV-2, Rapid 09/20/2021 Not Detected  Not Detected Final    Comment:       Rapid NAAT:  The specimen is NEGATIVE for SARS-CoV-2, the novel coronavirus associated with   COVID-19. The ID NOW COVID-19 assay is designed to detect the virus that causes COVID-19 in patients   with signs and symptoms of infection who are suspected of COVID-19. An individual without symptoms of COVID-19 and who is not shedding SARS-CoV-2 virus would   expect to have a negative (not detected) result in this assay. Negative results should be treated as presumptive and, if inconsistent with clinical signs   and symptoms or necessary for patient management,  should be tested with an alternative molecular assay. Negative results do not preclude   SARS-CoV-2 infection and   should not be used as the sole basis for patient management decisions. Fact sheet for Healthcare Providers: Leroy.es  Fact sheet for Patients: Leroy.es          Methodology: Isothermal Nucleic Acid Amplification      Pro-BNP 09/22/2021 682* <300 pg/mL Final     Pro-BNP results cannot be compared to BNP results.  BNP Interpretation 09/22/2021 Pro-BNP Reference Range:   Final    Comment:       Rule Out:    <300        Grey Zone:   Age <50     300-450   Age 54-65   300-900   Age >75     300-1800  Usually represents mild to moderate HF but other cardiopulmonary causes cannot be ruled out.         Rule In:   Age <50     >450   Age 54-65   >900   Age >76    >5 Reviewed patient's current plan of care and vital signs with nursing staff. Labs reviewed: [x] Yes  Last EKG in EMR reviewed: [x] Yes  QTc: 493. Medications  Current Facility-Administered Medications: miconazole (MICOTIN) 2 % powder, , Topical, BID  cefUROXime (CEFTIN) tablet 500 mg, 500 mg, Oral, 2 times per day  furosemide (LASIX) tablet 20 mg, 20 mg, Oral, Daily  apixaban (ELIQUIS) tablet 2.5 mg, 2.5 mg, Oral, BID  PARoxetine (PAXIL) tablet 20 mg, 20 mg, Oral, Daily  Calamine 8-8 % lotion, , Topical, PRN  acetaminophen (TYLENOL) tablet 650 mg, 650 mg, Oral, Q4H PRN  aluminum & magnesium hydroxide-simethicone (MAALOX) 200-200-20 MG/5ML suspension 30 mL, 30 mL, Oral, Q6H PRN  hydrOXYzine (ATARAX) tablet 50 mg, 50 mg, Oral, TID PRN  ibuprofen (ADVIL;MOTRIN) tablet 400 mg, 400 mg, Oral, Q6H PRN  traZODone (DESYREL) tablet 50 mg, 50 mg, Oral, Nightly PRN  polyethylene glycol (GLYCOLAX) packet 17 g, 17 g, Oral, Daily PRN  nicotine polacrilex (NICORETTE) gum 2 mg, 2 mg, Oral, Q1H PRN  haloperidol (HALDOL) tablet 5 mg, 5 mg, Oral, Q4H PRN **AND** LORazepam (ATIVAN) tablet 2 mg, 2 mg, Oral, Q4H PRN  haloperidol lactate (HALDOL) injection 5 mg, 5 mg, IntraMUSCular, Q4H PRN **AND** LORazepam (ATIVAN) injection 2 mg, 2 mg, IntraMUSCular, Q4H PRN **AND** diphenhydrAMINE (BENADRYL) injection 50 mg, 50 mg, IntraMUSCular, Q4H PRN    ASSESSMENT  Major depressive disorder, recurrent, severe without psychotic behavior ([facility] [address]         PLAN  Patient symptoms show: Minimal improvement. Internal medicine consult was previously ordered. Medication adjustment: Paxil dose increased to 30 mg, oral, daily, first dose 9/27/21. Monitor need and frequency of PRN medications. Encourage participation in groups and milieu. Attempt to develop insight. Psycho-education conducted. Supportive Therapy conducted. Probable discharge is to be determined by MD.   Follow-up daily while inpatient.      Patient continues to be monitored in the inpatient psychiatric facility at [facility] for safety and stabilization. Patient continues to need, on a daily basis, active treatment furnished directly by or requiring the supervision of inpatient psychiatric personnel. Electronically signed by KIRK Regan CNP on 9/26/2021 at 4:01 PM    **This report has been created using voice recognition software. It may contain minor errors which are inherent in voice recognition technology. **

## 2021-09-26 NOTE — PLAN OF CARE
Problem: Altered Mood, Depressive Behavior:  Goal: Ability to disclose and discuss suicidal ideas will improve  Description: Ability to disclose and discuss suicidal ideas will improve  Outcome: Ongoing  Note: Patient denies any current suicidal thoughts and agrees to seek out staff if thoughts arise. Patient depressed and anxious. Patient has stayed in her room all morning. Patient cooperative with staff and compliant with medications. Problem: Altered Mood, Depressive Behavior:  Goal: Absence of self-harm  Description: Absence of self-harm  Outcome: Ongoing  Note: Patient has remained free from self harm. Will continue to monitor. Problem: Pain:  Goal: Pain level will decrease  Description: Pain level will decrease  Outcome: Ongoing  Note: Patient has not complained of pain at this time. Will continue to monitor. Problem: Falls - Risk of:  Goal: Will remain free from falls  Description: Will remain free from falls  Outcome: Ongoing  Note: Patient has remained free from falls. Will continue to monitor.

## 2021-09-27 PROCEDURE — 6370000000 HC RX 637 (ALT 250 FOR IP): Performed by: PSYCHIATRY & NEUROLOGY

## 2021-09-27 PROCEDURE — 90833 PSYTX W PT W E/M 30 MIN: CPT | Performed by: PSYCHIATRY & NEUROLOGY

## 2021-09-27 PROCEDURE — 1240000000 HC EMOTIONAL WELLNESS R&B

## 2021-09-27 PROCEDURE — 99231 SBSQ HOSP IP/OBS SF/LOW 25: CPT | Performed by: INTERNAL MEDICINE

## 2021-09-27 PROCEDURE — 6370000000 HC RX 637 (ALT 250 FOR IP): Performed by: INTERNAL MEDICINE

## 2021-09-27 PROCEDURE — 99232 SBSQ HOSP IP/OBS MODERATE 35: CPT | Performed by: PSYCHIATRY & NEUROLOGY

## 2021-09-27 RX ADMIN — FUROSEMIDE 20 MG: 20 TABLET ORAL at 09:52

## 2021-09-27 RX ADMIN — ANTI-FUNGAL POWDER MICONAZOLE NITRATE TALC FREE: 1.42 POWDER TOPICAL at 11:49

## 2021-09-27 RX ADMIN — TRAZODONE HYDROCHLORIDE 50 MG: 50 TABLET ORAL at 20:36

## 2021-09-27 RX ADMIN — CEFUROXIME AXETIL 500 MG: 250 TABLET ORAL at 20:55

## 2021-09-27 RX ADMIN — CEFUROXIME AXETIL 500 MG: 250 TABLET ORAL at 09:52

## 2021-09-27 RX ADMIN — IBUPROFEN 400 MG: 400 TABLET, FILM COATED ORAL at 20:36

## 2021-09-27 RX ADMIN — APIXABAN 2.5 MG: 2.5 TABLET, FILM COATED ORAL at 20:36

## 2021-09-27 RX ADMIN — PAROXETINE HYDROCHLORIDE 30 MG: 20 TABLET, FILM COATED ORAL at 09:52

## 2021-09-27 RX ADMIN — HYDROXYZINE HYDROCHLORIDE 50 MG: 50 TABLET, FILM COATED ORAL at 20:36

## 2021-09-27 RX ADMIN — APIXABAN 2.5 MG: 2.5 TABLET, FILM COATED ORAL at 09:52

## 2021-09-27 ASSESSMENT — PAIN SCALES - GENERAL: PAINLEVEL_OUTOF10: 8

## 2021-09-27 NOTE — PLAN OF CARE
Problem: Altered Mood, Depressive Behavior:  Goal: Ability to disclose and discuss suicidal ideas will improve  Description: Ability to disclose and discuss suicidal ideas will improve  9/27/2021 1106 by Diana Levy LPN  Outcome: Ongoing   Patient denies suicidal ideations at this time    Problem: Altered Mood, Depressive Behavior:  Goal: Absence of self-harm  Description: Absence of self-harm  9/27/2021 1106 by Diana Levy LPN  Outcome: Ongoing   Patient remains free from self harm

## 2021-09-27 NOTE — GROUP NOTE
Group Therapy Note    Date: 9/27/2021    Group Start Time: 1000  Group End Time: 3278  Group Topic: Psychotherapy    [address], LSW    patient refused to attend psychotherapy group at [facility] after encouragement from staff.   1:1 talk time provided as alternative to group session

## 2021-09-27 NOTE — GROUP NOTE
Group Therapy Note    Date: 9/27/2021    Group Start Time: 1100  Group End Time: 1150  Group Topic: Cognitive Skills    [facility] DANIE Castro, TOMS        Group Therapy Note    Attendees:9/22        patient refused to attend decision and communication skills  group at [phone] after encouragement from staff. 1:1 talk time provided as alternative to group session.             Signature:  Petar Castro, [address]

## 2021-09-27 NOTE — CONSULTS
[facility]    CONSULTATION / HISTORY AND PHYSICAL EXAMINATION            Date:   9/27/2021  Patient name: Christophe Nguyen  Date of admission:  9/20/2021 12:34 PM  MRN:   [id number]  Account:  [de-identified]  YOB: 1960  PCP:    KIRK Lowery CNP  Room:   [address] [place] [address]  Code Status:    Full Code    Physician Requesting Consult: Best Kahn MD    Reason for Consult:  medical management    Chief Complaint:     Chief Complaint   Patient presents with    Leg Swelling   Diffuse rash in arms and legs concern for bedbugs    History Obtained From:     Patient medical record nursing staff    History of Present Illness:     58-year-old lady with history of smoking history of COPD chronic  diastolic congestive heart failure  Bedbound suffering from depression admitted to mental health unit medical consult for diffuse skin excoriation congestive heart failure hypertension COPD  Denies any fever chills positive for depression positive for dyspnea positive for poor mobility positive for bedbugs    Past Medical History:     Past Medical History:   Diagnosis Date    Arthritis     [facility]estive [facility]) [facility] [address]     COPD (chronic obstructive pulmonary disease) ([facility] [address]     Diverticulitis     Hx of blood clots     Pulmonary embolism ([facility] [address]         Past Surgical History:     Past Surgical History:   Procedure Laterality Date    ANKLE SURGERY      COLON SURGERY      FOOT SURGERY Left 1/22/2021    FOOT BONE BIOPSY W/ INCISION & DRAINAGE AND REDRESSING ON RIGHT FOOT performed by Erna Parsons DPM at [address] [place] [address] N          Medications Prior to Admission:     Prior to Admission medications    Medication Sig Start Date End Date Taking? Authorizing Provider   furosemide (LASIX) 20 MG tablet Take 1 tablet by mouth daily for 10 days 9/20/21 9/30/21 Yes Joaquim Godoy PA-C        Allergies:     Patient has no known allergies.     Social History: Tobacco:    reports that she has been smoking. She has a 11.25 pack-year smoking history. She has never used smokeless tobacco.  Alcohol:      reports previous alcohol use. Drug Use:  reports previous drug use. Family History:     Family History   Problem Relation Age of Onset    Cancer Mother     Diabetes Paternal Grandfather        Review of Systems:     Positive and Negative as described in HPI. CONSTITUTIONAL:  negative for fevers, chills, sweats, fatigue, weight loss  HEENT:  negative for vision, hearing changes, runny nose, throat pain  RESPIRATORY:  negative for shortness of breath, cough, congestion, wheezing. CARDIOVASCULAR:  negative for chest pain, palpitations. GASTROINTESTINAL:  negative for nausea, vomiting, diarrhea, constipation, change in bowel habits, abdominal pain   GENITOURINARY:  negative for difficulty of urination, burning with urination, frequency   INTEGUMENT: Itching rash all legs and arms HEMATOLOGIC/LYMPHATIC:  negative for swelling/edema   ALLERGIC/IMMUNOLOGIC:  negative for urticaria , itching  ENDOCRINE:  negative increase in drinking, increase in urination, hot or cold intolerance  MUSCULOSKELETAL:  negative joint pains, muscle aches, swelling of joints  NEUROLOGICAL:  negative for headaches, dizziness, lightheadedness, numbness, pain, tingling extremities  BEHAVIOR/PSYCH: Depression tearful    Physical Exam:     BP (!) 131/58   Pulse 90   Temp 98.6 °F (37 °C) (Tympanic)   Resp 16   Ht 5' 7\" (1.702 m)   Wt (!) 350 lb (158.8 kg)   SpO2 96%   BMI 54.82 kg/m²   Temp (24hrs), Av.3 °F (36.8 °C), Min:97.9 °F (36.6 °C), Max:98.6 °F (37 °C)    No results for input(s): POCGLU in the last 72 hours.   No intake or output data in the 24 hours ending 21 1442  Very poor personal hygiene foul-smelling morbidly obese  General Appearance:  alert, well appearing, and in no acute distress  Mental status: oriented to person, place, and time with normal affect  Head: normocephalic, atraumatic. Eye: no icterus, redness, pupils equal and reactive, extraocular eye movements intact, conjunctiva clear  Ear: normal external ear, no discharge, hearing intact  Nose:  no drainage noted  Mouth: mucous membranes moist  Neck: supple, no carotid bruits, thyroid not palpable  Lungs: Bilateral equal air entry, clear to ausculation, no wheezing, rales or rhonchi, normal effort  Cardiovascular: normal rate, regular rhythm, no murmur, gallop, rub. Abdomen: Soft, nontender, nondistended, normal bowel sounds, no hepatomegaly or splenomegaly  Neurologic: There are no new focal motor or sensory deficits, normal muscle tone and bulk, no abnormal sensation, normal speech, cranial nerves II through XII grossly intact  Skin:  Extremities:  peripheral pulses palpable, no pedal edema or calf pain with palpation  Diffuse skin excoriation noted in both upper and lower extremities  Psych: Flat affect  Rash consistent with scabies      Investigations:      Laboratory Testing:  No results found for this or any previous visit (from the past 24 hour(s)).         Consultations:   IP CONSULT TO INTERNAL MEDICINE  IP CONSULT TO INTERNAL MEDICINE  Assessment :      Primary Problem  Major depressive disorder, recurrent, severe without psychotic behavior [facility])    Active Hospital Problems    Diagnosis Date Noted    Rash of body [R21] 09/25/2021    At high risk for deep venous thrombosis [Z91.89] 09/25/2021    On anticoagulant therapy low dose eliquis [Z79.01] 09/25/2021    Scabies [B86] 09/25/2021    Peripheral edema [R60.9]     Infestation by bed bug [B88.8]     Severe recurrent major depression without psychotic features ([facility] [address] [F33.2] 09/21/2021    Major depressive disorder, recurrent, severe without psychotic behavior ([facility] [address] [F33.2] 05/22/2021    Depression with suicidal ideation [F32.9, R45.851] 05/21/2021    Healing pressure ulcer, stage IV ([address] [L89.94] 02/17/2021    Peripheral artery disease ([facility] [address] [I73.9] 01/21/2021    Class 4 congestive heart failure, acute on chronic, systolic (HCC) [E76.54] 19/86/1886    Chronic obstructive pulmonary disease (HCC) [J44.9] 10/29/2019    Class 3 severe obesity with serious comorbidity and body mass index (BMI) of 50.0 to 59.9 in adult [facility]) [E66.01, Z68.43] 10/29/2019    Essential hypertension [I10] 10/29/2019    Chronic heart failure with preserved ejection fraction ([facility] [address] [I50.32] 10/25/2019       Plan:     54-year-old  lady morbidly obese history of diastolic congestive heart failure  History of COPD with obesity hypoventilation syndrome possible sleep apnea  Diffuse skin excoriation both arms and legs patient is foul-smelling  Possible bedbugs versus scabies  Order for bath to be given to patient and then apply permethrin cream head to toe  Wound and stoma consult to evaluate for decubitus ulcer  Given patient body weight and bedbound at high risk of DVT and PE will start Eliquis 2.5 mg twice a day  bnp elevated  Lasix 20 mg daily    9/25/21    · Has scabies 1. Start ivermectin          9/26/21    · Has extensive scabies associated dermatitis with classical findings 2. Ivermectin given          9/27/21    · Stable    Vitals:    09/26/21 0915 09/26/21 2022 09/26/21 2125 09/27/21 0800   BP: (!) 125/57 132/80 132/80 (!) 131/58   Pulse: 88 86 86 90   Resp: 14 16  16   Temp: 97.8 °F (36.6 °C) 97.9 °F (36.6 °C)  98.6 °F (37 °C)   TempSrc: Oral Oral  Tympanic   SpO2:       Weight:       Height:     3. Priscila Hopper MD  9/27/2021  2:42 PM    Copy sent to Dr. Chica Sanabria, APRN - CNP    Please note that this chart was generated using voice recognition Dragon dictation software. Although every effort was made to ensure the accuracy of this automated transcription, some errors in transcription may have occurred.

## 2021-09-27 NOTE — CARE COORDINATION
OH ADDIE reviewed, pt PASRR status still shows as \"referred. \" SW awaits results for purpose of ECF placement.

## 2021-09-27 NOTE — PLAN OF CARE
Problem: Altered Mood, Depressive Behavior:  Goal: Absence of self-harm  Description: Absence of self-harm  9/26/2021 2114 by Nathaniel Austin RN  Outcome: Ongoing     Problem: Altered Mood, Depressive Behavior:  Goal: Ability to disclose and discuss suicidal ideas will improve  Description: Ability to disclose and discuss suicidal ideas will improve  9/26/2021 2114 by Nathaniel Austin RN  Outcome: Ongoing  Note: Patient denies suicidal ideation and verbally contracts for safety. Patient remains safe during Q15 min and random safety checks. Patient remains in hospital appropriate clothing at all times and free from harmful objects. Patient is accepting of talk time with writer.

## 2021-09-27 NOTE — GROUP NOTE
Group Therapy Note    Date: 9/27/2021    Group Start Time: 1330  Group End Time: 3060  Group Topic: Recreational    [address], CTRS        Group Therapy Note    Attendees: 10/21    patient refused to attend humor therapy group at [phone] after encouragement from staff. 1:1 talk time provided as alternative to group session.              Signature:  Petar Castro, [address]

## 2021-09-27 NOTE — PROGRESS NOTES
Ideation: Fleeting suicidal ideations, without current plan or intent, contracts for safety on the unit. Delusions: No evidence of delusions. Denies paranoia. Perceptual Disturbance: Patient does not appear to be responding to internal stimuli. Denies auditory hallucinations. Denies visual hallucinations. Cognition: Oriented to self, location, time, and situation. Memory: Intact. Insight & Judgement: Poor. Data   height is 5' 7\" (1.702 m) and weight is 350 lb (158.8 kg) (abnormal). Her tympanic temperature is 98.6 °F (37 °C). Her blood pressure is 131/58 (abnormal) and her pulse is 90. Her respiration is 16 and oxygen saturation is 96%.    Labs:   Admission on 09/20/2021   Component Date Value Ref Range Status    WBC 09/20/2021 8.4  3.5 - 11.0 k/uL Final    RBC 09/20/2021 3.80* 4.0 - 5.2 m/uL Final    Hemoglobin 09/20/2021 11.6* 12.0 - 16.0 g/dL Final    Hematocrit 09/20/2021 35.7* 36 - 46 % Final    MCV 09/20/2021 94.0  80 - 100 fL Final    MCH 09/20/2021 30.4  26 - 34 pg Final    MCHC 09/20/2021 32.4  31 - 37 g/dL Final    RDW 09/20/2021 15.2* 11.5 - 14.9 % Final    Platelets 03/69/0826 485* 150 - 450 k/uL Final    MPV 09/20/2021 7.4  6.0 - 12.0 fL Final    NRBC Automated 09/20/2021 NOT REPORTED  per 100 WBC Final    Differential Type 09/20/2021 NOT REPORTED   Final    Seg Neutrophils 09/20/2021 68* 36 - 66 % Final    Lymphocytes 09/20/2021 14* 24 - 44 % Final    Monocytes 09/20/2021 7  1 - 7 % Final    Eosinophils % 09/20/2021 10* 0 - 4 % Final    Basophils 09/20/2021 1  0 - 2 % Final    Immature Granulocytes 09/20/2021 NOT REPORTED  0 % Final    Segs Absolute 09/20/2021 5.80  1.3 - 9.1 k/uL Final    Absolute Lymph # 09/20/2021 1.10  1.0 - 4.8 k/uL Final    Absolute Mono # 09/20/2021 0.50  0.1 - 1.3 k/uL Final    Absolute Eos # 09/20/2021 0.90* 0.0 - 0.4 k/uL Final    Basophils Absolute 09/20/2021 0.10  0.0 - 0.2 k/uL Final    Absolute Immature Granulocyte 09/20/2021 NOT REPORTED 0.00 - 0.30 k/uL Final    WBC Morphology 09/20/2021 NOT REPORTED   Final    RBC Morphology 09/20/2021 NOT REPORTED   Final    Platelet Estimate 29/94/8683 NOT REPORTED   Final    Glucose 09/20/2021 110* 70 - 99 mg/dL Final    BUN 09/20/2021 22  8 - 23 mg/dL Final    CREATININE 09/20/2021 0.84  0.50 - 0.90 mg/dL Final    Bun/Cre Ratio 09/20/2021 NOT REPORTED  9 - 20 Final    Calcium 09/20/2021 9.3  8.6 - 10.4 mg/dL Final    Sodium 09/20/2021 142  135 - 144 mmol/L Final    Potassium 09/20/2021 4.3  3.7 - 5.3 mmol/L Final    Chloride 09/20/2021 103  98 - 107 mmol/L Final    CO2 09/20/2021 26  20 - 31 mmol/L Final    Anion Gap 09/20/2021 13  9 - 17 mmol/L Final    Alkaline Phosphatase 09/20/2021 73  35 - 104 U/L Final    ALT 09/20/2021 9  5 - 33 U/L Final    AST 09/20/2021 12  <32 U/L Final    Total Bilirubin 09/20/2021 0.24* 0.3 - 1.2 mg/dL Final    Total Protein 09/20/2021 7.2  6.4 - 8.3 g/dL Final    Albumin 09/20/2021 4.0  3.5 - 5.2 g/dL Final    Albumin/Globulin Ratio 09/20/2021 NOT REPORTED  1.0 - 2.5 Final    GFR Non- 09/20/2021 >60  >60 mL/min Final    GFR  09/20/2021 >60  >60 mL/min Final    GFR Comment 09/20/2021        Final    Comment: Average GFR for 61-76 years old:   80 mL/min/1.73sq m  Chronic Kidney Disease:   <60 mL/min/1.73sq m  Kidney failure:   <15 mL/min/1.73sq m              eGFR calculated using average adult body mass. Additional eGFR calculator available at:        [email or web address]            GFR Staging 09/20/2021 NOT REPORTED   Final    D-Dimer, Quant 09/20/2021 3.51* 0.00 - 0.59 mg/L FEU Final    Comment:        When combined with a low clinical probability, a D dimer value of <0.50 mg/L FEU is   considered negative for DVT and PE (negative predictive value of 98%, sensitivity of 97%).   If this test is not being used to help rule out DVT and PE, then the following reference   range should be utilized: 0.00 - 0.59 mg/L FEU. The D-Dimer assay is intended for use as an aid in the diagnosis of venous thromboembolism   (DVT and PE) and the results should be interpreted in conjunction with the patient's medical   history, clinical presentation, and other findings. Elevated levels of D-dimer activity can be seen in any state of coagulation activation and   is not recommended in patients with therapeutic dose anticoagulant therapy for >24 hours,   fibrinolytic therapy within the previous 7 days, trauma or surgery within the previous 4   weeks,   disseminated malignancies, aortic aneurysm, sepsis, severe infections, pneumonia, severe   skin infections, liver cirrhosis, advanced age, debora                           nary disease, diabetes, and pregnancy. A very low percentage of patients with DVT may yield D-dimer results below the cutoff of   0.5 mg/L FEU. This is known to be more prevalent in patients with distal DVT.  Pro-BNP 09/20/2021 543* <300 pg/mL Final     Pro-BNP results cannot be compared to BNP results.  BNP Interpretation 09/20/2021 Pro-BNP Reference Range:   Final    Comment:       Rule Out:    <300        Grey Zone:   Age <50     300-450   Age 54-65   300-900   Age >75     300-1800  Usually represents mild to moderate HF but other cardiopulmonary causes cannot be ruled out. Rule In:   Age <50     >65   Age 54-65   >900   Age >76    >5      Troponin, High Sensitivity 09/20/2021 10  0 - 14 ng/L Final    Comment:       High Sensitivity Troponin values cannot be compared with other Troponin methodologies. Patients with high levels of Biotin oral intake (i.e >5mg/day) may have falsely decreased   Troponin levels. Samples collected within 8 hours of biotin intake may require additional   information for diagnosis.       Troponin T 09/20/2021 NOT REPORTED  <0.03 ng/mL Final    Troponin Interp 09/20/2021 NOT REPORTED   Final    Ventricular Rate 09/20/2021 89  BPM Final    Atrial Rate 09/20/2021 89  BPM Final    P-R Interval 09/20/2021 168  ms Final    QRS Duration 09/20/2021 98  ms Final    Q-T Interval 09/20/2021 406  ms Final    QTc Calculation (Bazett) 09/20/2021 493  ms Final    P Axis 09/20/2021 71  degrees Final    R Axis 09/20/2021 -47  degrees Final    T Axis 09/20/2021 71  degrees Final    Specimen Description 09/20/2021 . NASOPHARYNGEAL SWAB   Final    SARS-CoV-2, Rapid 09/20/2021 Not Detected  Not Detected Final    Comment:       Rapid NAAT:  The specimen is NEGATIVE for SARS-CoV-2, the novel coronavirus associated with   COVID-19. The ID NOW COVID-19 assay is designed to detect the virus that causes COVID-19 in patients   with signs and symptoms of infection who are suspected of COVID-19. An individual without symptoms of COVID-19 and who is not shedding SARS-CoV-2 virus would   expect to have a negative (not detected) result in this assay. Negative results should be treated as presumptive and, if inconsistent with clinical signs   and symptoms or necessary for patient management,  should be tested with an alternative molecular assay. Negative results do not preclude   SARS-CoV-2 infection and   should not be used as the sole basis for patient management decisions. Fact sheet for Healthcare Providers: Leroy.es  Fact sheet for Patients: Leroy.es          Methodology: Isothermal Nucleic Acid Amplification      Pro-BNP 09/22/2021 682* <300 pg/mL Final     Pro-BNP results cannot be compared to BNP results.  BNP Interpretation 09/22/2021 Pro-BNP Reference Range:   Final    Comment:       Rule Out:    <300        Grey Zone:   Age <50     300-450   Age 54-65   300-900   Age >75     300-1800  Usually represents mild to moderate HF but other cardiopulmonary causes cannot be ruled out.         Rule In:   Age <50     >65   Age 54-65   >900   Age >76    >5           Reviewed patient's current plan of care and vital signs with nursing staff. Labs reviewed: [x] Yes  Last EKG in EMR reviewed: [x] Yes  QTc: 493. Medications  Current Facility-Administered Medications: PARoxetine (PAXIL) tablet 30 mg, 30 mg, Oral, Daily  miconazole (MICOTIN) 2 % powder, , Topical, BID  cefUROXime (CEFTIN) tablet 500 mg, 500 mg, Oral, 2 times per day  furosemide (LASIX) tablet 20 mg, 20 mg, Oral, Daily  apixaban (ELIQUIS) tablet 2.5 mg, 2.5 mg, Oral, BID  Calamine 8-8 % lotion, , Topical, PRN  acetaminophen (TYLENOL) tablet 650 mg, 650 mg, Oral, Q4H PRN  aluminum & magnesium hydroxide-simethicone (MAALOX) 200-200-20 MG/5ML suspension 30 mL, 30 mL, Oral, Q6H PRN  hydrOXYzine (ATARAX) tablet 50 mg, 50 mg, Oral, TID PRN  ibuprofen (ADVIL;MOTRIN) tablet 400 mg, 400 mg, Oral, Q6H PRN  traZODone (DESYREL) tablet 50 mg, 50 mg, Oral, Nightly PRN  polyethylene glycol (GLYCOLAX) packet 17 g, 17 g, Oral, Daily PRN  nicotine polacrilex (NICORETTE) gum 2 mg, 2 mg, Oral, Q1H PRN  haloperidol (HALDOL) tablet 5 mg, 5 mg, Oral, Q4H PRN **AND** LORazepam (ATIVAN) tablet 2 mg, 2 mg, Oral, Q4H PRN  haloperidol lactate (HALDOL) injection 5 mg, 5 mg, IntraMUSCular, Q4H PRN **AND** LORazepam (ATIVAN) injection 2 mg, 2 mg, IntraMUSCular, Q4H PRN **AND** diphenhydrAMINE (BENADRYL) injection 50 mg, 50 mg, IntraMUSCular, Q4H PRN    ASSESSMENT  Major depressive disorder, recurrent, severe without psychotic behavior ([facility] [address]         PLAN  Patient symptoms show: Minimal improvement. Paxil titrated to 30 mg this morning. Observe for tolerability and efficacy. Monitor need and frequency of PRN medications. Encourage participation in groups and milieu. Attempt to develop insight. Psycho-education conducted. Supportive Therapy conducted. Probable discharge is to be determined by MD.   Follow-up daily while inpatient. Patient continues to be monitored in the inpatient psychiatric facility at [facility] for safety and stabilization.  Patient continues to need, on a daily basis, active treatment furnished directly by or requiring the supervision of inpatient psychiatric personnel. Electronically signed by Christyne Cranker, APRN - CNP on 9/27/2021 at 1:47 PM    **This report has been created using voice recognition software. It may contain minor errors which are inherent in voice recognition technology. **    I independently saw and evaluated the patient. I reviewed the nurse practitioners documentation above. Any additional comments or changes to the nurse practitioners documentation are stated below otherwise agree with assessment. Plan will be as follows:  Spent 30 minutes with the patient, of that greater than 16 minutes was spent in supportive psychotherapy. Patient denying side effects to medication. She wants to try and come out of her room more often but has largely been laying in bed. She states that she is going to make an effort to come out to groups more often. Unable to be safe at home and we have past our pending for ECF placement. Patient is willing to go to [facility] and feels safe with that disposition option  PLAN  Patient s symptoms   are improving  Continue with current course of medication for now  Attempt to develop insight  Psycho-education conducted. Supportive Therapy conducted.   Probable discharge is pending ECF placement with pass our approval  Follow-up daily while on inpatient unit

## 2021-09-28 PROCEDURE — 1240000000 HC EMOTIONAL WELLNESS R&B

## 2021-09-28 PROCEDURE — 99232 SBSQ HOSP IP/OBS MODERATE 35: CPT | Performed by: PSYCHIATRY & NEUROLOGY

## 2021-09-28 PROCEDURE — 97110 THERAPEUTIC EXERCISES: CPT

## 2021-09-28 PROCEDURE — 6370000000 HC RX 637 (ALT 250 FOR IP): Performed by: PSYCHIATRY & NEUROLOGY

## 2021-09-28 PROCEDURE — APPSS30 APP SPLIT SHARED TIME 16-30 MINUTES: Performed by: NURSE PRACTITIONER

## 2021-09-28 PROCEDURE — 97530 THERAPEUTIC ACTIVITIES: CPT

## 2021-09-28 PROCEDURE — 99231 SBSQ HOSP IP/OBS SF/LOW 25: CPT | Performed by: INTERNAL MEDICINE

## 2021-09-28 PROCEDURE — 90833 PSYTX W PT W E/M 30 MIN: CPT | Performed by: PSYCHIATRY & NEUROLOGY

## 2021-09-28 PROCEDURE — 6370000000 HC RX 637 (ALT 250 FOR IP): Performed by: INTERNAL MEDICINE

## 2021-09-28 RX ADMIN — TRAZODONE HYDROCHLORIDE 50 MG: 50 TABLET ORAL at 23:02

## 2021-09-28 RX ADMIN — FUROSEMIDE 20 MG: 20 TABLET ORAL at 08:45

## 2021-09-28 RX ADMIN — APIXABAN 2.5 MG: 2.5 TABLET, FILM COATED ORAL at 08:45

## 2021-09-28 RX ADMIN — CEFUROXIME AXETIL 500 MG: 250 TABLET ORAL at 11:04

## 2021-09-28 RX ADMIN — APIXABAN 2.5 MG: 2.5 TABLET, FILM COATED ORAL at 23:03

## 2021-09-28 RX ADMIN — PAROXETINE HYDROCHLORIDE 30 MG: 20 TABLET, FILM COATED ORAL at 08:45

## 2021-09-28 RX ADMIN — ANTI-FUNGAL POWDER MICONAZOLE NITRATE TALC FREE: 1.42 POWDER TOPICAL at 23:04

## 2021-09-28 RX ADMIN — CEFUROXIME AXETIL 500 MG: 250 TABLET ORAL at 23:03

## 2021-09-28 RX ADMIN — ANTI-FUNGAL POWDER MICONAZOLE NITRATE TALC FREE: 1.42 POWDER TOPICAL at 09:34

## 2021-09-28 ASSESSMENT — PAIN DESCRIPTION - LOCATION: LOCATION: HIP

## 2021-09-28 ASSESSMENT — PAIN DESCRIPTION - FREQUENCY: FREQUENCY: CONTINUOUS

## 2021-09-28 ASSESSMENT — PAIN DESCRIPTION - PAIN TYPE: TYPE: CHRONIC PAIN

## 2021-09-28 ASSESSMENT — PAIN DESCRIPTION - ORIENTATION: ORIENTATION: RIGHT

## 2021-09-28 ASSESSMENT — PAIN - FUNCTIONAL ASSESSMENT: PAIN_FUNCTIONAL_ASSESSMENT: PREVENTS OR INTERFERES SOME ACTIVE ACTIVITIES AND ADLS

## 2021-09-28 ASSESSMENT — PAIN SCALES - GENERAL: PAINLEVEL_OUTOF10: 8

## 2021-09-28 ASSESSMENT — PAIN DESCRIPTION - ONSET: ONSET: ON-GOING

## 2021-09-28 NOTE — GROUP NOTE
Group Therapy Note    Date: 9/28/2021    Group Start Time: 1000  Group End Time: 1478  Group Topic: Psychotherapy    [place]; SANTA Muñoz        Group Therapy Note    Attendees: 10/20         patient refused to attend psychotherapy group at [place] [address] after encouragement from staff.   1:1 talk time provided as alternative to group session    Signature:  SANTA Muñoz

## 2021-09-28 NOTE — PROGRESS NOTES
1:1 Note:   Patient sat with writer for 40 minutes engaged in conversation about her family and growing up in [facility]. Patient was pleasant in conversation and able to remain on task and topic. Patient has been sitting in the day area today and brightened in affect. Patient was smiling when speaking about traveling to [place] [facility] to see her family. Patient reported her mood as 'ok' and patient was encouraged to attend creative expression group to engage with other peers.

## 2021-09-28 NOTE — CONSULTS
[place] [address] Internal Medicine    CONSULTATION / HISTORY AND PHYSICAL EXAMINATION            Date:   9/28/2021  Patient name: Ryanne Renteria  Date of admission:  9/20/2021 12:34 PM  MRN:   [id number]  Account:  [de-identified]  YOB: 1960  PCP:    KIRK Baldwin CNP  Room:   [address] [place] [address]  Code Status:    Full Code    Physician Requesting Consult: Stewart Adam MD    Reason for Consult:  medical management    Chief Complaint:     Chief Complaint   Patient presents with    Leg Swelling   Diffuse rash in arms and legs concern for bedbugs    History Obtained From:     Patient medical record nursing staff    History of Present Illness:     19-year-old lady with history of smoking history of COPD chronic  diastolic congestive heart failure  Bedbound suffering from depression admitted to mental health unit medical consult for diffuse skin excoriation congestive heart failure hypertension COPD  Denies any fever chills positive for depression positive for dyspnea positive for poor mobility positive for bedbugs    Past Medical History:     Past Medical History:   Diagnosis Date    Arthritis     [facility]F [facility]congestive heart failure) ([facility] [address]     COPD (chronic obstructive pulmonary disease) ([facility] [address]     Diverticulitis     Hx of blood clots     Pulmonary embolism ([facility] [address]         Past Surgical History:     Past Surgical History:   Procedure Laterality Date    ANKLE SURGERY      COLON SURGERY      FOOT SURGERY Left 1/22/2021    FOOT BONE BIOPSY W/ INCISION & DRAINAGE AND REDRESSING ON RIGHT FOOT performed by Modesta Cabezas DPM at [address] [facility]          Medications Prior to Admission:     Prior to Admission medications    Medication Sig Start Date End Date Taking? Authorizing Provider   furosemide (LASIX) 20 MG tablet Take 1 tablet by mouth daily for 10 days 9/20/21 9/30/21 Yes Connie Sanabria PA-C        Allergies:     Patient has no known allergies.     Social History: Tobacco:    reports that she has been smoking. She has a 11.25 pack-year smoking history. She has never used smokeless tobacco.  Alcohol:      reports previous alcohol use. Drug Use:  reports previous drug use. Family History:     Family History   Problem Relation Age of Onset    Cancer Mother     Diabetes Paternal Grandfather        Review of Systems:     Positive and Negative as described in HPI. CONSTITUTIONAL:  negative for fevers, chills, sweats, fatigue, weight loss  HEENT:  negative for vision, hearing changes, runny nose, throat pain  RESPIRATORY:  negative for shortness of breath, cough, congestion, wheezing. CARDIOVASCULAR:  negative for chest pain, palpitations. GASTROINTESTINAL:  negative for nausea, vomiting, diarrhea, constipation, change in bowel habits, abdominal pain   GENITOURINARY:  negative for difficulty of urination, burning with urination, frequency   INTEGUMENT: Itching rash all legs and arms HEMATOLOGIC/LYMPHATIC:  negative for swelling/edema   ALLERGIC/IMMUNOLOGIC:  negative for urticaria , itching  ENDOCRINE:  negative increase in drinking, increase in urination, hot or cold intolerance  MUSCULOSKELETAL:  negative joint pains, muscle aches, swelling of joints  NEUROLOGICAL:  negative for headaches, dizziness, lightheadedness, numbness, pain, tingling extremities  BEHAVIOR/PSYCH: Depression tearful    Physical Exam:     BP (!) 131/58   Pulse 90   Temp 98.6 °F (37 °C) (Tympanic)   Resp 16   Ht 5' 7\" (1.702 m)   Wt (!) 350 lb (158.8 kg)   SpO2 96%   BMI 54.82 kg/m²   No data recorded. No results for input(s): POCGLU in the last 72 hours. No intake or output data in the 24 hours ending 09/28/21 1431  Very poor personal hygiene foul-smelling morbidly obese  General Appearance:  alert, well appearing, and in no acute distress  Mental status: oriented to person, place, and time with normal affect  Head:  normocephalic, atraumatic.   Eye: no icterus, redness, pupils equal and reactive, extraocular eye movements intact, conjunctiva clear  Ear: normal external ear, no discharge, hearing intact  Nose:  no drainage noted  Mouth: mucous membranes moist  Neck: supple, no carotid bruits, thyroid not palpable  Lungs: Bilateral equal air entry, clear to ausculation, no wheezing, rales or rhonchi, normal effort  Cardiovascular: normal rate, regular rhythm, no murmur, gallop, rub. Abdomen: Soft, nontender, nondistended, normal bowel sounds, no hepatomegaly or splenomegaly  Neurologic: There are no new focal motor or sensory deficits, normal muscle tone and bulk, no abnormal sensation, normal speech, cranial nerves II through XII grossly intact  Skin:  Extremities:  peripheral pulses palpable, no pedal edema or calf pain with palpation  Diffuse skin excoriation noted in both upper and lower extremities  Psych: Flat affect  Rash consistent with scabies      Investigations:      Laboratory Testing:  No results found for this or any previous visit (from the past 24 hour(s)).         Consultations:   IP CONSULT TO INTERNAL MEDICINE  IP CONSULT TO INTERNAL MEDICINE  Assessment :      Primary Problem  Major depressive disorder, recurrent, severe without psychotic behavior [facility])    Active Hospital Problems    Diagnosis Date Noted    Rash of body [R21] 09/25/2021    At high risk for deep venous thrombosis [Z91.89] 09/25/2021    On anticoagulant therapy low dose eliquis [Z79.01] 09/25/2021    Scabies [B86] 09/25/2021    Peripheral edema [R60.9]     Infestation by bed bug [B88.8]     Severe recurrent major depression without psychotic features ([facility] [address] [F33.2] 09/21/2021    Major depressive disorder, recurrent, severe without psychotic behavior ([facility] [address] [F33.2] 05/22/2021    Depression with suicidal ideation [F32.9, R45.851] 05/21/2021    Healing pressure ulcer, stage IV ([facility] [address] [L89.94] 02/17/2021    Peripheral artery disease ([facility] [address] [I73.9] 01/21/2021    Class 4 congestive heart failure, acute on chronic, systolic (HCC) [B04.02] 06/53/9060    Chronic obstructive pulmonary disease ([facility] [J44.9] 10/29/2019    Class 3 severe obesity with serious comorbidity and body mass index (BMI) of 50.0 to 59.9 in adult [facility]) [E66.01, Z68.43] 10/29/2019    Essential hypertension [I10] 10/29/2019    Chronic heart failure with preserved ejection fraction ([facility] [I50.32] 10/25/2019       Plan:     54-year-old  lady morbidly obese history of diastolic congestive heart failure  History of COPD with obesity hypoventilation syndrome possible sleep apnea  Diffuse skin excoriation both arms and legs patient is foul-smelling  Possible bedbugs versus scabies  Order for bath to be given to patient and then apply permethrin cream head to toe  Wound and stoma consult to evaluate for decubitus ulcer  Given patient body weight and bedbound at high risk of DVT and PE will start Eliquis 2.5 mg twice a day  bnp elevated  Lasix 20 mg daily    9/25/21    · Has scabies 1. Start ivermectin          9/26/21    · Has extensive scabies associated dermatitis with classical findings 2. Ivermectin given          9/27/21    · Stable    Vitals:    09/26/21 0915 09/26/21 2022 09/26/21 2125 09/27/21 0800   BP: (!) 125/57 132/80 132/80 (!) 131/58   Pulse: 88 86 86 90   Resp: 14 16  16   Temp: 97.8 °F (36.6 °C) 97.9 °F (36.6 °C)  98.6 °F (37 °C)   TempSrc: Oral Oral  Tympanic   SpO2:       Weight:       Height:               9/28/21    · Pruritis improved . · Has scabies . Also hx bed bugs but at present scabies is the issue  · improving   Will sign off . Thanks . 3. Please call again , if neeeded . Petar Devi MD  9/28/2021  2:31 PM    Copy sent to Dr. Homa Michele, APRN - CNP    Please note that this chart was generated using voice recognition Dragon dictation software. Although every effort was made to ensure the accuracy of this automated transcription, some errors in transcription may have occurred.

## 2021-09-28 NOTE — CARE COORDINATION
SW talked to patient about PAS approval and about geographical preferences for SNF preference. SW called [facility] (phone # [phone]) and faxed referral to [phone]. SW called [facility] (phone # [phone]) and faxed referral to [phone]. Facility called back and said patient was denied due to charted suicidal ideation. SW called [facility] (phone # [phone]) and faxed referral to [phone]. SW continues to assist with placement for safe discharge.

## 2021-09-28 NOTE — PROGRESS NOTES
[facility]ail [facility] Daily Progress Note  9/28/2021    Patient Name: Adair Quiroz COMPLAINT:  Suicidal ideation with plan to overdose on medications. SUBJECTIVE:      Patient is seen today for a follow up assessment. Prior to our discussion, this writer observed patient in day area attending group and playing cards with staff and peers. She did appear to be enjoying herself and smiled infrequently throughout the game. Spoke with patient later in the afternoon. Patient is resting in her room. She reports feeling anxious and on edge today. She however has not utilized Atarax. Patient states that she has significant concerns with her disposition plan and has difficulty distracting herself  From her negative thoughts. She states she occasionally has passive suicidal ideation, but denies a plan or intent. States that she feels it would be better if she were not alive. She does however believe that her mood is slightly improved today and she feels it is secondary to being more active in the milieu. Staff report that she showered which is an improvement. Per review of staff documentation, social work note that patient's Fatmata Idler has been approved and referrals have been sent to multiple ECFs. No response at this time from any facilities. We will continue to assist patient with placement. There is currently no lower level of care that is available to help patient meet basic needs and provide a safe and secure environment. She requires continued inpatient hospitalization for stability. Medication Adherence: Adherent    Group Attendance on Unit:   [] Yes  [x] Selectively    [] No       Mental Status Exam  Level of consciousness: Alert and awake. Appearance: Appropriate attire for setting, resting in bed, with improved grooming and hygiene. Behavior/Motor: Approachable, psychomotor slowing noted.   Attitude toward examiner: Cooperative, attentive, fair eye contact  Speech: Normal rate, normal volume, normal tone.  Mood:  Patient reports \"a little better today\"  Affect: Blunted. Thought processes: Linear and coherent. Thought content: Denies homicidal ideation. Suicidal Ideation: Passive suicidal ideations, without current plan or intent, contracts for safety on the unit. Delusions: No evidence of delusions. Denies paranoia. Perceptual Disturbance: Patient does not appear to be responding to internal stimuli. Denies auditory hallucinations. Denies visual hallucinations. Cognition: Oriented to self, location, time, and situation. Memory: Intact. Insight & Judgement: Poor. Data   height is 5' 7\" (1.702 m) and weight is 350 lb (158.8 kg) (abnormal). Her tympanic temperature is 98.6 °F (37 °C). Her blood pressure is 131/58 (abnormal) and her pulse is 90. Her respiration is 16 and oxygen saturation is 96%.    Labs:   Admission on 09/20/2021   Component Date Value Ref Range Status    WBC 09/20/2021 8.4  3.5 - 11.0 k/uL Final    RBC 09/20/2021 3.80* 4.0 - 5.2 m/uL Final    Hemoglobin 09/20/2021 11.6* 12.0 - 16.0 g/dL Final    Hematocrit 09/20/2021 35.7* 36 - 46 % Final    MCV 09/20/2021 94.0  80 - 100 fL Final    MCH 09/20/2021 30.4  26 - 34 pg Final    MCHC 09/20/2021 32.4  31 - 37 g/dL Final    RDW 09/20/2021 15.2* 11.5 - 14.9 % Final    Platelets 73/77/4911 485* 150 - 450 k/uL Final    MPV 09/20/2021 7.4  6.0 - 12.0 fL Final    NRBC Automated 09/20/2021 NOT REPORTED  per 100 WBC Final    Differential Type 09/20/2021 NOT REPORTED   Final    Seg Neutrophils 09/20/2021 68* 36 - 66 % Final    Lymphocytes 09/20/2021 14* 24 - 44 % Final    Monocytes 09/20/2021 7  1 - 7 % Final    Eosinophils % 09/20/2021 10* 0 - 4 % Final    Basophils 09/20/2021 1  0 - 2 % Final    Immature Granulocytes 09/20/2021 NOT REPORTED  0 % Final    Segs Absolute 09/20/2021 5.80  1.3 - 9.1 k/uL Final    Absolute Lymph # 09/20/2021 1.10  1.0 - 4.8 k/uL Final    Absolute Mono # 09/20/2021 0.50  0.1 - 1.3 k/uL Final    Absolute Eos # 09/20/2021 0.90* 0.0 - 0.4 k/uL Final    Basophils Absolute 09/20/2021 0.10  0.0 - 0.2 k/uL Final    Absolute Immature Granulocyte 09/20/2021 NOT REPORTED  0.00 - 0.30 k/uL Final    WBC Morphology 09/20/2021 NOT REPORTED   Final    RBC Morphology 09/20/2021 NOT REPORTED   Final    Platelet Estimate 85/97/7353 NOT REPORTED   Final    Glucose 09/20/2021 110* 70 - 99 mg/dL Final    BUN 09/20/2021 22  8 - 23 mg/dL Final    CREATININE 09/20/2021 0.84  0.50 - 0.90 mg/dL Final    Bun/Cre Ratio 09/20/2021 NOT REPORTED  9 - 20 Final    Calcium 09/20/2021 9.3  8.6 - 10.4 mg/dL Final    Sodium 09/20/2021 142  135 - 144 mmol/L Final    Potassium 09/20/2021 4.3  3.7 - 5.3 mmol/L Final    Chloride 09/20/2021 103  98 - 107 mmol/L Final    CO2 09/20/2021 26  20 - 31 mmol/L Final    Anion Gap 09/20/2021 13  9 - 17 mmol/L Final    Alkaline Phosphatase 09/20/2021 73  35 - 104 U/L Final    ALT 09/20/2021 9  5 - 33 U/L Final    AST 09/20/2021 12  <32 U/L Final    Total Bilirubin 09/20/2021 0.24* 0.3 - 1.2 mg/dL Final    Total Protein 09/20/2021 7.2  6.4 - 8.3 g/dL Final    Albumin 09/20/2021 4.0  3.5 - 5.2 g/dL Final    Albumin/Globulin Ratio 09/20/2021 NOT REPORTED  1.0 - 2.5 Final    GFR Non- 09/20/2021 >60  >60 mL/min Final    GFR  09/20/2021 >60  >60 mL/min Final    GFR Comment 09/20/2021        Final    Comment: Average GFR for 61-76 years old:   80 mL/min/1.73sq m  Chronic Kidney Disease:   <60 mL/min/1.73sq m  Kidney failure:   <15 mL/min/1.73sq m              eGFR calculated using average adult body mass.  Additional eGFR calculator available at:        [employer]            GFR Staging 09/20/2021 NOT REPORTED   Final    D-Dimer, Quant 09/20/2021 3.51* 0.00 - 0.59 mg/L FEU Final    Comment:        When combined with a low clinical probability, a D dimer value of <0.50 mg/L FEU is   considered negative for DVT and PE (negative predictive value of 98%, sensitivity of 97%). If this test is not being used to help rule out DVT and PE, then the following reference   range should be utilized: 0.00 - 0.59 mg/L FEU. The D-Dimer assay is intended for use as an aid in the diagnosis of venous thromboembolism   (DVT and PE) and the results should be interpreted in conjunction with the patient's medical   history, clinical presentation, and other findings. Elevated levels of D-dimer activity can be seen in any state of coagulation activation and   is not recommended in patients with therapeutic dose anticoagulant therapy for >24 hours,   fibrinolytic therapy within the previous 7 days, trauma or surgery within the previous 4   weeks,   disseminated malignancies, aortic aneurysm, sepsis, severe infections, pneumonia, severe   skin infections, liver cirrhosis, advanced age, debora                           nary disease, diabetes, and pregnancy. A very low percentage of patients with DVT may yield D-dimer results below the cutoff of   0.5 mg/L FEU. This is known to be more prevalent in patients with distal DVT.  Pro-BNP 09/20/2021 543* <300 pg/mL Final     Pro-BNP results cannot be compared to BNP results.  BNP Interpretation 09/20/2021 Pro-BNP Reference Range:   Final    Comment:       Rule Out:    <300        Grey Zone:   Age <50     300-450   Age 54-65   300-900   Age >75     300-1800  Usually represents mild to moderate HF but other cardiopulmonary causes cannot be ruled out. Rule In:   Age <50     >65   Age 54-65   >900   Age >76    >5      Troponin, High Sensitivity 09/20/2021 10  0 - 14 ng/L Final    Comment:       High Sensitivity Troponin values cannot be compared with other Troponin methodologies. Patients with high levels of Biotin oral intake (i.e >5mg/day) may have falsely decreased   Troponin levels.  Samples collected within 8 hours of biotin intake may require additional   information for diagnosis.  Troponin T 09/20/2021 NOT REPORTED  <0.03 ng/mL Final    Troponin Interp 09/20/2021 NOT REPORTED   Final    Ventricular Rate 09/20/2021 89  BPM Final    Atrial Rate 09/20/2021 89  BPM Final    P-R Interval 09/20/2021 168  ms Final    QRS Duration 09/20/2021 98  ms Final    Q-T Interval 09/20/2021 406  ms Final    QTc Calculation (Bazett) 09/20/2021 493  ms Final    P Axis 09/20/2021 71  degrees Final    R Axis 09/20/2021 -47  degrees Final    T Axis 09/20/2021 71  degrees Final    Specimen Description 09/20/2021 . NASOPHARYNGEAL SWAB   Final    SARS-CoV-2, Rapid 09/20/2021 Not Detected  Not Detected Final    Comment:       Rapid NAAT:  The specimen is NEGATIVE for SARS-CoV-2, the novel coronavirus associated with   COVID-19. The ID NOW COVID-19 assay is designed to detect the virus that causes COVID-19 in patients   with signs and symptoms of infection who are suspected of COVID-19. An individual without symptoms of COVID-19 and who is not shedding SARS-CoV-2 virus would   expect to have a negative (not detected) result in this assay. Negative results should be treated as presumptive and, if inconsistent with clinical signs   and symptoms or necessary for patient management,  should be tested with an alternative molecular assay. Negative results do not preclude   SARS-CoV-2 infection and   should not be used as the sole basis for patient management decisions. Fact sheet for Healthcare Providers: Leroy.es  Fact sheet for Patients: Leroy.es          Methodology: Isothermal Nucleic Acid Amplification      Pro-BNP 09/22/2021 682* <300 pg/mL Final     Pro-BNP results cannot be compared to BNP results.     BNP Interpretation 09/22/2021 Pro-BNP Reference Range:   Final    Comment:       Rule Out:    <300        Grey Zone:   Age <50     300-450   Age 54-65   300-900   Age >75     300-1800  Usually represents mild to moderate HF but other cardiopulmonary causes cannot be ruled out. Rule In:   Age <50     >65   Age 54-65   >900   Age >76    >5           Reviewed patient's current plan of care and vital signs with nursing staff. Labs reviewed: [x] Yes  Last EKG in EMR reviewed: [x] Yes  QTc: 493. Medications  Current Facility-Administered Medications: PARoxetine (PAXIL) tablet 30 mg, 30 mg, Oral, Daily  miconazole (MICOTIN) 2 % powder, , Topical, BID  cefUROXime (CEFTIN) tablet 500 mg, 500 mg, Oral, 2 times per day  furosemide (LASIX) tablet 20 mg, 20 mg, Oral, Daily  apixaban (ELIQUIS) tablet 2.5 mg, 2.5 mg, Oral, BID  Calamine 8-8 % lotion, , Topical, PRN  acetaminophen (TYLENOL) tablet 650 mg, 650 mg, Oral, Q4H PRN  aluminum & magnesium hydroxide-simethicone (MAALOX) 200-200-20 MG/5ML suspension 30 mL, 30 mL, Oral, Q6H PRN  hydrOXYzine (ATARAX) tablet 50 mg, 50 mg, Oral, TID PRN  ibuprofen (ADVIL;MOTRIN) tablet 400 mg, 400 mg, Oral, Q6H PRN  traZODone (DESYREL) tablet 50 mg, 50 mg, Oral, Nightly PRN  polyethylene glycol (GLYCOLAX) packet 17 g, 17 g, Oral, Daily PRN  nicotine polacrilex (NICORETTE) gum 2 mg, 2 mg, Oral, Q1H PRN  haloperidol (HALDOL) tablet 5 mg, 5 mg, Oral, Q4H PRN **AND** LORazepam (ATIVAN) tablet 2 mg, 2 mg, Oral, Q4H PRN  haloperidol lactate (HALDOL) injection 5 mg, 5 mg, IntraMUSCular, Q4H PRN **AND** LORazepam (ATIVAN) injection 2 mg, 2 mg, IntraMUSCular, Q4H PRN **AND** diphenhydrAMINE (BENADRYL) injection 50 mg, 50 mg, IntraMUSCular, Q4H PRN    ASSESSMENT  Major depressive disorder, recurrent, severe without psychotic behavior ([facility] [address]         PLAN  Patient symptoms show: Minimal improvement. Continue current medication regimen. Observe for tolerability and efficacy. Monitor need and frequency of PRN medications. Encourage participation in groups and milieu. Attempt to develop insight. Psycho-education conducted. Supportive Therapy conducted.   Probable discharge is to be determined by MD.   Follow-up daily while inpatient. Patient continues to be monitored in the inpatient psychiatric facility at [facility] for safety and stabilization. Patient continues to need, on a daily basis, active treatment furnished directly by or requiring the supervision of inpatient psychiatric personnel. Electronically signed by KIRK Rios CNP on 9/28/2021 at 6:06 PM    **This report has been created using voice recognition software. It may contain minor errors which are inherent in voice recognition technology. **    I independently saw and evaluated the patient. I reviewed the nurse practitioners documentation above. Any additional comments or changes to the nurse practitioners documentation are stated below otherwise agree with assessment. Plan will be as follows:  Spent 30 minutes with the patient, of that greater than 16 minutes was spent in supportive psychotherapy. Patient is trying to engage more in the unit milieu. Attempting to attend groups. Participating with physical therapy. Denying side effects to medication. Somewhat constructive about disposition to [facility]  PLAN  Patient s symptoms   are improving  Continue with current medication  Attempt to develop insight  Psycho-education conducted. Supportive Therapy conducted.   Probable discharge is Friday  Follow-up daily while on inpatient unit

## 2021-09-28 NOTE — PROGRESS NOTES
Physical Therapy  Facility/Department: [facility]  Daily Treatment Note  NAME: Fabiola Clinton  : 1960  MRN: [id number]    Date of Service: 2021    Discharge Recommendations:  Patient would benefit from continued therapy after discharge        Assessment   Body structures, Functions, Activity limitations: Decreased functional mobility ; Decreased safe awareness;Decreased balance;Decreased strength;Decreased posture;Decreased endurance  Assessment: Pt presents with suicidal ideations and poor living conditions at home. Pt has large areas of wounds and scratches, not fully observed. Will cont per POC to prepare for d/c. Treatment Diagnosis: impaired mobiltiy d/t depressive d/o  Specific instructions for Next Treatment: check with Nsg re: possible isolation precautions  Prognosis: Fair  Decision Making: Medium Complexity  History: Fabiola Clinton is a 64 y.o. female who presents to the emergency department via EMS for evaluation of leg swelling x 2-3 weeks. Pt states her legs are now weeping fluid they are so swollen. She reports a wound over her left heel that was bleeding 1 week ago. She denies any pain in her legs. Denies fever, chills, nausea, emesis, abd pain, chest pain, sob, cough. Pt also states they her home is infested with bed bugs. She reports bites all over her body. States she does not want to go back home. Pt does not have a PCP. States she has not taken any of her medications for several months. No other complaints. Exam: ROM, social hx, balance, functional mobility  Clinical Presentation: uncomfortable  Barriers to Learning: none  REQUIRES PT FOLLOW UP: Yes  Activity Tolerance  Activity Tolerance: Patient limited by endurance; Patient limited by pain     Patient Diagnosis(es): The primary encounter diagnosis was Peripheral edema.  Diagnoses of Infestation by bed bug, Ulcer of left foot, limited to breakdown of skin ([address] and Suicidal behavior without attempted self-injury were also pertinent to this visit. has a past medical history of Arthritis, CHF (congestive heart failure) ([facility] [address] COPD (chronic obstructive pulmonary disease) ([facility] [address] Diverticulitis, Hx of blood clots, and Pulmonary embolism ([facility] [address] has a past surgical history that includes Ankle surgery; Colon surgery; hernia repair; and Foot surgery (Left, 1/22/2021). Restrictions  Restrictions/Precautions  Restrictions/Precautions: Fall Risk  Required Braces or Orthoses?: No  Position Activity Restriction  Other position/activity restrictions: active bed bug infestation per RN and CNP on unit. Pt is not currently in isolation. Subjective   General  Chart Reviewed: Yes  Additional Pertinent Hx: 19-year-old  lady morbidly obese history of diastolic congestive heart failureHistory of COPD with obesity hypoventilation syndrome possible sleep apneaDiffuse skin excoriation both arms and legs patient is foul-smellingPossible bedbugs versus scabiesOrder for bath to be given to patient and then apply permethrin cream head to toeWound and stoma consult to evaluate for decubitus ulcerGiven patient body weight and bedbound at high risk of DVT and PE will start Eliquis 2.5 mg twice a day  Response To Previous Treatment: Not applicable  Family / Caregiver Present: No  Subjective  Subjective: (P) Patient in [facility] upon arrival and agreeable to PT. General Comment  Comments: (P) RN ok's session. 9/23/21 Nursing staff has PPE at desk. Please ask for it prior to treatment. Pain Screening  Patient Currently in Pain: Yes  Pain Assessment  Pain Assessment: (P) 0-10  Pain Level: (P) 8  Pain Type: (P) Chronic pain  Pain Location: (P) Hip  Pain Orientation: (P) Right  Pain Descriptors: (P) Aching;Constant;Sore;Sharp; Heaviness  Pain Frequency: (P) Continuous  Pain Onset: (P) On-going  Functional Pain Assessment: (P) Prevents or interferes some active activities and ADLs  Non-Pharmaceutical Pain Intervention(s): (P) Ambulation/Increased Activity;Repositioned;Relaxation techniques; Rest  Vital Signs  Level of Consciousness: Alert (0)  Patient Currently in Pain: Yes  Oxygen Therapy  O2 Device: None (Room air)  Pre Treatment Pain Screening  Intervention List: Patient able to continue with treatment  Comments / Details: c/o generalized severe itching    Orientation  Orientation  Overall Orientation Status: Within Functional Limits  Objective   Bed mobility  Comment: ELFEGO. Patient seated in WC upon arrival.  Transfers  Sit to Stand: (P) Stand by assistance;Contact guard assistance (with RW)  Stand to sit: (P) Stand by assistance;Contact guard assistance (with RW)  Comment: (P) Patient continues to have heavy reliance on RW, severe fwd trunk flex when standing. No LOB noted, but unsteady due to hip pain. Patient tolerates standing 3x for 20-30 seconds. Ambulation  Ambulation?: (P) No (Deferred due to increased pain levels. )  More Ambulation?: No  Stairs/Curb  Stairs?: No     Balance  Posture: Poor  Sitting - Static: Fair  Sitting - Dynamic: Fair  Standing - Static: Fair;-  Standing - Dynamic: Fair;-  Comments: with RW  Other exercises  Other exercises?: Yes  Other exercises 1: seated BLE ex's. with lime green TB for light/mod resistance. Reps: 15 each on LLE. Pt can only tolerateAROM on RLE due to hip pain. Other exercises 2: (P) STS x5 with RW  Other exercises 3: (P) Static stand x3 ~<20-30 sec. Comment: rest breaks PRN.     Goals  Short term goals  Time Frame for Short term goals: 2 weeks  Short term goal 1: Pt will increase LE strength to [facility]  Short term goal 2: Pt will perform bed mobilty IND  Short term goal 3: Pt will transfer with RW MOD IND  Short term goal 4: Pt will ambulate with RW MOD IND 20 ft  Short term goal 5: Pt will improve standing balance to good to increase safety  Patient Goals   Patient goals : Go to SNF    Plan    Plan  Times per week: 3x/wk  Times per day: Daily  Specific instructions for Next Treatment: check with Nsg re: possible isolation precautions  Current Treatment Recommendations: Strengthening, Transfer Training, Endurance Training, Patient/Caregiver Education & Training, Balance Training, Functional Mobility Training, Safety Education & Training, Positioning, Gait Training  Safety Devices  Type of devices: Patient at risk for falls, Left in bed, Call light within reach, Nurse notified     Therapy Time   Individual Concurrent Group Co-treatment   Time In 0910         Time Out 0936         Minutes [address] [place], [place]

## 2021-09-28 NOTE — GROUP NOTE
Group Therapy Note    Date: 9/28/2021    Group Start Time: 1100  Group End Time: 1150  Group Topic: Recreational    [facility] DANIE Porter, CTRS        Group Therapy Note    Attendees: 8/19         Patient's Goal: improve ability to hold concentration on art activity. Improve interpersonal skills      Status After Intervention:  Improved    Participation Level:  Active Listener and Interactive    Participation Quality: Appropriate, Attentive, Sharing and Supportive      Speech:  normal      Thought Process/Content: Logical      Affective Functioning: Congruent      Mood: euphoric      Level of consciousness:  Alert, Oriented x4 and Attentive      Response to Learning: Able to verbalize current knowledge/experience, Able to verbalize/acknowledge new learning and Able to retain information    Modes of Intervention: Education, Support, Socialization, Exploration, Clarifying, Problem-solving and Activity      Discipline Responsible: Psychoeducational Specialist      Signature:  Sudha Headings

## 2021-09-28 NOTE — GROUP NOTE
Group Therapy Note    Date: 9/28/2021    Group Start Time: 1320  Group End Time: 3668  Group Topic: Cognitive Skills    MALENA Mcclellan, CTRS        Group Therapy Note    Attendees: 7/19    patient refused to attend problem solving skills group at [phone] after encouragement from staff. 1:1 talk time provided as alternative to group session.         Signature:  Obi Mcclellan, [address]

## 2021-09-28 NOTE — PLAN OF CARE
Problem: Altered Mood, Depressive Behavior:  Goal: Absence of self-harm  Description: Absence of self-harm  9/28/2021 1037 by Nelly Nath LPN  Outcome: Ongoing   Patient remains free from self harm    Problem: Altered Mood, Depressive Behavior:  Goal: Ability to disclose and discuss suicidal ideas will improve  Description: Ability to disclose and discuss suicidal ideas will improve  9/28/2021 1037 by Nelly Nath LPN  Outcome: Ongoing     Problem: Altered Mood, Depressive Behavior:  Goal: Ability to disclose and discuss suicidal ideas will improve  Description: Ability to disclose and discuss suicidal ideas will improve  9/28/2021 1037 by Nelly Nath LPN  Outcome: Ongoing   Patient denies suicidal ideations at this time

## 2021-09-28 NOTE — GROUP NOTE
Group Therapy Note    Date: 9/28/2021    Group Start Time: 0900  Group End Time: 0930  Group Topic: Community Meeting    MALENA HENRIQUEZ    Sandra Kussmaul, RN        Group Therapy Note    Attendees: 5/20         Patient's Goal:  Going to Group and stay active! Notes:   Active participant - Getting into a nursing home is long term goal.     Status After Intervention:  Improved    Participation Level: Interactive    Participation Quality: Appropriate      Speech:  normal      Thought Process/Content: Logical      Affective Functioning: Congruent      Mood: euthymic      Level of consciousness:  Alert      Response to Learning: Able to verbalize current knowledge/experience      Endings: None Reported    Modes of Intervention: Education      Discipline Responsible: Registered Nurse      Signature:  Sandra Kussmaul, RN

## 2021-09-28 NOTE — PROGRESS NOTES
Behavioral Services                                              Medicare Re-Certification    I certify that the inpatient psychiatric hospital services furnished since the previous certification/re-certification were, and continue to be, medically necessary for;    [x] (1) Treatment which could reasonably be expected to improve the patient's condition,    [x] (2) Or for diagnostic study. Estimated length of stay/service 1 to 4 days    Plan for post-hospital care ECF    This patient continues to need, on a daily basis, active treatment furnished directly by or requiring the supervision of inpatient psychiatric personnel.     Electronically signed by Carlos Yen MD on 9/27/2021 at 8:44 PM

## 2021-09-28 NOTE — PLAN OF CARE
Problem: Altered Mood, Depressive Behavior:  Goal: Ability to disclose and discuss suicidal ideas will improve  Description: Ability to disclose and discuss suicidal ideas will improve  9/27/2021 2228 by Lopez Breen LPN  Outcome: Ongoing     Problem: Altered Mood, Depressive Behavior:  Goal: Absence of self-harm  Description: Absence of self-harm  9/27/2021 2228 by Lopez Breen LPN  Outcome: Ongoing     Problem: Pain:  Goal: Pain level will decrease  Description: Pain level will decrease  9/27/2021 2228 by Lopez Breen LPN  Outcome: Ongoing     Problem: Pain:  Goal: Control of acute pain  Description: Control of acute pain  9/27/2021 2228 by Lopez Breen LPN  Outcome: Ongoing     Problem: Pain:  Goal: Control of chronic pain  Description: Control of chronic pain  9/27/2021 2228 by Lopez Breen LPN  Outcome: Ongoing     Problem: Falls - Risk of:  Goal: Will remain free from falls  Description: Will remain free from falls  9/27/2021 2228 by Lopez Breen LPN  Outcome: Ongoing     Problem: Falls - Risk of:  Goal: Absence of physical injury  Description: Absence of physical injury  9/27/2021 2228 by Lopez Breen LPN  Outcome: Ongoing     Problem: Skin Integrity:  Goal: Will show no infection signs and symptoms  Description: Will show no infection signs and symptoms  9/27/2021 2228 by Lopez Breen LPN  Outcome: Ongoing     Problem: Skin Integrity:  Goal: Absence of new skin breakdown  Description: Absence of new skin breakdown  9/27/2021 2228 by Lopez Breen LPN  Outcome: Ongoing     Problem: Tobacco Use:  Goal: Inpatient tobacco use cessation counseling participation  Description: Inpatient tobacco use cessation counseling participation  9/27/2021 2228 by Lopez Breen LPN  Outcome: Ongoing

## 2021-09-29 PROCEDURE — 6370000000 HC RX 637 (ALT 250 FOR IP): Performed by: PSYCHIATRY & NEUROLOGY

## 2021-09-29 PROCEDURE — 1240000000 HC EMOTIONAL WELLNESS R&B

## 2021-09-29 PROCEDURE — 99232 SBSQ HOSP IP/OBS MODERATE 35: CPT | Performed by: PSYCHIATRY & NEUROLOGY

## 2021-09-29 PROCEDURE — 90833 PSYTX W PT W E/M 30 MIN: CPT | Performed by: PSYCHIATRY & NEUROLOGY

## 2021-09-29 PROCEDURE — APPSS30 APP SPLIT SHARED TIME 16-30 MINUTES: Performed by: NURSE PRACTITIONER

## 2021-09-29 PROCEDURE — 6370000000 HC RX 637 (ALT 250 FOR IP): Performed by: INTERNAL MEDICINE

## 2021-09-29 RX ADMIN — APIXABAN 2.5 MG: 2.5 TABLET, FILM COATED ORAL at 09:35

## 2021-09-29 RX ADMIN — ANTI-FUNGAL POWDER MICONAZOLE NITRATE TALC FREE: 1.42 POWDER TOPICAL at 21:47

## 2021-09-29 RX ADMIN — HYDROXYZINE HYDROCHLORIDE 50 MG: 50 TABLET, FILM COATED ORAL at 21:48

## 2021-09-29 RX ADMIN — ANTI-FUNGAL POWDER MICONAZOLE NITRATE TALC FREE: 1.42 POWDER TOPICAL at 09:35

## 2021-09-29 RX ADMIN — IBUPROFEN 400 MG: 400 TABLET, FILM COATED ORAL at 21:48

## 2021-09-29 RX ADMIN — APIXABAN 2.5 MG: 2.5 TABLET, FILM COATED ORAL at 21:48

## 2021-09-29 RX ADMIN — FUROSEMIDE 20 MG: 20 TABLET ORAL at 09:35

## 2021-09-29 RX ADMIN — PAROXETINE HYDROCHLORIDE 30 MG: 20 TABLET, FILM COATED ORAL at 09:35

## 2021-09-29 ASSESSMENT — PAIN SCALES - GENERAL
PAINLEVEL_OUTOF10: 3
PAINLEVEL_OUTOF10: 8

## 2021-09-29 NOTE — PLAN OF CARE
Problem: Altered Mood, Depressive Behavior:  Goal: Ability to disclose and discuss suicidal ideas will improve  Description: Ability to disclose and discuss suicidal ideas will improve  Outcome: Ongoing  Goal: Absence of self-harm  Description: Absence of self-harm  Outcome: Ongoing      Pt denies suicidal ideations at this time. Pt agreed to seek staff at anytime she felt like any urges to harm self would arise. Safety checks maintained [id number]. Pt remains free from self harm this shift. Pt denies wanting to cause harm to self or others at this time. Pt encouraged to seek nursing staff at anytime if she felt at danger to themselves or others. Pt states understanding. Safety checks maintained [id number]    Pt aloof all evening. Denies current SI/HI.  /BC

## 2021-09-29 NOTE — GROUP NOTE
Group Therapy Note    Date: 9/29/2021    Group Start Time: 1100  Group End Time: 1111  Group Topic: Recreational    [address], [facility]        Group Therapy Note    Attendees:9/20    patient refused to attend  recreation and leisure  group at  [id number] after encouragement from staff. 1:1 talk time provided as alternative to group session.           Signature:  Crystal Cox, [address]

## 2021-09-29 NOTE — GROUP NOTE
Group Therapy Note    Date: 9/29/2021    Group Start Time: 1000  Group End Time: 0016  Group Topic: Psychotherapy    [place]        Group Therapy Note    Attendees: 11/22         patient refused to attend psychotherapy group at [place] [id number] after encouragement from staff.   1:1 talk time provided as alternative to group session    Signature:  [place]

## 2021-09-29 NOTE — PLAN OF CARE
Problem: Altered Mood, Depressive Behavior:  Goal: Ability to disclose and discuss suicidal ideas will improve  Description: Ability to disclose and discuss suicidal ideas will improve  9/29/2021 0934 by Jd Quesada  Outcome: Ongoing  Patient admits to depression rated 7/10, with 10 being the worst. Denies anxiety as well as homicidal and suicidal ideations. Also denies hallucinations. Contracts for safety. Problem: Pain:  Goal: Pain level will decrease  Description: Pain level will decrease  Outcome: Ongoing   Patient rates pain 8/10 with 10 being the worst. Has Tylenol and ibuprofen ordered as needed. Problem: Falls - Risk of:  Goal: Will remain free from falls  Description: Will remain free from falls  Outcome: Ongoing  Patient has remaines free from falls while on unit.

## 2021-09-29 NOTE — CARE COORDINATION
CROW escorted Terry Hutson from [facility] to pts room to meet with pt. Terry Hutson reports the pt has been with [facility] before and if the waiver is in place they will be willing to accept pt again, however Terry Hutson had to check into the waiver with central intake before making a decision.

## 2021-09-29 NOTE — CARE COORDINATION
CROW spoke with Zabrina Jennings from [facility] who reports that the pt has been accepted and can arrive on 9/30/2021.

## 2021-09-29 NOTE — CARE COORDINATION
[facility] made several attempts to fax transportation request after multiple attempts, SW contacted [employer] via [phone]. Transportation is set up via [facility] and pt will be picked-up about 2:00 pm on 9/30/201.

## 2021-09-30 VITALS
HEART RATE: 100 BPM | HEIGHT: 67 IN | RESPIRATION RATE: 16 BRPM | WEIGHT: 293 LBS | TEMPERATURE: 97 F | DIASTOLIC BLOOD PRESSURE: 89 MMHG | OXYGEN SATURATION: 96 % | SYSTOLIC BLOOD PRESSURE: 144 MMHG | BODY MASS INDEX: 45.99 KG/M2

## 2021-09-30 PROCEDURE — 6370000000 HC RX 637 (ALT 250 FOR IP): Performed by: PSYCHIATRY & NEUROLOGY

## 2021-09-30 PROCEDURE — 99239 HOSP IP/OBS DSCHRG MGMT >30: CPT | Performed by: PSYCHIATRY & NEUROLOGY

## 2021-09-30 PROCEDURE — 97116 GAIT TRAINING THERAPY: CPT

## 2021-09-30 PROCEDURE — 97110 THERAPEUTIC EXERCISES: CPT

## 2021-09-30 PROCEDURE — 6370000000 HC RX 637 (ALT 250 FOR IP): Performed by: INTERNAL MEDICINE

## 2021-09-30 RX ORDER — IBUPROFEN 400 MG/1
400 TABLET ORAL EVERY 6 HOURS PRN
Qty: 120 TABLET | Refills: 0 | Status: SHIPPED | OUTPATIENT
Start: 2021-09-30

## 2021-09-30 RX ORDER — PAROXETINE 30 MG/1
30 TABLET, FILM COATED ORAL DAILY
Qty: 30 TABLET | Refills: 0 | Status: SHIPPED | OUTPATIENT
Start: 2021-09-30

## 2021-09-30 RX ORDER — HYDROXYZINE 50 MG/1
50 TABLET, FILM COATED ORAL 3 TIMES DAILY PRN
Qty: 30 TABLET | Refills: 0 | Status: SHIPPED | OUTPATIENT
Start: 2021-09-30 | End: 2021-10-10

## 2021-09-30 RX ORDER — IODINE/SODIUM IODIDE 2 %
TINCTURE TOPICAL PRN
Qty: 237 ML | Refills: 0 | Status: SHIPPED | OUTPATIENT
Start: 2021-09-30

## 2021-09-30 RX ORDER — TRAZODONE HYDROCHLORIDE 50 MG/1
50 TABLET ORAL NIGHTLY PRN
Qty: 30 TABLET | Refills: 0 | Status: SHIPPED | OUTPATIENT
Start: 2021-09-30

## 2021-09-30 RX ADMIN — FUROSEMIDE 20 MG: 20 TABLET ORAL at 09:51

## 2021-09-30 RX ADMIN — PAROXETINE HYDROCHLORIDE 30 MG: 20 TABLET, FILM COATED ORAL at 09:51

## 2021-09-30 RX ADMIN — ANTI-FUNGAL POWDER MICONAZOLE NITRATE TALC FREE: 1.42 POWDER TOPICAL at 11:36

## 2021-09-30 RX ADMIN — APIXABAN 2.5 MG: 2.5 TABLET, FILM COATED ORAL at 09:50

## 2021-09-30 ASSESSMENT — PAIN SCALES - GENERAL
PAINLEVEL_OUTOF10: 8
PAINLEVEL_OUTOF10: 0

## 2021-09-30 ASSESSMENT — PAIN DESCRIPTION - PAIN TYPE: TYPE: CHRONIC PAIN

## 2021-09-30 ASSESSMENT — PAIN DESCRIPTION - LOCATION: LOCATION: GENERALIZED;HIP

## 2021-09-30 ASSESSMENT — PAIN DESCRIPTION - ORIENTATION: ORIENTATION: RIGHT

## 2021-09-30 NOTE — PROGRESS NOTES
Physical Therapy  Facility/Department: [facility]  Daily Treatment Note  NAME: Pk Diane  : 1960  MRN: [id number]    Date of Service: 2021    Discharge Recommendations:  Patient would benefit from continued therapy after discharge        Assessment   Body structures, Functions, Activity limitations: Decreased functional mobility ; Decreased safe awareness;Decreased balance;Decreased strength;Decreased posture;Decreased endurance  Treatment Diagnosis: impaired mobiltiy d/t depressive d/o  Specific instructions for Next Treatment: check with Nsg re: possible isolation precautions  History: Pk Diane is a 64 y.o. female who presents to the emergency department via EMS for evaluation of leg swelling x 2-3 weeks. Pt states her legs are now weeping fluid they are so swollen. She reports a wound over her left heel that was bleeding 1 week ago. She denies any pain in her legs. Denies fever, chills, nausea, emesis, abd pain, chest pain, sob, cough. Pt also states they her home is infested with bed bugs. She reports bites all over her body. States she does not want to go back home. Pt does not have a PCP. States she has not taken any of her medications for several months. No other complaints. REQUIRES PT FOLLOW UP: Yes  Activity Tolerance  Activity Tolerance: Patient limited by endurance; Patient limited by pain     Patient Diagnosis(es): The primary encounter diagnosis was Peripheral edema. Diagnoses of Infestation by bed bug, Ulcer of left foot, limited to breakdown of skin ([address] and Suicidal behavior without attempted self-injury were also pertinent to this visit. has a past medical history of Arthritis, CHF (congestive heart failure) ([address] COPD (chronic obstructive pulmonary disease) ([address] Diverticulitis, Hx of blood clots, and Pulmonary embolism ([address] has a past surgical history that includes Ankle surgery;  Colon surgery; hernia repair; and Foot surgery (Left, Deviations: Shuffles  Distance: 10ft  Comments: Pt is slightly unsteady and demos quick movements with RW. Stairs/Curb  Stairs?: No        Other exercises  Other exercises?: Yes  Other exercises 1: seated BLE ex's. with lime green TB for light/mod resistance. Reps: 20 each   Other exercises 2: STS x2 with RW  Other exercises 3: transfer with RW from [facility] to bed with CGA   Other exercises 4: writer donned and doffed slipper socks before and after ambulation          Comment: rest breaks PRN.      Goals  Short term goals  Time Frame for Short term goals: 2 weeks  Short term goal 1: Pt will increase LE strength to [facility]  Short term goal 2: Pt will perform bed mobilty IND  Short term goal 3: Pt will transfer with RW MOD IND  Short term goal 4: Pt will ambulate with RW MOD IND 20 ft  Short term goal 5: Pt will improve standing balance to good to increase safety  Patient Goals   Patient goals : Go to SNF    Plan    Plan  Times per week: 3x/wk  Times per day: Daily  Specific instructions for Next Treatment: check with Nsg re: possible isolation precautions  Current Treatment Recommendations: Strengthening, Transfer Training, Endurance Training, Patient/Caregiver Education & Training, Balance Training, Functional Mobility Training, Safety Education & Training, Positioning, Gait Training  Safety Devices  Type of devices: Patient at risk for falls, Left in bed, Call light within reach, Nurse notified        09/30/21 1110   PT Individual Minutes   Time In 1039   Time Out 1102   Minutes 23     Electronically signed by Santiago Dolan PTA on 9/30/21 at 11:16 AM EDT

## 2021-09-30 NOTE — GROUP NOTE
Group Therapy Note    Date: 9/30/2021    Group Start Time: 1100  Group End Time: 5865  Group Topic: Psychoeducation    [facility] [place]      Patient declined to attend coping skills group at 1100 despite encouragement from staff. 1:1 talk time offered by staff as alternative to group session.       Signature:  Keaton Johnson

## 2021-09-30 NOTE — GROUP NOTE
Group Therapy Note    Date: 9/30/2021    Group Start Time: 1330  Group End Time: 7540  Group Topic: Psychoeducation    [place] [place]      Patient declined to attend coping skills group at 1330 despite encouragement from staff. 1:1 talk time offered by staff as alternative to group session.         Signature:  Joel Devries

## 2021-09-30 NOTE — GROUP NOTE
Group Therapy Note    Date: 9/30/2021    Group Start Time: 0900  Group End Time: 7417  Group Topic: Community Meeting    [facility]    Patient refused to attend community meeting and goal setting group at 0900 after encouragement from staff. 1:1 talk time offered by staff as alternative to group session.

## 2021-09-30 NOTE — BH NOTE
[address] [facility]  Discharge Note    Pt discharged with followings belongings:   Dentures: None  Vision - Corrective Lenses: Glasses  Hearing Aid: None  Jewelry: None  Body Piercings Removed: N/A  Clothing: Other (Comment) (Bagged for bed bugs, ELFEGO)  Were All Patient Medications Collected?: Not Applicable  Other Valuables: Cell phone, Other (Comment) (Cracked cell phone,  and cord, prescription, ID, Insurance card)   Valuables sent home with pt or returned to patient. Patient education on aftercare instructions: yes  Information faxed to [facility] by staff  at 2:26 PM .Patient verbalize understanding of AVS:  yes. Status EXAM upon discharge:  Status and Exam  Normal: No  Facial Expression: Flat  Affect: Appropriate  Level of Consciousness: Alert  Mood:Normal: No  Mood: Depressed  Motor Activity:Normal: No  Motor Activity: Decreased  Interview Behavior: Cooperative  Preception: [place] to Person, Vianey Lyons to Time, [place] to Place, [place] to Situation  Attention:Normal: Yes  Attention: Distractible  Thought Processes: Circumstantial  Thought Content:Normal: No  Thought Content: Poverty of Content  Hallucinations: None  Delusions: No  Memory:Normal: No  Memory: Poor Remote, Poor Recent  Insight and Judgment: No  Insight and Judgment: Unmotivated, Poor Insight  Present Suicidal Ideation: No  Present Homicidal Ideation: No      Metabolic Screening:    Lab Results   Component Value Date    LABA1C 6.2 (H) 01/21/2021       Lab Results   Component Value Date    CHOL 135 10/29/2019     Lab Results   Component Value Date    TRIG 64 10/29/2019     Lab Results   Component Value Date    HDL 46 10/29/2019     No components found for: LDLCAL  No results found for: LABVLDL     Pt [facility] to [facility]     Ana Rivera RN

## 2021-09-30 NOTE — PLAN OF CARE
Problem: Altered Mood, Depressive Behavior:  Goal: Ability to disclose and discuss suicidal ideas will improve  Description: Ability to disclose and discuss suicidal ideas will improve  Outcome: Ongoing  Goal: Absence of self-harm  Description: Absence of self-harm  Outcome: Ongoing     Pt remains free from self harm this shift. Pt denies wanting to cause harm to self or others at this time. Pt encouraged to seek nursing staff at anytime if he felt at danger to themselves or others. Pt states understanding. Safety checks maintained [id number]     Pt denies suicidal ideations at this time. Pt agreed to seek staff at anytime he felt like any urges to harm self would arise. Safety checks maintained [id number].

## 2021-09-30 NOTE — PLAN OF CARE
Pt denies current suicidal ideations. She denied current pain. She showered today. All goals completed.

## 2021-09-30 NOTE — BH NOTE
Patient given tobacco quitline number 48606668089 at this time, refusing to call at this time, states \" I just dont want to quit now\"- patient given information as to the dangers of long term tobacco use. Continue to reinforce the importance of tobacco cessation.

## 2021-09-30 NOTE — DISCHARGE SUMMARY
Provider Discharge Summary     Patient ID:  Abhi Rao  [id number]  35 y.o.  1960    Admit date: 9/20/2021    Discharge date and time: 9/30/2021  3:14 PM     Admitting Physician: Reji Sebastian MD     Discharge Physician: Foster Irwin MD    Admission Diagnoses: Infestation by bed bug [B88.8]  Peripheral edema [R60.9]  Ulcer of left foot, limited to breakdown of skin ([facility] [L97.521]  Suicidal behavior without attempted self-injury [R45.89]  Depression with suicidal ideation [F32.9, R45.851]  Severe recurrent major depression without psychotic features ([facility] [F33.2]    Discharge Diagnoses:      Major depressive disorder, recurrent, severe without psychotic behavior ([facility]     Patient Active Problem List   Diagnosis Code    Chronic heart failure with preserved ejection fraction (HCC) I50.32    Chronic obstructive pulmonary disease ([facility] J44.9    Tobacco abuse Z72.0    Class 3 severe obesity with serious comorbidity and body mass index (BMI) of 50.0 to 59.9 in adult ([facility] E66.01, Z68.43    Essential hypertension I10    History of pulmonary embolism Z80.36    Family history of colon cancer in mother [de-identified]    Prediabetes R73.03    Class 4 congestive heart failure, acute on chronic, systolic ([facility]) C40.10    Hypokalemia E87.6    Cellulitis L03.90    Primary osteoarthritis of right hip M16.11    Chronic pain of multiple joints M25.50, G89.29    Depression F32.9    Cellulitis of right leg L03.115    Peripheral artery disease (HCC) I73.9    Non healing left heel wound S91.302A    Corns and callosities L84    Healing pressure ulcer, stage IV ([facility]) L89.94    Lymphedema of both lower extremities I89.0    Non-pressure chronic ulcer of right lower leg with fat layer exposed ([facility] L97.912    Localized edema R60.0    Xerosis cutis L85.3    Depression with suicidal ideation F32.9, R45.851    Major depressive disorder, recurrent, severe without psychotic behavior ([facility] F33.2    Percocet use disorder, mild, abuse (HCC) F11.10    Severe recurrent major depression without psychotic features ([facility] F33.2    Rash of body R21    At high risk for deep venous thrombosis Z91.89    On anticoagulant therapy low dose eliquis Z79.01    Peripheral edema R60.9    Infestation by bed bug B88.8    Scabies B86        Admission Condition: poor    Discharged Condition: stable    Indication for Admission: threat to self    History of Present Illnes (present tense wording is of findings from admission exam and are not necessarily indicative of current findings): Kennedy Ching a 64 y.o. female who has a past medical history of chronic heart failure with preserved ejection fraction, chronic obstructive pulmonary disease, tobacco abuse, class III severe obesity with serious comorbidity, essential hypertension, history of pulmonary embolism, family history of colon cancer in mother, prediabetes, class IV congestive heart failure, acute on chronic, systolic, hypokalemia, cellulitis, primary osteoarthritis of right hip, chronic pain of multiple joints, depression, peripheral artery disease, nonhealing left heel wound, lymphedema of both lower extremities, nonpressure chronic ulcer of right lower leg, localized edema, history of blood clots, history of diverticulitis, and arthritis.      She presented to the [facility] emergency room yesterday afternoon at 12:31 PM related to physical discomfort and hopelessness and helplessness as it relates to her home infestation of bedbugs. Roberto Rodriguez is covered with bites throughout her entire body and reports that she is unable to help herself at home. She reports that since her last discharge May 21 she has not been medication compliant nor did she establish contact with home health that had been arranged to come to her home. She states \"I never answer the phone if I do not know the number \"and denies that any home health agency presented to her door.   She lives in the [address] [place] [address] Center which makes care difficult and this morning she feels that she needs additional help and is agreement to moving forward with consideration of extended care facility placement until her skin heals and ability to care for self improves.     Currently Raúl Gordon endorses low mood with significant anhedonia poor sleep and a sense of worthlessness related to this infestation. She has poor motivation, difficulty with concentration and substantial psychomotor slowing. She reports that the symptoms have been present for greater than a 2-week. Almost all day every day and she was feeling increased suicidal ideation prior to reporting to the emergency room.     Patient denies symptoms of luis including increased goal-directed activity with decreased need for sleep elevated mood or rapid speech. She denies auditory or visual hallucinations or concerns that people are watching or talking about her. She does endorse symptoms of anxiety and rates at 7/10 currently on a 0-10 scale with 10 being the worst.  She denies panic attacks or intrusive or persistent thoughts that are relieved by repetitive behaviors. She denies a history of trauma or symptoms of PTSD. She denies phobias, fear of abandonment objection or utilizing self damaging behaviors as coping mechanism. She denies a forensic history including aggression or violence towards others.  Valentin Valenzuela does have a bedside commode available as well as a walker. She reports that she has a wheelchair to use at home due to difficulty with ambulation and weakness however her home is so small that it is not practical.  She reports chronic pain particularly to her right hip and bilateral knees. At this time it is clear that she is unable to care for self she is requested that her meals be brought to bedside and nursing team is in agreement. Internal medicine consult has been submitted stat as patient is extremely uncomfortable related to condition of skin.   Discussed medications and patient denies that she utilized duloxetine feeling that it was ineffective. She has requested to restart Paxil and after exploring risks versus benefits and alternatives with both this author and attending physician it is mutually agreed to reinitiate this medication. Hospital Course:   Upon admission, Ruddy Knapp was provided a safe secure environment, introduced to unit milieu. Patient participated in groups and individual therapies. Meds were adjusted as noted below. After few days of hospital care, patient began to feel improvement. Depression lifted, thoughts to harm self ceased. Sleep improved, appetite was good. On morning rounds 9/30/2021, Ruddy Knapp  endorses feeling ready for discharge. Patient denies suicidal or homicidal ideations, denies hallucinations or delusions. Denies SE's from meds. It was decided that maximum benefit from hospital care had been achieved and patient can be discharged. Consults:   Internal medicine and wound care    Significant Diagnostic Studies: Routine labs and diagnostics    Treatments: Psychotropic medications, therapy with group, milieu, and 1:1 with nurses, social workers, PAArelisC/CNP, and Attending physician. Discharge Medications:  Discharge Medication List as of 9/30/2021 11:00 AM      START taking these medications    Details   apixaban (ELIQUIS) 2.5 MG TABS tablet Take 1 tablet by mouth 2 times daily, Disp-60 tablet, R-0Print      Calamine 8-8 % LOTN lotion Apply topically as needed (itching), Topical, PRN Starting Thu 9/30/2021, Disp-237 mL, R-0, Print      hydrOXYzine (ATARAX) 50 MG tablet Take 1 tablet by mouth 3 times daily as needed for Anxiety, Disp-30 tablet, R-0Print      miconazole (MICOTIN) 2 % powder Apply topically 2 times daily. , Disp-45 g, R-1, Print      PARoxetine (PAXIL) 30 MG tablet Take 1 tablet by mouth daily, Disp-30 tablet, R-0Print      furosemide (LASIX) 20 MG tablet Take 1 tablet by mouth daily for 10 days, Disp-10 tablet, R-0Print         CONTINUE these medications which have CHANGED    Details   ibuprofen (ADVIL;MOTRIN) 400 MG tablet Take 1 tablet by mouth every 6 hours as needed (4-7 moderate pain, 8-10 severe pain), Disp-120 tablet, R-0Print      traZODone (DESYREL) 50 MG tablet Take 1 tablet by mouth nightly as needed for Sleep, Disp-30 tablet, R-0Print         STOP taking these medications       albuterol sulfate  (90 Base) MCG/ACT inhaler Comments:   Reason for Stopping:         aspirin (ASPIRIN 81) 81 MG EC tablet Comments:   Reason for Stopping:         atorvastatin (LIPITOR) 40 MG tablet Comments:   Reason for Stopping:         diclofenac sodium (VOLTAREN) 1 % GEL Comments:   Reason for Stopping:         DULoxetine 40 MG CPEP Comments:   Reason for Stopping:         lidocaine 4 % external patch Comments:   Reason for Stopping:         metoprolol tartrate (LOPRESSOR) 25 MG tablet Comments:   Reason for Stopping:                Core Measures statement:   Not applicable    Discharge Exam:  Level of consciousness:  Within normal limits  Appearance: Street clothes, seated, with good grooming  Behavior/Motor: No abnormalities noted  Attitude toward examiner:  Cooperative, attentive, good eye contact  Speech:  spontaneous, normal rate, normal volume and well articulated  Mood:  euthymic  Affect:  Full range  Thought processes:  linear, goal directed and coherent  Thought content:  denies homicidal ideation  Suicidal Ideation:  denies suicidal ideation  Delusions:  no evidence of delusions  Perceptual Disturbance:  denies any perceptual disturbance  Cognition:  Intact  Memory: age appropriate  Insight & Judgement: fair  Medication side effects: denies     Disposition: home    Patient Instructions: Activity: activity as tolerated  1. Patient instructed to take medications regularly and follow up with outpatient appointments.      Follow-up as scheduled with outpatient [facility] health      Signed:    Electronically signed by Angelique Padilla MD on 9/30/21 at 3:14 PM EDT    Time Spent on discharge is more than 30 minutes in the examination, evaluation, counseling and review of medications and discharge plan.

## 2021-12-15 ENCOUNTER — TELEPHONE (OUTPATIENT)
Dept: ORTHOPEDIC SURGERY | Age: 61
End: 2021-12-15

## 2021-12-15 DIAGNOSIS — M16.11 ARTHRITIS OF RIGHT HIP: Primary | ICD-10-CM

## 2021-12-15 DIAGNOSIS — M25.551 RIGHT HIP PAIN: ICD-10-CM

## 2021-12-15 NOTE — TELEPHONE ENCOUNTER
Per LOV note you would like patient to see specialist at [facility] for G[place] procedure. Is there a physician you prefer at [facility]?  Are you ok with a referral being put in for the specialist?

## 2021-12-15 NOTE — TELEPHONE ENCOUNTER
Gama Mckenzie from [address] [place] [address], [address] called in regards to the patients last in office visit with Dr Lili Bangura on 2/3/21. She states that Dr Lili Bangura referred her to see a specialist on that visit. She would like to know which specialist specifically he had in mind for the patient and she'd like to have the referral that was issued faxed to her so she can get the patient scheduled for this appointment.   Please fax to: [phone] attn: Gama Mckenzie    If you have questions for Gama Mckenzie and would jonas to speak with her regarding her request, please call her at: [phone]

## 2022-03-25 NOTE — TELEPHONE ENCOUNTER
Pt called & stated that her boyfriend, Ismael Snell, will be picking up her script. Stelara Counseling:  I discussed with the patient the risks of ustekinumab including but not limited to immunosuppression, malignancy, posterior leukoencephalopathy syndrome, and serious infections.  The patient understands that monitoring is required including a PPD at baseline and must alert us or the primary physician if symptoms of infection or other concerning signs are noted.

## 2022-06-06 NOTE — H&P
History and Physical Service  [facility] Internal Medicine    HISTORY AND PHYSICAL EXAMINATION            Date:   5/25/2020  Patient name: Sheree Soler  MRN:   [id number]  Account:  [de-identified]  YOB: 1960  PCP:    Mateo Sanchez PA-C  Code Status:    Full Code    Chief Complaint:     Chief Complaint   Patient presents with    Shortness of Breath    Leg Swelling    Blister     bilateral legs    Knee Pain         History Obtained From:     Patient, EMR, nursing staff    History of Present Illness: The patient is a 61 y.o. Non-/non  female who presents with shortness of breath and leg swelling      Progressively worsening over 1 month, associated with orthopnea bilateral leg swelling. Denies chest pain. History of COPD, not on home oxygen, pulmonary embolism approximate 30 years ago provoked. .  She reports she did stop taking her Lasix for couple weeks as she ran out. Past Medical History:     Past Medical History:   Diagnosis Date    Arthritis     [facility]estive [facility]) [facility] [address]     COPD [facility]chronic obstructive pulmonary disease) ([facility] [address]     Hx of blood clots     Pulmonary embolism (HCC)         Past Surgical History:     Past Surgical History:   Procedure Laterality Date    ANKLE SURGERY      COLON SURGERY      HERNIA REPAIR          Medications Prior to Admission:     Prior to Admission medications    Medication Sig Start Date End Date Taking?  Authorizing Provider   PARoxetine (PAXIL CR) 37.5 MG extended release tablet Take 1 tablet by mouth daily 4/21/20  Yes Mateo Sanchez PA-C   metoprolol tartrate (LOPRESSOR) 25 MG tablet Take 1 tablet by mouth 2 times daily 4/21/20  Yes Mateo Sanchez PA-C   lisinopril (PRINIVIL;ZESTRIL) 10 MG tablet Take 1 tablet by mouth daily 4/21/20  Yes Mateo Sanchez PA-C   furosemide (LASIX) 40 MG tablet Take 1 tablet by mouth 2 times daily 4/21/20  Yes Mateo Sanchez PA-C   metFORMIN (GLUCOPHAGE) 500 MG tablet Enlarged main pulmonary artery at 4.1 cm indicative of pulmonary hypertension. Mediastinum: The thoracic aorta is normal in course and caliber with mild atherosclerotic plaque. Cardiomegaly with atherosclerotic calcification in the coronary circulation. No pericardial effusion. Calcified mediastinal and right hilar nodes consistent with old granulomatous disease. No pathologic adenopathy. Lungs/pleura: Focus of opacification in the left lower lobe posteriorly. This is likely atelectasis associated with some airway secretions which are noted in the lower lobe bronchial tree bilaterally. Mild bronchial wall thickening in the lower lobes. Patchy opacification at the right lung apex, possibly asymmetric fibrotic changes. The lungs otherwise are grossly clear. Small focus of linear atelectasis in the right middle lobe anteriorly. No pleural fluid or pneumothorax. Upper Abdomen: Limited images of the upper abdomen are unremarkable. Soft Tissues/Bones: No acute bone or soft tissue abnormality. 1. No evidence of pulmonary embolic disease. Enlarged main pulmonary artery consistent with pulmonary hypertension. 2. Mild bronchial wall thickening with airway secretions in the lower lobes. Focus of opacification in the left lower lobe posteriorly, likely atelectasis. 3. Cardiomegaly. Atherosclerotic calcification in the aorta and coronary circulation.         Current Facility-Administered Medications   Medication Dose Route Frequency Provider Last Rate Last Dose    albuterol sulfate  (90 Base) MCG/ACT inhaler 2 puff  2 puff Inhalation Q6H PRN Freida Fierro MD        lisinopril (PRINIVIL;ZESTRIL) tablet 10 mg  10 mg Oral Daily Freida Fierro MD   10 mg at 05/25/20 9337    metFORMIN (GLUCOPHAGE) tablet 500 mg  500 mg Oral Daily with breakfast Freida Fierro MD   500 mg at 05/25/20 0809    metoprolol tartrate (LOPRESSOR) tablet 25 mg  25 mg Oral BID Freida Fierro MD   25 mg at 05/25/20 0582    MODERATE

## 2023-05-13 ENCOUNTER — APPOINTMENT (OUTPATIENT)
Dept: GENERAL RADIOLOGY | Age: 63
DRG: 720 | End: 2023-05-13
Payer: MEDICAID

## 2023-05-13 ENCOUNTER — HOSPITAL ENCOUNTER (INPATIENT)
Age: 63
LOS: 5 days | Discharge: NURSING, REHAB OR LONG-TERM CARE | DRG: 720 | End: 2023-05-18
Attending: EMERGENCY MEDICINE | Admitting: FAMILY MEDICINE
Payer: MEDICAID

## 2023-05-13 DIAGNOSIS — M16.11 PRIMARY OSTEOARTHRITIS OF RIGHT HIP: ICD-10-CM

## 2023-05-13 DIAGNOSIS — L03.115 CELLULITIS OF RIGHT LEG: Primary | ICD-10-CM

## 2023-05-13 PROBLEM — L03.116 CELLULITIS OF LEFT LOWER LIMB: Status: ACTIVE | Noted: 2023-05-13

## 2023-05-13 LAB
ABSOLUTE EOS #: 0.47 K/UL (ref 0–0.4)
ABSOLUTE LYMPH #: 0.94 K/UL (ref 1–4.8)
ABSOLUTE MONO #: 0.62 K/UL (ref 0.1–1.3)
ALBUMIN SERPL-MCNC: 3.6 G/DL (ref 3.5–5.2)
ALP SERPL-CCNC: 85 U/L (ref 35–104)
ALT SERPL-CCNC: 17 U/L (ref 5–33)
ANION GAP SERPL CALCULATED.3IONS-SCNC: 12 MMOL/L (ref 9–17)
AST SERPL-CCNC: 23 U/L
BACTERIA: NORMAL
BASOPHILS # BLD: 0 % (ref 0–2)
BASOPHILS ABSOLUTE: 0 K/UL (ref 0–0.2)
BILIRUB DIRECT SERPL-MCNC: 0.1 MG/DL
BILIRUB INDIRECT SERPL-MCNC: 0.4 MG/DL (ref 0–1)
BILIRUB SERPL-MCNC: 0.5 MG/DL (ref 0.3–1.2)
BILIRUBIN URINE: NEGATIVE
BUN SERPL-MCNC: 11 MG/DL (ref 8–23)
CALCIUM SERPL-MCNC: 9.4 MG/DL (ref 8.6–10.4)
CASTS UA: NORMAL /LPF
CHLORIDE SERPL-SCNC: 91 MMOL/L (ref 98–107)
CO2 SERPL-SCNC: 29 MMOL/L (ref 20–31)
COLOR: YELLOW
CREAT SERPL-MCNC: 0.63 MG/DL (ref 0.5–0.9)
EOSINOPHILS RELATIVE PERCENT: 3 % (ref 0–4)
EPITHELIAL CELLS UA: NORMAL /HPF
FLUAV RNA RESP QL NAA+PROBE: NOT DETECTED
FLUBV RNA RESP QL NAA+PROBE: NOT DETECTED
GFR SERPL CREATININE-BSD FRML MDRD: >60 ML/MIN/1.73M2
GLUCOSE BLD-MCNC: 121 MG/DL (ref 65–105)
GLUCOSE BLD-MCNC: 123 MG/DL (ref 65–105)
GLUCOSE BLD-MCNC: 128 MG/DL (ref 65–105)
GLUCOSE SERPL-MCNC: 191 MG/DL (ref 70–99)
GLUCOSE UR STRIP.AUTO-MCNC: ABNORMAL MG/DL
HCT VFR BLD AUTO: 36.5 % (ref 36–46)
HGB BLD-MCNC: 11.6 G/DL (ref 12–16)
INR PPP: 1.1
KETONES UR STRIP.AUTO-MCNC: NEGATIVE MG/DL
LACTIC ACID, SEPSIS: 2.9 MMOL/L (ref 0.5–1.9)
LACTIC ACID, SEPSIS: 2.9 MMOL/L (ref 0.5–1.9)
LEUKOCYTE ESTERASE UR QL STRIP.AUTO: NEGATIVE
LIPASE SERPL-CCNC: 17 U/L (ref 13–60)
LYMPHOCYTES # BLD: 6 % (ref 24–44)
MAGNESIUM SERPL-MCNC: 1.9 MG/DL (ref 1.6–2.6)
MCH RBC QN AUTO: 29.9 PG (ref 26–34)
MCHC RBC AUTO-ENTMCNC: 31.8 G/DL (ref 31–37)
MCV RBC AUTO: 93.8 FL (ref 80–100)
MONOCYTES # BLD: 4 % (ref 1–7)
MORPHOLOGY: ABNORMAL
MORPHOLOGY: ABNORMAL
NITRITE UR QL STRIP.AUTO: NEGATIVE
PARTIAL THROMBOPLASTIN TIME: 32.4 SEC (ref 24–36)
PDW BLD-RTO: 15.6 % (ref 11.5–14.9)
PHOSPHATE SERPL-MCNC: 3.3 MG/DL (ref 2.6–4.5)
PLATELET # BLD AUTO: 408 K/UL (ref 150–450)
PMV BLD AUTO: 7.5 FL (ref 6–12)
POTASSIUM SERPL-SCNC: 4.7 MMOL/L (ref 3.7–5.3)
PROT SERPL-MCNC: 8 G/DL (ref 6.4–8.3)
PROT UR STRIP.AUTO-MCNC: 7.5 MG/DL (ref 5–8)
PROT UR STRIP.AUTO-MCNC: ABNORMAL MG/DL
PROTHROMBIN TIME: 14.2 SEC (ref 11.8–14.6)
RBC # BLD: 3.89 M/UL (ref 4–5.2)
RBC CLUMPS #/AREA URNS AUTO: NORMAL /HPF
SARS-COV-2 RNA RESP QL NAA+PROBE: NOT DETECTED
SEG NEUTROPHILS: 87 % (ref 36–66)
SEGMENTED NEUTROPHILS ABSOLUTE COUNT: 13.57 K/UL (ref 1.3–9.1)
SODIUM SERPL-SCNC: 132 MMOL/L (ref 135–144)
SOURCE: NORMAL
SPECIFIC GRAVITY UA: 1.03 (ref 1–1.03)
SPECIMEN DESCRIPTION: NORMAL
TROPONIN I SERPL DL<=0.01 NG/ML-MCNC: 16 NG/L (ref 0–14)
TROPONIN I SERPL DL<=0.01 NG/ML-MCNC: 17 NG/L (ref 0–14)
TURBIDITY: CLEAR
URINE HGB: NEGATIVE
UROBILINOGEN, URINE: NORMAL
VANCOMYCIN RANDOM DATE LAST DOSE: NORMAL
VANCOMYCIN RANDOM DOSE AMOUNT: NORMAL
VANCOMYCIN RANDOM TIME LAST DOSE: 1824
VANCOMYCIN RANDOM: 35.4 UG/ML
WBC # BLD AUTO: 15.6 K/UL (ref 3.5–11)
WBC UA: NORMAL /HPF

## 2023-05-13 PROCEDURE — 2580000003 HC RX 258: Performed by: EMERGENCY MEDICINE

## 2023-05-13 PROCEDURE — 51702 INSERT TEMP BLADDER CATH: CPT

## 2023-05-13 PROCEDURE — 81001 URINALYSIS AUTO W/SCOPE: CPT

## 2023-05-13 PROCEDURE — 80202 ASSAY OF VANCOMYCIN: CPT

## 2023-05-13 PROCEDURE — 85025 COMPLETE CBC W/AUTO DIFF WBC: CPT

## 2023-05-13 PROCEDURE — 51701 INSERT BLADDER CATHETER: CPT

## 2023-05-13 PROCEDURE — 97166 OT EVAL MOD COMPLEX 45 MIN: CPT

## 2023-05-13 PROCEDURE — 36415 COLL VENOUS BLD VENIPUNCTURE: CPT

## 2023-05-13 PROCEDURE — 96365 THER/PROPH/DIAG IV INF INIT: CPT

## 2023-05-13 PROCEDURE — 80048 BASIC METABOLIC PNL TOTAL CA: CPT

## 2023-05-13 PROCEDURE — 96367 TX/PROPH/DG ADDL SEQ IV INF: CPT

## 2023-05-13 PROCEDURE — 6370000000 HC RX 637 (ALT 250 FOR IP): Performed by: EMERGENCY MEDICINE

## 2023-05-13 PROCEDURE — 83735 ASSAY OF MAGNESIUM: CPT

## 2023-05-13 PROCEDURE — 93005 ELECTROCARDIOGRAM TRACING: CPT | Performed by: EMERGENCY MEDICINE

## 2023-05-13 PROCEDURE — 80076 HEPATIC FUNCTION PANEL: CPT

## 2023-05-13 PROCEDURE — 96375 TX/PRO/DX INJ NEW DRUG ADDON: CPT

## 2023-05-13 PROCEDURE — 87636 SARSCOV2 & INF A&B AMP PRB: CPT

## 2023-05-13 PROCEDURE — 2500000003 HC RX 250 WO HCPCS: Performed by: FAMILY MEDICINE

## 2023-05-13 PROCEDURE — 85610 PROTHROMBIN TIME: CPT

## 2023-05-13 PROCEDURE — 6360000002 HC RX W HCPCS: Performed by: FAMILY MEDICINE

## 2023-05-13 PROCEDURE — 84100 ASSAY OF PHOSPHORUS: CPT

## 2023-05-13 PROCEDURE — 6360000002 HC RX W HCPCS: Performed by: EMERGENCY MEDICINE

## 2023-05-13 PROCEDURE — 99285 EMERGENCY DEPT VISIT HI MDM: CPT

## 2023-05-13 PROCEDURE — 71045 X-RAY EXAM CHEST 1 VIEW: CPT

## 2023-05-13 PROCEDURE — 97162 PT EVAL MOD COMPLEX 30 MIN: CPT

## 2023-05-13 PROCEDURE — 83690 ASSAY OF LIPASE: CPT

## 2023-05-13 PROCEDURE — 6370000000 HC RX 637 (ALT 250 FOR IP): Performed by: FAMILY MEDICINE

## 2023-05-13 PROCEDURE — 82947 ASSAY GLUCOSE BLOOD QUANT: CPT

## 2023-05-13 PROCEDURE — 83605 ASSAY OF LACTIC ACID: CPT

## 2023-05-13 PROCEDURE — 85730 THROMBOPLASTIN TIME PARTIAL: CPT

## 2023-05-13 PROCEDURE — 84484 ASSAY OF TROPONIN QUANT: CPT

## 2023-05-13 PROCEDURE — 2060000000 HC ICU INTERMEDIATE R&B

## 2023-05-13 PROCEDURE — 87040 BLOOD CULTURE FOR BACTERIA: CPT

## 2023-05-13 PROCEDURE — 96361 HYDRATE IV INFUSION ADD-ON: CPT

## 2023-05-13 PROCEDURE — 2580000003 HC RX 258: Performed by: FAMILY MEDICINE

## 2023-05-13 RX ORDER — POLYETHYLENE GLYCOL 3350 17 G/17G
17 POWDER, FOR SOLUTION ORAL DAILY
COMMUNITY

## 2023-05-13 RX ORDER — FUROSEMIDE 20 MG/1
20 TABLET ORAL DAILY
Status: DISCONTINUED | OUTPATIENT
Start: 2023-05-13 | End: 2023-05-18 | Stop reason: HOSPADM

## 2023-05-13 RX ORDER — VENLAFAXINE HYDROCHLORIDE 75 MG/1
75 CAPSULE, EXTENDED RELEASE ORAL DAILY
Status: DISCONTINUED | OUTPATIENT
Start: 2023-05-13 | End: 2023-05-18 | Stop reason: HOSPADM

## 2023-05-13 RX ORDER — DIAZEPAM 5 MG/1
5 TABLET ORAL EVERY 8 HOURS PRN
Status: ON HOLD | COMMUNITY
End: 2023-05-17 | Stop reason: HOSPADM

## 2023-05-13 RX ORDER — ALBUTEROL SULFATE 90 UG/1
2 AEROSOL, METERED RESPIRATORY (INHALATION) EVERY 4 HOURS PRN
COMMUNITY

## 2023-05-13 RX ORDER — POLYETHYLENE GLYCOL 3350 17 G/17G
17 POWDER, FOR SOLUTION ORAL DAILY
Status: DISCONTINUED | OUTPATIENT
Start: 2023-05-13 | End: 2023-05-18 | Stop reason: HOSPADM

## 2023-05-13 RX ORDER — SENNA PLUS 8.6 MG/1
1 TABLET ORAL DAILY PRN
Status: DISCONTINUED | OUTPATIENT
Start: 2023-05-13 | End: 2023-05-18 | Stop reason: HOSPADM

## 2023-05-13 RX ORDER — FAMOTIDINE 20 MG/1
10 TABLET, FILM COATED ORAL EVERY 12 HOURS PRN
Status: DISCONTINUED | OUTPATIENT
Start: 2023-05-13 | End: 2023-05-18 | Stop reason: HOSPADM

## 2023-05-13 RX ORDER — SODIUM CHLORIDE 9 MG/ML
INJECTION, SOLUTION INTRAVENOUS PRN
Status: DISCONTINUED | OUTPATIENT
Start: 2023-05-13 | End: 2023-05-18 | Stop reason: HOSPADM

## 2023-05-13 RX ORDER — 0.9 % SODIUM CHLORIDE 0.9 %
1000 INTRAVENOUS SOLUTION INTRAVENOUS ONCE
Status: COMPLETED | OUTPATIENT
Start: 2023-05-13 | End: 2023-05-13

## 2023-05-13 RX ORDER — ALBUTEROL SULFATE 90 UG/1
2 AEROSOL, METERED RESPIRATORY (INHALATION) EVERY 4 HOURS PRN
Status: DISCONTINUED | OUTPATIENT
Start: 2023-05-13 | End: 2023-05-18 | Stop reason: HOSPADM

## 2023-05-13 RX ORDER — HYDROCODONE BITARTRATE AND ACETAMINOPHEN 5; 325 MG/1; MG/1
2 TABLET ORAL EVERY 6 HOURS PRN
Status: DISCONTINUED | OUTPATIENT
Start: 2023-05-13 | End: 2023-05-18 | Stop reason: HOSPADM

## 2023-05-13 RX ORDER — IODINE/SODIUM IODIDE 2 %
TINCTURE TOPICAL PRN
Status: DISCONTINUED | OUTPATIENT
Start: 2023-05-13 | End: 2023-05-18 | Stop reason: HOSPADM

## 2023-05-13 RX ORDER — HYDROCODONE BITARTRATE AND ACETAMINOPHEN 5; 325 MG/1; MG/1
2 TABLET ORAL EVERY 6 HOURS PRN
Status: ON HOLD | COMMUNITY
End: 2023-05-17 | Stop reason: SDUPTHER

## 2023-05-13 RX ORDER — FAMOTIDINE 10 MG
10 TABLET ORAL EVERY 12 HOURS PRN
COMMUNITY

## 2023-05-13 RX ORDER — OXYCODONE HCL 10 MG/1
10 TABLET, FILM COATED, EXTENDED RELEASE ORAL EVERY 12 HOURS SCHEDULED
Status: DISCONTINUED | OUTPATIENT
Start: 2023-05-13 | End: 2023-05-18 | Stop reason: HOSPADM

## 2023-05-13 RX ORDER — SODIUM CHLORIDE 0.9 % (FLUSH) 0.9 %
5-40 SYRINGE (ML) INJECTION PRN
Status: DISCONTINUED | OUTPATIENT
Start: 2023-05-13 | End: 2023-05-18 | Stop reason: HOSPADM

## 2023-05-13 RX ORDER — NYSTATIN 10B UNIT
POWDER (EA) MISCELLANEOUS 2 TIMES DAILY
COMMUNITY

## 2023-05-13 RX ORDER — DOCUSATE SODIUM 100 MG/1
100 CAPSULE, LIQUID FILLED ORAL 2 TIMES DAILY
Status: DISCONTINUED | OUTPATIENT
Start: 2023-05-13 | End: 2023-05-18 | Stop reason: HOSPADM

## 2023-05-13 RX ORDER — SENNA PLUS 8.6 MG/1
1 TABLET ORAL PRN
COMMUNITY

## 2023-05-13 RX ORDER — IBUPROFEN 400 MG/1
400 TABLET ORAL EVERY 6 HOURS PRN
Status: DISCONTINUED | OUTPATIENT
Start: 2023-05-13 | End: 2023-05-18 | Stop reason: HOSPADM

## 2023-05-13 RX ORDER — KETOROLAC TROMETHAMINE 30 MG/ML
15 INJECTION, SOLUTION INTRAMUSCULAR; INTRAVENOUS ONCE
Status: COMPLETED | OUTPATIENT
Start: 2023-05-13 | End: 2023-05-13

## 2023-05-13 RX ORDER — TRAZODONE HYDROCHLORIDE 50 MG/1
50 TABLET ORAL NIGHTLY PRN
Status: DISCONTINUED | OUTPATIENT
Start: 2023-05-13 | End: 2023-05-18 | Stop reason: HOSPADM

## 2023-05-13 RX ORDER — VENLAFAXINE HYDROCHLORIDE 75 MG/1
75 CAPSULE, EXTENDED RELEASE ORAL DAILY
COMMUNITY

## 2023-05-13 RX ORDER — SODIUM CHLORIDE 0.9 % (FLUSH) 0.9 %
5-40 SYRINGE (ML) INJECTION EVERY 12 HOURS SCHEDULED
Status: DISCONTINUED | OUTPATIENT
Start: 2023-05-13 | End: 2023-05-18 | Stop reason: HOSPADM

## 2023-05-13 RX ORDER — ACETAMINOPHEN 500 MG
1000 TABLET ORAL ONCE
Status: COMPLETED | OUTPATIENT
Start: 2023-05-13 | End: 2023-05-13

## 2023-05-13 RX ORDER — DOCUSATE SODIUM 100 MG/1
100 CAPSULE, LIQUID FILLED ORAL 2 TIMES DAILY
COMMUNITY

## 2023-05-13 RX ADMIN — POLYETHYLENE GLYCOL 3350 17 G: 17 POWDER, FOR SOLUTION ORAL at 14:43

## 2023-05-13 RX ADMIN — PIPERACILLIN AND TAZOBACTAM 4500 MG: 4; .5 INJECTION, POWDER, FOR SOLUTION INTRAVENOUS at 15:00

## 2023-05-13 RX ADMIN — HYDROCODONE BITARTRATE AND ACETAMINOPHEN 2 TABLET: 5; 325 TABLET ORAL at 14:40

## 2023-05-13 RX ADMIN — DOCUSATE SODIUM 100 MG: 100 CAPSULE, LIQUID FILLED ORAL at 21:27

## 2023-05-13 RX ADMIN — SODIUM CHLORIDE, PRESERVATIVE FREE 5 ML: 5 INJECTION INTRAVENOUS at 15:00

## 2023-05-13 RX ADMIN — SODIUM CHLORIDE 1000 ML: 9 INJECTION, SOLUTION INTRAVENOUS at 04:06

## 2023-05-13 RX ADMIN — VANCOMYCIN HYDROCHLORIDE 2500 MG: 1.5 INJECTION, POWDER, LYOPHILIZED, FOR SOLUTION INTRAVENOUS at 04:22

## 2023-05-13 RX ADMIN — PIPERACILLIN AND TAZOBACTAM 3375 MG: 3; .375 INJECTION, POWDER, LYOPHILIZED, FOR SOLUTION INTRAVENOUS at 21:32

## 2023-05-13 RX ADMIN — ACETAMINOPHEN 1000 MG: 500 TABLET ORAL at 04:18

## 2023-05-13 RX ADMIN — FUROSEMIDE 20 MG: 20 TABLET ORAL at 14:40

## 2023-05-13 RX ADMIN — SODIUM CHLORIDE 1000 ML: 9 INJECTION, SOLUTION INTRAVENOUS at 03:17

## 2023-05-13 RX ADMIN — DOCUSATE SODIUM 100 MG: 100 CAPSULE, LIQUID FILLED ORAL at 14:46

## 2023-05-13 RX ADMIN — KETOROLAC TROMETHAMINE 15 MG: 30 INJECTION, SOLUTION INTRAMUSCULAR; INTRAVENOUS at 03:18

## 2023-05-13 RX ADMIN — APIXABAN 2.5 MG: 2.5 TABLET, FILM COATED ORAL at 21:27

## 2023-05-13 RX ADMIN — SODIUM CHLORIDE, PRESERVATIVE FREE 10 ML: 5 INJECTION INTRAVENOUS at 21:34

## 2023-05-13 RX ADMIN — CEFTRIAXONE SODIUM 1000 MG: 1 INJECTION, POWDER, FOR SOLUTION INTRAMUSCULAR; INTRAVENOUS at 03:31

## 2023-05-13 RX ADMIN — VANCOMYCIN HYDROCHLORIDE 1500 MG: 1.5 INJECTION, POWDER, LYOPHILIZED, FOR SOLUTION INTRAVENOUS at 18:24

## 2023-05-13 RX ADMIN — SENNOSIDES 8.6 MG: 8.6 TABLET, FILM COATED ORAL at 14:42

## 2023-05-13 RX ADMIN — PAROXETINE 30 MG: 10 TABLET, FILM COATED ORAL at 14:40

## 2023-05-13 RX ADMIN — VENLAFAXINE HYDROCHLORIDE 75 MG: 75 CAPSULE, EXTENDED RELEASE ORAL at 14:40

## 2023-05-13 RX ADMIN — APIXABAN 2.5 MG: 2.5 TABLET, FILM COATED ORAL at 14:41

## 2023-05-13 RX ADMIN — OXYCODONE HYDROCHLORIDE 10 MG: 10 TABLET, FILM COATED, EXTENDED RELEASE ORAL at 21:27

## 2023-05-13 RX ADMIN — ANTI-FUNGAL POWDER MICONAZOLE NITRATE TALC FREE: 1.42 POWDER TOPICAL at 14:43

## 2023-05-13 ASSESSMENT — ENCOUNTER SYMPTOMS
CHEST TIGHTNESS: 0
BACK PAIN: 0
TROUBLE SWALLOWING: 0
VOMITING: 0
FACIAL SWELLING: 0
SHORTNESS OF BREATH: 0
EYE PAIN: 0
NAUSEA: 0
COLOR CHANGE: 0
PHOTOPHOBIA: 0
ABDOMINAL PAIN: 0
VOICE CHANGE: 0

## 2023-05-13 ASSESSMENT — PAIN DESCRIPTION - LOCATION: LOCATION: BACK;HIP;FLANK

## 2023-05-13 ASSESSMENT — PAIN DESCRIPTION - DESCRIPTORS: DESCRIPTORS: ACHING;DISCOMFORT

## 2023-05-13 ASSESSMENT — PAIN SCALES - GENERAL
PAINLEVEL_OUTOF10: 8
PAINLEVEL_OUTOF10: 8

## 2023-05-13 ASSESSMENT — PAIN DESCRIPTION - ORIENTATION: ORIENTATION: RIGHT

## 2023-05-14 LAB
ABSOLUTE EOS #: 0.2 K/UL (ref 0–0.4)
ABSOLUTE LYMPH #: 1.5 K/UL (ref 1–4.8)
ABSOLUTE MONO #: 0.7 K/UL (ref 0.1–1.3)
ANION GAP SERPL CALCULATED.3IONS-SCNC: 6 MMOL/L (ref 9–17)
BASOPHILS # BLD: 0 % (ref 0–2)
BASOPHILS ABSOLUTE: 0 K/UL (ref 0–0.2)
BUN SERPL-MCNC: 13 MG/DL (ref 8–23)
CALCIUM SERPL-MCNC: 8.5 MG/DL (ref 8.6–10.4)
CHLORIDE SERPL-SCNC: 98 MMOL/L (ref 98–107)
CO2 SERPL-SCNC: 31 MMOL/L (ref 20–31)
CREAT SERPL-MCNC: 0.59 MG/DL (ref 0.5–0.9)
EOSINOPHILS RELATIVE PERCENT: 2 % (ref 0–4)
GFR SERPL CREATININE-BSD FRML MDRD: >60 ML/MIN/1.73M2
GLUCOSE BLD-MCNC: 118 MG/DL (ref 65–105)
GLUCOSE BLD-MCNC: 120 MG/DL (ref 65–105)
GLUCOSE BLD-MCNC: 134 MG/DL (ref 65–105)
GLUCOSE BLD-MCNC: 288 MG/DL (ref 65–105)
GLUCOSE SERPL-MCNC: 133 MG/DL (ref 70–99)
HCT VFR BLD AUTO: 30.6 % (ref 36–46)
HGB BLD-MCNC: 9.8 G/DL (ref 12–16)
LYMPHOCYTES # BLD: 13 % (ref 24–44)
MCH RBC QN AUTO: 30.2 PG (ref 26–34)
MCHC RBC AUTO-ENTMCNC: 32 G/DL (ref 31–37)
MCV RBC AUTO: 94.4 FL (ref 80–100)
MONOCYTES # BLD: 7 % (ref 1–7)
PDW BLD-RTO: 15.5 % (ref 11.5–14.9)
PLATELET # BLD AUTO: 306 K/UL (ref 150–450)
PMV BLD AUTO: 7.6 FL (ref 6–12)
POTASSIUM SERPL-SCNC: 3.8 MMOL/L (ref 3.7–5.3)
RBC # BLD: 3.24 M/UL (ref 4–5.2)
SEG NEUTROPHILS: 78 % (ref 36–66)
SEGMENTED NEUTROPHILS ABSOLUTE COUNT: 8.8 K/UL (ref 1.3–9.1)
SODIUM SERPL-SCNC: 135 MMOL/L (ref 135–144)
WBC # BLD AUTO: 11.3 K/UL (ref 3.5–11)

## 2023-05-14 PROCEDURE — 6360000002 HC RX W HCPCS: Performed by: FAMILY MEDICINE

## 2023-05-14 PROCEDURE — 36415 COLL VENOUS BLD VENIPUNCTURE: CPT

## 2023-05-14 PROCEDURE — 2580000003 HC RX 258: Performed by: FAMILY MEDICINE

## 2023-05-14 PROCEDURE — 2580000003 HC RX 258: Performed by: EMERGENCY MEDICINE

## 2023-05-14 PROCEDURE — 6360000002 HC RX W HCPCS: Performed by: EMERGENCY MEDICINE

## 2023-05-14 PROCEDURE — 82947 ASSAY GLUCOSE BLOOD QUANT: CPT

## 2023-05-14 PROCEDURE — 6370000000 HC RX 637 (ALT 250 FOR IP): Performed by: FAMILY MEDICINE

## 2023-05-14 PROCEDURE — 80048 BASIC METABOLIC PNL TOTAL CA: CPT

## 2023-05-14 PROCEDURE — 2060000000 HC ICU INTERMEDIATE R&B

## 2023-05-14 PROCEDURE — 85025 COMPLETE CBC W/AUTO DIFF WBC: CPT

## 2023-05-14 RX ADMIN — HYDROCODONE BITARTRATE AND ACETAMINOPHEN 2 TABLET: 5; 325 TABLET ORAL at 15:08

## 2023-05-14 RX ADMIN — ANTI-FUNGAL POWDER MICONAZOLE NITRATE TALC FREE: 1.42 POWDER TOPICAL at 17:00

## 2023-05-14 RX ADMIN — VANCOMYCIN HYDROCHLORIDE 1250 MG: 1.25 INJECTION, POWDER, LYOPHILIZED, FOR SOLUTION INTRAVENOUS at 22:37

## 2023-05-14 RX ADMIN — VANCOMYCIN HYDROCHLORIDE 1250 MG: 1.25 INJECTION, POWDER, LYOPHILIZED, FOR SOLUTION INTRAVENOUS at 10:17

## 2023-05-14 RX ADMIN — APIXABAN 2.5 MG: 2.5 TABLET, FILM COATED ORAL at 10:09

## 2023-05-14 RX ADMIN — DOCUSATE SODIUM 100 MG: 100 CAPSULE, LIQUID FILLED ORAL at 10:09

## 2023-05-14 RX ADMIN — SODIUM CHLORIDE, PRESERVATIVE FREE 5 ML: 5 INJECTION INTRAVENOUS at 10:17

## 2023-05-14 RX ADMIN — VENLAFAXINE HYDROCHLORIDE 75 MG: 75 CAPSULE, EXTENDED RELEASE ORAL at 10:09

## 2023-05-14 RX ADMIN — PAROXETINE 30 MG: 10 TABLET, FILM COATED ORAL at 10:08

## 2023-05-14 RX ADMIN — APIXABAN 2.5 MG: 2.5 TABLET, FILM COATED ORAL at 20:47

## 2023-05-14 RX ADMIN — DOCUSATE SODIUM 100 MG: 100 CAPSULE, LIQUID FILLED ORAL at 20:47

## 2023-05-14 RX ADMIN — OXYCODONE HYDROCHLORIDE 10 MG: 10 TABLET, FILM COATED, EXTENDED RELEASE ORAL at 20:47

## 2023-05-14 RX ADMIN — FUROSEMIDE 20 MG: 20 TABLET ORAL at 10:09

## 2023-05-14 RX ADMIN — HYDROCODONE BITARTRATE AND ACETAMINOPHEN 2 TABLET: 5; 325 TABLET ORAL at 22:36

## 2023-05-14 RX ADMIN — OXYCODONE HYDROCHLORIDE 10 MG: 10 TABLET, FILM COATED, EXTENDED RELEASE ORAL at 10:09

## 2023-05-14 RX ADMIN — PIPERACILLIN AND TAZOBACTAM 3375 MG: 3; .375 INJECTION, POWDER, LYOPHILIZED, FOR SOLUTION INTRAVENOUS at 15:13

## 2023-05-14 RX ADMIN — HYDROCODONE BITARTRATE AND ACETAMINOPHEN 2 TABLET: 5; 325 TABLET ORAL at 00:23

## 2023-05-14 RX ADMIN — PIPERACILLIN AND TAZOBACTAM 3375 MG: 3; .375 INJECTION, POWDER, LYOPHILIZED, FOR SOLUTION INTRAVENOUS at 05:55

## 2023-05-14 RX ADMIN — POLYETHYLENE GLYCOL 3350 17 G: 17 POWDER, FOR SOLUTION ORAL at 10:10

## 2023-05-14 ASSESSMENT — PAIN SCALES - GENERAL
PAINLEVEL_OUTOF10: 8
PAINLEVEL_OUTOF10: 8
PAINLEVEL_OUTOF10: 7
PAINLEVEL_OUTOF10: 9
PAINLEVEL_OUTOF10: 8

## 2023-05-15 ENCOUNTER — APPOINTMENT (OUTPATIENT)
Dept: ULTRASOUND IMAGING | Age: 63
DRG: 720 | End: 2023-05-15
Payer: MEDICAID

## 2023-05-15 LAB
EKG ATRIAL RATE: 115 BPM
EKG P-R INTERVAL: 140 MS
EKG Q-T INTERVAL: 368 MS
EKG QRS DURATION: 96 MS
EKG QTC CALCULATION (BAZETT): 509 MS
EKG R AXIS: -117 DEGREES
EKG T AXIS: 58 DEGREES
EKG VENTRICULAR RATE: 115 BPM
GLUCOSE BLD-MCNC: 107 MG/DL (ref 65–105)
GLUCOSE BLD-MCNC: 134 MG/DL (ref 65–105)
GLUCOSE BLD-MCNC: 138 MG/DL (ref 65–105)
VANCOMYCIN RANDOM DATE LAST DOSE: NORMAL
VANCOMYCIN RANDOM DOSE AMOUNT: NORMAL
VANCOMYCIN RANDOM TIME LAST DOSE: 2237
VANCOMYCIN RANDOM: 25.8 UG/ML

## 2023-05-15 PROCEDURE — 2580000003 HC RX 258: Performed by: EMERGENCY MEDICINE

## 2023-05-15 PROCEDURE — 97110 THERAPEUTIC EXERCISES: CPT

## 2023-05-15 PROCEDURE — 6360000002 HC RX W HCPCS: Performed by: FAMILY MEDICINE

## 2023-05-15 PROCEDURE — 6370000000 HC RX 637 (ALT 250 FOR IP): Performed by: FAMILY MEDICINE

## 2023-05-15 PROCEDURE — 6360000002 HC RX W HCPCS: Performed by: EMERGENCY MEDICINE

## 2023-05-15 PROCEDURE — 99213 OFFICE O/P EST LOW 20 MIN: CPT

## 2023-05-15 PROCEDURE — 93010 ELECTROCARDIOGRAM REPORT: CPT | Performed by: INTERNAL MEDICINE

## 2023-05-15 PROCEDURE — 80202 ASSAY OF VANCOMYCIN: CPT

## 2023-05-15 PROCEDURE — 2580000003 HC RX 258: Performed by: FAMILY MEDICINE

## 2023-05-15 PROCEDURE — 2060000000 HC ICU INTERMEDIATE R&B

## 2023-05-15 PROCEDURE — 36415 COLL VENOUS BLD VENIPUNCTURE: CPT

## 2023-05-15 PROCEDURE — 82947 ASSAY GLUCOSE BLOOD QUANT: CPT

## 2023-05-15 PROCEDURE — 76882 US LMTD JT/FCL EVL NVASC XTR: CPT

## 2023-05-15 RX ADMIN — PIPERACILLIN AND TAZOBACTAM 3375 MG: 3; .375 INJECTION, POWDER, LYOPHILIZED, FOR SOLUTION INTRAVENOUS at 17:26

## 2023-05-15 RX ADMIN — DOCUSATE SODIUM 100 MG: 100 CAPSULE, LIQUID FILLED ORAL at 09:35

## 2023-05-15 RX ADMIN — FUROSEMIDE 20 MG: 20 TABLET ORAL at 09:35

## 2023-05-15 RX ADMIN — SODIUM CHLORIDE, PRESERVATIVE FREE 10 ML: 5 INJECTION INTRAVENOUS at 09:41

## 2023-05-15 RX ADMIN — ANTI-FUNGAL POWDER MICONAZOLE NITRATE TALC FREE: 1.42 POWDER TOPICAL at 01:16

## 2023-05-15 RX ADMIN — PAROXETINE 30 MG: 10 TABLET, FILM COATED ORAL at 09:35

## 2023-05-15 RX ADMIN — HYDROCODONE BITARTRATE AND ACETAMINOPHEN 2 TABLET: 5; 325 TABLET ORAL at 06:14

## 2023-05-15 RX ADMIN — POLYETHYLENE GLYCOL 3350 17 G: 17 POWDER, FOR SOLUTION ORAL at 09:35

## 2023-05-15 RX ADMIN — PIPERACILLIN AND TAZOBACTAM 3375 MG: 3; .375 INJECTION, POWDER, LYOPHILIZED, FOR SOLUTION INTRAVENOUS at 09:40

## 2023-05-15 RX ADMIN — VANCOMYCIN HYDROCHLORIDE 1000 MG: 1 INJECTION, POWDER, LYOPHILIZED, FOR SOLUTION INTRAVENOUS at 15:28

## 2023-05-15 RX ADMIN — APIXABAN 2.5 MG: 2.5 TABLET, FILM COATED ORAL at 22:08

## 2023-05-15 RX ADMIN — APIXABAN 2.5 MG: 2.5 TABLET, FILM COATED ORAL at 09:35

## 2023-05-15 RX ADMIN — VENLAFAXINE HYDROCHLORIDE 75 MG: 75 CAPSULE, EXTENDED RELEASE ORAL at 09:35

## 2023-05-15 RX ADMIN — DOCUSATE SODIUM 100 MG: 100 CAPSULE, LIQUID FILLED ORAL at 22:08

## 2023-05-15 RX ADMIN — OXYCODONE HYDROCHLORIDE 10 MG: 10 TABLET, FILM COATED, EXTENDED RELEASE ORAL at 22:08

## 2023-05-15 RX ADMIN — HYDROCODONE BITARTRATE AND ACETAMINOPHEN 2 TABLET: 5; 325 TABLET ORAL at 15:22

## 2023-05-15 RX ADMIN — PIPERACILLIN AND TAZOBACTAM 3375 MG: 3; .375 INJECTION, POWDER, LYOPHILIZED, FOR SOLUTION INTRAVENOUS at 01:12

## 2023-05-15 RX ADMIN — OXYCODONE HYDROCHLORIDE 10 MG: 10 TABLET, FILM COATED, EXTENDED RELEASE ORAL at 09:35

## 2023-05-15 ASSESSMENT — PAIN DESCRIPTION - ORIENTATION: ORIENTATION: RIGHT

## 2023-05-15 ASSESSMENT — PAIN DESCRIPTION - DESCRIPTORS: DESCRIPTORS: DISCOMFORT;SORE

## 2023-05-15 ASSESSMENT — PAIN SCALES - GENERAL
PAINLEVEL_OUTOF10: 10
PAINLEVEL_OUTOF10: 9

## 2023-05-15 ASSESSMENT — PAIN DESCRIPTION - LOCATION: LOCATION: GENERALIZED

## 2023-05-15 NOTE — CARE COORDINATION
Writer spoke with Ghazala at [facility] and patient is a bedhold. Patient does not require prior authorization to return to facility.     Electronically signed by SANTA Ly on 5/15/2023 at 3:54 PM

## 2023-05-15 NOTE — PROGRESS NOTES
Physician Progress Note      Edgar King  [facility] #:                  [id number]  :                       1960  ADMIT DATE:       2023 2:52 AM  DISCH DATE:  RESPONDING  PROVIDER #:        Asia Anders MD          QUERY TEXT:    Pt admitted with right thigh cellulitis. Pt noted to have tachycardia, fever,   leukocytosis, and tachypnea. If possible, please document in the progress   notes and discharge summary if you are evaluating and /or treating any of the   following: The medical record reflects the following:  Risk Factors: right thigh cellulitis  Clinical Indicators: presents with WBC 15.6, lactic 2.9, temp 101.3, ,   RR 27 and found to have right thigh cellulitis  Treatment: Labs, imaging, monitoring, Zosyn, Vancomycin  Options provided:  -- Sepsis, present on admission  -- Sepsis, present on admission, now resolved  -- Sepsis, developed following admission  -- right thigh cellulitis without Sepsis  -- Sepsis was ruled out  -- Other - I will add my own diagnosis  -- Disagree - Not applicable / Not valid  -- Disagree - Clinically unable to determine / Unknown  -- Refer to Clinical Documentation Reviewer    PROVIDER RESPONSE TEXT:    This patient has sepsis which was present on admission.     Query created by: Gaston Lao on 5/15/2023 11:34 AM      Electronically signed by:  Asia Anders MD 5/15/2023 12:33 PM

## 2023-05-15 NOTE — PROGRESS NOTES
Physical Therapy  Facility/Department: [facility] PROGRESSIVE CARE  Daily Treatment Note  NAME: Paresh Hodge  : 1960  MRN: [id number]    Date of Service: 5/15/2023    Discharge Recommendations:  Patient would benefit from continued therapy after discharge        Patient Diagnosis(es): The encounter diagnosis was Cellulitis of right leg. Assessment         Plan    Physcial Therapy Plan  General Plan: 2-3 times per week  Specific Instructions for Next Treatment: B LE ROM/positioning  Current Treatment Recommendations: Strengthening;ROM; Functional mobility training;Patient/Caregiver education & training;Positioning     Restrictions  Restrictions/Precautions  Restrictions/Precautions: General Precautions  Required Braces or Orthoses?: No  Implants present? : Metal implants (L ankle sx)  Position Activity Restriction  Other position/activity restrictions: OT/PT eval and treat     Subjective    Subjective  Subjective: Pt is in bed upon arrival. Agreeable to PT. Pain: 8/10 in  R thigh, RN in room during conversation  Orientation  Overall Orientation Status: Within Functional Limits  Orientation Level: Oriented X4  Cognition  Overall Cognitive Status: Exceptions  Arousal/Alertness: Delayed responses to stimuli  Following Commands: Follows one step commands with increased time; Follows one step commands with repetition  Attention Span: Attends with cues to redirect  Safety Judgement: Decreased awareness of need for assistance;Decreased awareness of need for safety  Problem Solving: Assistance required to implement solutions;Assistance required to generate solutions  Insights: Decreased awareness of deficits  Initiation: Requires cues for some  Sequencing: Requires cues for some     Objective   Vitals  O2 Device: Nasal cannula  Bed Mobility Training  Bed Mobility Training: Yes  Overall Level of Assistance: Maximum assistance;Assist X2;Adaptive equipment  Interventions: Safety awareness training;Verbal cues  Rolling: Maximum

## 2023-05-15 NOTE — PROGRESS NOTES
Vancomycin Dosing by Pharmacy - Daily Note   Vancomycin Therapy Day:  3  Indication: sepsis    Allergies:  Patient has no known allergies. Actual Weight:    Wt Readings from Last 1 Encounters:   05/13/23 (!) 400 lb (181.4 kg)       Labs/Ancillary Data  Estimated Creatinine Clearance: 167 mL/min (based on SCr of 0.59 mg/dL). Recent Labs     05/13/23  0304 05/14/23  0427   CREATININE 0.63 0.59   BUN 11 13   WBC 15.6* 11.3*     No results found for: PROCAL    Intake/Output Summary (Last 24 hours) at 5/15/2023 0612  Last data filed at 5/15/2023 0544  Gross per 24 hour   Intake 100 ml   Output 3050 ml   Net -2950 ml     Temp: 98.2    Culture Date / Source  /  Results  See micro  Recent vancomycin administrations                     vancomycin (VANCOCIN) 1,250 mg in sodium chloride 0.9 % 250 mL IVPB (Pyui3Tgp) (mg) 1,250 mg New Bag 05/14/23 2237     1,250 mg New Bag  1017    vancomycin (VANCOCIN) 1,500 mg in sodium chloride 0.9 % 250 mL IVPB (Zsmw2Tce) (mg) 1,500 mg New Bag 05/13/23 1824    vancomycin (VANCOCIN) 2,500 mg in sodium chloride 0.9 % 500 mL IVPB (mg) 2,500 mg New Bag 05/13/23 0422                    Vancomycin Concentrations:   TROUGH:  No results for input(s): VANCOTROUGH in the last 72 hours. RANDOM:    Recent Labs     05/13/23  2119 05/15/23  0501   VANCORANDOM 35.4 25.8       MRSA Nasal Swab: N/A. Non-respiratory infection. Lucyann Push PLAN     Decrease dose to 1000 mg q12h IV  Ensured BUN/sCr ordered at baseline and every 48 hours x at least 3 levels, then at least weekly.   Repeat vancomycin concentration ordered for 5/16/23 @ 0600   Pharmacy will continue to monitor patient and adjust therapy as indicated      Vancomycin Target Concentration Parameters  Treatment  Population Target AUC/SHAAN Target Trough   Invasive MRSA Infection (bacteremia, pneumonia, meningitis, endocarditis, osteomyelitis)  Sepsis (undifferentiated) 400-600 N/A   Infection due to non-MRSA pathogen  Empiric treatment of non-invasive MRSA

## 2023-05-15 NOTE — PROGRESS NOTES
[facility]  Progress Note    5/15/2023 12:35 PM  Subjective: Interval History: Pt has no new complaints, no n/v/d, able to eat and drink. Diet: ADULT DIET; Regular    Medications:   Reviewed medications    Labs:   CBC:   Recent Labs     05/14/23 0427   WBC 11.3*   HGB 9.8*        BMP:    Recent Labs     05/14/23 0427      K 3.8   CL 98   CO2 31   BUN 13   CREATININE 0.59   GLUCOSE 133*     Hepatic:   Recent Labs     05/13/23 0304   AST 23   ALT 17   BILITOT 0.5   ALKPHOS 85     Troponin: No results for input(s): TROPONINI in the last 72 hours. BNP: No results for input(s): BNP in the last 72 hours. Lipids: No results for input(s): CHOL, HDL in the last 72 hours.     Invalid input(s): LDLCALCU  INR:   Recent Labs     05/13/23 0304   INR 1.1         Objective:   Vitals: /62   Pulse 81   Temp 97.9 °F (36.6 °C) (Oral)   Resp 19   Ht 5' 6\" (1.676 m)   Wt (!) 400 lb (181.4 kg)   SpO2 100%   BMI 64.56 kg/m²   General appearance: alert and cooperative with exam  Neck: no adenopathy, no carotid bruit, no JVD, supple, symmetrical, trachea midline and thyroid not enlarged, symmetric, no tenderness/mass/nodules  Lungs: clear to auscultation bilaterally  Heart: regular rate and rhythm, S1, S2 normal, no murmur, click, rub or gallop  Abdomen: soft, non-tender; bowel sounds normal; no masses,  no organomegaly  Extremities: extremities normal, atraumatic, no cyanosis or edema, large area of cellulitis R Lat thigh  Neurologic: Mental status: Alert, oriented, thought content appropriate  Skin: Pressure ulcers coccyx region    Assessment:     Patient Active Problem List    Diagnosis Date Noted    Cellulitis of left lower limb 05/13/2023    Rash of body 09/25/2021    At high risk for deep venous thrombosis 09/25/2021    On anticoagulant therapy low dose eliquis 09/25/2021    Scabies 09/25/2021    Peripheral edema     Infestation by bed bug     Severe recurrent major depression without

## 2023-05-15 NOTE — PROGRESS NOTES
05/15/23 1551   Encounter Summary   Encounter Overview/Reason  Spiritual/Emotional Needs   Service Provided For: Patient   Referral/Consult From: Rounding   Last Encounter  05/15/23   Complexity of Encounter Low   Assessment/Intervention/Outcome   Assessment Calm;Coping   Intervention Active listening;Sustaining Presence/Ministry of presence   Outcome Engaged in conversation

## 2023-05-15 NOTE — PLAN OF CARE
Safety maintained, call light is within reach, no s/s or c/o distress, bed is low/locked, side rails are up x2, pt has frequently refused repositioning    Problem: Skin/Tissue Integrity  Goal: Absence of new skin breakdown  Description: 1. Monitor for areas of redness and/or skin breakdown  2. Assess vascular access sites hourly  3. Every 4-6 hours minimum:  Change oxygen saturation probe site  4. Every 4-6 hours:  If on nasal continuous positive airway pressure, respiratory therapy assess nares and determine need for appliance change or resting period. Outcome: Not Progressing     Problem: Discharge Planning  Goal: Discharge to home or other facility with appropriate resources  Outcome: Progressing     Problem: Safety - Adult  Goal: Free from fall injury  Outcome: Progressing     Problem: ABCDS Injury Assessment  Goal: Absence of physical injury  Outcome: Progressing     Problem: Infection - Adult  Goal: Absence of infection at discharge  Outcome: Progressing  Goal: Absence of fever/infection during anticipated neutropenic period  Outcome: Progressing     Problem: Skin/Tissue Integrity  Goal: Absence of new skin breakdown  Description: 1. Monitor for areas of redness and/or skin breakdown  2. Assess vascular access sites hourly  3. Every 4-6 hours minimum:  Change oxygen saturation probe site  4. Every 4-6 hours:  If on nasal continuous positive airway pressure, respiratory therapy assess nares and determine need for appliance change or resting period.   Outcome: Not Progressing

## 2023-05-16 PROBLEM — L03.116 CELLULITIS OF LEFT LOWER LIMB: Status: RESOLVED | Noted: 2023-05-13 | Resolved: 2023-05-16

## 2023-05-16 LAB
BUN SERPL-MCNC: 11 MG/DL (ref 8–23)
CREAT SERPL-MCNC: 0.6 MG/DL (ref 0.5–0.9)
DATE LAST DOSE: NORMAL
GFR SERPL CREATININE-BSD FRML MDRD: >60 ML/MIN/1.73M2
GLUCOSE BLD-MCNC: 107 MG/DL (ref 65–105)
GLUCOSE BLD-MCNC: 117 MG/DL (ref 65–105)
GLUCOSE BLD-MCNC: 172 MG/DL (ref 65–105)
TME LAST DOSE: 144 H
VANCOMYCIN DOSE: 1000 MG
VANCOMYCIN SERPL-MCNC: 23.7 UG/ML

## 2023-05-16 PROCEDURE — 80202 ASSAY OF VANCOMYCIN: CPT

## 2023-05-16 PROCEDURE — 36415 COLL VENOUS BLD VENIPUNCTURE: CPT

## 2023-05-16 PROCEDURE — 82947 ASSAY GLUCOSE BLOOD QUANT: CPT

## 2023-05-16 PROCEDURE — 6360000002 HC RX W HCPCS: Performed by: FAMILY MEDICINE

## 2023-05-16 PROCEDURE — 82565 ASSAY OF CREATININE: CPT

## 2023-05-16 PROCEDURE — 6360000002 HC RX W HCPCS: Performed by: EMERGENCY MEDICINE

## 2023-05-16 PROCEDURE — 6370000000 HC RX 637 (ALT 250 FOR IP): Performed by: FAMILY MEDICINE

## 2023-05-16 PROCEDURE — 2060000000 HC ICU INTERMEDIATE R&B

## 2023-05-16 PROCEDURE — 84520 ASSAY OF UREA NITROGEN: CPT

## 2023-05-16 PROCEDURE — 2580000003 HC RX 258: Performed by: EMERGENCY MEDICINE

## 2023-05-16 PROCEDURE — 2580000003 HC RX 258: Performed by: FAMILY MEDICINE

## 2023-05-16 RX ADMIN — DOCUSATE SODIUM 100 MG: 100 CAPSULE, LIQUID FILLED ORAL at 09:55

## 2023-05-16 RX ADMIN — VENLAFAXINE HYDROCHLORIDE 75 MG: 75 CAPSULE, EXTENDED RELEASE ORAL at 09:55

## 2023-05-16 RX ADMIN — PIPERACILLIN AND TAZOBACTAM 3375 MG: 3; .375 INJECTION, POWDER, LYOPHILIZED, FOR SOLUTION INTRAVENOUS at 09:52

## 2023-05-16 RX ADMIN — PIPERACILLIN AND TAZOBACTAM 3375 MG: 3; .375 INJECTION, POWDER, LYOPHILIZED, FOR SOLUTION INTRAVENOUS at 03:16

## 2023-05-16 RX ADMIN — HYDROCODONE BITARTRATE AND ACETAMINOPHEN 2 TABLET: 5; 325 TABLET ORAL at 23:37

## 2023-05-16 RX ADMIN — VANCOMYCIN HYDROCHLORIDE 1000 MG: 1 INJECTION, POWDER, LYOPHILIZED, FOR SOLUTION INTRAVENOUS at 01:44

## 2023-05-16 RX ADMIN — PIPERACILLIN AND TAZOBACTAM 3375 MG: 3; .375 INJECTION, POWDER, LYOPHILIZED, FOR SOLUTION INTRAVENOUS at 16:10

## 2023-05-16 RX ADMIN — VANCOMYCIN HYDROCHLORIDE 1000 MG: 1 INJECTION, POWDER, LYOPHILIZED, FOR SOLUTION INTRAVENOUS at 13:58

## 2023-05-16 RX ADMIN — APIXABAN 2.5 MG: 2.5 TABLET, FILM COATED ORAL at 21:29

## 2023-05-16 RX ADMIN — APIXABAN 2.5 MG: 2.5 TABLET, FILM COATED ORAL at 09:54

## 2023-05-16 RX ADMIN — HYDROCODONE BITARTRATE AND ACETAMINOPHEN 2 TABLET: 5; 325 TABLET ORAL at 17:10

## 2023-05-16 RX ADMIN — OXYCODONE HYDROCHLORIDE 10 MG: 10 TABLET, FILM COATED, EXTENDED RELEASE ORAL at 12:40

## 2023-05-16 RX ADMIN — PAROXETINE 30 MG: 10 TABLET, FILM COATED ORAL at 09:54

## 2023-05-16 RX ADMIN — HYDROCODONE BITARTRATE AND ACETAMINOPHEN 2 TABLET: 5; 325 TABLET ORAL at 02:05

## 2023-05-16 RX ADMIN — FUROSEMIDE 20 MG: 20 TABLET ORAL at 09:54

## 2023-05-16 RX ADMIN — DOCUSATE SODIUM 100 MG: 100 CAPSULE, LIQUID FILLED ORAL at 21:29

## 2023-05-16 RX ADMIN — HYDROCODONE BITARTRATE AND ACETAMINOPHEN 2 TABLET: 5; 325 TABLET ORAL at 09:55

## 2023-05-16 RX ADMIN — POLYETHYLENE GLYCOL 3350 17 G: 17 POWDER, FOR SOLUTION ORAL at 09:55

## 2023-05-16 RX ADMIN — OXYCODONE HYDROCHLORIDE 10 MG: 10 TABLET, FILM COATED, EXTENDED RELEASE ORAL at 21:29

## 2023-05-16 RX ADMIN — ANTI-FUNGAL POWDER MICONAZOLE NITRATE TALC FREE: 1.42 POWDER TOPICAL at 21:30

## 2023-05-16 ASSESSMENT — PAIN DESCRIPTION - DESCRIPTORS: DESCRIPTORS: DISCOMFORT

## 2023-05-16 ASSESSMENT — PAIN SCALES - GENERAL
PAINLEVEL_OUTOF10: 9

## 2023-05-16 ASSESSMENT — PAIN DESCRIPTION - LOCATION
LOCATION: HIP
LOCATION: GENERALIZED

## 2023-05-16 ASSESSMENT — PAIN DESCRIPTION - ORIENTATION: ORIENTATION: RIGHT

## 2023-05-16 NOTE — CARE COORDINATION
Writer spoke with Ghazala at [facility] and patient is a bedhold. Patient does not require prior authorization to return to facility.     Electronically signed by SANTA Pastrana on 5/16/2023 at 1:33 PM

## 2023-05-16 NOTE — PLAN OF CARE
Problem: Discharge Planning  Goal: Discharge to home or other facility with appropriate resources  5/16/2023 1541 by Shakir Bassett RN  Outcome: Progressing     Problem: Safety - Adult  Goal: Free from fall injury  5/16/2023 1541 by Shakir Bassett RN  Outcome: Progressing     Problem: Skin/Tissue Integrity  Goal: Absence of new skin breakdown  Description: 1. Monitor for areas of redness and/or skin breakdown  2. Assess vascular access sites hourly  3. Every 4-6 hours minimum:  Change oxygen saturation probe site  4. Every 4-6 hours:  If on nasal continuous positive airway pressure, respiratory therapy assess nares and determine need for appliance change or resting period.   5/16/2023 1541 by Shakir Bassett RN  Outcome: Progressing     Problem: ABCDS Injury Assessment  Goal: Absence of physical injury  5/16/2023 1541 by Shakir Bassett RN  Outcome: Progressing

## 2023-05-16 NOTE — CONSULTS
[facility]  Consult Note       NAME:  Abril Paulson  MEDICAL RECORD NUMBER:  [id number]  AGE: 61 y.o. GENDER: female  : 1960  TODAY'S DATE:  5/15/2023    Subjective: Abril Paulson is a 61 y.o. female with inpatient referral to [facility] for:  Buttocks/posterior thigh wounds      Wound Identification:  Wound Type:  moisture associated skin damage  Contributing Factors: decreased mobility and obesity    Patient Goal of Care:  [x] Wound Healing  [] Odor Control  [] Palliative Care  [] Pain Control   [] Other:         PAST MEDICAL HISTORY        Diagnosis Date    Arthritis     CHF (congestive heart failure) (HCC)     COPD (chronic obstructive pulmonary disease) (HCC)     Diverticulitis     Hx of blood clots     Pulmonary embolism ([facility] [address]        PAST SURGICAL HISTORY    Past Surgical History:   Procedure Laterality Date    ANKLE SURGERY      COLON SURGERY      FOOT SURGERY Left 2021    FOOT BONE BIOPSY W/ INCISION & DRAINAGE AND REDRESSING ON RIGHT FOOT performed by Delmer Linda DPM at [address] [place] [address]    Family History   Problem Relation Age of Onset    Cancer Mother     Diabetes Paternal Grandfather        SOCIAL HISTORY    Social History     Tobacco Use    Smoking status: Every Day     Packs/day: 0.25     Years: 45.00     Pack years: 11.25     Types: Cigarettes    Smokeless tobacco: Never    Tobacco comments:     has not smoked in two months   Vaping Use    Vaping Use: Never used   Substance Use Topics    Alcohol use: Not Currently     Comment: 1-2 times per week    Drug use: Not Currently         ALLERGIES    No Known Allergies    HOME MEDICATIONS  Prior to Admission medications    Medication Sig Start Date End Date Taking?  Authorizing Provider   docusate sodium (COLACE) 100 MG capsule Take 1 capsule by mouth 2 times daily   Yes Historical Provider, MD   venlafaxine (EFFEXOR XR) 75 MG extended release capsule Take 1 capsule

## 2023-05-16 NOTE — PLAN OF CARE
Problem: Safety - Adult  Goal: Free from fall injury  Outcome: Progressing     Problem: Skin/Tissue Integrity  Goal: Absence of new skin breakdown  Description: 1. Monitor for areas of redness and/or skin breakdown  2. Assess vascular access sites hourly  3. Every 4-6 hours minimum:  Change oxygen saturation probe site  4. Every 4-6 hours:  If on nasal continuous positive airway pressure, respiratory therapy assess nares and determine need for appliance change or resting period.   Outcome: Progressing     Problem: Chronic Conditions and Co-morbidities  Goal: Patient's chronic conditions and co-morbidity symptoms are monitored and maintained or improved  Outcome: Progressing     Problem: Discharge Planning  Goal: Discharge to home or other facility with appropriate resources  Outcome: Progressing

## 2023-05-16 NOTE — PROGRESS NOTES
Vancomycin Dosing by Pharmacy - Daily Note   Vancomycin Therapy Day:  4  Indication: sepsis    Allergies:  Patient has no known allergies. Actual Weight:    Wt Readings from Last 1 Encounters:   05/13/23 (!) 400 lb (181.4 kg)       Labs/Ancillary Data  Estimated Creatinine Clearance: 164 mL/min (based on SCr of 0.6 mg/dL). Recent Labs     05/14/23  0427 05/16/23  0455   CREATININE 0.59 0.60   BUN 13 11   WBC 11.3*  --      No results found for: PROCAL    Intake/Output Summary (Last 24 hours) at 5/16/2023 0618  Last data filed at 5/15/2023 2322  Gross per 24 hour   Intake 100 ml   Output 2500 ml   Net -2400 ml     Temp: 97.7    Culture Date / Source  /  Results  See micro reports  Recent vancomycin administrations                     vancomycin (VANCOCIN) 1,000 mg in sodium chloride 0.9 % 250 mL IVPB (Fyml2Ebl) (mg) 1,000 mg New Bag 05/16/23 0144     1,000 mg New Bag 05/15/23 1528    vancomycin (VANCOCIN) 1,250 mg in sodium chloride 0.9 % 250 mL IVPB (Stie6Lyi) (mg) 1,250 mg New Bag 05/14/23 2237     1,250 mg New Bag  1017    vancomycin (VANCOCIN) 1,500 mg in sodium chloride 0.9 % 250 mL IVPB (Fwfu8Tgt) (mg) 1,500 mg New Bag 05/13/23 1824                    Vancomycin Concentrations:   TROUGH:  No results for input(s): VANCOTROUGH in the last 72 hours. RANDOM:    Recent Labs     05/13/23  2119 05/15/23  0501 05/16/23  0455   VANCORANDOM 35.4 25.8 23.7       MRSA Nasal Swab: N/A. Non-respiratory infection. [place] PLAN     Continue current dose of 1000 mg q12h IV  Ensured BUN/sCr ordered at baseline and every 48 hours x at least 3 levels, then at least weekly. Repeat vancomycin concentration in 7 days if needed.   Pharmacy will continue to monitor patient and adjust therapy as indicated      Vancomycin Target Concentration Parameters  Treatment  Population Target AUC/SHAAN Target Trough   Invasive MRSA Infection (bacteremia, pneumonia, meningitis, endocarditis, osteomyelitis)  Sepsis (undifferentiated) 400-600 N/A

## 2023-05-16 NOTE — DISCHARGE INSTR - COC
Therapy:  Wound 05/13/23 Thigh Left;Right; Inner moisture associated skin damage (Active)   Wound Etiology Other 05/15/23 1449   Dressing Status New dressing applied 05/15/23 1449   Wound Cleansed Soap and water 05/15/23 1449   Dressing/Treatment Triad hydro/zinc oxide-based hydrophilic paste 94/78/97 7591   Wound Assessment Denuded 05/15/23 1449   Drainage Amount Scant 05/15/23 1449   Drainage Description Serosanguinous 05/15/23 1449   Odor None 05/15/23 1449   Aruna-wound Assessment Blanchable erythema;Denuded 05/15/23 1449   Margins Attached edges 05/15/23 1449   Number of days: 2       Wound 05/13/23 Buttocks (Active)   Wound Image   05/15/23 1449   Wound Etiology Other 05/15/23 1449   Dressing Status New dressing applied 05/15/23 1449   Wound Cleansed Soap and water 05/15/23 1449   Dressing/Treatment Triad hydro/zinc oxide-based hydrophilic paste 30/43/52 5339   Wound Assessment Denuded 05/15/23 1449   Drainage Amount Scant 05/15/23 1449   Drainage Description Serosanguinous 05/15/23 1449   Odor None 05/15/23 1449   Aruna-wound Assessment Blanchable erythema;Denuded 05/15/23 1449   Margins Attached edges 05/15/23 1449   Number of days: 2     Buttocks/posterior thighs: Cleanse with soap and water, pat dry. Apply triad cream twice daily and as needed (no foam dressings- skin too moist)     Elimination:  Continence: Bowel: Yes  Bladder: Yes  Urinary Catheter: None   Colostomy/Ileostomy/Ileal Conduit: No       Date of Last BM: 5/18/23    Intake/Output Summary (Last 24 hours) at 5/15/2023 2031  Last data filed at 5/15/2023 1857  Gross per 24 hour   Intake 778.53 ml   Output 3450 ml   Net -2671.47 ml     I/O last 3 completed shifts: In: 778.5 [P.O.:200; IV Piggyback:578.5]  Out: 4500 [Urine:4500]    Safety Concerns: At Risk for Falls    Impairments/Disabilities:      Vision    Nutrition Therapy:  Current Nutrition Therapy:   - Oral Diet:  General    Routes of Feeding: Oral  Liquids:  Thin Liquids  Daily Fluid

## 2023-05-16 NOTE — PROGRESS NOTES
Progress Note    5/16/2023 9:23 AM  Subjective: Interval History: Fever and chills are gone;she still states that her whole Rt side hurts;she is chronically bed bound and can't even turn herself in bed;she does state that she feels better with current rx    Diet: ADULT DIET; Regular    Medications:   Reviewed medications    Labs:   CBC:   Recent Labs     05/14/23 0427   WBC 11.3*   HGB 9.8*        BMP:    Recent Labs     05/14/23 0427 05/16/23  0455     --    K 3.8  --    CL 98  --    CO2 31  --    BUN 13 11   CREATININE 0.59 0.60   GLUCOSE 133*  --      Hepatic: No results for input(s): AST, ALT, ALB, BILITOT, ALKPHOS in the last 72 hours. Troponin: No results for input(s): TROPONINI in the last 72 hours. BNP: No results for input(s): BNP in the last 72 hours. Lipids: No results for input(s): CHOL, HDL in the last 72 hours. Invalid input(s): LDLCALCU  INR: No results for input(s): INR in the last 72 hours.       Objective:   Vitals: BP (!) 121/52   Pulse 76   Temp 98.2 °F (36.8 °C) (Oral)   Resp 18   Ht 5' 6\" (1.676 m)   Wt (!) 400 lb (181.4 kg)   SpO2 98%   BMI 64.56 kg/m²   General appearance: alert and cooperative with exam  Neck: no adenopathy, no carotid bruit, no JVD, supple, symmetrical, trachea midline and thyroid not enlarged, symmetric, no tenderness/mass/nodules  Lungs: clear to auscultation bilaterally  Heart: regular rate and rhythm, S1, S2 normal, no murmur, click, rub or gallop  Abdomen: soft, non-tender; bowel sounds normal; no masses,  no organomegaly  Extremities: Area of erythema,induration about Rt buttock; there is an angry area just lateral to her anus on the Rt that probably is the source of the original bacterial invasion  Neurologic: Mental status: Alert, oriented, thought content appropriate    Assessment:     Patient Active Problem List    Diagnosis Date Noted    Cellulitis of left lower limb 05/13/2023    Rash of body 09/25/2021    At high risk for deep

## 2023-05-17 LAB
BUN SERPL-MCNC: 13 MG/DL (ref 8–23)
CREAT SERPL-MCNC: 0.76 MG/DL (ref 0.5–0.9)
GFR SERPL CREATININE-BSD FRML MDRD: >60 ML/MIN/1.73M2

## 2023-05-17 PROCEDURE — 84520 ASSAY OF UREA NITROGEN: CPT

## 2023-05-17 PROCEDURE — 82565 ASSAY OF CREATININE: CPT

## 2023-05-17 PROCEDURE — 36415 COLL VENOUS BLD VENIPUNCTURE: CPT

## 2023-05-17 PROCEDURE — 2060000000 HC ICU INTERMEDIATE R&B

## 2023-05-17 PROCEDURE — 6370000000 HC RX 637 (ALT 250 FOR IP): Performed by: FAMILY MEDICINE

## 2023-05-17 PROCEDURE — 2580000003 HC RX 258: Performed by: FAMILY MEDICINE

## 2023-05-17 PROCEDURE — 6360000002 HC RX W HCPCS: Performed by: FAMILY MEDICINE

## 2023-05-17 PROCEDURE — 2580000003 HC RX 258: Performed by: EMERGENCY MEDICINE

## 2023-05-17 PROCEDURE — 6360000002 HC RX W HCPCS: Performed by: EMERGENCY MEDICINE

## 2023-05-17 RX ORDER — AMOXICILLIN AND CLAVULANATE POTASSIUM 875; 125 MG/1; MG/1
1 TABLET, FILM COATED ORAL EVERY 12 HOURS SCHEDULED
Status: DISCONTINUED | OUTPATIENT
Start: 2023-05-17 | End: 2023-05-18 | Stop reason: HOSPADM

## 2023-05-17 RX ORDER — HYDROCODONE BITARTRATE AND ACETAMINOPHEN 5; 325 MG/1; MG/1
2 TABLET ORAL EVERY 6 HOURS PRN
Qty: 28 TABLET | Refills: 0 | Status: SHIPPED | OUTPATIENT
Start: 2023-05-17 | End: 2023-05-24

## 2023-05-17 RX ORDER — DOXYCYCLINE 100 MG/1
100 CAPSULE ORAL EVERY 12 HOURS SCHEDULED
Status: DISCONTINUED | OUTPATIENT
Start: 2023-05-17 | End: 2023-05-18 | Stop reason: HOSPADM

## 2023-05-17 RX ADMIN — PIPERACILLIN AND TAZOBACTAM 3375 MG: 3; .375 INJECTION, POWDER, LYOPHILIZED, FOR SOLUTION INTRAVENOUS at 03:23

## 2023-05-17 RX ADMIN — VANCOMYCIN HYDROCHLORIDE 1500 MG: 1.5 INJECTION, POWDER, LYOPHILIZED, FOR SOLUTION INTRAVENOUS at 14:40

## 2023-05-17 RX ADMIN — APIXABAN 2.5 MG: 2.5 TABLET, FILM COATED ORAL at 20:59

## 2023-05-17 RX ADMIN — HYDROCODONE BITARTRATE AND ACETAMINOPHEN 2 TABLET: 5; 325 TABLET ORAL at 05:51

## 2023-05-17 RX ADMIN — APIXABAN 2.5 MG: 2.5 TABLET, FILM COATED ORAL at 09:23

## 2023-05-17 RX ADMIN — VANCOMYCIN HYDROCHLORIDE 1000 MG: 1 INJECTION, POWDER, LYOPHILIZED, FOR SOLUTION INTRAVENOUS at 01:14

## 2023-05-17 RX ADMIN — DOCUSATE SODIUM 100 MG: 100 CAPSULE, LIQUID FILLED ORAL at 09:22

## 2023-05-17 RX ADMIN — ANTI-FUNGAL POWDER MICONAZOLE NITRATE TALC FREE: 1.42 POWDER TOPICAL at 09:50

## 2023-05-17 RX ADMIN — FUROSEMIDE 20 MG: 20 TABLET ORAL at 09:24

## 2023-05-17 RX ADMIN — VENLAFAXINE HYDROCHLORIDE 75 MG: 75 CAPSULE, EXTENDED RELEASE ORAL at 09:20

## 2023-05-17 RX ADMIN — OXYCODONE HYDROCHLORIDE 10 MG: 10 TABLET, FILM COATED, EXTENDED RELEASE ORAL at 09:21

## 2023-05-17 RX ADMIN — OXYCODONE HYDROCHLORIDE 10 MG: 10 TABLET, FILM COATED, EXTENDED RELEASE ORAL at 20:59

## 2023-05-17 RX ADMIN — DOCUSATE SODIUM 100 MG: 100 CAPSULE, LIQUID FILLED ORAL at 20:59

## 2023-05-17 RX ADMIN — POLYETHYLENE GLYCOL 3350 17 G: 17 POWDER, FOR SOLUTION ORAL at 09:25

## 2023-05-17 RX ADMIN — ANTI-FUNGAL POWDER MICONAZOLE NITRATE TALC FREE: 1.42 POWDER TOPICAL at 20:59

## 2023-05-17 RX ADMIN — HYDROCODONE BITARTRATE AND ACETAMINOPHEN 2 TABLET: 5; 325 TABLET ORAL at 18:39

## 2023-05-17 RX ADMIN — AMOXICILLIN AND CLAVULANATE POTASSIUM 1 TABLET: 875; 125 TABLET, FILM COATED ORAL at 20:58

## 2023-05-17 RX ADMIN — SODIUM CHLORIDE, PRESERVATIVE FREE 10 ML: 5 INJECTION INTRAVENOUS at 21:00

## 2023-05-17 RX ADMIN — PAROXETINE 30 MG: 10 TABLET, FILM COATED ORAL at 09:18

## 2023-05-17 RX ADMIN — PIPERACILLIN AND TAZOBACTAM 3375 MG: 3; .375 INJECTION, POWDER, LYOPHILIZED, FOR SOLUTION INTRAVENOUS at 09:42

## 2023-05-17 RX ADMIN — DOXYCYCLINE 100 MG: 100 CAPSULE ORAL at 20:58

## 2023-05-17 RX ADMIN — HYDROCODONE BITARTRATE AND ACETAMINOPHEN 2 TABLET: 5; 325 TABLET ORAL at 13:10

## 2023-05-17 ASSESSMENT — PAIN SCALES - GENERAL
PAINLEVEL_OUTOF10: 9
PAINLEVEL_OUTOF10: 8
PAINLEVEL_OUTOF10: 9
PAINLEVEL_OUTOF10: 9

## 2023-05-17 ASSESSMENT — PAIN DESCRIPTION - LOCATION
LOCATION: ARM;HIP;KNEE
LOCATION: GENERALIZED

## 2023-05-17 ASSESSMENT — PAIN DESCRIPTION - DESCRIPTORS: DESCRIPTORS: ACHING

## 2023-05-17 NOTE — PLAN OF CARE
Problem: Discharge Planning  Goal: Discharge to home or other facility with appropriate resources  5/17/2023 0400 by Maribel Velez RN  Outcome: Progressing     Problem: Safety - Adult  Goal: Free from fall injury  5/17/2023 0400 by Maribel Velez RN  Outcome: Progressing  Flowsheets (Taken 5/16/2023 6708)  Free From Fall Injury: Ana Car family/caregiver on patient safety  Problem: Skin/Tissue Integrity  Goal: Absence of new skin breakdown  Description: 1. Monitor for areas of redness and/or skin breakdown  2. Assess vascular access sites hourly  3. Every 4-6 hours minimum:  Change oxygen saturation probe site  4. Every 4-6 hours:  If on nasal continuous positive airway pressure, respiratory therapy assess nares and determine need for appliance change or resting period.   5/17/2023 0400 by Maribel Velez RN  Outcome: Progressing     Problem: ABCDS Injury Assessment  Goal: Absence of physical injury  5/17/2023 0400 by Maribel Velez RN  Outcome: Progressing     Problem: Infection - Adult  Goal: Absence of infection at discharge  5/17/2023 0400 by Maribel Velez RN  Outcome: Progressing     Problem: Chronic Conditions and Co-morbidities  Goal: Patient's chronic conditions and co-morbidity symptoms are monitored and maintained or improved  5/17/2023 0400 by Maribel Velez RN  Outcome: Progressing

## 2023-05-17 NOTE — PLAN OF CARE
Problem: Discharge Planning  Goal: Discharge to home or other facility with appropriate resources  5/17/2023 1500 by Starr Coleman RN  Outcome: Progressing     Problem: Safety - Adult  Goal: Free from fall injury  5/17/2023 1500 by Starr Coleman RN  Outcome: Progressing     Problem: Skin/Tissue Integrity  Goal: Absence of new skin breakdown  Description: 1. Monitor for areas of redness and/or skin breakdown  2. Assess vascular access sites hourly  3. Every 4-6 hours minimum:  Change oxygen saturation probe site  4. Every 4-6 hours:  If on nasal continuous positive airway pressure, respiratory therapy assess nares and determine need for appliance change or resting period.   5/17/2023 1500 by Starr Coleman RN  Outcome: Progressing

## 2023-05-17 NOTE — PROGRESS NOTES
Vancomycin Dosing by Pharmacy - Daily Note   Vancomycin Therapy Day:  5  Indication: ssti    Allergies:  Patient has no known allergies. Actual Weight:    Wt Readings from Last 1 Encounters:   05/13/23 (!) 400 lb (181.4 kg)       Labs/Ancillary Data  Estimated Creatinine Clearance: 129 mL/min (based on SCr of 0.76 mg/dL). Recent Labs     05/16/23  0455 05/17/23  0429   CREATININE 0.60 0.76   BUN 11 13     No results found for: PROCAL    Intake/Output Summary (Last 24 hours) at 5/17/2023 0755  Last data filed at 5/17/2023 0515  Gross per 24 hour   Intake --   Output 2400 ml   Net -2400 ml     Temp: 98.5    Culture Date / Source  /  Results  See micro   Recent vancomycin administrations                     vancomycin (VANCOCIN) 1,000 mg in sodium chloride 0.9 % 250 mL IVPB ([id number]) (mg) 1,000 mg New Bag 05/17/23 0114     1,000 mg New Bag 05/16/23 1358     1,000 mg New Bag  0144     1,000 mg New Bag 05/15/23 1528    vancomycin (VANCOCIN) 1,250 mg in sodium chloride 0.9 % 250 mL IVPB ([id number]) (mg) 1,250 mg New Bag 05/14/23 2237     1,250 mg New Bag  1017                    Vancomycin Concentrations:   TROUGH:  No results for input(s): VANCOTROUGH in the last 72 hours. RANDOM:    Recent Labs     05/15/23  0501 05/16/23  0455   VANCORANDOM 25.8 23.7       MRSA Nasal Swab: N/A. Non-respiratory infection. Les Pimple PLAN     Increase dose to 1500 mg q24h IV  Ensured BUN/sCr ordered at baseline and every 48 hours x at least 3 levels, then at least weekly.   Pharmacy will continue to monitor patient and adjust therapy as indicated      Vancomycin Target Concentration Parameters  Treatment  Population Target AUC/SHAAN Target Trough   Invasive MRSA Infection (bacteremia, pneumonia, meningitis, endocarditis, osteomyelitis)  Sepsis (undifferentiated) 400-600 N/A   Infection due to non-MRSA pathogen  Empiric treatment of non-invasive MRSA infection  (SSTI, UTI) <500 10-15 mg/L   CrCl < 29 mL/min  Rapidly fluctuating serum creatinine

## 2023-05-17 NOTE — PROGRESS NOTES
Physical Therapy        Physical Therapy Cancel Note      DATE: 2023    NAME: Isatu Vines  MRN: [id number]   : 1960      Patient not seen this date for Physical Therapy due to:    Patient Declined: Pt refuses PT d/t increase pain. Pt will check back later, time permitting.        Electronically signed by James Middleton PTA on 2023 at 11:51 AM

## 2023-05-18 VITALS
HEART RATE: 78 BPM | WEIGHT: 293 LBS | RESPIRATION RATE: 17 BRPM | OXYGEN SATURATION: 98 % | TEMPERATURE: 97.8 F | HEIGHT: 66 IN | BODY MASS INDEX: 47.09 KG/M2 | SYSTOLIC BLOOD PRESSURE: 120 MMHG | DIASTOLIC BLOOD PRESSURE: 70 MMHG

## 2023-05-18 LAB
MICROORGANISM SPEC CULT: NORMAL
MICROORGANISM SPEC CULT: NORMAL
SPECIMEN DESCRIPTION: NORMAL
SPECIMEN DESCRIPTION: NORMAL

## 2023-05-18 PROCEDURE — 6370000000 HC RX 637 (ALT 250 FOR IP): Performed by: FAMILY MEDICINE

## 2023-05-18 RX ADMIN — AMOXICILLIN AND CLAVULANATE POTASSIUM 1 TABLET: 875; 125 TABLET, FILM COATED ORAL at 13:20

## 2023-05-18 RX ADMIN — DOXYCYCLINE 100 MG: 100 CAPSULE ORAL at 13:20

## 2023-05-18 RX ADMIN — ANTI-FUNGAL POWDER MICONAZOLE NITRATE TALC FREE: 1.42 POWDER TOPICAL at 16:12

## 2023-05-18 RX ADMIN — HYDROCODONE BITARTRATE AND ACETAMINOPHEN 2 TABLET: 5; 325 TABLET ORAL at 04:57

## 2023-05-18 RX ADMIN — APIXABAN 2.5 MG: 2.5 TABLET, FILM COATED ORAL at 13:20

## 2023-05-18 RX ADMIN — FUROSEMIDE 20 MG: 20 TABLET ORAL at 13:20

## 2023-05-18 RX ADMIN — DOCUSATE SODIUM 100 MG: 100 CAPSULE, LIQUID FILLED ORAL at 16:11

## 2023-05-18 RX ADMIN — PAROXETINE 30 MG: 10 TABLET, FILM COATED ORAL at 13:20

## 2023-05-18 RX ADMIN — HYDROCODONE BITARTRATE AND ACETAMINOPHEN 2 TABLET: 5; 325 TABLET ORAL at 13:20

## 2023-05-18 RX ADMIN — VENLAFAXINE HYDROCHLORIDE 75 MG: 75 CAPSULE, EXTENDED RELEASE ORAL at 13:20

## 2023-05-18 ASSESSMENT — PAIN SCALES - GENERAL
PAINLEVEL_OUTOF10: 9
PAINLEVEL_OUTOF10: 9

## 2023-05-18 ASSESSMENT — PAIN DESCRIPTION - LOCATION: LOCATION: GENERALIZED;BACK;KNEE

## 2023-05-18 ASSESSMENT — PAIN DESCRIPTION - DESCRIPTORS: DESCRIPTORS: THROBBING

## 2023-05-18 NOTE — CARE COORDINATION
Patient will [facility] to [facility] [address] [facility]  Phone [phone]  Fax [phone]   at [phone] via [address] [facility]. [address] [place] faxed to facility. Patient/Family informed of discharge and is agreeable. Bedside RN notified, Call report to [phone].

## 2023-05-18 NOTE — PROGRESS NOTES
[facility]  Progress Note    5/17/2023 10:35 PM  Subjective: Interval History: Pt has no complaints, she wants to go back to [facility]. Diet: ADULT DIET; Regular    Medications:   Reviewed medications    Labs:   CBC: No results for input(s): WBC, HGB, PLT in the last 72 hours. BMP:    Recent Labs     05/17/23  0429   BUN 13   CREATININE 0.76     Hepatic: No results for input(s): AST, ALT, ALB, BILITOT, ALKPHOS in the last 72 hours. Troponin: No results for input(s): TROPONINI in the last 72 hours. BNP: No results for input(s): BNP in the last 72 hours. Lipids: No results for input(s): CHOL, HDL in the last 72 hours. Invalid input(s): LDLCALCU  INR: No results for input(s): INR in the last 72 hours.       Objective:   Vitals: BP (!) 135/54   Pulse 80   Temp 97.3 °F (36.3 °C)   Resp 16   Ht 5' 6\" (1.676 m)   Wt (!) 400 lb (181.4 kg)   SpO2 97%   BMI 64.56 kg/m²   General appearance: alert and cooperative with exam  Neck: no adenopathy, no carotid bruit, no JVD, supple, symmetrical, trachea midline and thyroid not enlarged, symmetric, no tenderness/mass/nodules  Lungs: clear to auscultation bilaterally  Heart: regular rate and rhythm, S1, S2 normal, no murmur, click, rub or gallop  Abdomen: soft, non-tender; bowel sounds normal; no masses,  no organomegaly  Extremities: R thigh cellulitis resolved,extremities normal, atraumatic, no cyanosis , minimal lymphedema  Neurologic: Mental status: Alert, oriented, thought content appropriate    Assessment:     Patient Active Problem List    Diagnosis Date Noted    Rash of body 09/25/2021    At high risk for deep venous thrombosis 09/25/2021    On anticoagulant therapy low dose eliquis 09/25/2021    Scabies 09/25/2021    Peripheral edema     Infestation by bed bug     Severe recurrent major depression without psychotic features ([facility] 09/21/2021    Major depressive disorder, recurrent, severe without psychotic behavior ([facility] 05/22/2021    Percocet use disorder,

## 2023-05-18 NOTE — PLAN OF CARE
Problem: Discharge Planning  Goal: Discharge to home or other facility with appropriate resources  5/18/2023 0345 by Yamileth Gudino RN  Outcome: Progressing     Problem: Safety - Adult  Goal: Free from fall injury  5/18/2023 0345 by Yamileth Gudino RN  Outcome: Progressing  Note: The patient remained free from falls this shift, call light within reach, bed in locked and lowest position. Side rails up x2. Continue to monitor closely. Problem: Skin/Tissue Integrity  Goal: Absence of new skin breakdown  Description: 1. Monitor for areas of redness and/or skin breakdown  2. Assess vascular access sites hourly  3. Every 4-6 hours minimum:  Change oxygen saturation probe site  4. Every 4-6 hours:  If on nasal continuous positive airway pressure, respiratory therapy assess nares and determine need for appliance change or resting period.   5/18/2023 0345 by Yamileth Gudino RN  Outcome: Progressing     Problem: Infection - Adult  Goal: Absence of fever/infection during anticipated neutropenic period  5/18/2023 0345 by Yamileth Gudino RN  Outcome: Progressing     Problem: Chronic Conditions and Co-morbidities  Goal: Patient's chronic conditions and co-morbidity symptoms are monitored and maintained or improved  5/18/2023 0345 by Yamileth Gudino RN  Outcome: Progressing

## 2023-05-18 NOTE — PLAN OF CARE
Problem: Discharge Planning  Goal: Discharge to home or other facility with appropriate resources  5/18/2023 1615 by Teagan Carter RN  Outcome: Adequate for Discharge  5/18/2023 0345 by Janel King RN  Outcome: Progressing     Problem: Safety - Adult  Goal: Free from fall injury  5/18/2023 1615 by Teagan Carter RN  Outcome: Adequate for Discharge  5/18/2023 0345 by Janel King RN  Outcome: Progressing  Note: The patient remained free from falls this shift, call light within reach, bed in locked and lowest position. Side rails up x2. Continue to monitor closely. Problem: Skin/Tissue Integrity  Goal: Absence of new skin breakdown  Description: 1. Monitor for areas of redness and/or skin breakdown  2. Assess vascular access sites hourly  3. Every 4-6 hours minimum:  Change oxygen saturation probe site  4. Every 4-6 hours:  If on nasal continuous positive airway pressure, respiratory therapy assess nares and determine need for appliance change or resting period.   5/18/2023 1615 by Teagan Carter RN  Outcome: Adequate for Discharge  5/18/2023 0345 by Janel King RN  Outcome: Progressing     Problem: ABCDS Injury Assessment  Goal: Absence of physical injury  Outcome: Adequate for Discharge     Problem: Infection - Adult  Goal: Absence of infection at discharge  Outcome: Adequate for Discharge  Goal: Absence of fever/infection during anticipated neutropenic period  5/18/2023 1615 by Teagan Carter RN  Outcome: Adequate for Discharge  5/18/2023 0345 by Janel King RN  Outcome: Progressing     Problem: Chronic Conditions and Co-morbidities  Goal: Patient's chronic conditions and co-morbidity symptoms are monitored and maintained or improved  5/18/2023 1615 by Teagan Carter RN  Outcome: Adequate for Discharge  5/18/2023 0345 by Janel King RN  Outcome: Progressing

## 2023-05-18 NOTE — PROGRESS NOTES
Called report to Kenneth Linares. Updated new  medication(s) with the Edwardo and all questions fully answered.

## 2023-05-19 ASSESSMENT — PAIN SCALES - GENERAL: PAINLEVEL_OUTOF10: 8

## 2023-05-19 NOTE — DISCHARGE SUMMARY
[address]                 [address] [place], [address]                               DISCHARGE SUMMARY    PATIENT NAME: Paradise Woods                        :        1960  MED REC NO:   [id number]                              ROOM:       2114  ACCOUNT NO:   [de-identified]                           ADMIT DATE: 2023  PROVIDER:     Marcelino Garvin DATE:    PRINCIPAL DIAGNOSIS:  Cellulitis right buttock and right lower  extremity. COMORBIDITIES:  Include:  1. Morbid obesity. 2.  Congestive heart failure. 3.  High risk for DVT. OPERATIONS AND PROCEDURES:  None. HOSPITAL COURSE:  This 400+ pound female was admitted from a local [facility]  with a history of fever and chills and feeling sick. It became apparent  that she had red tender area on her right buttock and right lower  extremity. She was started on IV vancomycin and Zosyn. Because she is  bed bound, she was maintained at bedrest.  She was continued on low-dose  Eliquis because of her high risk for clot formation. Her fever and  chills rapidly diminished and gradually the tenderness and erythema that  she had in her right buttock/leg also diminished. She was then, the day  before discharge, transitioned to p.o. antibiotics. As there was no  discharge apparent, there was nothing to culture in terms of giving us a  definitive antibiotic to use. She was transitioned to Augmentin from  her Zosyn and vancomycin. In addition, doxycycline was added. As such,  she is being discharged back on doxycycline and Augmentin. CONDITION ON DISCHARGE:  Improved. MEDICATIONS AND DOSAGE:  Per med reconciliation sheet. ACTIVITY LEVEL:  This lady is chronically bed-bound. DIET:  House diet. FOLLOWUP:  She will be followed at [facility] by Dr. Kiran Grider and our  nurse practitioner and transferred to [facility].         MARCELINO SAUNDERS    D: 2023

## 2023-06-27 NOTE — PROGRESS NOTES
Pharmacy Vancomycin Consult     Vancomycin Day: 3  Current Dosin mg every 12 hours    Temp max:  98.7    Recent Labs     21  0511 21  0556   BUN 13 18       Recent Labs     21  0511 21  0556   CREATININE 0.74 0.78       Recent Labs     21  0511 21  0556   WBC 6.7 5.4         Intake/Output Summary (Last 24 hours) at 2021 0655  Last data filed at 2021 1103  Gross per 24 hour   Intake --   Output 600 ml   Net -600 ml       Culture Date      Source                       Results  See micro    Ht Readings from Last 1 Encounters:   21 5' 7\" (1.702 m)        Wt Readings from Last 1 Encounters:   21 (!) 342 lb 6 oz (155.3 kg)         Body mass index is 53.62 kg/m². Estimated Creatinine Clearance: 120 mL/min (based on SCr of 0.78 mg/dL).     Trough: 24.7 @ 0556 (14 hours since last dose)    Assessment/Plan:  Decrease dose to 1500 mg every 24 hours  Goal trough 10-20 for cellulitis    Joe Brennan, PharmD, BCSCP  2021   6:57 AM Hydroxyzine Pregnancy And Lactation Text: This medication is not safe during pregnancy and should not be taken. It is also excreted in breast milk and breast feeding isn't recommended.

## 2023-08-21 NOTE — TELEPHONE ENCOUNTER
PA submitted. Pt received at approximately 0750 in her room meditating. Pt was able to follow staff directions when she was in the milieu raising her voice when interacting over a general debate about thoughts towards currency. Pt followed directions by going to her room to take a break because the conversation was increasing feelings of anxiety and anger, as stated by the patient. During vitals the patient stated she believes she is an empath and takes on the feelings of others as though they are her own. Pt stated she doesn't think she actually needs medication and said there are other approaches she can use to help her navigate in the world with others feelings. Pt said she hears other beings that are like spirits who speak with her and advise her. Pt said she believed her eyes were open and she was able to see again. Pt said she still gets overwhelmed by certain things and struggles to keep her emotions of anxiety and agitation under control. Pt was asked to discuss this further during community group and to write down her thoughts. Pt was reminded to participate in her treatment plan by taking her medication for long term health improvement. Pt was calm, compliant and respectful during and after conversation. Pt returned to the milieu and interacted with peers in a quiet manner since peers were sleeping.

## 2024-11-27 ENCOUNTER — APPOINTMENT (OUTPATIENT)
Dept: CT IMAGING | Age: 64
DRG: 871 | End: 2024-11-27
Payer: MEDICARE

## 2024-11-27 ENCOUNTER — HOSPITAL ENCOUNTER (INPATIENT)
Age: 64
LOS: 9 days | Discharge: NURSING, REHAB OR LONG-TERM CARE | DRG: 871 | End: 2024-12-06
Attending: EMERGENCY MEDICINE | Admitting: INTERNAL MEDICINE
Payer: MEDICARE

## 2024-11-27 ENCOUNTER — APPOINTMENT (OUTPATIENT)
Dept: GENERAL RADIOLOGY | Age: 64
DRG: 871 | End: 2024-11-27
Payer: MEDICARE

## 2024-11-27 DIAGNOSIS — N30.01 ACUTE CYSTITIS WITH HEMATURIA: Primary | ICD-10-CM

## 2024-11-27 DIAGNOSIS — M25.551 BILATERAL HIP PAIN: ICD-10-CM

## 2024-11-27 DIAGNOSIS — R06.02 SHORTNESS OF BREATH: ICD-10-CM

## 2024-11-27 DIAGNOSIS — U07.1 COVID-19: ICD-10-CM

## 2024-11-27 DIAGNOSIS — R41.82 ALTERED MENTAL STATUS, UNSPECIFIED ALTERED MENTAL STATUS TYPE: ICD-10-CM

## 2024-11-27 DIAGNOSIS — F33.2 MAJOR DEPRESSIVE DISORDER, RECURRENT, SEVERE WITHOUT PSYCHOTIC BEHAVIOR (HCC): ICD-10-CM

## 2024-11-27 DIAGNOSIS — M25.552 BILATERAL HIP PAIN: ICD-10-CM

## 2024-11-27 PROBLEM — J96.00 ACUTE RESPIRATORY FAILURE: Status: ACTIVE | Noted: 2024-11-27

## 2024-11-27 LAB
ALBUMIN SERPL-MCNC: 3 G/DL (ref 3.5–5.2)
ALP SERPL-CCNC: 83 U/L (ref 35–104)
ALT SERPL-CCNC: 427 U/L (ref 10–35)
AMMONIA PLAS-SCNC: 41 UMOL/L (ref 11–51)
ANION GAP SERPL CALCULATED.3IONS-SCNC: 16 MMOL/L (ref 9–16)
APAP SERPL-MCNC: <5 UG/ML (ref 10–30)
ARTERIAL PATENCY WRIST A: ABNORMAL
AST SERPL-CCNC: 1358 U/L (ref 10–35)
BACTERIA URNS QL MICRO: ABNORMAL
BASOPHILS # BLD: 0.13 K/UL (ref 0–0.2)
BASOPHILS NFR BLD: 1 % (ref 0–2)
BDY SITE: ABNORMAL
BILIRUB SERPL-MCNC: 1 MG/DL (ref 0–1.2)
BILIRUB UR QL STRIP: ABNORMAL
BNP SERPL-MCNC: ABNORMAL PG/ML (ref 0–300)
BUN SERPL-MCNC: 18 MG/DL (ref 8–23)
CALCIUM SERPL-MCNC: 9.1 MG/DL (ref 8.6–10.4)
CASTS #/AREA URNS LPF: ABNORMAL /LPF
CHLORIDE SERPL-SCNC: 97 MMOL/L (ref 98–107)
CLARITY UR: ABNORMAL
CO2 SERPL-SCNC: 21 MMOL/L (ref 20–31)
COHGB MFR BLD: 0.7 % (ref 0–5)
COLOR UR: ABNORMAL
CREAT SERPL-MCNC: 0.7 MG/DL (ref 0.7–1.2)
CRP SERPL HS-MCNC: 126 MG/L (ref 0–5)
CRYSTALS URNS MICRO: ABNORMAL /HPF
CRYSTALS URNS MICRO: ABNORMAL /HPF
D DIMER PPP FEU-MCNC: 2.27 UG/ML FEU (ref 0–0.59)
EOSINOPHIL # BLD: 0 K/UL (ref 0–0.4)
EOSINOPHILS RELATIVE PERCENT: 0 % (ref 0–4)
EPI CELLS #/AREA URNS HPF: ABNORMAL /HPF
ERYTHROCYTE [DISTWIDTH] IN BLOOD BY AUTOMATED COUNT: 17.7 % (ref 11.5–14.9)
ERYTHROCYTE [SEDIMENTATION RATE] IN BLOOD BY PHOTOMETRIC METHOD: 68 MM/HR (ref 0–30)
ETHANOL PERCENT: 0 %
ETHANOLAMINE SERPL-MCNC: <10 MG/DL (ref 0–0.08)
FLUAV RNA RESP QL NAA+PROBE: NOT DETECTED
FLUBV RNA RESP QL NAA+PROBE: NOT DETECTED
GFR, ESTIMATED: >90 ML/MIN/1.73M2
GLUCOSE SERPL-MCNC: 145 MG/DL (ref 74–99)
GLUCOSE UR STRIP-MCNC: NEGATIVE MG/DL
HCO3 ARTERIAL: 23 MMOL/L (ref 22–26)
HCT VFR BLD AUTO: 47.3 % (ref 36–46)
HGB BLD-MCNC: 15.3 G/DL (ref 12–16)
HGB UR QL STRIP.AUTO: ABNORMAL
INR PPP: 2
KETONES UR STRIP-MCNC: ABNORMAL MG/DL
LACTATE BLDV-SCNC: 2.1 MMOL/L (ref 0.5–1.9)
LACTATE BLDV-SCNC: 3.1 MMOL/L (ref 0.5–1.9)
LACTATE BLDV-SCNC: 3.5 MMOL/L (ref 0.5–1.9)
LDH SERPL-CCNC: 930 U/L (ref 135–214)
LEUKOCYTE ESTERASE UR QL STRIP: ABNORMAL
LIPASE SERPL-CCNC: 26 U/L (ref 13–60)
LYMPHOCYTES NFR BLD: 2.39 K/UL (ref 1–4.8)
LYMPHOCYTES RELATIVE PERCENT: 19 % (ref 24–44)
MAGNESIUM SERPL-MCNC: 2 MG/DL (ref 1.6–2.4)
MCH RBC QN AUTO: 30 PG (ref 26–34)
MCHC RBC AUTO-ENTMCNC: 32.4 G/DL (ref 31–37)
MCV RBC AUTO: 92.4 FL (ref 80–100)
METHEMOGLOBIN: 0 % (ref 0–1.9)
MONOCYTES NFR BLD: 1.13 K/UL (ref 0.1–1.3)
MONOCYTES NFR BLD: 9 % (ref 1–7)
MORPHOLOGY: NORMAL
NEGATIVE BASE EXCESS, ART: 0.9 MMOL/L (ref 0–2)
NEUTROPHILS NFR BLD: 71 % (ref 36–66)
NEUTS SEG NFR BLD: 8.95 K/UL (ref 1.3–9.1)
NITRITE UR QL STRIP: POSITIVE
O2 SAT, ARTERIAL: 93 % (ref 95–98)
PCO2 ARTERIAL: 36.4 MMHG (ref 35–45)
PH ARTERIAL: 7.42 (ref 7.35–7.45)
PH UR STRIP: 5 [PH] (ref 5–8)
PLATELET # BLD AUTO: 309 K/UL (ref 150–450)
PMV BLD AUTO: 9.1 FL (ref 6–12)
PO2 ARTERIAL: 69.4 MMHG (ref 80–100)
POTASSIUM SERPL-SCNC: 4.6 MMOL/L (ref 3.7–5.3)
PROCALCITONIN SERPL-MCNC: 0.27 NG/ML (ref 0–0.09)
PROT SERPL-MCNC: 7.2 G/DL (ref 6.6–8.7)
PROT UR STRIP-MCNC: ABNORMAL MG/DL
PROTHROMBIN TIME: 22.9 SEC (ref 11.8–14.6)
PT. POSITION: ABNORMAL
RBC # BLD AUTO: 5.12 M/UL (ref 4–5.2)
RBC #/AREA URNS HPF: ABNORMAL /HPF
RESPIRATORY RATE: 18
SARS-COV-2 RNA RESP QL NAA+PROBE: DETECTED
SODIUM SERPL-SCNC: 134 MMOL/L (ref 136–145)
SOURCE: ABNORMAL
SP GR UR STRIP: 1.03 (ref 1–1.03)
SPECIMEN DESCRIPTION: ABNORMAL
T4 FREE SERPL-MCNC: 1.4 NG/DL (ref 0.9–1.7)
TEXT FOR RESPIRATORY: ABNORMAL
TROPONIN I SERPL HS-MCNC: 26 NG/L (ref 0–14)
TROPONIN I SERPL HS-MCNC: 28 NG/L (ref 0–14)
TSH SERPL DL<=0.05 MIU/L-ACNC: 1.32 UIU/ML (ref 0.27–4.2)
UROBILINOGEN UR STRIP-ACNC: NORMAL EU/DL (ref 0–1)
WBC #/AREA URNS HPF: ABNORMAL /HPF
WBC OTHER # BLD: 12.6 K/UL (ref 3.5–11)

## 2024-11-27 PROCEDURE — 83880 ASSAY OF NATRIURETIC PEPTIDE: CPT

## 2024-11-27 PROCEDURE — 83615 LACTATE (LD) (LDH) ENZYME: CPT

## 2024-11-27 PROCEDURE — 84145 PROCALCITONIN (PCT): CPT

## 2024-11-27 PROCEDURE — 82728 ASSAY OF FERRITIN: CPT

## 2024-11-27 PROCEDURE — 96375 TX/PRO/DX INJ NEW DRUG ADDON: CPT

## 2024-11-27 PROCEDURE — 6360000004 HC RX CONTRAST MEDICATION

## 2024-11-27 PROCEDURE — 71260 CT THORAX DX C+: CPT

## 2024-11-27 PROCEDURE — 96360 HYDRATION IV INFUSION INIT: CPT

## 2024-11-27 PROCEDURE — 82805 BLOOD GASES W/O2 SATURATION: CPT

## 2024-11-27 PROCEDURE — 85379 FIBRIN DEGRADATION QUANT: CPT

## 2024-11-27 PROCEDURE — 80143 DRUG ASSAY ACETAMINOPHEN: CPT

## 2024-11-27 PROCEDURE — 87040 BLOOD CULTURE FOR BACTERIA: CPT

## 2024-11-27 PROCEDURE — 87154 CUL TYP ID BLD PTHGN 6+ TRGT: CPT

## 2024-11-27 PROCEDURE — 5A0945A ASSISTANCE WITH RESPIRATORY VENTILATION, 24-96 CONSECUTIVE HOURS, HIGH NASAL FLOW/VELOCITY: ICD-10-PCS | Performed by: INTERNAL MEDICINE

## 2024-11-27 PROCEDURE — 2500000003 HC RX 250 WO HCPCS

## 2024-11-27 PROCEDURE — 83690 ASSAY OF LIPASE: CPT

## 2024-11-27 PROCEDURE — 36600 WITHDRAWAL OF ARTERIAL BLOOD: CPT

## 2024-11-27 PROCEDURE — 80053 COMPREHEN METABOLIC PANEL: CPT

## 2024-11-27 PROCEDURE — 6370000000 HC RX 637 (ALT 250 FOR IP)

## 2024-11-27 PROCEDURE — 82140 ASSAY OF AMMONIA: CPT

## 2024-11-27 PROCEDURE — 96374 THER/PROPH/DIAG INJ IV PUSH: CPT

## 2024-11-27 PROCEDURE — 84443 ASSAY THYROID STIM HORMONE: CPT

## 2024-11-27 PROCEDURE — 84484 ASSAY OF TROPONIN QUANT: CPT

## 2024-11-27 PROCEDURE — 87636 SARSCOV2 & INF A&B AMP PRB: CPT

## 2024-11-27 PROCEDURE — 2700000000 HC OXYGEN THERAPY PER DAY

## 2024-11-27 PROCEDURE — 81001 URINALYSIS AUTO W/SCOPE: CPT

## 2024-11-27 PROCEDURE — 85025 COMPLETE CBC W/AUTO DIFF WBC: CPT

## 2024-11-27 PROCEDURE — 36415 COLL VENOUS BLD VENIPUNCTURE: CPT

## 2024-11-27 PROCEDURE — 94761 N-INVAS EAR/PLS OXIMETRY MLT: CPT

## 2024-11-27 PROCEDURE — 87205 SMEAR GRAM STAIN: CPT

## 2024-11-27 PROCEDURE — 6360000002 HC RX W HCPCS: Performed by: EMERGENCY MEDICINE

## 2024-11-27 PROCEDURE — 6360000002 HC RX W HCPCS

## 2024-11-27 PROCEDURE — 99285 EMERGENCY DEPT VISIT HI MDM: CPT

## 2024-11-27 PROCEDURE — 0202U NFCT DS 22 TRGT SARS-COV-2: CPT

## 2024-11-27 PROCEDURE — 2060000000 HC ICU INTERMEDIATE R&B

## 2024-11-27 PROCEDURE — 83605 ASSAY OF LACTIC ACID: CPT

## 2024-11-27 PROCEDURE — 83550 IRON BINDING TEST: CPT

## 2024-11-27 PROCEDURE — 86140 C-REACTIVE PROTEIN: CPT

## 2024-11-27 PROCEDURE — 74174 CTA ABD&PLVS W/CONTRAST: CPT

## 2024-11-27 PROCEDURE — 70450 CT HEAD/BRAIN W/O DYE: CPT

## 2024-11-27 PROCEDURE — 6360000004 HC RX CONTRAST MEDICATION: Performed by: EMERGENCY MEDICINE

## 2024-11-27 PROCEDURE — 2580000003 HC RX 258

## 2024-11-27 PROCEDURE — 84439 ASSAY OF FREE THYROXINE: CPT

## 2024-11-27 PROCEDURE — 83540 ASSAY OF IRON: CPT

## 2024-11-27 PROCEDURE — 99291 CRITICAL CARE FIRST HOUR: CPT | Performed by: INTERNAL MEDICINE

## 2024-11-27 PROCEDURE — 71045 X-RAY EXAM CHEST 1 VIEW: CPT

## 2024-11-27 PROCEDURE — 83735 ASSAY OF MAGNESIUM: CPT

## 2024-11-27 PROCEDURE — 96361 HYDRATE IV INFUSION ADD-ON: CPT

## 2024-11-27 PROCEDURE — 2580000003 HC RX 258: Performed by: EMERGENCY MEDICINE

## 2024-11-27 PROCEDURE — G0480 DRUG TEST DEF 1-7 CLASSES: HCPCS

## 2024-11-27 PROCEDURE — 85652 RBC SED RATE AUTOMATED: CPT

## 2024-11-27 PROCEDURE — 85610 PROTHROMBIN TIME: CPT

## 2024-11-27 PROCEDURE — 93005 ELECTROCARDIOGRAM TRACING: CPT | Performed by: EMERGENCY MEDICINE

## 2024-11-27 RX ORDER — POTASSIUM CHLORIDE 7.45 MG/ML
10 INJECTION INTRAVENOUS PRN
Status: DISCONTINUED | OUTPATIENT
Start: 2024-11-27 | End: 2024-12-06 | Stop reason: HOSPADM

## 2024-11-27 RX ORDER — ONDANSETRON 4 MG/1
4 TABLET, ORALLY DISINTEGRATING ORAL EVERY 8 HOURS PRN
Status: DISCONTINUED | OUTPATIENT
Start: 2024-11-27 | End: 2024-12-01

## 2024-11-27 RX ORDER — GABAPENTIN 100 MG/1
100 CAPSULE ORAL NIGHTLY
COMMUNITY

## 2024-11-27 RX ORDER — MORPHINE SULFATE 15 MG/1
15 TABLET, FILM COATED, EXTENDED RELEASE ORAL 2 TIMES DAILY
Status: CANCELLED | OUTPATIENT
Start: 2024-11-27

## 2024-11-27 RX ORDER — CETIRIZINE HYDROCHLORIDE 10 MG/1
10 TABLET ORAL NIGHTLY
COMMUNITY

## 2024-11-27 RX ORDER — IOPAMIDOL 755 MG/ML
100 INJECTION, SOLUTION INTRAVASCULAR
Status: COMPLETED | OUTPATIENT
Start: 2024-11-27 | End: 2024-11-27

## 2024-11-27 RX ORDER — SODIUM CHLORIDE 9 MG/ML
INJECTION, SOLUTION INTRAVENOUS PRN
Status: DISCONTINUED | OUTPATIENT
Start: 2024-11-27 | End: 2024-11-30 | Stop reason: SDUPTHER

## 2024-11-27 RX ORDER — FLUTICASONE PROPIONATE 50 MCG
1 SPRAY, SUSPENSION (ML) NASAL DAILY
COMMUNITY

## 2024-11-27 RX ORDER — SODIUM CHLORIDE 9 MG/ML
INJECTION, SOLUTION INTRAVENOUS ONCE
Status: COMPLETED | OUTPATIENT
Start: 2024-11-27 | End: 2024-11-27

## 2024-11-27 RX ORDER — OXYCODONE AND ACETAMINOPHEN 5; 325 MG/1; MG/1
2 TABLET ORAL EVERY 6 HOURS PRN
COMMUNITY

## 2024-11-27 RX ORDER — VENLAFAXINE HYDROCHLORIDE 150 MG/1
150 CAPSULE, EXTENDED RELEASE ORAL DAILY
Status: DISCONTINUED | OUTPATIENT
Start: 2024-11-27 | End: 2024-12-06 | Stop reason: HOSPADM

## 2024-11-27 RX ORDER — VENLAFAXINE HYDROCHLORIDE 150 MG/1
150 TABLET, EXTENDED RELEASE ORAL DAILY
COMMUNITY

## 2024-11-27 RX ORDER — SODIUM CHLORIDE 0.9 % (FLUSH) 0.9 %
10 SYRINGE (ML) INJECTION PRN
Status: DISCONTINUED | OUTPATIENT
Start: 2024-11-27 | End: 2024-12-06 | Stop reason: HOSPADM

## 2024-11-27 RX ORDER — POLYETHYLENE GLYCOL 3350 17 G/17G
17 POWDER, FOR SOLUTION ORAL DAILY
Status: DISCONTINUED | OUTPATIENT
Start: 2024-11-27 | End: 2024-12-06 | Stop reason: HOSPADM

## 2024-11-27 RX ORDER — GABAPENTIN 100 MG/1
100 CAPSULE ORAL NIGHTLY
Status: CANCELLED | OUTPATIENT
Start: 2024-11-27

## 2024-11-27 RX ORDER — LEVOFLOXACIN 5 MG/ML
750 INJECTION, SOLUTION INTRAVENOUS EVERY 24 HOURS
Status: DISCONTINUED | OUTPATIENT
Start: 2024-11-27 | End: 2024-11-28

## 2024-11-27 RX ORDER — VENLAFAXINE HYDROCHLORIDE 37.5 MG/1
37.5 TABLET, EXTENDED RELEASE ORAL DAILY
Status: CANCELLED | OUTPATIENT
Start: 2024-11-27

## 2024-11-27 RX ORDER — SODIUM CHLORIDE 0.9 % (FLUSH) 0.9 %
5-40 SYRINGE (ML) INJECTION PRN
Status: DISCONTINUED | OUTPATIENT
Start: 2024-11-27 | End: 2024-11-30 | Stop reason: SDUPTHER

## 2024-11-27 RX ORDER — SENNOSIDES A AND B 8.6 MG/1
1 TABLET, FILM COATED ORAL NIGHTLY PRN
Status: DISCONTINUED | OUTPATIENT
Start: 2024-11-27 | End: 2024-12-06 | Stop reason: HOSPADM

## 2024-11-27 RX ORDER — ONDANSETRON 4 MG/1
4 TABLET, FILM COATED ORAL EVERY 6 HOURS PRN
Status: DISCONTINUED | OUTPATIENT
Start: 2024-11-27 | End: 2024-11-28

## 2024-11-27 RX ORDER — POTASSIUM CHLORIDE 1500 MG/1
40 TABLET, EXTENDED RELEASE ORAL PRN
Status: DISCONTINUED | OUTPATIENT
Start: 2024-11-27 | End: 2024-12-06 | Stop reason: HOSPADM

## 2024-11-27 RX ORDER — ONDANSETRON 2 MG/ML
4 INJECTION INTRAMUSCULAR; INTRAVENOUS EVERY 6 HOURS PRN
Status: DISCONTINUED | OUTPATIENT
Start: 2024-11-27 | End: 2024-12-01

## 2024-11-27 RX ORDER — DIAZEPAM 5 MG/1
5 TABLET ORAL NIGHTLY
COMMUNITY

## 2024-11-27 RX ORDER — DEXAMETHASONE SODIUM PHOSPHATE 10 MG/ML
6 INJECTION, SOLUTION INTRAMUSCULAR; INTRAVENOUS ONCE
Status: COMPLETED | OUTPATIENT
Start: 2024-11-27 | End: 2024-11-27

## 2024-11-27 RX ORDER — SODIUM CHLORIDE 0.9 % (FLUSH) 0.9 %
10 SYRINGE (ML) INJECTION PRN
Status: COMPLETED | OUTPATIENT
Start: 2024-11-27 | End: 2024-11-27

## 2024-11-27 RX ORDER — SENNOSIDES A AND B 8.6 MG/1
2 TABLET, FILM COATED ORAL DAILY
Status: DISCONTINUED | OUTPATIENT
Start: 2024-11-27 | End: 2024-12-06 | Stop reason: HOSPADM

## 2024-11-27 RX ORDER — SODIUM CHLORIDE 0.9 % (FLUSH) 0.9 %
5-40 SYRINGE (ML) INJECTION EVERY 12 HOURS SCHEDULED
Status: DISCONTINUED | OUTPATIENT
Start: 2024-11-27 | End: 2024-11-30 | Stop reason: SDUPTHER

## 2024-11-27 RX ORDER — IBUPROFEN 600 MG/1
1 TABLET ORAL EVERY 24 HOURS
COMMUNITY

## 2024-11-27 RX ORDER — MORPHINE SULFATE 15 MG/1
15 TABLET, FILM COATED, EXTENDED RELEASE ORAL 2 TIMES DAILY
Status: ON HOLD | COMMUNITY
End: 2024-12-06 | Stop reason: HOSPADM

## 2024-11-27 RX ORDER — BENZONATATE 100 MG/1
100 CAPSULE ORAL EVERY 8 HOURS PRN
Status: DISCONTINUED | OUTPATIENT
Start: 2024-11-27 | End: 2024-12-06 | Stop reason: HOSPADM

## 2024-11-27 RX ORDER — OXYCODONE AND ACETAMINOPHEN 5; 325 MG/1; MG/1
2 TABLET ORAL EVERY 6 HOURS PRN
Status: CANCELLED | OUTPATIENT
Start: 2024-11-27

## 2024-11-27 RX ORDER — DIAZEPAM 2 MG/1
2 TABLET ORAL 2 TIMES DAILY
Status: ON HOLD | COMMUNITY
End: 2024-12-06

## 2024-11-27 RX ORDER — ONDANSETRON 4 MG/1
4 TABLET, FILM COATED ORAL EVERY 6 HOURS PRN
COMMUNITY

## 2024-11-27 RX ORDER — SENNOSIDES A AND B 8.6 MG/1
2 TABLET, FILM COATED ORAL DAILY
COMMUNITY

## 2024-11-27 RX ORDER — ACETAMINOPHEN 325 MG/1
650 TABLET ORAL EVERY 6 HOURS PRN
Status: CANCELLED | OUTPATIENT
Start: 2024-11-27

## 2024-11-27 RX ORDER — FLUTICASONE PROPIONATE 50 MCG
1 SPRAY, SUSPENSION (ML) NASAL DAILY
Status: DISCONTINUED | OUTPATIENT
Start: 2024-11-27 | End: 2024-11-29

## 2024-11-27 RX ORDER — MAGNESIUM SULFATE HEPTAHYDRATE 40 MG/ML
2000 INJECTION, SOLUTION INTRAVENOUS PRN
Status: DISCONTINUED | OUTPATIENT
Start: 2024-11-27 | End: 2024-12-06 | Stop reason: HOSPADM

## 2024-11-27 RX ORDER — GUAIFENESIN 600 MG/1
600 TABLET, EXTENDED RELEASE ORAL EVERY 8 HOURS PRN
COMMUNITY

## 2024-11-27 RX ORDER — DEXAMETHASONE SODIUM PHOSPHATE 10 MG/ML
6 INJECTION, SOLUTION INTRAMUSCULAR; INTRAVENOUS DAILY
Status: DISCONTINUED | OUTPATIENT
Start: 2024-11-28 | End: 2024-12-06 | Stop reason: HOSPADM

## 2024-11-27 RX ORDER — VENLAFAXINE HYDROCHLORIDE 37.5 MG/1
37.5 TABLET, EXTENDED RELEASE ORAL DAILY
Status: ON HOLD | COMMUNITY
End: 2024-12-06 | Stop reason: HOSPADM

## 2024-11-27 RX ORDER — DOCUSATE SODIUM 100 MG/1
100 CAPSULE, LIQUID FILLED ORAL 2 TIMES DAILY
Status: DISCONTINUED | OUTPATIENT
Start: 2024-11-27 | End: 2024-12-06 | Stop reason: HOSPADM

## 2024-11-27 RX ORDER — ACETAMINOPHEN 650 MG/1
650 SUPPOSITORY RECTAL EVERY 6 HOURS PRN
Status: CANCELLED | OUTPATIENT
Start: 2024-11-27

## 2024-11-27 RX ORDER — IODINE/SODIUM IODIDE 2 %
TINCTURE TOPICAL PRN
Status: DISCONTINUED | OUTPATIENT
Start: 2024-11-27 | End: 2024-12-06 | Stop reason: HOSPADM

## 2024-11-27 RX ORDER — GUAIFENESIN 600 MG/1
600 TABLET, EXTENDED RELEASE ORAL EVERY 8 HOURS PRN
Status: DISCONTINUED | OUTPATIENT
Start: 2024-11-27 | End: 2024-12-06 | Stop reason: HOSPADM

## 2024-11-27 RX ORDER — LORAZEPAM 2 MG/ML
1 INJECTION INTRAMUSCULAR EVERY 6 HOURS PRN
Status: DISCONTINUED | OUTPATIENT
Start: 2024-11-27 | End: 2024-11-28

## 2024-11-27 RX ORDER — POLYETHYLENE GLYCOL 3350 17 G/17G
17 POWDER, FOR SOLUTION ORAL DAILY PRN
Status: DISCONTINUED | OUTPATIENT
Start: 2024-11-27 | End: 2024-12-06 | Stop reason: HOSPADM

## 2024-11-27 RX ORDER — ALBUTEROL SULFATE 90 UG/1
2 INHALANT RESPIRATORY (INHALATION) EVERY 4 HOURS PRN
Status: DISCONTINUED | OUTPATIENT
Start: 2024-11-27 | End: 2024-12-06 | Stop reason: HOSPADM

## 2024-11-27 RX ORDER — BENZONATATE 100 MG/1
100 CAPSULE ORAL EVERY 8 HOURS PRN
COMMUNITY

## 2024-11-27 RX ORDER — IOPAMIDOL 755 MG/ML
75 INJECTION, SOLUTION INTRAVASCULAR
Status: DISCONTINUED | OUTPATIENT
Start: 2024-11-27 | End: 2024-11-27

## 2024-11-27 RX ORDER — 0.9 % SODIUM CHLORIDE 0.9 %
30 INTRAVENOUS SOLUTION INTRAVENOUS ONCE
Status: COMPLETED | OUTPATIENT
Start: 2024-11-27 | End: 2024-11-27

## 2024-11-27 RX ORDER — 0.9 % SODIUM CHLORIDE 0.9 %
100 INTRAVENOUS SOLUTION INTRAVENOUS ONCE
Status: COMPLETED | OUTPATIENT
Start: 2024-11-27 | End: 2024-11-27

## 2024-11-27 RX ADMIN — IOPAMIDOL 100 ML: 755 INJECTION, SOLUTION INTRAVENOUS at 21:52

## 2024-11-27 RX ADMIN — SODIUM CHLORIDE 1000 ML: 0.9 INJECTION, SOLUTION INTRAVENOUS at 12:51

## 2024-11-27 RX ADMIN — PIPERACILLIN AND TAZOBACTAM 4500 MG: 4; .5 INJECTION, POWDER, FOR SOLUTION INTRAVENOUS at 22:18

## 2024-11-27 RX ADMIN — SODIUM CHLORIDE, PRESERVATIVE FREE 10 ML: 5 INJECTION INTRAVENOUS at 21:52

## 2024-11-27 RX ADMIN — SODIUM CHLORIDE, PRESERVATIVE FREE 10 ML: 5 INJECTION INTRAVENOUS at 22:20

## 2024-11-27 RX ADMIN — VANCOMYCIN HYDROCHLORIDE 2500 MG: 1 INJECTION, POWDER, LYOPHILIZED, FOR SOLUTION INTRAVENOUS at 22:50

## 2024-11-27 RX ADMIN — CEFTRIAXONE SODIUM 1000 MG: 1 INJECTION, POWDER, FOR SOLUTION INTRAMUSCULAR; INTRAVENOUS at 14:12

## 2024-11-27 RX ADMIN — SODIUM CHLORIDE 100 ML: 9 INJECTION, SOLUTION INTRAVENOUS at 15:26

## 2024-11-27 RX ADMIN — LEVOFLOXACIN 750 MG: 750 INJECTION, SOLUTION INTRAVENOUS at 17:11

## 2024-11-27 RX ADMIN — SODIUM CHLORIDE: 9 INJECTION, SOLUTION INTRAVENOUS at 21:53

## 2024-11-27 RX ADMIN — VENLAFAXINE HYDROCHLORIDE 150 MG: 150 CAPSULE, EXTENDED RELEASE ORAL at 22:45

## 2024-11-27 RX ADMIN — SODIUM CHLORIDE, PRESERVATIVE FREE 10 ML: 5 INJECTION INTRAVENOUS at 15:26

## 2024-11-27 RX ADMIN — APIXABAN 5 MG: 5 TABLET, FILM COATED ORAL at 22:25

## 2024-11-27 RX ADMIN — SODIUM CHLORIDE, PRESERVATIVE FREE 40 MG: 5 INJECTION INTRAVENOUS at 22:21

## 2024-11-27 RX ADMIN — DEXAMETHASONE SODIUM PHOSPHATE 6 MG: 10 INJECTION INTRAMUSCULAR; INTRAVENOUS at 14:12

## 2024-11-27 RX ADMIN — IOPAMIDOL 100 ML: 755 INJECTION, SOLUTION INTRAVENOUS at 15:25

## 2024-11-27 RX ADMIN — ANTI-FUNGAL POWDER MICONAZOLE NITRATE TALC FREE: 1.42 POWDER TOPICAL at 22:20

## 2024-11-27 NOTE — ED NOTES
Report given to EFE Alanis from U.   Report method by phone   The following was reviewed with receiving RN:   Current vital signs:  /73   Pulse 79   Temp 98.7 °F (37.1 °C) (Oral)   Resp 12   Ht 1.676 m (5' 6\")   Wt (!) 181 kg (399 lb)   SpO2 94%   BMI 64.40 kg/m²                      Any medication or safety alerts were reviewed. Any pending diagnostics and notifications were also reviewed, as well as any safety concerns or issues, abnormal labs, abnormal imaging, and abnormal assessment findings. Questions were answered.

## 2024-11-27 NOTE — ED NOTES
Attempted to call report to PCU, no answer at this time. States RN will call back when available.

## 2024-11-27 NOTE — ED NOTES
Writer informed Dr. Rodriguez of O2 decreased to 88% even when oxygen is increased to 6LNC. Respiratory paged.

## 2024-11-27 NOTE — H&P
sodium chloride (OCEAN, BABY AYR) 0.65 % nasal spray 2 sprays by Nasal route 2 times daily    Lisbeth Calix MD   venlafaxine 150 MG extended release tablet Take 1 tablet by mouth daily (Give with 37.5 mg tablet)    Lisbeth Calix MD   venlafaxine 37.5 MG extended release tablet Take 1 tablet by mouth daily ( Give with 150 mg tablet)    Lisbeth Calix MD   ondansetron (ZOFRAN) 4 MG tablet Take 1 tablet by mouth every 6 hours as needed for Nausea or Vomiting    Lisbeth Calix MD   cetirizine (ZYRTEC) 10 MG tablet Take 1 tablet by mouth at bedtime    Lisbeth Calix MD   guaiFENesin (MUCINEX) 600 MG extended release tablet Take 1 tablet by mouth every 8 hours as needed for Congestion    Provider, Historical, MD   senna (SENOKOT) 8.6 MG tablet Take 2 tablets by mouth daily    Lisbeth Calix MD   diazePAM (VALIUM) 2 MG tablet Take 1 tablet by mouth 2 times daily. ( Morning and evening) Max Daily Amount: 4 mg    Lisbeth Calix MD   diazePAM (VALIUM) 5 MG tablet Take 1 tablet by mouth at bedtime. Max Daily Amount: 5 mg    Lisbeth Calix MD   docusate sodium (COLACE) 100 MG capsule Take 1 capsule by mouth 2 times daily ( Morning and bedtime)    Lisbeth Calix MD   famotidine (PEPCID) 10 MG tablet Take 1 tablet by mouth every 12 hours as needed  Patient not taking: Reported on 11/27/2024    Lisbeth Calix MD   polyethylene glycol (MIRALAX) 17 g PACK packet Take 17 g by mouth daily    Lisbeth Calix MD senna (SENOKOT) 8.6 MG tablet Take 1 tablet by mouth daily as needed for Constipation    Lisbeth Calix MD   albuterol sulfate HFA (VENTOLIN HFA) 108 (90 Base) MCG/ACT inhaler Inhale 2 puffs into the lungs every 4 hours as needed for Wheezing or Shortness of Breath    Lisbeth Calix MD   apixaban (ELIQUIS) 2.5 MG TABS tablet Take 1 tablet by mouth 2 times daily  Patient not taking: Reported on 11/27/2024 9/30/21   Maxwell Hu MD

## 2024-11-27 NOTE — ED PROVIDER NOTES
EMERGENCY DEPARTMENT ENCOUNTER    Pt Name: Noemy Turcios  MRN: [id number]  Birthdate 1960  Date of evaluation: 11/27/24  CHIEF COMPLAINT       Chief Complaint   Patient presents with    Fatigue     HISTORY OF PRESENT ILLNESS   64-year-old female presents for complaints of fatigue and shortness of breath.  Per nursing home staff patient was reportedly more \"lethargic\" starting yesterday, they state when they checked on her today she was hypoxic, normally wears 4 L of home O2 and they could not get her SpO2 up and they were concerned about her mental status and sent her for evaluation.  Patient denies any specific complaints currently.    The history is provided by the patient, the EMS personnel and the nursing home.           REVIEW OF SYSTEMS     Review of Systems   Unable to perform ROS: Mental status change     PASTMEDICAL HISTORY     Past Medical History:   Diagnosis Date    Arthritis     CHF (congestive heart failure) ([facility])     COPD (chronic obstructive pulmonary disease) ([facility])     Diverticulitis     Hx of blood clots     Pulmonary embolism ([facility])      Past Problem List  Patient Active Problem List   Diagnosis Code    Chronic heart failure with preserved ejection fraction ([facility]) I50.32    Chronic obstructive pulmonary disease ([facility]) J44.9    Tobacco abuse Z72.0    Morbid obesity E66.01    Essential hypertension I10    History of pulmonary embolism Z86.711    Family history of colon cancer in mother Z80.0    Prediabetes R73.03    Class 4 congestive heart failure, acute on chronic, systolic ([facility]) I50.23    Hypokalemia E87.6    Cellulitis L03.90    Primary osteoarthritis of right hip M16.11    Chronic pain of multiple joints M25.50, G89.29    Depression F32.A    Cellulitis of right leg L03.115    Peripheral artery disease ([facility]) I73.9    Non healing left heel wound S91.302A    Corns and callosities L84    Healing pressure ulcer, stage IV ([facility]) L89.94    Lymphedema of both lower extremities I89.0    Non-pressure chronic

## 2024-11-27 NOTE — ED NOTES
Nurse from [facility] states that pt weight \"at the beginning of the month\" was 348.6lbs. Pt wears continuous oxygen anywhere between 2-4L, normally about 3.5 L. States pt does not wear a cpap at night.

## 2024-11-28 PROBLEM — N30.01 ACUTE CYSTITIS WITH HEMATURIA: Status: ACTIVE | Noted: 2024-11-28

## 2024-11-28 PROBLEM — R41.82 ALTERED MENTAL STATUS: Status: ACTIVE | Noted: 2024-11-28

## 2024-11-28 PROBLEM — R79.89 ABNORMAL LFTS: Status: ACTIVE | Noted: 2024-11-28

## 2024-11-28 PROBLEM — U07.1 COVID-19: Status: ACTIVE | Noted: 2024-11-28

## 2024-11-28 LAB
A1AT SERPL-MCNC: 238 MG/DL (ref 90–200)
ALBUMIN SERPL-MCNC: 2.7 G/DL (ref 3.5–5.2)
ALP SERPL-CCNC: 66 U/L (ref 35–104)
ALT SERPL-CCNC: 328 U/L (ref 10–35)
ANION GAP SERPL CALCULATED.3IONS-SCNC: 12 MMOL/L (ref 9–16)
AST SERPL-CCNC: 512 U/L (ref 10–35)
BASOPHILS # BLD: 0 K/UL (ref 0–0.2)
BASOPHILS NFR BLD: 1 % (ref 0–2)
BILIRUB SERPL-MCNC: 0.7 MG/DL (ref 0–1.2)
BUN SERPL-MCNC: 14 MG/DL (ref 8–23)
CALCIUM SERPL-MCNC: 8.7 MG/DL (ref 8.6–10.4)
CHLORIDE SERPL-SCNC: 98 MMOL/L (ref 98–107)
CK SERPL-CCNC: 15 U/L (ref 26–192)
CO2 SERPL-SCNC: 23 MMOL/L (ref 20–31)
CREAT SERPL-MCNC: 0.5 MG/DL (ref 0.7–1.2)
EOSINOPHIL # BLD: 0 K/UL (ref 0–0.4)
EOSINOPHILS RELATIVE PERCENT: 0 % (ref 0–4)
ERYTHROCYTE [DISTWIDTH] IN BLOOD BY AUTOMATED COUNT: 17.8 % (ref 11.5–14.9)
FERRITIN SERPL-MCNC: 7021 NG/ML
GFR, ESTIMATED: >90 ML/MIN/1.73M2
GLUCOSE SERPL-MCNC: 117 MG/DL (ref 74–99)
HCT VFR BLD AUTO: 42.8 % (ref 36–46)
HGB BLD-MCNC: 13.6 G/DL (ref 12–16)
IRON SATN MFR SERPL: 44 % (ref 20–55)
IRON SERPL-MCNC: 74 UG/DL (ref 37–145)
LACTATE BLDV-SCNC: 1.4 MMOL/L (ref 0.5–2.2)
LYMPHOCYTES NFR BLD: 1.9 K/UL (ref 1–4.8)
LYMPHOCYTES RELATIVE PERCENT: 23 % (ref 24–44)
MCH RBC QN AUTO: 30.2 PG (ref 26–34)
MCHC RBC AUTO-ENTMCNC: 31.8 G/DL (ref 31–37)
MCV RBC AUTO: 94.9 FL (ref 80–100)
MONOCYTES NFR BLD: 0.4 K/UL (ref 0.1–1.3)
MONOCYTES NFR BLD: 5 % (ref 1–7)
NEUTROPHILS NFR BLD: 71 % (ref 36–66)
NEUTS SEG NFR BLD: 5.6 K/UL (ref 1.3–9.1)
PLATELET # BLD AUTO: 209 K/UL (ref 150–450)
PMV BLD AUTO: 8.5 FL (ref 6–12)
POTASSIUM SERPL-SCNC: 4.2 MMOL/L (ref 3.7–5.3)
PROT SERPL-MCNC: 6.4 G/DL (ref 6.6–8.7)
RBC # BLD AUTO: 4.51 M/UL (ref 4–5.2)
SODIUM SERPL-SCNC: 133 MMOL/L (ref 136–145)
TIBC SERPL-MCNC: 168 UG/DL (ref 250–450)
UNSATURATED IRON BINDING CAPACITY: 94 UG/DL (ref 112–347)
WBC OTHER # BLD: 8 K/UL (ref 3.5–11)

## 2024-11-28 PROCEDURE — 2580000003 HC RX 258

## 2024-11-28 PROCEDURE — 6370000000 HC RX 637 (ALT 250 FOR IP)

## 2024-11-28 PROCEDURE — 6360000002 HC RX W HCPCS

## 2024-11-28 PROCEDURE — 99255 IP/OBS CONSLTJ NEW/EST HI 80: CPT | Performed by: INTERNAL MEDICINE

## 2024-11-28 PROCEDURE — 82550 ASSAY OF CK (CPK): CPT

## 2024-11-28 PROCEDURE — 36415 COLL VENOUS BLD VENIPUNCTURE: CPT

## 2024-11-28 PROCEDURE — 83605 ASSAY OF LACTIC ACID: CPT

## 2024-11-28 PROCEDURE — 51798 US URINE CAPACITY MEASURE: CPT

## 2024-11-28 PROCEDURE — 92523 SPEECH SOUND LANG COMPREHEN: CPT

## 2024-11-28 PROCEDURE — 2000000000 HC ICU R&B

## 2024-11-28 PROCEDURE — 80053 COMPREHEN METABOLIC PANEL: CPT

## 2024-11-28 PROCEDURE — 85025 COMPLETE CBC W/AUTO DIFF WBC: CPT

## 2024-11-28 PROCEDURE — 82103 ALPHA-1-ANTITRYPSIN TOTAL: CPT

## 2024-11-28 PROCEDURE — APPNB60 APP NON BILLABLE TIME 46-60 MINS: Performed by: NURSE PRACTITIONER

## 2024-11-28 PROCEDURE — 99291 CRITICAL CARE FIRST HOUR: CPT | Performed by: INTERNAL MEDICINE

## 2024-11-28 RX ORDER — SODIUM CHLORIDE 9 MG/ML
INJECTION, SOLUTION INTRAVENOUS CONTINUOUS
Status: DISCONTINUED | OUTPATIENT
Start: 2024-11-28 | End: 2024-12-06 | Stop reason: HOSPADM

## 2024-11-28 RX ORDER — FUROSEMIDE 10 MG/ML
40 INJECTION INTRAMUSCULAR; INTRAVENOUS 2 TIMES DAILY
Status: DISCONTINUED | OUTPATIENT
Start: 2024-11-28 | End: 2024-11-30

## 2024-11-28 RX ORDER — KETOROLAC TROMETHAMINE 30 MG/ML
30 INJECTION, SOLUTION INTRAMUSCULAR; INTRAVENOUS EVERY 6 HOURS PRN
Status: DISCONTINUED | OUTPATIENT
Start: 2024-11-28 | End: 2024-12-02

## 2024-11-28 RX ADMIN — PIPERACILLIN AND TAZOBACTAM 4500 MG: 4; .5 INJECTION, POWDER, FOR SOLUTION INTRAVENOUS at 12:47

## 2024-11-28 RX ADMIN — APIXABAN 5 MG: 5 TABLET, FILM COATED ORAL at 19:48

## 2024-11-28 RX ADMIN — FUROSEMIDE 40 MG: 10 INJECTION, SOLUTION INTRAMUSCULAR; INTRAVENOUS at 10:30

## 2024-11-28 RX ADMIN — DEXAMETHASONE SODIUM PHOSPHATE 6 MG: 10 INJECTION INTRAMUSCULAR; INTRAVENOUS at 10:30

## 2024-11-28 RX ADMIN — KETOROLAC TROMETHAMINE 30 MG: 30 INJECTION, SOLUTION INTRAMUSCULAR at 17:05

## 2024-11-28 RX ADMIN — POLYETHYLENE GLYCOL 3350 17 G: 17 POWDER, FOR SOLUTION ORAL at 10:29

## 2024-11-28 RX ADMIN — SODIUM CHLORIDE: 9 INJECTION, SOLUTION INTRAVENOUS at 17:02

## 2024-11-28 RX ADMIN — ANTI-FUNGAL POWDER MICONAZOLE NITRATE TALC FREE: 1.42 POWDER TOPICAL at 10:31

## 2024-11-28 RX ADMIN — ANTI-FUNGAL POWDER MICONAZOLE NITRATE TALC FREE: 1.42 POWDER TOPICAL at 23:38

## 2024-11-28 RX ADMIN — SODIUM CHLORIDE, PRESERVATIVE FREE 40 MG: 5 INJECTION INTRAVENOUS at 10:29

## 2024-11-28 RX ADMIN — SODIUM CHLORIDE, PRESERVATIVE FREE 10 ML: 5 INJECTION INTRAVENOUS at 10:32

## 2024-11-28 RX ADMIN — PIPERACILLIN AND TAZOBACTAM 3375 MG: 3; .375 INJECTION, POWDER, LYOPHILIZED, FOR SOLUTION INTRAVENOUS at 02:34

## 2024-11-28 RX ADMIN — SODIUM CHLORIDE, PRESERVATIVE FREE 10 ML: 5 INJECTION INTRAVENOUS at 20:09

## 2024-11-28 RX ADMIN — APIXABAN 5 MG: 5 TABLET, FILM COATED ORAL at 10:30

## 2024-11-28 RX ADMIN — VENLAFAXINE HYDROCHLORIDE 150 MG: 150 CAPSULE, EXTENDED RELEASE ORAL at 10:30

## 2024-11-28 RX ADMIN — PIPERACILLIN AND TAZOBACTAM 4500 MG: 4; .5 INJECTION, POWDER, FOR SOLUTION INTRAVENOUS at 19:47

## 2024-11-28 ASSESSMENT — PAIN DESCRIPTION - FREQUENCY: FREQUENCY: CONTINUOUS

## 2024-11-28 ASSESSMENT — PAIN DESCRIPTION - PAIN TYPE: TYPE: CHRONIC PAIN

## 2024-11-28 ASSESSMENT — PAIN DESCRIPTION - LOCATION: LOCATION: HIP

## 2024-11-28 ASSESSMENT — PAIN SCALES - GENERAL: PAINLEVEL_OUTOF10: 6

## 2024-11-28 ASSESSMENT — PAIN DESCRIPTION - ORIENTATION: ORIENTATION: RIGHT

## 2024-11-28 NOTE — CODE DOCUMENTATION
I had a long discussion with the patient in person, in presence of the Marlyn BOJORQUEZ RN  taking care of the patient. Patient's current clinical condition, laboratory and radiographic findings as well as recommendations of physicians consulted on the case were discussed with the patient in detail in simple English. All questions and concerns of the patient were addressed, and appropriate emotional support was provided. After understanding patient's current medical condition, patient decided that she wanted to continue the ongoing treatment but in case patient's heart stops (cardiac arrest) or the patient stops breathing (respiratory arrest), she do not want any chest compressions, insertion of a breathing tube down patient's throat for respiratory support or other similar resuscitative measures to be performed on the patient. They requested that if such a condition arises, the patient should be made comfortable and nature should be allowed to take its course. Patient asked that her code status should be changed to DNRCCA (no intubation), and signed the DNRCCA order form in my presence, which was witnessed by RN, Marlyn BOJORQUEZ     Will honor patient's wishes, and will change patient's code status to DNRCCA (no intubation).    Hilda Arce MD  PGY1 Transitional Year Resident  [place], [place]  11/28/2024 4:16 PM

## 2024-11-29 ENCOUNTER — APPOINTMENT (OUTPATIENT)
Dept: GENERAL RADIOLOGY | Age: 64
DRG: 871 | End: 2024-11-29
Payer: MEDICARE

## 2024-11-29 ENCOUNTER — APPOINTMENT (OUTPATIENT)
Dept: ULTRASOUND IMAGING | Age: 64
DRG: 871 | End: 2024-11-29
Payer: MEDICARE

## 2024-11-29 ENCOUNTER — APPOINTMENT (OUTPATIENT)
Age: 64
DRG: 871 | End: 2024-11-29
Payer: MEDICARE

## 2024-11-29 PROBLEM — D72.829 LEUKOCYTOSIS: Status: ACTIVE | Noted: 2024-11-29

## 2024-11-29 PROBLEM — B37.2 CANDIDAL INTERTRIGO: Status: ACTIVE | Noted: 2024-11-29

## 2024-11-29 PROBLEM — R79.82 ELEVATED C-REACTIVE PROTEIN (CRP): Status: ACTIVE | Noted: 2024-11-29

## 2024-11-29 PROBLEM — I48.91 NEW ONSET ATRIAL FIBRILLATION (HCC): Status: ACTIVE | Noted: 2024-11-29

## 2024-11-29 PROBLEM — R78.81 POSITIVE BLOOD CULTURE: Status: ACTIVE | Noted: 2024-11-29

## 2024-11-29 PROBLEM — Z51.5 ENCOUNTER FOR PALLIATIVE CARE: Status: ACTIVE | Noted: 2024-11-29

## 2024-11-29 LAB
ALBUMIN SERPL-MCNC: 3.1 G/DL (ref 3.5–5.2)
ALP SERPL-CCNC: 66 U/L (ref 35–104)
ALT SERPL-CCNC: 241 U/L (ref 10–35)
AMPHET UR QL SCN: NEGATIVE
ANION GAP SERPL CALCULATED.3IONS-SCNC: 13 MMOL/L (ref 9–16)
ANTI-XA UNFRAC HEPARIN: >1.1 IU/L (ref 0.3–0.7)
ARTERIAL PATENCY WRIST A: ABNORMAL
AST SERPL-CCNC: 175 U/L (ref 10–35)
B PARAP IS1001 DNA NPH QL NAA+NON-PROBE: NOT DETECTED
B PERT DNA SPEC QL NAA+PROBE: NOT DETECTED
BARBITURATES UR QL SCN: NEGATIVE
BASOPHILS # BLD: 0 K/UL (ref 0–0.2)
BASOPHILS NFR BLD: 0 % (ref 0–2)
BDY SITE: ABNORMAL
BENZODIAZ UR QL: POSITIVE
BILIRUB SERPL-MCNC: 0.7 MG/DL (ref 0–1.2)
BNP SERPL-MCNC: ABNORMAL PG/ML (ref 0–300)
BUN SERPL-MCNC: 15 MG/DL (ref 8–23)
C PNEUM DNA NPH QL NAA+NON-PROBE: NOT DETECTED
CALCIUM SERPL-MCNC: 9 MG/DL (ref 8.6–10.4)
CANNABINOIDS UR QL SCN: NEGATIVE
CHLORIDE SERPL-SCNC: 93 MMOL/L (ref 98–107)
CMV IGG SERPL QL IA: <0.2
CMV IGM SERPL QL IA: 0.1
CO2 SERPL-SCNC: 27 MMOL/L (ref 20–31)
COCAINE UR QL SCN: NEGATIVE
COHGB MFR BLD: 1.8 % (ref 0–5)
CREAT SERPL-MCNC: 0.5 MG/DL (ref 0.7–1.2)
CRP SERPL HS-MCNC: 30 MG/L (ref 0–5)
D DIMER PPP FEU-MCNC: 2.79 UG/ML FEU (ref 0–0.59)
ECHO AO ASC DIAM: 3.3 CM
ECHO AO ASCENDING AORTA INDEX: 1.35 CM/M2
ECHO AO ROOT DIAM: 2.8 CM
ECHO AO ROOT INDEX: 1.15 CM/M2
ECHO AV AREA PEAK VELOCITY: 1.6 CM2
ECHO AV AREA VTI: 1.6 CM2
ECHO AV AREA/BSA PEAK VELOCITY: 0.7 CM2/M2
ECHO AV AREA/BSA VTI: 0.7 CM2/M2
ECHO AV MEAN GRADIENT: 3 MMHG
ECHO AV MEAN VELOCITY: 0.8 M/S
ECHO AV PEAK GRADIENT: 5 MMHG
ECHO AV PEAK VELOCITY: 1.2 M/S
ECHO AV VELOCITY RATIO: 0.5
ECHO AV VTI: 17.7 CM
ECHO BSA: 2.59 M2
ECHO EST RA PRESSURE: 3 MMHG
ECHO LA AREA 2C: 18.8 CM2
ECHO LA AREA 4C: 22.3 CM2
ECHO LA DIAMETER INDEX: 1.8 CM/M2
ECHO LA DIAMETER: 4.4 CM
ECHO LA MAJOR AXIS: 6.9 CM
ECHO LA MINOR AXIS: 6.8 CM
ECHO LA TO AORTIC ROOT RATIO: 1.57
ECHO LA VOL BP: 50 ML (ref 22–52)
ECHO LA VOL MOD A2C: 43 ML (ref 22–52)
ECHO LA VOL MOD A4C: 59 ML (ref 22–52)
ECHO LA VOL/BSA BIPLANE: 20 ML/M2 (ref 16–34)
ECHO LA VOLUME INDEX MOD A2C: 18 ML/M2 (ref 16–34)
ECHO LA VOLUME INDEX MOD A4C: 24 ML/M2 (ref 16–34)
ECHO LV E' LATERAL VELOCITY: 8.38 CM/S
ECHO LV E' SEPTAL VELOCITY: 3.08 CM/S
ECHO LV EDV A2C: 94 ML
ECHO LV EDV A4C: 47 ML
ECHO LV EDV INDEX A4C: 19 ML/M2
ECHO LV EDV NDEX A2C: 39 ML/M2
ECHO LV EF PHYSICIAN: 25 %
ECHO LV EJECTION FRACTION A2C: 39 %
ECHO LV EJECTION FRACTION A4C: 30 %
ECHO LV EJECTION FRACTION BIPLANE: 38 % (ref 55–100)
ECHO LV ESV A2C: 57 ML
ECHO LV ESV A4C: 33 ML
ECHO LV ESV INDEX A2C: 23 ML/M2
ECHO LV ESV INDEX A4C: 14 ML/M2
ECHO LV FRACTIONAL SHORTENING: 15 % (ref 28–44)
ECHO LV INTERNAL DIMENSION DIASTOLE INDEX: 2.13 CM/M2
ECHO LV INTERNAL DIMENSION DIASTOLIC: 5.2 CM (ref 3.9–5.3)
ECHO LV INTERNAL DIMENSION SYSTOLIC INDEX: 1.8 CM/M2
ECHO LV INTERNAL DIMENSION SYSTOLIC: 4.4 CM
ECHO LV IVSD: 1.2 CM (ref 0.6–0.9)
ECHO LV MASS 2D: 248.8 G (ref 67–162)
ECHO LV MASS INDEX 2D: 102 G/M2 (ref 43–95)
ECHO LV POSTERIOR WALL DIASTOLIC: 1.2 CM (ref 0.6–0.9)
ECHO LV RELATIVE WALL THICKNESS RATIO: 0.46
ECHO LVOT AREA: 3.1 CM2
ECHO LVOT AV VTI INDEX: 0.51
ECHO LVOT DIAM: 2 CM
ECHO LVOT MEAN GRADIENT: 1 MMHG
ECHO LVOT PEAK GRADIENT: 1 MMHG
ECHO LVOT PEAK VELOCITY: 0.6 M/S
ECHO LVOT STROKE VOLUME INDEX: 11.7 ML/M2
ECHO LVOT SV: 28.6 ML
ECHO LVOT VTI: 9.1 CM
ECHO MV A VELOCITY: 0.32 M/S
ECHO MV AREA VTI: 1.8 CM2
ECHO MV E DECELERATION TIME (DT): 127 MS
ECHO MV E VELOCITY: 0.93 M/S
ECHO MV E/A RATIO: 2.91
ECHO MV E/E' LATERAL: 11.1
ECHO MV E/E' RATIO (AVERAGED): 20.65
ECHO MV E/E' SEPTAL: 30.19
ECHO MV LVOT VTI INDEX: 1.74
ECHO MV MAX VELOCITY: 0.9 M/S
ECHO MV MEAN GRADIENT: 2 MMHG
ECHO MV MEAN VELOCITY: 0.6 M/S
ECHO MV PEAK GRADIENT: 3 MMHG
ECHO MV VTI: 15.8 CM
ECHO RA AREA 4C: 16.1 CM2
ECHO RA END SYSTOLIC VOLUME APICAL 4 CHAMBER INDEX BSA: 16 ML/M2
ECHO RA VOLUME: 39 ML
ECHO RIGHT VENTRICULAR SYSTOLIC PRESSURE (RVSP): 27 MMHG
ECHO RV TAPSE: 1.1 CM (ref 1.7–?)
ECHO TV REGURGITANT MAX VELOCITY: 2.43 M/S
ECHO TV REGURGITANT PEAK GRADIENT: 24 MMHG
EKG ATRIAL RATE: 99 BPM
EKG P AXIS: 71 DEGREES
EKG P-R INTERVAL: 152 MS
EKG Q-T INTERVAL: 334 MS
EKG Q-T INTERVAL: 394 MS
EKG QRS DURATION: 102 MS
EKG QRS DURATION: 86 MS
EKG QTC CALCULATION (BAZETT): 505 MS
EKG QTC CALCULATION (BAZETT): 522 MS
EKG R AXIS: -164 DEGREES
EKG R AXIS: -164 DEGREES
EKG T AXIS: 51 DEGREES
EKG T AXIS: 62 DEGREES
EKG VENTRICULAR RATE: 147 BPM
EKG VENTRICULAR RATE: 99 BPM
EOSINOPHIL # BLD: 0 K/UL (ref 0–0.4)
EOSINOPHILS RELATIVE PERCENT: 0 % (ref 0–4)
ERYTHROCYTE [DISTWIDTH] IN BLOOD BY AUTOMATED COUNT: 17.5 % (ref 11.5–14.9)
ERYTHROCYTE [DISTWIDTH] IN BLOOD BY AUTOMATED COUNT: 17.6 % (ref 11.5–14.9)
FENTANYL UR QL: NEGATIVE
FERRITIN SERPL-MCNC: 941 NG/ML
FLUAV RNA NPH QL NAA+NON-PROBE: NOT DETECTED
FLUBV RNA NPH QL NAA+NON-PROBE: NOT DETECTED
GAS FLOW.O2 O2 DELIVERY SYS: ABNORMAL L/MIN
GFR, ESTIMATED: >90 ML/MIN/1.73M2
GLUCOSE SERPL-MCNC: 164 MG/DL (ref 74–99)
HADV DNA NPH QL NAA+NON-PROBE: NOT DETECTED
HAV IGM SERPL QL IA: NONREACTIVE
HBV CORE IGM SERPL QL IA: NONREACTIVE
HBV SURFACE AG SERPL QL IA: NONREACTIVE
HCO3 ARTERIAL: 27.9 MMOL/L (ref 22–26)
HCOV 229E RNA NPH QL NAA+NON-PROBE: NOT DETECTED
HCOV HKU1 RNA NPH QL NAA+NON-PROBE: NOT DETECTED
HCOV NL63 RNA NPH QL NAA+NON-PROBE: NOT DETECTED
HCOV OC43 RNA NPH QL NAA+NON-PROBE: NOT DETECTED
HCT VFR BLD AUTO: 42.2 % (ref 36–46)
HCT VFR BLD AUTO: 42.5 % (ref 36–46)
HCV AB SERPL QL IA: NONREACTIVE
HGB BLD-MCNC: 13.7 G/DL (ref 12–16)
HGB BLD-MCNC: 13.7 G/DL (ref 12–16)
HMPV RNA NPH QL NAA+NON-PROBE: NOT DETECTED
HPIV1 RNA NPH QL NAA+NON-PROBE: NOT DETECTED
HPIV2 RNA NPH QL NAA+NON-PROBE: NOT DETECTED
HPIV3 RNA NPH QL NAA+NON-PROBE: NOT DETECTED
HPIV4 RNA NPH QL NAA+NON-PROBE: NOT DETECTED
INR PPP: 2.3
INR PPP: 2.8
LDH SERPL-CCNC: 220 U/L (ref 135–214)
LYMPHOCYTES NFR BLD: 1.73 K/UL (ref 1–4.8)
LYMPHOCYTES RELATIVE PERCENT: 13 % (ref 24–44)
M PNEUMO DNA NPH QL NAA+NON-PROBE: NOT DETECTED
MAGNESIUM SERPL-MCNC: 1.9 MG/DL (ref 1.6–2.4)
MCH RBC QN AUTO: 30.1 PG (ref 26–34)
MCH RBC QN AUTO: 30.3 PG (ref 26–34)
MCHC RBC AUTO-ENTMCNC: 32.2 G/DL (ref 31–37)
MCHC RBC AUTO-ENTMCNC: 32.4 G/DL (ref 31–37)
MCV RBC AUTO: 93.1 FL (ref 80–100)
MCV RBC AUTO: 94 FL (ref 80–100)
METHADONE UR QL: NEGATIVE
METHEMOGLOBIN: 0.3 % (ref 0–1.9)
MICROORGANISM SPEC CULT: ABNORMAL
MONOCYTES NFR BLD: 0.67 K/UL (ref 0.1–1.3)
MONOCYTES NFR BLD: 5 % (ref 1–7)
MORPHOLOGY: ABNORMAL
NEUTROPHILS NFR BLD: 82 % (ref 36–66)
NEUTS SEG NFR BLD: 10.9 K/UL (ref 1.3–9.1)
O2 SAT, ARTERIAL: 92.5 % (ref 95–98)
OPIATES UR QL SCN: POSITIVE
OXYCODONE UR QL SCN: POSITIVE
PARTIAL THROMBOPLASTIN TIME: 39.1 SEC (ref 24–36)
PCO2 ARTERIAL: 42.2 MMHG (ref 35–45)
PCP UR QL SCN: POSITIVE
PH ARTERIAL: 7.44 (ref 7.35–7.45)
PLATELET # BLD AUTO: 285 K/UL (ref 150–450)
PLATELET # BLD AUTO: 301 K/UL (ref 150–450)
PMV BLD AUTO: 8.4 FL (ref 6–12)
PMV BLD AUTO: 8.7 FL (ref 6–12)
PO2 ARTERIAL: 63.7 MMHG (ref 80–100)
POSITIVE BASE EXCESS, ART: 3.3 MMOL/L (ref 0–2)
POTASSIUM SERPL-SCNC: 3.2 MMOL/L (ref 3.7–5.3)
PROT SERPL-MCNC: 6.5 G/DL (ref 6.6–8.7)
PROTHROMBIN TIME: 25.3 SEC (ref 11.8–14.6)
PROTHROMBIN TIME: 29.3 SEC (ref 11.8–14.6)
PT. POSITION: ABNORMAL
RBC # BLD AUTO: 4.52 M/UL (ref 4–5.2)
RBC # BLD AUTO: 4.53 M/UL (ref 4–5.2)
RESPIRATORY RATE: 17
RSV RNA NPH QL NAA+NON-PROBE: NOT DETECTED
RV+EV RNA NPH QL NAA+NON-PROBE: NOT DETECTED
SARS-COV-2 RNA NPH QL NAA+NON-PROBE: NOT DETECTED
SERVICE CMNT-IMP: ABNORMAL
SODIUM SERPL-SCNC: 133 MMOL/L (ref 136–145)
SPECIMEN DESCRIPTION: ABNORMAL
SPECIMEN DESCRIPTION: NORMAL
TEST INFORMATION: ABNORMAL
TROPONIN I SERPL HS-MCNC: 106 NG/L (ref 0–14)
TROPONIN I SERPL HS-MCNC: 98 NG/L (ref 0–14)
WBC OTHER # BLD: 13.3 K/UL (ref 3.5–11)
WBC OTHER # BLD: 14.1 K/UL (ref 3.5–11)

## 2024-11-29 PROCEDURE — 86038 ANTINUCLEAR ANTIBODIES: CPT

## 2024-11-29 PROCEDURE — 93010 ELECTROCARDIOGRAM REPORT: CPT | Performed by: INTERNAL MEDICINE

## 2024-11-29 PROCEDURE — 99232 SBSQ HOSP IP/OBS MODERATE 35: CPT | Performed by: NURSE PRACTITIONER

## 2024-11-29 PROCEDURE — 85027 COMPLETE CBC AUTOMATED: CPT

## 2024-11-29 PROCEDURE — 83735 ASSAY OF MAGNESIUM: CPT

## 2024-11-29 PROCEDURE — 86256 FLUORESCENT ANTIBODY TITER: CPT

## 2024-11-29 PROCEDURE — 2580000003 HC RX 258

## 2024-11-29 PROCEDURE — 6360000002 HC RX W HCPCS: Performed by: NURSE PRACTITIONER

## 2024-11-29 PROCEDURE — 86663 EPSTEIN-BARR ANTIBODY: CPT

## 2024-11-29 PROCEDURE — 94761 N-INVAS EAR/PLS OXIMETRY MLT: CPT

## 2024-11-29 PROCEDURE — 82805 BLOOD GASES W/O2 SATURATION: CPT

## 2024-11-29 PROCEDURE — 83516 IMMUNOASSAY NONANTIBODY: CPT

## 2024-11-29 PROCEDURE — 51798 US URINE CAPACITY MEASURE: CPT

## 2024-11-29 PROCEDURE — 93005 ELECTROCARDIOGRAM TRACING: CPT

## 2024-11-29 PROCEDURE — 99291 CRITICAL CARE FIRST HOUR: CPT | Performed by: INTERNAL MEDICINE

## 2024-11-29 PROCEDURE — 71045 X-RAY EXAM CHEST 1 VIEW: CPT

## 2024-11-29 PROCEDURE — 85730 THROMBOPLASTIN TIME PARTIAL: CPT

## 2024-11-29 PROCEDURE — 99222 1ST HOSP IP/OBS MODERATE 55: CPT

## 2024-11-29 PROCEDURE — 87641 MR-STAPH DNA AMP PROBE: CPT

## 2024-11-29 PROCEDURE — 82728 ASSAY OF FERRITIN: CPT

## 2024-11-29 PROCEDURE — 2500000003 HC RX 250 WO HCPCS

## 2024-11-29 PROCEDURE — 86664 EPSTEIN-BARR NUCLEAR ANTIGEN: CPT

## 2024-11-29 PROCEDURE — 2580000003 HC RX 258: Performed by: INTERNAL MEDICINE

## 2024-11-29 PROCEDURE — 97530 THERAPEUTIC ACTIVITIES: CPT

## 2024-11-29 PROCEDURE — 2000000000 HC ICU R&B

## 2024-11-29 PROCEDURE — 36600 WITHDRAWAL OF ARTERIAL BLOOD: CPT

## 2024-11-29 PROCEDURE — 80053 COMPREHEN METABOLIC PANEL: CPT

## 2024-11-29 PROCEDURE — 2700000000 HC OXYGEN THERAPY PER DAY

## 2024-11-29 PROCEDURE — 6370000000 HC RX 637 (ALT 250 FOR IP): Performed by: INTERNAL MEDICINE

## 2024-11-29 PROCEDURE — C8929 TTE W OR WO FOL WCON,DOPPLER: HCPCS

## 2024-11-29 PROCEDURE — 51701 INSERT BLADDER CATHETER: CPT

## 2024-11-29 PROCEDURE — 6360000004 HC RX CONTRAST MEDICATION

## 2024-11-29 PROCEDURE — 6370000000 HC RX 637 (ALT 250 FOR IP)

## 2024-11-29 PROCEDURE — 86376 MICROSOMAL ANTIBODY EACH: CPT

## 2024-11-29 PROCEDURE — 83615 LACTATE (LD) (LDH) ENZYME: CPT

## 2024-11-29 PROCEDURE — 6360000002 HC RX W HCPCS

## 2024-11-29 PROCEDURE — 99232 SBSQ HOSP IP/OBS MODERATE 35: CPT | Performed by: INTERNAL MEDICINE

## 2024-11-29 PROCEDURE — 97162 PT EVAL MOD COMPLEX 30 MIN: CPT

## 2024-11-29 PROCEDURE — 85610 PROTHROMBIN TIME: CPT

## 2024-11-29 PROCEDURE — 85520 HEPARIN ASSAY: CPT

## 2024-11-29 PROCEDURE — 85379 FIBRIN DEGRADATION QUANT: CPT

## 2024-11-29 PROCEDURE — 80307 DRUG TEST PRSMV CHEM ANLYZR: CPT

## 2024-11-29 PROCEDURE — 2500000003 HC RX 250 WO HCPCS: Performed by: INTERNAL MEDICINE

## 2024-11-29 PROCEDURE — 86645 CMV ANTIBODY IGM: CPT

## 2024-11-29 PROCEDURE — C1751 CATH, INF, PER/CENT/MIDLINE: HCPCS

## 2024-11-29 PROCEDURE — 86644 CMV ANTIBODY: CPT

## 2024-11-29 PROCEDURE — 84484 ASSAY OF TROPONIN QUANT: CPT

## 2024-11-29 PROCEDURE — 93306 TTE W/DOPPLER COMPLETE: CPT | Performed by: INTERNAL MEDICINE

## 2024-11-29 PROCEDURE — 76705 ECHO EXAM OF ABDOMEN: CPT

## 2024-11-29 PROCEDURE — 85025 COMPLETE CBC W/AUTO DIFF WBC: CPT

## 2024-11-29 PROCEDURE — 86665 EPSTEIN-BARR CAPSID VCA: CPT

## 2024-11-29 PROCEDURE — 02HV33Z INSERTION OF INFUSION DEVICE INTO SUPERIOR VENA CAVA, PERCUTANEOUS APPROACH: ICD-10-PCS | Performed by: INTERNAL MEDICINE

## 2024-11-29 PROCEDURE — 2580000003 HC RX 258: Performed by: NURSE PRACTITIONER

## 2024-11-29 PROCEDURE — 97166 OT EVAL MOD COMPLEX 45 MIN: CPT

## 2024-11-29 PROCEDURE — 86140 C-REACTIVE PROTEIN: CPT

## 2024-11-29 PROCEDURE — APPSS30 APP SPLIT SHARED TIME 16-30 MINUTES: Performed by: NURSE PRACTITIONER

## 2024-11-29 PROCEDURE — 36569 INSJ PICC 5 YR+ W/O IMAGING: CPT

## 2024-11-29 PROCEDURE — 76937 US GUIDE VASCULAR ACCESS: CPT

## 2024-11-29 PROCEDURE — 80074 ACUTE HEPATITIS PANEL: CPT

## 2024-11-29 PROCEDURE — 86225 DNA ANTIBODY NATIVE: CPT

## 2024-11-29 PROCEDURE — 36415 COLL VENOUS BLD VENIPUNCTURE: CPT

## 2024-11-29 PROCEDURE — 83880 ASSAY OF NATRIURETIC PEPTIDE: CPT

## 2024-11-29 PROCEDURE — 99223 1ST HOSP IP/OBS HIGH 75: CPT | Performed by: INTERNAL MEDICINE

## 2024-11-29 RX ORDER — HEPARIN SODIUM 1000 [USP'U]/ML
2000 INJECTION, SOLUTION INTRAVENOUS; SUBCUTANEOUS PRN
Status: DISCONTINUED | OUTPATIENT
Start: 2024-11-29 | End: 2024-11-29 | Stop reason: SDUPTHER

## 2024-11-29 RX ORDER — HEPARIN SODIUM 10000 [USP'U]/100ML
5-30 INJECTION, SOLUTION INTRAVENOUS CONTINUOUS
Status: DISCONTINUED | OUTPATIENT
Start: 2024-11-29 | End: 2024-11-29 | Stop reason: SDUPTHER

## 2024-11-29 RX ORDER — HEPARIN SODIUM 1000 [USP'U]/ML
4000 INJECTION, SOLUTION INTRAVENOUS; SUBCUTANEOUS PRN
Status: DISCONTINUED | OUTPATIENT
Start: 2024-11-29 | End: 2024-11-29 | Stop reason: SDUPTHER

## 2024-11-29 RX ORDER — LIDOCAINE HYDROCHLORIDE 10 MG/ML
50 INJECTION, SOLUTION EPIDURAL; INFILTRATION; INTRACAUDAL; PERINEURAL ONCE
Status: DISCONTINUED | OUTPATIENT
Start: 2024-11-29 | End: 2024-12-02

## 2024-11-29 RX ORDER — HEPARIN SODIUM 10000 [USP'U]/100ML
5-30 INJECTION, SOLUTION INTRAVENOUS CONTINUOUS
Status: DISCONTINUED | OUTPATIENT
Start: 2024-11-29 | End: 2024-11-30

## 2024-11-29 RX ORDER — MIDODRINE HYDROCHLORIDE 2.5 MG/1
2.5 TABLET ORAL
Status: DISCONTINUED | OUTPATIENT
Start: 2024-11-29 | End: 2024-11-30

## 2024-11-29 RX ORDER — HEPARIN SODIUM 1000 [USP'U]/ML
4000 INJECTION, SOLUTION INTRAVENOUS; SUBCUTANEOUS ONCE
Status: DISCONTINUED | OUTPATIENT
Start: 2024-11-29 | End: 2024-11-29 | Stop reason: ALTCHOICE

## 2024-11-29 RX ORDER — FLUTICASONE PROPIONATE 50 MCG
1 SPRAY, SUSPENSION (ML) NASAL DAILY PRN
Status: DISCONTINUED | OUTPATIENT
Start: 2024-11-29 | End: 2024-12-06 | Stop reason: HOSPADM

## 2024-11-29 RX ORDER — SODIUM CHLORIDE 0.9 % (FLUSH) 0.9 %
5-40 SYRINGE (ML) INJECTION EVERY 12 HOURS SCHEDULED
Status: DISCONTINUED | OUTPATIENT
Start: 2024-11-29 | End: 2024-12-06 | Stop reason: HOSPADM

## 2024-11-29 RX ORDER — DIGOXIN 0.25 MG/ML
125 INJECTION INTRAMUSCULAR; INTRAVENOUS ONCE
Status: DISCONTINUED | OUTPATIENT
Start: 2024-11-29 | End: 2024-11-29

## 2024-11-29 RX ORDER — HEPARIN SODIUM 1000 [USP'U]/ML
4000 INJECTION, SOLUTION INTRAVENOUS; SUBCUTANEOUS ONCE
Status: DISCONTINUED | OUTPATIENT
Start: 2024-11-29 | End: 2024-11-29 | Stop reason: SDUPTHER

## 2024-11-29 RX ORDER — HEPARIN SODIUM 1000 [USP'U]/ML
4000 INJECTION, SOLUTION INTRAVENOUS; SUBCUTANEOUS PRN
Status: DISCONTINUED | OUTPATIENT
Start: 2024-11-29 | End: 2024-11-30

## 2024-11-29 RX ORDER — SODIUM CHLORIDE 9 MG/ML
INJECTION, SOLUTION INTRAVENOUS PRN
Status: DISCONTINUED | OUTPATIENT
Start: 2024-11-29 | End: 2024-12-06 | Stop reason: HOSPADM

## 2024-11-29 RX ORDER — HEPARIN SODIUM 1000 [USP'U]/ML
2000 INJECTION, SOLUTION INTRAVENOUS; SUBCUTANEOUS PRN
Status: DISCONTINUED | OUTPATIENT
Start: 2024-11-29 | End: 2024-11-30

## 2024-11-29 RX ORDER — DILTIAZEM HYDROCHLORIDE 5 MG/ML
10 INJECTION INTRAVENOUS ONCE
Status: COMPLETED | OUTPATIENT
Start: 2024-11-29 | End: 2024-11-29

## 2024-11-29 RX ORDER — SODIUM CHLORIDE 0.9 % (FLUSH) 0.9 %
5-40 SYRINGE (ML) INJECTION PRN
Status: DISCONTINUED | OUTPATIENT
Start: 2024-11-29 | End: 2024-12-06 | Stop reason: HOSPADM

## 2024-11-29 RX ADMIN — AMIODARONE HYDROCHLORIDE 0.5 MG/MIN: 1.8 INJECTION, SOLUTION INTRAVENOUS at 17:34

## 2024-11-29 RX ADMIN — VENLAFAXINE HYDROCHLORIDE 150 MG: 150 CAPSULE, EXTENDED RELEASE ORAL at 09:34

## 2024-11-29 RX ADMIN — SODIUM CHLORIDE, PRESERVATIVE FREE 40 MG: 5 INJECTION INTRAVENOUS at 09:22

## 2024-11-29 RX ADMIN — MIDODRINE HYDROCHLORIDE 2.5 MG: 2.5 TABLET ORAL at 11:29

## 2024-11-29 RX ADMIN — SODIUM CHLORIDE, PRESERVATIVE FREE 10 ML: 5 INJECTION INTRAVENOUS at 21:57

## 2024-11-29 RX ADMIN — ANTI-FUNGAL POWDER MICONAZOLE NITRATE TALC FREE: 1.42 POWDER TOPICAL at 22:02

## 2024-11-29 RX ADMIN — APIXABAN 5 MG: 5 TABLET, FILM COATED ORAL at 09:34

## 2024-11-29 RX ADMIN — DILTIAZEM HYDROCHLORIDE 10 MG: 5 INJECTION, SOLUTION INTRAVENOUS at 05:16

## 2024-11-29 RX ADMIN — PERFLUTREN 4 ML: 6.52 INJECTION, SUSPENSION INTRAVENOUS at 15:27

## 2024-11-29 RX ADMIN — MIDODRINE HYDROCHLORIDE 2.5 MG: 2.5 TABLET ORAL at 16:39

## 2024-11-29 RX ADMIN — ANTI-FUNGAL POWDER MICONAZOLE NITRATE TALC FREE: 1.42 POWDER TOPICAL at 11:29

## 2024-11-29 RX ADMIN — AMIODARONE HYDROCHLORIDE 150 MG: 1.5 INJECTION, SOLUTION INTRAVENOUS at 11:27

## 2024-11-29 RX ADMIN — DILTIAZEM HYDROCHLORIDE 5 MG/HR: 5 INJECTION, SOLUTION INTRAVENOUS at 05:26

## 2024-11-29 RX ADMIN — PIPERACILLIN AND TAZOBACTAM 4500 MG: 4; .5 INJECTION, POWDER, FOR SOLUTION INTRAVENOUS at 04:21

## 2024-11-29 RX ADMIN — PIPERACILLIN AND TAZOBACTAM 4500 MG: 4; .5 INJECTION, POWDER, FOR SOLUTION INTRAVENOUS at 19:51

## 2024-11-29 RX ADMIN — AMIODARONE HYDROCHLORIDE 1 MG/MIN: 1.8 INJECTION, SOLUTION INTRAVENOUS at 11:40

## 2024-11-29 RX ADMIN — HEPARIN SODIUM 6 UNITS/KG/HR: 10000 INJECTION, SOLUTION INTRAVENOUS at 17:36

## 2024-11-29 RX ADMIN — PIPERACILLIN AND TAZOBACTAM 4500 MG: 4; .5 INJECTION, POWDER, FOR SOLUTION INTRAVENOUS at 11:36

## 2024-11-29 RX ADMIN — POTASSIUM CHLORIDE 40 MEQ: 1500 TABLET, EXTENDED RELEASE ORAL at 16:39

## 2024-11-29 RX ADMIN — DEXAMETHASONE SODIUM PHOSPHATE 6 MG: 10 INJECTION INTRAMUSCULAR; INTRAVENOUS at 09:22

## 2024-11-29 ASSESSMENT — PAIN SCALES - GENERAL
PAINLEVEL_OUTOF10: 0

## 2024-11-29 NOTE — PROCEDURES
PROCEDURE NOTE  Date: 11/29/2024   Name: Noemy Turcios  YOB: 1960    Procedures        Picc placement order:    Benefits include stable, long term intravenous access. Blood draws. Peripheral vein preservation. Safely deliver vesicant medications.    Risks include failure to obtain the desired result(s) of the procedure, discomfort, injury, the need for additional procedures/therapies, permanent loss of body function, bleeding/bruising, arterial puncture, air embolism, nerve damage, hematoma, phlebitis, catheter fracture/rupture, catheter embolism, catheter occlusion, catheter migration, catheter site infection, unintentional/accidental removal of catheter, bloodstream infection, infiltration, cardiac arrhythmia, vein thrombosis, difficult catheter removal.   Alternatives discussed including centrally inserted central catheter, as well as less invasive procedures such as multiple peripheral IVs, extended dwell catheters and midline placements.     Consent obtained by proceduralist, signed by Patient/DPOA.      Prescribed therapy: Vasopressor, Cardizem, Amiodarone, IVF  Peripheral ultrasound assessment done. Plan for left Brachial vein insertion.     Product type: Antimicrobial/Antithrombogenic Arrow non tapered power injectable PICC  History/Labs/Allergies Reviewed  Placed By: Joey - [facility] IV Team  Assistant - Staff-RN  Time out Performed using Two Identifiers    Catheter info: 5.5 Greenlandic - dual lumen  Total length: 49 cm  External catheter length: 0 cm  Extremity circumference at site: 54 cm  Number of attempts: 1  Estimated blood loss: 1 ml  Placement verified by: positive blood return & flushes easily  Special equipment used: [facility] ECG Tip tracker system, ultrasound, MST, and micro-introducer needle.   Catheter securement: Adhesive Zaelab securement device  Catheter securement adhesive (SecureportIV) utilized at insertion site  Dressing applied: Tegaderm CHG  Lidocaine administered intradermally conc.1%:

## 2024-11-30 PROBLEM — R06.02 SHORTNESS OF BREATH: Status: ACTIVE | Noted: 2024-11-30

## 2024-11-30 PROBLEM — R94.31 QT PROLONGATION: Status: ACTIVE | Noted: 2024-11-30

## 2024-11-30 LAB
ALBUMIN SERPL-MCNC: 3.2 G/DL (ref 3.5–5.2)
ALP SERPL-CCNC: 69 U/L (ref 35–104)
ALT SERPL-CCNC: 219 U/L (ref 10–35)
ANION GAP SERPL CALCULATED.3IONS-SCNC: 12 MMOL/L (ref 9–16)
AST SERPL-CCNC: 150 U/L (ref 10–35)
BASOPHILS # BLD: 0 K/UL (ref 0–0.2)
BASOPHILS NFR BLD: 0 % (ref 0–2)
BILIRUB SERPL-MCNC: 0.7 MG/DL (ref 0–1.2)
BUN SERPL-MCNC: 14 MG/DL (ref 8–23)
CALCIUM SERPL-MCNC: 9.2 MG/DL (ref 8.6–10.4)
CHLORIDE SERPL-SCNC: 96 MMOL/L (ref 98–107)
CO2 SERPL-SCNC: 26 MMOL/L (ref 20–31)
CREAT SERPL-MCNC: 0.6 MG/DL (ref 0.7–1.2)
EOSINOPHIL # BLD: 0 K/UL (ref 0–0.4)
EOSINOPHILS RELATIVE PERCENT: 0 % (ref 0–4)
ERYTHROCYTE [DISTWIDTH] IN BLOOD BY AUTOMATED COUNT: 17.5 % (ref 11.5–14.9)
GFR, ESTIMATED: >90 ML/MIN/1.73M2
GLUCOSE BLD-MCNC: 125 MG/DL (ref 65–105)
GLUCOSE SERPL-MCNC: 151 MG/DL (ref 74–99)
HCT VFR BLD AUTO: 44.1 % (ref 36–46)
HGB BLD-MCNC: 13.9 G/DL (ref 12–16)
LYMPHOCYTES NFR BLD: 2.4 K/UL (ref 1–4.8)
LYMPHOCYTES RELATIVE PERCENT: 16 % (ref 24–44)
MCH RBC QN AUTO: 29.4 PG (ref 26–34)
MCHC RBC AUTO-ENTMCNC: 31.6 G/DL (ref 31–37)
MCV RBC AUTO: 93.1 FL (ref 80–100)
MONOCYTES NFR BLD: 1.1 K/UL (ref 0.1–1.3)
MONOCYTES NFR BLD: 8 % (ref 1–7)
NEUTROPHILS NFR BLD: 76 % (ref 36–66)
NEUTS SEG NFR BLD: 11.1 K/UL (ref 1.3–9.1)
PARTIAL THROMBOPLASTIN TIME: 53.9 SEC (ref 24–36)
PARTIAL THROMBOPLASTIN TIME: 83.2 SEC (ref 24–36)
PLATELET # BLD AUTO: 307 K/UL (ref 150–450)
PMV BLD AUTO: 8.5 FL (ref 6–12)
POTASSIUM SERPL-SCNC: 3.6 MMOL/L (ref 3.7–5.3)
PROT SERPL-MCNC: 6.4 G/DL (ref 6.6–8.7)
RBC # BLD AUTO: 4.74 M/UL (ref 4–5.2)
SODIUM SERPL-SCNC: 134 MMOL/L (ref 136–145)
WBC OTHER # BLD: 14.6 K/UL (ref 3.5–11)

## 2024-11-30 PROCEDURE — 80053 COMPREHEN METABOLIC PANEL: CPT

## 2024-11-30 PROCEDURE — 99233 SBSQ HOSP IP/OBS HIGH 50: CPT | Performed by: INTERNAL MEDICINE

## 2024-11-30 PROCEDURE — 2500000003 HC RX 250 WO HCPCS: Performed by: INTERNAL MEDICINE

## 2024-11-30 PROCEDURE — 85025 COMPLETE CBC W/AUTO DIFF WBC: CPT

## 2024-11-30 PROCEDURE — 51702 INSERT TEMP BLADDER CATH: CPT

## 2024-11-30 PROCEDURE — 51701 INSERT BLADDER CATHETER: CPT

## 2024-11-30 PROCEDURE — 51798 US URINE CAPACITY MEASURE: CPT

## 2024-11-30 PROCEDURE — 6360000002 HC RX W HCPCS

## 2024-11-30 PROCEDURE — 97129 THER IVNTJ 1ST 15 MIN: CPT

## 2024-11-30 PROCEDURE — 2580000003 HC RX 258: Performed by: INTERNAL MEDICINE

## 2024-11-30 PROCEDURE — 99232 SBSQ HOSP IP/OBS MODERATE 35: CPT | Performed by: NURSE PRACTITIONER

## 2024-11-30 PROCEDURE — 6370000000 HC RX 637 (ALT 250 FOR IP)

## 2024-11-30 PROCEDURE — 82947 ASSAY GLUCOSE BLOOD QUANT: CPT

## 2024-11-30 PROCEDURE — 6360000002 HC RX W HCPCS: Performed by: NURSE PRACTITIONER

## 2024-11-30 PROCEDURE — 99231 SBSQ HOSP IP/OBS SF/LOW 25: CPT | Performed by: INTERNAL MEDICINE

## 2024-11-30 PROCEDURE — 2580000003 HC RX 258

## 2024-11-30 PROCEDURE — 2000000000 HC ICU R&B

## 2024-11-30 PROCEDURE — 2580000003 HC RX 258: Performed by: NURSE PRACTITIONER

## 2024-11-30 PROCEDURE — 6370000000 HC RX 637 (ALT 250 FOR IP): Performed by: INTERNAL MEDICINE

## 2024-11-30 PROCEDURE — 85730 THROMBOPLASTIN TIME PARTIAL: CPT

## 2024-11-30 PROCEDURE — APPSS30 APP SPLIT SHARED TIME 16-30 MINUTES: Performed by: NURSE PRACTITIONER

## 2024-11-30 RX ORDER — FUROSEMIDE 10 MG/ML
40 INJECTION INTRAMUSCULAR; INTRAVENOUS DAILY
Status: DISCONTINUED | OUTPATIENT
Start: 2024-12-01 | End: 2024-12-01

## 2024-11-30 RX ORDER — MIDODRINE HYDROCHLORIDE 5 MG/1
5 TABLET ORAL
Status: DISCONTINUED | OUTPATIENT
Start: 2024-11-30 | End: 2024-12-02

## 2024-11-30 RX ORDER — MIDODRINE HYDROCHLORIDE 2.5 MG/1
2.5 TABLET ORAL ONCE
Status: COMPLETED | OUTPATIENT
Start: 2024-11-30 | End: 2024-11-30

## 2024-11-30 RX ADMIN — PIPERACILLIN AND TAZOBACTAM 4500 MG: 4; .5 INJECTION, POWDER, FOR SOLUTION INTRAVENOUS at 11:44

## 2024-11-30 RX ADMIN — MIDODRINE HYDROCHLORIDE 5 MG: 5 TABLET ORAL at 11:43

## 2024-11-30 RX ADMIN — HEPARIN SODIUM 2000 UNITS: 1000 INJECTION INTRAVENOUS; SUBCUTANEOUS at 00:14

## 2024-11-30 RX ADMIN — APIXABAN 5 MG: 5 TABLET, FILM COATED ORAL at 20:14

## 2024-11-30 RX ADMIN — MIDODRINE HYDROCHLORIDE 2.5 MG: 2.5 TABLET ORAL at 09:08

## 2024-11-30 RX ADMIN — SODIUM CHLORIDE, PRESERVATIVE FREE 10 ML: 5 INJECTION INTRAVENOUS at 20:14

## 2024-11-30 RX ADMIN — SODIUM CHLORIDE, PRESERVATIVE FREE 40 MG: 5 INJECTION INTRAVENOUS at 09:08

## 2024-11-30 RX ADMIN — MIDODRINE HYDROCHLORIDE 5 MG: 5 TABLET ORAL at 16:53

## 2024-11-30 RX ADMIN — ANTI-FUNGAL POWDER MICONAZOLE NITRATE TALC FREE: 1.42 POWDER TOPICAL at 09:10

## 2024-11-30 RX ADMIN — SODIUM CHLORIDE: 9 INJECTION, SOLUTION INTRAVENOUS at 03:32

## 2024-11-30 RX ADMIN — PIPERACILLIN AND TAZOBACTAM 4500 MG: 4; .5 INJECTION, POWDER, FOR SOLUTION INTRAVENOUS at 03:30

## 2024-11-30 RX ADMIN — AMIODARONE HYDROCHLORIDE 0.5 MG/MIN: 1.8 INJECTION, SOLUTION INTRAVENOUS at 16:55

## 2024-11-30 RX ADMIN — AMIODARONE HYDROCHLORIDE 0.5 MG/MIN: 1.8 INJECTION, SOLUTION INTRAVENOUS at 05:12

## 2024-11-30 RX ADMIN — ANTI-FUNGAL POWDER MICONAZOLE NITRATE TALC FREE: 1.42 POWDER TOPICAL at 20:15

## 2024-11-30 RX ADMIN — DEXAMETHASONE SODIUM PHOSPHATE 6 MG: 10 INJECTION INTRAMUSCULAR; INTRAVENOUS at 09:08

## 2024-11-30 RX ADMIN — POTASSIUM BICARBONATE 20 MEQ: 782 TABLET, EFFERVESCENT ORAL at 09:08

## 2024-11-30 RX ADMIN — SODIUM CHLORIDE, PRESERVATIVE FREE 10 ML: 5 INJECTION INTRAVENOUS at 09:10

## 2024-11-30 RX ADMIN — PIPERACILLIN AND TAZOBACTAM 4500 MG: 4; .5 INJECTION, POWDER, FOR SOLUTION INTRAVENOUS at 19:20

## 2024-11-30 RX ADMIN — VENLAFAXINE HYDROCHLORIDE 150 MG: 150 CAPSULE, EXTENDED RELEASE ORAL at 09:08

## 2024-11-30 RX ADMIN — FUROSEMIDE 40 MG: 10 INJECTION, SOLUTION INTRAMUSCULAR; INTRAVENOUS at 09:08

## 2024-11-30 ASSESSMENT — PAIN SCALES - GENERAL
PAINLEVEL_OUTOF10: 0

## 2024-12-01 LAB
ALBUMIN SERPL-MCNC: 3.2 G/DL (ref 3.5–5.2)
ALP SERPL-CCNC: 66 U/L (ref 35–104)
ALT SERPL-CCNC: 167 U/L (ref 10–35)
ANION GAP SERPL CALCULATED.3IONS-SCNC: 18 MMOL/L (ref 9–16)
AST SERPL-CCNC: 81 U/L (ref 10–35)
BASOPHILS # BLD: 0.1 K/UL (ref 0–0.2)
BASOPHILS NFR BLD: 1 % (ref 0–2)
BILIRUB SERPL-MCNC: 0.7 MG/DL (ref 0–1.2)
BUN SERPL-MCNC: 14 MG/DL (ref 8–23)
CALCIUM SERPL-MCNC: 8.5 MG/DL (ref 8.6–10.4)
CHLORIDE SERPL-SCNC: 91 MMOL/L (ref 98–107)
CO2 SERPL-SCNC: 26 MMOL/L (ref 20–31)
CREAT SERPL-MCNC: 0.6 MG/DL (ref 0.7–1.2)
CRP SERPL HS-MCNC: 11.8 MG/L (ref 0–5)
EOSINOPHIL # BLD: 0 K/UL (ref 0–0.4)
EOSINOPHILS RELATIVE PERCENT: 0 % (ref 0–4)
ERYTHROCYTE [DISTWIDTH] IN BLOOD BY AUTOMATED COUNT: 17.3 % (ref 11.5–14.9)
GFR, ESTIMATED: >90 ML/MIN/1.73M2
GLUCOSE SERPL-MCNC: 239 MG/DL (ref 74–99)
HCT VFR BLD AUTO: 42.7 % (ref 36–46)
HGB BLD-MCNC: 13.7 G/DL (ref 12–16)
LYMPHOCYTES NFR BLD: 1.5 K/UL (ref 1–4.8)
LYMPHOCYTES RELATIVE PERCENT: 14 % (ref 24–44)
MAGNESIUM SERPL-MCNC: 1.7 MG/DL (ref 1.6–2.4)
MCH RBC QN AUTO: 30 PG (ref 26–34)
MCHC RBC AUTO-ENTMCNC: 32.2 G/DL (ref 31–37)
MCV RBC AUTO: 93.4 FL (ref 80–100)
MONOCYTES NFR BLD: 0.6 K/UL (ref 0.1–1.3)
MONOCYTES NFR BLD: 5 % (ref 1–7)
NEUTROPHILS NFR BLD: 80 % (ref 36–66)
NEUTS SEG NFR BLD: 8.8 K/UL (ref 1.3–9.1)
PLATELET # BLD AUTO: 290 K/UL (ref 150–450)
PMV BLD AUTO: 8.5 FL (ref 6–12)
POTASSIUM SERPL-SCNC: 2.8 MMOL/L (ref 3.7–5.3)
POTASSIUM SERPL-SCNC: 5.4 MMOL/L (ref 3.7–5.3)
PROT SERPL-MCNC: 6.2 G/DL (ref 6.6–8.7)
RBC # BLD AUTO: 4.57 M/UL (ref 4–5.2)
SMOOTH MUSCLE ANTIBODY: 23 UNITS (ref 0–19)
SODIUM SERPL-SCNC: 135 MMOL/L (ref 136–145)
WBC OTHER # BLD: 10.9 K/UL (ref 3.5–11)

## 2024-12-01 PROCEDURE — APPSS30 APP SPLIT SHARED TIME 16-30 MINUTES: Performed by: NURSE PRACTITIONER

## 2024-12-01 PROCEDURE — 80053 COMPREHEN METABOLIC PANEL: CPT

## 2024-12-01 PROCEDURE — 99233 SBSQ HOSP IP/OBS HIGH 50: CPT | Performed by: INTERNAL MEDICINE

## 2024-12-01 PROCEDURE — 6360000002 HC RX W HCPCS

## 2024-12-01 PROCEDURE — 2500000003 HC RX 250 WO HCPCS

## 2024-12-01 PROCEDURE — 6370000000 HC RX 637 (ALT 250 FOR IP)

## 2024-12-01 PROCEDURE — 2580000003 HC RX 258: Performed by: NURSE PRACTITIONER

## 2024-12-01 PROCEDURE — 36415 COLL VENOUS BLD VENIPUNCTURE: CPT

## 2024-12-01 PROCEDURE — 2060000000 HC ICU INTERMEDIATE R&B

## 2024-12-01 PROCEDURE — 83735 ASSAY OF MAGNESIUM: CPT

## 2024-12-01 PROCEDURE — 99231 SBSQ HOSP IP/OBS SF/LOW 25: CPT | Performed by: INTERNAL MEDICINE

## 2024-12-01 PROCEDURE — 2580000003 HC RX 258

## 2024-12-01 PROCEDURE — 2580000003 HC RX 258: Performed by: INTERNAL MEDICINE

## 2024-12-01 PROCEDURE — 99232 SBSQ HOSP IP/OBS MODERATE 35: CPT | Performed by: NURSE PRACTITIONER

## 2024-12-01 PROCEDURE — 2500000003 HC RX 250 WO HCPCS: Performed by: INTERNAL MEDICINE

## 2024-12-01 PROCEDURE — 94761 N-INVAS EAR/PLS OXIMETRY MLT: CPT

## 2024-12-01 PROCEDURE — 6370000000 HC RX 637 (ALT 250 FOR IP): Performed by: INTERNAL MEDICINE

## 2024-12-01 PROCEDURE — 85025 COMPLETE CBC W/AUTO DIFF WBC: CPT

## 2024-12-01 PROCEDURE — 86140 C-REACTIVE PROTEIN: CPT

## 2024-12-01 PROCEDURE — 6360000002 HC RX W HCPCS: Performed by: NURSE PRACTITIONER

## 2024-12-01 PROCEDURE — 84132 ASSAY OF SERUM POTASSIUM: CPT

## 2024-12-01 PROCEDURE — 2700000000 HC OXYGEN THERAPY PER DAY

## 2024-12-01 RX ORDER — FUROSEMIDE 10 MG/ML
20 INJECTION INTRAMUSCULAR; INTRAVENOUS DAILY
Status: DISCONTINUED | OUTPATIENT
Start: 2024-12-01 | End: 2024-12-03

## 2024-12-01 RX ORDER — AMIODARONE HYDROCHLORIDE 200 MG/1
200 TABLET ORAL DAILY
Status: DISCONTINUED | OUTPATIENT
Start: 2024-12-01 | End: 2024-12-03

## 2024-12-01 RX ADMIN — PIPERACILLIN AND TAZOBACTAM 4500 MG: 4; .5 INJECTION, POWDER, FOR SOLUTION INTRAVENOUS at 10:55

## 2024-12-01 RX ADMIN — PIPERACILLIN AND TAZOBACTAM 4500 MG: 4; .5 INJECTION, POWDER, FOR SOLUTION INTRAVENOUS at 04:33

## 2024-12-01 RX ADMIN — APIXABAN 5 MG: 5 TABLET, FILM COATED ORAL at 21:48

## 2024-12-01 RX ADMIN — PIPERACILLIN AND TAZOBACTAM 4500 MG: 4; .5 INJECTION, POWDER, FOR SOLUTION INTRAVENOUS at 19:13

## 2024-12-01 RX ADMIN — ANTI-FUNGAL POWDER MICONAZOLE NITRATE TALC FREE: 1.42 POWDER TOPICAL at 21:48

## 2024-12-01 RX ADMIN — DEXAMETHASONE SODIUM PHOSPHATE 6 MG: 10 INJECTION INTRAMUSCULAR; INTRAVENOUS at 07:44

## 2024-12-01 RX ADMIN — AMIODARONE HYDROCHLORIDE 0.5 MG/MIN: 1.8 INJECTION, SOLUTION INTRAVENOUS at 06:33

## 2024-12-01 RX ADMIN — POTASSIUM CHLORIDE 10 MEQ: 7.46 INJECTION, SOLUTION INTRAVENOUS at 09:42

## 2024-12-01 RX ADMIN — POTASSIUM CHLORIDE 10 MEQ: 7.46 INJECTION, SOLUTION INTRAVENOUS at 14:15

## 2024-12-01 RX ADMIN — POTASSIUM BICARBONATE 40 MEQ: 782 TABLET, EFFERVESCENT ORAL at 07:44

## 2024-12-01 RX ADMIN — AMIODARONE HYDROCHLORIDE 200 MG: 200 TABLET ORAL at 09:41

## 2024-12-01 RX ADMIN — ANTI-FUNGAL POWDER MICONAZOLE NITRATE TALC FREE: 1.42 POWDER TOPICAL at 07:45

## 2024-12-01 RX ADMIN — KETOROLAC TROMETHAMINE 30 MG: 30 INJECTION, SOLUTION INTRAMUSCULAR at 16:09

## 2024-12-01 RX ADMIN — KETOROLAC TROMETHAMINE 30 MG: 30 INJECTION, SOLUTION INTRAMUSCULAR at 05:33

## 2024-12-01 RX ADMIN — POTASSIUM CHLORIDE 10 MEQ: 7.46 INJECTION, SOLUTION INTRAVENOUS at 07:41

## 2024-12-01 RX ADMIN — APIXABAN 5 MG: 5 TABLET, FILM COATED ORAL at 07:44

## 2024-12-01 RX ADMIN — POTASSIUM CHLORIDE 10 MEQ: 7.46 INJECTION, SOLUTION INTRAVENOUS at 08:48

## 2024-12-01 RX ADMIN — VENLAFAXINE HYDROCHLORIDE 150 MG: 150 CAPSULE, EXTENDED RELEASE ORAL at 07:44

## 2024-12-01 RX ADMIN — SODIUM CHLORIDE, PRESERVATIVE FREE 40 MG: 5 INJECTION INTRAVENOUS at 07:44

## 2024-12-01 RX ADMIN — POTASSIUM CHLORIDE 10 MEQ: 7.46 INJECTION, SOLUTION INTRAVENOUS at 06:31

## 2024-12-01 RX ADMIN — FUROSEMIDE 20 MG: 10 INJECTION, SOLUTION INTRAMUSCULAR; INTRAVENOUS at 08:49

## 2024-12-01 RX ADMIN — POTASSIUM CHLORIDE 10 MEQ: 7.46 INJECTION, SOLUTION INTRAVENOUS at 10:53

## 2024-12-01 RX ADMIN — SODIUM CHLORIDE, PRESERVATIVE FREE 10 ML: 5 INJECTION INTRAVENOUS at 21:48

## 2024-12-01 ASSESSMENT — PAIN DESCRIPTION - ORIENTATION: ORIENTATION: RIGHT

## 2024-12-01 ASSESSMENT — PAIN SCALES - GENERAL
PAINLEVEL_OUTOF10: 0
PAINLEVEL_OUTOF10: 0
PAINLEVEL_OUTOF10: 9
PAINLEVEL_OUTOF10: 7
PAINLEVEL_OUTOF10: 4

## 2024-12-01 ASSESSMENT — PAIN DESCRIPTION - LOCATION
LOCATION: HIP
LOCATION: ABDOMEN

## 2024-12-02 ENCOUNTER — APPOINTMENT (OUTPATIENT)
Dept: ULTRASOUND IMAGING | Age: 64
DRG: 871 | End: 2024-12-02
Payer: MEDICARE

## 2024-12-02 PROBLEM — R10.11 RIGHT UPPER QUADRANT ABDOMINAL PAIN: Status: ACTIVE | Noted: 2024-12-02

## 2024-12-02 PROBLEM — K80.10 CALCULUS OF GALLBLADDER WITH CHRONIC CHOLECYSTITIS WITHOUT OBSTRUCTION: Status: ACTIVE | Noted: 2024-12-02

## 2024-12-02 LAB
ALBUMIN SERPL-MCNC: 3.6 G/DL (ref 3.5–5.2)
ALP SERPL-CCNC: 73 U/L (ref 35–104)
ALT SERPL-CCNC: 137 U/L (ref 10–35)
ANA SER QL IA: NEGATIVE
ANION GAP SERPL CALCULATED.3IONS-SCNC: 14 MMOL/L (ref 9–16)
AST SERPL-CCNC: 55 U/L (ref 10–35)
BASOPHILS # BLD: 0 K/UL (ref 0–0.2)
BASOPHILS NFR BLD: 0 % (ref 0–2)
BILIRUB SERPL-MCNC: 1 MG/DL (ref 0–1.2)
BUN SERPL-MCNC: 21 MG/DL (ref 8–23)
CALCIUM SERPL-MCNC: 9.4 MG/DL (ref 8.6–10.4)
CHLORIDE SERPL-SCNC: 90 MMOL/L (ref 98–107)
CO2 SERPL-SCNC: 29 MMOL/L (ref 20–31)
CREAT SERPL-MCNC: 0.6 MG/DL (ref 0.7–1.2)
DSDNA IGG SER QL IA: 1.6 IU/ML
EBV EA-D IGG SER-ACNC: 283 U/ML
EBV INTERPRETATION: ABNORMAL
EBV NA IGG SER IA-ACNC: 483 U/ML
EBV VCA IGG SER-ACNC: 1077 U/ML
EBV VCA IGM SER-ACNC: 17 U/ML
EOSINOPHIL # BLD: 0 K/UL (ref 0–0.4)
EOSINOPHILS RELATIVE PERCENT: 0 % (ref 0–4)
ERYTHROCYTE [DISTWIDTH] IN BLOOD BY AUTOMATED COUNT: 17.9 % (ref 11.5–14.9)
GFR, ESTIMATED: >90 ML/MIN/1.73M2
GLUCOSE BLD-MCNC: 164 MG/DL (ref 65–105)
GLUCOSE BLD-MCNC: 179 MG/DL (ref 65–105)
GLUCOSE SERPL-MCNC: 165 MG/DL (ref 74–99)
HCT VFR BLD AUTO: 45.9 % (ref 36–46)
HGB BLD-MCNC: 14.8 G/DL (ref 12–16)
LKM AB TITR SER IF: NORMAL {TITER}
LYMPHOCYTES NFR BLD: 2.2 K/UL (ref 1–4.8)
LYMPHOCYTES RELATIVE PERCENT: 15 % (ref 24–44)
MAGNESIUM SERPL-MCNC: 1.9 MG/DL (ref 1.6–2.4)
MCH RBC QN AUTO: 29.6 PG (ref 26–34)
MCHC RBC AUTO-ENTMCNC: 32.1 G/DL (ref 31–37)
MCV RBC AUTO: 92.1 FL (ref 80–100)
MICROORGANISM SPEC CULT: NORMAL
MITOCHONDRIA M2 IGG SER-ACNC: 2.3 U/ML (ref 0–4)
MONOCYTES NFR BLD: 1 K/UL (ref 0.1–1.3)
MONOCYTES NFR BLD: 6 % (ref 1–7)
MRSA, DNA, NASAL: ABNORMAL
NEUTROPHILS NFR BLD: 79 % (ref 36–66)
NEUTS SEG NFR BLD: 11.8 K/UL (ref 1.3–9.1)
NUCLEAR IGG SER IA-RTO: 0.3 U/ML
PLATELET # BLD AUTO: 336 K/UL (ref 150–450)
PMV BLD AUTO: 9.1 FL (ref 6–12)
POTASSIUM SERPL-SCNC: 3.4 MMOL/L (ref 3.7–5.3)
POTASSIUM SERPL-SCNC: 3.7 MMOL/L (ref 3.7–5.3)
PROT SERPL-MCNC: 6.7 G/DL (ref 6.6–8.7)
RBC # BLD AUTO: 4.99 M/UL (ref 4–5.2)
SERVICE CMNT-IMP: NORMAL
SODIUM SERPL-SCNC: 133 MMOL/L (ref 136–145)
SPECIMEN DESCRIPTION: ABNORMAL
SPECIMEN DESCRIPTION: NORMAL
WBC OTHER # BLD: 15 K/UL (ref 3.5–11)

## 2024-12-02 PROCEDURE — 6370000000 HC RX 637 (ALT 250 FOR IP)

## 2024-12-02 PROCEDURE — 2580000003 HC RX 258: Performed by: INTERNAL MEDICINE

## 2024-12-02 PROCEDURE — 2060000000 HC ICU INTERMEDIATE R&B

## 2024-12-02 PROCEDURE — 76856 US EXAM PELVIC COMPLETE: CPT

## 2024-12-02 PROCEDURE — 83735 ASSAY OF MAGNESIUM: CPT

## 2024-12-02 PROCEDURE — 97130 THER IVNTJ EA ADDL 15 MIN: CPT

## 2024-12-02 PROCEDURE — 99233 SBSQ HOSP IP/OBS HIGH 50: CPT | Performed by: INTERNAL MEDICINE

## 2024-12-02 PROCEDURE — 97129 THER IVNTJ 1ST 15 MIN: CPT

## 2024-12-02 PROCEDURE — 2580000003 HC RX 258: Performed by: NURSE PRACTITIONER

## 2024-12-02 PROCEDURE — 6360000002 HC RX W HCPCS: Performed by: NURSE PRACTITIONER

## 2024-12-02 PROCEDURE — 82947 ASSAY GLUCOSE BLOOD QUANT: CPT

## 2024-12-02 PROCEDURE — 36415 COLL VENOUS BLD VENIPUNCTURE: CPT

## 2024-12-02 PROCEDURE — 99233 SBSQ HOSP IP/OBS HIGH 50: CPT | Performed by: NURSE PRACTITIONER

## 2024-12-02 PROCEDURE — 84132 ASSAY OF SERUM POTASSIUM: CPT

## 2024-12-02 PROCEDURE — 6360000002 HC RX W HCPCS

## 2024-12-02 PROCEDURE — 85025 COMPLETE CBC W/AUTO DIFF WBC: CPT

## 2024-12-02 PROCEDURE — 6360000002 HC RX W HCPCS: Performed by: INTERNAL MEDICINE

## 2024-12-02 PROCEDURE — 99254 IP/OBS CNSLTJ NEW/EST MOD 60: CPT | Performed by: NURSE PRACTITIONER

## 2024-12-02 PROCEDURE — 6370000000 HC RX 637 (ALT 250 FOR IP): Performed by: INTERNAL MEDICINE

## 2024-12-02 PROCEDURE — 2580000003 HC RX 258

## 2024-12-02 PROCEDURE — 2500000003 HC RX 250 WO HCPCS: Performed by: INTERNAL MEDICINE

## 2024-12-02 PROCEDURE — 6370000000 HC RX 637 (ALT 250 FOR IP): Performed by: NURSE PRACTITIONER

## 2024-12-02 PROCEDURE — 80053 COMPREHEN METABOLIC PANEL: CPT

## 2024-12-02 RX ORDER — PROCHLORPERAZINE EDISYLATE 5 MG/ML
5 INJECTION INTRAMUSCULAR; INTRAVENOUS EVERY 6 HOURS PRN
Status: DISCONTINUED | OUTPATIENT
Start: 2024-12-02 | End: 2024-12-06 | Stop reason: HOSPADM

## 2024-12-02 RX ORDER — MIDODRINE HYDROCHLORIDE 10 MG/1
10 TABLET ORAL
Status: DISCONTINUED | OUTPATIENT
Start: 2024-12-02 | End: 2024-12-06 | Stop reason: HOSPADM

## 2024-12-02 RX ORDER — ONDANSETRON 2 MG/ML
4 INJECTION INTRAMUSCULAR; INTRAVENOUS EVERY 6 HOURS PRN
Status: DISCONTINUED | OUTPATIENT
Start: 2024-12-02 | End: 2024-12-02

## 2024-12-02 RX ORDER — KETOROLAC TROMETHAMINE 30 MG/ML
15 INJECTION, SOLUTION INTRAMUSCULAR; INTRAVENOUS EVERY 6 HOURS PRN
Status: DISPENSED | OUTPATIENT
Start: 2024-12-02 | End: 2024-12-03

## 2024-12-02 RX ORDER — METOPROLOL SUCCINATE 25 MG/1
25 TABLET, EXTENDED RELEASE ORAL DAILY
Status: DISCONTINUED | OUTPATIENT
Start: 2024-12-02 | End: 2024-12-03

## 2024-12-02 RX ORDER — METOPROLOL TARTRATE 1 MG/ML
5 INJECTION, SOLUTION INTRAVENOUS 3 TIMES DAILY PRN
Status: DISCONTINUED | OUTPATIENT
Start: 2024-12-02 | End: 2024-12-06 | Stop reason: HOSPADM

## 2024-12-02 RX ADMIN — KETOROLAC TROMETHAMINE 30 MG: 30 INJECTION, SOLUTION INTRAMUSCULAR at 00:58

## 2024-12-02 RX ADMIN — APIXABAN 5 MG: 5 TABLET, FILM COATED ORAL at 07:43

## 2024-12-02 RX ADMIN — METOPROLOL TARTRATE 5 MG: 1 INJECTION, SOLUTION INTRAVENOUS at 21:38

## 2024-12-02 RX ADMIN — PIPERACILLIN AND TAZOBACTAM 4500 MG: 4; .5 INJECTION, POWDER, FOR SOLUTION INTRAVENOUS at 18:28

## 2024-12-02 RX ADMIN — DEXAMETHASONE SODIUM PHOSPHATE 6 MG: 10 INJECTION INTRAMUSCULAR; INTRAVENOUS at 07:42

## 2024-12-02 RX ADMIN — AMIODARONE HYDROCHLORIDE 200 MG: 200 TABLET ORAL at 07:43

## 2024-12-02 RX ADMIN — METOPROLOL TARTRATE 5 MG: 1 INJECTION, SOLUTION INTRAVENOUS at 04:02

## 2024-12-02 RX ADMIN — VENLAFAXINE HYDROCHLORIDE 150 MG: 150 CAPSULE, EXTENDED RELEASE ORAL at 07:43

## 2024-12-02 RX ADMIN — FUROSEMIDE 20 MG: 10 INJECTION, SOLUTION INTRAMUSCULAR; INTRAVENOUS at 07:42

## 2024-12-02 RX ADMIN — APIXABAN 5 MG: 5 TABLET, FILM COATED ORAL at 20:47

## 2024-12-02 RX ADMIN — PIPERACILLIN AND TAZOBACTAM 4500 MG: 4; .5 INJECTION, POWDER, FOR SOLUTION INTRAVENOUS at 03:22

## 2024-12-02 RX ADMIN — ANTI-FUNGAL POWDER MICONAZOLE NITRATE TALC FREE: 1.42 POWDER TOPICAL at 07:44

## 2024-12-02 RX ADMIN — SODIUM CHLORIDE, PRESERVATIVE FREE 10 ML: 5 INJECTION INTRAVENOUS at 07:44

## 2024-12-02 RX ADMIN — ANTI-FUNGAL POWDER MICONAZOLE NITRATE TALC FREE: 1.42 POWDER TOPICAL at 20:47

## 2024-12-02 RX ADMIN — MIDODRINE HYDROCHLORIDE 10 MG: 10 TABLET ORAL at 17:30

## 2024-12-02 RX ADMIN — MIDODRINE HYDROCHLORIDE 10 MG: 10 TABLET ORAL at 11:54

## 2024-12-02 RX ADMIN — PIPERACILLIN AND TAZOBACTAM 4500 MG: 4; .5 INJECTION, POWDER, FOR SOLUTION INTRAVENOUS at 11:57

## 2024-12-02 RX ADMIN — KETOROLAC TROMETHAMINE 15 MG: 30 INJECTION, SOLUTION INTRAMUSCULAR at 15:35

## 2024-12-02 RX ADMIN — POTASSIUM CHLORIDE 40 MEQ: 1500 TABLET, EXTENDED RELEASE ORAL at 10:44

## 2024-12-02 RX ADMIN — METOPROLOL SUCCINATE 25 MG: 25 TABLET, EXTENDED RELEASE ORAL at 11:54

## 2024-12-02 RX ADMIN — SODIUM CHLORIDE, PRESERVATIVE FREE 40 MG: 5 INJECTION INTRAVENOUS at 07:42

## 2024-12-02 RX ADMIN — MIDODRINE HYDROCHLORIDE 5 MG: 5 TABLET ORAL at 07:43

## 2024-12-02 RX ADMIN — POLYETHYLENE GLYCOL 3350 17 G: 17 POWDER, FOR SOLUTION ORAL at 11:54

## 2024-12-02 RX ADMIN — SENNOSIDES 17.2 MG: 8.6 TABLET, FILM COATED ORAL at 11:54

## 2024-12-02 RX ADMIN — KETOROLAC TROMETHAMINE 15 MG: 30 INJECTION, SOLUTION INTRAMUSCULAR at 09:02

## 2024-12-02 ASSESSMENT — ENCOUNTER SYMPTOMS
CHOKING: 0
EYE ITCHING: 0
COLOR CHANGE: 0
COUGH: 0
CONSTIPATION: 0
EYE PAIN: 0
APNEA: 0
BLOOD IN STOOL: 0
ANAL BLEEDING: 0
ABDOMINAL DISTENTION: 0
CHEST TIGHTNESS: 0
BACK PAIN: 0
EYE DISCHARGE: 0
ABDOMINAL PAIN: 1
RHINORRHEA: 0
SORE THROAT: 0
SHORTNESS OF BREATH: 0

## 2024-12-02 ASSESSMENT — PAIN DESCRIPTION - DESCRIPTORS
DESCRIPTORS: ACHING

## 2024-12-02 ASSESSMENT — PAIN DESCRIPTION - PAIN TYPE
TYPE: ACUTE PAIN

## 2024-12-02 ASSESSMENT — PAIN DESCRIPTION - ORIENTATION
ORIENTATION: RIGHT;UPPER

## 2024-12-02 ASSESSMENT — PAIN SCALES - GENERAL
PAINLEVEL_OUTOF10: 10
PAINLEVEL_OUTOF10: 6
PAINLEVEL_OUTOF10: 8
PAINLEVEL_OUTOF10: 9
PAINLEVEL_OUTOF10: 8
PAINLEVEL_OUTOF10: 6
PAINLEVEL_OUTOF10: 6
PAINLEVEL_OUTOF10: 8
PAINLEVEL_OUTOF10: 7

## 2024-12-02 ASSESSMENT — PAIN - FUNCTIONAL ASSESSMENT
PAIN_FUNCTIONAL_ASSESSMENT: ACTIVITIES ARE NOT PREVENTED
PAIN_FUNCTIONAL_ASSESSMENT: PREVENTS OR INTERFERES SOME ACTIVE ACTIVITIES AND ADLS
PAIN_FUNCTIONAL_ASSESSMENT: ACTIVITIES ARE NOT PREVENTED

## 2024-12-02 ASSESSMENT — PAIN DESCRIPTION - LOCATION
LOCATION: ABDOMEN;HIP
LOCATION: ABDOMEN;HIP
LOCATION: HIP
LOCATION: ABDOMEN;HIP
LOCATION: ABDOMEN
LOCATION: ABDOMEN;HIP

## 2024-12-02 ASSESSMENT — PAIN DESCRIPTION - ONSET
ONSET: ON-GOING

## 2024-12-02 ASSESSMENT — PAIN DESCRIPTION - FREQUENCY
FREQUENCY: CONTINUOUS

## 2024-12-02 NOTE — FLOWSHEET NOTE
12/02/24 0535   Treatment Team Notification   Reason for Communication Evaluate   Name of Team Member Notified Ashley Gillespie   Treatment Team Role Advanced Practice Nurse   Method of Communication Secure Message   Response Waiting for response   Notification Time 0536         HI with labs this moring lab poked the patient twice and was unable to get blood drawn. patient has a PICC line, would I be able to get an order for a line draw? thanks.     NP notified

## 2024-12-02 NOTE — FLOWSHEET NOTE
12/02/24 0543   Treatment Team Notification   Reason for Communication Evaluate   Name of Team Member Notified Ashley Gillespie   Treatment Team Role Advanced Practice Nurse   Method of Communication Secure Message   Response Waiting for response   Notification Time 0541         NP placed order for line draw due to lab being unable to draw blood.

## 2024-12-02 NOTE — FLOWSHEET NOTE
12/02/24 0339   Treatment Team Notification   Reason for Communication Evaluate   Name of Team Member Notified Dr. FÉLIX Yee   Treatment Team Role Consulting Provider   Method of Communication Secure Message   Response Waiting for response   Notification Time 0340       Dr. Yee messaged for patient HR running 120's to 130's. Awaiting response

## 2024-12-02 NOTE — FLOWSHEET NOTE
12/02/24 0600   Treatment Team Notification   Reason for Communication Evaluate   Name of Team Member Notified Ashley Gillespie   Treatment Team Role Advanced Practice Nurse   Method of Communication Secure Message   Response Waiting for response   Notification Time 0600         NP notified of urine output from yang of only 200 ml since around 0130.

## 2024-12-03 ENCOUNTER — APPOINTMENT (OUTPATIENT)
Dept: GENERAL RADIOLOGY | Age: 64
DRG: 871 | End: 2024-12-03
Payer: MEDICARE

## 2024-12-03 PROBLEM — R74.8 ELEVATED LIPASE: Status: ACTIVE | Noted: 2024-12-03

## 2024-12-03 LAB
ALBUMIN SERPL-MCNC: 3.7 G/DL (ref 3.5–5.2)
ALP SERPL-CCNC: 82 U/L (ref 35–104)
ALT SERPL-CCNC: 126 U/L (ref 10–35)
ANION GAP SERPL CALCULATED.3IONS-SCNC: 22 MMOL/L (ref 9–16)
AST SERPL-CCNC: 70 U/L (ref 10–35)
BASOPHILS # BLD: 0 K/UL (ref 0–0.2)
BASOPHILS NFR BLD: 0 % (ref 0–2)
BILIRUB SERPL-MCNC: 1.2 MG/DL (ref 0–1.2)
BNP SERPL-MCNC: ABNORMAL PG/ML (ref 0–300)
BUN SERPL-MCNC: 30 MG/DL (ref 8–23)
CALCIUM SERPL-MCNC: 9.6 MG/DL (ref 8.6–10.4)
CHLORIDE SERPL-SCNC: 92 MMOL/L (ref 98–107)
CO2 SERPL-SCNC: 20 MMOL/L (ref 20–31)
CREAT SERPL-MCNC: 0.7 MG/DL (ref 0.7–1.2)
CRP SERPL HS-MCNC: 8.7 MG/L (ref 0–5)
EOSINOPHIL # BLD: 0 K/UL (ref 0–0.4)
EOSINOPHILS RELATIVE PERCENT: 0 % (ref 0–4)
ERYTHROCYTE [DISTWIDTH] IN BLOOD BY AUTOMATED COUNT: 18.2 % (ref 11.5–14.9)
GFR, ESTIMATED: >90 ML/MIN/1.73M2
GLUCOSE SERPL-MCNC: 181 MG/DL (ref 74–99)
HCT VFR BLD AUTO: 48.5 % (ref 36–46)
HGB BLD-MCNC: 15.4 G/DL (ref 12–16)
LIPASE SERPL-CCNC: 254 U/L (ref 13–60)
LYMPHOCYTES NFR BLD: 2.6 K/UL (ref 1–4.8)
LYMPHOCYTES RELATIVE PERCENT: 13 % (ref 24–44)
MCH RBC QN AUTO: 29.6 PG (ref 26–34)
MCHC RBC AUTO-ENTMCNC: 31.7 G/DL (ref 31–37)
MCV RBC AUTO: 93.1 FL (ref 80–100)
MONOCYTES NFR BLD: 1 K/UL (ref 0.1–1.3)
MONOCYTES NFR BLD: 5 % (ref 1–7)
MORPHOLOGY: ABNORMAL
NEUTROPHILS NFR BLD: 82 % (ref 36–66)
NEUTS SEG NFR BLD: 16.4 K/UL (ref 1.3–9.1)
NUCLEATED RED BLOOD CELLS: 7 PER 100 WBC
PLATELET # BLD AUTO: 397 K/UL (ref 150–450)
PMV BLD AUTO: 10 FL (ref 6–12)
POTASSIUM SERPL-SCNC: 4 MMOL/L (ref 3.7–5.3)
PROT SERPL-MCNC: 7 G/DL (ref 6.6–8.7)
RBC # BLD AUTO: 5.2 M/UL (ref 4–5.2)
RETICS # AUTO: 0.1 M/UL (ref 0.02–0.1)
RETICS/RBC NFR AUTO: 2 % (ref 0.5–2)
SMOOTH MUSCLE IGG TITR SER: NORMAL {TITER}
SODIUM SERPL-SCNC: 134 MMOL/L (ref 136–145)
WBC OTHER # BLD: 20 K/UL (ref 3.5–11)

## 2024-12-03 PROCEDURE — 2580000003 HC RX 258: Performed by: INTERNAL MEDICINE

## 2024-12-03 PROCEDURE — 99233 SBSQ HOSP IP/OBS HIGH 50: CPT | Performed by: INTERNAL MEDICINE

## 2024-12-03 PROCEDURE — 85025 COMPLETE CBC W/AUTO DIFF WBC: CPT

## 2024-12-03 PROCEDURE — 2500000003 HC RX 250 WO HCPCS: Performed by: INTERNAL MEDICINE

## 2024-12-03 PROCEDURE — APPSS30 APP SPLIT SHARED TIME 16-30 MINUTES: Performed by: NURSE PRACTITIONER

## 2024-12-03 PROCEDURE — 80053 COMPREHEN METABOLIC PANEL: CPT

## 2024-12-03 PROCEDURE — 83690 ASSAY OF LIPASE: CPT

## 2024-12-03 PROCEDURE — 99232 SBSQ HOSP IP/OBS MODERATE 35: CPT | Performed by: NURSE PRACTITIONER

## 2024-12-03 PROCEDURE — 6370000000 HC RX 637 (ALT 250 FOR IP)

## 2024-12-03 PROCEDURE — 6360000002 HC RX W HCPCS

## 2024-12-03 PROCEDURE — 99233 SBSQ HOSP IP/OBS HIGH 50: CPT | Performed by: NURSE PRACTITIONER

## 2024-12-03 PROCEDURE — 36415 COLL VENOUS BLD VENIPUNCTURE: CPT

## 2024-12-03 PROCEDURE — 6360000002 HC RX W HCPCS: Performed by: INTERNAL MEDICINE

## 2024-12-03 PROCEDURE — 71045 X-RAY EXAM CHEST 1 VIEW: CPT

## 2024-12-03 PROCEDURE — 2060000000 HC ICU INTERMEDIATE R&B

## 2024-12-03 PROCEDURE — 83880 ASSAY OF NATRIURETIC PEPTIDE: CPT

## 2024-12-03 PROCEDURE — 2500000003 HC RX 250 WO HCPCS

## 2024-12-03 PROCEDURE — 85045 AUTOMATED RETICULOCYTE COUNT: CPT

## 2024-12-03 PROCEDURE — 86140 C-REACTIVE PROTEIN: CPT

## 2024-12-03 PROCEDURE — 6370000000 HC RX 637 (ALT 250 FOR IP): Performed by: NURSE PRACTITIONER

## 2024-12-03 PROCEDURE — 99231 SBSQ HOSP IP/OBS SF/LOW 25: CPT | Performed by: INTERNAL MEDICINE

## 2024-12-03 PROCEDURE — 2580000003 HC RX 258

## 2024-12-03 RX ORDER — AMIODARONE HYDROCHLORIDE 200 MG/1
200 TABLET ORAL DAILY
Status: DISCONTINUED | OUTPATIENT
Start: 2024-12-03 | End: 2024-12-05

## 2024-12-03 RX ORDER — DIGOXIN 0.25 MG/ML
250 INJECTION INTRAMUSCULAR; INTRAVENOUS ONCE
Status: COMPLETED | OUTPATIENT
Start: 2024-12-03 | End: 2024-12-03

## 2024-12-03 RX ORDER — FUROSEMIDE 10 MG/ML
40 INJECTION INTRAMUSCULAR; INTRAVENOUS DAILY
Status: DISCONTINUED | OUTPATIENT
Start: 2024-12-04 | End: 2024-12-03

## 2024-12-03 RX ORDER — METOPROLOL SUCCINATE 50 MG/1
50 TABLET, EXTENDED RELEASE ORAL DAILY
Status: DISCONTINUED | OUTPATIENT
Start: 2024-12-04 | End: 2024-12-06 | Stop reason: HOSPADM

## 2024-12-03 RX ORDER — FUROSEMIDE 10 MG/ML
20 INJECTION INTRAMUSCULAR; INTRAVENOUS DAILY
Status: DISCONTINUED | OUTPATIENT
Start: 2024-12-04 | End: 2024-12-05

## 2024-12-03 RX ORDER — FUROSEMIDE 10 MG/ML
20 INJECTION INTRAMUSCULAR; INTRAVENOUS ONCE
Status: DISCONTINUED | OUTPATIENT
Start: 2024-12-03 | End: 2024-12-03

## 2024-12-03 RX ORDER — POTASSIUM CHLORIDE 7.45 MG/ML
10 INJECTION INTRAVENOUS ONCE
Status: COMPLETED | OUTPATIENT
Start: 2024-12-03 | End: 2024-12-03

## 2024-12-03 RX ADMIN — METOPROLOL SUCCINATE 25 MG: 25 TABLET, EXTENDED RELEASE ORAL at 11:38

## 2024-12-03 RX ADMIN — ANTI-FUNGAL POWDER MICONAZOLE NITRATE TALC FREE: 1.42 POWDER TOPICAL at 14:49

## 2024-12-03 RX ADMIN — DEXAMETHASONE SODIUM PHOSPHATE 6 MG: 10 INJECTION INTRAMUSCULAR; INTRAVENOUS at 14:49

## 2024-12-03 RX ADMIN — METOPROLOL TARTRATE 5 MG: 1 INJECTION, SOLUTION INTRAVENOUS at 04:50

## 2024-12-03 RX ADMIN — MIDODRINE HYDROCHLORIDE 10 MG: 10 TABLET ORAL at 14:50

## 2024-12-03 RX ADMIN — APIXABAN 5 MG: 5 TABLET, FILM COATED ORAL at 21:24

## 2024-12-03 RX ADMIN — MIDODRINE HYDROCHLORIDE 10 MG: 10 TABLET ORAL at 09:23

## 2024-12-03 RX ADMIN — PIPERACILLIN AND TAZOBACTAM 4500 MG: 4; .5 INJECTION, POWDER, FOR SOLUTION INTRAVENOUS at 19:44

## 2024-12-03 RX ADMIN — PIPERACILLIN AND TAZOBACTAM 4500 MG: 4; .5 INJECTION, POWDER, FOR SOLUTION INTRAVENOUS at 11:37

## 2024-12-03 RX ADMIN — AMIODARONE HYDROCHLORIDE 0.5 MG/MIN: 1.8 INJECTION, SOLUTION INTRAVENOUS at 16:14

## 2024-12-03 RX ADMIN — MIDODRINE HYDROCHLORIDE 10 MG: 10 TABLET ORAL at 18:58

## 2024-12-03 RX ADMIN — VENLAFAXINE HYDROCHLORIDE 150 MG: 150 CAPSULE, EXTENDED RELEASE ORAL at 14:49

## 2024-12-03 RX ADMIN — SODIUM CHLORIDE, PRESERVATIVE FREE 40 MG: 5 INJECTION INTRAVENOUS at 14:48

## 2024-12-03 RX ADMIN — PIPERACILLIN AND TAZOBACTAM 4500 MG: 4; .5 INJECTION, POWDER, FOR SOLUTION INTRAVENOUS at 03:16

## 2024-12-03 RX ADMIN — AMIODARONE HYDROCHLORIDE 150 MG: 1.5 INJECTION, SOLUTION INTRAVENOUS at 09:26

## 2024-12-03 RX ADMIN — DIGOXIN 250 MCG: 0.25 INJECTION INTRAMUSCULAR; INTRAVENOUS at 07:36

## 2024-12-03 RX ADMIN — SODIUM CHLORIDE, PRESERVATIVE FREE 10 ML: 5 INJECTION INTRAVENOUS at 14:50

## 2024-12-03 RX ADMIN — POTASSIUM CHLORIDE 10 MEQ: 7.46 INJECTION, SOLUTION INTRAVENOUS at 16:09

## 2024-12-03 RX ADMIN — ANTI-FUNGAL POWDER MICONAZOLE NITRATE TALC FREE: 1.42 POWDER TOPICAL at 21:25

## 2024-12-03 RX ADMIN — APIXABAN 5 MG: 5 TABLET, FILM COATED ORAL at 14:48

## 2024-12-03 RX ADMIN — AMIODARONE HYDROCHLORIDE 1 MG/MIN: 1.8 INJECTION, SOLUTION INTRAVENOUS at 09:46

## 2024-12-03 ASSESSMENT — ENCOUNTER SYMPTOMS
SORE THROAT: 0
RHINORRHEA: 0
COUGH: 0
SHORTNESS OF BREATH: 0

## 2024-12-03 NOTE — FLOWSHEET NOTE
12/03/24 1605   Treatment Team Notification   Reason for Communication Evaluate  (decreased yang outpt, 300mL this shift, confirm holding Lasix this shift.)   Name of Team Member Notified Dr Brady   Treatment Team Role Resident   Method of Communication Face to face   Response No new orders   Notification Time 1605     Notified Dr Brady of decrease to patient yang output. Confirmed hold on Lasix administration this shift. No new orders, will continue to closely monitor.

## 2024-12-04 ENCOUNTER — APPOINTMENT (OUTPATIENT)
Dept: NUCLEAR MEDICINE | Age: 64
DRG: 871 | End: 2024-12-04
Payer: MEDICARE

## 2024-12-04 LAB
ALBUMIN SERPL-MCNC: 3.3 G/DL (ref 3.5–5.2)
ALP SERPL-CCNC: 79 U/L (ref 35–104)
ALT SERPL-CCNC: 141 U/L (ref 10–35)
ANION GAP SERPL CALCULATED.3IONS-SCNC: 13 MMOL/L (ref 9–16)
AST SERPL-CCNC: 80 U/L (ref 10–35)
BASOPHILS # BLD: 0 K/UL (ref 0–0.2)
BASOPHILS NFR BLD: 0 % (ref 0–2)
BILIRUB SERPL-MCNC: 1 MG/DL (ref 0–1.2)
BUN SERPL-MCNC: 28 MG/DL (ref 8–23)
CALCIUM SERPL-MCNC: 9.4 MG/DL (ref 8.6–10.4)
CHLORIDE SERPL-SCNC: 93 MMOL/L (ref 98–107)
CO2 SERPL-SCNC: 29 MMOL/L (ref 20–31)
CREAT SERPL-MCNC: 0.6 MG/DL (ref 0.7–1.2)
EOSINOPHIL # BLD: 0 K/UL (ref 0–0.4)
EOSINOPHILS RELATIVE PERCENT: 0 % (ref 0–4)
ERYTHROCYTE [DISTWIDTH] IN BLOOD BY AUTOMATED COUNT: 18.1 % (ref 11.5–14.9)
GFR, ESTIMATED: >90 ML/MIN/1.73M2
GLUCOSE SERPL-MCNC: 172 MG/DL (ref 74–99)
HCT VFR BLD AUTO: 44.4 % (ref 36–46)
HGB BLD-MCNC: 14 G/DL (ref 12–16)
LIPASE SERPL-CCNC: 87 U/L (ref 13–60)
LYMPHOCYTES NFR BLD: 2.56 K/UL (ref 1–4.8)
LYMPHOCYTES RELATIVE PERCENT: 13 % (ref 24–44)
MAGNESIUM SERPL-MCNC: 2.1 MG/DL (ref 1.6–2.4)
MCH RBC QN AUTO: 29.4 PG (ref 26–34)
MCHC RBC AUTO-ENTMCNC: 31.6 G/DL (ref 31–37)
MCV RBC AUTO: 93.1 FL (ref 80–100)
MONOCYTES NFR BLD: 1.18 K/UL (ref 0.1–1.3)
MONOCYTES NFR BLD: 6 % (ref 1–7)
MORPHOLOGY: ABNORMAL
MORPHOLOGY: ABNORMAL
NEUTROPHILS NFR BLD: 81 % (ref 36–66)
NEUTS SEG NFR BLD: 15.97 K/UL (ref 1.3–9.1)
NUCLEATED RED BLOOD CELLS: 3 PER 100 WBC
PATH REV BLD -IMP: NORMAL
PLATELET # BLD AUTO: 284 K/UL (ref 150–450)
PMV BLD AUTO: 9.5 FL (ref 6–12)
POTASSIUM SERPL-SCNC: 3.4 MMOL/L (ref 3.7–5.3)
PROT SERPL-MCNC: 6.3 G/DL (ref 6.6–8.7)
RBC # BLD AUTO: 4.77 M/UL (ref 4–5.2)
SODIUM SERPL-SCNC: 135 MMOL/L (ref 136–145)
SURGICAL PATHOLOGY REPORT: NORMAL
WBC OTHER # BLD: 19.7 K/UL (ref 3.5–11)

## 2024-12-04 PROCEDURE — 83735 ASSAY OF MAGNESIUM: CPT

## 2024-12-04 PROCEDURE — APPSS30 APP SPLIT SHARED TIME 16-30 MINUTES: Performed by: NURSE PRACTITIONER

## 2024-12-04 PROCEDURE — 2580000003 HC RX 258: Performed by: INTERNAL MEDICINE

## 2024-12-04 PROCEDURE — 80053 COMPREHEN METABOLIC PANEL: CPT

## 2024-12-04 PROCEDURE — 6370000000 HC RX 637 (ALT 250 FOR IP)

## 2024-12-04 PROCEDURE — 99231 SBSQ HOSP IP/OBS SF/LOW 25: CPT | Performed by: INTERNAL MEDICINE

## 2024-12-04 PROCEDURE — 99232 SBSQ HOSP IP/OBS MODERATE 35: CPT | Performed by: INTERNAL MEDICINE

## 2024-12-04 PROCEDURE — 97130 THER IVNTJ EA ADDL 15 MIN: CPT

## 2024-12-04 PROCEDURE — 36415 COLL VENOUS BLD VENIPUNCTURE: CPT

## 2024-12-04 PROCEDURE — 6370000000 HC RX 637 (ALT 250 FOR IP): Performed by: INTERNAL MEDICINE

## 2024-12-04 PROCEDURE — 83690 ASSAY OF LIPASE: CPT

## 2024-12-04 PROCEDURE — 6360000002 HC RX W HCPCS

## 2024-12-04 PROCEDURE — 2060000000 HC ICU INTERMEDIATE R&B

## 2024-12-04 PROCEDURE — 99233 SBSQ HOSP IP/OBS HIGH 50: CPT | Performed by: INTERNAL MEDICINE

## 2024-12-04 PROCEDURE — 99232 SBSQ HOSP IP/OBS MODERATE 35: CPT | Performed by: SURGERY

## 2024-12-04 PROCEDURE — A9537 TC99M MEBROFENIN: HCPCS | Performed by: INTERNAL MEDICINE

## 2024-12-04 PROCEDURE — 85025 COMPLETE CBC W/AUTO DIFF WBC: CPT

## 2024-12-04 PROCEDURE — 6360000002 HC RX W HCPCS: Performed by: INTERNAL MEDICINE

## 2024-12-04 PROCEDURE — 3430000000 HC RX DIAGNOSTIC RADIOPHARMACEUTICAL: Performed by: INTERNAL MEDICINE

## 2024-12-04 PROCEDURE — 78227 HEPATOBIL SYST IMAGE W/DRUG: CPT

## 2024-12-04 PROCEDURE — 2580000003 HC RX 258

## 2024-12-04 PROCEDURE — 2500000003 HC RX 250 WO HCPCS

## 2024-12-04 PROCEDURE — 97129 THER IVNTJ 1ST 15 MIN: CPT

## 2024-12-04 PROCEDURE — 6370000000 HC RX 637 (ALT 250 FOR IP): Performed by: NURSE PRACTITIONER

## 2024-12-04 RX ORDER — DIGOXIN 125 MCG
125 TABLET ORAL EVERY OTHER DAY
Status: DISCONTINUED | OUTPATIENT
Start: 2024-12-04 | End: 2024-12-06 | Stop reason: HOSPADM

## 2024-12-04 RX ORDER — SODIUM CHLORIDE 0.9 % (FLUSH) 0.9 %
10 SYRINGE (ML) INJECTION PRN
Status: DISCONTINUED | OUTPATIENT
Start: 2024-12-04 | End: 2024-12-06 | Stop reason: HOSPADM

## 2024-12-04 RX ORDER — HYDROCODONE BITARTRATE AND ACETAMINOPHEN 5; 325 MG/1; MG/1
1 TABLET ORAL EVERY 6 HOURS PRN
Status: DISCONTINUED | OUTPATIENT
Start: 2024-12-04 | End: 2024-12-06 | Stop reason: HOSPADM

## 2024-12-04 RX ADMIN — DIGOXIN 125 MCG: 125 TABLET ORAL at 12:20

## 2024-12-04 RX ADMIN — SODIUM CHLORIDE, PRESERVATIVE FREE 10 ML: 5 INJECTION INTRAVENOUS at 21:37

## 2024-12-04 RX ADMIN — POTASSIUM BICARBONATE 40 MEQ: 782 TABLET, EFFERVESCENT ORAL at 12:19

## 2024-12-04 RX ADMIN — SODIUM CHLORIDE, PRESERVATIVE FREE 40 MG: 5 INJECTION INTRAVENOUS at 12:22

## 2024-12-04 RX ADMIN — APIXABAN 5 MG: 5 TABLET, FILM COATED ORAL at 12:19

## 2024-12-04 RX ADMIN — APIXABAN 5 MG: 5 TABLET, FILM COATED ORAL at 21:37

## 2024-12-04 RX ADMIN — PIPERACILLIN AND TAZOBACTAM 4500 MG: 4; .5 INJECTION, POWDER, FOR SOLUTION INTRAVENOUS at 11:50

## 2024-12-04 RX ADMIN — SODIUM CHLORIDE, PRESERVATIVE FREE 10 ML: 5 INJECTION INTRAVENOUS at 09:45

## 2024-12-04 RX ADMIN — METOPROLOL SUCCINATE 50 MG: 50 TABLET, EXTENDED RELEASE ORAL at 12:21

## 2024-12-04 RX ADMIN — DOCUSATE SODIUM 100 MG: 100 CAPSULE, LIQUID FILLED ORAL at 21:37

## 2024-12-04 RX ADMIN — ANTI-FUNGAL POWDER MICONAZOLE NITRATE TALC FREE: 1.42 POWDER TOPICAL at 21:37

## 2024-12-04 RX ADMIN — VENLAFAXINE HYDROCHLORIDE 150 MG: 150 CAPSULE, EXTENDED RELEASE ORAL at 12:20

## 2024-12-04 RX ADMIN — HYDROCODONE BITARTRATE AND ACETAMINOPHEN 1 TABLET: 5; 325 TABLET ORAL at 21:44

## 2024-12-04 RX ADMIN — DEXAMETHASONE SODIUM PHOSPHATE 6 MG: 10 INJECTION INTRAMUSCULAR; INTRAVENOUS at 12:21

## 2024-12-04 RX ADMIN — MIDODRINE HYDROCHLORIDE 10 MG: 10 TABLET ORAL at 16:39

## 2024-12-04 RX ADMIN — AMIODARONE HYDROCHLORIDE 0.5 MG/MIN: 1.8 INJECTION, SOLUTION INTRAVENOUS at 11:46

## 2024-12-04 RX ADMIN — Medication 5.5 MILLICURIE: at 09:45

## 2024-12-04 RX ADMIN — MIDODRINE HYDROCHLORIDE 10 MG: 10 TABLET ORAL at 12:21

## 2024-12-04 RX ADMIN — PIPERACILLIN AND TAZOBACTAM 4500 MG: 4; .5 INJECTION, POWDER, FOR SOLUTION INTRAVENOUS at 03:58

## 2024-12-04 RX ADMIN — FUROSEMIDE 20 MG: 10 INJECTION, SOLUTION INTRAMUSCULAR; INTRAVENOUS at 12:21

## 2024-12-04 RX ADMIN — ANTI-FUNGAL POWDER MICONAZOLE NITRATE TALC FREE: 1.42 POWDER TOPICAL at 09:00

## 2024-12-04 ASSESSMENT — ENCOUNTER SYMPTOMS
SORE THROAT: 0
COUGH: 0
RHINORRHEA: 0
SHORTNESS OF BREATH: 0

## 2024-12-04 ASSESSMENT — PAIN DESCRIPTION - LOCATION: LOCATION: GENERALIZED

## 2024-12-04 ASSESSMENT — PAIN DESCRIPTION - DESCRIPTORS: DESCRIPTORS: ACHING

## 2024-12-04 NOTE — DISCHARGE INSTR - COC
Continuity of Care Form    Patient Name: Noemy Turcios   :  1960  MRN:  [id number]    Admit date:  2024  Discharge date:  2024    Code Status Order: DNR-CCA   Advance Directives:   Advance Care Flowsheet Documentation             Admitting Physician:  Maximus Rosa MD  PCP: Sixto Merrill MD    Discharging Nurse: Italia WILKS  Discharging Hospital Unit/Room#:   Discharging Unit Phone Number: [phone]    Emergency Contact:   Extended Emergency Contact Information  Primary Emergency Contact: Siena Myers  Home Phone: [phone]  Mobile Phone: [phone]  Relation: Other    Past Surgical History:  Past Surgical History:   Procedure Laterality Date    ANKLE SURGERY      COLON SURGERY      FOOT SURGERY Left 2021    FOOT BONE BIOPSY W/ INCISION & DRAINAGE AND REDRESSING ON RIGHT FOOT performed by Matthieu Marie DPM at [facility] OR    HERNIA REPAIR         Immunization History:   Immunization History   Administered Date(s) Administered    COVID-19, PFIZER PURPLE top, DILUTE for use, (age 12 y+), 30mcg/0.3mL 2024    TDaP, ADACEL (age 10y-64y), BOOSTRIX (age 10y+), IM, 0.5mL 2017       Active Problems:  Patient Active Problem List   Diagnosis Code    Chronic heart failure with preserved ejection fraction (HCC) I50.32    Chronic obstructive pulmonary disease ([facility]) J44.9    Tobacco abuse Z72.0    Morbid obesity E66.01    Essential hypertension I10    History of pulmonary embolism Z86.711    Family history of colon cancer in mother Z80.0    Prediabetes R73.03    Class 4 congestive heart failure, acute on chronic, systolic (HCC) I50.23    Hypokalemia E87.6    Cellulitis L03.90    Primary osteoarthritis of right hip M16.11    Chronic pain of multiple joints M25.50, G89.29    Depression F32.A    Cellulitis of right leg L03.115    Peripheral artery disease (HCC) I73.9    Non healing left heel wound S91.302A    Corns and callosities L84    Healing pressure ulcer, stage IV ([facility])

## 2024-12-04 NOTE — CONSULTS
Gastroenterology Consult Note    Patient:   Noemy Turcios   Admit date:  11/27/2024  Facility:   [facility]  Referring/PCP: Sixto Merrill MD  Date:     11/28/2024  Consultant:   KIRK Sal NP, Pete Steve MD    Subjective:     This 64 y.o. female was admitted 11/27/2024 with a diagnosis of \"Acute respiratory failure [J96.00]  Acute cystitis with hematuria [N30.01]  Altered mental status, unspecified altered mental status type [R41.82]  COVID-19 [U07.1]\" and is seen in consultation regarding   Chief Complaint   Patient presents with    Fatigue     64/F w/ pmhx of COPD, CHF, DVT, morbid obesity presents to ED from [facility] for altered mental status, increased O2 requirements, lethargy.     GI consulted for elevated LFTs.    In the ED her lactic 3.1, Pro-BNP 74391, procal 0.27, , , Trop 28, , AST 1358, WBC 12.6, D-Dimer 2.27.  UA + nitrites, leukocytes  COVID +  CT chest: pneumonia  CTA abdomen/pelvis unremarkable.    On examination patient is very drowsy, not opening her eyes when speaking.  She is, however, able to answer questions appropriately.    Patient denies any abdominal pain, nausea, vomiting.  She reports R hip pain.  Patient denies any falls.  She reports feeling weak.    Denies any alcohol.  No previous history of liver issues.  No reported hypotension.  Patient was actually hypertensive on admission.  Tylenol and alcohol level normal.    Her LFTs this morning have significantly improved. ALT  328, .      Past Medical History:  Past Medical History:   Diagnosis Date    Arthritis     CHF (congestive heart failure) (HCC)     COPD (chronic obstructive pulmonary disease) (HCC)     Diverticulitis     Hx of blood clots     Pulmonary embolism (HCC)        Past Surgical History:  Past Surgical History:   Procedure Laterality Date    ANKLE SURGERY      COLON SURGERY      FOOT SURGERY Left 1/22/2021    FOOT BONE BIOPSY W/ INCISION & DRAINAGE AND 
  Palliative Care Inpatient Consult    NAME:  Noemy Turcios  MEDICAL RECORD NUMBER:  [id number]  AGE: 64 y.o.   GENDER: female  : 1960  TODAY'S DATE:  2024    Reasons for Consultation:    Symptom and/or pain management  Provision of information regarding PC and/or hospice philosophies  Complex, time-intensive communication and interdisciplinary psychosocial support  Clarification of goals of care and/or assistance with difficult decision-making  Guidance in regards to resources and transition(s)    Members of PC team contributing to this consultation are : Angie Mansfield, Palliative Care APRN-CNP    Plan      Palliative Interaction: Patient seen on rounds. She is sitting up and bed working to eat lunch. Introduce myself and the palliative role in her care.     She shares that she lives at [facility]. She has been there for about 4 years. She notes that she was confused and SOB while at the facility which prompted this admission. She notes her breathing has improved. She is currently without complaint.     On admission patient changed code status to DNR-CCA no intubation. She again confirms this to be her wishes to me.     She shares at [facility] she gets around using a wheelchair. She states she is able to do things she enjoys and feels she has a good quality of life. She states her goal is to get better.     She does not seem fully engaged in conversation. Although she will answer my questions she typically responders with one or two word answers.     She is not  and states she does not have children. She does however have 5 siblings, she states most of her family lives in [place]. Encourage her to complete a Medical POA should she ever be unable to make her own decisions. Have place spiritual care consult for assistance with this. Appreciate their help.       Principle Problem/Diagnosis:  Acute respiratory failure    Additional Assessments:     1- Symptom management/ pain control     Pain Assessment:  The 
[facility]  In Patient Cardiology Consult      Date:   11/29/2024  Patient name: Noemy Turcios  Date of admission:  11/27/2024 12:22 PM  MRN:   [id number]  YOB: 1960    Reason for Admission: Shortness of breath, altered mental status    CHIEF COMPLAINT: Shortness of breath, altered mental status    History Obtained From: The chart    HISTORY OF PRESENT ILLNESS:    This is a 64-year-old female.  She came into the emergency room.  She had increasing altered mental status at the nursing home.  She also had increasing oxygen requirements.  She was found to be COVID-positive with pneumonia.  Cardiology was consulted for A-fib with RVR, heart rate in the 150s.  Patient is chronically on Eliquis due to DVT history.  She was seen this a.m., denies any chest pain.  She was started on a Cardizem drip, now her blood pressure is on the lower side.  She is currently in A-fib with a heart rate of around 110.    Past Medical History:    Past Medical History:   Diagnosis Date    Arthritis     CHF (congestive heart failure) (HCC)     COPD (chronic obstructive pulmonary disease) (HCC)     Diverticulitis     Hx of blood clots     Pulmonary embolism (HCC)          Past Surgical History:    Past Surgical History:   Procedure Laterality Date    ANKLE SURGERY      COLON SURGERY      FOOT SURGERY Left 1/22/2021    FOOT BONE BIOPSY W/ INCISION & DRAINAGE AND REDRESSING ON RIGHT FOOT performed by Matthieu Marie DPM at [facility] OR    HERNIA REPAIR           Home Medications:    No outpatient medications have been marked as taking for the 11/27/24 encounter (Hospital Encounter).        Allergies:  Patient has no known allergies.    Social History:    Social History     Socioeconomic History    Marital status: Single     Spouse name: None    Number of children: None    Years of education: None    Highest education level: None   Tobacco Use    Smoking status: Every Day     Current packs/day: 0.25     Average packs/day: 
[facility]  Inpatient Cardiology Consult             Date:   12/4/2024  Patient name: Noemy Turcios  Date of admission:  11/27/2024 12:22 PM  MRN:   [id number]  YOB: 1960      Reason for consultation:  AF with RVR    CHIEF COMPLAINT:  Fatigue     History Obtained From:  Medical record    HISTORY OF PRESENT ILLNESS:      The patient is a 64 y.o woman with h/o COPD, DVT and PE who presented to the ED from her ECF on 11/27, with findings of bilateral pneumonia, + SARS CoV2 PCR, UTI and PAF with RVR.  She developed hypotension with IV diltiazem but has had improved rate control back on IV amiodarone with one dose of IV digoxin given on 12/2.      Past Medical History:   has a past medical history of Arthritis, CHF (congestive heart failure) ([facility]), COPD (chronic obstructive pulmonary disease) ([facility]), Diverticulitis, Hx of blood clots, and Pulmonary embolism ([facility]).    Past Surgical History:   has a past surgical history that includes Ankle surgery; Colon surgery; hernia repair; and Foot surgery (Left, 1/22/2021).     Home Medications:    Prior to Admission medications    Medication Sig Start Date End Date Taking? Authorizing Provider   benzonatate (TESSALON) 100 MG capsule Take 1 capsule by mouth every 8 hours as needed for Cough    ProviderLisbeth MD   fluticasone (FLONASE) 50 MCG/ACT nasal spray 1 spray by Each Nostril route daily    Lisbeth Calix MD   gabapentin (NEURONTIN) 100 MG capsule Take 1 capsule by mouth at bedtime.    ProviderLisbeth MD   Glucagon, rDNA, (GLUCAGON EMERGENCY) 1 MG KIT Inject 1 mg as directed every 24 hours As needed for blood glucose below 60 and symptomatic    ProviderLisbeth MD   morphine (MS CONTIN) 15 MG extended release tablet Take 1 tablet by mouth 2 times daily. For shoulder and hip pain Max Daily Amount: 30 mg    Lisbeth Calix MD   oxyCODONE-acetaminophen (PERCOCET) 5-325 MG per tablet Take 2 tablets by mouth every 6 hours as 
Patient with a history of multiple medical issues admitted from nursing home with altered mental status.  Urologist consulted for urinary retention.  She is DNR CC.  She has had multiple straight cath and finally a Singleton catheter was placed.      Recommend keeping the catheter in place and keeping it upon discharge.  She will need follow-up for an outpatient voiding trial.  There is no other urologic intervention is necessary at this time over the weekend.    Please call with any questions.      [facility]EL [facility]    
       Past Surgical  History:     Past Surgical History:   Procedure Laterality Date    ANKLE SURGERY      COLON SURGERY      FOOT SURGERY Left 1/22/2021    FOOT BONE BIOPSY W/ INCISION & DRAINAGE AND REDRESSING ON RIGHT FOOT performed by Matthieu Marie DPM at [facility] OR    HERNIA REPAIR         Medications:      [Held by provider] furosemide  40 mg IntraVENous BID    piperacillin-tazobactam  4,500 mg IntraVENous Q8H    docusate sodium  100 mg Oral BID    fluticasone  1 spray Each Nostril Daily    miconazole   Topical BID    polyethylene glycol  17 g Oral Daily    senna  2 tablet Oral Daily    venlafaxine  150 mg Oral Daily    sodium chloride flush  5-40 mL IntraVENous 2 times per day    pantoprazole (PROTONIX) 40 mg in sodium chloride (PF) 0.9 % 10 mL injection  40 mg IntraVENous Daily    dexAMETHasone  6 mg IntraVENous Daily    apixaban  5 mg Oral BID       Social History:     Social History     Socioeconomic History    Marital status: Single     Spouse name: Not on file    Number of children: Not on file    Years of education: Not on file    Highest education level: Not on file   Occupational History    Not on file   Tobacco Use    Smoking status: Every Day     Current packs/day: 0.25     Average packs/day: 0.3 packs/day for 45.0 years (11.3 ttl pk-yrs)     Types: Cigarettes    Smokeless tobacco: Never    Tobacco comments:     has not smoked in two months   Vaping Use    Vaping status: Never Used   Substance and Sexual Activity    Alcohol use: Not Currently     Comment: 1-2 times per week    Drug use: Not Currently    Sexual activity: Not Currently   Other Topics Concern    Not on file   Social History Narrative    Not on file     Social Determinants of Health     Financial Resource Strain: Low Risk  (7/22/2020)    Overall Financial Resource Strain (CARDIA)     Difficulty of Paying Living Expenses: Not very hard   Food Insecurity: No Food Insecurity (10/22/2020)    Hunger Vital Sign     Worried About Running Out 
listed below and the patient's options regarding those diagnoses were also included in determining the time component.    Please note that portions of this note were completed with Dragon dictation, the computer voice recognition software.  Quite often unanticipated grammatical, syntax, homophones, and other interpretive errors are inadvertently transcribed by the computer software.  Please disregard these errors and any other errors that may have escaped final proofreading.     Electronically signed by KIRK Ruiz CNP  on 12/2/2024 at 10:03 PM   
packs/day for 30 years quit 4 years  Remote history of pulmonary embolism/DVT-dating back to before 2017; has been maintained on low-dose Eliquis most likely due to her immobility  DNR CCA no intubation      Plan:      Continue droplet plus isolation  Continue intermediate ICU status  Oxygen to keep saturations greater than 88%  Would continue Zosyn because of the concern of possible aspiration when she was encephalopathic and lethargic; this will also cover most urinary pathogens  Continue IV Decadron  Will ask pharmacy to dose antiretroviral that would be appropriate for her  Agree with limiting sedatives, however need to be careful that she does not have withdrawal symptoms  Trend inflammatory markers  Trend proBNP  Trend LFTs they are improving  Agree with checking echocardiogram  Patient is much more alert, okay for her to advance diet  Patient's INR is elevated because she had been on Eliquis; cannot use elevated INR to determine extent of liver failure  She is not a transplant candidate given her multiple comorbidities which include morbid obesity and her CODE STATUS  I did clarify her CODE STATUS she is a no intubation

## 2024-12-05 ENCOUNTER — APPOINTMENT (OUTPATIENT)
Dept: GENERAL RADIOLOGY | Age: 64
DRG: 871 | End: 2024-12-05
Payer: MEDICARE

## 2024-12-05 LAB
ALBUMIN SERPL-MCNC: 3.5 G/DL (ref 3.5–5.2)
ALP SERPL-CCNC: 77 U/L (ref 35–104)
ALT SERPL-CCNC: 99 U/L (ref 10–35)
ANION GAP SERPL CALCULATED.3IONS-SCNC: 14 MMOL/L (ref 9–16)
AST SERPL-CCNC: 41 U/L (ref 10–35)
BASOPHILS # BLD: 0 K/UL (ref 0–0.2)
BASOPHILS NFR BLD: 0 % (ref 0–2)
BILIRUB DIRECT SERPL-MCNC: 0.6 MG/DL (ref 0–0.3)
BILIRUB INDIRECT SERPL-MCNC: 0.3 MG/DL (ref 0–1)
BILIRUB SERPL-MCNC: 0.9 MG/DL (ref 0–1.2)
BUN SERPL-MCNC: 23 MG/DL (ref 8–23)
CALCIUM SERPL-MCNC: 9 MG/DL (ref 8.6–10.4)
CHLORIDE SERPL-SCNC: 90 MMOL/L (ref 98–107)
CO2 SERPL-SCNC: 29 MMOL/L (ref 20–31)
CREAT SERPL-MCNC: 0.6 MG/DL (ref 0.7–1.2)
CREAT SERPL-MCNC: 0.6 MG/DL (ref 0.7–1.2)
EOSINOPHIL # BLD: 0 K/UL (ref 0–0.4)
EOSINOPHILS RELATIVE PERCENT: 0 % (ref 0–4)
ERYTHROCYTE [DISTWIDTH] IN BLOOD BY AUTOMATED COUNT: 18 % (ref 11.5–14.9)
GFR, ESTIMATED: >90 ML/MIN/1.73M2
GFR, ESTIMATED: >90 ML/MIN/1.73M2
GLUCOSE SERPL-MCNC: 160 MG/DL (ref 74–99)
HCT VFR BLD AUTO: 45.3 % (ref 36–46)
HGB BLD-MCNC: 14.1 G/DL (ref 12–16)
LYMPHOCYTES NFR BLD: 2.38 K/UL (ref 1–4.8)
LYMPHOCYTES RELATIVE PERCENT: 12 % (ref 24–44)
MCH RBC QN AUTO: 29.6 PG (ref 26–34)
MCHC RBC AUTO-ENTMCNC: 31.1 G/DL (ref 31–37)
MCV RBC AUTO: 95.2 FL (ref 80–100)
MONOCYTES NFR BLD: 0.99 K/UL (ref 0.1–1.3)
MONOCYTES NFR BLD: 5 % (ref 1–7)
MORPHOLOGY: ABNORMAL
MORPHOLOGY: ABNORMAL
NEUTROPHILS NFR BLD: 83 % (ref 36–66)
NEUTS SEG NFR BLD: 16.43 K/UL (ref 1.3–9.1)
PLATELET # BLD AUTO: 245 K/UL (ref 150–450)
PMV BLD AUTO: 9.7 FL (ref 6–12)
POTASSIUM SERPL-SCNC: 3.2 MMOL/L (ref 3.7–5.3)
PROT SERPL-MCNC: 6.1 G/DL (ref 6.6–8.7)
RBC # BLD AUTO: 4.75 M/UL (ref 4–5.2)
SODIUM SERPL-SCNC: 133 MMOL/L (ref 136–145)
WBC OTHER # BLD: 19.8 K/UL (ref 3.5–11)

## 2024-12-05 PROCEDURE — 6360000002 HC RX W HCPCS

## 2024-12-05 PROCEDURE — 74018 RADEX ABDOMEN 1 VIEW: CPT

## 2024-12-05 PROCEDURE — 99232 SBSQ HOSP IP/OBS MODERATE 35: CPT | Performed by: SURGERY

## 2024-12-05 PROCEDURE — 80076 HEPATIC FUNCTION PANEL: CPT

## 2024-12-05 PROCEDURE — 6370000000 HC RX 637 (ALT 250 FOR IP)

## 2024-12-05 PROCEDURE — APPSS30 APP SPLIT SHARED TIME 16-30 MINUTES: Performed by: NURSE PRACTITIONER

## 2024-12-05 PROCEDURE — 80048 BASIC METABOLIC PNL TOTAL CA: CPT

## 2024-12-05 PROCEDURE — 36415 COLL VENOUS BLD VENIPUNCTURE: CPT

## 2024-12-05 PROCEDURE — 85025 COMPLETE CBC W/AUTO DIFF WBC: CPT

## 2024-12-05 PROCEDURE — 6370000000 HC RX 637 (ALT 250 FOR IP): Performed by: NURSE PRACTITIONER

## 2024-12-05 PROCEDURE — 2060000000 HC ICU INTERMEDIATE R&B

## 2024-12-05 PROCEDURE — 97129 THER IVNTJ 1ST 15 MIN: CPT

## 2024-12-05 PROCEDURE — 99233 SBSQ HOSP IP/OBS HIGH 50: CPT | Performed by: NURSE PRACTITIONER

## 2024-12-05 PROCEDURE — 2580000003 HC RX 258: Performed by: INTERNAL MEDICINE

## 2024-12-05 PROCEDURE — 97130 THER IVNTJ EA ADDL 15 MIN: CPT

## 2024-12-05 PROCEDURE — 99231 SBSQ HOSP IP/OBS SF/LOW 25: CPT | Performed by: INTERNAL MEDICINE

## 2024-12-05 PROCEDURE — 99233 SBSQ HOSP IP/OBS HIGH 50: CPT | Performed by: INTERNAL MEDICINE

## 2024-12-05 PROCEDURE — 99232 SBSQ HOSP IP/OBS MODERATE 35: CPT | Performed by: INTERNAL MEDICINE

## 2024-12-05 PROCEDURE — 2580000003 HC RX 258

## 2024-12-05 PROCEDURE — 82565 ASSAY OF CREATININE: CPT

## 2024-12-05 RX ORDER — FUROSEMIDE 20 MG/1
20 TABLET ORAL DAILY
Status: DISCONTINUED | OUTPATIENT
Start: 2024-12-05 | End: 2024-12-05

## 2024-12-05 RX ORDER — AMIODARONE HYDROCHLORIDE 200 MG/1
200 TABLET ORAL DAILY
Status: DISCONTINUED | OUTPATIENT
Start: 2024-12-05 | End: 2024-12-06 | Stop reason: HOSPADM

## 2024-12-05 RX ORDER — FUROSEMIDE 20 MG/1
20 TABLET ORAL DAILY
Status: DISCONTINUED | OUTPATIENT
Start: 2024-12-06 | End: 2024-12-06 | Stop reason: HOSPADM

## 2024-12-05 RX ADMIN — APIXABAN 5 MG: 5 TABLET, FILM COATED ORAL at 19:59

## 2024-12-05 RX ADMIN — DOCUSATE SODIUM 100 MG: 100 CAPSULE, LIQUID FILLED ORAL at 08:54

## 2024-12-05 RX ADMIN — ANTI-FUNGAL POWDER MICONAZOLE NITRATE TALC FREE: 1.42 POWDER TOPICAL at 19:59

## 2024-12-05 RX ADMIN — AMIODARONE HYDROCHLORIDE 200 MG: 200 TABLET ORAL at 10:47

## 2024-12-05 RX ADMIN — SODIUM CHLORIDE, PRESERVATIVE FREE 10 ML: 5 INJECTION INTRAVENOUS at 20:00

## 2024-12-05 RX ADMIN — HYDROCODONE BITARTRATE AND ACETAMINOPHEN 1 TABLET: 5; 325 TABLET ORAL at 03:57

## 2024-12-05 RX ADMIN — DEXAMETHASONE SODIUM PHOSPHATE 6 MG: 10 INJECTION INTRAMUSCULAR; INTRAVENOUS at 08:56

## 2024-12-05 RX ADMIN — MIDODRINE HYDROCHLORIDE 10 MG: 10 TABLET ORAL at 17:23

## 2024-12-05 RX ADMIN — SODIUM CHLORIDE, PRESERVATIVE FREE 40 MG: 5 INJECTION INTRAVENOUS at 09:08

## 2024-12-05 RX ADMIN — MIDODRINE HYDROCHLORIDE 10 MG: 10 TABLET ORAL at 12:45

## 2024-12-05 RX ADMIN — SENNOSIDES 17.2 MG: 8.6 TABLET, FILM COATED ORAL at 08:54

## 2024-12-05 RX ADMIN — SODIUM CHLORIDE, PRESERVATIVE FREE 10 ML: 5 INJECTION INTRAVENOUS at 09:11

## 2024-12-05 RX ADMIN — APIXABAN 5 MG: 5 TABLET, FILM COATED ORAL at 08:55

## 2024-12-05 RX ADMIN — VENLAFAXINE HYDROCHLORIDE 150 MG: 150 CAPSULE, EXTENDED RELEASE ORAL at 08:55

## 2024-12-05 RX ADMIN — HYDROCODONE BITARTRATE AND ACETAMINOPHEN 1 TABLET: 5; 325 TABLET ORAL at 10:45

## 2024-12-05 RX ADMIN — POLYETHYLENE GLYCOL 3350 17 G: 17 POWDER, FOR SOLUTION ORAL at 08:55

## 2024-12-05 RX ADMIN — HYDROCODONE BITARTRATE AND ACETAMINOPHEN 1 TABLET: 5; 325 TABLET ORAL at 17:23

## 2024-12-05 RX ADMIN — HYDROCODONE BITARTRATE AND ACETAMINOPHEN 1 TABLET: 5; 325 TABLET ORAL at 23:31

## 2024-12-05 RX ADMIN — FUROSEMIDE 20 MG: 10 INJECTION, SOLUTION INTRAMUSCULAR; INTRAVENOUS at 09:00

## 2024-12-05 RX ADMIN — POTASSIUM BICARBONATE 40 MEQ: 782 TABLET, EFFERVESCENT ORAL at 12:44

## 2024-12-05 RX ADMIN — MIDODRINE HYDROCHLORIDE 10 MG: 10 TABLET ORAL at 08:55

## 2024-12-05 RX ADMIN — METOPROLOL SUCCINATE 50 MG: 50 TABLET, EXTENDED RELEASE ORAL at 09:02

## 2024-12-05 RX ADMIN — ANTI-FUNGAL POWDER MICONAZOLE NITRATE TALC FREE: 1.42 POWDER TOPICAL at 08:56

## 2024-12-05 ASSESSMENT — PAIN DESCRIPTION - FREQUENCY: FREQUENCY: CONTINUOUS

## 2024-12-05 ASSESSMENT — ENCOUNTER SYMPTOMS
APNEA: 0
CONSTIPATION: 0
ABDOMINAL PAIN: 1
EYE ITCHING: 0
COLOR CHANGE: 0
EYE DISCHARGE: 0
ANAL BLEEDING: 0
EYE PAIN: 0
BLOOD IN STOOL: 0
SHORTNESS OF BREATH: 0
ABDOMINAL DISTENTION: 0
CHEST TIGHTNESS: 0
COUGH: 0
SORE THROAT: 0
BACK PAIN: 0
CHOKING: 0
RHINORRHEA: 0

## 2024-12-05 ASSESSMENT — PAIN DESCRIPTION - ORIENTATION
ORIENTATION: RIGHT
ORIENTATION: OTHER (COMMENT)

## 2024-12-05 ASSESSMENT — PAIN SCALES - GENERAL
PAINLEVEL_OUTOF10: 8
PAINLEVEL_OUTOF10: 3
PAINLEVEL_OUTOF10: 8
PAINLEVEL_OUTOF10: 8
PAINLEVEL_OUTOF10: 3

## 2024-12-05 ASSESSMENT — PAIN DESCRIPTION - PAIN TYPE
TYPE: CHRONIC PAIN
TYPE: CHRONIC PAIN

## 2024-12-05 ASSESSMENT — PAIN - FUNCTIONAL ASSESSMENT
PAIN_FUNCTIONAL_ASSESSMENT: ACTIVITIES ARE NOT PREVENTED

## 2024-12-05 ASSESSMENT — PAIN DESCRIPTION - LOCATION
LOCATION: GENERALIZED
LOCATION: HIP
LOCATION: GENERALIZED

## 2024-12-05 ASSESSMENT — PAIN DESCRIPTION - DESCRIPTORS
DESCRIPTORS: ACHING;SORE
DESCRIPTORS: SHARP
DESCRIPTORS: DISCOMFORT;SHARP
DESCRIPTORS: SORE;ACHING

## 2024-12-05 ASSESSMENT — PAIN SCALES - WONG BAKER: WONGBAKER_NUMERICALRESPONSE: NO HURT

## 2024-12-05 ASSESSMENT — PAIN DESCRIPTION - ONSET: ONSET: AWAKENED FROM SLEEP

## 2024-12-06 VITALS
OXYGEN SATURATION: 97 % | RESPIRATION RATE: 14 BRPM | WEIGHT: 293 LBS | HEART RATE: 88 BPM | BODY MASS INDEX: 47.09 KG/M2 | TEMPERATURE: 97.5 F | DIASTOLIC BLOOD PRESSURE: 73 MMHG | HEIGHT: 66 IN | SYSTOLIC BLOOD PRESSURE: 110 MMHG

## 2024-12-06 PROBLEM — I48.11 LONGSTANDING PERSISTENT ATRIAL FIBRILLATION (HCC): Status: ACTIVE | Noted: 2024-12-06

## 2024-12-06 LAB
ALBUMIN SERPL-MCNC: 3.4 G/DL (ref 3.5–5.2)
ALP SERPL-CCNC: 81 U/L (ref 35–104)
ALT SERPL-CCNC: 74 U/L (ref 10–35)
ANION GAP SERPL CALCULATED.3IONS-SCNC: 10 MMOL/L (ref 9–16)
AST SERPL-CCNC: 32 U/L (ref 10–35)
BASOPHILS # BLD: 0 K/UL (ref 0–0.2)
BASOPHILS NFR BLD: 0 % (ref 0–2)
BILIRUB SERPL-MCNC: 1 MG/DL (ref 0–1.2)
BODY TEMPERATURE: 37
BUN SERPL-MCNC: 25 MG/DL (ref 8–23)
CALCIUM SERPL-MCNC: 9.6 MG/DL (ref 8.6–10.4)
CHLORIDE SERPL-SCNC: 90 MMOL/L (ref 98–107)
CO2 SERPL-SCNC: 34 MMOL/L (ref 20–31)
COHGB MFR BLD: 4.6 % (ref 0–5)
CREAT SERPL-MCNC: 0.5 MG/DL (ref 0.7–1.2)
EOSINOPHIL # BLD: 0 K/UL (ref 0–0.4)
EOSINOPHILS RELATIVE PERCENT: 0 % (ref 0–4)
ERYTHROCYTE [DISTWIDTH] IN BLOOD BY AUTOMATED COUNT: 17.9 % (ref 11.5–14.9)
GFR, ESTIMATED: >90 ML/MIN/1.73M2
GLUCOSE SERPL-MCNC: 162 MG/DL (ref 74–99)
HCO3 VENOUS: 29.9 MMOL/L (ref 24–30)
HCT VFR BLD AUTO: 45.6 % (ref 36–46)
HGB BLD-MCNC: 14.5 G/DL (ref 12–16)
LYMPHOCYTES NFR BLD: 1.93 K/UL (ref 1–4.8)
LYMPHOCYTES RELATIVE PERCENT: 11 % (ref 24–44)
MCH RBC QN AUTO: 29.4 PG (ref 26–34)
MCHC RBC AUTO-ENTMCNC: 31.7 G/DL (ref 31–37)
MCV RBC AUTO: 92.7 FL (ref 80–100)
METHEMOGLOBIN: 0.4 % (ref 0–1.9)
MONOCYTES NFR BLD: 0.53 K/UL (ref 0.1–1.3)
MONOCYTES NFR BLD: 3 % (ref 1–7)
MORPHOLOGY: ABNORMAL
MORPHOLOGY: ABNORMAL
NEUTROPHILS NFR BLD: 86 % (ref 36–66)
NEUTS SEG NFR BLD: 15.04 K/UL (ref 1.3–9.1)
NUCLEATED RED BLOOD CELLS: 4 PER 100 WBC
O2 SAT, VEN: 99.8 % (ref 60–85)
PCO2 VENOUS: 37 MM HG (ref 39–55)
PH VENOUS: 7.53 (ref 7.32–7.42)
PLATELET # BLD AUTO: 255 K/UL (ref 150–450)
PMV BLD AUTO: 10 FL (ref 6–12)
PO2 VENOUS: 150.1 MM HG (ref 30–50)
POSITIVE BASE EXCESS, VEN: 6.9 MMOL/L (ref 0–2)
POTASSIUM SERPL-SCNC: 3.6 MMOL/L (ref 3.7–5.3)
PROT SERPL-MCNC: 6.2 G/DL (ref 6.6–8.7)
RBC # BLD AUTO: 4.92 M/UL (ref 4–5.2)
SODIUM SERPL-SCNC: 134 MMOL/L (ref 136–145)
TEXT FOR RESPIRATORY: ABNORMAL
WBC OTHER # BLD: 17.5 K/UL (ref 3.5–11)

## 2024-12-06 PROCEDURE — 6370000000 HC RX 637 (ALT 250 FOR IP): Performed by: NURSE PRACTITIONER

## 2024-12-06 PROCEDURE — 6370000000 HC RX 637 (ALT 250 FOR IP)

## 2024-12-06 PROCEDURE — 99239 HOSP IP/OBS DSCHRG MGMT >30: CPT | Performed by: INTERNAL MEDICINE

## 2024-12-06 PROCEDURE — 2580000003 HC RX 258

## 2024-12-06 PROCEDURE — 6360000002 HC RX W HCPCS

## 2024-12-06 PROCEDURE — 85025 COMPLETE CBC W/AUTO DIFF WBC: CPT

## 2024-12-06 PROCEDURE — 82805 BLOOD GASES W/O2 SATURATION: CPT

## 2024-12-06 PROCEDURE — 6370000000 HC RX 637 (ALT 250 FOR IP): Performed by: INTERNAL MEDICINE

## 2024-12-06 PROCEDURE — 80053 COMPREHEN METABOLIC PANEL: CPT

## 2024-12-06 PROCEDURE — 36415 COLL VENOUS BLD VENIPUNCTURE: CPT

## 2024-12-06 PROCEDURE — 97129 THER IVNTJ 1ST 15 MIN: CPT

## 2024-12-06 PROCEDURE — 99231 SBSQ HOSP IP/OBS SF/LOW 25: CPT

## 2024-12-06 PROCEDURE — 82800 BLOOD PH: CPT

## 2024-12-06 PROCEDURE — 2580000003 HC RX 258: Performed by: INTERNAL MEDICINE

## 2024-12-06 PROCEDURE — 97130 THER IVNTJ EA ADDL 15 MIN: CPT

## 2024-12-06 RX ORDER — MIDODRINE HYDROCHLORIDE 10 MG/1
10 TABLET ORAL
Qty: 90 TABLET | Refills: 0 | Status: SHIPPED | OUTPATIENT
Start: 2024-12-06 | End: 2025-01-05

## 2024-12-06 RX ORDER — METOPROLOL SUCCINATE 50 MG/1
50 TABLET, EXTENDED RELEASE ORAL DAILY
Qty: 30 TABLET | Refills: 0 | Status: SHIPPED | OUTPATIENT
Start: 2024-12-07 | End: 2025-01-06

## 2024-12-06 RX ORDER — AMIODARONE HYDROCHLORIDE 200 MG/1
200 TABLET ORAL DAILY
Qty: 30 TABLET | Refills: 0 | Status: SHIPPED | OUTPATIENT
Start: 2024-12-07 | End: 2025-01-06

## 2024-12-06 RX ORDER — HYDROCODONE BITARTRATE AND ACETAMINOPHEN 5; 325 MG/1; MG/1
1 TABLET ORAL EVERY 8 HOURS PRN
Qty: 28 TABLET | Refills: 0 | Status: SHIPPED | OUTPATIENT
Start: 2024-12-06 | End: 2024-12-13

## 2024-12-06 RX ORDER — DIAZEPAM 2 MG/1
2 TABLET ORAL DAILY PRN
Qty: 2 TABLET | Refills: 0 | Status: SHIPPED | OUTPATIENT
Start: 2024-12-06 | End: 2025-12-01

## 2024-12-06 RX ORDER — DIGOXIN 125 MCG
125 TABLET ORAL EVERY OTHER DAY
Qty: 15 TABLET | Refills: 0 | Status: SHIPPED | OUTPATIENT
Start: 2024-12-08 | End: 2025-01-07

## 2024-12-06 RX ADMIN — SODIUM CHLORIDE, PRESERVATIVE FREE 10 ML: 5 INJECTION INTRAVENOUS at 09:39

## 2024-12-06 RX ADMIN — HYDROCODONE BITARTRATE AND ACETAMINOPHEN 1 TABLET: 5; 325 TABLET ORAL at 09:34

## 2024-12-06 RX ADMIN — MIDODRINE HYDROCHLORIDE 10 MG: 10 TABLET ORAL at 12:19

## 2024-12-06 RX ADMIN — FUROSEMIDE 20 MG: 20 TABLET ORAL at 09:37

## 2024-12-06 RX ADMIN — SODIUM CHLORIDE, PRESERVATIVE FREE 40 MG: 5 INJECTION INTRAVENOUS at 09:34

## 2024-12-06 RX ADMIN — DIGOXIN 125 MCG: 125 TABLET ORAL at 09:39

## 2024-12-06 RX ADMIN — POTASSIUM BICARBONATE 40 MEQ: 782 TABLET, EFFERVESCENT ORAL at 09:33

## 2024-12-06 RX ADMIN — DEXAMETHASONE SODIUM PHOSPHATE 6 MG: 10 INJECTION INTRAMUSCULAR; INTRAVENOUS at 09:35

## 2024-12-06 RX ADMIN — AMIODARONE HYDROCHLORIDE 200 MG: 200 TABLET ORAL at 09:35

## 2024-12-06 RX ADMIN — VENLAFAXINE HYDROCHLORIDE 150 MG: 150 CAPSULE, EXTENDED RELEASE ORAL at 09:35

## 2024-12-06 RX ADMIN — METOPROLOL SUCCINATE 50 MG: 50 TABLET, EXTENDED RELEASE ORAL at 09:35

## 2024-12-06 RX ADMIN — APIXABAN 5 MG: 5 TABLET, FILM COATED ORAL at 09:35

## 2024-12-06 RX ADMIN — ANTI-FUNGAL POWDER MICONAZOLE NITRATE TALC FREE: 1.42 POWDER TOPICAL at 09:39

## 2024-12-06 RX ADMIN — SENNOSIDES 17.2 MG: 8.6 TABLET, FILM COATED ORAL at 09:35

## 2024-12-06 RX ADMIN — MIDODRINE HYDROCHLORIDE 10 MG: 10 TABLET ORAL at 09:35

## 2024-12-06 ASSESSMENT — ENCOUNTER SYMPTOMS
COUGH: 0
APNEA: 0
BACK PAIN: 0
EYE DISCHARGE: 0
BLOOD IN STOOL: 0
COLOR CHANGE: 0
ANAL BLEEDING: 0
EYE PAIN: 0
CONSTIPATION: 0
ABDOMINAL DISTENTION: 0
ABDOMINAL PAIN: 0
SHORTNESS OF BREATH: 0
EYE ITCHING: 0
CHOKING: 0
CHEST TIGHTNESS: 0

## 2024-12-06 ASSESSMENT — PAIN DESCRIPTION - LOCATION: LOCATION: HIP

## 2024-12-06 ASSESSMENT — PAIN DESCRIPTION - DESCRIPTORS: DESCRIPTORS: ACHING;THROBBING

## 2024-12-06 ASSESSMENT — PAIN DESCRIPTION - ORIENTATION: ORIENTATION: RIGHT

## 2024-12-06 ASSESSMENT — PAIN SCALES - GENERAL: PAINLEVEL_OUTOF10: 8

## 2024-12-06 NOTE — PLAN OF CARE
Problem: Safety - Adult  Goal: Free from fall injury  12/1/2024 0451 by Emani Page RN  Outcome: Progressing  Flowsheets (Taken 12/1/2024 0451)  Free From Fall Injury: Instruct family/caregiver on patient safety     Problem: Chronic Conditions and Co-morbidities  Goal: Patient's chronic conditions and co-morbidity symptoms are monitored and maintained or improved  12/1/2024 0451 by Emani Page RN  Outcome: Progressing  Flowsheets (Taken 12/1/2024 0451)  Care Plan - Patient's Chronic Conditions and Co-Morbidity Symptoms are Monitored and Maintained or Improved: Monitor and assess patient's chronic conditions and comorbid symptoms for stability, deterioration, or improvement     Problem: Discharge Planning  Goal: Discharge to home or other facility with appropriate resources  12/1/2024 0451 by Emani Page RN  Outcome: Progressing  Flowsheets (Taken 12/1/2024 0451)  Discharge to home or other facility with appropriate resources: Identify barriers to discharge with patient and caregiver     Problem: Skin/Tissue Integrity  Goal: Absence of new skin breakdown  Description: 1.  Monitor for areas of redness and/or skin breakdown  2.  Assess vascular access sites hourly  3.  Every 4-6 hours minimum:  Change oxygen saturation probe site  4.  Every 4-6 hours:  If on nasal continuous positive airway pressure, respiratory therapy assess nares and determine need for appliance change or resting period.  12/1/2024 0451 by Emani Page RN  Outcome: Progressing     Problem: ABCDS Injury Assessment  Goal: Absence of physical injury  12/1/2024 0451 by Emani Page RN  Outcome: Progressing  Flowsheets (Taken 12/1/2024 0451)  Absence of Physical Injury: Implement safety measures based on patient assessment     Problem: Nutrition Deficit:  Goal: Optimize nutritional status  12/1/2024 0451 by Emani Page RN  Outcome: Progressing  Flowsheets (Taken 12/1/2024 0451)  Nutrient intake appropriate for 
  Problem: Safety - Adult  Goal: Free from fall injury  12/1/2024 1721 by Edmundo Rodriguez RN  Outcome: Progressing     Problem: Chronic Conditions and Co-morbidities  Goal: Patient's chronic conditions and co-morbidity symptoms are monitored and maintained or improved  12/1/2024 1721 by Edmundo Rodriguez RN  Outcome: Progressing     Problem: Discharge Planning  Goal: Discharge to home or other facility with appropriate resources  12/1/2024 1721 by Edmundo Rodriguez RN  Outcome: Progressing     Problem: Skin/Tissue Integrity  Goal: Absence of new skin breakdown  Description: 1.  Monitor for areas of redness and/or skin breakdown  2.  Assess vascular access sites hourly  3.  Every 4-6 hours minimum:  Change oxygen saturation probe site  4.  Every 4-6 hours:  If on nasal continuous positive airway pressure, respiratory therapy assess nares and determine need for appliance change or resting period.  12/1/2024 1721 by Edmundo Rodriguez RN  Outcome: Progressing     Problem: ABCDS Injury Assessment  Goal: Absence of physical injury  12/1/2024 1721 by Edmundo Rodriguez RN  Outcome: Progressing     Problem: Nutrition Deficit:  Goal: Optimize nutritional status  12/1/2024 1721 by Edmundo Rodriguez RN  Outcome: Progressing     Problem: Pain  Goal: Verbalizes/displays adequate comfort level or baseline comfort level  12/1/2024 1721 by Edmundo Rodriguez RN  Outcome: Progressing     Problem: Cardiovascular - Adult  Goal: Maintains optimal cardiac output and hemodynamic stability  12/1/2024 1721 by Edmundo Rodriguez RN  Outcome: Progressing     Problem: Cardiovascular - Adult  Goal: Absence of cardiac dysrhythmias or at baseline  12/1/2024 1721 by Edmundo Rodriguez RN  Outcome: Progressing     Problem: Neurosensory - Adult  Goal: Achieves stable or improved neurological status  Outcome: Progressing     Problem: Respiratory - Adult  Goal: Achieves optimal ventilation and oxygenation  12/1/2024 1721 by Edmundo Rodriguez RN  Outcome: Progressing   
  Problem: Safety - Adult  Goal: Free from fall injury  12/2/2024 1554 by Carolina Johnson RN  Outcome: Progressing     Problem: Chronic Conditions and Co-morbidities  Goal: Patient's chronic conditions and co-morbidity symptoms are monitored and maintained or improved  12/2/2024 1554 by Carolina Johnson RN  Outcome: Progressing     Problem: Discharge Planning  Goal: Discharge to home or other facility with appropriate resources  12/2/2024 1554 by Carolina Johnson RN  Outcome: Progressing     
  Problem: Safety - Adult  Goal: Free from fall injury  12/3/2024 0546 by Jake Talley, RN  Outcome: Progressing     Problem: Chronic Conditions and Co-morbidities  Goal: Patient's chronic conditions and co-morbidity symptoms are monitored and maintained or improved  12/3/2024 0546 by Jake Talley, RN  Outcome: Progressing     Problem: Discharge Planning  Goal: Discharge to home or other facility with appropriate resources  12/3/2024 0546 by Jake Talley, RN  Outcome: Progressing     Problem: Skin/Tissue Integrity  Goal: Absence of new skin breakdown  Description: 1.  Monitor for areas of redness and/or skin breakdown  2.  Assess vascular access sites hourly  3.  Every 4-6 hours minimum:  Change oxygen saturation probe site  4.  Every 4-6 hours:  If on nasal continuous positive airway pressure, respiratory therapy assess nares and determine need for appliance change or resting period.  Outcome: Progressing     Problem: ABCDS Injury Assessment  Goal: Absence of physical injury  Outcome: Progressing     
  Problem: Safety - Adult  Goal: Free from fall injury  12/5/2024 0121 by Saira Urban RN  Outcome: Progressing  Flowsheets (Taken 12/5/2024 0121)  Free From Fall Injury:   Instruct family/caregiver on patient safety   Based on caregiver fall risk screen, instruct family/caregiver to ask for assistance with transferring infant if caregiver noted to have fall risk factors  12/4/2024 1527 by Tonia Rodríguez RN  Outcome: Progressing     Problem: Chronic Conditions and Co-morbidities  Goal: Patient's chronic conditions and co-morbidity symptoms are monitored and maintained or improved  Outcome: Progressing  Flowsheets (Taken 12/2/2024 0433 by Sylvie Lara RN)  Care Plan - Patient's Chronic Conditions and Co-Morbidity Symptoms are Monitored and Maintained or Improved:   Monitor and assess patient's chronic conditions and comorbid symptoms for stability, deterioration, or improvement   Collaborate with multidisciplinary team to address chronic and comorbid conditions and prevent exacerbation or deterioration     Problem: Discharge Planning  Goal: Discharge to home or other facility with appropriate resources  12/5/2024 0121 by Saira Urban RN  Flowsheets (Taken 12/5/2024 0121)  Discharge to home or other facility with appropriate resources:   Identify barriers to discharge with patient and caregiver   Arrange for needed discharge resources and transportation as appropriate   Identify discharge learning needs (meds, wound care, etc)  12/4/2024 1527 by Tonia Rodríguez RN  Outcome: Progressing     Problem: Skin/Tissue Integrity  Goal: Absence of new skin breakdown  Description: 1.  Monitor for areas of redness and/or skin breakdown  2.  Assess vascular access sites hourly  3.  Every 4-6 hours minimum:  Change oxygen saturation probe site  4.  Every 4-6 hours:  If on nasal continuous positive airway pressure, respiratory therapy assess nares and determine need for appliance change or resting period.  Outcome: 
  Problem: Safety - Adult  Goal: Free from fall injury  Outcome: Progressing     Problem: Chronic Conditions and Co-morbidities  Goal: Patient's chronic conditions and co-morbidity symptoms are monitored and maintained or improved  Outcome: Progressing     Problem: Discharge Planning  Goal: Discharge to home or other facility with appropriate resources  Outcome: Progressing     Problem: Skin/Tissue Integrity  Goal: Absence of new skin breakdown  Description: 1.  Monitor for areas of redness and/or skin breakdown  2.  Assess vascular access sites hourly  3.  Every 4-6 hours minimum:  Change oxygen saturation probe site  4.  Every 4-6 hours:  If on nasal continuous positive airway pressure, respiratory therapy assess nares and determine need for appliance change or resting period.  Outcome: Progressing     Problem: ABCDS Injury Assessment  Goal: Absence of physical injury  Outcome: Progressing     Problem: Nutrition Deficit:  Goal: Optimize nutritional status  Outcome: Progressing     Problem: Pain  Goal: Verbalizes/displays adequate comfort level or baseline comfort level  Outcome: Progressing     Problem: Cardiovascular - Adult  Goal: Maintains optimal cardiac output and hemodynamic stability  Outcome: Progressing     Problem: Cardiovascular - Adult  Goal: Absence of cardiac dysrhythmias or at baseline  Outcome: Progressing     Problem: Neurosensory - Adult  Goal: Achieves stable or improved neurological status  Outcome: Progressing     Problem: Respiratory - Adult  Goal: Achieves optimal ventilation and oxygenation  Outcome: Progressing     Problem: Skin/Tissue Integrity - Adult  Goal: Incisions, wounds, or drain sites healing without S/S of infection  Outcome: Progressing     Problem: Musculoskeletal - Adult  Goal: Return mobility to safest level of function  Outcome: Progressing     Problem: Gastrointestinal - Adult  Goal: Maintains or returns to baseline bowel function  Outcome: Progressing     Problem: 
  Problem: Safety - Adult  Goal: Free from fall injury  Outcome: Progressing     Problem: Discharge Planning  Goal: Discharge to home or other facility with appropriate resources  Outcome: Progressing     Problem: Skin/Tissue Integrity  Goal: Absence of new skin breakdown  Description: 1.  Monitor for areas of redness and/or skin breakdown  2.  Assess vascular access sites hourly  3.  Every 4-6 hours minimum:  Change oxygen saturation probe site  4.  Every 4-6 hours:  If on nasal continuous positive airway pressure, respiratory therapy assess nares and determine need for appliance change or resting period.  Outcome: Progressing     Problem: ABCDS Injury Assessment  Goal: Absence of physical injury  Outcome: Progressing     
  Problem: Safety - Adult  Goal: Free from fall injury  Outcome: Progressing     Problem: Discharge Planning  Goal: Discharge to home or other facility with appropriate resources  Outcome: Progressing  Flowsheets (Taken 11/30/2024 0910)  Discharge to home or other facility with appropriate resources: Identify barriers to discharge with patient and caregiver     Problem: Skin/Tissue Integrity  Goal: Absence of new skin breakdown  Description: 1.  Monitor for areas of redness and/or skin breakdown  2.  Assess vascular access sites hourly  3.  Every 4-6 hours minimum:  Change oxygen saturation probe site  4.  Every 4-6 hours:  If on nasal continuous positive airway pressure, respiratory therapy assess nares and determine need for appliance change or resting period.  Outcome: Progressing     Problem: ABCDS Injury Assessment  Goal: Absence of physical injury  Outcome: Progressing     Problem: Nutrition Deficit:  Goal: Optimize nutritional status  Outcome: Progressing     
  Problem: Safety - Adult  Goal: Free from fall injury  Outcome: Progressing  Pt assessed as a fall risk this shift. Remains free from falls and accidental injury at this time. Fall precautions in place. Floor free from obstacles, and bed is locked and in lowest position. Adequate lighting provided. Pt encouraged to call before getting OOB for any need. Bed alarm activated.      Problem: Discharge Planning  Goal: Discharge to home or other facility with appropriate resources  Outcome: Progressing  D/C barriers: Amiodarone drip.      Problem: Cardiovascular - Adult  Goal: Absence of cardiac dysrhythmias or at baseline  Outcome: Progressing  Pt remains on amiodarone drip and in Afib. Started on PO digoxin today     
Monitor for areas of redness and/or skin breakdown  2.  Assess vascular access sites hourly  3.  Every 4-6 hours minimum:  Change oxygen saturation probe site  4.  Every 4-6 hours:  If on nasal continuous positive airway pressure, respiratory therapy assess nares and determine need for appliance change or resting period.  12/3/2024 1657 by Adriana Murrell, RN  Outcome: Progressing  Note: Skin assessment complete. Pt on low air loss alternating pressure mattress. Pt turned and repositioned every two hours with assistance from staff.  Area kept free from moisture with FMS and yang. Proper nourishment and fluids encouraged, as appropriate. Skin remains clean and dry. See LDAs.  Will continue to monitor for additional needs and changes in skin breakdown.    12/3/2024 0546 by Jake Talley, RN  Outcome: Progressing     Problem: ABCDS Injury Assessment  Goal: Absence of physical injury  12/3/2024 1657 by Adriana Murrell, RN  Outcome: Progressing  Flowsheets (Taken 12/1/2024 0451 by Emani Page, RN)  Absence of Physical Injury: Implement safety measures based on patient assessment  Note: Fall assessment performed and appropriate measures implemented. Room freed from clutter. Bed in lowest position with wheels locked. Call light in place. ID band in place.     12/3/2024 0546 by Jake Talley, RN  Outcome: Progressing     Problem: Nutrition Deficit:  Goal: Optimize nutritional status  Outcome: Progressing     Problem: Pain  Goal: Verbalizes/displays adequate comfort level or baseline comfort level  Outcome: Progressing  Flowsheets (Taken 12/2/2024 0433 by Sylvie Lara, RN)  Verbalizes/displays adequate comfort level or baseline comfort level:   Encourage patient to monitor pain and request assistance   Assess pain using appropriate pain scale  Note: Patient's pain has been well controlled throughout the entire shift, please see MAR.       Problem: Cardiovascular - Adult  Goal: Maintains optimal 
Incisions, wounds, or drain sites healing without S/S of infection  12/2/2024 0433 by Sylvie Lara RN  Outcome: Progressing     Problem: Musculoskeletal - Adult  Goal: Return mobility to safest level of function  12/2/2024 0433 by Sylvie Lara RN  Outcome: Progressing     Problem: Gastrointestinal - Adult  Goal: Maintains or returns to baseline bowel function  12/2/2024 0433 by Sylvie Lara RN  Outcome: Progressing     Problem: Genitourinary - Adult  Goal: Absence of urinary retention  12/2/2024 0433 by Sylvie Lara RN  Outcome: Progressing     Problem: Infection - Adult  Goal: Absence of infection at discharge  12/2/2024 0433 by Sylvie Lara RN  Outcome: Progressing     
Respiratory - Adult  Goal: Achieves optimal ventilation and oxygenation  Outcome: Progressing  Flowsheets (Taken 11/30/2024 0137)  Achieves optimal ventilation and oxygenation:   Assess for changes in respiratory status   Position to facilitate oxygenation and minimize respiratory effort   Assess the need for suctioning and aspirate as needed   Respiratory therapy support as indicated   Assess for changes in mentation and behavior   Oxygen supplementation based on oxygen saturation or arterial blood gases   Assess and instruct to report shortness of breath or any respiratory difficulty     Problem: Skin/Tissue Integrity - Adult  Goal: Incisions, wounds, or drain sites healing without S/S of infection  Outcome: Progressing  Flowsheets (Taken 11/30/2024 0137)  Incisions, Wounds, or Drain Sites Healing Without Sign and Symptoms of Infection:   TWICE DAILY: Assess and document skin integrity   TWICE DAILY: Assess and document dressing/incision, wound bed, drain sites and surrounding tissue     Problem: Musculoskeletal - Adult  Goal: Return mobility to safest level of function  Outcome: Progressing  Flowsheets (Taken 11/30/2024 0137)  Return Mobility to Safest Level of Function: Ensure adequate protection for wounds/incisions during mobilization     Problem: Gastrointestinal - Adult  Goal: Maintains or returns to baseline bowel function  Outcome: Progressing  Flowsheets (Taken 11/30/2024 0137)  Maintains or returns to baseline bowel function:   Assess bowel function   Administer IV fluids as ordered to ensure adequate hydration   Encourage oral fluids to ensure adequate hydration   Administer ordered medications as needed     Problem: Genitourinary - Adult  Goal: Absence of urinary retention  Outcome: Progressing  Flowsheets (Taken 11/30/2024 0137)  Absence of urinary retention:   Assess patient’s ability to void and empty bladder   Monitor intake/output and perform bladder scan as needed     Problem: Infection - 
analgesics based on type and severity of pain and evaluate response   Implement non-pharmacological measures as appropriate and evaluate response  Taken 11/28/2024 2000 by Steph Elam, RN  Verbalizes/displays adequate comfort level or baseline comfort level:   Encourage patient to monitor pain and request assistance   Assess pain using appropriate pain scale   Administer analgesics based on type and severity of pain and evaluate response   Implement non-pharmacological measures as appropriate and evaluate response     
functional status, cognitive ability or social support system     Problem: Skin/Tissue Integrity  Goal: Absence of new skin breakdown  Description: 1.  Monitor for areas of redness and/or skin breakdown  2.  Assess vascular access sites hourly  3.  Every 4-6 hours minimum:  Change oxygen saturation probe site  4.  Every 4-6 hours:  If on nasal continuous positive airway pressure, respiratory therapy assess nares and determine need for appliance change or resting period.  11/29/2024 1535 by Shana Perkins RN  Outcome: Progressing  11/29/2024 0453 by Elle Olmstead RN  Outcome: Progressing  Note: No new skin breakdown noted     Problem: ABCDS Injury Assessment  Goal: Absence of physical injury  11/29/2024 1535 by Shana Perkins RN  Outcome: Progressing  11/29/2024 0453 by Elle Olmstead RN  Outcome: Progressing  Flowsheets  Taken 11/29/2024 0453 by Elle Olmstead RN  Absence of Physical Injury: Implement safety measures based on patient assessment  Taken 11/28/2024 2034 by Steph Elam RN  Absence of Physical Injury: Implement safety measures based on patient assessment     Problem: Nutrition Deficit:  Goal: Optimize nutritional status  11/29/2024 1535 by Shana Perkins RN  Outcome: Progressing  11/29/2024 0453 by Elle Olmstead RN  Outcome: Progressing     Problem: Pain  Goal: Verbalizes/displays adequate comfort level or baseline comfort level  11/29/2024 1535 by Shana Perkins RN  Outcome: Progressing  11/29/2024 0453 by Elle Olmstead RN  Outcome: Progressing  Flowsheets  Taken 11/29/2024 0400 by Steph Elam RN  Verbalizes/displays adequate comfort level or baseline comfort level:   Encourage patient to monitor pain and request assistance   Assess pain using appropriate pain scale   Administer analgesics based on type and severity of pain and evaluate response   Implement non-pharmacological measures as appropriate and evaluate response  Taken 11/29/2024 0000 by Steph Elam 
notify Licensed Independent Practitioner for values outside of normal range  Taken 12/5/2024 2000  Maintains optimal cardiac output and hemodynamic stability:   Monitor blood pressure and heart rate   Monitor urine output and notify Licensed Independent Practitioner for values outside of normal range     Problem: Cardiovascular - Adult  Goal: Absence of cardiac dysrhythmias or at baseline  12/6/2024 0427 by Steph Elam, RN  Outcome: Progressing  Flowsheets  Taken 12/6/2024 0400  Absence of cardiac dysrhythmias or at baseline:   Monitor cardiac rate and rhythm   Assess for signs of decreased cardiac output  Taken 12/6/2024 0000  Absence of cardiac dysrhythmias or at baseline:   Monitor cardiac rate and rhythm   Assess for signs of decreased cardiac output  Taken 12/5/2024 2000  Absence of cardiac dysrhythmias or at baseline: Monitor cardiac rate and rhythm     Problem: Neurosensory - Adult  Goal: Achieves stable or improved neurological status  12/6/2024 0427 by Steph Elam RN  Outcome: Progressing  Flowsheets  Taken 12/6/2024 0400  Achieves stable or improved neurological status:   Assess for and report changes in neurological status   Initiate measures to prevent increased intracranial pressure  Taken 12/6/2024 0000  Achieves stable or improved neurological status:   Assess for and report changes in neurological status   Initiate measures to prevent increased intracranial pressure   Maintain blood pressure and fluid volume within ordered parameters to optimize cerebral perfusion and minimize risk of hemorrhage   Monitor temperature, glucose, and sodium. Initiate appropriate interventions as ordered  Taken 12/5/2024 2000  Achieves stable or improved neurological status:   Assess for and report changes in neurological status   Initiate measures to prevent increased intracranial pressure   Maintain blood pressure and fluid volume within ordered parameters to optimize cerebral perfusion and minimize risk of

## 2024-12-06 NOTE — DISCHARGE SUMMARY
[facility]   IN-PATIENT SERVICE   [facility]    Discharge Summary     Patient ID: Noemy Turcios  :  1960   MRN: [id number]     ACCOUNT:  [id number]   Patient's PCP: Sixto Merrill MD  Admit Date: 2024   Discharge Date: 2024   Length of Stay: 9  Code Status:  DNR-CCA  Admitting Physician: Maximus Rosa MD  Discharge Physician: Hilda Arce MD     Active Discharge Diagnoses:       Primary Problem  Acute respiratory failure      Hospital Problems  Active Hospital Problems    Diagnosis Date Noted    Longstanding persistent atrial fibrillation (HCC) [I48.11] 2024     Priority: High    New onset atrial fibrillation (HCC) [I48.91] 2024     Priority: High    Elevated lipase [R74.8] 2024    Right upper quadrant abdominal pain [R10.11] 2024    Calculus of gallbladder with chronic cholecystitis without obstruction [K80.10] 2024    QT prolongation [R94.31] 2024    Shortness of breath [R06.02] 2024    Elevated C-reactive protein (CRP) [R79.82] 2024    Leukocytosis [D72.829] 2024    Positive blood culture [R78.81] 2024    Candidal intertrigo [B37.2] 2024    Encounter for palliative care [Z51.5] 2024    Altered mental status [R41.82] 2024    Elevated LFTs [R79.89] 2024    COVID-19 [U07.1] 2024    Acute cystitis with hematuria [N30.01] 2024    Acute respiratory failure [J96.00] 2024       Admission Condition:  poor     Discharged Condition: good    Hospital Stay:       Hospital Course:     64-year-old female history of COPD, CHF, previous history of DVT, morbid obesity presenting from nursing home with altered mental status since this morning.  On presentation to the ED, patient requiring increased oxygen.  At baseline she is on 3 to 4 L at the nursing home, in the ED she is on 9 L of high flow nasal cannula.  Patient also initially very lethargic, was altered but can answer

## 2024-12-06 NOTE — DISCHARGE INSTRUCTIONS
-Follow-up with OB/GYN in 1 week  -If you begin to experience any symptoms such as chest pain, shortness of breath, nausea, vomiting, dizziness, drowsiness, abdominal pain, loss of consciousness, or any other symptoms you find concerning please return to the ED for follow-up evaluation.  -If you have been given medication please take them as prescribed. Do not take more medication than recommended at any given time.   -Please follow-up with your primary care provider within 7 days for continued care.   -Please feel free return to the hospital if your symptoms worsen or any new concerning symptoms develop.  Follow-up with your primary care physician as needed for all other the concerns.

## 2024-12-06 NOTE — CARE COORDINATION
Case Management Assessment  Initial Evaluation    Date/Time of Evaluation: 11/28/2024 4:19 PM  Assessment Completed by: Charo Rodriguez RN    If patient is discharged prior to next notation, then this note serves as note for discharge by case management.    Patient Name: Noemy Turcios                   YOB: 1960  Diagnosis: Acute respiratory failure [J96.00]  Acute cystitis with hematuria [N30.01]  Altered mental status, unspecified altered mental status type [R41.82]  COVID-19 [U07.1]                   Date / Time: 11/27/2024 12:22 PM    Patient Admission Status: Inpatient   Readmission Risk (Low < 19, Mod (19-27), High > 27): Readmission Risk Score: 16    Current PCP: Sixto Merrill MD  PCP verified by CM?      Chart Reviewed: Yes      History Provided by: Medical Record  Patient Orientation: Other (see comment) (Pt. was asleep at the time of visit.)    Patient Cognition:      Hospitalization in the last 30 days (Readmission):  No    If yes, Readmission Assessment in  Navigator will be completed.    Advance Directives:      Code Status: DNR-CCA   Patient's Primary Decision Maker is:        Discharge Planning:    Patient lives with: Alone Type of Home: Skilled Nursing Facility  Primary Care Giver: Other (Comment) (SNF)  Patient Support Systems include: Family Members   Current Financial resources: Medicaid  Current community resources: ECF/Home Care  Current services prior to admission: Oxygen Therapy            Current DME:              Type of Home Care services:  None, Nursing Services    ADLS  Prior functional level:    Current functional level:      PT AM-PAC:   /24  OT AM-PAC:   /24    Family can provide assistance at DC:    Would you like Case Management to discuss the discharge plan with any other family members/significant others, and if so, who?    Plans to Return to Present Housing: Yes  Other Identified Issues/Barriers to RETURNING to current housing: Is Now COVID +  Potential 
DISCHARGE PLANNING NOTE:    Writer is following for potential discharge to [facility]. Pt is a bed hold at facility, no authorization needed for return, can return when medically ready. Writer met with pt for update, no further questions at this time.  Electronically signed by SANTA Scott on 12/5/2024 at 11:59 AM    
DISCHARGE PLANNING NOTE:    Writer is following for potential discharge to [facility]. Pt is a bed hold at facility, pt can return when medically ready no authorization needed. Writer met with pt for update, no further questions at this time.  Electronically signed by SANTA Scott on 12/4/2024 at 4:38 PM    
DISCHARGE PLANNING NOTE:    Writer is following for potential discharge to [facility]. No authorization needed for return, can return when medically ready. Pt met with pt for update, no further questions at this time.  Electronically signed by SANTA Scott on 12/6/2024 at 11:11 AM    Writer placed call to pt contact [facility] per pt request for update on transportation. No answer, voicemail left for return call.  Electronically signed by SANTA Scott on 12/6/2024 at 12:48 PM    
IMM letter provided to patient.  Patient offered four hours to make informed decision regarding appeal process; patient agreeable to discharge.   Electronically signed by SANTA Scott on 12/6/2024 at 12:49 PM    
ONGOING DISCHARGE  NOTE:      Writer reviewed notes, Patient is agreeable to discharge to [facility] [address]  [facility] [address]  Phone [phone]  Fax [phone]    Hida scan on hold     General surgery following     ATB    Amio drip        Patient has straight  Medicare.     Patient is a Bed Hold.     Will continue to follow for additional discharge needs.     If patient is discharged prior to next notation, then this note serves as note for discharge by case management.    Electronically signed by Jennifer Leigh RN on 12/3/2024 at 3:25 PM    
ONGOING DISCHARGE PLAN:    Patient is alert and oriented x4.    Spoke with patient regarding discharge plan and patient confirms that plan is still to discharge to [facility]    CXR in the AM    Decadron     ATB    Eliquis    At baseline oxygen     Will continue to follow for additional discharge needs.    If patient is discharged prior to next notation, then this note serves as note for discharge by case management.    Electronically signed by Jennifer Leigh RN on 12/2/2024 at 3:39 PM    
ONGOING DISCHARGE PLAN:    Patient is alert and oriented x4.    Spoke with patient regarding discharge plan and patient confirms that plan is still to discharge to [facility]    Patient is a bedhold    Picc line placed today     On 5 liters salter    ATB    Liver ultrasound pending     IV Amiodarone       Will continue to follow for additional discharge needs.    If patient is discharged prior to next notation, then this note serves as note for discharge by case management.    Electronically signed by Jennifer Leigh RN on 11/29/2024 at 2:04 PM    
Transportation arranged for patient to go to [facility] at [facility] via [facility]. Facility informed. Bedside nurse informed.     Number for Report: [phone]  Electronically signed by SANTA Scott on 12/6/2024 at 12:47 PM      
ventilator support    Rehab Therapies: Physical Therapy and Occupational Therapy  Weight Bearing Status/Restrictions: No weight bearing restrictions  Other Medical Equipment (for information only, NOT a DME order):    Other Treatments:     Patient's personal belongings (please select all that are sent with patient):  Glasses    RN SIGNATURE:  Electronically signed by Italia Leong RN on 12/6/24 at 11:19 AM EST    CASE MANAGEMENT/SOCIAL WORK SECTION    Inpatient Status Date: 11/27/24    Readmission Risk Assessment Score:  Readmission Risk              Risk of Unplanned Readmission:  32           Discharging to Facility/ Agency   [address] [address]  Phone [phone]  Fax [phone]    Dialysis Facility (if applicable)   Name:  Address:  Dialysis Schedule:  Phone:  Fax:    / signature: Electronically signed by SANTA Scott on 12/4/24 at 4:38 PM EST    PHYSICIAN SECTION    Prognosis: Fair    Condition at Discharge: Stable    Rehab Potential (if transferring to Rehab): Fair    Recommended Labs or Other Treatments After Discharge:     Physician Certification: I certify the above information and transfer of Noemy Turcios  is necessary for the continuing treatment of the diagnosis listed and that she requires Skilled Nursing Facility for greater 30 days.     Update Admission H&P: No change in H&P    PHYSICIAN SIGNATURE:  Electronically signed by Lisa Madison MD on 12/6/24 at 2:04 PM EST    Medication List    START taking these medications    START taking these medications  amiodarone 200 MG tablet  Commonly known as: CORDARONE  Take 1 tablet by mouth daily  Start taking on: December 7, 2024   digoxin 125 MCG tablet  Commonly known as: LANOXIN  Take 1 tablet by mouth every other day  Start taking on: December 8, 2024   HYDROcodone-acetaminophen 5-325 MG per tablet  Commonly known as: Norco  Take 1 tablet by mouth every 8 hours as needed for

## 2024-12-06 NOTE — PROGRESS NOTES
[facility] -   Infectious diseases evaluation  admission date 11/27/2024    reason for consultation:   COVID-19    Impression :   Current:  COVID-19 infection/multifocal pneumonia  UTI  Symptomatic cholelithiasis  Leukocytosis.  On steroids  Candidal intertrigo to the neck breast folds, groin and axillary area  Encephalopathy likely secondary to above  Positive blood culture for staph epi likely contamination  QTC Prolongation 522  Acute on chronic hypoxic respiratory failure  Elevated liver enzyme  COPD on home oxygen  CHF  History of DVT and PE  Morbid obesity  QT prolongation        HENCE:      Zosyn course completed 12/4/2024  Decadron 6 mg IV daily.  Topical antifungal treatment  MRSA swab was positive, likely colonization  Urine culture was not sent  Liver ultrasound showing hepatomegaly with hepatic steatosis.  Cholelithiasis.  DNR CCA no intubation      Infection Control Recommendations   Droplet plus isolation    Antimicrobial Stewardship Recommendations   Simplification of therapy  Targeted therapy      History of Present Illness:   Initial history:  Noemy Turcios is a 64 y.o.-year-old female was brought to the hospital from nursing facility for worsening mental status, lethargic, requiring more oxygen.  The patient was initially placed on 9 L of high flow oxygen per nasal cannula.  She is awake, confused, unable to provide detailed history that was obtained from chart review and nursing staff.  Initial WBC 12.1, lactic acid 3.1, procalcitonin 0.27  COVID-19 was positive  UA suggestive of UTI showed too numerous to count WBC, too numerous to count RBCs, many bacteria, moderate leukocyte esterase  D-dimer 2.27   CT scan abdomen and pelvis was negative  CT PE no pulmonary embolism, multifocal pneumonia, pulmonary hypertension, at least moderate coronary artery calcification  CTA head no acute abnormalities  ProBNP 38,510  Interval changes  12/6/2024   She was 
    [place] [facility]    Gastroenterology Daily Progress Note      Patient:   Noemy Turcios   :    1960   Facility:   [facility]  Date:     2024  Consultant:   KIRK Zamorano - CNP, CNP      SUBJECTIVE  64 y.o. female admitted 2024 with Acute respiratory failure [J96.00]  Acute cystitis with hematuria [N30.01]  Altered mental status, unspecified altered mental status type [R41.82]  COVID-19 [U07.1] and seen for elevated lft's. The pt was seen and examined. She is alert, on oxygen. Lft's trending down no c/o abdominal pain or nausea. Tolerating tube feeding liver w/u ongoing        OBJECTIVE  Scheduled Meds:   midodrine  2.5 mg Oral TID WC    piperacillin-tazobactam  4,500 mg IntraVENous Q8H    sodium chloride flush  5-40 mL IntraVENous 2 times per day    lidocaine 1 % injection  50 mg IntraDERmal Once    [Held by provider] furosemide  40 mg IntraVENous BID    docusate sodium  100 mg Oral BID    miconazole   Topical BID    polyethylene glycol  17 g Oral Daily    senna  2 tablet Oral Daily    venlafaxine  150 mg Oral Daily    sodium chloride flush  5-40 mL IntraVENous 2 times per day    pantoprazole (PROTONIX) 40 mg in sodium chloride (PF) 0.9 % 10 mL injection  40 mg IntraVENous Daily    dexAMETHasone  6 mg IntraVENous Daily    [Held by provider] apixaban  5 mg Oral BID       Vital Signs:  /66   Pulse (!) 104   Temp 97.8 °F (36.6 °C) (Axillary)   Resp 17   Ht 1.676 m (5' 5.98\")   Wt (!) 147.9 kg (326 lb 1 oz)   SpO2 96%   BMI 52.65 kg/m²    Review of Systems - History obtained from chart review and the patient  General ROS: positive for  - fatigue  Respiratory ROS: positive for - cough  Cardiovascular ROS: negative for - chest pain  Gastrointestinal ROS: no abdominal pain, change in bowel habits, or black or bloody stools   Physical Exam:     General Appearance: ill appearing and alert and oriented to person, place and time, but lethargic well-developed and 
    [place] [facility]    Gastroenterology Daily Progress Note      Patient:   Noemy Turcios   :    1960   Facility:   [facility]   Date:     2024  Consultant:   KIRK Zamorano - CNP, CNP      SUBJECTIVE  64 y.o. female admitted 2024 with Acute respiratory failure [J96.00]  Acute cystitis with hematuria [N30.01]  Altered mental status, unspecified altered mental status type [R41.82]  COVID-19 [U07.1] and seen for elevated lipase and abdominal pain. The pt was seen and examined. Lipase trending down HIDA shows low ejection fraction. Pt denies nausea c/o intermittent right sided abdominal pain. .        OBJECTIVE  Scheduled Meds:   digoxin  125 mcg Oral Every Other Day    [Held by provider] amiodarone  200 mg Oral Daily    furosemide  20 mg IntraVENous Daily    metoprolol succinate  50 mg Oral Daily    midodrine  10 mg Oral TID WC    piperacillin-tazobactam  4,500 mg IntraVENous Q8H    sodium chloride flush  5-40 mL IntraVENous 2 times per day    docusate sodium  100 mg Oral BID    miconazole   Topical BID    polyethylene glycol  17 g Oral Daily    senna  2 tablet Oral Daily    venlafaxine  150 mg Oral Daily    pantoprazole (PROTONIX) 40 mg in sodium chloride (PF) 0.9 % 10 mL injection  40 mg IntraVENous Daily    dexAMETHasone  6 mg IntraVENous Daily    apixaban  5 mg Oral BID       Vital Signs:  /74   Pulse 97   Temp 97.3 °F (36.3 °C) (Oral)   Resp 18   Ht 1.676 m (5' 5.98\")   Wt (!) 144.6 kg (318 lb 12.6 oz)   SpO2 92%   BMI 51.48 kg/m²    Review of Systems - History obtained from chart review and the patient  General ROS: positive for  - fatigue  Respiratory ROS: positive for - cough  Cardiovascular ROS: negative  Gastrointestinal ROS: positive for - abdominal pain   Physical Exam:     General Appearance: ill appearing alert and oriented to person, place and time, well-developed and well-nourished, in no acute distress  Skin: warm and dry, no rash or 
    [facility] PULMONARY,CRITICAL CARE & SLEEP   Teofelipe Marks MD/Dejan BRADLEY AGAP-BC, NP-C      Cherie BRADLEY NP-C    Brodie BRADLEY NP-C                                         Pulmonary Progress Note    Patient - Noemy Turcios   Age - 64 y.o.   - 1960  MRN - [id number]  Acct # - [id number]  Date of Admission - 2024 12:22 PM    Consulting Service/Physician:       Primary Care Physician: Sixto Merrill MD    SUBJECTIVE:     Chief Complaint:   Chief Complaint   Patient presents with    Fatigue     Subjective:    She continues to be very fatigued.  She is alert, she answers questions.  She denies any worsening shortness of breath.  She tells me that her belly pain has improved.  She denies any nausea.  She remains on 2 L, pulse ox in the low 90s.    She is now off amiodarone infusion.  Heart rate is in the 80s.  She is still in A-fib.  She denies any chest pain.  She has not had any fevers.    VITALS  /78   Pulse 84   Temp 97.7 °F (36.5 °C) (Oral)   Resp 18   Ht 1.676 m (5' 5.98\")   Wt (!) 144.6 kg (318 lb 12.6 oz)   SpO2 92%   BMI 51.48 kg/m²   Wt Readings from Last 3 Encounters:   24 (!) 144.6 kg (318 lb 12.6 oz)   23 (!) 181.4 kg (399 lb 14.6 oz)   21 (!) 158.8 kg (350 lb)     I/O (24 Hours)    Intake/Output Summary (Last 24 hours) at 2024 1515  Last data filed at 2024 1250  Gross per 24 hour   Intake --   Output 2500 ml   Net -2500 ml     Ventilator:      Exam:   Physical Exam   Constitutional:  Oriented to person, place, and time.  Sleepy, answers all questions, not in distress lying in bed, not motivated  HENT: Unremarkable, nasal cannula in place  Head: Normocephalic and atraumatic.   Eyes: EOM are normal. Pupils are equal, round, and reactive to light.   Neck: Neck supple.  Short thick stature neck  Cardiovascular: A-fib, rate better controlled today, heart rate 101 on amiodarone 
    [facility] PULMONARY,CRITICAL CARE & SLEEP   Teofelipe Marks MD/Dejan BRADLEY AGAP-BC, NP-C      Cherie BRADLEY NP-C    Brodie BRADLEY NP-C                                         Pulmonary Progress Note    Patient - Noemy Turcios   Age - 64 y.o.   - 1960  MRN - [id number]  Acct # - [id number]  Date of Admission - 2024 12:22 PM    Consulting Service/Physician:       Primary Care Physician: Sixto Merrill MD    SUBJECTIVE:     Chief Complaint:   Chief Complaint   Patient presents with    Fatigue     Subjective:    She tells me she has been short of breath for several days, she does not feel like she is worse than a few days ago.  She denies any fevers or chills.  She has tolerated being weaned down to 3 L of oxygen which is her baseline.  She denies any cough or phlegm.  She denies any chest pain.  She denies any nausea.  She has been having some right upper quadrant abdominal pain.  HIDA scan and surgical evaluation has been ordered by primary care.    VITALS  /86   Pulse 93   Temp 98.3 °F (36.8 °C) (Axillary)   Resp 18   Ht 1.676 m (5' 5.98\")   Wt (!) 144.6 kg (318 lb 12.6 oz)   SpO2 95%   BMI 51.48 kg/m²   Wt Readings from Last 3 Encounters:   24 (!) 144.6 kg (318 lb 12.6 oz)   23 (!) 181.4 kg (399 lb 14.6 oz)   21 (!) 158.8 kg (350 lb)     I/O (24 Hours)    Intake/Output Summary (Last 24 hours) at 2024 1411  Last data filed at 2024 1311  Gross per 24 hour   Intake 780 ml   Output 1650 ml   Net -870 ml     Ventilator:      Exam:   Physical Exam   Constitutional:  Oriented to person, place, and time. Appears well-developed and well-nourished.  Obese female lying flat in bed for comfort, not in distress  HENT: Unremarkable, nasal cannula in place  Head: Normocephalic and atraumatic.   Eyes: EOM are normal. Pupils are equal, round, and reactive to light.   Neck: Neck supple.  Short thick stature 
    [facility] PULMONARY,CRITICAL CARE & SLEEP   Teofelipe Marks MD/Dejan BRADLEY AGAP-BC, NP-C      Cherie BRADLEY NP-C    Brodie BRADLEY NP-C                                         Pulmonary Progress Note    Patient - Noemy Turcios   Age - 64 y.o.   - 1960  MRN - [id number]  Acct # - [id number]  Date of Admission - 2024 12:22 PM    Consulting Service/Physician:       Primary Care Physician: Sixto Merrill MD    SUBJECTIVE:     Chief Complaint:   Chief Complaint   Patient presents with    Fatigue     Subjective:    She went into A-fib with RVR overnight, heart rate as high as 150.  She did receive digoxin, still remained elevated, was placed on amiodarone infusion.  Heart rate currently is 115, she denies any chest pain or palpitations currently.  She does not feel short of breath as she did yesterday.  She remains on 3 L with adequate saturations.  She has not had any fevers.    White count has increased.  CRP is trending down.  proBNP greater than 70,000    She has been having perineal bleeding, quite extensive per nursing, her hemoglobin this morning is 15.4.  She does have a rectal tube and a Singleton catheter, no blood seen in either.    VITALS  BP (!) 122/100   Pulse (!) 106   Temp 97.9 °F (36.6 °C)   Resp 18   Ht 1.676 m (5' 5.98\")   Wt (!) 144.6 kg (318 lb 12.6 oz)   SpO2 95%   BMI 51.48 kg/m²   Wt Readings from Last 3 Encounters:   24 (!) 144.6 kg (318 lb 12.6 oz)   23 (!) 181.4 kg (399 lb 14.6 oz)   21 (!) 158.8 kg (350 lb)     I/O (24 Hours)    Intake/Output Summary (Last 24 hours) at 12/3/2024 1107  Last data filed at 2024 1831  Gross per 24 hour   Intake 210 ml   Output 800 ml   Net -590 ml     Ventilator:      Exam:   Physical Exam   Constitutional:  Oriented to person, place, and time. Appears well-developed and well-nourished.  Obese female lying in bed, getting changed, not in distress  HENT: Unremarkable, 
    Palliative Care Progress Note    NAME:  Noemy Turcios  MEDICAL RECORD NUMBER:  [id number]  AGE: 64 y.o.   GENDER: female  : 1960  TODAY'S DATE:  2024    Reason for Consult:  goals of care      Plan        Palliative Interaction: Patient seen on rounds. Resting comfortably in bed. She is in better spirits as compared to my initial visit with her. She states she is doing well and is currently without complaint.     Likely being discharged today per primary. No further palliative interventions. Will sign off. Should additional concerns arise, please reach out.           Principle Problem/Diagnosis:  Acute respiratory failure    Additional Assessments:    1- Symptom management/ pain control     Pain Assessment:  Some hip pain               Anxiety:  none                          Dyspnea:  none                          Fatigue:   generalized         We feel the patient symptoms are being controlled. her current regimen is reviewed by myself and discussed with the staff.       2- Goals of care evaluation   The patient goals of care are improve or maintain function/quality of life   Goals of care discussed with:    [x] Patient independently    [] Patient and Family    [] Family or Healthcare DPOA independently    [] Unable to discuss with patient, family/DPOA not present    3- Code Status  DNR-CCA    4- Other recommendations  - We will continue to provide comfort and support to the patient and the family      History of Present Illness     The patient is a 64 y.o.  Non- / non  female who presents with Fatigue      Referred to Palliative Care by  [x] Physician   [] Nursing  [] Family Request   [] Other:       Patient is a 64 year old female, past medical history of COPD, CHF, and morbid obesity. Patient presented with complaints of altered mental status and increased O2 requirements. Work up in the ED significant for multifocal pneumonia, COVID-19, as well as UTI. Patient also noted to have AST of 
    [place] [facility]    Gastroenterology Daily Progress Note      Patient:   Noemy Turcios   :    1960   Facility:   [facility]  Date:     2024  Consultant:   KIRK Zamorano - CNP, CNP      SUBJECTIVE  64 y.o. female admitted 2024 with Acute respiratory failure [J96.00]  Acute cystitis with hematuria [N30.01]  Altered mental status, unspecified altered mental status type [R41.82]  COVID-19 [U07.1] and seen for elevated lft. The pt was seen and examined. Lft's trending down,. No c/o abdominal pain or nausea, fms with brown stool .liver autoimmune results pending.        OBJECTIVE  Scheduled Meds:   potassium bicarb-citric acid  40 mEq Oral Once    midodrine  5 mg Oral TID WC    furosemide  40 mg IntraVENous Daily    piperacillin-tazobactam  4,500 mg IntraVENous Q8H    sodium chloride flush  5-40 mL IntraVENous 2 times per day    lidocaine 1 % injection  50 mg IntraDERmal Once    docusate sodium  100 mg Oral BID    miconazole   Topical BID    polyethylene glycol  17 g Oral Daily    senna  2 tablet Oral Daily    venlafaxine  150 mg Oral Daily    pantoprazole (PROTONIX) 40 mg in sodium chloride (PF) 0.9 % 10 mL injection  40 mg IntraVENous Daily    dexAMETHasone  6 mg IntraVENous Daily    apixaban  5 mg Oral BID       Vital Signs:  /78   Pulse (!) 105   Temp 99 °F (37.2 °C) (Axillary)   Resp 14   Ht 1.676 m (5' 5.98\")   Wt (!) 147.9 kg (326 lb 1 oz)   SpO2 98%   BMI 52.65 kg/m²    Review of Systems - History obtained from chart review and the patient  General ROS: positive for  - fatigue  Respiratory ROS: positive for - cough  Cardiovascular ROS: negative for - chest pain  Gastrointestinal ROS: no abdominal pain, change in bowel habits, or black or bloody stools   Physical Exam:     General Appearance: ill appearing and alert and oriented to person, place and time, but lethargic well-developed and well-nourished, in no acute distress  Skin: warm and dry, no  
    [facility] PULMONARY,CRITICAL CARE & SLEEP   Teofelipe Marks MD/Dejan BRADLEY AGAP-BC, NP-C      Cherie BRADLEY NP-C    Brodie BRADLEY NP-C                                         Pulmonary Progress Note    Patient - Noemy Turcios   Age - 64 y.o.   - 1960  MRN - [id number]  Acct # - [id number]  Date of Admission - 2024 12:22 PM    Consulting Service/Physician:       Primary Care Physician: Sixto Merrill MD    SUBJECTIVE:     Chief Complaint:   Chief Complaint   Patient presents with    Fatigue     Subjective:    She continues to seem fatigued, lethargic at times.  She did awaken for me but did not keep her eyes open for very long.  She did answer a few simple questions for me.  I am told that she was able to have breakfast and have a meaningful conversation with RN earlier today.  She has not had any fevers.  Blood pressure and heart rate have been stable.  She has not had any desaturations while on 2 L.    She was taken out of droplet plus isolation.    She denies any worsening shortness of breath, no cough or phlegm.  She denies any abdominal pain or nausea today.    Her bicarb on her CHEM profile is slightly elevated at 34.  She continues to be in a negative net balance for fluid status.    VITALS  /73   Pulse 88   Temp 97.5 °F (36.4 °C) (Axillary)   Resp 14   Ht 1.676 m (5' 5.98\")   Wt (!) 144.6 kg (318 lb 12.6 oz)   SpO2 97%   BMI 51.48 kg/m²   Wt Readings from Last 3 Encounters:   24 (!) 144.6 kg (318 lb 12.6 oz)   23 (!) 181.4 kg (399 lb 14.6 oz)   21 (!) 158.8 kg (350 lb)     I/O (24 Hours)    Intake/Output Summary (Last 24 hours) at 2024 1237  Last data filed at 2024  Gross per 24 hour   Intake 10 ml   Output 1700 ml   Net -1690 ml     Ventilator:      Exam:   Physical Exam   Constitutional:  Oriented to person, place, and time.  Sleepy, answers all questions, not in distress lying in bed, looks 
    [place] [facility]    Gastroenterology Daily Progress Note      Patient:   Noemy Turcios   :    1960   Facility:   [facility]  Date:     2024  Consultant:   KIRK Zamorano - CNP, CNP      SUBJECTIVE  64 y.o. female admitted 2024 with Acute respiratory failure [J96.00]  Acute cystitis with hematuria [N30.01]  Altered mental status, unspecified altered mental status type [R41.82]  COVID-19 [U07.1] and seen for abdominal pain with elevated lipase. The pt was seen and examined. No c/o nausea c/o right sided abdominal pain. .        OBJECTIVE  Scheduled Meds:   amiodarone  200 mg Oral Daily    [START ON 2024] furosemide  20 mg Oral Daily    digoxin  125 mcg Oral Every Other Day    metoprolol succinate  50 mg Oral Daily    midodrine  10 mg Oral TID WC    sodium chloride flush  5-40 mL IntraVENous 2 times per day    docusate sodium  100 mg Oral BID    miconazole   Topical BID    polyethylene glycol  17 g Oral Daily    senna  2 tablet Oral Daily    venlafaxine  150 mg Oral Daily    pantoprazole (PROTONIX) 40 mg in sodium chloride (PF) 0.9 % 10 mL injection  40 mg IntraVENous Daily    dexAMETHasone  6 mg IntraVENous Daily    apixaban  5 mg Oral BID       Vital Signs:  /78   Pulse 84   Temp 97.7 °F (36.5 °C) (Oral)   Resp 18   Ht 1.676 m (5' 5.98\")   Wt (!) 144.6 kg (318 lb 12.6 oz)   SpO2 92%   BMI 51.48 kg/m²    Review of Systems - History obtained from chart review and the patient  General ROS: positive for  - fatigue  Respiratory ROS: positive for - cough  Cardiovascular ROS: negative  Gastrointestinal ROS: positive for - abdominal pain   Physical Exam:     General Appearance: ill appearing alert and oriented to person, place and time, well-developed and well-nourished, in no acute distress  Skin: warm and dry, no rash or erythema  Head: normocephalic and atraumatic  Eyes: pupils equal, round, and reactive to light, extraocular eye movements intact, 
   11/30/24 1400   Encounter Summary   Encounter Overview/Reason Spiritual/Emotional Needs   Service Provided For Patient   Referral/Consult From Palliative Care   Last Encounter  11/30/24   Complexity of Encounter Low   Spiritual/Emotional needs   Type Spiritual Support   Assessment/Intervention/Outcome   Assessment Unable to assess  (Covid Isolation)   Intervention Prayer (assurance of)/[place]       
   12/05/24 1711   Encounter Summary   Encounter Overview/Reason Spiritual/Emotional Needs   Service Provided For Patient   Referral/Consult From Palliative Care   Last Encounter  12/05/24   Complexity of Encounter Low   Spiritual/Emotional needs   Type Spiritual Support   Palliative Care   Type Palliative Care, Follow-up   Assessment/Intervention/Outcome   Assessment Unable to assess  (covid isolation)   Intervention Prayer (assurance of)/[place]       
   12/06/24 1127   Encounter Summary   Encounter Overview/Reason Loneliness/Social Isolation       
  [facility] General Surgery   Azam Rondon MD, FACS  Lise PIERRE Tom, APRN-CNP  [address], [address]  [place] [address]  P: [phone], F: [phone]    General and Robotic Surgery  Progress Note             PATIENT NAME: Noemy Turcios   :  1960   MRN: [id number]   PCP:  Sixto Merrill MD     TODAY'S DATE: 2024    64 y.o. female seen and examined.  Discussed with charge nurse.  Sleeping comfortably.  No new issues from my standpoint.  GI input noted.  Patient was short of breath earlier.  Not even stable for EGD at this time.    PAST MEDICAL HISTORY     Past Medical History:   Diagnosis Date    Arthritis     CHF (congestive heart failure) (HCC)     COPD (chronic obstructive pulmonary disease) (HCC)     Diverticulitis     Hx of blood clots     Pulmonary embolism (HCC)        PROBLEM LIST     Patient Active Problem List   Diagnosis    Chronic heart failure with preserved ejection fraction (HCC)    Chronic obstructive pulmonary disease (HCC)    Tobacco abuse    Morbid obesity    Essential hypertension    History of pulmonary embolism    Family history of colon cancer in mother    Prediabetes    Class 4 congestive heart failure, acute on chronic, systolic (HCC)    Hypokalemia    Cellulitis    Primary osteoarthritis of right hip    Chronic pain of multiple joints    Depression    Cellulitis of right leg    Peripheral artery disease (HCC)    Non healing left heel wound    Corns and callosities    Healing pressure ulcer, stage IV (HCC)    Lymphedema of both lower extremities    Non-pressure chronic ulcer of right lower leg with fat layer exposed (HCC)    Localized edema    Xerosis cutis    Depression with suicidal ideation    Major depressive disorder, recurrent, severe without psychotic behavior (HCC)    Percocet use disorder, mild, abuse (HCC)    Severe recurrent major depression without psychotic features (HCC)    Rash of body    At high risk for deep venous thrombosis    On 
  [facility] General Surgery   Azam Rondon MD, FACS  Lise PIERRE Tom, APRN-CNP  [address], [address]  [place] [address]  P: [phone], F: [phone]    General and Robotic Surgery  Progress Note         PATIENT NAME: Noemy Turcios   :  1960   MRN: [id number]   PCP:  Sixto Merrill MD     TODAY'S DATE: 12/3/2024    HISTORY OF PRESENT ILLNESS: 64 y.o. female seen and examined at bedside earlier today.  Patient actually denies any abdominal pain at this point.  States that she feels better.  Denies nausea and vomiting.  No fever or chills.  Nursing staff reports that patient's heart rate has been quite elevated, has been unable to complete HIDA scan.    PAST MEDICAL HISTORY     Past Medical History:   Diagnosis Date    Arthritis     CHF (congestive heart failure) (HCC)     COPD (chronic obstructive pulmonary disease) (HCC)     Diverticulitis     Hx of blood clots     Pulmonary embolism (HCC)        PROBLEM LIST     Patient Active Problem List   Diagnosis    Chronic heart failure with preserved ejection fraction (HCC)    Chronic obstructive pulmonary disease (HCC)    Tobacco abuse    Morbid obesity    Essential hypertension    History of pulmonary embolism    Family history of colon cancer in mother    Prediabetes    Class 4 congestive heart failure, acute on chronic, systolic (HCC)    Hypokalemia    Cellulitis    Primary osteoarthritis of right hip    Chronic pain of multiple joints    Depression    Cellulitis of right leg    Peripheral artery disease (HCC)    Non healing left heel wound    Corns and callosities    Healing pressure ulcer, stage IV (HCC)    Lymphedema of both lower extremities    Non-pressure chronic ulcer of right lower leg with fat layer exposed (HCC)    Localized edema    Xerosis cutis    Depression with suicidal ideation    Major depressive disorder, recurrent, severe without psychotic behavior (HCC)    Percocet use disorder, mild, abuse (HCC)    Severe recurrent 
  [facility]  IN-PATIENT SERVICE  [facility]    PROGRESS NOTE             12/2/2024    7:06 AM    Name:   Noemy Turcios  MRN:     [id number]     Acct:      [id number]   Room:   2101/2101-01   Day:  5  Admit Date:  11/27/2024 12:22 PM    PCP:  Sixto Merrill MD  Code Status:  DNR-CCA    Subjective:     C/C:   Chief Complaint   Patient presents with    Fatigue     Interval History Status: improved      -Patient was seen and examined at bedside.  Amiodarone drip was switched to tablet on 12/1; patient was A-fib overnight -125  Metoprolol was given.  -Patient on 3 L nasal cannula with O2 saturation 95 to 98% 9  -Patient was transferred to [facility]  -Diet advanced to regular  -Patient had right upper quadrant abdominal pain; solid to touch; feels like stool impaction; ultrasound abdomen ordered  -Labs were unable to draw on today  -Pelvic ultrasound still pending  -Liver-Kidney-Microsome Abs, IgG negative; excluding autoimmune liver disease  -Hyperkalemia resolved;potassium now 3.6  -Smooth muscle antibody quant is 23; Weak Positive-Suggest repeat testing in two to three weeks with fresh specimen.  -CRP trending down 11.8>30.0>126        Brief History:     64-year-old female history of COPD, CHF, previous history of DVT, morbid obesity presenting from nursing home with altered mental status since this morning.  On presentation to the ED, patient requiring increased oxygen.  At baseline she is on 3 to 4 L at the nursing home, in the ED she is on 9 L of high flow nasal cannula.  Patient also initially very lethargic, was altered but can answer questions.     Labs also significant for elevated lactic of 3.1, elevated white blood cell count of 12.1, elevated Pro-Jonathan of 0.27, elevated D-dimer, CT PE was negative however did show multifocal pneumonia.  Of note patient does take multiple sedatives at at the nursing home Neurontin, morphine, Percocet, Valium, Effexor, 
  Physician Progress Note      PATIENT:               RUBEN BRIZUELA  CSN #:                  [id number]  :                       1960  ADMIT DATE:       2024 12:22 PM  DISCH DATE:  RESPONDING  PROVIDER #:        Lisa Madison MD          QUERY TEXT:    Patient admitted with severe sepsis secondary to Covid-19 vs multifocal   pneumonia as well as UTI,  Per 24  IM progress note: episode of atrial fibrillation fibrillation   with RVR overnight, being treated with Eliquis    If possible, please document in progress notes and discharge summary if you   are evaluating and/or treating any of the following:?  ?  The medical record reflects the following:  Risk Factors: female, CHF, HTN  Clinical Indicators: atrial fibrillation,  female, CHF, hypertension  Treatment: Brandon Berry, MSN, RN, CCDS, CRCR  Clinical   .  Options provided:  -- Secondary hypercoagulable state in a patient with atrial fibrillation  -- Other - I will add my own diagnosis  -- Disagree - Not applicable / Not valid  -- Disagree - Clinically unable to determine / Unknown  -- Refer to Clinical Documentation Reviewer    PROVIDER RESPONSE TEXT:    This patient has secondary hypercoagulable state in a patient with atrial   fibrillation.    Query created by: Jamila Berry on 2024 11:55 AM      Electronically signed by:  Lisa Madison MD 2024 1:08 PM          
  [facility]  IN-PATIENT SERVICE  [facility]    PROGRESS NOTE             12/6/2024    8:31 AM    Name:   Noemy Turcios  MRN:     [id number]     Acct:      [id number]   Room:   2112/2112-01   Day:  9  Admit Date:  11/27/2024 12:22 PM    PCP:  Sixto Merrill MD  Code Status:  DNR-CCA    Subjective:     C/C:   Chief Complaint   Patient presents with    Fatigue     Interval History Status: not changed.      -Patient was seen and examined at bedside.  Vitally stable.  Atrial fibrillation without tachycardia.   -Patient down to 2.5 L nasal cannula; 2 saturation 92-95  -White blood cells trending down; last day of Decadron tomorrow  -Kidney function WNL  -Electrolytes: sodium 133/potassium 3.6; will monitor  -KUB on 12/6: No stool burden  -Zosyn course completed on 12/4      Brief History:     64-year-old female history of COPD, CHF, previous history of DVT, morbid obesity presenting from nursing home with altered mental status since this morning.  On presentation to the ED, patient requiring increased oxygen.  At baseline she is on 3 to 4 L at the nursing home, in the ED she is on 9 L of high flow nasal cannula.  Patient also initially very lethargic, was altered but can answer questions.     Labs also significant for elevated lactic of 3.1, elevated white blood cell count of 12.1, elevated Pro-Jonathan of 0.27, elevated D-dimer, CT PE was negative however did show multifocal pneumonia.  Of note patient does take multiple sedatives at at the nursing home Neurontin, morphine, Percocet, Valium, Effexor, trazodone.  Concern for aspiration pneumonia at this time as well.  Labs also significant for positive COVID 19 pneumonia.  Will start patient on Decadron, breathing treatments and get infectious disease on board.  She also had a very elevated AST level of more than 1000, will get ferritin, CT angiogram of the abdomen to rule out ischemic liver.  Will also get GI on board.  
[facility]   Occupational Therapy Evaluation  Date: 24  Patient Name: Noemy Turcios       Room:   MRN: [id number]  Account: [id number]   : 1960  (64 y.o.) Gender: female     Discharge Recommendations:  The patient may need non-skilled ADL assistance after discharge.     OT Equipment Recommendations  Other: N/A    Referring Practitioner: Hilda Arce MD  Diagnosis: Acute Respiratory Failure           Past Medical History:  has a past medical history of Arthritis, CHF (congestive heart failure) ([facility]), COPD (chronic obstructive pulmonary disease) ([facility]), Diverticulitis, Hx of blood clots, and Pulmonary embolism ([facility]).    Past Surgical History:   has a past surgical history that includes Ankle surgery; Colon surgery; hernia repair; and Foot surgery (Left, 2021).    Restrictions  Restrictions/Precautions  Restrictions/Precautions: Fall Risk, General Precautions, Isolation (Covid-19)  Required Braces or Orthoses?: No  Implants present? : Metal implants (L ankle surgery)      Vitals  Vitals  O2 Device: High flow nasal cannula     Subjective  Comments: Ok per RN Angeles for OT/PT eval  Subjective  Pain: patient reporting 9/10 R side pain      Social/Functional History  Social/Functional History  Lives With: Other (comment) ([facility], LTC resident)  Type of Home: Facility  Home Layout: One level  Home Equipment: Oxygen, Hospital bed  Receives Help From: Personal care attendant  ADL Assistance: Needs assistance (patient reporting able to complete hygiene/grooming task and self feeding with set-up but recieves assistance from staff for toileting, dressing and bed baths)  Homemaking Assistance: Needs assistance (staff at facility completes)  Ambulation Assistance: Non-ambulatory  Transfer Assistance: Needs assistance (patient reporting use of total lift for transfers, reporting limited time spent out of bed)  IADL Comments: patient sleeps in hospital bed  Additional 
Attending Physician Statement  I have discussed the care of Noemy Turcios and I have examined the patient myself and taken ROS and HPI, including pertinent history and exam findings, with the resident. I have reviewed the key elements of all parts of the encounter with the resident.  I agree with the assessment, plan and orders as documented by the resident.    Patient seen and examined, clinically improved much more awake and alert today no abdominal pain no abdominal tenderness transaminitis improving will advance diet  Patient to be switched to p.o. amnio today  Will transfer out of ICU  Singleton's catheter placed on  Dose of Lasix reduced to 20, replace potassium  Leukocytosis resolved  Electronically signed by Lisa Madison MD    
Bladder scan done, 22ml recorded.  
Bladder scan done, 271ml recorded.  
Bladder scan of 326 ml recorded, straight catheterization was done since it has been more than 12 hours from the last straight cath time yesterday.    400 ml of milad colored urine output from straight cath recorded.  
[facility]   Progress Note                   Date:   12/5/2024  Patient name: Noemy Turcios  Date of admission:  11/27/2024 12:22 PM  MRN:   [id number]  YOB: 1960  PCP: Sixto Merrill MD    Reason for Admission: Acute respiratory failure [J96.00]  Acute cystitis with hematuria [N30.01]  Altered mental status, unspecified altered mental status type [R41.82]  COVID-19 [U07.1]    Subjective:       Clinical Changes / Abnormalities: Pt seen and examined in the room.  Patient resting in bed with RN at bedside. Pt denies any current CP. Reports a continued/chronic sob.  Labs, vitals and tele reviewed- A-fib 95  Amiodarone drip infusing     Medications:   Scheduled Meds:   digoxin  125 mcg Oral Every Other Day    [Held by provider] amiodarone  200 mg Oral Daily    furosemide  20 mg IntraVENous Daily    metoprolol succinate  50 mg Oral Daily    midodrine  10 mg Oral TID WC    sodium chloride flush  5-40 mL IntraVENous 2 times per day    docusate sodium  100 mg Oral BID    miconazole   Topical BID    polyethylene glycol  17 g Oral Daily    senna  2 tablet Oral Daily    venlafaxine  150 mg Oral Daily    pantoprazole (PROTONIX) 40 mg in sodium chloride (PF) 0.9 % 10 mL injection  40 mg IntraVENous Daily    dexAMETHasone  6 mg IntraVENous Daily    apixaban  5 mg Oral BID     Continuous Infusions:   amiodarone 0.5 mg/min (12/04/24 1146)    sodium chloride      [Held by provider] sodium chloride Stopped (11/30/24 0908)     CBC:   Recent Labs     12/03/24  0448 12/04/24  0607   WBC 20.0* 19.7*   HGB 15.4 14.0    284     BMP:    Recent Labs     12/03/24  0448 12/04/24  0607 12/05/24  0529   * 135*  --    K 4.0 3.4*  --    CL 92* 93*  --    CO2 20 29  --    BUN 30* 28*  --    CREATININE 0.7 0.6* 0.6*   GLUCOSE 181* 172*  --      Hepatic:   Recent Labs     12/03/24  0448 12/04/24  0607 12/05/24  0530   AST 70* 80* 41*   * 141* 99*   BILITOT 1.2 1.0 0.9   ALKPHOS 82 79 77     Troponin: 
[facility]   Progress Note                   Date:   12/6/2024  Patient name: Noemy Turcios  Date of admission:  11/27/2024 12:22 PM  MRN:   [id number]  YOB: 1960  PCP: Sixto Merrill MD    Reason for Admission: Acute respiratory failure [J96.00]  Acute cystitis with hematuria [N30.01]  Altered mental status, unspecified altered mental status type [R41.82]  COVID-19 [U07.1]    Subjective:       Clinical Changes / Abnormalities: Pt seen and examined in the room.  Patient resting in bed with RN at bedside. Pt denies any current CP. Reports a continued/chronic sob.  Labs, vitals and tele reviewed- A-fib 89    Medications:   Scheduled Meds:   potassium bicarb-citric acid  40 mEq Oral Once    amiodarone  200 mg Oral Daily    furosemide  20 mg Oral Daily    digoxin  125 mcg Oral Every Other Day    metoprolol succinate  50 mg Oral Daily    midodrine  10 mg Oral TID WC    sodium chloride flush  5-40 mL IntraVENous 2 times per day    docusate sodium  100 mg Oral BID    miconazole   Topical BID    polyethylene glycol  17 g Oral Daily    senna  2 tablet Oral Daily    venlafaxine  150 mg Oral Daily    pantoprazole (PROTONIX) 40 mg in sodium chloride (PF) 0.9 % 10 mL injection  40 mg IntraVENous Daily    dexAMETHasone  6 mg IntraVENous Daily    apixaban  5 mg Oral BID     Continuous Infusions:   sodium chloride      [Held by provider] sodium chloride Stopped (11/30/24 0908)     CBC:   Recent Labs     12/04/24  0607 12/05/24  1054 12/06/24  0535   WBC 19.7* 19.8* 17.5*   HGB 14.0 14.1 14.5    245 255     BMP:    Recent Labs     12/04/24  0607 12/05/24  0529 12/05/24  1054 12/06/24  0535   *  --  133* 134*   K 3.4*  --  3.2* 3.6*   CL 93*  --  90* 90*   CO2 29  --  29 34*   BUN 28*  --  23 25*   CREATININE 0.6* 0.6* 0.6* 0.5*   GLUCOSE 172*  --  160* 162*     Hepatic:   Recent Labs     12/04/24  0607 12/05/24  0530 12/06/24  0535   AST 80* 41* 32   * 99* 74*   BILITOT 1.0 0.9 1.0 
[facility]  In Patient Cardiology Consult      Date:   12/2/2024  Patient name: Noemy Turcios  Date of admission:  11/27/2024 12:22 PM  MRN:   [id number]  YOB: 1960    Reason for Admission: Shortness of breath, altered mental status  Pt seen and examined in the room.  Patient resting in bed. Pt denies any CP, does endorse abd pain and mild SOB. No sign or symptoms of respiratory distress. Labs, vitals and tele reviewed- afib 110-120's     Echo- showed reduced LVEF    Current Facility-Administered Medications:     metoprolol (LOPRESSOR) injection 5 mg, 5 mg, IntraVENous, TID PRN, Joselito, Elder, DO, 5 mg at 12/02/24 0402    ketorolac (TORADOL) injection 15 mg, 15 mg, IntraVENous, Q6H PRN, Janiya Grayson MD, 15 mg at 12/02/24 0902    furosemide (LASIX) injection 20 mg, 20 mg, IntraVENous, Daily, Hilda Arce MD, 20 mg at 12/02/24 0742    amiodarone (CORDARONE) tablet 200 mg, 200 mg, Oral, Daily, Joselito, Elder, DO, 200 mg at 12/02/24 0743    midodrine (PROAMATINE) tablet 5 mg, 5 mg, Oral, TID WC, Mitesh, Quratulain, DO, 5 mg at 12/02/24 0743    fluticasone (FLONASE) 50 MCG/ACT nasal spray 1 spray, 1 spray, Each Nostril, Daily PRN, Hilda Arce MD    piperacillin-tazobactam (ZOSYN) 4,500 mg in sodium chloride 0.9 % 100 mL IVPB ([id number]), 4,500 mg, IntraVENous, Q8H, Kia Tijerina APRN - CNP, Stopped at 12/02/24 0722    sodium chloride flush 0.9 % injection 5-40 mL, 5-40 mL, IntraVENous, 2 times per day, Aleksandra Lomeli MD, 10 mL at 12/02/24 0744    sodium chloride flush 0.9 % injection 5-40 mL, 5-40 mL, IntraVENous, PRN, Aleksandra Lomeli MD    0.9 % sodium chloride infusion, , IntraVENous, PRN, Aleksandra Lomeli MD    [Held by provider] 0.9 % sodium chloride infusion, , IntraVENous, Continuous, Dejan Mehta MD, Stopped at 11/30/24 0908    sodium chloride flush 0.9 % injection 10 mL, 10 mL, IntraVENous, PRN, Efrain Rodriguez DO, 10 mL at 11/27/24 1526    albuterol sulfate HFA 
[facility]  In Patient Cardiology Consult      Date:   2024  Patient name: Noemy Turcios  Date of admission:  2024 12:22 PM  MRN:   [id number]  YOB: 1960    Reason for Admission: Shortness of breath, altered mental status    Subjective:  On amio drip  No cp  No sob  Echo- showed reduced LVEF    Current Facility-Administered Medications:     midodrine (PROAMATINE) tablet 5 mg, 5 mg, Oral, TID BENNY, Mitesh, Quratulain, DO, 5 mg at 24 1653    furosemide (LASIX) injection 40 mg, 40 mg, IntraVENous, Daily, Lisa Madison MD    fluticasone (FLONASE) 50 MCG/ACT nasal spray 1 spray, 1 spray, Each Nostril, Daily PRN, Hilda Arce MD    piperacillin-tazobactam (ZOSYN) 4,500 mg in sodium chloride 0.9 % 100 mL IVPB ([id number]), 4,500 mg, IntraVENous, Q8H, Kia Tijerina APRN - CNP, Last Rate: 25 mL/hr at 24 0433, 4,500 mg at 24 0433    sodium chloride flush 0.9 % injection 5-40 mL, 5-40 mL, IntraVENous, 2 times per day, Aleksandra Lomeli MD, 10 mL at 24    sodium chloride flush 0.9 % injection 5-40 mL, 5-40 mL, IntraVENous, PRN, Aleksandra Lomeli MD    0.9 % sodium chloride infusion, , IntraVENous, PRN, Aleksandra Lomeli MD    lidocaine PF 1 % injection 50 mg, 50 mg, IntraDERmal, Once, Aleksandra Lomeli MD    [COMPLETED] amiodarone (NEXTERONE) 150 mg in dextrose 5% 100 ml, 150 mg, IntraVENous, Once, Stopped at 24 1138 **FOLLOWED BY** [] amiodarone (NEXTERONE) 360 mg in dextrose 5% 200 ml, 1 mg/min, IntraVENous, Continuous, Last Rate: 33.3 mL/hr at 24 1145, 1 mg/min at 24 1145 **FOLLOWED BY** amiodarone (NEXTERONE) 360 mg in dextrose 5% 200 ml, 0.5 mg/min, IntraVENous, Continuous, Lisa Madison MD, Last Rate: 16.7 mL/hr at 24, 0.5 mg/min at 24    [Held by provider] 0.9 % sodium chloride infusion, , IntraVENous, Continuous, Dejan Mehta MD, Stopped at 24 0908    ketorolac (TORADOL) injection 30 mg, 30 mg, IntraVENous, 
Comprehensive Nutrition Assessment    Type and Reason for Visit:  Initial, Positive nutrition screen (Decreased appetite and intake, wt loss)    Nutrition Recommendations/Plan:   Provide diet as ordered.    Provide Ensure Clear x 2 daily.      Malnutrition Assessment:  Malnutrition Status:  At risk for malnutrition (11/28/24 0974)    Context:  Acute Illness     Findings of the 6 clinical characteristics of malnutrition:  Energy Intake:  Unable to assess (Intake less than 50% x 1 day or more)  Weight Loss:  Unable to assess     Body Fat Loss:  Unable to assess     Muscle Mass Loss:  Unable to assess    Fluid Accumulation:  Moderate to Severe (May not be related to nutritional status) Extremities   Strength:  Not Performed    Nutrition Assessment:    Pt admitted with acute respiratory failure and has been diagnosed with COVID-19. Pt had been very lethargic and was kept NPO; physician noted pt more alert and approved start of Clear Liquid diet. Ensure Clear also ordered twice daily.    Nutrition Related Findings:    Edema: +3 RUE, LUE, RLE, LLE. Na: 133, Glucose: 117. Decadron. Other labs and meds reviewed. Hx: COPD, CHF, previous history of DVT. Wound Type:  (Cracking, fissures on feet)       Current Nutrition Intake & Therapies:    Average Meal Intake:  (Diet recently initiated)     ADULT DIET; Clear Liquid  ADULT ORAL NUTRITION SUPPLEMENT; Breakfast, Lunch; Clear Liquid Oral Supplement    Anthropometric Measures:  Height: 167.6 cm (5' 5.98\")  Ideal Body Weight (IBW): 130 lbs (59 kg)    Admission Body Weight: 181 kg (399 lb 0.5 oz)  Current Body Weight: 181 kg (399 lb 0.5 oz), 306.9 % IBW. Weight Source: Bed scale  Current BMI (kg/m2): 64.4  Usual Body Weight: 158.1 kg (348 lb 9.6 oz) (Earlier this month (11/2024), per nursing home. Wt may fluctuate with fluid shifts.)     % Weight Change (Calculated): 14.5                    BMI Categories: Obese Class 3 (BMI 40.0 or greater)    Estimated Daily Nutrient 
Dr cole notified pt c/o RUQ pain 6/10. she says its mild and aching. GI signed off yesterday     Resident notified. Per resident, give toradol and they will reassess after.   910 am - toradol adminstered.   1040 am - pt reporting increased pain at 8/10 after toradol being administered at 910 am. Attempted to call residents to udpate, no answer. Residents paged.     Residents placed HIDA scan order, gen surgery consult. Ok to give stool softeners despite diarrhea and FMS system because resident thinks he feels an impaction. Npo at midnight for hida scan per resident since they cannot do scan today.   
Dr. Gricelda sue at bedside, Rn discussed current vaginal bleeding,  and past straight cath with decreased urine output. Verbal orders received for a yang and urology consult. Provider aware of vaginal bleeding.   
Dr. Lomeli (Infectious Diseases) notified of consult.  
Dr. Madison notified that multiple phlebotomists have attempted to draw patients labs with no success. Order received for PICC placement.   
Dr. Rondon (General Surgery) notified of consult.  
Dr. Yee notified of troponin 106. Order received for repeat at 1900. Dr. Yee also ordered heparin gtt and hold eliquis.   
EFE Harden called with weight verification from nursing home.  Patient still not ready for CT.  Patient needs IV and labs per EFE Harden.  
Gallbladder ultrasound that is ordered was already completed on Friday. Please see liver ultrasound report.  
Informed Dr. Yee thru [facility] about latest troponin result of 98. No new orders for repeat troponin as per MD.   
Lesinski, Joanie, NP (GI) notified of consult.  
Medical Residents notified that patient is having vaginal bleeding. No new orders at this time.   
NP Ashley notified pt is hallucinating that there are cows in her room and she can see and hear them  
Palliative care notified of consult.  
Patient arrived to the unit from ICU via bed accompanied by ICU and PCU staff. Patient was placed on telemetry monitoring, assessed, and oriented to room. Patient was oriented to call light and fall precautions. Bed is lock and in lowest position, bed alarm on, call light within reach. Patient was placed on droplet plus precautions.   
Patient seen in ER with residents  Admitted from nursing facility due to hypoxia  64-year-old female history of HFpEF, COPD, severe obesity BMI 64, HTN, prior PE on Eliquis, PAD, bilateral LE lymphedema, major depression    Noted to be hypoxic-requiring 8 to 10 L high flow nasal cannula oxygen  Drowsy but arousable on exam-AO x 2 to 3 knows she is in hospital but does not know which falls asleep mid conversation.  Not able to give much history.  Lung sounds clear anteriorly patient not cooperating with positioning, abdomen soft nontender.    Acute encephalopathy secondary to sepsis  Severe sepsis secondary to COVID-19, multifocal pneumonia, UTI.  Started on broad-spectrum antibiotics, IV fluids, repeat lactate.    Multifocal pneumonia-broad-spectrum antibiotics, nasal MRSA swab pneumonia panel  COVID-19 pneumonia with hypoxia-ID consult, IV Decadron, CRP LDH ferritin to be checked every alternate day  Acute hypoxic respiratory failure secondary to pneumonia no hypercapnia on VBG -IV Decadron, pulm consult  Also significant elevation of liver enzymes which is new-check CT abdomen.  GI consult.  INR is elevated- 2, patient is on Eliquis at home  Severe obesity BMI 64-likely ULISSES, OHS contributing  Pulmonary hypertension  Polypharmacy-patient is on multiple medications Neurontin, morphine, Percocet, Valium, Effexor, trazodone-all contributing to sedation.  Will hold and monitor    Patient continues to be unstable and has risk of sudden deterioration requiring highest level of care.  Patient admitted to ICU  Critical care time spent 35 minutes.    Heather Fisher MD          
Pharmacist to evaluate COVID treatment eligibility:     Pt's pertaining lab values and vital sings: ; High floor O2 8 L/min (baseline 4 l/min); onset of symptoms 11/26/2024. Pt is on dexamethasone 6 mg IVP daily.     Based on the values above pt magdi qualify for be start dpm Tocilizumab therapy, however pt's liver enzymes are elevated >X10 ULN which is a contraindication to initiation of the therapy with Tocilizumab. Would recommend against using Remdesivir due to potential of this medication cause additional  liver injury.     Thank you.     Isaura NunezD, [facility] 11/28/2024 12:14 PM      
Pharmacy monitoring of Heparin infusion  Heparin Infusion New Order    Patient on heparin for:   elevated troponins    Was patient on previous oral anticoagulant/dose?:  Eliquis 5 mg PO BID , Confirmed with RN/Pt/Pts family/Surescripts?: yes    Factor Xa inhibitor/LMWH use within the past 72 hours? YES  If yes, date of last administration: 11/29 @ 0934 - eliquis 5 mg was given     Recent Labs     11/27/24  1249 11/27/24  1344 11/28/24  0544 11/29/24  0623 11/29/24  1509   HGB 15.3  --  13.6  --  13.7   INR  --  2.0  --  2.3  --      --  209  --  285       Heparin to be monitored using aPTT until 72h following last factor Xa inhibitor/LMWH administration .(aPTT should be used for patients who have received a DOAC in the past 72 hours)?      Have admin instructions been updated to reflect appropriate protocol? YES    Have appropriate labs been ordered for corresponding protocol? YES   *Discontinue anti-Xa lab monitoring if using aPTT protocol    Is the starting rate/last rate adjustment appropriate? yes    Concurrent anticoagulants: none  (Note it is not appropriate to be on most anticoagulants while on a heparin drip)    Review platelets from the past few days.  Any concerns?: No    Please record any appropriate additional notes here:    Per Cardio - start heparin gtt now  Okay with no initial bolus due to administration of DOAC at 0900    Fanny Torres, Cezar, [facility]   
Phlebotomist states that ordered labs will be unable to be drawn at this time d/t several unsuccessful attempts by several phlebotomists to draw the labs.  
Potassium recheck 5.4    NP Ehret informed.   Recheck 12.2.24 @ 0000  
Prayer outside room.    12/02/24 1836   Encounter Summary   Encounter Overview/Reason Spiritual/Emotional Needs   Service Provided For Patient   Referral/Consult From Palliative Care   Last Encounter  12/02/24   Complexity of Encounter Low   Spiritual/Emotional needs   Type Spiritual Support   Assessment/Intervention/Outcome   Assessment Unable to assess  (covid isolation)   Intervention Prayer (assurance of)/[place]       
Pt arrived to the unit,bed in lowest position, wheels locked and call light with in reach.  
Pt just moved to ICU; prayer outside room.    11/28/24 110   Encounter Summary   Encounter Overview/Reason Spiritual/Emotional Needs   Service Provided For Patient   Referral/Consult From Palliative Care   Last Encounter  11/28/24   Complexity of Encounter Low   Spiritual/Emotional needs   Type Spiritual Support   Assessment/Intervention/Outcome   Assessment Unable to assess  (covid isolation)   Intervention Prayer (assurance of)/[place]       
Pt picked by transport stretcher. Pt PICC line and rectal pouch removed. Pt telemetry removed. Pt in no signs of distress. Pt has [employer]-cell phone.  
Pt placed on non-heated high flow d/t desat to 86% pt currently on 10l Spo2 92%   
Pt provided IS. Pt educated on its use and frequency. Pt demonstrated use of it. Initial score of 400.   
Pt suddenly desats to 80% on 5L HFNC. Writer increased flow rate with minimal improvement. Placed on NRB spO2 improves slowly to 90%. Lungs sound crackly upon auscultation. RT called to bedside.    Writer updates Dr. Mehta of this and MD states to keep O2 >88% with HFNC and to reduce fluids to 50mL/hr.  
Pt suddenly went into Afib RVR HR up to 158, pt asymptomatic and denies chest pain/ palpitations.    Writer notified TARIQ Hightower of this, NP places several orders and places consult to cardiology.  
Pulmonary Progress Note  [facility]      Patient - Noemy Turcios,  Age - 64 y.o.    - 1960      Room Number -    MRN -  [id number]   [facility] # - [id number]  Date of Admission -  2024 12:22 PM    Consulting Service/Physician   Consulting - Maximus Rosa MD  Primary Care Physician - Sixto Merrill MD     SUBJECTIVE   Patient resting quietly.  Current on 4 L nasal cannula O2 saturation is 96 to 98%    OBJECTIVE   VITALS    height is 1.676 m (5' 5.98\") and weight is 142.5 kg (314 lb 2.5 oz) (abnormal). Her axillary temperature is 99.3 °F (37.4 °C). Her blood pressure is 122/87 and her pulse is 107 (abnormal). Her respiration is 15 and oxygen saturation is 97%.     Body mass index is 50.73 kg/m².  Temperature Range: Temp: 99.3 °F (37.4 °C) Temp  Av.2 °F (36.8 °C)  Min: 97.1 °F (36.2 °C)  Max: 99.3 °F (37.4 °C)  BP Range:  Systolic (24hrs), Av , Min:85 , Max:145     Diastolic (24hrs), Av, Min:49, Max:116    Pulse Range: Pulse  Av.6  Min: 88  Max: 113  Respiration Range: Resp  Avg: 15.6  Min: 13  Max: 19  Current Pulse Ox::  SpO2: 97 %  24HR Pulse Ox Range:  SpO2  Av.7 %  Min: 90 %  Max: 99 %  Oxygen Amount and Delivery: O2 Flow Rate (L/min): 4 L/min (decrease to 3)    Wt Readings from Last 3 Encounters:   24 (!) 142.5 kg (314 lb 2.5 oz)   23 (!) 181.4 kg (399 lb 14.6 oz)   21 (!) 158.8 kg (350 lb)       I/O (24 Hours)    Intake/Output Summary (Last 24 hours) at 2024 1359  Last data filed at 2024 0635  Gross per 24 hour   Intake --   Output 1775 ml   Net -1775 ml       EXAM     General Appearance  Awake, alert, oriented, in no acute distress  HEENT - normocephalic, atraumatic.  Neck - Supple,  trachea midline   Lungs -coarse breath sounds no crackles rales or wheezes   Heart Exam:PMI normal. No lifts, heaves, or thrills. RRR. No murmurs, clicks, gallops, or rubs  Abdomen Exam: Abdomen soft, non-tender  Extremity Exam: 
Pulmonary Progress Note  Pulmonary [facility] Critical Care Specialists      Patient - Noemy Turcios,  Age - 64 y.o.    - 1960      Room Number -    MRN -  [id number]   [facility] # - [id number]  Date of Admission -  2024 12:22 PM      Consulting Service/Physician   Consulting - Maximus Rosa MD  Primary Care Physician - Sixto Merrill MD     SUBJECTIVE   Patient appears to be doing fair.  Resting in bed.  No obvious distress.  O2 saturations 96 to 96% on 4 L.  No fever    OBJECTIVE   VITALS    height is 1.676 m (5' 5.98\") and weight is 147.9 kg (326 lb 1 oz) (abnormal). Her axillary temperature is 97.8 °F (36.6 °C). Her blood pressure is 107/67 and her pulse is 100. Her respiration is 17 and oxygen saturation is 92%.     Body mass index is 52.65 kg/m².  Temperature Range: Temp: 97.8 °F (36.6 °C) Temp  Av.9 °F (36.6 °C)  Min: 97.8 °F (36.6 °C)  Max: 98 °F (36.7 °C)  BP Range:  Systolic (24hrs), Av , Min:71 , Max:123     Diastolic (24hrs), Av, Min:23, Max:98    Pulse Range: Pulse  Av.6  Min: 96  Max: 128  Respiration Range: Resp  Av.2  Min: 13  Max: 22  Current Pulse Ox::  SpO2: 92 %  24HR Pulse Ox Range:  SpO2  Av.8 %  Min: 90 %  Max: 99 %  Oxygen Amount and Delivery: O2 Flow Rate (L/min): 4 L/min    Wt Readings from Last 3 Encounters:   24 (!) 147.9 kg (326 lb 1 oz)   23 (!) 181.4 kg (399 lb 14.6 oz)   21 (!) 158.8 kg (350 lb)       I/O (24 Hours)    Intake/Output Summary (Last 24 hours) at 2024 1302  Last data filed at 2024 1200  Gross per 24 hour   Intake 2447.78 ml   Output 2490 ml   Net -42.22 ml       EXAM     General Appearance  Awake, alert, oriented, in no acute distress  HEENT - normocephalic, atraumatic.  Neck - Supple,  trachea midline   Lungs -coarse breath sounds no crackles rales  Heart Exam:PMI normal. No lifts, heaves, or thrills. RRR.  Extremity Exam: No signs of cyanosis    MEDS      midodrine  5 mg Oral TID WC    [START ON 
RN talked to Dr. Prather over the phone. Continue the amiodarone drip, he's adding digoxin every other day. RN asked if pt is okay to go down for a HIDA scan today from his standpoint, per Dr. Prather pt is okay for HIDA scan today.   
Resident room called for critical result of +BC gram + cocci in clusters  
Rn attempted to call report 4 times to [facility]. No answer.  
Rn notified Dr. Marks of pt carbon dioxide -34. Pt AOX4, Pt BP stable. Pt on 2L NC. MD states NP will be seeing pt today.  
Shandra served Dr. Prather of . Orders received.   
Speech Language Pathology  ST ICU    Date: 12/1/2024  Patient Name: Noemy Turcios  YOB: 1960   AGE: 64 y.o.  MRN: [id number]        Patient Not Available for Speech Therapy     Due to:  [] Testing  [] Hemodialysis  [] Cancelled by RN  [] Surgery   [] Intubation/Sedation/Pain Medication  [] Medical instability  [x] Refusal: Pt. Declining ST this date. Reporting abdominal pain 6/10. Nurse notified. Will continue to follow.  [] Other    Completed by: Diane Mansfield M.S. CF-SLP      
Speech Language Pathology  ST PROGRESSIVE CARE    Cognitive Treatment Note    Date: 12/2/2024  Patient’s Name: Noemy Turcios  MRN: [id number]  Diagnosis:   Patient Active Problem List   Diagnosis Code    Chronic heart failure with preserved ejection fraction (HCC) I50.32    Chronic obstructive pulmonary disease ([facility]) J44.9    Tobacco abuse Z72.0    Morbid obesity E66.01    Essential hypertension I10    History of pulmonary embolism Z86.711    Family history of colon cancer in mother Z80.0    Prediabetes R73.03    Class 4 congestive heart failure, acute on chronic, systolic ([facility]) I50.23    Hypokalemia E87.6    Cellulitis L03.90    Primary osteoarthritis of right hip M16.11    Chronic pain of multiple joints M25.50, G89.29    Depression F32.A    Cellulitis of right leg L03.115    Peripheral artery disease ([facility]) I73.9    Non healing left heel wound S91.302A    Corns and callosities L84    Healing pressure ulcer, stage IV ([facility]) L89.94    Lymphedema of both lower extremities I89.0    Non-pressure chronic ulcer of right lower leg with fat layer exposed ([facility]) L97.912    Localized edema R60.0    Xerosis cutis L85.3    Depression with suicidal ideation F32.A, R45.851    Major depressive disorder, recurrent, severe without psychotic behavior ([facility]) F33.2    Percocet use disorder, mild, abuse ([facility]) F11.10    Severe recurrent major depression without psychotic features ([facility]) F33.2    Rash of body R21    At high risk for deep venous thrombosis Z91.89    On anticoagulant therapy low dose eliquis Z79.01    Peripheral edema R60.0    Infestation by bed bug B88.8    Scabies B86    Acute respiratory failure J96.00    Altered mental status R41.82    Elevated LFTs R79.89    COVID-19 U07.1    Acute cystitis with hematuria N30.01    New onset atrial fibrillation ([facility]) I48.91    Elevated C-reactive protein (CRP) R79.82    Leukocytosis D72.829    Positive blood culture R78.81    Candidal intertrigo B37.2    Encounter for palliative care Z51.5    
Speech Language Pathology  ST PROGRESSIVE CARE    Cognitive Treatment Note    Date: 12/5/2024  Patient’s Name: Noemy Turcios  MRN: [id number]  Diagnosis:   Patient Active Problem List   Diagnosis Code    Chronic heart failure with preserved ejection fraction (HCC) I50.32    Chronic obstructive pulmonary disease ([facility]) J44.9    Tobacco abuse Z72.0    Morbid obesity E66.01    Essential hypertension I10    History of pulmonary embolism Z86.711    Family history of colon cancer in mother Z80.0    Prediabetes R73.03    Class 4 congestive heart failure, acute on chronic, systolic ([facility]) I50.23    Hypokalemia E87.6    Cellulitis L03.90    Primary osteoarthritis of right hip M16.11    Chronic pain of multiple joints M25.50, G89.29    Depression F32.A    Cellulitis of right leg L03.115    Peripheral artery disease ([facility]) I73.9    Non healing left heel wound S91.302A    Corns and callosities L84    Healing pressure ulcer, stage IV ([facility]) L89.94    Lymphedema of both lower extremities I89.0    Non-pressure chronic ulcer of right lower leg with fat layer exposed ([facility]) L97.912    Localized edema R60.0    Xerosis cutis L85.3    Depression with suicidal ideation F32.A, R45.851    Major depressive disorder, recurrent, severe without psychotic behavior ([facility]) F33.2    Percocet use disorder, mild, abuse ([facility]) F11.10    Severe recurrent major depression without psychotic features ([facility]) F33.2    Rash of body R21    At high risk for deep venous thrombosis Z91.89    On anticoagulant therapy low dose eliquis Z79.01    Peripheral edema R60.0    Infestation by bed bug B88.8    Scabies B86    Acute respiratory failure J96.00    Altered mental status R41.82    Elevated LFTs R79.89    COVID-19 U07.1    Acute cystitis with hematuria N30.01    New onset atrial fibrillation ([facility]) I48.91    Elevated C-reactive protein (CRP) R79.82    Leukocytosis D72.829    Positive blood culture R78.81    Candidal intertrigo B37.2    Encounter for palliative care Z51.5    
Speech Language Pathology  ST PROGRESSIVE CARE    Cognitive Treatment Note    Date: 12/6/2024  Patient’s Name: Noemy Turcios  MRN: [id number]  Diagnosis:   Patient Active Problem List   Diagnosis Code    Chronic heart failure with preserved ejection fraction (HCC) I50.32    Chronic obstructive pulmonary disease ([facility]) J44.9    Tobacco abuse Z72.0    Morbid obesity E66.01    Essential hypertension I10    History of pulmonary embolism Z86.711    Family history of colon cancer in mother Z80.0    Prediabetes R73.03    Class 4 congestive heart failure, acute on chronic, systolic ([facility]) I50.23    Hypokalemia E87.6    Cellulitis L03.90    Primary osteoarthritis of right hip M16.11    Chronic pain of multiple joints M25.50, G89.29    Depression F32.A    Cellulitis of right leg L03.115    Peripheral artery disease ([facility]) I73.9    Non healing left heel wound S91.302A    Corns and callosities L84    Healing pressure ulcer, stage IV ([facility]) L89.94    Lymphedema of both lower extremities I89.0    Non-pressure chronic ulcer of right lower leg with fat layer exposed ([facility]) L97.912    Localized edema R60.0    Xerosis cutis L85.3    Depression with suicidal ideation F32.A, R45.851    Major depressive disorder, recurrent, severe without psychotic behavior ([facility]) F33.2    Percocet use disorder, mild, abuse ([facility]) F11.10    Severe recurrent major depression without psychotic features ([facility]) F33.2    Rash of body R21    At high risk for deep venous thrombosis Z91.89    On anticoagulant therapy low dose eliquis Z79.01    Peripheral edema R60.0    Infestation by bed bug B88.8    Scabies B86    Acute respiratory failure J96.00    Altered mental status R41.82    Elevated LFTs R79.89    COVID-19 U07.1    Acute cystitis with hematuria N30.01    New onset atrial fibrillation ([facility]) I48.91    Elevated C-reactive protein (CRP) R79.82    Leukocytosis D72.829    Positive blood culture R78.81    Candidal intertrigo B37.2    Encounter for palliative care Z51.5    
Speech Language Pathology  [facility] ICU    Cognitive Treatment Note    Date: 11/30/2024  Patient’s Name: Noemy Turcios  MRN: [id number]  Diagnosis:   Patient Active Problem List   Diagnosis Code    Chronic heart failure with preserved ejection fraction (HCC) I50.32    Chronic obstructive pulmonary disease ([facility]) J44.9    Tobacco abuse Z72.0    Morbid obesity E66.01    Essential hypertension I10    History of pulmonary embolism Z86.711    Family history of colon cancer in mother Z80.0    Prediabetes R73.03    Class 4 congestive heart failure, acute on chronic, systolic ([facility]) I50.23    Hypokalemia E87.6    Cellulitis L03.90    Primary osteoarthritis of right hip M16.11    Chronic pain of multiple joints M25.50, G89.29    Depression F32.A    Cellulitis of right leg L03.115    Peripheral artery disease ([facility]) I73.9    Non healing left heel wound S91.302A    Corns and callosities L84    Healing pressure ulcer, stage IV ([facility]) L89.94    Lymphedema of both lower extremities I89.0    Non-pressure chronic ulcer of right lower leg with fat layer exposed ([facility]) L97.912    Localized edema R60.0    Xerosis cutis L85.3    Depression with suicidal ideation F32.A, R45.851    Major depressive disorder, recurrent, severe without psychotic behavior ([facility]) F33.2    Percocet use disorder, mild, abuse ([facility]) F11.10    Severe recurrent major depression without psychotic features ([facility]) F33.2    Rash of body R21    At high risk for deep venous thrombosis Z91.89    On anticoagulant therapy low dose eliquis Z79.01    Peripheral edema R60.0    Infestation by bed bug B88.8    Scabies B86    Acute respiratory failure J96.00    Altered mental status R41.82    Elevated LFTs R79.89    COVID-19 U07.1    Acute cystitis with hematuria N30.01    New onset atrial fibrillation (HCC) I48.91    Elevated C-reactive protein (CRP) R79.82    Leukocytosis D72.829    Positive blood culture R78.81    Candidal intertrigo B37.2    Encounter for palliative care Z51.5    QT 
Spiritual Health History and Assessment/Progress Note  [facility]    (P) Palliative Care, Spiritual/Emotional Needs,  ,  ,      Name: Noemy Turcios MRN: [id number]    Age: 64 y.o.     Sex: female   Language: English   Orthodox: None   Acute respiratory failure     Date: 12/1/2024            Total Time Calculated: (P) 2 min              Spiritual Assessment continued in [facility] ICU        Referral/Consult From: (P) Palliative Care   Encounter Overview/Reason: (P) Palliative Care, Spiritual/Emotional Needs  Service Provided For: (P) Patient    Pt. Intubated    Shana, Belief, Meaning:   Patient unable to assess at this time  Family/Friends No family/friends present      Importance and Influence:  Patient unable to assess at this time  Family/Friends No family/friends present    Community:  Patient Other: As Pt. Is Intubated, unable to assess  Family/Friends No family/friends present    Assessment and Plan of Care:     Patient Interventions include: Other: Dropped Off Prayer Card  Family/Friends Interventions include: No family/friends present    Patient Plan of Care: Spiritual Care available upon further referral  Family/Friends Plan of Care: No family/friends present    Electronically signed by Chaplain Neymar on 12/1/2024 at 2:48 PM       12/01/24 1445   Encounter Summary   Encounter Overview/Reason Palliative Care;Spiritual/Emotional Needs   Service Provided For Patient   Referral/Consult From Palliative Care   Support System Unknown   Last Encounter  12/01/24   Complexity of Encounter Low   Begin Time 1443   End Time  1445   Total Time Calculated 2 min   Spiritual/Emotional needs   Type Spiritual Support   Palliative Care   Type Palliative Care, Follow-up   Assessment/Intervention/Outcome   Assessment Unable to assess;Other (Comment)  (Pt. Intubated)   Intervention Other (Comment)  (Dropped Off Prayer Card)   Outcome Did not respond;Other (comment)  (Pt. Intubated)   Plan and Referrals 
Spoke with RN, due to patient condition and being jsu tput on drip HIDA scan is on hold. RN told writer theyd check with doctors on plan for later and will call with plan, if no response nuc med will reach out later to get HIDa done. Call [phone] with any questions. Electronically signed by Maverick Gu on 12/3/2024 at 9:58 AM      Called floor multiple times, awaiting to hear back from RN about pt condition and to plan when pt can come down for HIDA scan.  Any questions please [place] nuclear medicine at [phone]. Electronically signed by Maverick Gu on 12/3/2024 at 9:36 AM    
Spoke with RN, due to the time of day and her not being able to sit with the patient through the whole test, we are unable to get her done today.  We will call up first thing tomorrow morning to make a plan.  Please keep her NPO and NO PAIN MEDS after midnight.  Any questions please call nuclear medicine at [phone]. Electronically signed by Meghann Romo on 12/3/2024 at 1:39 PM    
Spoke with RN, unfortunately due to Camera availability and staffing patients HIDA scan will be done in am. If an opening is possible will try to get in today but plan for NPO after midnight and NO Opiate PAIN MEDS and test will be done first thing in am. Any questions please call [phone]. Electronically signed by Maverick Gu on 12/2/2024 at 11:29 AM    
[facility]   Pharmacy Pharmacokinetic Monitoring Service - Vancomycin     Noemy Turcios is a 64 y.o. female starting on vancomycin therapy for   Indication: Respiratory infection, MRSA suspected. Pharmacy consulted by Hilda Robert for monitoring and adjustment.    Target Concentration: Goal AUC/SHAAN 400-600 mg*hr/L    Additional Antimicrobials: zosyn    Pertinent Laboratory Values:   Wt Readings from Last 1 Encounters:   11/27/24 (!) 181 kg (399 lb)     Temp Readings from Last 1 Encounters:   11/27/24 97.5 °F (36.4 °C)     Estimated Creatinine Clearance: 138 mL/min (based on SCr of 0.7 mg/dL).  Recent Labs     11/27/24  1249 11/27/24  1413   CREATININE  --  0.7   BUN  --  18   WBC 12.6*  --        Pertinent Cultures: see micro  MRSA Nasal Swab: not ordered. Order placed by pharmacy.        Plan:  Dosing recommendations based on Bayesian software  Start vancomycin 2500mg x1, followed by 1750mg every 12 hours.  Renal labs as indicated   Vancomycin concentration ordered for 11/29 @ 0600   Pharmacy will continue to monitor patient and adjust therapy as indicated    Thank you for the consult,  Moi Garcia [facility]  11/27/2024 8:00 PM   
[facility]   Pharmacy Pharmacokinetic Monitoring Service - Vancomycin    Consulting Provider: Dr. Hilda Arce   Indication: HAP  Target Concentration: Goal AUC/SHAAN 400-600 mg*hr/L  Day of Therapy: 2  Additional Antimicrobials: Zosyn    Pertinent Laboratory Values:   Wt Readings from Last 1 Encounters:   11/28/24 (!) 181 kg (399 lb 0.5 oz)     Temp Readings from Last 1 Encounters:   11/28/24 97.5 °F (36.4 °C) (Oral)     Estimated Creatinine Clearance: 194 mL/min (A) (based on SCr of 0.5 mg/dL (L)).  Recent Labs     11/27/24  1249 11/27/24  1413 11/28/24  0544   CREATININE  --  0.7 0.5*   BUN  --  18 14   WBC 12.6*  --  8.0     Procalcitonin: 0.27    Pertinent Cultures:  See Micro  MRSA Nasal Swab: was ordered by provider, awaiting results.    Recent vancomycin administrations                     vancomycin (VANCOCIN) 2,500 mg in sodium chloride 0.9 % 500 mL IVPB (mg) 2,500 mg New Bag 11/27/24 2250                    Assessment:  Date/Time Current Dose Concentration AUC   11/28/2024 0832 1750 mg Q12H 10.2 487   Note: Serum concentrations collected for AUC dosing may appear elevated if collected in close proximity to the dose administered, this is not necessarily an indication of toxicity    Regimen: 1750 mg IV every 12 hours.  Start time: 10:50 on 11/28/2024  Exposure target: AUC24 (range)400-600 mg/L.hr   XIR57-07: 464 mg/L.hr  AUC24,ss: 487 mg/L.hr  Probability of AUC24 > 400: 73 %  Ctrough,ss: 10.2 mg/L  Probability of Ctrough,ss > 20: 4 %    Plan:  Current dosing regimen is therapeutic  Continue current dose  Repeat vancomycin concentration ordered for 11/29 @ 0600   Pharmacy will continue to monitor patient and adjust therapy as indicated    Thank you for the consult,  Hair Jackson RPH  11/28/2024 8:27 AM   
Transfer to icu7 with writer, 3 NA, on zoll monitor, with belongings   
Writer notified Dr. Yee of new consult and updated MD on pt. MD agrees with primary's orders.  
Writer updated TARIQ Mar of pt's bladder scan volume of 667mL. NP states okay to straight cath if pt unable to void.  
QT prolongation R94.31    Shortness of breath R06.02    Right upper quadrant abdominal pain R10.11    Calculus of gallbladder with chronic cholecystitis without obstruction K80.10    Elevated lipase R74.8       Pain Ratin/10 in back, nurse notified.    Cognitive Treatment    Treatment time: 4861-0268      Subjective: [x] Drowsy [x] Cooperative     [] Confused     [] Agitated    [] Lethargic      Objective/Assessment:    Recall:   Pt can recall: her age, birthday, current/prior president and president elect when given extra time.    Delayed recall 3/3 indep.    Problem Solving/Reasoning: Nurse reports the pt will drink but has not been eating hardly anything today, pt reporting poor appetite.    Asked 4 reasons to utilize call light & the pt can state 2/4 indep (e.g., if in pain, if need help positioning, then states \"I don't know\").     Sequencing task ~75% acc indep. Pt closing eyes mid-task, alertness may impact accuracy.    Other: No family present, call light left within reach. Pt is currently on COVID-19 isolation preducautions.    Plan:  [x] Continue ST services    [] Discharge from ST:      Discharge recommendations:  [x] Further therapy recommended at discharge.   [] No therapy recommended at discharge.      Treatment completed by: Nisha Delgado M.A., CCC-SLP     
changes  12/2/2024   She remains on oxygen per nasal cannula 3 L which is her baseline, complaining of right-sided abdominal pain ,afebrile, tachycardic.  Liver ultrasound 11/29/2024 showed cholelithiasis  Patient Vitals for the past 8 hrs:   BP Temp Temp src Pulse Resp SpO2   12/02/24 1715 114/89 97.5 °F (36.4 °C) Axillary 85 19 95 %   12/02/24 1645 -- -- -- (!) 108 -- --   12/02/24 1605 -- -- -- (!) 107 -- --   12/02/24 1602 -- -- -- (!) 103 -- --   12/02/24 1601 -- -- -- (!) 108 -- --   12/02/24 1600 -- -- -- (!) 117 -- --   12/02/24 1558 -- -- -- (!) 115 -- --   12/02/24 1545 96/72 97.7 °F (36.5 °C) Axillary 72 18 97 %   12/02/24 1209 107/86 98.3 °F (36.8 °C) Axillary 93 18 95 %   12/02/24 1200 (!) 114/102 -- -- -- -- --   Seen and examined in the ICU.  Afebrile.  SpO2 96% on 4 L NC  Denies any SOB,fever, chills, n/v, dysuria.C/o hip pain.  Liquid brown stools.  LUE PICC site clean.  CRP improved.  CMV, Hepatitis panel non-reactive.  11/29 CXR: Stable bibasal infiltrates.    I have personally reviewed the past medical history, past surgical history, medications, social history, and family history, and I haveupdated the database accordingly.      Allergies:   Patient has no known allergies.     Review of Systems:     Review of Systems   All other systems reviewed and are negative.    Physical Examination :       Physical Exam  Vitals and nursing note reviewed.   Constitutional:       Appearance: She is obese.      Comments: Lethargic   HENT:      Head: Normocephalic and atraumatic.      Right Ear: External ear normal.      Left Ear: External ear normal.      Nose: Nose normal.   Eyes:      General: No scleral icterus.     Extraocular Movements: Extraocular movements intact.      Conjunctiva/sclera: Conjunctivae normal.      Pupils: Pupils are equal, round, and reactive to light.   Cardiovascular:      Rate and Rhythm: Tachycardia present. Rhythm irregular.      Heart sounds: No murmur heard.  Pulmonary:      
per kg  Weight Used for Protein Requirements: Ideal  Protein (g/day): 130-150  based on 2.2-2.5 gm per kg     Fluid (ml/day): per physician    Nutrition Diagnosis:   Inadequate protein-energy intake related to decreased appetite as evidenced by intake 0-25%    Nutrition Interventions:   Food and/or Nutrient Delivery: Continue Current Diet, Modify Oral Nutrition Supplement  Nutrition Education/Counseling: No recommendation at this time  Coordination of Nutrition Care: Continue to monitor while inpatient       Goals:  Goals: Meet at least 75% of estimated needs  Type of Goal: Continue current goal  Previous Goal Met: Progressing toward Goal(s)    Nutrition Monitoring and Evaluation:      Food/Nutrient Intake Outcomes: Food and Nutrient Intake, Supplement Intake  Physical Signs/Symptoms Outcomes: Biochemical Data, GI Status, Fluid Status or Edema, Skin, Weight    Discharge Planning:    Continue current diet     Ara Adrian RD, LD  Contact: [phone]    
remains afebrile, went to [facility] with rapid ventricular response, received digoxin and was placed on amiodarone, on oxygen per nasal cannula ,abdominal pain improved, no vomiting, reportedly had Perianal bleed.  WBC 20 on Decadron  proBNP elevated  She was unable to complete HIDA scan  Liver ultrasound 11/29/2024 showed cholelithiasis      I have personally reviewed the past medical history, past surgical history, medications, social history, and family history, and I haveupdated the database accordingly.      Allergies:   Patient has no known allergies.     Review of Systems:   As per history present illness  Physical Examination :       Physical Exam  Vitals and nursing note reviewed.   Constitutional:       Appearance: She is obese.      Comments: Lethargic   HENT:      Head: Normocephalic and atraumatic.      Right Ear: External ear normal.      Left Ear: External ear normal.      Nose: Nose normal.   Cardiovascular:      Rate and Rhythm: Tachycardia present. Rhythm irregular.      Heart sounds: No murmur heard.  Pulmonary:      Effort: Pulmonary effort is normal.      Breath sounds: No rhonchi or rales.   Abdominal:      General: There is no distension.      Palpations: Abdomen is soft.   Genitourinary:     Comments: No yang.  Musculoskeletal:      Cervical back: Neck supple.      Right lower leg: No edema.      Left lower leg: No edema.   Skin:     General: Skin is warm and dry.      Findings: No erythema.         Past Medical History:     Past Medical History:   Diagnosis Date    Arthritis     CHF (congestive heart failure) (HCC)     COPD (chronic obstructive pulmonary disease) (HCC)     Diverticulitis     Hx of blood clots     Pulmonary embolism (HCC)        Past Surgical  History:     Past Surgical History:   Procedure Laterality Date    ANKLE SURGERY      COLON SURGERY      FOOT SURGERY Left 1/22/2021    FOOT BONE BIOPSY W/ INCISION & DRAINAGE AND REDRESSING ON RIGHT FOOT performed by Matthieu Marie DPM 
review and the patient  General ROS: positive for  - fatigue  Respiratory ROS: positive for - cough  Cardiovascular ROS: positive for - rapid heart rate  Gastrointestinal ROS: positive for - abdominal pain   Physical Exam:     General Appearance: ill appearing alert and oriented to person, place and time, well-developed and well-nourished, in no acute distress  Skin: warm and dry, no rash or erythema  Head: normocephalic and atraumatic  Eyes: pupils equal, round, and reactive to light, extraocular eye movements intact, conjunctivae normal  ENT: hearing grossly normal bilaterally  Neck: neck supple and non tender without mass, no thyromegaly or thyroid nodules, no cervical lymphadenopathy   Pulmonary/Chest: clear to auscultation bilaterally- no wheezes, rales or rhonchi, normal air movement, no respiratory distress  Cardiovascular: tachycardic  rate, regular rhythm, normal S1 and S2, no murmurs, rubs, clicks or gallops, distal pulses intact, no carotid bruits  Abdomen: obese  soft, non-tender, non-distended, normal bowel sounds, no masses or organomegaly fms with brown stool   Extremities: no cyanosis, clubbing or edema  Musculoskeletal: normal range of motion, no joint swelling, deformity or tenderness  Neurologic: no cranial nerve deficit and muscle strength normal    Lab and Imaging Review     CBC  Recent Labs     12/01/24  0516 12/02/24  0901 12/03/24  0448   WBC 10.9 15.0* 20.0*   HGB 13.7 14.8 15.4   HCT 42.7 45.9 48.5*   MCV 93.4 92.1 93.1    336 397       BMP  Recent Labs     12/01/24  0516 12/01/24  1641 12/02/24  0013 12/02/24  0901 12/03/24  0448   *  --   --  133* 134*   K 2.8*   < > 3.7 3.4* 4.0   CL 91*  --   --  90* 92*   CO2 26  --   --  29 20   BUN 14  --   --  21 30*   CREATININE 0.6*  --   --  0.6* 0.7   GLUCOSE 239*  --   --  165* 181*   CALCIUM 8.5*  --   --  9.4 9.6   MG 1.7  --   --  1.9  --     < > = values in this interval not displayed.       LFTS  Recent Labs     12/01/24  0516 
the nursing home, in the ED she is on 2 L of high flow nasal cannula.  Patient also initially very lethargic, was altered but can answer questions.     Labs also significant for elevated lactic of 3.1, elevated white blood cell count of 12.1, elevated Pro-Jonathan of 0.27, elevated D-dimer, CT PE was negative however did show multifocal pneumonia.  Of note patient does take multiple sedatives at at the nursing home Neurontin, morphine, Percocet, Valium, Effexor, trazodone.  Concern for aspiration pneumonia at this time as well.  Labs also significant for positive COVID 19 pneumonia.  Will start patient on Decadron, breathing treatments and get infectious disease on board.  She also had a very elevated AST level of more than 1000, will get ferritin, CT angiogram of the abdomen to rule out ischemic liver.  Will also get GI on board.  Urinalysis also positive for urinary tract infection.  Will start patient on broad-spectrum antibiotics.  Given her altered mental status, will keep patient n.p.o. until she passes nursing swallow assessment.     Per chart review patient was previously DNR CC from May 2024 of this year.  Will get palliative on board, currently patient is too altered to change her CODE STATUS.  However does state that she does not want to be intubated or be shocked.     Being admitted to the hospital for the management of severe sepsis secondary to COVID-19 versus multifocal pneumonia as well as UTI.    Assessment:  Cognitive Diagnosis: Pt presents with mild cognitive communication disorder characterized by deficits in memory, reasoning, attention, problem solving, and thought organization.        Recommendations:  Recommendations  Requires SLP Intervention: Yes  Patient Education: results and recommendations  Patient Education Response: Verbalizes understanding     D/C Recommendations: Ongoing speech therapy is recommended during this hospitalization;Ongoing speech therapy is recommended at next level of 
AM    LABGLOM >60 05/17/2023 04:29 AM     ABGs:  Lab Results   Component Value Date/Time    PHART 7.438 11/29/2024 09:00 AM    PO2ART 63.7 11/29/2024 09:00 AM    FVX0UWG 42.2 11/29/2024 09:00 AM    No results found for: \"IFIO2\", \"MODE\", \"SETTIDVOL\", \"SETPEEP\"  Ionized Calcium:  No components found for: \"IONCA\"  Magnesium:    Lab Results   Component Value Date/Time    MG 2.0 11/27/2024 02:13 PM     Phosphorus:    Lab Results   Component Value Date/Time    PHOS 3.3 05/13/2023 03:04 AM        LIVER PROFILE   Recent Labs     11/27/24  1413 11/28/24  0544   AST 1,358* 512*   * 328*   LIPASE 26  --    BILITOT 1.0 0.7   ALKPHOS 83 66     INR   Recent Labs     11/29/24  0623   INR 2.3     PTT   Lab Results   Component Value Date    APTT 32.4 05/13/2023         RADIOLOGY     (See actual reports for details)    ASSESSMENT/PLAN     Acute on chronic hypoxic respiratory failure, patient's baseline is 2 to 4 L nasal cannula, when admitted was on 8 L  Multifocal pneumonia mostly at the bases of her lung  COVID-positive with elevated inflammatory markers on admission  UTI based on her UA  1 out of 2 blood cultures positive for strep epi suspect contamination  Elevated liver enzymes, possible viral hepatitis-improving  Markedly elevated proBNP, over 38,000 on admission but does not appear to be in acute CHF  Elevated D-dimer-CT scan negative for pulmonary embolism on this admission  Acute encephalopathy, multifactorial including sepsis, COVID, and polypharmacy (Neurontin, morphine, Percocet, Valium, Effexor, trazodone)  Hyponatremia-probably secondary to SIADH from pulmonary processes  ULISSES-clinical diagnosis suspect moderate to severe patient refused in the past to go for sleep study  COPD-no PFTs to document, patient currently not bronchospastic  Morbid obesity-BMI 64 on admission  Tobacco abuse-2 to 3 packs/day for 30 years quit 4 years  Remote history of pulmonary embolism/DVT-dating back to before 2017; has been 
bleeding. Negative for difficulty urinating, dyspareunia, dysuria, enuresis and flank pain.   Musculoskeletal:  Negative for arthralgias, back pain, gait problem and joint swelling.   Skin:  Negative for color change, pallor and rash.   Neurological:  Negative for dizziness, facial asymmetry, light-headedness and headaches.     Medications:     Allergies:  No Known Allergies    Current Meds:   Scheduled Meds:    potassium chloride  10 mEq IntraVENous Once    [Held by provider] amiodarone  200 mg Oral Daily    midodrine  10 mg Oral TID WC    metoprolol succinate  25 mg Oral Daily    furosemide  20 mg IntraVENous Daily    piperacillin-tazobactam  4,500 mg IntraVENous Q8H    sodium chloride flush  5-40 mL IntraVENous 2 times per day    docusate sodium  100 mg Oral BID    miconazole   Topical BID    polyethylene glycol  17 g Oral Daily    senna  2 tablet Oral Daily    venlafaxine  150 mg Oral Daily    pantoprazole (PROTONIX) 40 mg in sodium chloride (PF) 0.9 % 10 mL injection  40 mg IntraVENous Daily    dexAMETHasone  6 mg IntraVENous Daily    apixaban  5 mg Oral BID     Continuous Infusions:    amiodarone 1 mg/min (12/03/24 0946)    Followed by    amiodarone      sodium chloride      [Held by provider] sodium chloride Stopped (11/30/24 0908)     PRN Meds: metoprolol, ketorolac, prochlorperazine, fluticasone, sodium chloride flush, sodium chloride, sodium chloride flush, albuterol sulfate HFA, benzonatate, Calamine, guaiFENesin, senna, potassium chloride **OR** potassium alternative oral replacement **OR** potassium chloride, magnesium sulfate, polyethylene glycol    Data:     Past Medical History:   has a past medical history of Arthritis, CHF (congestive heart failure) ([facility]), COPD (chronic obstructive pulmonary disease) ([facility]), Diverticulitis, Hx of blood clots, and Pulmonary embolism ([facility]).    Social History:   reports that she has been smoking. She has a 11.3 pack-year smoking history. She has never used smokeless 
hours as needed  Patient not taking: Reported on 11/27/2024   polyethylene glycol (MIRALAX) 17 g PACK packet Take 17 g by mouth daily   senna (SENOKOT) 8.6 MG tablet Take 1 tablet by mouth daily as needed for Constipation   albuterol sulfate HFA (VENTOLIN HFA) 108 (90 Base) MCG/ACT inhaler Inhale 2 puffs into the lungs every 4 hours as needed for Wheezing or Shortness of Breath   apixaban (ELIQUIS) 2.5 MG TABS tablet Take 1 tablet by mouth 2 times daily  Patient not taking: Reported on 11/27/2024   Calamine 8-8 % LOTN lotion Apply topically as needed (itching)   miconazole (MICOTIN) 2 % powder Apply topically 2 times daily. (Morning and at bedtime)   PARoxetine (PAXIL) 30 MG tablet Take 1 tablet by mouth daily  Patient not taking: Reported on 11/27/2024   traZODone (DESYREL) 50 MG tablet Take 1 tablet by mouth nightly as needed for Sleep  Patient not taking: Reported on 11/27/2024   furosemide (LASIX) 20 MG tablet Take 1 tablet by mouth daily for 10 days  Patient not taking: Reported on 11/27/2024         Please let me know if you have any questions about this encounter. Thank you!    Electronically signed by Satish Beach on 11/27/2024 at 1:54 PM    
normal.      Nose: Nose normal.      Mouth/Throat:      Mouth: Mucous membranes are dry.      Pharynx: Oropharynx is clear.   Eyes:      General: No scleral icterus.     Conjunctiva/sclera: Conjunctivae normal.   Cardiovascular:      Rate and Rhythm: Tachycardia present. Rhythm irregular.      Heart sounds: No murmur heard.  Pulmonary:      Effort: Pulmonary effort is normal. No respiratory distress.      Comments: Diminished  Abdominal:      General: Bowel sounds are normal. There is no distension.      Palpations: Abdomen is soft.      Tenderness: There is no abdominal tenderness.   Genitourinary:     Comments: Mani, FMS  Musculoskeletal:      Cervical back: Neck supple.      Right lower leg: No edema.      Left lower leg: No edema.   Skin:     General: Skin is warm and dry.      Capillary Refill: Capillary refill takes less than 2 seconds.      Findings: No erythema.      Comments: Erythematous rash with satellite lesions to the left neck, breast folds, axillary and groin.   Neurological:      Mental Status: She is alert and oriented to person, place, and time.      Comments: Lethargic.   Psychiatric:         Mood and Affect: Mood normal.         Behavior: Behavior normal.         Past Medical History:     Past Medical History:   Diagnosis Date    Arthritis     CHF (congestive heart failure) (HCC)     COPD (chronic obstructive pulmonary disease) (HCC)     Diverticulitis     Hx of blood clots     Pulmonary embolism (HCC)        Past Surgical  History:     Past Surgical History:   Procedure Laterality Date    ANKLE SURGERY      COLON SURGERY      FOOT SURGERY Left 1/22/2021    FOOT BONE BIOPSY W/ INCISION & DRAINAGE AND REDRESSING ON RIGHT FOOT performed by Matthieu Marie DPM at [facility] OR    HERNIA REPAIR         Medications:      furosemide  20 mg IntraVENous Daily    potassium bicarb-citric acid  20 mEq Oral Once    amiodarone  200 mg Oral Daily    midodrine  5 mg Oral TID     piperacillin-tazobactam  4,500 mg 
NOT REPORTED 01/22/2021 12:22 PM     Lab Results   Component Value Date/Time    CULTURE NO GROWTH 12 HOURS 11/27/2024 01:46 PM       [unfilled]    Radiology:    CTA ABDOMEN PELVIS W CONTRAST    Result Date: 11/27/2024  EXAMINATION: CTA OF THE ABDOMEN AND PELVIS WITH CONTRAST 11/27/2024 8:50 pm: TECHNIQUE: CTA of the abdomen and pelvis was performed with the administration of intravenous contrast. Multiplanar reformatted images are provided for review. MIP images are provided for review. Automated exposure control, iterative reconstruction, and/or weight based adjustment of the mA/kV was utilized to reduce the radiation dose to as low as reasonably achievable. COMPARISON: None. HISTORY: ORDERING SYSTEM PROVIDED HISTORY: concern for ischemic liver with AST > 1000 TECHNOLOGIST PROVIDED HISTORY: concern for ischemic liver with AST > 1000 Reason for Exam: concern for ischemic liver with AST > 1000 Relevant Medical/Surgical History: hx colon surgery, hernia repair FINDINGS: CTA ABDOMEN: Lower Chest:  Visualized portion of the lower chest demonstrates no acute abnormality. Organs:  Liver demonstrates no suspicious mass. There is no evidence of intrahepatic biliary ductal dilatation. The spleen, pancreas and adrenal glands are unremarkable. The kidneys demonstrate no evidence of hydronephrosis. GI/Bowel:  There is no evidence of bowel obstruction.  No evidence of abnormal bowel wall thickening or distension. The abdominal aorta, splanchnic arteries, iliac arteries demonstrate no evidence of significant stenosis, dissection or aneurysmal dilatation.  The hepatic artery is opacified and appears unremarkable. There is no contrast extravasation to suggest active bleeding. CTA PELVIS: Pelvis:  No acute abnormality of the pelvis organs or bladder. Peritoneum/Retroperitoneum: No evidence of ascites or free air. Bones/Soft Tissues: Unremarkable     Unremarkable CTA of the abdomen and pelvis.     CT CHEST PULMONARY EMBOLISM W 
Needs (11/30/2024)    PRAPARE - Transportation     Lack of Transportation (Medical): No     Lack of Transportation (Non-Medical): No   Physical Activity: Inactive (7/22/2020)    Exercise Vital Sign     Days of Exercise per Week: 0 days     Minutes of Exercise per Session: 0 min   Stress: Stress Concern Present (7/22/2020)    [employer] - Occupational Stress Questionnaire     Feeling of Stress : To some extent   Social Connections: Not on file   Intimate Partner Violence: Not on file   Housing Stability: Low Risk  (11/30/2024)    Housing Stability Vital Sign     Unable to Pay for Housing in the Last Year: No     Number of Times Moved in the Last Year: 1     Homeless in the Last Year: No       Family History:     Family History   Problem Relation Age of Onset    Cancer Mother     Diabetes Paternal Grandfather       Medical Decision Making:   I have independently reviewed/ordered the following labs:    CBC with Differential:   Recent Labs     12/04/24  0607 12/05/24  1054   WBC 19.7* 19.8*   HGB 14.0 14.1   HCT 44.4 45.3    245   LYMPHOPCT 13* 12*   MONOPCT 6 5   EOSPCT 0 0     BMP:  Recent Labs     12/04/24  0607 12/05/24  0529 12/05/24  1054   *  --  133*   K 3.4*  --  3.2*   CL 93*  --  90*   CO2 29  --  29   BUN 28*  --  23   CREATININE 0.6* 0.6* 0.6*   MG 2.1  --   --      Hepatic Function Panel:   Recent Labs     12/04/24  0607 12/05/24  0530   BILIDIR  --  0.6*   IBILI  --  0.3   BILITOT 1.0 0.9   ALKPHOS 79 77   * 99*   AST 80* 41*     No results for input(s): \"RPR\" in the last 72 hours.  No results for input(s): \"HIV\" in the last 72 hours.  No results for input(s): \"BC\" in the last 72 hours.  Lab Results   Component Value Date/Time    CREATININE 0.6 12/05/2024 10:54 AM    GLUCOSE 160 12/05/2024 10:54 AM    GLUCOSE 189 11/08/2022 06:30 AM   CRP: 126-30  Procalcitonin: 0.27    Detailed results:        Thank you for allowing us to participate in the care of this 
Total  How much help is needed standing up from a chair using your arms?: Total  How much help is needed walking in hospital room?: Total  How much help is needed climbing 3-5 steps with a railing?: Total  AM-PAC Inpatient Mobility Raw Score : 6  AM-PAC Inpatient T-Scale Score : 23.55  Mobility Inpatient CMS 0-100% Score: 100  Mobility Inpatient CMS G-Code Modifier : CN     Goals     Short Term Goals  Time Frame for Short Term Goals: NA      Plan  Physical Therapy Plan  General Plan: Discharge with evaluation only   Safety Devices  Type of Devices: Bed alarm in place, Call light within reach, Left in bed, Nurse notified, Heels elevated for pressure relief       PT Individual Minutes    PT Individual Minutes  Time In: 1130  Time Out: 1200  Minutes: 30   Time Code Minutes  Timed Code Treatment Minutes: 10 Minutes       Electronically signed by Gloria Aviles, PT on 11/29/24 at 3:20 PM EST       
obstruction.  No evidence of  abnormal bowel wall thickening or distension.    The abdominal aorta, splanchnic arteries, iliac arteries demonstrate no  evidence of significant stenosis, dissection or aneurysmal dilatation.  The  hepatic artery is opacified and appears unremarkable.    There is no contrast extravasation to suggest active bleeding.      CTA PELVIS:    Pelvis:  No acute abnormality of the pelvis organs or bladder.    Peritoneum/Retroperitoneum: No evidence of ascites or free air.    Bones/Soft Tissues: Unremarkable    Impression  Unremarkable CTA of the abdomen and pelvis.      Hospital Problems             Last Modified POA    * (Principal) Acute respiratory failure 11/27/2024 Yes    New onset atrial fibrillation (HCC) 11/29/2024 Yes    Altered mental status 11/28/2024 Yes    Elevated LFTs 11/30/2024 Yes    COVID-19 11/28/2024 Yes    Acute cystitis with hematuria 11/28/2024 Yes    Elevated C-reactive protein (CRP) 11/29/2024 Yes    Leukocytosis 11/29/2024 Yes    Positive blood culture 11/29/2024 Yes    Candidal intertrigo 11/29/2024 Yes    Encounter for palliative care 11/29/2024 Yes    QT prolongation 11/30/2024 Yes    Shortness of breath 11/30/2024 Yes    Right upper quadrant abdominal pain 12/2/2024 Yes    Calculus of gallbladder with chronic cholecystitis without obstruction 12/2/2024 Yes    Elevated lipase 12/3/2024 Yes         ASSESSMENT   64 y.o. female with multiple comorbidities  Abdominal x-ray today revealed no evidence of obstruction.  HIDA scan revealed ejection fraction of 7% without any acute cholecystitis.  Ultrasound of the liver shows hepatomegaly and hepatic steatosis.  Cholelithiasis.  No acute findings.  CT angiography of the abdomen and pelvis was unremarkable.  Sodium is mildly low.  Potassium will be replaced.  Creatinine is normal.  Alkaline phosphatase total bilirubin normal.  AST ALT mildly elevated likely fatty liver.  WBC count remains high at 19.8.  Hemoglobin stable at 
While intending to generate adocument that actually reflects the content of the visit, the document can still have some errors including those of syntax and sound a like substitutions which may escape proof reading.  It such instances, actual meaningcan be extrapolated by contextual diversion.    Aleksandra Lomeli MD  Office: [phone]  Perfect serve / office [phone]        
the Last Year: Never true   Transportation Needs: No Transportation Needs (11/30/2024)    PRAPARE - Transportation     Lack of Transportation (Medical): No     Lack of Transportation (Non-Medical): No   Physical Activity: Inactive (7/22/2020)    Exercise Vital Sign     Days of Exercise per Week: 0 days     Minutes of Exercise per Session: 0 min   Stress: Stress Concern Present (7/22/2020)    [employer] - Occupational Stress Questionnaire     Feeling of Stress : To some extent   Social Connections: Not on file   Intimate Partner Violence: Not on file   Housing Stability: Low Risk  (11/30/2024)    Housing Stability Vital Sign     Unable to Pay for Housing in the Last Year: No     Number of Times Moved in the Last Year: 1     Homeless in the Last Year: No       Family History:     Family History   Problem Relation Age of Onset    Cancer Mother     Diabetes Paternal Grandfather       Medical Decision Making:   I have independently reviewed/ordered the following labs:    CBC with Differential:   Recent Labs     11/29/24  1509 11/29/24  1701 11/30/24  0539   WBC 13.3* 14.1* 14.6*   HGB 13.7 13.7 13.9   HCT 42.5 42.2 44.1    301 307   LYMPHOPCT 13*  --  16*   MONOPCT 5  --  8*   EOSPCT 0  --  0     BMP:  Recent Labs     11/27/24  1413 11/28/24  0544 11/29/24  1509 11/30/24  0539   *   < > 133* 134*   K 4.6   < > 3.2* 3.6*   CL 97*   < > 93* 96*   CO2 21   < > 27 26   BUN 18   < > 15 14   CREATININE 0.7   < > 0.5* 0.6*   MG 2.0  --  1.9  --     < > = values in this interval not displayed.     Hepatic Function Panel:   Recent Labs     11/29/24  1509 11/30/24  0539   BILITOT 0.7 0.7   ALKPHOS 66 69   * 219*   * 150*     No results for input(s): \"RPR\" in the last 72 hours.  No results for input(s): \"HIV\" in the last 72 hours.  No results for input(s): \"BC\" in the last 72 hours.  Lab Results   Component Value Date/Time    CREATININE 0.6 11/30/2024 05:39 AM    GLUCOSE 151 
Hepatomegaly.  Calcified granulomas within the spleen and liver. Soft Tissues/Bones: Spondylotic changes throughout the thoracic spine.     1. No pulmonary embolism. 2. Consolidation in the lower lobes, right greater than left, with patchy ground-glass opacity in the upper lobes.  Findings are compatible with multifocal pneumonia. 3. Markedly enlarged main pulmonary artery is compatible with pulmonary hypertension. 4. At least moderate coronary artery calcification.     CT Head W/O Contrast    Result Date: 11/27/2024  EXAMINATION: CT OF THE HEAD WITHOUT CONTRAST  11/27/2024 3:13 pm TECHNIQUE: CT of the head was performed without the administration of intravenous contrast. Automated exposure control, iterative reconstruction, and/or weight based adjustment of the mA/kV was utilized to reduce the radiation dose to as low as reasonably achievable. COMPARISON: None. HISTORY: ORDERING SYSTEM PROVIDED HISTORY: [facility] TECHNOLOGIST PROVIDED HISTORY: ams Decision Support Exception - unselect if not a suspected or confirmed emergency medical condition->Emergency Medical Condition (MA) Reason for Exam: AMS Additional signs and symptoms: ED note states per nursing home staff, pt more 'Lethargic\" starting yesterday FINDINGS: BRAIN/VENTRICLES: There is no acute intracranial hemorrhage, mass effect or midline shift.  No abnormal extra-axial fluid collection.  The gray-white differentiation is maintained without evidence of an acute infarct.  There are periventricular white matter chronic microvascular ischemic changes. There is no evidence of hydrocephalus. Limited evaluation of the posterior fossa due to artifact. ORBITS: The visualized portion of the orbits demonstrate no acute abnormality. SINUSES: The visualized paranasal sinuses demonstrate no acute abnormality. There opacification of bilateral mastoid air cells, left greater than right. SOFT TISSUES/SKULL:  No acute abnormality of the visualized skull or soft tissues.     1. No 
actual meaningcan be extrapolated by contextual diversion.    Kia Tijerina, KIRK - CNP  Office: [phone]  Perfect serve / office [phone]        
transcription errors may be present.    FLORENCE MARQUEZ-BC, NP-C  [facility]  
Wall E' velocity:0.08 m/s          Labs:     CBC:   Recent Labs     12/02/24  0901 12/03/24 0448   WBC 15.0* 20.0*   HGB 14.8 15.4   HCT 45.9 48.5*    397     BMP:   Recent Labs     12/02/24  0901 12/03/24 0448   * 134*   K 3.4* 4.0   CO2 29 20   BUN 21 30*   CREATININE 0.6* 0.7   LABGLOM >90 >90   GLUCOSE 165* 181*     BNP: No results for input(s): \"BNP\" in the last 72 hours.  PT/INR:   No results for input(s): \"PROTIME\", \"INR\" in the last 72 hours.    APTT:  No results for input(s): \"APTT\" in the last 72 hours.    CARDIAC ENZYMES:  No results for input(s): \"TROPHS\" in the last 72 hours.    FASTING LIPID PANEL:  Lab Results   Component Value Date/Time    HDL 28 11/08/2022 06:30 AM    TRIG 91 11/08/2022 06:30 AM     LIVER PROFILE:  Recent Labs     12/02/24  0901 12/03/24 0448   AST 55* 70*   * 126*     11/27/24    ECHO (TTE) COMPLETE (PRN CONTRAST/BUBBLE/STRAIN/3D) 11/29/2024  5:27 PM (Final)    Interpretation Summary    Left Ventricle: Severely reduced left ventricular systolic function with a visually estimated EF of 30 - 35%. EF by 2D Simpsons Biplane is 38%. Left ventricle size is normal. Mildly increased wall thickness. Severe global hypokinesis present, with minor regional variation. Swirling contrast in LV apex, but no thrombus with definity. Abnormal diastolic function. Average E/e' ratio is 20.65.    Right Ventricle: Right ventricle is mildly dilated. Reduced systolic function. TAPSE is abnormal.    Aortic Valve: Mildly calcified cusps.    Mitral Valve: Mild annular calcification. Mildly calcified leaflets. Trace regurgitation.    Tricuspid Valve: Mild regurgitation. Normal RVSP. The estimated RVSP is 27 mmHg.    Right Atrium: Right atrium is mildly dilated.    Image quality is suboptimal. Contrast used: Definity. Procedure performed with the patient in a supine position.    Signed by: Elder Yee DO on 11/29/2024  5:27 PM     IMPRESSION:    New onset atrial fibrillation with RVR, 
11/27/2024     Past Medical History:   Diagnosis Date    Arthritis     CHF (congestive heart failure) (HCC)     COPD (chronic obstructive pulmonary disease) (HCC)     Diverticulitis     Hx of blood clots     Pulmonary embolism (HCC)       Acute respiratory failure, pneumonia on imaging, COVID +  AMS likely 2/2 sepsis  UTI  Elevated LFTs              Ischemic vs DILI vs viral vs autoimmune              Tylenol level < 5              Alcohol negative   CK normal  Hx of CHF    Plan:        Liver workup pending  US liver  CBC, CMP, PT/INR pending  Supportive care  Avoid hepatotoxic medications  DNR-CCA no intubation    This plan was formulated in collaboration with Dr. Steve    Explained to the patient and d/W Nursing Staff  Will F/U with you  Please call or Page for any issues or change in status  Thanks    This note is created with the assistance of the speech recognition program.  While intending to generate a document that actually reflects the content of the visit, document can still have some errors including those of syntax and sound like substitutions which may escape proof reading.  Actual meaning can be extrapolated by contextual diversion.    Electronically signed by KIRK Sal NP on 11/29/2024 at 6:15 AM     Attending Physician Statement  I have discussed the care of Noemy Turcios and I have examined the patient myselft and taken ros and hpi , including pertinent history and exam findings,  with the author of this note . I have reviewed the key elements of all parts of the encounter with the nurse practitioner/resident.  I agree with the assessment, plan and orders as documented by the above health care provider.  I have made necessary changes as deemed appropriate directly in the note.     More than 50% of the time was spent taking care of this patient in addition to the nurse practitioner time.  That also included history taking follow-up physical examination and review of 
regurgitation.    Tricuspid Valve: Mild regurgitation. Normal RVSP. The estimated RVSP is 27 mmHg.    Right Atrium: Right atrium is mildly dilated.    Image quality is suboptimal. Contrast used: Definity. Procedure performed with the patient in a supine position.    Signed by: Elder Yee DO on 11/29/2024  5:27 PM     IMPRESSION:      New onset atrial fibrillation with RVR, exacerbated by sepsis  New onset HFrEF, EF 38%  COVID  Pneumonia  UTI  Altered mental status  Hyponatremia  ULISSES  COPD  Morbid obesity  History of DVT/PE on Eliquis  DNR CCA-no intubation    RECOMMENDATIONS:  Okay to stop cardizem drip due to lower BP  Will continue IV amiodarone so long as she remains septic, once no longer septic will change her to p.o. amiodarone  Continue Eliquis  Can increase midodrine if needed  Given DNR-CCA status- ? Invasive workup, will re address once she's out of ICU    Discussed with patient and nursing.    Thank you for allowing me to participate in the care of this patient, please do not hesitate to call if you have any questions.    Elder Yee DO, FACC, FACOI, RPVI, SARKISE, ROSA  [facility]  [email or web address]  [phone]          
to as low as reasonably achievable. COMPARISON: Correlation with concurrent chest radiograph, CT on 05/24/2020 HISTORY: Acute hypoxia. FINDINGS: Image quality significantly degraded by motion artifact. Pulmonary Arteries: Pulmonary arteries are adequately opacified for evaluation.  No intraluminal filling defect seen.  Main pulmonary artery is markedly enlarged, measuring 4.3 cm in caliber. Mediastinum: Borderline cardiomegaly.  At least moderate coronary artery calcification.  No pericardial effusion.  Thoracic aorta is normal caliber. No lymphadenopathy.  Scattered calcified lymph nodes.  Thyroid and esophagus are unremarkable. Lungs/pleura: Patchy ground-glass opacity in the upper lobes.  Consolidation in the lower lobes, right greater than left.  No significant pleural effusion.  Scattered small calcified granulomas. Upper Abdomen: Hepatomegaly.  Calcified granulomas within the spleen and liver. Soft Tissues/Bones: Spondylotic changes throughout the thoracic spine.     1. No pulmonary embolism. 2. Consolidation in the lower lobes, right greater than left, with patchy ground-glass opacity in the upper lobes.  Findings are compatible with multifocal pneumonia. 3. Markedly enlarged main pulmonary artery is compatible with pulmonary hypertension. 4. At least moderate coronary artery calcification.     CT Head W/O Contrast    Result Date: 11/27/2024  EXAMINATION: CT OF THE HEAD WITHOUT CONTRAST  11/27/2024 3:13 pm TECHNIQUE: CT of the head was performed without the administration of intravenous contrast. Automated exposure control, iterative reconstruction, and/or weight based adjustment of the mA/kV was utilized to reduce the radiation dose to as low as reasonably achievable. COMPARISON: None. HISTORY: ORDERING SYSTEM PROVIDED HISTORY: ams TECHNOLOGIST PROVIDED HISTORY: ams Decision Support Exception - unselect if not a suspected or confirmed emergency medical condition->Emergency Medical Condition (MA) Reason for 
function is improving.  Ammonia normal, AST is down to 512 from 1358 and ALT is down to 328 from 427 albumin low 2.7 from 3  Alpha 1 antitrypsin elevated to 238; probably due to history of COPD  -INR today 11/29 is  2.3  -US liver hepatomegaly with hepatic steatosis and suggesting cholelithiasis with no gallstone  Transaminitis keeps trending down   Cytomegalovirus IgG and IgM negative  Hepatitis panel acute negative    History of depression and anxiety  Continue  Effexor home dose  Will hold Valium, replace Ativan 1 mg every 6 as needed  When mental status improves, reconcile home meds     History of CHF  Likely hold off Lasix due to acute sepsis  QTc prolonged likely secondary to CAD plus psych medications  Please avoid any QT prolonging medications  proBNP 38,510 her most recent proBNP was 682 on  9/2021  Cardiac workout troponin was 28 which is higher than her baseline  Diuretics on hold  Troponin was high 109 on 11/29; Eliquis was held and heparin drip started with Amiodarone drip  Echo showed ejection fraction 30-35 percentile with severe global hypokinesia, abnormal diastolic function  APTT trending up 83.2 from 53.9  Anti-Xa unfractionated heparin > 1.10    Evidence of dermatitis and excoriations  Inpatient wound ostomy consult  Evidence of xerosis cutis bilateral lower extremities continue calamine lotion  Erythematous rash possible intertrigo we will order miconazole powder    Depression and anxiety  Venlafaxine    DVT prophylaxis: Heparin drip  GI prophylaxis: Protonix  Diet: clear liquid    Code: DNR CCA no intubation    Consultations:   IP CONSULT TO VASCULAR ACCESS TEAM  IP CONSULT TO INTERNAL MEDICINE  IP CONSULT TO PULMONOLOGY  IP CONSULT TO PALLIATIVE CARE      Plan will be discussed with the attending, Dr Mali Arce MD  PGY I transitional year resident  12/1/2024 6:59 AM       
resident  11/30/2024 7:47 AM   Attending Physician Statement  I have discussed the care of Noemy Turcios and I have examined the patient myself and taken ROS and HPI, including pertinent history and exam findings, with the resident. I have reviewed the key elements of all parts of the encounter with the resident.  I agree with the assessment, plan and orders as documented by the resident.  Patient seen and examined more awake today  Has been retaining urine, insert Singleton's consult urology  Echocardiogram reviewed drop in ejection fraction 20 to 25% with severe global hypokinesia cardiology on board and abnormal diastolic dysfunction,  Trace pedal edema bilateral infiltrates which could be secondary to COVID we will cut down the dose of Lasix  A-fib RVR, on amiodarone drip,   Will likely need ischemia workup, Eliquis and started on heparin drip by cardiology  Vaginal/urethral bleeding got straight cath multiple times, will monitor likely traumatic from multiple straight cath  Leukocytosis is steroid-induced on Zosyn already  Transaminitis getting better ultrasound liver consistent with hepatic steatosis and cholelithiasis  doubt cholecystitis  Electronically signed by Lisa Madison MD      
including pertinent history and exam findings, with the resident. I have reviewed the key elements of all parts of the encounter with the resident.  I agree with the assessment, plan and orders as documented by the resident.  Patient seen and examined,      Appears euvolemic, switch to home dose of diuretics, off antibiotics \\  Get KUB  Transaminitis improving  Off amiodarone drip on p.o. amiodarone  Discharge once cleared by other services    electronically signed by Lisa Madison MD

## 2024-12-11 ENCOUNTER — HOSPITAL ENCOUNTER (INPATIENT)
Age: 64
LOS: 4 days | Discharge: NURSING, REHAB OR LONG-TERM CARE | DRG: 698 | End: 2024-12-16
Attending: EMERGENCY MEDICINE | Admitting: INTERNAL MEDICINE
Payer: MEDICARE

## 2024-12-11 ENCOUNTER — APPOINTMENT (OUTPATIENT)
Dept: GENERAL RADIOLOGY | Age: 64
DRG: 698 | End: 2024-12-11
Payer: MEDICARE

## 2024-12-11 ENCOUNTER — APPOINTMENT (OUTPATIENT)
Dept: CT IMAGING | Age: 64
DRG: 698 | End: 2024-12-11
Payer: MEDICARE

## 2024-12-11 DIAGNOSIS — B96.89 URINARY TRACT INFECTION DUE TO ESBL KLEBSIELLA: ICD-10-CM

## 2024-12-11 DIAGNOSIS — N39.0 URINARY TRACT INFECTION WITH HEMATURIA, SITE UNSPECIFIED: ICD-10-CM

## 2024-12-11 DIAGNOSIS — R31.9 URINARY TRACT INFECTION WITH HEMATURIA, SITE UNSPECIFIED: ICD-10-CM

## 2024-12-11 DIAGNOSIS — R41.82 ALTERED MENTAL STATUS, UNSPECIFIED ALTERED MENTAL STATUS TYPE: Primary | ICD-10-CM

## 2024-12-11 DIAGNOSIS — I24.9 ACUTE CORONARY SYNDROME (HCC): ICD-10-CM

## 2024-12-11 DIAGNOSIS — N39.0 URINARY TRACT INFECTION DUE TO ESBL KLEBSIELLA: ICD-10-CM

## 2024-12-11 LAB
ALBUMIN SERPL-MCNC: 3.4 G/DL (ref 3.5–5.2)
ALP SERPL-CCNC: 88 U/L (ref 35–104)
ALT SERPL-CCNC: 28 U/L (ref 10–35)
AMMONIA PLAS-SCNC: 19 UMOL/L (ref 11–51)
AMPHET UR QL SCN: NEGATIVE
ANION GAP SERPL CALCULATED.3IONS-SCNC: 15 MMOL/L (ref 9–16)
APAP SERPL-MCNC: <5 UG/ML (ref 10–30)
AST SERPL-CCNC: 24 U/L (ref 10–35)
BACTERIA URNS QL MICRO: ABNORMAL
BARBITURATES UR QL SCN: NEGATIVE
BASOPHILS # BLD: 0 K/UL (ref 0–0.2)
BASOPHILS NFR BLD: 0 % (ref 0–2)
BENZODIAZ UR QL: POSITIVE
BILIRUB SERPL-MCNC: 2.2 MG/DL (ref 0–1.2)
BILIRUB UR QL STRIP: ABNORMAL
BUN SERPL-MCNC: 22 MG/DL (ref 8–23)
CALCIUM SERPL-MCNC: 9.7 MG/DL (ref 8.6–10.4)
CANNABINOIDS UR QL SCN: NEGATIVE
CHLORIDE SERPL-SCNC: 86 MMOL/L (ref 98–107)
CLARITY UR: ABNORMAL
CO2 SERPL-SCNC: 34 MMOL/L (ref 20–31)
COCAINE UR QL SCN: NEGATIVE
COLOR UR: ABNORMAL
CREAT SERPL-MCNC: 0.5 MG/DL (ref 0.7–1.2)
EOSINOPHIL # BLD: 0.2 K/UL (ref 0–0.4)
EOSINOPHILS RELATIVE PERCENT: 1 % (ref 0–4)
EPI CELLS #/AREA URNS HPF: ABNORMAL /HPF
ERYTHROCYTE [DISTWIDTH] IN BLOOD BY AUTOMATED COUNT: 18.3 % (ref 11.5–14.9)
ETHANOL PERCENT: 0 %
ETHANOLAMINE SERPL-MCNC: <10 MG/DL (ref 0–0.08)
FENTANYL UR QL: NEGATIVE
GFR, ESTIMATED: >90 ML/MIN/1.73M2
GLUCOSE SERPL-MCNC: 120 MG/DL (ref 74–99)
GLUCOSE UR STRIP-MCNC: NEGATIVE MG/DL
HCT VFR BLD AUTO: 45.8 % (ref 36–46)
HGB BLD-MCNC: 14.4 G/DL (ref 12–16)
HGB UR QL STRIP.AUTO: ABNORMAL
KETONES UR STRIP-MCNC: ABNORMAL MG/DL
LACTATE BLDV-SCNC: 1.6 MMOL/L (ref 0.5–2.2)
LEUKOCYTE ESTERASE UR QL STRIP: ABNORMAL
LYMPHOCYTES NFR BLD: 1.7 K/UL (ref 1–4.8)
LYMPHOCYTES RELATIVE PERCENT: 12 % (ref 24–44)
MAGNESIUM SERPL-MCNC: 2.3 MG/DL (ref 1.6–2.4)
MCH RBC QN AUTO: 29.6 PG (ref 26–34)
MCHC RBC AUTO-ENTMCNC: 31.5 G/DL (ref 31–37)
MCV RBC AUTO: 93.8 FL (ref 80–100)
METHADONE UR QL: NEGATIVE
MONOCYTES NFR BLD: 1.9 K/UL (ref 0.1–1.3)
MONOCYTES NFR BLD: 14 % (ref 1–7)
NEUTROPHILS NFR BLD: 73 % (ref 36–66)
NEUTS SEG NFR BLD: 9.7 K/UL (ref 1.3–9.1)
NITRITE UR QL STRIP: NEGATIVE
OPIATES UR QL SCN: POSITIVE
OXYCODONE UR QL SCN: POSITIVE
PCP UR QL SCN: NEGATIVE
PH UR STRIP: 5 [PH] (ref 5–8)
PHOSPHATE SERPL-MCNC: 2.9 MG/DL (ref 2.5–4.5)
PLATELET # BLD AUTO: 213 K/UL (ref 150–450)
PMV BLD AUTO: 11.1 FL (ref 6–12)
POTASSIUM SERPL-SCNC: 3.4 MMOL/L (ref 3.7–5.3)
PROT SERPL-MCNC: 6.9 G/DL (ref 6.6–8.7)
PROT UR STRIP-MCNC: ABNORMAL MG/DL
RBC # BLD AUTO: 4.88 M/UL (ref 4–5.2)
RBC #/AREA URNS HPF: ABNORMAL /HPF
SALICYLATES SERPL-MCNC: <1 MG/DL (ref 0–10)
SODIUM SERPL-SCNC: 135 MMOL/L (ref 136–145)
SP GR UR STRIP: 1.02 (ref 1–1.03)
TEST INFORMATION: ABNORMAL
TRICYCLIC ANTIDEPRESSANTS, UR: NEGATIVE
TROPONIN I SERPL HS-MCNC: 49 NG/L (ref 0–14)
TROPONIN I SERPL HS-MCNC: 52 NG/L (ref 0–14)
UROBILINOGEN UR STRIP-ACNC: NORMAL EU/DL (ref 0–1)
WBC #/AREA URNS HPF: ABNORMAL /HPF
WBC OTHER # BLD: 13.6 K/UL (ref 3.5–11)
YEAST URNS QL MICRO: ABNORMAL

## 2024-12-11 PROCEDURE — G0480 DRUG TEST DEF 1-7 CLASSES: HCPCS

## 2024-12-11 PROCEDURE — 93005 ELECTROCARDIOGRAM TRACING: CPT

## 2024-12-11 PROCEDURE — 96375 TX/PRO/DX INJ NEW DRUG ADDON: CPT

## 2024-12-11 PROCEDURE — 80053 COMPREHEN METABOLIC PANEL: CPT

## 2024-12-11 PROCEDURE — 85025 COMPLETE CBC W/AUTO DIFF WBC: CPT

## 2024-12-11 PROCEDURE — 80143 DRUG ASSAY ACETAMINOPHEN: CPT

## 2024-12-11 PROCEDURE — 87077 CULTURE AEROBIC IDENTIFY: CPT

## 2024-12-11 PROCEDURE — 2580000003 HC RX 258

## 2024-12-11 PROCEDURE — 80179 DRUG ASSAY SALICYLATE: CPT

## 2024-12-11 PROCEDURE — 83735 ASSAY OF MAGNESIUM: CPT

## 2024-12-11 PROCEDURE — 36415 COLL VENOUS BLD VENIPUNCTURE: CPT

## 2024-12-11 PROCEDURE — 6360000002 HC RX W HCPCS

## 2024-12-11 PROCEDURE — 84100 ASSAY OF PHOSPHORUS: CPT

## 2024-12-11 PROCEDURE — 99285 EMERGENCY DEPT VISIT HI MDM: CPT

## 2024-12-11 PROCEDURE — 84484 ASSAY OF TROPONIN QUANT: CPT

## 2024-12-11 PROCEDURE — 81001 URINALYSIS AUTO W/SCOPE: CPT

## 2024-12-11 PROCEDURE — 96365 THER/PROPH/DIAG IV INF INIT: CPT

## 2024-12-11 PROCEDURE — 87186 SC STD MICRODIL/AGAR DIL: CPT

## 2024-12-11 PROCEDURE — 82140 ASSAY OF AMMONIA: CPT

## 2024-12-11 PROCEDURE — 71045 X-RAY EXAM CHEST 1 VIEW: CPT

## 2024-12-11 PROCEDURE — 70450 CT HEAD/BRAIN W/O DYE: CPT

## 2024-12-11 PROCEDURE — 87040 BLOOD CULTURE FOR BACTERIA: CPT

## 2024-12-11 PROCEDURE — 05H933Z INSERTION OF INFUSION DEVICE INTO RIGHT BRACHIAL VEIN, PERCUTANEOUS APPROACH: ICD-10-PCS | Performed by: RADIOLOGY

## 2024-12-11 PROCEDURE — 80307 DRUG TEST PRSMV CHEM ANLYZR: CPT

## 2024-12-11 PROCEDURE — 83605 ASSAY OF LACTIC ACID: CPT

## 2024-12-11 PROCEDURE — 87086 URINE CULTURE/COLONY COUNT: CPT

## 2024-12-11 RX ORDER — VANCOMYCIN 1.5 G/300ML
1500 INJECTION, SOLUTION INTRAVENOUS ONCE
Status: COMPLETED | OUTPATIENT
Start: 2024-12-11 | End: 2024-12-12

## 2024-12-11 RX ORDER — VANCOMYCIN 1.5 G/300ML
1500 INJECTION, SOLUTION INTRAVENOUS EVERY 12 HOURS
Status: DISCONTINUED | OUTPATIENT
Start: 2024-12-12 | End: 2024-12-13

## 2024-12-11 RX ORDER — FLUCONAZOLE 2 MG/ML
200 INJECTION, SOLUTION INTRAVENOUS ONCE
Status: COMPLETED | OUTPATIENT
Start: 2024-12-11 | End: 2024-12-12

## 2024-12-11 RX ADMIN — PIPERACILLIN AND TAZOBACTAM 3375 MG: 3; .375 INJECTION, POWDER, LYOPHILIZED, FOR SOLUTION INTRAVENOUS at 22:15

## 2024-12-11 RX ADMIN — VANCOMYCIN 1500 MG: 1.5 INJECTION, SOLUTION INTRAVENOUS at 23:02

## 2024-12-11 ASSESSMENT — LIFESTYLE VARIABLES
HOW OFTEN DO YOU HAVE A DRINK CONTAINING ALCOHOL: NEVER
HOW MANY STANDARD DRINKS CONTAINING ALCOHOL DO YOU HAVE ON A TYPICAL DAY: PATIENT DOES NOT DRINK

## 2024-12-12 ENCOUNTER — APPOINTMENT (OUTPATIENT)
Age: 64
DRG: 698 | End: 2024-12-12
Attending: INTERNAL MEDICINE
Payer: MEDICARE

## 2024-12-12 PROBLEM — N39.0 UTI (URINARY TRACT INFECTION): Status: ACTIVE | Noted: 2024-12-12

## 2024-12-12 LAB
AMMONIA PLAS-SCNC: 16 UMOL/L (ref 11–51)
ANION GAP SERPL CALCULATED.3IONS-SCNC: 21 MMOL/L (ref 9–16)
ANTI-XA UNFRAC HEPARIN: >1.1 IU/L (ref 0.3–0.7)
BASOPHILS # BLD: 0 K/UL (ref 0–0.2)
BASOPHILS NFR BLD: 0 % (ref 0–2)
BODY TEMPERATURE: 37
BUN SERPL-MCNC: 22 MG/DL (ref 8–23)
CALCIUM SERPL-MCNC: 9.7 MG/DL (ref 8.6–10.4)
CHLORIDE SERPL-SCNC: 86 MMOL/L (ref 98–107)
CO2 SERPL-SCNC: 30 MMOL/L (ref 20–31)
COHGB MFR BLD: 5.3 % (ref 0–5)
CREAT SERPL-MCNC: 0.6 MG/DL (ref 0.7–1.2)
EOSINOPHIL # BLD: 0.23 K/UL (ref 0–0.4)
EOSINOPHILS RELATIVE PERCENT: 1 % (ref 0–4)
ERYTHROCYTE [DISTWIDTH] IN BLOOD BY AUTOMATED COUNT: 18.5 % (ref 11.5–14.9)
ERYTHROCYTE [DISTWIDTH] IN BLOOD BY AUTOMATED COUNT: 18.7 % (ref 11.5–14.9)
GFR, ESTIMATED: >90 ML/MIN/1.73M2
GLUCOSE BLD-MCNC: 117 MG/DL (ref 65–105)
GLUCOSE SERPL-MCNC: 117 MG/DL (ref 74–99)
HCO3 VENOUS: 28 MMOL/L (ref 24–30)
HCT VFR BLD AUTO: 45.5 % (ref 36–46)
HCT VFR BLD AUTO: 47.3 % (ref 36–46)
HGB BLD-MCNC: 14.7 G/DL (ref 12–16)
HGB BLD-MCNC: 14.9 G/DL (ref 12–16)
INR PPP: 1.1
LYMPHOCYTES NFR BLD: 2.04 K/UL (ref 1–4.8)
LYMPHOCYTES RELATIVE PERCENT: 9 % (ref 24–44)
MCH RBC QN AUTO: 29.9 PG (ref 26–34)
MCH RBC QN AUTO: 30.3 PG (ref 26–34)
MCHC RBC AUTO-ENTMCNC: 31.5 G/DL (ref 31–37)
MCHC RBC AUTO-ENTMCNC: 32.4 G/DL (ref 31–37)
MCV RBC AUTO: 93.6 FL (ref 80–100)
MCV RBC AUTO: 94.9 FL (ref 80–100)
METHEMOGLOBIN: 0.1 % (ref 0–1.9)
MONOCYTES NFR BLD: 1.82 K/UL (ref 0.1–1.3)
MONOCYTES NFR BLD: 8 % (ref 1–7)
MORPHOLOGY: ABNORMAL
NEUTROPHILS NFR BLD: 82 % (ref 36–66)
NEUTS SEG NFR BLD: 18.61 K/UL (ref 1.3–9.1)
O2 SAT, VEN: 95.8 % (ref 60–85)
PARTIAL THROMBOPLASTIN TIME: 26.7 SEC (ref 24–36)
PARTIAL THROMBOPLASTIN TIME: >150 SEC (ref 24–36)
PCO2 VENOUS: 36.1 MM HG (ref 39–55)
PH VENOUS: 7.51 (ref 7.32–7.42)
PLATELET # BLD AUTO: 186 K/UL (ref 150–450)
PLATELET # BLD AUTO: 203 K/UL (ref 150–450)
PMV BLD AUTO: 10.6 FL (ref 6–12)
PMV BLD AUTO: 11 FL (ref 6–12)
PO2 VENOUS: 66.8 MM HG (ref 30–50)
POSITIVE BASE EXCESS, VEN: 5.1 MMOL/L (ref 0–2)
POTASSIUM SERPL-SCNC: 4.4 MMOL/L (ref 3.7–5.3)
PROTHROMBIN TIME: 14.9 SEC (ref 11.8–14.6)
RBC # BLD AUTO: 4.86 M/UL (ref 4–5.2)
RBC # BLD AUTO: 4.98 M/UL (ref 4–5.2)
SODIUM SERPL-SCNC: 137 MMOL/L (ref 136–145)
TEXT FOR RESPIRATORY: ABNORMAL
TROPONIN I SERPL HS-MCNC: 52 NG/L (ref 0–14)
WBC OTHER # BLD: 15.8 K/UL (ref 3.5–11)
WBC OTHER # BLD: 22.7 K/UL (ref 3.5–11)

## 2024-12-12 PROCEDURE — C8924 2D TTE W OR W/O FOL W/CON,FU: HCPCS

## 2024-12-12 PROCEDURE — 93005 ELECTROCARDIOGRAM TRACING: CPT | Performed by: INTERNAL MEDICINE

## 2024-12-12 PROCEDURE — 94761 N-INVAS EAR/PLS OXIMETRY MLT: CPT

## 2024-12-12 PROCEDURE — 6360000002 HC RX W HCPCS

## 2024-12-12 PROCEDURE — 85027 COMPLETE CBC AUTOMATED: CPT

## 2024-12-12 PROCEDURE — 6360000002 HC RX W HCPCS: Performed by: NURSE PRACTITIONER

## 2024-12-12 PROCEDURE — 2500000003 HC RX 250 WO HCPCS: Performed by: NURSE PRACTITIONER

## 2024-12-12 PROCEDURE — 6370000000 HC RX 637 (ALT 250 FOR IP): Performed by: NURSE PRACTITIONER

## 2024-12-12 PROCEDURE — 82947 ASSAY GLUCOSE BLOOD QUANT: CPT

## 2024-12-12 PROCEDURE — 94640 AIRWAY INHALATION TREATMENT: CPT

## 2024-12-12 PROCEDURE — 82805 BLOOD GASES W/O2 SATURATION: CPT

## 2024-12-12 PROCEDURE — 2500000003 HC RX 250 WO HCPCS

## 2024-12-12 PROCEDURE — 84484 ASSAY OF TROPONIN QUANT: CPT

## 2024-12-12 PROCEDURE — 80048 BASIC METABOLIC PNL TOTAL CA: CPT

## 2024-12-12 PROCEDURE — 99214 OFFICE O/P EST MOD 30 MIN: CPT

## 2024-12-12 PROCEDURE — 82800 BLOOD PH: CPT

## 2024-12-12 PROCEDURE — 2500000003 HC RX 250 WO HCPCS: Performed by: INTERNAL MEDICINE

## 2024-12-12 PROCEDURE — 85610 PROTHROMBIN TIME: CPT

## 2024-12-12 PROCEDURE — 99223 1ST HOSP IP/OBS HIGH 75: CPT | Performed by: INTERNAL MEDICINE

## 2024-12-12 PROCEDURE — 2700000000 HC OXYGEN THERAPY PER DAY

## 2024-12-12 PROCEDURE — 85025 COMPLETE CBC W/AUTO DIFF WBC: CPT

## 2024-12-12 PROCEDURE — 85730 THROMBOPLASTIN TIME PARTIAL: CPT

## 2024-12-12 PROCEDURE — 36415 COLL VENOUS BLD VENIPUNCTURE: CPT

## 2024-12-12 PROCEDURE — 93005 ELECTROCARDIOGRAM TRACING: CPT | Performed by: NURSE PRACTITIONER

## 2024-12-12 PROCEDURE — 6360000002 HC RX W HCPCS: Performed by: INTERNAL MEDICINE

## 2024-12-12 PROCEDURE — 2060000000 HC ICU INTERMEDIATE R&B

## 2024-12-12 PROCEDURE — 6360000004 HC RX CONTRAST MEDICATION: Performed by: INTERNAL MEDICINE

## 2024-12-12 PROCEDURE — 85520 HEPARIN ASSAY: CPT

## 2024-12-12 PROCEDURE — 2580000003 HC RX 258: Performed by: NURSE PRACTITIONER

## 2024-12-12 PROCEDURE — 82140 ASSAY OF AMMONIA: CPT

## 2024-12-12 RX ORDER — IPRATROPIUM BROMIDE AND ALBUTEROL SULFATE 2.5; .5 MG/3ML; MG/3ML
1 SOLUTION RESPIRATORY (INHALATION) 3 TIMES DAILY
COMMUNITY
End: 2024-12-16

## 2024-12-12 RX ORDER — FAMOTIDINE 10 MG
10 TABLET ORAL 2 TIMES DAILY
COMMUNITY

## 2024-12-12 RX ORDER — IODINE/SODIUM IODIDE 2 %
TINCTURE TOPICAL PRN
Status: DISCONTINUED | OUTPATIENT
Start: 2024-12-12 | End: 2024-12-16 | Stop reason: HOSPADM

## 2024-12-12 RX ORDER — OXYCODONE AND ACETAMINOPHEN 5; 325 MG/1; MG/1
2 TABLET ORAL EVERY 6 HOURS PRN
Status: DISCONTINUED | OUTPATIENT
Start: 2024-12-12 | End: 2024-12-16 | Stop reason: HOSPADM

## 2024-12-12 RX ORDER — METOPROLOL SUCCINATE 50 MG/1
50 TABLET, EXTENDED RELEASE ORAL DAILY
Status: DISCONTINUED | OUTPATIENT
Start: 2024-12-12 | End: 2024-12-15

## 2024-12-12 RX ORDER — ONDANSETRON 4 MG/1
4 TABLET, ORALLY DISINTEGRATING ORAL EVERY 8 HOURS PRN
Status: DISCONTINUED | OUTPATIENT
Start: 2024-12-12 | End: 2024-12-12

## 2024-12-12 RX ORDER — SENNOSIDES A AND B 8.6 MG/1
2 TABLET, FILM COATED ORAL DAILY
Status: DISCONTINUED | OUTPATIENT
Start: 2024-12-12 | End: 2024-12-16 | Stop reason: HOSPADM

## 2024-12-12 RX ORDER — ASPIRIN 325 MG
325 TABLET, DELAYED RELEASE (ENTERIC COATED) ORAL
Status: ACTIVE | OUTPATIENT
Start: 2024-12-12 | End: 2024-12-12

## 2024-12-12 RX ORDER — MIDODRINE HYDROCHLORIDE 10 MG/1
10 TABLET ORAL
Status: DISCONTINUED | OUTPATIENT
Start: 2024-12-12 | End: 2024-12-16 | Stop reason: HOSPADM

## 2024-12-12 RX ORDER — PAROXETINE 30 MG/1
30 TABLET, FILM COATED ORAL EVERY MORNING
COMMUNITY

## 2024-12-12 RX ORDER — HEPARIN SODIUM 10000 [USP'U]/100ML
5-30 INJECTION, SOLUTION INTRAVENOUS CONTINUOUS
Status: DISCONTINUED | OUTPATIENT
Start: 2024-12-12 | End: 2024-12-14 | Stop reason: SDUPTHER

## 2024-12-12 RX ORDER — DOCUSATE SODIUM 100 MG/1
100 CAPSULE, LIQUID FILLED ORAL 2 TIMES DAILY
Status: DISCONTINUED | OUTPATIENT
Start: 2024-12-12 | End: 2024-12-16 | Stop reason: HOSPADM

## 2024-12-12 RX ORDER — HEPARIN SODIUM 1000 [USP'U]/ML
4000 INJECTION, SOLUTION INTRAVENOUS; SUBCUTANEOUS PRN
Status: DISCONTINUED | OUTPATIENT
Start: 2024-12-12 | End: 2024-12-14 | Stop reason: SDUPTHER

## 2024-12-12 RX ORDER — GABAPENTIN 100 MG/1
100 CAPSULE ORAL NIGHTLY
Status: DISCONTINUED | OUTPATIENT
Start: 2024-12-12 | End: 2024-12-16 | Stop reason: HOSPADM

## 2024-12-12 RX ORDER — HEPARIN SODIUM 1000 [USP'U]/ML
2000 INJECTION, SOLUTION INTRAVENOUS; SUBCUTANEOUS PRN
Status: DISCONTINUED | OUTPATIENT
Start: 2024-12-12 | End: 2024-12-14 | Stop reason: SDUPTHER

## 2024-12-12 RX ORDER — CETIRIZINE HYDROCHLORIDE 10 MG/1
10 TABLET ORAL NIGHTLY
Status: DISCONTINUED | OUTPATIENT
Start: 2024-12-12 | End: 2024-12-16 | Stop reason: HOSPADM

## 2024-12-12 RX ORDER — ONDANSETRON 2 MG/ML
4 INJECTION INTRAMUSCULAR; INTRAVENOUS EVERY 6 HOURS PRN
Status: DISCONTINUED | OUTPATIENT
Start: 2024-12-12 | End: 2024-12-12

## 2024-12-12 RX ORDER — DIGOXIN 125 MCG
125 TABLET ORAL EVERY OTHER DAY
Status: DISCONTINUED | OUTPATIENT
Start: 2024-12-13 | End: 2024-12-16 | Stop reason: HOSPADM

## 2024-12-12 RX ORDER — SODIUM CHLORIDE 0.9 % (FLUSH) 0.9 %
10 SYRINGE (ML) INJECTION PRN
Status: DISCONTINUED | OUTPATIENT
Start: 2024-12-12 | End: 2024-12-16 | Stop reason: HOSPADM

## 2024-12-12 RX ORDER — DIAZEPAM 2 MG/1
5 TABLET ORAL NIGHTLY
Status: DISCONTINUED | OUTPATIENT
Start: 2024-12-12 | End: 2024-12-16 | Stop reason: HOSPADM

## 2024-12-12 RX ORDER — SODIUM CHLORIDE 9 MG/ML
INJECTION, SOLUTION INTRAVENOUS PRN
Status: DISCONTINUED | OUTPATIENT
Start: 2024-12-12 | End: 2024-12-16 | Stop reason: HOSPADM

## 2024-12-12 RX ORDER — FUROSEMIDE 10 MG/ML
10 INJECTION INTRAMUSCULAR; INTRAVENOUS DAILY
Status: DISCONTINUED | OUTPATIENT
Start: 2024-12-12 | End: 2024-12-16 | Stop reason: HOSPADM

## 2024-12-12 RX ORDER — OXYCODONE AND ACETAMINOPHEN 5; 325 MG/1; MG/1
1 TABLET ORAL EVERY 6 HOURS PRN
Status: DISCONTINUED | OUTPATIENT
Start: 2024-12-12 | End: 2024-12-16 | Stop reason: HOSPADM

## 2024-12-12 RX ORDER — POTASSIUM CHLORIDE 1500 MG/1
40 TABLET, EXTENDED RELEASE ORAL PRN
Status: DISCONTINUED | OUTPATIENT
Start: 2024-12-12 | End: 2024-12-16 | Stop reason: HOSPADM

## 2024-12-12 RX ORDER — MAGNESIUM SULFATE HEPTAHYDRATE 40 MG/ML
2000 INJECTION, SOLUTION INTRAVENOUS ONCE
Status: COMPLETED | OUTPATIENT
Start: 2024-12-12 | End: 2024-12-12

## 2024-12-12 RX ORDER — FUROSEMIDE 20 MG/1
20 TABLET ORAL DAILY
Status: DISCONTINUED | OUTPATIENT
Start: 2024-12-12 | End: 2024-12-16 | Stop reason: HOSPADM

## 2024-12-12 RX ORDER — POLYETHYLENE GLYCOL 3350 17 G/17G
17 POWDER, FOR SOLUTION ORAL DAILY PRN
Status: DISCONTINUED | OUTPATIENT
Start: 2024-12-12 | End: 2024-12-16 | Stop reason: HOSPADM

## 2024-12-12 RX ORDER — ACETAMINOPHEN 650 MG/1
650 SUPPOSITORY RECTAL EVERY 6 HOURS PRN
Status: DISCONTINUED | OUTPATIENT
Start: 2024-12-12 | End: 2024-12-16 | Stop reason: HOSPADM

## 2024-12-12 RX ORDER — SODIUM CHLORIDE 0.9 % (FLUSH) 0.9 %
5-40 SYRINGE (ML) INJECTION EVERY 12 HOURS SCHEDULED
Status: DISCONTINUED | OUTPATIENT
Start: 2024-12-12 | End: 2024-12-16 | Stop reason: HOSPADM

## 2024-12-12 RX ORDER — HEPARIN SODIUM 1000 [USP'U]/ML
4000 INJECTION, SOLUTION INTRAVENOUS; SUBCUTANEOUS ONCE
Status: COMPLETED | OUTPATIENT
Start: 2024-12-12 | End: 2024-12-12

## 2024-12-12 RX ORDER — VENLAFAXINE HYDROCHLORIDE 37.5 MG/1
37.5 CAPSULE, EXTENDED RELEASE ORAL DAILY
COMMUNITY

## 2024-12-12 RX ORDER — MAGNESIUM SULFATE HEPTAHYDRATE 40 MG/ML
2000 INJECTION, SOLUTION INTRAVENOUS PRN
Status: DISCONTINUED | OUTPATIENT
Start: 2024-12-12 | End: 2024-12-16 | Stop reason: HOSPADM

## 2024-12-12 RX ORDER — ACETAMINOPHEN 325 MG/1
650 TABLET ORAL EVERY 6 HOURS PRN
Status: DISCONTINUED | OUTPATIENT
Start: 2024-12-12 | End: 2024-12-16 | Stop reason: HOSPADM

## 2024-12-12 RX ORDER — VENLAFAXINE HYDROCHLORIDE 37.5 MG/1
37.5 CAPSULE, EXTENDED RELEASE ORAL DAILY
Status: DISCONTINUED | OUTPATIENT
Start: 2024-12-12 | End: 2024-12-16 | Stop reason: HOSPADM

## 2024-12-12 RX ORDER — METOPROLOL TARTRATE 1 MG/ML
5 INJECTION, SOLUTION INTRAVENOUS EVERY 6 HOURS
Status: DISCONTINUED | OUTPATIENT
Start: 2024-12-12 | End: 2024-12-15

## 2024-12-12 RX ORDER — ALBUTEROL SULFATE 90 UG/1
2 INHALANT RESPIRATORY (INHALATION) EVERY 4 HOURS PRN
Status: DISCONTINUED | OUTPATIENT
Start: 2024-12-12 | End: 2024-12-16 | Stop reason: HOSPADM

## 2024-12-12 RX ORDER — BISACODYL 10 MG
10 SUPPOSITORY, RECTAL RECTAL DAILY PRN
Status: DISCONTINUED | OUTPATIENT
Start: 2024-12-12 | End: 2024-12-16 | Stop reason: HOSPADM

## 2024-12-12 RX ORDER — AMIODARONE HYDROCHLORIDE 200 MG/1
200 TABLET ORAL DAILY
Status: DISCONTINUED | OUTPATIENT
Start: 2024-12-12 | End: 2024-12-16

## 2024-12-12 RX ORDER — VENLAFAXINE HYDROCHLORIDE 150 MG/1
150 CAPSULE, EXTENDED RELEASE ORAL DAILY
Status: DISCONTINUED | OUTPATIENT
Start: 2024-12-12 | End: 2024-12-16 | Stop reason: HOSPADM

## 2024-12-12 RX ORDER — FLUCONAZOLE 2 MG/ML
200 INJECTION, SOLUTION INTRAVENOUS EVERY 24 HOURS
Status: DISCONTINUED | OUTPATIENT
Start: 2024-12-12 | End: 2024-12-12

## 2024-12-12 RX ORDER — IPRATROPIUM BROMIDE AND ALBUTEROL SULFATE 2.5; .5 MG/3ML; MG/3ML
1 SOLUTION RESPIRATORY (INHALATION) 3 TIMES DAILY
Status: DISCONTINUED | OUTPATIENT
Start: 2024-12-12 | End: 2024-12-16 | Stop reason: HOSPADM

## 2024-12-12 RX ORDER — OXYCODONE AND ACETAMINOPHEN 5; 325 MG/1; MG/1
1 TABLET ORAL EVERY 6 HOURS PRN
Status: ON HOLD | COMMUNITY
End: 2024-12-16

## 2024-12-12 RX ADMIN — PIPERACILLIN AND TAZOBACTAM 4500 MG: 4; .5 INJECTION, POWDER, FOR SOLUTION INTRAVENOUS at 21:46

## 2024-12-12 RX ADMIN — IPRATROPIUM BROMIDE AND ALBUTEROL SULFATE 1 DOSE: 2.5; .5 SOLUTION RESPIRATORY (INHALATION) at 19:44

## 2024-12-12 RX ADMIN — ANTI-FUNGAL POWDER MICONAZOLE NITRATE TALC FREE: 1.42 POWDER TOPICAL at 21:49

## 2024-12-12 RX ADMIN — DOCUSATE SODIUM 100 MG: 100 CAPSULE, LIQUID FILLED ORAL at 21:48

## 2024-12-12 RX ADMIN — METOPROLOL TARTRATE 5 MG: 1 INJECTION, SOLUTION INTRAVENOUS at 17:40

## 2024-12-12 RX ADMIN — IPRATROPIUM BROMIDE AND ALBUTEROL SULFATE 1 DOSE: 2.5; .5 SOLUTION RESPIRATORY (INHALATION) at 07:17

## 2024-12-12 RX ADMIN — PIPERACILLIN AND TAZOBACTAM 3375 MG: 3; .375 INJECTION, POWDER, LYOPHILIZED, FOR SOLUTION INTRAVENOUS at 05:08

## 2024-12-12 RX ADMIN — OXYCODONE HYDROCHLORIDE AND ACETAMINOPHEN 2 TABLET: 5; 325 TABLET ORAL at 05:39

## 2024-12-12 RX ADMIN — ANTI-FUNGAL POWDER MICONAZOLE NITRATE TALC FREE: 1.42 POWDER TOPICAL at 16:01

## 2024-12-12 RX ADMIN — VANCOMYCIN 1500 MG: 1.5 INJECTION, SOLUTION INTRAVENOUS at 18:05

## 2024-12-12 RX ADMIN — MAGNESIUM SULFATE HEPTAHYDRATE 2000 MG: 40 INJECTION, SOLUTION INTRAVENOUS at 00:44

## 2024-12-12 RX ADMIN — PERFLUTREN 3 ML: 6.52 INJECTION, SUSPENSION INTRAVENOUS at 15:04

## 2024-12-12 RX ADMIN — CETIRIZINE HYDROCHLORIDE 10 MG: 10 TABLET, FILM COATED ORAL at 21:48

## 2024-12-12 RX ADMIN — GABAPENTIN 100 MG: 100 CAPSULE ORAL at 21:48

## 2024-12-12 RX ADMIN — FUROSEMIDE 10 MG: 10 INJECTION, SOLUTION INTRAMUSCULAR; INTRAVENOUS at 17:40

## 2024-12-12 RX ADMIN — FLUCONAZOLE 200 MG: 200 INJECTION, SOLUTION INTRAVENOUS at 00:45

## 2024-12-12 RX ADMIN — HEPARIN SODIUM 12 UNITS/KG/HR: 10000 INJECTION, SOLUTION INTRAVENOUS at 11:45

## 2024-12-12 RX ADMIN — HEPARIN SODIUM 4000 UNITS: 1000 INJECTION INTRAVENOUS; SUBCUTANEOUS at 11:42

## 2024-12-12 RX ADMIN — PIPERACILLIN AND TAZOBACTAM 4500 MG: 4; .5 INJECTION, POWDER, FOR SOLUTION INTRAVENOUS at 14:28

## 2024-12-12 RX ADMIN — SODIUM CHLORIDE, PRESERVATIVE FREE 10 ML: 5 INJECTION INTRAVENOUS at 16:02

## 2024-12-12 RX ADMIN — SODIUM CHLORIDE, PRESERVATIVE FREE 10 ML: 5 INJECTION INTRAVENOUS at 21:49

## 2024-12-12 ASSESSMENT — PAIN DESCRIPTION - LOCATION
LOCATION: ABDOMEN
LOCATION: ABDOMEN;HIP

## 2024-12-12 ASSESSMENT — PAIN SCALES - GENERAL
PAINLEVEL_OUTOF10: 0
PAINLEVEL_OUTOF10: 6
PAINLEVEL_OUTOF10: 7

## 2024-12-12 ASSESSMENT — PAIN - FUNCTIONAL ASSESSMENT: PAIN_FUNCTIONAL_ASSESSMENT: PREVENTS OR INTERFERES SOME ACTIVE ACTIVITIES AND ADLS

## 2024-12-12 ASSESSMENT — PAIN DESCRIPTION - ORIENTATION: ORIENTATION: RIGHT

## 2024-12-12 ASSESSMENT — PAIN DESCRIPTION - FREQUENCY: FREQUENCY: CONTINUOUS

## 2024-12-12 ASSESSMENT — PAIN DESCRIPTION - DESCRIPTORS: DESCRIPTORS: ACHING;DISCOMFORT;CRAMPING

## 2024-12-12 NOTE — ED NOTES
Gisele, patient's sister called ED for update on patient's status. Per pt, writer okay to share medical information with sister.

## 2024-12-12 NOTE — CONSULTS
[facility] -   Infectious diseases evaluation  admission date 12/11/2024    reason for consultation:   Antibiotic management    Impression :   Current:  UTI with hematuria  Encephalopathy  Troponin elevation/EKG abnormalities, followed by cardiology on heparin drip  Left flank, buttocks wounds with no signs of infection.  Recent COVID-19 infection/multifocal pneumonia  Recent UTI  Cholelithiasis  COPD on home oxygen  CHF  History of DVT and PE  Morbid obesity  QT prolongation        HENCE:     Continue IV vancomycin and Zosyn   Discontinue Diflucan   The patient recently received Zosyn course completed 12/4/2024  Follow blood and urine culture, adjust antibiotics as needed  Follow CBC and renal function        Infection Control Recommendations   Contact isolation    Antimicrobial Stewardship Recommendations   Simplification of therapy  Targeted therapy      History of Present Illness:   Initial history:  Noemy Turcios is a 64 y.o.-year-old female presented to hospital with altered mental status associated with hematuria.  She drowsy, arousable, poor historian, on oxygen per nasal cannula, denied shortness of breath or cough, denied pain, denied nausea or vomiting, no other complaints.  Urinalysis showed moderate amount of leukocyte Estrace, large hemoglobin, too numerous to count WBC  Initial WBC 13.6, lactic acid 1.6  CT head showed no acute abnormalities, opacification of the majority of left mastoid air cells.  Chest x-ray showed cardiomegaly, no infiltrate  Urine drug screen was positive for benzodiazepine, opiates and oxycodone  The patient was recently hospitalized 11/27/2024 through 12/6/2024 for acute respiratory failure, was found to have COVID-19 infection, multifocal pneumonia and UTI, was treated with IV Zosyn through 12/4/2024.  She received a course of Decadron.  MRSA swab was positive last admission thought to be colonization.  Singleton catheter was placed 
  Department of Urology  Urology Consult Note    Patient:  Noemy Turcios  MRN: [id number]  YOB: 1960    Reason for Consult:   patient had yang inserted last admission for retention - returns to ED for another UTI  Requesting Physician:  Ashley Gillespie, APRN - CNP     CHIEF COMPLAINT:    Chief Complaint   Patient presents with    Altered Mental Status    Hematuria       History Obtained From:   patient, electronic medical record    HISTORY OF PRESENT ILLNESS:    The patient is a 64 y.o. female who presents from facility with AMS and hematuria. She was recently admitted to [facility] for respiratory failure  and was found to be in retention with UTI. A yang catheter was placed last admission and she was discharged to facility with it, with orders to fu as outpatient for void trial. She had been discharged from hospital to facility for less than a week before returning to hospital, thus unable to fu outpatient.  UA this admission concerning for UTI, WBC mildly elevated.  In the past, she was thought to be colonized. ID consulted this admission for UTI and atb recommendations. Currently on fluconazole, zosyn, and vanco. She had a negative ct last admission. Patient is unable to provide any history to me, lethargic. She wakes to voice and nods yes and no, but does not answer any questions    Past Medical History:        Diagnosis Date    Arthritis     CHF (congestive heart failure) (HCC)     COPD (chronic obstructive pulmonary disease) (HCC)     Diverticulitis     Hx of blood clots     Pulmonary embolism (HCC)      Past Surgical History:        Procedure Laterality Date    ANKLE SURGERY      COLON SURGERY      FOOT SURGERY Left 1/22/2021    FOOT BONE BIOPSY W/ INCISION & DRAINAGE AND REDRESSING ON RIGHT FOOT performed by Matthieu Marie DPM at [facility] OR    HERNIA REPAIR       Current Medications:   Current Facility-Administered Medications: sodium chloride flush 0.9 % injection 5-40 mL, 5-40 mL, IntraVENous, 2 
times daily.  Patient taking differently: Apply topically 2 times daily. (Morning and at bedtime) 9/30/21  Yes Maxwell Hu MD   furosemide (LASIX) 20 MG tablet Take 1 tablet by mouth daily for 10 days 9/20/21 12/12/24 Yes Pooja Asencio PA-C   famotidine (PEPCID) 10 MG tablet Take 1 tablet by mouth 2 times daily    Lisbeth Calix MD   metoprolol succinate (TOPROL XL) 50 MG extended release tablet Take 1 tablet by mouth daily 12/7/24 1/6/25  Janiya Grayson MD   melatonin 3 MG TABS tablet Take 1 tablet by mouth nightly as needed (FOR INSOMNIA) 12/6/24   Janiya Grayson MD   benzonatate (TESSALON) 100 MG capsule Take 1 capsule by mouth every 8 hours as needed for Cough    Lisbeth Calix MD   Glucagon, rDNA, (GLUCAGON EMERGENCY) 1 MG KIT Inject 1 mg as directed every 24 hours As needed for blood glucose below 60 and symptomatic    ProviderLisbeth MD   oxyCODONE-acetaminophen (PERCOCET) 5-325 MG per tablet Take 2 tablets by mouth every 6 hours as needed for Pain. (Give 1 tablet for moderate and 2 tablets for severe pain)    Lisbeth Calix MD   sodium chloride (OCEAN, BABY AYR) 0.65 % nasal spray 2 sprays by Nasal route 2 times daily    Lisbeth Calix MD   venlafaxine 150 MG extended release tablet Take 1 tablet by mouth daily (with breakfast) (Give with 37.5 mg tablet)    Lisbeth Calix MD   ondansetron (ZOFRAN) 4 MG tablet Take 1 tablet by mouth every 6 hours as needed for Nausea or Vomiting    Lisbeth Calix MD   cetirizine (ZYRTEC) 10 MG tablet Take 1 tablet by mouth at bedtime    Lisbeth Calix MD   guaiFENesin (MUCINEX) 600 MG extended release tablet Take 1 tablet by mouth every 8 hours as needed for Congestion    Lisbeth Calix MD   polyethylene glycol (MIRALAX) 17 g PACK packet Take 17 g by mouth daily    Lisbeth Calix MD senna (SENOKOT) 8.6 MG tablet Take 1 tablet by mouth daily as needed for Constipation    Lisbeth Calix 
mouth daily    Provider, MD Lisbeth   senna (SENOKOT) 8.6 MG tablet Take 1 tablet by mouth daily as needed for Constipation    Lisbeth Calix MD   albuterol sulfate HFA (VENTOLIN HFA) 108 (90 Base) MCG/ACT inhaler Inhale 2 puffs into the lungs every 4 hours as needed for Wheezing or Shortness of Breath    ProviderLisbeth MD   traZODone (DESYREL) 50 MG tablet Take 1 tablet by mouth nightly as needed for Sleep 9/30/21   Maxwell Hu MD       Allergies:  Patient has no known allergies.    Social History:   reports that she has been smoking. She has a 11.3 pack-year smoking history. She has never used smokeless tobacco. She reports that she does not currently use alcohol. She reports that she does not currently use drugs.     Family History: family history includes Cancer in her mother; Diabetes in her paternal grandfather. No h/o sudden cardiac death.No for premature CAD    REVIEW OF SYSTEMS:    Constitutional: there has been no unanticipated weight loss. There's been No change in energy level, No change in activity level.     Eyes: No visual changes or diplopia. No scleral icterus.  ENT: No Headaches, hearing loss or vertigo. No mouth sores or sore throat.  Cardiovascular: see above  Respiratory: see above  Gastrointestinal: No abdominal pain, appetite loss, blood in stools.   Genitourinary: No dysuria, trouble voiding, or hematuria.  Musculoskeletal:  No gait disturbance, No weakness or joint complaints.  Integumentary: No rash or pruritis.  Neurological: No headache or diplopia. No tingling  Psychiatric: No anxiety, or depression.  Endocrine: No temperature intolerance.   Hematologic/Lymphatic: No abnormal bruising or bleeding, blood clots or swollen lymph nodes.  Allergic/Immunologic: No nasal congestion or hives.      PHYSICAL EXAM:      /74   Pulse 80   Temp 97.5 °F (36.4 °C) (Oral)   Resp 18   Ht 1.651 m (5' 5\")   Wt (!) 144.2 kg (318 lb)   SpO2 97%   BMI 52.92 kg/m²

## 2024-12-12 NOTE — H&P
BID Ashley Gillespie APRN - CNP        [Held by provider] venlafaxine (EFFEXOR XR) extended release capsule 37.5 mg  37.5 mg Oral Daily Ashley Gillespie APRN - CNP        cetirizine (ZYRTEC) tablet 10 mg  10 mg Oral Nightly Ashley Gillespie APRN - CNP        furosemide (LASIX) tablet 20 mg  20 mg Oral Daily Ashley Gillespie APRN - CNP        gabapentin (NEURONTIN) capsule 100 mg  100 mg Oral Nightly Ashley Gillespie APRN - CNP        metoprolol succinate (TOPROL XL) extended release tablet 50 mg  50 mg Oral Daily Ashley Gillespie APRN - CNP        oxyCODONE-acetaminophen (PERCOCET) 5-325 MG per tablet 2 tablet  2 tablet Oral Q6H PRN Ashley Gillespie APRN - CNP   2 tablet at 12/12/24 0539    oxyCODONE-acetaminophen (PERCOCET) 5-325 MG per tablet 1 tablet  1 tablet Oral Q6H PRN Ashley Gillespie APRN - CNP        [Held by provider] PARoxetine (PAXIL) tablet 30 mg  30 mg Oral QAM Ashley Gillespie APRN - SNEHA        ipratropium 0.5 mg-albuterol 2.5 mg (DUONEB) nebulizer solution 1 Dose  1 Dose Inhalation TID Ashley Gillespie APRN - CNP   1 Dose at 12/12/24 0717    piperacillin-tazobactam (ZOSYN) 4,500 mg in sodium chloride 0.9 % 100 mL IVPB ([id number])  4,500 mg IntraVENous Q8H Ashley Gillespie APRN - CNP        aspirin EC tablet 325 mg  325 mg Oral NOW Heather Fisher MD        vancomycin (VANCOCIN) intermittent dosing (placeholder)   Other RX Placeholder Nova Baker MD        vancomycin (VANCOCIN) 1500 mg in 300 mL IVPB  1,500 mg IntraVENous Q12H Nova Baker MD           Impressions :     1. Principal Problem:    UTI (urinary tract infection)  Resolved Problems:    * No resolved hospital problems. *        2.  has a past medical history of Arthritis, CHF (congestive heart failure) ([facility]), COPD (chronic obstructive pulmonary disease) ([facility]), Diverticulitis, blood clots, and Pulmonary embolism ([facility]).     Plans:     64-year-old female history of HFpEF, COPD, severe obesity BMI 52, HTN, A-fib as well as prior PE on Eliquis,

## 2024-12-12 NOTE — DISCHARGE INSTR - COC
Continuity of Care Form    Patient Name: Noemy Turcios   :  1960  MRN:  [id number]    Admit date:  2024  Discharge date:  24    Code Status Order: DNR-CCA   Advance Directives:   Advance Care Flowsheet Documentation             Admitting Physician:  Heather Fisher MD  PCP: Sixto Merrill MD    Discharging Nurse: jennifer  Discharging Hospital Unit/Room#: 3/-01  Discharging Unit Phone Number: [phone]    Emergency Contact:   Extended Emergency Contact Information  Primary Emergency Contact: Siena Myers  Home Phone: [phone]  Mobile Phone: [phone]  Relation: Other    Past Surgical History:  Past Surgical History:   Procedure Laterality Date    ANKLE SURGERY      COLON SURGERY      FOOT SURGERY Left 2021    FOOT BONE BIOPSY W/ INCISION & DRAINAGE AND REDRESSING ON RIGHT FOOT performed by Matthieu Marie DPM at [facility] OR    HERNIA REPAIR         Immunization History:   Immunization History   Administered Date(s) Administered    TDaP, ADACEL (age 10y-64y), BOOSTRIX (age 10y+), IM, 0.5mL 2017       Active Problems:  Patient Active Problem List   Diagnosis Code    Chronic heart failure with preserved ejection fraction (HCC) I50.32    Chronic obstructive pulmonary disease (HCC) J44.9    Tobacco abuse Z72.0    Morbid obesity E66.01    Essential hypertension I10    History of pulmonary embolism Z86.711    Family history of colon cancer in mother Z80.0    Prediabetes R73.03    Class 4 congestive heart failure, acute on chronic, systolic (HCC) I50.23    Hypokalemia E87.6    Cellulitis L03.90    Primary osteoarthritis of right hip M16.11    Chronic pain of multiple joints M25.50, G89.29    Depression F32.A    Cellulitis of right leg L03.115    Peripheral artery disease (HCC) I73.9    Non healing left heel wound S91.302A    Corns and callosities L84    Healing pressure ulcer, stage IV (HCC) L89.94    Lymphedema of both lower extremities I89.0    Non-pressure chronic ulcer of right

## 2024-12-12 NOTE — ED NOTES
Report given to Stephanie Wylie RN from ED.   Report method in person   The following was reviewed with receiving RN:   Current vital signs:  /71   Pulse 80   Temp 98.4 °F (36.9 °C) (Rectal)   Resp 18   Ht 1.651 m (5' 5\")   Wt (!) 144.2 kg (318 lb)   SpO2 96%   BMI 52.92 kg/m²                MEWS Score: 2     Any medication or safety alerts were reviewed. Any pending diagnostics and notifications were also reviewed, as well as any safety concerns or issues, abnormal labs, abnormal imaging, and abnormal assessment findings. Questions were answered.

## 2024-12-12 NOTE — ED PROVIDER NOTES
EMERGENCY DEPARTMENT ENCOUNTER   ATTENDING ATTESTATION     Pt Name: Noemy Turcios  MRN: [id number]  Birthdate 1960  Date of evaluation: 12/11/24       Noemy Turcios is a 64 y.o. female who presents with Altered Mental Status and Hematuria      MDM: 64-year-old female presents for altered mental status.  On exam patient no acute distress, vital stable, patient arouses to voice, following commands    Will check labs, imaging    Labs are reviewed, UA consistent with urinary tract infection, tropes at baseline white count of 13.6 started on broad-spectrum antibiotic coverage, will admit    Spoke with Ashley Gillespie NP for Dr. Fisher who accepts admission. Patient demonstrates understanding and agreement with the plan, was given the opportunity to ask questions, and these questions were answered to the best of the provided information at this time. VS stable for transfer to floor.       This dictation was prepared using Dragon Medical voice recognition software.       Vitals:   Vitals:    12/11/24 2118 12/11/24 2137 12/11/24 2146 12/11/24 2241   BP: 98/64  113/71    Pulse: 84  80 80   Resp:       Temp:  98.4 °F (36.9 °C)     TempSrc:  Rectal     SpO2:   96% 97%   Weight:       Height:             I personally saw and examined the patient. I have reviewed and agree with the resident's findings, including all diagnostic interpretations and treatment plan as written. I was present for the key portions of any procedures performed and the inclusive time noted for any critical care statement.    Efrain Rodriguez DO  Attending Emergency Physician           Efrain Rodriguez DO  12/12/24 0009

## 2024-12-12 NOTE — ED PROVIDER NOTES
[facility] EMERGENCY DEPARTMENT  Emergency Department Encounter  Emergency Medicine Resident     Pt Name:Noemy Turcios  MRN: [id number]  Birthdate 1960  Date of evaluation: 12/11/24  PCP:  Sixto Merrill MD  Note Started: 8:33 PM EST      CHIEF COMPLAINT       Chief Complaint   Patient presents with    Altered Mental Status    Hematuria       HISTORY OF PRESENT ILLNESS  (Location/Symptom, Timing/Onset, Context/Setting, Quality, Duration, Modifying Factors, Severity.)      Noemy Turcios is a 64 y.o. female who presents with concerns for altered mental status is patient is not making any sense.  Patient comes from a nursing facility with recent admission for a urinary tract infection.  Patient was noted to have decreased urine output and a Singleton had been placed with significant hematuria.  Of note patient has been experiencing vaginal bleeding and was to follow-up with OB.  Patient is on Eliquis.  Limited history from patient as she is following commands but responses do not make any sense.  Patient with a history of COPD on 2 L of oxygen via nasal cannula at baseline.    PAST MEDICAL / SURGICAL / SOCIAL / FAMILY HISTORY      has a past medical history of Arthritis, CHF (congestive heart failure) (HCC), COPD (chronic obstructive pulmonary disease) (HCC), Diverticulitis, Hx of blood clots, and Pulmonary embolism (HCC).     has a past surgical history that includes Ankle surgery; Colon surgery; hernia repair; and Foot surgery (Left, 1/22/2021).    Social History     Socioeconomic History    Marital status: Single     Spouse name: Not on file    Number of children: Not on file    Years of education: Not on file    Highest education level: Not on file   Occupational History    Not on file   Tobacco Use    Smoking status: Every Day     Current packs/day: 0.25     Average packs/day: 0.3 packs/day for 45.0 years (11.3 ttl pk-yrs)     Types: Cigarettes    Smokeless tobacco: Never    Tobacco comments:     has not

## 2024-12-12 NOTE — ED NOTES
Report given to EFE Pringle from U.   Report method by phone   The following was reviewed with receiving RN:   Current vital signs:  /71   Pulse 80   Temp 98.8 °F (37.1 °C)   Resp 17   Ht 1.651 m (5' 5\")   Wt (!) 144.2 kg (318 lb)   SpO2 97%   BMI 52.92 kg/m²                MEWS Score: 2     Any medication or safety alerts were reviewed. Any pending diagnostics and notifications were also reviewed, as well as any safety concerns or issues, abnormal labs, abnormal imaging, and abnormal assessment findings. Questions were answered.

## 2024-12-12 NOTE — ED NOTES
Writer exchanged existing yang with new yang. Pt existing yang had dark blood in line that was not all the way down the tube, no output in the bag. Upon changing yang, while cleaning vaginal area, writer pulled many large clots out of vaginal area. Bleeding controlled and yang was placed with good urine return. Of note, pt brief was saturated with noticeable blood and urine, new brief placed under pt.

## 2024-12-13 ENCOUNTER — APPOINTMENT (OUTPATIENT)
Dept: INTERVENTIONAL RADIOLOGY/VASCULAR | Age: 64
DRG: 698 | End: 2024-12-13
Attending: INTERNAL MEDICINE
Payer: MEDICARE

## 2024-12-13 LAB
ANION GAP SERPL CALCULATED.3IONS-SCNC: 13 MMOL/L (ref 9–16)
BASOPHILS # BLD: 0 K/UL (ref 0–0.2)
BASOPHILS NFR BLD: 0 % (ref 0–2)
BUN SERPL-MCNC: 18 MG/DL (ref 8–23)
CALCIUM SERPL-MCNC: 9.1 MG/DL (ref 8.6–10.4)
CHLORIDE SERPL-SCNC: 87 MMOL/L (ref 98–107)
CO2 SERPL-SCNC: 36 MMOL/L (ref 20–31)
CREAT SERPL-MCNC: 0.4 MG/DL (ref 0.7–1.2)
DATE LAST DOSE: NORMAL
ECHO BSA: 2.57 M2
ECHO LV EF PHYSICIAN: 55 %
ECHO LV FRACTIONAL SHORTENING: 41 % (ref 28–44)
ECHO LV GLOBAL LONGITUDINAL STRAIN (GLS): -15 %
ECHO LV INTERNAL DIMENSION DIASTOLE INDEX: 1.41 CM/M2
ECHO LV INTERNAL DIMENSION DIASTOLIC: 3.4 CM (ref 3.9–5.3)
ECHO LV INTERNAL DIMENSION SYSTOLIC INDEX: 0.83 CM/M2
ECHO LV INTERNAL DIMENSION SYSTOLIC: 2 CM
ECHO LV IVSD: 2 CM (ref 0.6–0.9)
ECHO LV MASS 2D: 291.6 G (ref 67–162)
ECHO LV MASS INDEX 2D: 121 G/M2 (ref 43–95)
ECHO LV POSTERIOR WALL DIASTOLIC: 1.9 CM (ref 0.6–0.9)
ECHO LV RELATIVE WALL THICKNESS RATIO: 1.12
EKG ATRIAL RATE: 79 BPM
EKG ATRIAL RATE: 84 BPM
EKG P AXIS: 64 DEGREES
EKG P AXIS: 67 DEGREES
EKG P-R INTERVAL: 166 MS
EKG P-R INTERVAL: 170 MS
EKG Q-T INTERVAL: 448 MS
EKG Q-T INTERVAL: 516 MS
EKG QRS DURATION: 104 MS
EKG QRS DURATION: 106 MS
EKG QTC CALCULATION (BAZETT): 529 MS
EKG QTC CALCULATION (BAZETT): 591 MS
EKG R AXIS: 171 DEGREES
EKG R AXIS: 175 DEGREES
EKG T AXIS: 19 DEGREES
EKG T AXIS: 23 DEGREES
EKG VENTRICULAR RATE: 79 BPM
EKG VENTRICULAR RATE: 84 BPM
EOSINOPHIL # BLD: 0.18 K/UL (ref 0–0.4)
EOSINOPHILS RELATIVE PERCENT: 1 % (ref 0–4)
ERYTHROCYTE [DISTWIDTH] IN BLOOD BY AUTOMATED COUNT: 18.4 % (ref 11.5–14.9)
GFR, ESTIMATED: >90 ML/MIN/1.73M2
GLUCOSE SERPL-MCNC: 138 MG/DL (ref 74–99)
HCT VFR BLD AUTO: 44.5 % (ref 36–46)
HGB BLD-MCNC: 13.8 G/DL (ref 12–16)
LYMPHOCYTES NFR BLD: 2.64 K/UL (ref 1–4.8)
LYMPHOCYTES RELATIVE PERCENT: 15 % (ref 24–44)
MAGNESIUM SERPL-MCNC: 2 MG/DL (ref 1.6–2.4)
MCH RBC QN AUTO: 30.2 PG (ref 26–34)
MCHC RBC AUTO-ENTMCNC: 31.1 G/DL (ref 31–37)
MCV RBC AUTO: 97.3 FL (ref 80–100)
MONOCYTES NFR BLD: 17 % (ref 1–7)
MONOCYTES NFR BLD: 2.99 K/UL (ref 0.1–1.3)
MORPHOLOGY: ABNORMAL
NEUTROPHILS NFR BLD: 67 % (ref 36–66)
NEUTS SEG NFR BLD: 11.79 K/UL (ref 1.3–9.1)
PARTIAL THROMBOPLASTIN TIME: 103.9 SEC (ref 24–36)
PARTIAL THROMBOPLASTIN TIME: 49.4 SEC (ref 24–36)
PARTIAL THROMBOPLASTIN TIME: 64.3 SEC (ref 24–36)
PARTIAL THROMBOPLASTIN TIME: 95.7 SEC (ref 24–36)
PLATELET # BLD AUTO: 148 K/UL (ref 150–450)
PMV BLD AUTO: 10.6 FL (ref 6–12)
POTASSIUM SERPL-SCNC: 2.3 MMOL/L (ref 3.7–5.3)
RBC # BLD AUTO: 4.58 M/UL (ref 4–5.2)
SODIUM SERPL-SCNC: 136 MMOL/L (ref 136–145)
TME LAST DOSE: 646 H
VANCOMYCIN DOSE: 1500 MG
VANCOMYCIN SERPL-MCNC: 36.1 UG/ML (ref 5–40)
WBC OTHER # BLD: 17.6 K/UL (ref 3.5–11)

## 2024-12-13 PROCEDURE — 2060000000 HC ICU INTERMEDIATE R&B

## 2024-12-13 PROCEDURE — 2580000003 HC RX 258: Performed by: NURSE PRACTITIONER

## 2024-12-13 PROCEDURE — 6370000000 HC RX 637 (ALT 250 FOR IP): Performed by: NURSE PRACTITIONER

## 2024-12-13 PROCEDURE — 36584 COMPL RPLCMT PICC RS&I: CPT

## 2024-12-13 PROCEDURE — 93010 ELECTROCARDIOGRAM REPORT: CPT | Performed by: INTERNAL MEDICINE

## 2024-12-13 PROCEDURE — 83735 ASSAY OF MAGNESIUM: CPT

## 2024-12-13 PROCEDURE — 99233 SBSQ HOSP IP/OBS HIGH 50: CPT | Performed by: INTERNAL MEDICINE

## 2024-12-13 PROCEDURE — 6370000000 HC RX 637 (ALT 250 FOR IP): Performed by: INTERNAL MEDICINE

## 2024-12-13 PROCEDURE — 93005 ELECTROCARDIOGRAM TRACING: CPT | Performed by: NURSE PRACTITIONER

## 2024-12-13 PROCEDURE — 80048 BASIC METABOLIC PNL TOTAL CA: CPT

## 2024-12-13 PROCEDURE — 6360000002 HC RX W HCPCS: Performed by: INTERNAL MEDICINE

## 2024-12-13 PROCEDURE — 80202 ASSAY OF VANCOMYCIN: CPT

## 2024-12-13 PROCEDURE — 36415 COLL VENOUS BLD VENIPUNCTURE: CPT

## 2024-12-13 PROCEDURE — 85730 THROMBOPLASTIN TIME PARTIAL: CPT

## 2024-12-13 PROCEDURE — 2700000000 HC OXYGEN THERAPY PER DAY

## 2024-12-13 PROCEDURE — 94640 AIRWAY INHALATION TREATMENT: CPT

## 2024-12-13 PROCEDURE — 85025 COMPLETE CBC W/AUTO DIFF WBC: CPT

## 2024-12-13 PROCEDURE — 6360000002 HC RX W HCPCS

## 2024-12-13 PROCEDURE — 02HV33Z INSERTION OF INFUSION DEVICE INTO SUPERIOR VENA CAVA, PERCUTANEOUS APPROACH: ICD-10-PCS | Performed by: RADIOLOGY

## 2024-12-13 PROCEDURE — 99233 SBSQ HOSP IP/OBS HIGH 50: CPT | Performed by: SURGERY

## 2024-12-13 PROCEDURE — C1713 ANCHOR/SCREW BN/BN,TIS/BN: HCPCS

## 2024-12-13 PROCEDURE — 94761 N-INVAS EAR/PLS OXIMETRY MLT: CPT

## 2024-12-13 PROCEDURE — 6360000002 HC RX W HCPCS: Performed by: NURSE PRACTITIONER

## 2024-12-13 RX ORDER — POTASSIUM CHLORIDE 1500 MG/1
40 TABLET, EXTENDED RELEASE ORAL ONCE
Status: COMPLETED | OUTPATIENT
Start: 2024-12-13 | End: 2024-12-13

## 2024-12-13 RX ORDER — SODIUM CHLORIDE AND POTASSIUM CHLORIDE 300; 900 MG/100ML; MG/100ML
INJECTION, SOLUTION INTRAVENOUS ONCE
Status: COMPLETED | OUTPATIENT
Start: 2024-12-13 | End: 2024-12-13

## 2024-12-13 RX ORDER — POTASSIUM CHLORIDE 1500 MG/1
40 TABLET, EXTENDED RELEASE ORAL 2 TIMES DAILY WITH MEALS
Status: DISCONTINUED | OUTPATIENT
Start: 2024-12-14 | End: 2024-12-16 | Stop reason: HOSPADM

## 2024-12-13 RX ADMIN — SENNOSIDES 17.2 MG: 8.6 TABLET, FILM COATED ORAL at 09:57

## 2024-12-13 RX ADMIN — VENLAFAXINE HYDROCHLORIDE 37.5 MG: 150 CAPSULE, EXTENDED RELEASE ORAL at 09:59

## 2024-12-13 RX ADMIN — SODIUM CHLORIDE, PRESERVATIVE FREE 10 ML: 5 INJECTION INTRAVENOUS at 09:58

## 2024-12-13 RX ADMIN — VENLAFAXINE HYDROCHLORIDE 150 MG: 150 CAPSULE, EXTENDED RELEASE ORAL at 09:57

## 2024-12-13 RX ADMIN — DOCUSATE SODIUM 100 MG: 100 CAPSULE, LIQUID FILLED ORAL at 09:57

## 2024-12-13 RX ADMIN — PAROXETINE HYDROCHLORIDE 30 MG: 20 TABLET, FILM COATED ORAL at 09:57

## 2024-12-13 RX ADMIN — SODIUM CHLORIDE, PRESERVATIVE FREE 10 ML: 5 INJECTION INTRAVENOUS at 21:37

## 2024-12-13 RX ADMIN — PIPERACILLIN AND TAZOBACTAM 4500 MG: 4; .5 INJECTION, POWDER, FOR SOLUTION INTRAVENOUS at 05:58

## 2024-12-13 RX ADMIN — IPRATROPIUM BROMIDE AND ALBUTEROL SULFATE 1 DOSE: 2.5; .5 SOLUTION RESPIRATORY (INHALATION) at 13:15

## 2024-12-13 RX ADMIN — GABAPENTIN 100 MG: 100 CAPSULE ORAL at 21:38

## 2024-12-13 RX ADMIN — MIDODRINE HYDROCHLORIDE 10 MG: 10 TABLET ORAL at 18:33

## 2024-12-13 RX ADMIN — POTASSIUM CHLORIDE AND SODIUM CHLORIDE: 900; 300 INJECTION, SOLUTION INTRAVENOUS at 18:40

## 2024-12-13 RX ADMIN — DIGOXIN 125 MCG: 125 TABLET ORAL at 09:57

## 2024-12-13 RX ADMIN — IPRATROPIUM BROMIDE AND ALBUTEROL SULFATE 1 DOSE: 2.5; .5 SOLUTION RESPIRATORY (INHALATION) at 07:57

## 2024-12-13 RX ADMIN — ANTI-FUNGAL POWDER MICONAZOLE NITRATE TALC FREE: 1.42 POWDER TOPICAL at 21:37

## 2024-12-13 RX ADMIN — HEPARIN SODIUM 8 UNITS/KG/HR: 10000 INJECTION, SOLUTION INTRAVENOUS at 03:21

## 2024-12-13 RX ADMIN — HEPARIN SODIUM 2000 UNITS: 1000 INJECTION INTRAVENOUS; SUBCUTANEOUS at 23:20

## 2024-12-13 RX ADMIN — FUROSEMIDE 10 MG: 10 INJECTION, SOLUTION INTRAMUSCULAR; INTRAVENOUS at 09:59

## 2024-12-13 RX ADMIN — IPRATROPIUM BROMIDE AND ALBUTEROL SULFATE 1 DOSE: 2.5; .5 SOLUTION RESPIRATORY (INHALATION) at 19:27

## 2024-12-13 RX ADMIN — POTASSIUM CHLORIDE 40 MEQ: 1500 TABLET, EXTENDED RELEASE ORAL at 18:33

## 2024-12-13 RX ADMIN — CETIRIZINE HYDROCHLORIDE 10 MG: 10 TABLET, FILM COATED ORAL at 21:38

## 2024-12-13 RX ADMIN — PIPERACILLIN AND TAZOBACTAM 4500 MG: 4; .5 INJECTION, POWDER, FOR SOLUTION INTRAVENOUS at 21:37

## 2024-12-13 RX ADMIN — VANCOMYCIN 1500 MG: 1.5 INJECTION, SOLUTION INTRAVENOUS at 06:46

## 2024-12-13 RX ADMIN — PIPERACILLIN AND TAZOBACTAM 4500 MG: 4; .5 INJECTION, POWDER, FOR SOLUTION INTRAVENOUS at 13:50

## 2024-12-13 RX ADMIN — MIDODRINE HYDROCHLORIDE 10 MG: 10 TABLET ORAL at 09:57

## 2024-12-13 RX ADMIN — ANTI-FUNGAL POWDER MICONAZOLE NITRATE TALC FREE: 1.42 POWDER TOPICAL at 09:58

## 2024-12-13 RX ADMIN — DOCUSATE SODIUM 100 MG: 100 CAPSULE, LIQUID FILLED ORAL at 21:38

## 2024-12-13 ASSESSMENT — PAIN DESCRIPTION - LOCATION: LOCATION: GENERALIZED

## 2024-12-13 ASSESSMENT — PAIN SCALES - GENERAL: PAINLEVEL_OUTOF10: 5

## 2024-12-13 ASSESSMENT — PAIN DESCRIPTION - DESCRIPTORS: DESCRIPTORS: ACHING

## 2024-12-14 PROBLEM — B96.89 URINARY TRACT INFECTION DUE TO ESBL KLEBSIELLA: Status: ACTIVE | Noted: 2024-12-12

## 2024-12-14 LAB
ANION GAP SERPL CALCULATED.3IONS-SCNC: 11 MMOL/L (ref 9–16)
BASOPHILS # BLD: 0 K/UL (ref 0–0.2)
BASOPHILS NFR BLD: 0 % (ref 0–2)
BUN SERPL-MCNC: 14 MG/DL (ref 8–23)
CALCIUM SERPL-MCNC: 8.9 MG/DL (ref 8.6–10.4)
CHLORIDE SERPL-SCNC: 93 MMOL/L (ref 98–107)
CO2 SERPL-SCNC: 33 MMOL/L (ref 20–31)
CREAT SERPL-MCNC: 0.3 MG/DL (ref 0.7–1.2)
EKG ATRIAL RATE: 80 BPM
EKG ATRIAL RATE: 81 BPM
EKG P AXIS: 38 DEGREES
EKG P AXIS: 40 DEGREES
EKG P-R INTERVAL: 170 MS
EKG P-R INTERVAL: 172 MS
EKG Q-T INTERVAL: 416 MS
EKG Q-T INTERVAL: 520 MS
EKG QRS DURATION: 104 MS
EKG QRS DURATION: 112 MS
EKG QTC CALCULATION (BAZETT): 479 MS
EKG QTC CALCULATION (BAZETT): 604 MS
EKG R AXIS: -112 DEGREES
EKG R AXIS: -140 DEGREES
EKG T AXIS: 17 DEGREES
EKG T AXIS: 9 DEGREES
EKG VENTRICULAR RATE: 80 BPM
EKG VENTRICULAR RATE: 81 BPM
EOSINOPHIL # BLD: 0.2 K/UL (ref 0–0.4)
EOSINOPHILS RELATIVE PERCENT: 2 % (ref 0–4)
ERYTHROCYTE [DISTWIDTH] IN BLOOD BY AUTOMATED COUNT: 18.2 % (ref 11.5–14.9)
GFR, ESTIMATED: >90 ML/MIN/1.73M2
GLUCOSE SERPL-MCNC: 83 MG/DL (ref 74–99)
HCT VFR BLD AUTO: 38.3 % (ref 36–46)
HGB BLD-MCNC: 12.4 G/DL (ref 12–16)
LYMPHOCYTES NFR BLD: 2.7 K/UL (ref 1–4.8)
LYMPHOCYTES RELATIVE PERCENT: 27 % (ref 24–44)
MAGNESIUM SERPL-MCNC: 2 MG/DL (ref 1.6–2.4)
MCH RBC QN AUTO: 30.4 PG (ref 26–34)
MCHC RBC AUTO-ENTMCNC: 32.4 G/DL (ref 31–37)
MCV RBC AUTO: 94.1 FL (ref 80–100)
MICROORGANISM SPEC CULT: ABNORMAL
MICROORGANISM SPEC CULT: ABNORMAL
MONOCYTES NFR BLD: 1.3 K/UL (ref 0.1–1.3)
MONOCYTES NFR BLD: 13 % (ref 1–7)
NEUTROPHILS NFR BLD: 58 % (ref 36–66)
NEUTS SEG NFR BLD: 5.8 K/UL (ref 1.3–9.1)
PARTIAL THROMBOPLASTIN TIME: 29.1 SEC (ref 24–36)
PARTIAL THROMBOPLASTIN TIME: 52.7 SEC (ref 24–36)
PARTIAL THROMBOPLASTIN TIME: 93 SEC (ref 24–36)
PLATELET # BLD AUTO: 144 K/UL (ref 150–450)
PMV BLD AUTO: 10.5 FL (ref 6–12)
POTASSIUM SERPL-SCNC: 3.4 MMOL/L (ref 3.7–5.3)
RBC # BLD AUTO: 4.07 M/UL (ref 4–5.2)
SODIUM SERPL-SCNC: 137 MMOL/L (ref 136–145)
SPECIMEN DESCRIPTION: ABNORMAL
WBC OTHER # BLD: 10.1 K/UL (ref 3.5–11)

## 2024-12-14 PROCEDURE — 6360000002 HC RX W HCPCS: Performed by: NURSE PRACTITIONER

## 2024-12-14 PROCEDURE — 6370000000 HC RX 637 (ALT 250 FOR IP): Performed by: NURSE PRACTITIONER

## 2024-12-14 PROCEDURE — 83735 ASSAY OF MAGNESIUM: CPT

## 2024-12-14 PROCEDURE — 2580000003 HC RX 258: Performed by: NURSE PRACTITIONER

## 2024-12-14 PROCEDURE — 6370000000 HC RX 637 (ALT 250 FOR IP): Performed by: INTERNAL MEDICINE

## 2024-12-14 PROCEDURE — 2060000000 HC ICU INTERMEDIATE R&B

## 2024-12-14 PROCEDURE — 94761 N-INVAS EAR/PLS OXIMETRY MLT: CPT

## 2024-12-14 PROCEDURE — 36415 COLL VENOUS BLD VENIPUNCTURE: CPT

## 2024-12-14 PROCEDURE — 80048 BASIC METABOLIC PNL TOTAL CA: CPT

## 2024-12-14 PROCEDURE — 94640 AIRWAY INHALATION TREATMENT: CPT

## 2024-12-14 PROCEDURE — 99232 SBSQ HOSP IP/OBS MODERATE 35: CPT | Performed by: INTERNAL MEDICINE

## 2024-12-14 PROCEDURE — 85730 THROMBOPLASTIN TIME PARTIAL: CPT

## 2024-12-14 PROCEDURE — 6360000002 HC RX W HCPCS: Performed by: INTERNAL MEDICINE

## 2024-12-14 PROCEDURE — 93010 ELECTROCARDIOGRAM REPORT: CPT | Performed by: INTERNAL MEDICINE

## 2024-12-14 PROCEDURE — 99232 SBSQ HOSP IP/OBS MODERATE 35: CPT | Performed by: NURSE PRACTITIONER

## 2024-12-14 PROCEDURE — 2500000003 HC RX 250 WO HCPCS: Performed by: INTERNAL MEDICINE

## 2024-12-14 PROCEDURE — 2700000000 HC OXYGEN THERAPY PER DAY

## 2024-12-14 PROCEDURE — 99233 SBSQ HOSP IP/OBS HIGH 50: CPT | Performed by: NURSE PRACTITIONER

## 2024-12-14 PROCEDURE — 85025 COMPLETE CBC W/AUTO DIFF WBC: CPT

## 2024-12-14 RX ADMIN — IPRATROPIUM BROMIDE AND ALBUTEROL SULFATE 1 DOSE: 2.5; .5 SOLUTION RESPIRATORY (INHALATION) at 13:05

## 2024-12-14 RX ADMIN — FUROSEMIDE 10 MG: 10 INJECTION, SOLUTION INTRAMUSCULAR; INTRAVENOUS at 10:00

## 2024-12-14 RX ADMIN — OXYCODONE HYDROCHLORIDE AND ACETAMINOPHEN 2 TABLET: 5; 325 TABLET ORAL at 20:35

## 2024-12-14 RX ADMIN — Medication 3 MG: at 20:35

## 2024-12-14 RX ADMIN — SODIUM CHLORIDE, PRESERVATIVE FREE 10 ML: 5 INJECTION INTRAVENOUS at 20:36

## 2024-12-14 RX ADMIN — DOCUSATE SODIUM 100 MG: 100 CAPSULE, LIQUID FILLED ORAL at 20:35

## 2024-12-14 RX ADMIN — APIXABAN 2.5 MG: 2.5 TABLET, FILM COATED ORAL at 20:35

## 2024-12-14 RX ADMIN — IPRATROPIUM BROMIDE AND ALBUTEROL SULFATE 1 DOSE: 2.5; .5 SOLUTION RESPIRATORY (INHALATION) at 07:37

## 2024-12-14 RX ADMIN — OXYCODONE HYDROCHLORIDE AND ACETAMINOPHEN 1 TABLET: 5; 325 TABLET ORAL at 15:43

## 2024-12-14 RX ADMIN — ANTI-FUNGAL POWDER MICONAZOLE NITRATE TALC FREE: 1.42 POWDER TOPICAL at 10:01

## 2024-12-14 RX ADMIN — HEPARIN SODIUM 8 UNITS/KG/HR: 10000 INJECTION, SOLUTION INTRAVENOUS at 05:03

## 2024-12-14 RX ADMIN — DOCUSATE SODIUM 100 MG: 100 CAPSULE, LIQUID FILLED ORAL at 10:00

## 2024-12-14 RX ADMIN — SENNOSIDES 17.2 MG: 8.6 TABLET, FILM COATED ORAL at 10:01

## 2024-12-14 RX ADMIN — POTASSIUM CHLORIDE 40 MEQ: 1500 TABLET, EXTENDED RELEASE ORAL at 19:54

## 2024-12-14 RX ADMIN — MIDODRINE HYDROCHLORIDE 10 MG: 10 TABLET ORAL at 10:01

## 2024-12-14 RX ADMIN — VENLAFAXINE HYDROCHLORIDE 150 MG: 150 CAPSULE, EXTENDED RELEASE ORAL at 09:59

## 2024-12-14 RX ADMIN — POTASSIUM CHLORIDE 40 MEQ: 1500 TABLET, EXTENDED RELEASE ORAL at 10:00

## 2024-12-14 RX ADMIN — MEROPENEM 1000 MG: 1 INJECTION INTRAVENOUS at 15:35

## 2024-12-14 RX ADMIN — MEROPENEM 1000 MG: 1 INJECTION INTRAVENOUS at 20:41

## 2024-12-14 RX ADMIN — SODIUM CHLORIDE, PRESERVATIVE FREE 10 ML: 5 INJECTION INTRAVENOUS at 07:42

## 2024-12-14 RX ADMIN — PIPERACILLIN AND TAZOBACTAM 4500 MG: 4; .5 INJECTION, POWDER, FOR SOLUTION INTRAVENOUS at 05:58

## 2024-12-14 RX ADMIN — GABAPENTIN 100 MG: 100 CAPSULE ORAL at 20:35

## 2024-12-14 RX ADMIN — MIDODRINE HYDROCHLORIDE 10 MG: 10 TABLET ORAL at 18:56

## 2024-12-14 RX ADMIN — IPRATROPIUM BROMIDE AND ALBUTEROL SULFATE 1 DOSE: 2.5; .5 SOLUTION RESPIRATORY (INHALATION) at 21:04

## 2024-12-14 RX ADMIN — ANTI-FUNGAL POWDER MICONAZOLE NITRATE TALC FREE: 1.42 POWDER TOPICAL at 20:36

## 2024-12-14 RX ADMIN — PAROXETINE HYDROCHLORIDE 30 MG: 20 TABLET, FILM COATED ORAL at 10:00

## 2024-12-14 RX ADMIN — CETIRIZINE HYDROCHLORIDE 10 MG: 10 TABLET, FILM COATED ORAL at 20:35

## 2024-12-14 ASSESSMENT — PAIN SCALES - GENERAL
PAINLEVEL_OUTOF10: 6
PAINLEVEL_OUTOF10: 9
PAINLEVEL_OUTOF10: 9
PAINLEVEL_OUTOF10: 8
PAINLEVEL_OUTOF10: 4

## 2024-12-14 ASSESSMENT — PAIN DESCRIPTION - DESCRIPTORS
DESCRIPTORS: ACHING
DESCRIPTORS: ACHING
DESCRIPTORS: ACHING;DISCOMFORT

## 2024-12-14 ASSESSMENT — PAIN - FUNCTIONAL ASSESSMENT
PAIN_FUNCTIONAL_ASSESSMENT: PREVENTS OR INTERFERES SOME ACTIVE ACTIVITIES AND ADLS

## 2024-12-14 ASSESSMENT — PAIN DESCRIPTION - LOCATION
LOCATION: HIP
LOCATION: HIP
LOCATION: GENERALIZED

## 2024-12-14 ASSESSMENT — PAIN DESCRIPTION - PAIN TYPE
TYPE: CHRONIC PAIN
TYPE: CHRONIC PAIN

## 2024-12-14 ASSESSMENT — PAIN DESCRIPTION - ORIENTATION
ORIENTATION: RIGHT
ORIENTATION: RIGHT

## 2024-12-14 ASSESSMENT — PAIN DESCRIPTION - ONSET
ONSET: ON-GOING
ONSET: ON-GOING

## 2024-12-14 ASSESSMENT — PAIN DESCRIPTION - FREQUENCY
FREQUENCY: CONTINUOUS
FREQUENCY: CONTINUOUS

## 2024-12-14 NOTE — FLOWSHEET NOTE
12/14/24 0419   Treatment Team Notification   Reason for Communication Review case   Name of Team Member Notified Carolina Schulz   Treatment Team Role Advanced Practice Nurse   Method of Communication Secure Message   Response Waiting for response   Notification Time 0420         Patient's urine culture has resulted. Alert sent to NP and awaiting directives

## 2024-12-15 LAB
ALBUMIN SERPL-MCNC: 3.3 G/DL (ref 3.5–5.2)
ALP SERPL-CCNC: 79 U/L (ref 35–104)
ALT SERPL-CCNC: 23 U/L (ref 10–35)
ANION GAP SERPL CALCULATED.3IONS-SCNC: 12 MMOL/L (ref 9–16)
AST SERPL-CCNC: 24 U/L (ref 10–35)
BASOPHILS # BLD: 0.1 K/UL (ref 0–0.2)
BASOPHILS NFR BLD: 1 % (ref 0–2)
BILIRUB SERPL-MCNC: 1.3 MG/DL (ref 0–1.2)
BUN SERPL-MCNC: 12 MG/DL (ref 8–23)
CALCIUM SERPL-MCNC: 9.4 MG/DL (ref 8.6–10.4)
CHLORIDE SERPL-SCNC: 92 MMOL/L (ref 98–107)
CO2 SERPL-SCNC: 29 MMOL/L (ref 20–31)
CREAT SERPL-MCNC: 0.4 MG/DL (ref 0.7–1.2)
EOSINOPHIL # BLD: 0.3 K/UL (ref 0–0.4)
EOSINOPHILS RELATIVE PERCENT: 2 % (ref 0–4)
ERYTHROCYTE [DISTWIDTH] IN BLOOD BY AUTOMATED COUNT: 18.8 % (ref 11.5–14.9)
GFR, ESTIMATED: >90 ML/MIN/1.73M2
GLUCOSE SERPL-MCNC: 84 MG/DL (ref 74–99)
HCT VFR BLD AUTO: 39.5 % (ref 36–46)
HGB BLD-MCNC: 12.2 G/DL (ref 12–16)
LYMPHOCYTES NFR BLD: 2.9 K/UL (ref 1–4.8)
LYMPHOCYTES RELATIVE PERCENT: 23 % (ref 24–44)
MCH RBC QN AUTO: 29.4 PG (ref 26–34)
MCHC RBC AUTO-ENTMCNC: 30.9 G/DL (ref 31–37)
MCV RBC AUTO: 95.2 FL (ref 80–100)
MONOCYTES NFR BLD: 1.4 K/UL (ref 0.1–1.3)
MONOCYTES NFR BLD: 11 % (ref 1–7)
NEUTROPHILS NFR BLD: 63 % (ref 36–66)
NEUTS SEG NFR BLD: 7.7 K/UL (ref 1.3–9.1)
PARTIAL THROMBOPLASTIN TIME: 25.5 SEC (ref 24–36)
PARTIAL THROMBOPLASTIN TIME: 29.8 SEC (ref 24–36)
PLATELET # BLD AUTO: 179 K/UL (ref 150–450)
PMV BLD AUTO: 10.6 FL (ref 6–12)
POTASSIUM SERPL-SCNC: 4.3 MMOL/L (ref 3.7–5.3)
PROT SERPL-MCNC: 6.2 G/DL (ref 6.6–8.7)
RBC # BLD AUTO: 4.15 M/UL (ref 4–5.2)
SODIUM SERPL-SCNC: 133 MMOL/L (ref 136–145)
WBC OTHER # BLD: 12.4 K/UL (ref 3.5–11)

## 2024-12-15 PROCEDURE — 2580000003 HC RX 258: Performed by: NURSE PRACTITIONER

## 2024-12-15 PROCEDURE — 2700000000 HC OXYGEN THERAPY PER DAY

## 2024-12-15 PROCEDURE — 6370000000 HC RX 637 (ALT 250 FOR IP): Performed by: INTERNAL MEDICINE

## 2024-12-15 PROCEDURE — 99232 SBSQ HOSP IP/OBS MODERATE 35: CPT | Performed by: INTERNAL MEDICINE

## 2024-12-15 PROCEDURE — 36415 COLL VENOUS BLD VENIPUNCTURE: CPT

## 2024-12-15 PROCEDURE — 94640 AIRWAY INHALATION TREATMENT: CPT

## 2024-12-15 PROCEDURE — 2060000000 HC ICU INTERMEDIATE R&B

## 2024-12-15 PROCEDURE — 6370000000 HC RX 637 (ALT 250 FOR IP): Performed by: NURSE PRACTITIONER

## 2024-12-15 PROCEDURE — 85730 THROMBOPLASTIN TIME PARTIAL: CPT

## 2024-12-15 PROCEDURE — 99233 SBSQ HOSP IP/OBS HIGH 50: CPT | Performed by: NURSE PRACTITIONER

## 2024-12-15 PROCEDURE — 6360000002 HC RX W HCPCS: Performed by: NURSE PRACTITIONER

## 2024-12-15 PROCEDURE — 80053 COMPREHEN METABOLIC PANEL: CPT

## 2024-12-15 PROCEDURE — 6360000002 HC RX W HCPCS: Performed by: INTERNAL MEDICINE

## 2024-12-15 PROCEDURE — 85025 COMPLETE CBC W/AUTO DIFF WBC: CPT

## 2024-12-15 PROCEDURE — 94761 N-INVAS EAR/PLS OXIMETRY MLT: CPT

## 2024-12-15 RX ORDER — METOPROLOL SUCCINATE 25 MG/1
25 TABLET, EXTENDED RELEASE ORAL DAILY
Status: DISCONTINUED | OUTPATIENT
Start: 2024-12-16 | End: 2024-12-16 | Stop reason: HOSPADM

## 2024-12-15 RX ADMIN — CETIRIZINE HYDROCHLORIDE 10 MG: 10 TABLET, FILM COATED ORAL at 20:32

## 2024-12-15 RX ADMIN — GABAPENTIN 100 MG: 100 CAPSULE ORAL at 20:32

## 2024-12-15 RX ADMIN — MIDODRINE HYDROCHLORIDE 10 MG: 10 TABLET ORAL at 09:41

## 2024-12-15 RX ADMIN — IPRATROPIUM BROMIDE AND ALBUTEROL SULFATE 1 DOSE: 2.5; .5 SOLUTION RESPIRATORY (INHALATION) at 19:30

## 2024-12-15 RX ADMIN — PAROXETINE HYDROCHLORIDE 30 MG: 20 TABLET, FILM COATED ORAL at 09:37

## 2024-12-15 RX ADMIN — POTASSIUM CHLORIDE 40 MEQ: 1500 TABLET, EXTENDED RELEASE ORAL at 18:30

## 2024-12-15 RX ADMIN — MEROPENEM 1000 MG: 1 INJECTION INTRAVENOUS at 04:25

## 2024-12-15 RX ADMIN — VENLAFAXINE HYDROCHLORIDE 37.5 MG: 150 CAPSULE, EXTENDED RELEASE ORAL at 09:41

## 2024-12-15 RX ADMIN — DOCUSATE SODIUM 100 MG: 100 CAPSULE, LIQUID FILLED ORAL at 20:32

## 2024-12-15 RX ADMIN — APIXABAN 2.5 MG: 2.5 TABLET, FILM COATED ORAL at 09:36

## 2024-12-15 RX ADMIN — IPRATROPIUM BROMIDE AND ALBUTEROL SULFATE 1 DOSE: 2.5; .5 SOLUTION RESPIRATORY (INHALATION) at 13:33

## 2024-12-15 RX ADMIN — VENLAFAXINE HYDROCHLORIDE 150 MG: 150 CAPSULE, EXTENDED RELEASE ORAL at 09:37

## 2024-12-15 RX ADMIN — SODIUM CHLORIDE, PRESERVATIVE FREE 10 ML: 5 INJECTION INTRAVENOUS at 13:33

## 2024-12-15 RX ADMIN — OXYCODONE HYDROCHLORIDE AND ACETAMINOPHEN 1 TABLET: 5; 325 TABLET ORAL at 19:47

## 2024-12-15 RX ADMIN — FUROSEMIDE 10 MG: 10 INJECTION, SOLUTION INTRAMUSCULAR; INTRAVENOUS at 09:36

## 2024-12-15 RX ADMIN — SODIUM CHLORIDE, PRESERVATIVE FREE 10 ML: 5 INJECTION INTRAVENOUS at 20:34

## 2024-12-15 RX ADMIN — MIDODRINE HYDROCHLORIDE 10 MG: 10 TABLET ORAL at 18:30

## 2024-12-15 RX ADMIN — APIXABAN 2.5 MG: 2.5 TABLET, FILM COATED ORAL at 20:32

## 2024-12-15 RX ADMIN — SENNOSIDES 17.2 MG: 8.6 TABLET, FILM COATED ORAL at 09:41

## 2024-12-15 RX ADMIN — ANTI-FUNGAL POWDER MICONAZOLE NITRATE TALC FREE: 1.42 POWDER TOPICAL at 20:34

## 2024-12-15 RX ADMIN — DIGOXIN 125 MCG: 125 TABLET ORAL at 09:41

## 2024-12-15 RX ADMIN — MEROPENEM 1000 MG: 1 INJECTION INTRAVENOUS at 20:00

## 2024-12-15 RX ADMIN — OXYCODONE HYDROCHLORIDE AND ACETAMINOPHEN 1 TABLET: 5; 325 TABLET ORAL at 06:09

## 2024-12-15 RX ADMIN — IPRATROPIUM BROMIDE AND ALBUTEROL SULFATE 1 DOSE: 2.5; .5 SOLUTION RESPIRATORY (INHALATION) at 07:19

## 2024-12-15 RX ADMIN — ANTI-FUNGAL POWDER MICONAZOLE NITRATE TALC FREE: 1.42 POWDER TOPICAL at 09:41

## 2024-12-15 RX ADMIN — POTASSIUM CHLORIDE 40 MEQ: 1500 TABLET, EXTENDED RELEASE ORAL at 09:37

## 2024-12-15 RX ADMIN — MEROPENEM 1000 MG: 1 INJECTION INTRAVENOUS at 13:32

## 2024-12-15 ASSESSMENT — PAIN SCALES - GENERAL
PAINLEVEL_OUTOF10: 6
PAINLEVEL_OUTOF10: 3
PAINLEVEL_OUTOF10: 2
PAINLEVEL_OUTOF10: 6

## 2024-12-15 ASSESSMENT — PAIN DESCRIPTION - FREQUENCY
FREQUENCY: CONTINUOUS

## 2024-12-15 ASSESSMENT — PAIN DESCRIPTION - ORIENTATION
ORIENTATION: RIGHT

## 2024-12-15 ASSESSMENT — PAIN DESCRIPTION - ONSET
ONSET: ON-GOING

## 2024-12-15 ASSESSMENT — PAIN DESCRIPTION - DESCRIPTORS
DESCRIPTORS: ACHING
DESCRIPTORS: ACHING;DISCOMFORT

## 2024-12-15 ASSESSMENT — PAIN DESCRIPTION - LOCATION
LOCATION: HIP

## 2024-12-15 ASSESSMENT — PAIN - FUNCTIONAL ASSESSMENT
PAIN_FUNCTIONAL_ASSESSMENT: PREVENTS OR INTERFERES SOME ACTIVE ACTIVITIES AND ADLS

## 2024-12-15 ASSESSMENT — PAIN DESCRIPTION - PAIN TYPE
TYPE: CHRONIC PAIN

## 2024-12-16 VITALS
HEART RATE: 80 BPM | HEIGHT: 65 IN | SYSTOLIC BLOOD PRESSURE: 114 MMHG | DIASTOLIC BLOOD PRESSURE: 58 MMHG | TEMPERATURE: 97.6 F | RESPIRATION RATE: 20 BRPM | OXYGEN SATURATION: 99 % | BODY MASS INDEX: 47.35 KG/M2 | WEIGHT: 284.17 LBS

## 2024-12-16 LAB
ERYTHROCYTE [DISTWIDTH] IN BLOOD BY AUTOMATED COUNT: 19.2 % (ref 11.5–14.9)
HCT VFR BLD AUTO: 36.2 % (ref 36–46)
HGB BLD-MCNC: 11.5 G/DL (ref 12–16)
MCH RBC QN AUTO: 30.3 PG (ref 26–34)
MCHC RBC AUTO-ENTMCNC: 31.9 G/DL (ref 31–37)
MCV RBC AUTO: 95 FL (ref 80–100)
PLATELET # BLD AUTO: 154 K/UL (ref 150–450)
PMV BLD AUTO: 9.9 FL (ref 6–12)
RBC # BLD AUTO: 3.81 M/UL (ref 4–5.2)
WBC OTHER # BLD: 10.1 K/UL (ref 3.5–11)

## 2024-12-16 PROCEDURE — 2580000003 HC RX 258: Performed by: NURSE PRACTITIONER

## 2024-12-16 PROCEDURE — 6360000002 HC RX W HCPCS: Performed by: INTERNAL MEDICINE

## 2024-12-16 PROCEDURE — 94640 AIRWAY INHALATION TREATMENT: CPT

## 2024-12-16 PROCEDURE — 94761 N-INVAS EAR/PLS OXIMETRY MLT: CPT

## 2024-12-16 PROCEDURE — 6370000000 HC RX 637 (ALT 250 FOR IP): Performed by: NURSE PRACTITIONER

## 2024-12-16 PROCEDURE — 36415 COLL VENOUS BLD VENIPUNCTURE: CPT

## 2024-12-16 PROCEDURE — 99232 SBSQ HOSP IP/OBS MODERATE 35: CPT | Performed by: INTERNAL MEDICINE

## 2024-12-16 PROCEDURE — 2700000000 HC OXYGEN THERAPY PER DAY

## 2024-12-16 PROCEDURE — 6370000000 HC RX 637 (ALT 250 FOR IP): Performed by: INTERNAL MEDICINE

## 2024-12-16 PROCEDURE — 6360000002 HC RX W HCPCS: Performed by: NURSE PRACTITIONER

## 2024-12-16 PROCEDURE — 85027 COMPLETE CBC AUTOMATED: CPT

## 2024-12-16 RX ORDER — FUROSEMIDE 20 MG/1
20 TABLET ORAL DAILY
Qty: 10 TABLET | Refills: 0 | Status: SHIPPED | OUTPATIENT
Start: 2024-12-16 | End: 2024-12-16

## 2024-12-16 RX ORDER — AMIODARONE HYDROCHLORIDE 200 MG/1
100 TABLET ORAL DAILY
Qty: 15 TABLET | Refills: 0 | Status: SHIPPED | OUTPATIENT
Start: 2024-12-16 | End: 2025-01-15

## 2024-12-16 RX ORDER — METOPROLOL SUCCINATE 25 MG/1
25 TABLET, EXTENDED RELEASE ORAL DAILY
Qty: 30 TABLET | Refills: 3 | Status: SHIPPED | OUTPATIENT
Start: 2024-12-17

## 2024-12-16 RX ORDER — MEROPENEM 1 G/1
500 INJECTION, POWDER, FOR SOLUTION INTRAVENOUS EVERY 8 HOURS
Qty: 5 EACH | Refills: 0 | Status: SHIPPED | OUTPATIENT
Start: 2024-12-16 | End: 2024-12-21

## 2024-12-16 RX ORDER — FUROSEMIDE 20 MG/1
20 TABLET ORAL DAILY
Qty: 30 TABLET | Refills: 0 | Status: SHIPPED | OUTPATIENT
Start: 2024-12-16

## 2024-12-16 RX ORDER — OXYCODONE AND ACETAMINOPHEN 5; 325 MG/1; MG/1
1 TABLET ORAL EVERY 6 HOURS PRN
Qty: 12 TABLET | Refills: 0 | Status: SHIPPED | OUTPATIENT
Start: 2024-12-16 | End: 2024-12-19

## 2024-12-16 RX ADMIN — MIDODRINE HYDROCHLORIDE 10 MG: 10 TABLET ORAL at 13:00

## 2024-12-16 RX ADMIN — POTASSIUM CHLORIDE 40 MEQ: 1500 TABLET, EXTENDED RELEASE ORAL at 09:49

## 2024-12-16 RX ADMIN — FUROSEMIDE 10 MG: 10 INJECTION, SOLUTION INTRAMUSCULAR; INTRAVENOUS at 09:51

## 2024-12-16 RX ADMIN — METOPROLOL SUCCINATE 25 MG: 25 TABLET, EXTENDED RELEASE ORAL at 09:50

## 2024-12-16 RX ADMIN — ANTI-FUNGAL POWDER MICONAZOLE NITRATE TALC FREE: 1.42 POWDER TOPICAL at 10:01

## 2024-12-16 RX ADMIN — VENLAFAXINE HYDROCHLORIDE 37.5 MG: 150 CAPSULE, EXTENDED RELEASE ORAL at 10:02

## 2024-12-16 RX ADMIN — IPRATROPIUM BROMIDE AND ALBUTEROL SULFATE 1 DOSE: 2.5; .5 SOLUTION RESPIRATORY (INHALATION) at 13:35

## 2024-12-16 RX ADMIN — IPRATROPIUM BROMIDE AND ALBUTEROL SULFATE 1 DOSE: 2.5; .5 SOLUTION RESPIRATORY (INHALATION) at 08:02

## 2024-12-16 RX ADMIN — APIXABAN 2.5 MG: 2.5 TABLET, FILM COATED ORAL at 09:49

## 2024-12-16 RX ADMIN — DOCUSATE SODIUM 100 MG: 100 CAPSULE, LIQUID FILLED ORAL at 09:49

## 2024-12-16 RX ADMIN — OXYCODONE HYDROCHLORIDE AND ACETAMINOPHEN 2 TABLET: 5; 325 TABLET ORAL at 09:58

## 2024-12-16 RX ADMIN — MIDODRINE HYDROCHLORIDE 10 MG: 10 TABLET ORAL at 09:50

## 2024-12-16 RX ADMIN — SENNOSIDES 17.2 MG: 8.6 TABLET, FILM COATED ORAL at 09:49

## 2024-12-16 RX ADMIN — VENLAFAXINE HYDROCHLORIDE 150 MG: 150 CAPSULE, EXTENDED RELEASE ORAL at 09:50

## 2024-12-16 RX ADMIN — MEROPENEM 1000 MG: 1 INJECTION INTRAVENOUS at 13:00

## 2024-12-16 RX ADMIN — PAROXETINE HYDROCHLORIDE 30 MG: 20 TABLET, FILM COATED ORAL at 09:49

## 2024-12-16 RX ADMIN — MEROPENEM 1000 MG: 1 INJECTION INTRAVENOUS at 04:34

## 2024-12-16 RX ADMIN — SODIUM CHLORIDE, PRESERVATIVE FREE 10 ML: 5 INJECTION INTRAVENOUS at 10:04

## 2024-12-16 ASSESSMENT — PAIN DESCRIPTION - ORIENTATION: ORIENTATION: RIGHT;LEFT

## 2024-12-16 ASSESSMENT — PAIN - FUNCTIONAL ASSESSMENT: PAIN_FUNCTIONAL_ASSESSMENT: PREVENTS OR INTERFERES WITH ALL ACTIVE AND SOME PASSIVE ACTIVITIES

## 2024-12-16 ASSESSMENT — PAIN DESCRIPTION - DESCRIPTORS: DESCRIPTORS: ACHING;SORE;SHARP

## 2024-12-16 ASSESSMENT — PAIN DESCRIPTION - LOCATION: LOCATION: HIP

## 2024-12-16 ASSESSMENT — PAIN SCALES - GENERAL: PAINLEVEL_OUTOF10: 9

## 2024-12-16 NOTE — DISCHARGE SUMMARY
other medications prescribed for you. Read the directions carefully, and ask your doctor or other care provider to review them with you.                STOP taking these medications      benzonatate 100 MG capsule  Commonly known as: TESSALON     diazePAM 5 MG tablet  Commonly known as: VALIUM     guaiFENesin 600 MG extended release tablet  Commonly known as: MUCINEX     ondansetron 4 MG tablet  Commonly known as: ZOFRAN     sodium chloride 0.65 % nasal spray  Commonly known as: OCEAN, BABY AYR     traZODone 50 MG tablet  Commonly known as: DESYREL               Where to Get Your Medications        These medications [facility] [address] [facility] [phone] - F [phone]  [address] [place] [address] [place] [address]      Phone: [phone]   meropenem 1 g injection  metoprolol succinate 25 MG extended release tablet       You can get these medications from any pharmacy    Bring a paper prescription for each of these medications  oxyCODONE-acetaminophen 5-325 MG per tablet         No discharge procedures on file.    Time Spent on discharge is  36 mins in patient examination, evaluation, counseling as well as medication reconciliation, prescriptions for required medications, discharge plan and follow up.    Electronically signed by   Librado Becker MD  12/16/2024  1:47 PM      Thank you Sixto Huddleston MD for the opportunity to be involved in this patient's care.    Please note that this chart was generated using voice recognition Dragon dictation software.  Although every effort was made to ensure the accuracy of this automated transcription, some errors in transcription may have occurred.

## 2024-12-16 NOTE — PLAN OF CARE
Problem: Chronic Conditions and Co-morbidities  Goal: Patient's chronic conditions and co-morbidity symptoms are monitored and maintained or improved  12/12/2024 1548 by Vernell Orozco RN  Outcome: Progressing     Problem: Discharge Planning  Goal: Discharge to home or other facility with appropriate resources  Outcome: Progressing     Problem: Safety - Adult  Goal: Free from fall injury  12/12/2024 1548 by Vernell Orozco RN  Outcome: Progressing     Problem: Skin/Tissue Integrity  Goal: Absence of new skin breakdown  Description: 1.  Monitor for areas of redness and/or skin breakdown  2.  Assess vascular access sites hourly  3.  Every 4-6 hours minimum:  Change oxygen saturation probe site  4.  Every 4-6 hours:  If on nasal continuous positive airway pressure, respiratory therapy assess nares and determine need for appliance change or resting period.  12/12/2024 1548 by Vernell Orozco RN  Outcome: Progressing     Problem: ABCDS Injury Assessment  Goal: Absence of physical injury  Outcome: Progressing     Problem: Confusion  Goal: Confusion, delirium, dementia, or psychosis is improved or at baseline  Description: INTERVENTIONS:  1. Assess for possible contributors to thought disturbance, including medications, impaired vision or hearing, underlying metabolic abnormalities, dehydration, psychiatric diagnoses, and notify attending LIP  2. [place] high risk fall precautions, as indicated  3. Provide frequent short contacts to provide reality reorientation, refocusing and direction  4. Decrease environmental stimuli, including noise as appropriate  5. Monitor and intervene to maintain adequate nutrition, hydration, elimination, sleep and activity  6. If unable to ensure safety without constant attention obtain sitter and review sitter guidelines with assigned personnel  7. Initiate Psychosocial CNS and Spiritual Care consult, as indicated  12/12/2024 1548 by Vernell Orozco RN  Outcome: Progressing   
  Problem: Chronic Conditions and Co-morbidities  Goal: Patient's chronic conditions and co-morbidity symptoms are monitored and maintained or improved  12/14/2024 0323 by Laurie Menezes RN  Outcome: Progressing  Flowsheets (Taken 12/14/2024 0323)  Care Plan - Patient's Chronic Conditions and Co-Morbidity Symptoms are Monitored and Maintained or Improved:   Update acute care plan with appropriate goals if chronic or comorbid symptoms are exacerbated and prevent overall improvement and discharge   Monitor and assess patient's chronic conditions and comorbid symptoms for stability, deterioration, or improvement   Collaborate with multidisciplinary team to address chronic and comorbid conditions and prevent exacerbation or deterioration     Problem: Discharge Planning  Goal: Discharge to home or other facility with appropriate resources  12/14/2024 0323 by Laurie Menezes RN  Outcome: Progressing  Flowsheets (Taken 12/14/2024 0323)  Discharge to home or other facility with appropriate resources:   Identify barriers to discharge with patient and caregiver   Refer to discharge planning if patient needs post-hospital services based on physician order or complex needs related to functional status, cognitive ability or social support system     Problem: Safety - Adult  Goal: Free from fall injury  12/14/2024 0323 by Laurie Menezes, RN  Outcome: Progressing  Note: Pt assessed as a fall risk this shift. Remains free from falls and accidental injury at this time. Fall precautions in place, including falling star sign and fall risk band on pt. Floor free from obstacles, and bed is locked and in lowest position. Adequate lighting provided.  Pt encouraged to call for any need.  Bed alarm activated. Will continue to monitor needs during hourly rounding, and reinforce education on use of call light.       Problem: Skin/Tissue Integrity  Goal: Absence of new skin breakdown  Description: 1.  Monitor for areas of 
  Problem: Chronic Conditions and Co-morbidities  Goal: Patient's chronic conditions and co-morbidity symptoms are monitored and maintained or improved  Outcome: Progressing     Problem: Discharge Planning  Goal: Discharge to home or other facility with appropriate resources  Outcome: Progressing     Problem: Safety - Adult  Goal: Free from fall injury  Outcome: Progressing     Problem: Skin/Tissue Integrity  Goal: Absence of new skin breakdown  Description: 1.  Monitor for areas of redness and/or skin breakdown  2.  Assess vascular access sites hourly  3.  Every 4-6 hours minimum:  Change oxygen saturation probe site  4.  Every 4-6 hours:  If on nasal continuous positive airway pressure, respiratory therapy assess nares and determine need for appliance change or resting period.  Outcome: Progressing     Problem: ABCDS Injury Assessment  Goal: Absence of physical injury  Outcome: Progressing     Problem: Confusion  Goal: Confusion, delirium, dementia, or psychosis is improved or at baseline  Description: INTERVENTIONS:  1. Assess for possible contributors to thought disturbance, including medications, impaired vision or hearing, underlying metabolic abnormalities, dehydration, psychiatric diagnoses, and notify attending LIP  2. [place] high risk fall precautions, as indicated  3. Provide frequent short contacts to provide reality reorientation, refocusing and direction  4. Decrease environmental stimuli, including noise as appropriate  5. Monitor and intervene to maintain adequate nutrition, hydration, elimination, sleep and activity  6. If unable to ensure safety without constant attention obtain sitter and review sitter guidelines with assigned personnel  7. Initiate Psychosocial CNS and Spiritual Care consult, as indicated  Outcome: Progressing     Problem: Pain  Goal: Verbalizes/displays adequate comfort level or baseline comfort level  Outcome: Progressing     
  Problem: Chronic Conditions and Co-morbidities  Goal: Patient's chronic conditions and co-morbidity symptoms are monitored and maintained or improved  Outcome: Progressing  Flowsheets (Taken 12/12/2024 0354)  Care Plan - Patient's Chronic Conditions and Co-Morbidity Symptoms are Monitored and Maintained or Improved:   Monitor and assess patient's chronic conditions and comorbid symptoms for stability, deterioration, or improvement   Collaborate with multidisciplinary team to address chronic and comorbid conditions and prevent exacerbation or deterioration     Problem: Safety - Adult  Goal: Free from fall injury  Outcome: Progressing  Flowsheets (Taken 12/12/2024 0354)  Free From Fall Injury: Instruct family/caregiver on patient safety     Problem: Skin/Tissue Integrity  Goal: Absence of new skin breakdown  Description: 1.  Monitor for areas of redness and/or skin breakdown  2.  Assess vascular access sites hourly  3.  Every 4-6 hours minimum:  Change oxygen saturation probe site  4.  Every 4-6 hours:  If on nasal continuous positive airway pressure, respiratory therapy assess nares and determine need for appliance change or resting period.  Outcome: Progressing  Note: Skin breakdown noted on exam, referred to wound care for management     Problem: Confusion  Goal: Confusion, delirium, dementia, or psychosis is improved or at baseline  Description: INTERVENTIONS:  1. Assess for possible contributors to thought disturbance, including medications, impaired vision or hearing, underlying metabolic abnormalities, dehydration, psychiatric diagnoses, and notify attending LIP  2. [place] high risk fall precautions, as indicated  3. Provide frequent short contacts to provide reality reorientation, refocusing and direction  4. Decrease environmental stimuli, including noise as appropriate  5. Monitor and intervene to maintain adequate nutrition, hydration, elimination, sleep and activity  6. If unable to ensure safety 
  Problem: Safety - Adult  Goal: Free from fall injury  Outcome: Progressing   Note: Pt assessed as a fall risk this shift. Remains free from falls and accidental injury at this time. Fall precautions in place, including falling star sign and fall risk band on pt. Floor free from obstacles, and bed is locked and in lowest position. Adequate lighting provided.  Pt encouraged to call before getting OOB for any need.  Bed alarm activated. Will continue to monitor needs during hourly rounding, and reinforce education on use of call light.     Problem: Skin/Tissue Integrity  Goal: Absence of new skin breakdown  Outcome: Progressing   Note: Skin assessment complete. Waffle mattress in place. Coccyx reddened. Sensicare applied PRN. Turned and repositioned every two hours.  Area kept free from moisture. Proper nourishment and fluids encouraged, as appropriate. Will continue to monitor for additional needs and changes in skin breakdown.     Problem: Pain  Goal: Verbalizes/displays adequate comfort level or baseline comfort level  Outcome: Progressing   Note: Pt medicated with pain medication prn.  Assessed all pain characteristics including level, type, location, frequency, and onset.  Non-pharmacologic interventions offered to pt as well.  Pt states pain is tolerable at this time.      Problem: Chronic Conditions and Co-morbidities  Goal: Patient's chronic conditions and co-morbidity symptoms are monitored and maintained or improved  Outcome: Progressing   Note: Patient's Chronic Conditions and Co-Morbidity symptoms are monitored and maintained or improved; monitor and assess patient's chronic conditions and comorbid symptoms for stability, deterioration, or improvement; Collaborate with multidisciplinary team to address chronic and comorbid conditions and prevent exacerbation or deterioration.   
  Problem: Safety - Adult  Goal: Free from fall injury  Outcome: Progressing   Note: Pt assessed as a fall risk this shift. Remains free from falls and accidental injury at this time. Fall precautions in place, including falling star sign and fall risk band on pt. Floor free from obstacles, and bed is locked and in lowest position. Adequate lighting provided.  Pt encouraged to call before getting OOB for any need.  Bed alarm activated. Will continue to monitor needs during hourly rounding, and reinforce education on use of call light.     Problem: Skin/Tissue Integrity  Goal: Absence of new skin breakdown  Outcome: Progressing   Note: Skin assessment complete. Waffle mattress in place. Coccyx reddened. Sensicare applied PRN. Turned and repositioned every two hours.  Area kept free from moisture. Proper nourishment and fluids encouraged, as appropriate. Will continue to monitor for additional needs and changes in skin breakdown.    Problem: Pain  Goal: Verbalizes/displays adequate comfort level or baseline comfort level  Outcome: Progressing   Note: Pt medicated with pain medication prn.  Assessed all pain characteristics including level, type, location, frequency, and onset.  Non-pharmacologic interventions offered to pt as well.  Pt states pain is tolerable at this time.      Problem: Chronic Conditions and Co-morbidities  Goal: Patient's chronic conditions and co-morbidity symptoms are monitored and maintained or improved  Outcome: Progressing   Note: Patient's Chronic Conditions and Co-Morbidity symptoms are monitored and maintained or improved; monitor and assess patient's chronic conditions and comorbid symptoms for stability, deterioration, or improvement; Collaborate with multidisciplinary team to address chronic and comorbid conditions and prevent exacerbation or deterioration.   
BRONCHOSPASM/BRONCHOCONSTRICTION   [x]  IMPROVE AERATION/BREATH SOUNDS  [x]   ADMINISTER BRONCHODILATOR THERAPY AS APPROPRIATE  [x]   ASSESS BREATH SOUNDS  []   IMPLEMENT AEROSOL/MDI PROTOCOL  [x]   PATIENT EDUCATION AS NEEDED    PROVIDE ADEQUATE OXYGENATION WITH ACCEPTABLE SP02/ABG'S  [x]  IDENTIFY APPROPRIATE OXYGEN THERAPY  [x]   MONITOR SP02/ABG'S AS NEEDED   [x]   PATIENT EDUCATION AS NEEDED      
BRONCHOSPASM/BRONCHOCONSTRICTION   [x]  IMPROVE AERATION/BREATH SOUNDS  [x]   ADMINISTER BRONCHODILATOR THERAPY AS APPROPRIATE  [x]   ASSESS BREATH SOUNDS  []   IMPLEMENT AEROSOL/MDI PROTOCOL  [x]   PATIENT EDUCATION AS NEEDED    PROVIDE ADEQUATE OXYGENATION WITH ACCEPTABLE SP02/ABG'S  [x]  IDENTIFY APPROPRIATE OXYGEN THERAPY  [x]   MONITOR SP02/ABG'S AS NEEDED   [x]   PATIENT EDUCATION AS NEEDED      
or resting period.  12/13/2024 0351 by Laurie Menezes, RN  Outcome: Progressing  Note: Skin assessment complete. Waffle mattress in place. Coccyx reddened. Sensicare applied PRN. Turned and repositioned every two hours.  Area kept free from moisture. Proper nourishment and fluids encouraged, as appropriate. Will continue to monitor for additional needs and changes in skin breakdown.       Problem: Pain  Goal: Verbalizes/displays adequate comfort level or baseline comfort level  Outcome: Progressing  Note: Pt medicated with pain medication prn.  Assessed all pain characteristics including level, type, location, frequency, and onset.  Non-pharmacologic interventions offered to pt as well.  Pt states pain is tolerable at this time.

## 2024-12-16 NOTE — CARE COORDINATION
Case Management Assessment  Initial Evaluation    Date/Time of Evaluation: 12/12/2024 12:00 PM  Assessment Completed by: Charo Rodriguez RN    If patient is discharged prior to next notation, then this note serves as note for discharge by case management.    Patient Name: Noemy Turcios                   YOB: 1960  Diagnosis: UTI (urinary tract infection) [N39.0]  Urinary tract infection with hematuria, site unspecified [N39.0, R31.9]  Altered mental status, unspecified altered mental status type [R41.82]                   Date / Time: 12/11/2024  8:21 PM    Patient Admission Status: Inpatient   Readmission Risk (Low < 19, Mod (19-27), High > 27): Readmission Risk Score: 21.8    Current PCP: Sixto Merrill MD  PCP verified by CM? No    Chart Reviewed: Yes      History Provided by: Medical Record  Patient Orientation: Other (see comment) (Pt was very sleepy at the time of Visit, Information taken from Chart notes/Medical records.)    Patient Cognition:      Hospitalization in the last 30 days (Readmission):  Yes    If yes, Readmission Assessment in  Navigator will be completed.    Advance Directives:      Code Status: DNR-CCA   Patient's Primary Decision Maker is:        Discharge Planning:    Patient lives with:   Type of Home: Skilled Nursing Facility  Primary Care Giver: Other (Comment) (SNF, from [facility], LTC, Bedhold, No Auth needed to return.)  Patient Support Systems include: Family Members   Current Financial resources: Medicaid, Medicare  Current community resources: ECF/Home Care  Current services prior to admission:              Current DME:              Type of Home Care services:       ADLS  Prior functional level:    Current functional level:      PT AM-PAC:   /24  OT AM-PAC:   /24    Family can provide assistance at DC:    Would you like Case Management to discuss the discharge plan with any other family members/significant others, and if so, who?    Plans to Return to Present 
DISCHARGE PLANNING NOTE:    Writer is following for potential discharge to [facility]. Pt is a bed hold at facility. Writer met with pt for update, no further questions at this time.  Electronically signed by SANTA Scott on 12/13/2024 at 10:52 AM    
DISCHARGE PLANNING NOTE:    Writer is following for potential discharge to [facility]. Pt is a bed hold at facility, no authorization needed for return. Writer met with pt for update, all questions answered at this time.    Perfectserve message placed to bedside EFE Novoa requesting JOS to be signed and completed.    Forms faxed to [facility] for scheduling.  Electronically signed by SANTA Scott on 12/16/2024 at 11:18 AM    
IMM letter provided to patient.  Patient offered four hours to make informed decision regarding appeal process; patient agreeable to discharge.   Electronically signed by SANTA Scott on 12/16/2024 at 11:18 AM    
ONGOING DISCHARGE PLAN:    Chart Review-AMS    ID Consult-IV Zosyn. Cardiac Consult-Heparin gtt. AMS.  Mani.    Plan is to return to [facility]. Per [facility] no authorization needed to return.    Will continue to follow for additional discharge needs.    If patient is discharged prior to next notation, then this note serves as note for discharge by case management.    Electronically signed by Kadie Rosales RN on 12/14/2024 at 10:44 AM    
ONGOING DISCHARGE PLAN:    Chart Review-AMS    ID Consult-LUCERO Kitchen through 12/21. [facility] w/[facility] notified. Pt has PICC. Cardiac Consult-Eliquis. JOSUE Singleton.     Plan is to return to [facility]. Per [facility] no authorization needed to return.    Will continue to follow for additional discharge needs.    If patient is discharged prior to next notation, then this note serves as note for discharge by case management.    Electronically signed by Kadie Rosales RN on 12/15/2024 at 11:50 AM    
Transportation arranged for patient to go to [facility] at [facility] via [facility]. Facility informed. Bedside nurse informed.     Number for Report: [phone]  Electronically signed by SANTA Scott on 12/16/2024 at 12:16 PM      
tablet by mouth nightly as needed for Sleep   * venlafaxine 150 MG extended release tablet  Take 1 tablet by mouth daily (with breakfast) (Give with 37.5 mg tablet)   * venlafaxine 37.5 MG extended release capsule  Commonly known as: EFFEXOR XR  Take 1 capsule by mouth daily   Ventolin  (90 Base) MCG/ACT inhaler  Generic drug: albuterol sulfate HFA  Inhale 2 puffs into the lungs every 4 hours as needed for Wheezing or Shortness of Breath    very important  * This list has 6 medication(s) that are the same as other medications prescribed for you. Read the directions carefully, and ask your doctor or other care provider to review them with you.

## 2024-12-16 NOTE — PROGRESS NOTES
[facility] -   Infectious diseases evaluation  admission date 12/11/2024    reason for consultation:   Antibiotic management    Impression :   Current:  ESBL Klebsiella Pneumoniae UTI  Leukocytosis  Encephalopathy  Troponin elevation/EKG abnormalities, followed by cardiology on heparin drip  Left flank, buttocks wounds with no signs of infection.  Recent COVID-19 infection/multifocal pneumonia  Recent UTI  Cholelithiasis  COPD on home oxygen  CHF  History of DVT and PE  Morbid obesity  QT prolongation        HENCE:   Stop Zosyn.  Meropenem 1 gm IV every 8 hours x 7 days through 12/21/24.  Follow blood and urine culture, adjust antibiotics as needed  Follow CBC and renal function.  Supportive care.  Discussed with patient, RN.    Infection Control Recommendations   Contact isolation    Antimicrobial Stewardship Recommendations   Simplification of therapy  Targeted therapy      History of Present Illness:   Initial history:  Noemy Turcios is a 64 y.o.-year-old female presented to hospital with altered mental status associated with hematuria.  She drowsy, arousable, poor historian, on oxygen per nasal cannula, denied shortness of breath or cough, denied pain, denied nausea or vomiting, no other complaints.  Urinalysis showed moderate amount of leukocyte Estrace, large hemoglobin, too numerous to count WBC  Initial WBC 13.6, lactic acid 1.6  CT head showed no acute abnormalities, opacification of the majority of left mastoid air cells.  Chest x-ray showed cardiomegaly, no infiltrate  Urine drug screen was positive for benzodiazepine, opiates and oxycodone  The patient was recently hospitalized 11/27/2024 through 12/6/2024 for acute respiratory failure, was found to have COVID-19 infection, multifocal pneumonia and UTI, was treated with IV Zosyn through 12/4/2024.  She received a course of Decadron.  MRSA swab was positive last admission thought to be colonization.  Mani 
          [facility] -   Infectious diseases evaluation  admission date 12/11/2024    reason for consultation:   Antibiotic management    Impression :   Current:  ESBL Klebsiella Pneumoniae UTI  Leukocytosis  Encephalopathy  Troponin elevation/EKG abnormalities, followed by cardiology on heparin drip  Left flank, buttocks wounds with no signs of infection.  Recent COVID-19 infection/multifocal pneumonia  Recent UTI  Cholelithiasis  COPD on home oxygen  CHF  History of DVT and PE  Morbid obesity  QT prolongation        HENCE:     Meropenem 1 gm IV every 8 hours through 12/21/24.  No growth on blood cultures  Follow CBC and renal function.  Supportive care.      Infection Control Recommendations   Contact isolation    Antimicrobial Stewardship Recommendations   Simplification of therapy  Targeted therapy      History of Present Illness:   Initial history:  Noemy Turcios is a 64 y.o.-year-old female presented to hospital with altered mental status associated with hematuria.  She drowsy, arousable, poor historian, on oxygen per nasal cannula, denied shortness of breath or cough, denied pain, denied nausea or vomiting, no other complaints.  Urinalysis showed moderate amount of leukocyte Estrace, large hemoglobin, too numerous to count WBC  Initial WBC 13.6, lactic acid 1.6  CT head showed no acute abnormalities, opacification of the majority of left mastoid air cells.  Chest x-ray showed cardiomegaly, no infiltrate  Urine drug screen was positive for benzodiazepine, opiates and oxycodone  The patient was recently hospitalized 11/27/2024 through 12/6/2024 for acute respiratory failure, was found to have COVID-19 infection, multifocal pneumonia and UTI, was treated with IV Zosyn through 12/4/2024.  She received a course of Decadron.  MRSA swab was positive last admission thought to be colonization.  Singleton catheter was placed last admission due to retention.  Interval changes  12/15/2024 
    IN-PATIENT SERVICE  [facility] Internal Medicine  [facility] Internal Medicine   Bob Serna MD; Justino Samson MD; Jason Valdez MD; Maximus Rosa MD,   Heather Fisher MD; Librado Becker MD; Lisa Madison MD; Edgar Ricketts MD; Lewis Che MD      Progress note            Date:   12/15/2024  Patient name:  Noemy Turcios  MRN:   [id number]  Account:  [id number]  YOB: 1960  PCP:    Sixto Merrill MD  Code Status:    DNR-CCA    Chief Complaint:     Chief Complaint   Patient presents with    Altered Mental Status    Hematuria         History Obtained From:     Patient, EMR, nursing staff    HPI     This patient is a 64 y.o. Non- / non  femalewho presents with  Expand All Collapse All         [facility] Internal Medicine  Bob Serna MD; Justino Samson MD; Jason Valdez MD; MD Heather Hawkins MD; Librado Becker MD  [facility] Internal Medicine   IN-PATIENT SERVICE  [facility]                   Date:                            12/12/2024  Patientname:               Noemy Turcios  Date of admission:      12/11/2024  8:21 PM  MRN:                           [id number]  Account:                      [id number]  YOB: 1960  PCP:                            Sixto Merrill MD  Room:                          2083/2083-01  Code Status:               DNR-CCA        Chief Complaint:          Chief Complaint   Patient presents with    Altered Mental Status    Hematuria         History of Present Illness:      Noemy Turcios is a 64 y.o. Non- / non  female who presents with Altered Mental Status and Hematuria   and is admitted to the hospital for the management of UTI (urinary tract infection).     Patient's medical history significant for chronic HFpEF, COPD, HTN, history of pulmonary embolism and atrial fibrillation-chronically anticoagulated on Eliquis, morbid obesity, and 
    IN-PATIENT SERVICE  [facility] Internal Medicine  [facility] Internal Medicine   Bob Serna MD; Justino Samson MD; Jason Valdez MD; Maximus Rosa MD,   Heather Fisher MD; Librado Becker MD; Lisa Madison MD; Edgar Ricketts MD; Lewis Che MD      Progress note            Date:   12/14/2024  Patient name:  Noemy Turcios  MRN:   [id number]  Account:  [id number]  YOB: 1960  PCP:    Sixto Merrill MD  Code Status:    DNR-CCA    Chief Complaint:     Chief Complaint   Patient presents with    Altered Mental Status    Hematuria         History Obtained From:     Patient, EMR, nursing staff    HPI     This patient is a 64 y.o. Non- / non  femalewho presents with  Expand All Collapse All         [facility] Internal Medicine  Bob Serna MD; Justino Samson MD; Jason Valdez MD; MD Heather Hawkins MD; Librado Becker MD  [facility] Internal Medicine   IN-PATIENT SERVICE  [facility]                   Date:                            12/12/2024  Patientname:               Noemy Turcios  Date of admission:      12/11/2024  8:21 PM  MRN:                           [id number]  Account:                      [id number]  YOB: 1960  PCP:                            Sixto Merrlil MD  Room:                          2083/2083-01  Code Status:               DNR-CCA        Chief Complaint:          Chief Complaint   Patient presents with    Altered Mental Status    Hematuria         History of Present Illness:      Noemy Turcios is a 64 y.o. Non- / non  female who presents with Altered Mental Status and Hematuria   and is admitted to the hospital for the management of UTI (urinary tract infection).     Patient's medical history significant for chronic HFpEF, COPD, HTN, history of pulmonary embolism and atrial fibrillation-chronically anticoagulated on Eliquis, morbid obesity, and 
  [facility]  Progress Note                   Date:   12/13/2024  Patient name: Noemy Turcios  Date of admission:  12/11/2024  8:21 PM  MRN:   [id number]  YOB: 1960  PCP: Sixto Merrill MD    Reason for Admission: UTI (urinary tract infection) [N39.0]  Urinary tract infection with hematuria, site unspecified [N39.0, R31.9]  Altered mental status, unspecified altered mental status type [R41.82]    Subjective:       Clinical Changes /Abnormalities:Patient seen and examined resting comfortably in bed no distress . Tele/vitals/labs reviewed .    Medications:   Scheduled Meds:   sodium chloride flush  5-40 mL IntraVENous 2 times per day    [Held by provider] amiodarone  200 mg Oral Daily    [Held by provider] diazePAM  5 mg Oral Nightly    digoxin  125 mcg Oral Every Other Day    docusate sodium  100 mg Oral BID    venlafaxine  150 mg Oral Daily    senna  2 tablet Oral Daily    midodrine  10 mg Oral TID WC    miconazole   Topical BID    [Held by provider] apixaban  2.5 mg Oral BID    venlafaxine  37.5 mg Oral Daily    cetirizine  10 mg Oral Nightly    [Held by provider] furosemide  20 mg Oral Daily    gabapentin  100 mg Oral Nightly    [Held by provider] metoprolol succinate  50 mg Oral Daily    PARoxetine  30 mg Oral QAM    ipratropium 0.5 mg-albuterol 2.5 mg  1 Dose Inhalation TID    piperacillin-tazobactam  4,500 mg IntraVENous Q8H    furosemide  10 mg IntraVENous Daily    metoprolol  5 mg IntraVENous Q6H     Continuous Infusions:   sodium chloride      heparin (PORCINE) Infusion 6 Units/kg/hr (12/13/24 1003)     CBC:   Recent Labs     12/12/24  0536 12/12/24  1047 12/13/24  0915   WBC 22.7* 15.8* 17.6*   HGB 14.7 14.9 13.8    186 148*     BMP:    Recent Labs     12/11/24 2028 12/12/24  0536   * 137   K 3.4* 4.4   CL 86* 86*   CO2 34* 30   BUN 22 22   CREATININE 0.5* 0.6*   GLUCOSE 120* 117*     Hepatic:  Recent Labs     12/11/24 2028   AST 24   ALT 28   BILITOT 2.2* 
  [facility]  Progress Note                   Date:   12/15/2024  Patient name: Noemy Turcios  Date of admission:  12/11/2024  8:21 PM  MRN:   [id number]  YOB: 1960  PCP: Sixto Merrill MD    Reason for Admission: UTI (urinary tract infection) [N39.0]  Urinary tract infection with hematuria, site unspecified [N39.0, R31.9]  Altered mental status, unspecified altered mental status type [R41.82]    Subjective:       Clinical Changes /Abnormalities:Patient seen and examined resting comfortably in bed no distress.  Pt states that she has hip pain.  Tele/vitals/labs reviewed .    Medications:   Scheduled Meds:   meropenem  1,000 mg IntraVENous Q8H    potassium chloride  40 mEq Oral BID WC    sodium chloride flush  5-40 mL IntraVENous 2 times per day    [Held by provider] amiodarone  200 mg Oral Daily    [Held by provider] diazePAM  5 mg Oral Nightly    digoxin  125 mcg Oral Every Other Day    docusate sodium  100 mg Oral BID    venlafaxine  150 mg Oral Daily    senna  2 tablet Oral Daily    midodrine  10 mg Oral TID WC    miconazole   Topical BID    apixaban  2.5 mg Oral BID    venlafaxine  37.5 mg Oral Daily    cetirizine  10 mg Oral Nightly    [Held by provider] furosemide  20 mg Oral Daily    gabapentin  100 mg Oral Nightly    [Held by provider] metoprolol succinate  50 mg Oral Daily    PARoxetine  30 mg Oral QAM    ipratropium 0.5 mg-albuterol 2.5 mg  1 Dose Inhalation TID    furosemide  10 mg IntraVENous Daily    metoprolol  5 mg IntraVENous Q6H     Continuous Infusions:   sodium chloride       CBC:   Recent Labs     12/12/24  1047 12/13/24  0915 12/14/24  0614   WBC 15.8* 17.6* 10.1   HGB 14.9 13.8 12.4    148* 144*     BMP:    Recent Labs     12/13/24  1641 12/14/24  0614    137   K 2.3* 3.4*   CL 87* 93*   CO2 36* 33*   BUN 18 14   CREATININE 0.4* 0.3*   GLUCOSE 138* 83     Hepatic:  No results for input(s): \"AST\", \"ALT\", \"BILITOT\", \"ALKPHOS\" in the last 72 
  [facility]  Progress Note                   Date:   12/16/2024  Patient name: Noemy Turcios  Date of admission:  12/11/2024  8:21 PM  MRN:   [id number]  YOB: 1960  PCP: Sixto Merrill MD    Reason for Admission: UTI (urinary tract infection) [N39.0]  Urinary tract infection with hematuria, site unspecified [N39.0, R31.9]  Altered mental status, unspecified altered mental status type [R41.82]    Subjective:       Clinical Changes /Abnormalities:Patient seen and examined resting comfortably in bed no distress.  Pt states that she has hip pain.  Tele/vitals/labs reviewed . sr83    Medications:   Scheduled Meds:   metoprolol succinate  25 mg Oral Daily    meropenem  1,000 mg IntraVENous Q8H    potassium chloride  40 mEq Oral BID WC    sodium chloride flush  5-40 mL IntraVENous 2 times per day    [Held by provider] amiodarone  200 mg Oral Daily    [Held by provider] diazePAM  5 mg Oral Nightly    digoxin  125 mcg Oral Every Other Day    docusate sodium  100 mg Oral BID    venlafaxine  150 mg Oral Daily    senna  2 tablet Oral Daily    midodrine  10 mg Oral TID WC    miconazole   Topical BID    apixaban  2.5 mg Oral BID    venlafaxine  37.5 mg Oral Daily    cetirizine  10 mg Oral Nightly    [Held by provider] furosemide  20 mg Oral Daily    gabapentin  100 mg Oral Nightly    PARoxetine  30 mg Oral QAM    ipratropium 0.5 mg-albuterol 2.5 mg  1 Dose Inhalation TID    furosemide  10 mg IntraVENous Daily     Continuous Infusions:   sodium chloride       CBC:   Recent Labs     12/14/24  0614 12/15/24  1028 12/16/24  0722   WBC 10.1 12.4* 10.1   HGB 12.4 12.2 11.5*   * 179 154     BMP:    Recent Labs     12/13/24  1641 12/14/24  0614 12/15/24  1028    137 133*   K 2.3* 3.4* 4.3   CL 87* 93* 92*   CO2 36* 33* 29   BUN 18 14 12   CREATININE 0.4* 0.3* 0.4*   GLUCOSE 138* 83 84     Hepatic:  Recent Labs     12/15/24  1028   AST 24   ALT 23   BILITOT 1.3*   ALKPHOS 79       Troponin: 
  Physician Progress Note      PATIENT:               RUBEN BRIZUELA  CSN #:                  [id number]  :                       1960  ADMIT DATE:       2024 8:21 PM  DISCH DATE:  RESPONDING  PROVIDER #:        Heather Fisher MD          QUERY TEXT:    Patient admitted with CAUTI, noted to have paroxysmal  atrial fibrillation and   is maintained on Eliquis. If possible, please document in progress notes and   discharge summary if you are evaluating and/or treating any of the following:?  ?  The medical record reflects the following:  Risk Factors: advanced age 64, female HTN, CHF  Clinical Indicators: H&P: [id number] rate controlled, sinus, amiodarone   held per cardiology advice due to prolonged QTc, Eliquis was resumed however   held in light of heparin infusion for suspected ACS.  Treatment: resume Eliquis after testing.    Thank You, Danni CDS  Options provided:  -- Secondary hypercoagulable state in a patient with atrial fibrillation  -- Other - I will add my own diagnosis  -- Disagree - Not applicable / Not valid  -- Disagree - Clinically unable to determine / Unknown  -- Refer to Clinical Documentation Reviewer    PROVIDER RESPONSE TEXT:    This patient has secondary hypercoagulable state in a patient with atrial   fibrillation.    Query created by: Joanie Gonzalez on 2024 1:43 PM      Electronically signed by:  Heather Fisher MD 2024 7:20 PM          
BRONCHOSPASM/BRONCHOCONSTRICTION     [x]         IMPROVE AERATION/BREATH SOUNDS  [x]   ADMINISTER BRONCHODILATOR THERAPY AS APPROPRIATE  [x]   ASSESS BREATH SOUNDS  []   IMPLEMENT AEROSOL/MDI PROTOCOL  [x]   PATIENT EDUCATION AS NEEDED    
BRONCHOSPASM/BRONCHOCONSTRICTION     [x]         IMPROVE AERATION/BREATH SOUNDS  [x]   ADMINISTER BRONCHODILATOR THERAPY AS APPROPRIATE  [x]   ASSESS BREATH SOUNDS  []   IMPLEMENT AEROSOL/MDI PROTOCOL  [x]   PATIENT EDUCATION AS NEEDED    
HEPARIN DRIP  Date   12/13 @  0915     APTT   103.9     Plt   148 K  aPTT 103-109 No bolus Decrease infusion by 2 units/kg/hr      Rate decreased by 2 units/kg/hr to:    Rate/Dose Change : 6 Units/kg/hr : 8.7 mL/hr : IntraVENous 12/13/24 1003 12/13/24 1004   Heron Edouard R.Ph.  12/13/2024  10:22 AM    
HEPARIN DRIP  Date   12/13 @ 1641    APTT  64.3     Plt  148 K  aPTT 62-94 No bolus No change in rate   Continue same rate:    Rate/Dose Change : 6 Units/kg/hr : 8.7 mL/hr : IntraVENous 12/13/24 1003 12/13/24 1004       MYRA Mendez.Ph.  12/13/2024  6:02 PM    
I was paged by nurse to review abnormal EKG  EKG was ordered last night  Concerned about ST elevation in lead I, aVL, ST depression in 3 and aVF  Consulted cardiology stat, ordered stat troponin, start aspirin 325  Spoke personally on phone with Dr. Elder Yee.  Dr Yee advised that if troponin is elevated patient will need to go to interventional radiology and we are to inform him immediately.  If troponin remains downtrending okay to keep patient in [place].  Ordering heparin infusion.  Prolonged Qtc >590, advised hold amiodarone.      Heather Fisher MD    
Informed Dr. Becker of patient having vaginal (mod) bleeding. Hep drip stopped per Dr. Becker. To restart eliquis 2.5 mg home dose.   
Melida Espinoza NP (cardio) notified of consult.   
Order received for PICC placement. Patient disoriented to situation. No family or POA listed on chart. Nurse looking into who will consent.   
Per Dr. Gricelda TORREZ to change PICC order to emergent, patient only has a friend listed in chart, as we unable to get consent, due to pt mentation and lack of decision maker.   
Pharmacy monitoring of Heparin infusion  Heparin Infusion New Order    Patient on heparin for:  MI    Was patient on previous oral anticoagulant/dose?:  YES pt was on Apixaban 2.5 mg BID, Confirmed with RN/Pt/Pts family/Surescripts?: yes    Factor Xa inhibitor/LMWH use within the past 72 hours? Unable to assess, pt is not responsive       Recent Labs     12/11/24 2028 12/12/24  0536 12/12/24  1047   HGB 14.4 14.7 14.9    203 186       Heparin to be monitored using aPTT until 72h following last factor Xa inhibitor/LMWH administration .(aPTT should be used for patients who have received a DOAC in the past 72 hours)?      Have admin instructions been updated to reflect appropriate protocol? YES    Have appropriate labs been ordered for corresponding protocol? YES   *Discontinue anti-Xa lab monitoring if using aPTT protocol    Is the starting rate/last rate adjustment appropriate? yes    Concurrent anticoagulants: none  (Note it is not appropriate to be on most anticoagulants while on a heparin drip)    Review platelets from the past few days.  Any concerns?: No    Isaura Banks PharmD, BCPS 12/12/2024 10:58 AM    
Pt sleeping  
Regarding pelvic ultrasound - Patient already had one completed 12/2 for same reason. Please check the chart.  
[facility]   Pharmacy Pharmacokinetic Monitoring Service - Vancomycin     Noemy Turcios is a 64 y.o. female starting on vancomycin therapy for UTI. Pharmacy consulted by Dr. Nova Baker MD for monitoring and adjustment.    Target Concentration: Goal trough of 10-15 mg/L and AUC/SHAAN <500 mg*hr/L    Additional Antimicrobials: Zosyn    Pertinent Laboratory Values:   Wt Readings from Last 1 Encounters:   12/11/24 (!) 144.2 kg (318 lb)     Temp Readings from Last 1 Encounters:   12/11/24 98.4 °F (36.9 °C) (Rectal)     Estimated Creatinine Clearance: 165 mL/min (A) (based on SCr of 0.5 mg/dL (L)).  Recent Labs     12/09/24  1432 12/11/24 2028   CREATININE 0.46* 0.5*   BUN  --  22   WBC  --  13.6*       Pertinent Cultures:  Culture Date Source Results   12/11/2024 Blood x2 Pending   MRSA Nasal Swab: N/A. Non-respiratory infection.    Plan:  Dosing recommendations based on Bayesian software  Start vancomycin No Loading dose, 1500 mg Q12H  Anticipated AUC of 474 and trough concentration of 10.2 at steady state  Renal labs as indicated   Vancomycin concentration ordered for 12/13 @ 0600   Pharmacy will continue to monitor patient and adjust therapy as indicated    Thank you for the consult,  Hair Jackson RPH  12/11/2024 11:34 PM   
[facility]   Pharmacy Pharmacokinetic Monitoring Service - Vancomycin    Consulting Provider:  Dr. Nova Baker MD   Indication: UTI  Target Concentration: Goal trough of 10-15 mg/L and AUC/SHAAN <500 mg*hr/L  Day of Therapy: 2  Additional Antimicrobials: zosyn, Fluconazole    Pertinent Laboratory Values:   Wt Readings from Last 1 Encounters:   12/11/24 (!) 144.2 kg (318 lb)     Temp Readings from Last 1 Encounters:   12/12/24 97.5 °F (36.4 °C) (Oral)     Estimated Creatinine Clearance: 137 mL/min (A) (based on SCr of 0.6 mg/dL (L)).  Recent Labs     12/11/24 2028 12/12/24  0536   CREATININE 0.5* 0.6*   BUN 22 22   WBC 13.6* 22.7*         Pertinent Cultures:      Recent vancomycin administrations                     vancomycin (VANCOCIN) 1500 mg in 300 mL IVPB (mg) 1,500 mg New Bag 12/11/24 2302                    Assessment:  Note: Serum concentrations collected for AUC dosing may appear elevated if collected in close proximity to the dose administered, this is not necessarily an indication of toxicity    Plan:  Current dosing regimen is therapeutic (see below)  Continue current dose 1500 mg IVPB BID  Repeat vancomycin concentration ordered for 12/13 @ 0600  Pharmacy will continue to monitor patient and adjust therapy as indicated       Loading dose: N/A  Regimen: 1500 mg IV every 12 hours.  Start time: 11:02 on 12/12/2024  Exposure target: AUC24 (range)400-500 mg/L.hr   [id number]: 458 mg/L.hr  AUC24,ss: 494 mg/L.hr  Probability of AUC24 > 400: 75 %  Ctrough,ss: 10.9 mg/L  Probability of Ctrough,ss > 20: 5 %    Thank you for the consult,  Isaura Banks RPH  12/12/2024 10:16 AM   
Update Dr. IGNACIO Yee per perfect serve on dc hep drip and restart home dose eliquis 2.5 mg. Dr. Yee ok with it.  
Urology rounded on patient.   Admitted for UTI, ucx pending.   ID consulted for IV abx.   Hx of retention, with yang.   Would maintain yang for now, ensure yang changed this admission.  Formal consult note to follow.  
Writer notified Dr. Fisher that pt is alert to voice but extremely lethargic, pt cool to touch, no respiratory distress, but not remaining awake to take medications.    [id number]  Writer gave Dr. Fisher EKG due to abnormal results. Writer attempted to find previous EKG from ED, EKG not in chart or uploaded in chart.   
Writer perfect serve messaged Dr. Fisher the following message:   CTP informed me that vaginal bleeding has increased. It is constantly bleeding but more like a mild menstrual bleed.  
Writer to edmond with Dr. Fisher, pt remains unable to stay alert for a bedside swallow. Per Dr. Fisher, essential medications to be ordered IV.  
Comment: 1-2 times per week    Drug use: Not Currently    Sexual activity: Not Currently   Other Topics Concern    Not on file   Social History Narrative    Not on file     Social Determinants of Health     Financial Resource Strain: Low Risk  (7/22/2020)    Overall Financial Resource Strain (CARDIA)     Difficulty of Paying Living Expenses: Not very hard   Food Insecurity: Patient Unable To Answer (12/12/2024)    Hunger Vital Sign     Worried About Running Out of Food in the Last Year: Patient unable to answer     Ran Out of Food in the Last Year: Patient unable to answer   Transportation Needs: Patient Unable To Answer (12/12/2024)    PRAPARE - Transportation     Lack of Transportation (Medical): Patient unable to answer     Lack of Transportation (Non-Medical): Patient unable to answer   Physical Activity: Inactive (7/22/2020)    Exercise Vital Sign     Days of Exercise per Week: 0 days     Minutes of Exercise per Session: 0 min   Stress: Stress Concern Present (7/22/2020)    [employer] - Occupational Stress Questionnaire     Feeling of Stress : To some extent   Social Connections: Not on file   Intimate Partner Violence: Not on file   Housing Stability: Patient Unable To Answer (12/12/2024)    Housing Stability Vital Sign     Unable to Pay for Housing in the Last Year: Patient unable to answer     Number of Times Moved in the Last Year: 1     Homeless in the Last Year: Patient unable to answer       Family History:     Family History   Problem Relation Age of Onset    Cancer Mother     Diabetes Paternal Grandfather       Medical Decision Making:   I have independently reviewed/ordered the following labs:    CBC with Differential:   Recent Labs     12/14/24  0614 12/15/24  1028 12/16/24  0722   WBC 10.1 12.4* 10.1   HGB 12.4 12.2 11.5*   HCT 38.3 39.5 36.2   * 179 154   LYMPHOPCT 27 23*  --    MONOPCT 13* 11*  --    EOSPCT 2 2  --      BMP:  Recent Labs     12/13/24  1641 
 12/12/24  0536 12/12/24  1047 12/13/24  0915   WBC 22.7* 15.8* 17.6*   HGB 14.7 14.9 13.8   HCT 45.5 47.3* 44.5    186 148*   LYMPHOPCT 9*  --  15*   MONOPCT 8*  --  17*   EOSPCT 1  --  1     BMP:  Recent Labs     12/11/24 2028 12/12/24  0536   * 137   K 3.4* 4.4   CL 86* 86*   CO2 34* 30   BUN 22 22   CREATININE 0.5* 0.6*   MG 2.3  --      Hepatic Function Panel:   Recent Labs     12/11/24 2028   BILITOT 2.2*   ALKPHOS 88   ALT 28   AST 24     No results for input(s): \"RPR\" in the last 72 hours.  No results for input(s): \"HIV\" in the last 72 hours.  No results for input(s): \"BC\" in the last 72 hours.  Lab Results   Component Value Date/Time    CREATININE 0.6 12/12/2024 05:36 AM    GLUCOSE 117 12/12/2024 05:36 AM    GLUCOSE 88 12/09/2024 02:32 PM   CRP: 126-30  Procalcitonin: 0.27    Detailed results:        Thank you for allowing us to participate in the care of this patient.Please call with questions.    This note is created with the assistance of a speech recognition program.  While intending to generate adocument that actually reflects the content of the visit, the document can still have some errors including those of syntax and sound a like substitutions which may escape proof reading.  It such instances, actual meaningcan be extrapolated by contextual diversion.    Aleksnadra Lomeli MD  Office: [phone]  Perfect serve / office [phone]        
failure     Altered mental status     Elevated LFTs     COVID-19     Acute cystitis with hematuria     New onset atrial fibrillation (HCC)     Elevated C-reactive protein (CRP)     Leukocytosis     Positive blood culture     Candidal intertrigo     Encounter for palliative care     QT prolongation     Shortness of breath     Right upper quadrant abdominal pain     Calculus of gallbladder with chronic cholecystitis without obstruction     Elevated lipase     Longstanding persistent atrial fibrillation (HCC)     UTI (urinary tract infection)      Plan of Treatment:   PAF, currently in NSR. Continue Heparin gtt and switch to home dose eiquis on discharge . Amio held d/t prolonged QT. Continue Toprol 50mg daily   Preserved LV function on echo   ATB per primary   patient was suggested [facility] on 12/3 but she refused at that time. No further cardiac workup   Midodrine for BP support     Electronically signed by KIRK Davenport NP on 12/13/2024 at 1:16 PM  [facility] [employer].  [phone]         
NEGATIVE NEGATIVE    Oxycodone Screen, Ur POSITIVE (A) NEGATIVE    Fentanyl, Ur NEGATIVE NEGATIVE    Test Information       This method is a screening test to detect only these drug classes as part of a medical workup. Confirmatory testing by another method should be ordered if clinically indicated.   Drug screen, tricyclic    Collection Time: 12/11/24  9:10 PM   Result Value Ref Range    Tricyclic Antidepressants, Urine NEGATIVE NEGATIVE   EKG 12 Lead    Collection Time: 12/11/24  9:14 PM   Result Value Ref Range    Ventricular Rate 84 BPM    Atrial Rate 84 BPM    P-R Interval 166 ms    QRS Duration 104 ms    Q-T Interval 448 ms    QTc Calculation (Bazett) 529 ms    P Axis 67 degrees    R Axis 171 degrees    T Axis 19 degrees   Ammonia    Collection Time: 12/11/24  9:34 PM   Result Value Ref Range    Ammonia 19 11 - 51 umol/L   Troponin    Collection Time: 12/11/24  9:44 PM   Result Value Ref Range    Troponin, High Sensitivity 49 (H) 0 - 14 ng/L   POC Glucose Fingerstick    Collection Time: 12/12/24  2:12 AM   Result Value Ref Range    POC Glucose 117 (H) 65 - 105 mg/dL       Imaging/Diagnostics:  CT HEAD WO CONTRAST    Result Date: 12/11/2024  No acute intracranial process. Parenchymal volume loss and chronic microvascular ischemic change. Opacification of the majority of the left mastoid air cells, stable.  A few scattered right mastoid air cells are opacified.     XR CHEST PORTABLE    Result Date: 12/11/2024  1. Stable cardiomegaly without evidence of CHF. 2. No airspace consolidation or pleural effusion, but evaluation of the left lung base and costophrenic angle is limited due to patient rotation to the left and cardiomegaly.     XR ABDOMEN (KUB) (SINGLE AP VIEW)    Result Date: 12/5/2024  No significant stool burden and no evidence of obstruction         Patient status inpatient in the Progressive Unit/Step down    Hospital Problems             Last Modified POA    * (Principal) UTI (urinary tract infection) 
KIRK - CNP        oxyCODONE-acetaminophen (PERCOCET) 5-325 MG per tablet 2 tablet  2 tablet Oral Q6H PRN Ashley Gillespie APRN - CNP   2 tablet at 12/12/24 0539    oxyCODONE-acetaminophen (PERCOCET) 5-325 MG per tablet 1 tablet  1 tablet Oral Q6H PRN Ashley Gillespie APRN - CNP        PARoxetine (PAXIL) tablet 30 mg  30 mg Oral QAM Heather Fisher MD        ipratropium 0.5 mg-albuterol 2.5 mg (DUONEB) nebulizer solution 1 Dose  1 Dose Inhalation TID Ashley Gillespie APRN - CNP   1 Dose at 12/13/24 0757    piperacillin-tazobactam (ZOSYN) 4,500 mg in sodium chloride 0.9 % 100 mL IVPB ([id number])  4,500 mg IntraVENous Q8H Ashley Gillespie APRN - CNP 25 mL/hr at 12/13/24 0558 4,500 mg at 12/13/24 0558    heparin (porcine) injection 4,000 Units  4,000 Units IntraVENous PRN Heather Fisher MD        heparin (porcine) injection 2,000 Units  2,000 Units IntraVENous PRN Heather Fisher MD        heparin 25,000 units in dextrose 5% 250 mL (premix) infusion  5-30 Units/kg/hr IntraVENous Continuous Heather Fisher MD 11.5 mL/hr at 12/13/24 0321 8 Units/kg/hr at 12/13/24 0321    furosemide (LASIX) injection 10 mg  10 mg IntraVENous Daily Heather Fisher MD   10 mg at 12/12/24 1740    metoprolol (LOPRESSOR) injection 5 mg  5 mg IntraVENous Q6H Heather Fisher MD   5 mg at 12/12/24 1740    vancomycin (VANCOCIN) intermittent dosing (placeholder)   Other RX Placeholder Nova Baker MD        vancomycin (VANCOCIN) 1500 mg in 300 mL IVPB  1,500 mg IntraVENous Q12H Nova Baker MD   Stopped at 12/13/24 0917       Impressions :     1. Principal Problem:    UTI (urinary tract infection)  Resolved Problems:    * No resolved hospital problems. *        2.  has a past medical history of Arthritis, CHF (congestive heart failure) (HCC), COPD (chronic obstructive pulmonary disease) (HCC), Diverticulitis, blood clots, and Pulmonary embolism (HCC).     Plans:     64-year-old female history of HFpEF, COPD, severe obesity BMI

## 2024-12-17 LAB
EKG ATRIAL RATE: 82 BPM
EKG P AXIS: 49 DEGREES
EKG P-R INTERVAL: 156 MS
EKG Q-T INTERVAL: 404 MS
EKG QRS DURATION: 100 MS
EKG QTC CALCULATION (BAZETT): 472 MS
EKG R AXIS: -43 DEGREES
EKG T AXIS: 32 DEGREES
EKG VENTRICULAR RATE: 82 BPM
MICROORGANISM SPEC CULT: NORMAL
MICROORGANISM SPEC CULT: NORMAL
SERVICE CMNT-IMP: NORMAL
SERVICE CMNT-IMP: NORMAL
SPECIMEN DESCRIPTION: NORMAL
SPECIMEN DESCRIPTION: NORMAL

## (undated) DEVICE — NEEDLE BNE MAR ASPIR 11GA L4IN TWO PC HNDL W/ PRB JAMSH

## (undated) DEVICE — MERCY HEALTH ST CHARLES: Brand: MEDLINE INDUSTRIES, INC.

## (undated) DEVICE — CONTAINER,SPECIMEN,4OZ,OR STRL: Brand: MEDLINE

## (undated) DEVICE — BANDAGE COMPR W4INXL5YD WHT BGE POLY COT M E WRP WV HK AND

## (undated) DEVICE — GAUZE,SPONGE,FLUFF,6"X6.75",STRL,5/TRAY: Brand: MEDLINE

## (undated) DEVICE — SOLUTION IV IRRIG POUR BRL 0.9% SODIUM CHL 2F7124

## (undated) DEVICE — GLOVE ORTHO 7 1/2   MSG9475

## (undated) DEVICE — DRESSING GZ W3XL16IN CELOS ACETT OIL EMUL N ADH

## (undated) DEVICE — SINGLE PORT MANIFOLD: Brand: NEPTUNE 2

## (undated) DEVICE — Z DUP USE 2428697 DRESSING POSTOP AG PRISMASEAL 3.5X4IN

## (undated) DEVICE — STRIP WND PK W0.25INXL5YD IODO GZ TIGHTLY WVN CURAD

## (undated) DEVICE — Device

## (undated) DEVICE — BANDAGE,GAUZE,BULKEE II,4.5"X4.1YD,STRL: Brand: MEDLINE

## (undated) DEVICE — Z DISCONTINUED NO SUB IDED JR BANDAGE COMPR ELASTIC 6 IN ACE

## (undated) DEVICE — SUTURE ETHLN SZ 4-0 L18IN NONABSORBABLE BLK L24MM FS-1 3/8 1629H